# Patient Record
Sex: MALE | Race: WHITE | Employment: OTHER | URBAN - METROPOLITAN AREA
[De-identification: names, ages, dates, MRNs, and addresses within clinical notes are randomized per-mention and may not be internally consistent; named-entity substitution may affect disease eponyms.]

---

## 2017-01-12 RX ORDER — ATORVASTATIN CALCIUM 80 MG/1
TABLET, FILM COATED ORAL
Qty: 90 TABLET | Refills: 1 | Status: SHIPPED | OUTPATIENT
Start: 2017-01-12 | End: 2017-01-13 | Stop reason: SDUPTHER

## 2017-01-13 ENCOUNTER — TELEPHONE (OUTPATIENT)
Dept: CARDIOLOGY CLINIC | Age: 72
End: 2017-01-13

## 2017-01-13 RX ORDER — ATORVASTATIN CALCIUM 80 MG/1
TABLET, FILM COATED ORAL
Qty: 90 TABLET | Refills: 1 | Status: SHIPPED | OUTPATIENT
Start: 2017-01-13 | End: 2017-09-22 | Stop reason: SDUPTHER

## 2017-02-27 RX ORDER — RAMIPRIL 5 MG/1
CAPSULE ORAL
Qty: 90 CAPSULE | Refills: 3 | Status: SHIPPED | OUTPATIENT
Start: 2017-02-27 | End: 2017-12-07 | Stop reason: ALTCHOICE

## 2017-04-25 ENCOUNTER — OFFICE VISIT (OUTPATIENT)
Dept: INTERNAL MEDICINE CLINIC | Age: 72
End: 2017-04-25

## 2017-04-25 VITALS
HEART RATE: 72 BPM | SYSTOLIC BLOOD PRESSURE: 132 MMHG | BODY MASS INDEX: 37.1 KG/M2 | WEIGHT: 244 LBS | DIASTOLIC BLOOD PRESSURE: 60 MMHG

## 2017-04-25 DIAGNOSIS — M54.50 ACUTE RIGHT-SIDED LOW BACK PAIN WITHOUT SCIATICA: ICD-10-CM

## 2017-04-25 DIAGNOSIS — R25.1 TREMOR: Primary | ICD-10-CM

## 2017-04-25 PROCEDURE — 1123F ACP DISCUSS/DSCN MKR DOCD: CPT | Performed by: INTERNAL MEDICINE

## 2017-04-25 PROCEDURE — 1036F TOBACCO NON-USER: CPT | Performed by: INTERNAL MEDICINE

## 2017-04-25 PROCEDURE — 3017F COLORECTAL CA SCREEN DOC REV: CPT | Performed by: INTERNAL MEDICINE

## 2017-04-25 PROCEDURE — G8417 CALC BMI ABV UP PARAM F/U: HCPCS | Performed by: INTERNAL MEDICINE

## 2017-04-25 PROCEDURE — 4040F PNEUMOC VAC/ADMIN/RCVD: CPT | Performed by: INTERNAL MEDICINE

## 2017-04-25 PROCEDURE — 99214 OFFICE O/P EST MOD 30 MIN: CPT | Performed by: INTERNAL MEDICINE

## 2017-04-25 PROCEDURE — G8428 CUR MEDS NOT DOCUMENT: HCPCS | Performed by: INTERNAL MEDICINE

## 2017-04-25 PROCEDURE — G8599 NO ASA/ANTIPLAT THER USE RNG: HCPCS | Performed by: INTERNAL MEDICINE

## 2017-04-25 RX ORDER — TIZANIDINE 4 MG/1
4 TABLET ORAL EVERY 6 HOURS PRN
Qty: 40 TABLET | Refills: 0 | Status: SHIPPED | OUTPATIENT
Start: 2017-04-25 | End: 2019-06-11

## 2017-05-04 ENCOUNTER — TELEPHONE (OUTPATIENT)
Dept: INTERNAL MEDICINE CLINIC | Age: 72
End: 2017-05-04

## 2017-05-04 RX ORDER — PROPRANOLOL HYDROCHLORIDE 80 MG/1
80 TABLET ORAL 2 TIMES DAILY
Qty: 90 TABLET | Refills: 3 | Status: SHIPPED | OUTPATIENT
Start: 2017-05-04 | End: 2017-11-07 | Stop reason: SDUPTHER

## 2017-05-04 RX ORDER — PROPRANOLOL HYDROCHLORIDE 80 MG/1
80 TABLET ORAL 2 TIMES DAILY
COMMUNITY
End: 2017-05-04 | Stop reason: SDUPTHER

## 2017-09-25 RX ORDER — ATORVASTATIN CALCIUM 80 MG/1
TABLET, FILM COATED ORAL
Qty: 30 TABLET | Refills: 2 | Status: SHIPPED | OUTPATIENT
Start: 2017-09-25 | End: 2017-11-27 | Stop reason: SDUPTHER

## 2017-10-04 DIAGNOSIS — Z23 NEED FOR INFLUENZA VACCINATION: Primary | ICD-10-CM

## 2017-10-04 PROCEDURE — G0008 ADMIN INFLUENZA VIRUS VAC: HCPCS | Performed by: INTERNAL MEDICINE

## 2017-10-04 PROCEDURE — 90662 IIV NO PRSV INCREASED AG IM: CPT | Performed by: INTERNAL MEDICINE

## 2017-11-07 RX ORDER — PROPRANOLOL HYDROCHLORIDE 80 MG/1
TABLET ORAL
Qty: 180 TABLET | Refills: 3 | Status: SHIPPED | OUTPATIENT
Start: 2017-11-07 | End: 2017-12-07 | Stop reason: ALTCHOICE

## 2017-11-28 RX ORDER — ATORVASTATIN CALCIUM 80 MG/1
TABLET, FILM COATED ORAL
Qty: 90 TABLET | Refills: 0 | Status: SHIPPED | OUTPATIENT
Start: 2017-11-28 | End: 2018-06-18 | Stop reason: SDUPTHER

## 2017-12-01 ENCOUNTER — PATIENT MESSAGE (OUTPATIENT)
Dept: CARDIOLOGY CLINIC | Age: 72
End: 2017-12-01

## 2017-12-04 NOTE — TELEPHONE ENCOUNTER
From: Alejandro Tejeda  To: Chevy Cardenas MD  Sent: 12/1/2017 3:25 PM EST  Subject: Non-Urgent Medical Question    The online My chart tells me there is an order for a lipid panel ordered by Dr. Belinda Carpenter, that expires 12/28/17. I want to have it drawn at [facility] in the next few days, so results are back by my appointment on Dec 7. Do I need a printed order or does the lab have the order in the system?

## 2017-12-07 ENCOUNTER — OFFICE VISIT (OUTPATIENT)
Dept: CARDIOLOGY CLINIC | Age: 72
End: 2017-12-07

## 2017-12-07 ENCOUNTER — HOSPITAL ENCOUNTER (OUTPATIENT)
Dept: OTHER | Age: 72
Discharge: HOME | End: 2017-12-07
Attending: INTERNAL MEDICINE | Admitting: INTERNAL MEDICINE

## 2017-12-07 VITALS
BODY MASS INDEX: 35.16 KG/M2 | SYSTOLIC BLOOD PRESSURE: 112 MMHG | WEIGHT: 232 LBS | DIASTOLIC BLOOD PRESSURE: 58 MMHG | HEIGHT: 68 IN | HEART RATE: 58 BPM

## 2017-12-07 DIAGNOSIS — E78.5 HYPERLIPIDEMIA, UNSPECIFIED HYPERLIPIDEMIA TYPE: ICD-10-CM

## 2017-12-07 DIAGNOSIS — I10 ESSENTIAL HYPERTENSION: ICD-10-CM

## 2017-12-07 DIAGNOSIS — G20 PARKINSON DISEASE (HCC): ICD-10-CM

## 2017-12-07 DIAGNOSIS — I25.10 ATHEROSCLEROSIS OF NATIVE CORONARY ARTERY OF NATIVE HEART WITHOUT ANGINA PECTORIS: Primary | ICD-10-CM

## 2017-12-07 DIAGNOSIS — R07.9 CHEST PAIN, UNSPECIFIED TYPE: ICD-10-CM

## 2017-12-07 LAB
ALBUMIN SERPL-MCNC: 4.2 G/DL (ref 3.4–5)
ALP BLD-CCNC: 84 U/L (ref 40–129)
ALT SERPL-CCNC: 33 U/L (ref 10–40)
ANION GAP SERPL CALCULATED.3IONS-SCNC: 15 MMOL/L (ref 3–16)
AST SERPL-CCNC: 23 U/L (ref 15–37)
BILIRUB SERPL-MCNC: 0.5 MG/DL (ref 0–1)
BILIRUBIN DIRECT: <0.2 MG/DL (ref 0–0.3)
BILIRUBIN, INDIRECT: NORMAL MG/DL (ref 0–1)
BUN BLDV-MCNC: 15 MG/DL (ref 7–20)
CALCIUM SERPL-MCNC: 9.5 MG/DL (ref 8.3–10.6)
CHLORIDE BLD-SCNC: 103 MMOL/L (ref 99–110)
CHOLESTEROL, TOTAL: 151 MG/DL (ref 0–199)
CO2: 25 MMOL/L (ref 21–32)
CREAT SERPL-MCNC: 0.8 MG/DL (ref 0.8–1.3)
GFR AFRICAN AMERICAN: >60
GFR NON-AFRICAN AMERICAN: >60
GLUCOSE BLD-MCNC: 104 MG/DL (ref 70–99)
HDLC SERPL-MCNC: 41 MG/DL (ref 40–60)
LDL CHOLESTEROL CALCULATED: 88 MG/DL
POTASSIUM SERPL-SCNC: 4.3 MMOL/L (ref 3.5–5.1)
SODIUM BLD-SCNC: 143 MMOL/L (ref 136–145)
TOTAL PROTEIN: 6.8 G/DL (ref 6.4–8.2)
TRIGL SERPL-MCNC: 109 MG/DL (ref 0–150)
VLDLC SERPL CALC-MCNC: 22 MG/DL

## 2017-12-07 PROCEDURE — 3017F COLORECTAL CA SCREEN DOC REV: CPT | Performed by: INTERNAL MEDICINE

## 2017-12-07 PROCEDURE — G8417 CALC BMI ABV UP PARAM F/U: HCPCS | Performed by: INTERNAL MEDICINE

## 2017-12-07 PROCEDURE — G8599 NO ASA/ANTIPLAT THER USE RNG: HCPCS | Performed by: INTERNAL MEDICINE

## 2017-12-07 PROCEDURE — 1036F TOBACCO NON-USER: CPT | Performed by: INTERNAL MEDICINE

## 2017-12-07 PROCEDURE — 4040F PNEUMOC VAC/ADMIN/RCVD: CPT | Performed by: INTERNAL MEDICINE

## 2017-12-07 PROCEDURE — G8484 FLU IMMUNIZE NO ADMIN: HCPCS | Performed by: INTERNAL MEDICINE

## 2017-12-07 PROCEDURE — 1123F ACP DISCUSS/DSCN MKR DOCD: CPT | Performed by: INTERNAL MEDICINE

## 2017-12-07 PROCEDURE — 99214 OFFICE O/P EST MOD 30 MIN: CPT | Performed by: INTERNAL MEDICINE

## 2017-12-07 PROCEDURE — G8427 DOCREV CUR MEDS BY ELIG CLIN: HCPCS | Performed by: INTERNAL MEDICINE

## 2017-12-07 RX ORDER — PROPRANOLOL HYDROCHLORIDE 40 MG/1
40 TABLET ORAL DAILY
COMMUNITY
End: 2018-06-18

## 2017-12-07 RX ORDER — NITROGLYCERIN 0.4 MG/1
0.4 TABLET SUBLINGUAL EVERY 5 MIN PRN
Qty: 25 TABLET | Refills: 3 | Status: SHIPPED | OUTPATIENT
Start: 2017-12-07 | End: 2018-08-21 | Stop reason: SDUPTHER

## 2017-12-07 RX ORDER — PRIMIDONE 50 MG/1
50 TABLET ORAL DAILY
COMMUNITY

## 2017-12-07 NOTE — PATIENT INSTRUCTIONS
Mr. Marlen Brown has been stable cardiac wise. Doing well and no cardiac testing at this time but if he have any changes or if chest tightness persists. 1. No med changes. Contiue inderal 40 mg anfd stop ramipril. 2. Aoid cold weather exertion. 3. Will continue with risk factor modifications. 4. Return for regular follow up in 6 months. 5. Slip given for lipid panel.

## 2017-12-07 NOTE — PROGRESS NOTES
TAKE 1 CAPSULE EVERY DAY 90 capsule 3    omeprazole (PRILOSEC) 20 MG delayed release capsule Take 20 mg by mouth daily      aspirin 81 MG tablet Take 1 tablet by mouth daily 30 tablet 0    nitroGLYCERIN (NITROSTAT) 0.4 MG SL tablet Place 1 tablet under the tongue every 5 minutes as needed 25 tablet 3    Psyllium 500 MG CAPS Take by mouth 2 times daily      fish oil-omega-3 fatty acids 1000 MG capsule Take 2 g by mouth daily.  multivitamin (THERAGRAN) per tablet Take 1 tablet by mouth daily.  propranolol (INDERAL) 80 MG tablet TAKE 1 TABLET TWICE DAILY (SUBSTITUTED FOR INDERAL) 180 tablet 3    tiZANidine (ZANAFLEX) 4 MG tablet Take 1 tablet by mouth every 6 hours as needed (pain) 40 tablet 0     No current facility-administered medications for this visit. Allergies:  Amoxicillin-pot clavulanate     Review of Systems:   · Constitutional: there has been no unanticipated weight loss. There's been no change in energy level, sleep pattern, or activity level. · Eyes: No visual changes or diplopia. No scleral icterus. · ENT: No Headaches, hearing loss or vertigo. No mouth sores or sore throat. · Cardiovascular: Reviewed in HPI  · Respiratory: No cough or wheezing, no sputum production. No hematemesis. · Gastrointestinal: No abdominal pain, appetite loss, blood in stools. No change in bowel or bladder habits. · Genitourinary: No dysuria, trouble voiding, or hematuria. · Musculoskeletal:  No gait disturbance, weakness or joint complaints. · Integumentary: No rash or pruritis. · Neurological: No headache, diplopia, change in muscle strength, numbness or tingling. No change in gait, balance, coordination, mood, affect, memory, mentation, behavior. · Psychiatric: No anxiety, no depression. · Endocrine: No malaise, fatigue or temperature intolerance. No excessive thirst, fluid intake, or urination. No tremor.   · Hematologic/Lymphatic: No abnormal bruising or bleeding, blood clots or swollen lymph nodes. · Allergic/Immunologic: No nasal congestion or hives. Physical Examination:    Vitals:    12/07/17 1255   BP: (!) 112/58   Pulse: 58        Constitutional and General Appearance: in NAD  Skin:good turgor,intact without lesions  HEENT: EOMI ,normal  Neck:no JVD  Respiratory:  · Normal excursion and expansion without use of accessory muscles  · Resp Auscultation: Normal breath sounds without dullness  Cardiovascular:  · The apical impulses not displaced  · Heart tones are crisp and normal  · Cervical veins are not engorged  · The carotid upstroke is normal in amplitude and contour without delay. No bruit heard on physical.   · Peripheral pulses are symmetrical and full  · There is no clubbing, cyanosis of the extremities. · No edema  · Femoral Arteries: 2+ and equal  · Pedal Pulses: 2+ and equal   Abdomen:  · No masses or tenderness  · Liver/Spleen: No Abnormalities Noted  Neurological/Psychiatric:  · Alert and oriented in all spheres  · Moves all extremities well  · Exhibits normal gait balance and coordination  · No abnormalities of mood, affect, memory, mentation, or behavior are noted    Assessment:    1. Coronary atherosclerosis of native coronary artery: Stable. 2005 Cath-- stent in LAD  2006 Cath--Patent stent in lad -nonobstructive circumflex disease. 1/10 GXT Myoview Normal perfusion. An angiogram was normal.   Cath 4/2015--Normal LV function with EF 60, widely patent LAD stent, 30% distal CFx. 2. Hypertension: Stable. Blood pressure 122/56, pulse 60 sitting BP drops about 10 points with standing most likely due to the parkinson's. 3. Hyperlipemia: Currently on Lipitor 80 mg and fish oil. 4.  Carotid disease: Dopplers 2012-- 41-67% MINA, 4-63% LICA. 5.  Tremors, chronic: Essential familial. Taking Inderal and primidone. 6.   Chest pain, chronic: Intermittent. Normal angiogram 2015. 7. Parkinson disease--newly diagnosed and on carbidopa.      Plan:  Mr. Iris Domínguez has been stable cardiac wise. Doing well and no cardiac testing at this time but if he have any changes or if chest tightness persists. 1. No med changes. Contiue inderal 40 mg anfd stop ramipril. 2. Aoid cold weather exertion. 3. Will continue with risk factor modifications. 4. Return for regular follow up in 6 months. 5. Slip given for lipid panel. I appreciate the opportunity of cooperating in the care of this individual.    Jing Cheng.  Millie Cage M.D., [facility] [place]

## 2018-06-18 ENCOUNTER — OFFICE VISIT (OUTPATIENT)
Dept: INTERNAL MEDICINE CLINIC | Age: 73
End: 2018-06-18

## 2018-06-18 ENCOUNTER — OFFICE VISIT (OUTPATIENT)
Dept: CARDIOLOGY CLINIC | Age: 73
End: 2018-06-18

## 2018-06-18 VITALS
WEIGHT: 237 LBS | SYSTOLIC BLOOD PRESSURE: 114 MMHG | HEART RATE: 93 BPM | BODY MASS INDEX: 35.92 KG/M2 | OXYGEN SATURATION: 95 % | DIASTOLIC BLOOD PRESSURE: 78 MMHG | HEIGHT: 68 IN

## 2018-06-18 VITALS
WEIGHT: 236 LBS | HEIGHT: 68 IN | BODY MASS INDEX: 35.77 KG/M2 | SYSTOLIC BLOOD PRESSURE: 100 MMHG | HEART RATE: 72 BPM | DIASTOLIC BLOOD PRESSURE: 58 MMHG

## 2018-06-18 DIAGNOSIS — I10 ESSENTIAL HYPERTENSION: Primary | ICD-10-CM

## 2018-06-18 DIAGNOSIS — R06.02 SHORTNESS OF BREATH: Chronic | ICD-10-CM

## 2018-06-18 DIAGNOSIS — E78.49 OTHER HYPERLIPIDEMIA: ICD-10-CM

## 2018-06-18 DIAGNOSIS — E78.49 OTHER HYPERLIPIDEMIA: Chronic | ICD-10-CM

## 2018-06-18 DIAGNOSIS — I25.10 ATHEROSCLEROSIS OF NATIVE CORONARY ARTERY OF NATIVE HEART WITHOUT ANGINA PECTORIS: Primary | Chronic | ICD-10-CM

## 2018-06-18 DIAGNOSIS — I10 ESSENTIAL HYPERTENSION: ICD-10-CM

## 2018-06-18 DIAGNOSIS — G20 PARKINSON'S DISEASE (HCC): ICD-10-CM

## 2018-06-18 DIAGNOSIS — R42 DIZZINESS: ICD-10-CM

## 2018-06-18 DIAGNOSIS — R07.9 CHEST PAIN, UNSPECIFIED TYPE: Chronic | ICD-10-CM

## 2018-06-18 DIAGNOSIS — R25.1 TREMOR: ICD-10-CM

## 2018-06-18 PROCEDURE — 99214 OFFICE O/P EST MOD 30 MIN: CPT | Performed by: INTERNAL MEDICINE

## 2018-06-18 PROCEDURE — G8417 CALC BMI ABV UP PARAM F/U: HCPCS | Performed by: INTERNAL MEDICINE

## 2018-06-18 PROCEDURE — 1036F TOBACCO NON-USER: CPT | Performed by: INTERNAL MEDICINE

## 2018-06-18 PROCEDURE — 1123F ACP DISCUSS/DSCN MKR DOCD: CPT | Performed by: INTERNAL MEDICINE

## 2018-06-18 PROCEDURE — 3017F COLORECTAL CA SCREEN DOC REV: CPT | Performed by: INTERNAL MEDICINE

## 2018-06-18 PROCEDURE — 4040F PNEUMOC VAC/ADMIN/RCVD: CPT | Performed by: INTERNAL MEDICINE

## 2018-06-18 PROCEDURE — G8427 DOCREV CUR MEDS BY ELIG CLIN: HCPCS | Performed by: INTERNAL MEDICINE

## 2018-06-18 PROCEDURE — G8599 NO ASA/ANTIPLAT THER USE RNG: HCPCS | Performed by: INTERNAL MEDICINE

## 2018-06-18 RX ORDER — ATORVASTATIN CALCIUM 80 MG/1
80 TABLET, FILM COATED ORAL DAILY
Qty: 90 TABLET | Refills: 3 | Status: CANCELLED | OUTPATIENT
Start: 2018-06-18

## 2018-06-18 RX ORDER — ATORVASTATIN CALCIUM 80 MG/1
80 TABLET, FILM COATED ORAL DAILY
Qty: 90 TABLET | Refills: 3 | Status: SHIPPED | OUTPATIENT
Start: 2018-06-18 | End: 2019-01-09 | Stop reason: SDUPTHER

## 2018-06-18 RX ORDER — METOPROLOL SUCCINATE 50 MG/1
50 TABLET, EXTENDED RELEASE ORAL DAILY
Qty: 90 TABLET | Refills: 3 | Status: SHIPPED | OUTPATIENT
Start: 2018-06-18 | End: 2019-01-09 | Stop reason: SDUPTHER

## 2018-06-18 ASSESSMENT — PATIENT HEALTH QUESTIONNAIRE - PHQ9
2. FEELING DOWN, DEPRESSED OR HOPELESS: 0
1. LITTLE INTEREST OR PLEASURE IN DOING THINGS: 1
SUM OF ALL RESPONSES TO PHQ QUESTIONS 1-9: 1
SUM OF ALL RESPONSES TO PHQ9 QUESTIONS 1 & 2: 1

## 2018-06-27 ENCOUNTER — HOSPITAL ENCOUNTER (OUTPATIENT)
Dept: OTHER | Age: 73
Discharge: HOME | End: 2018-06-27
Attending: INTERNAL MEDICINE | Admitting: INTERNAL MEDICINE

## 2018-06-27 DIAGNOSIS — G20 PARKINSON DISEASE (HCC): ICD-10-CM

## 2018-06-27 DIAGNOSIS — I10 ESSENTIAL HYPERTENSION: ICD-10-CM

## 2018-06-27 DIAGNOSIS — I25.10 ATHEROSCLEROSIS OF NATIVE CORONARY ARTERY OF NATIVE HEART WITHOUT ANGINA PECTORIS: ICD-10-CM

## 2018-06-27 DIAGNOSIS — E78.5 HYPERLIPIDEMIA, UNSPECIFIED HYPERLIPIDEMIA TYPE: ICD-10-CM

## 2018-06-27 DIAGNOSIS — R07.9 CHEST PAIN, UNSPECIFIED TYPE: ICD-10-CM

## 2018-06-27 LAB
A/G RATIO: 1.5 (ref 1.1–2.2)
ALBUMIN SERPL-MCNC: 4 G/DL (ref 3.4–5)
ALP BLD-CCNC: 89 U/L (ref 40–129)
ALT SERPL-CCNC: 18 U/L (ref 10–40)
ANION GAP SERPL CALCULATED.3IONS-SCNC: 15 MMOL/L (ref 3–16)
AST SERPL-CCNC: 34 U/L (ref 15–37)
BILIRUB SERPL-MCNC: 0.4 MG/DL (ref 0–1)
BILIRUBIN DIRECT: <0.2 MG/DL (ref 0–0.3)
BILIRUBIN, INDIRECT: NORMAL MG/DL (ref 0–1)
BUN BLDV-MCNC: 15 MG/DL (ref 7–20)
CALCIUM SERPL-MCNC: 9.2 MG/DL (ref 8.3–10.6)
CHLORIDE BLD-SCNC: 105 MMOL/L (ref 99–110)
CO2: 25 MMOL/L (ref 21–32)
CREAT SERPL-MCNC: 0.8 MG/DL (ref 0.8–1.3)
GFR AFRICAN AMERICAN: >60
GFR NON-AFRICAN AMERICAN: >60
GLOBULIN: 2.6 G/DL
GLUCOSE BLD-MCNC: 98 MG/DL (ref 70–99)
POTASSIUM SERPL-SCNC: 4.4 MMOL/L (ref 3.5–5.1)
SODIUM BLD-SCNC: 145 MMOL/L (ref 136–145)
TOTAL PROTEIN: 6.6 G/DL (ref 6.4–8.2)
TSH REFLEX: 2.36 UIU/ML (ref 0.27–4.2)

## 2018-08-21 ENCOUNTER — TELEPHONE (OUTPATIENT)
Dept: CARDIOLOGY CLINIC | Age: 73
End: 2018-08-21

## 2018-08-21 DIAGNOSIS — I25.10 ATHEROSCLEROSIS OF NATIVE CORONARY ARTERY OF NATIVE HEART WITHOUT ANGINA PECTORIS: ICD-10-CM

## 2018-08-21 DIAGNOSIS — E78.5 HYPERLIPIDEMIA, UNSPECIFIED HYPERLIPIDEMIA TYPE: ICD-10-CM

## 2018-08-21 DIAGNOSIS — R07.9 CHEST PAIN, UNSPECIFIED TYPE: ICD-10-CM

## 2018-08-21 DIAGNOSIS — G20 PARKINSON DISEASE (HCC): ICD-10-CM

## 2018-08-21 DIAGNOSIS — I10 ESSENTIAL HYPERTENSION: ICD-10-CM

## 2018-08-21 RX ORDER — NITROGLYCERIN 0.4 MG/1
0.4 TABLET SUBLINGUAL EVERY 5 MIN PRN
Qty: 25 TABLET | Refills: 3 | Status: SHIPPED | OUTPATIENT
Start: 2018-08-21 | End: 2020-02-12 | Stop reason: SDUPTHER

## 2018-10-03 DIAGNOSIS — Z23 NEED FOR INFLUENZA VACCINATION: Primary | ICD-10-CM

## 2018-10-03 PROCEDURE — 90662 IIV NO PRSV INCREASED AG IM: CPT | Performed by: INTERNAL MEDICINE

## 2018-10-03 PROCEDURE — G0008 ADMIN INFLUENZA VIRUS VAC: HCPCS | Performed by: INTERNAL MEDICINE

## 2018-12-18 ENCOUNTER — OFFICE VISIT (OUTPATIENT)
Dept: INTERNAL MEDICINE CLINIC | Age: 73
End: 2018-12-18
Payer: MEDICARE

## 2018-12-18 VITALS
BODY MASS INDEX: 36.19 KG/M2 | DIASTOLIC BLOOD PRESSURE: 60 MMHG | HEART RATE: 68 BPM | WEIGHT: 238 LBS | SYSTOLIC BLOOD PRESSURE: 122 MMHG

## 2018-12-18 DIAGNOSIS — R25.1 TREMOR: ICD-10-CM

## 2018-12-18 DIAGNOSIS — E78.49 OTHER HYPERLIPIDEMIA: Primary | ICD-10-CM

## 2018-12-18 DIAGNOSIS — G20 PARKINSON'S DISEASE (HCC): ICD-10-CM

## 2018-12-18 PROCEDURE — 1101F PT FALLS ASSESS-DOCD LE1/YR: CPT | Performed by: INTERNAL MEDICINE

## 2018-12-18 PROCEDURE — 1036F TOBACCO NON-USER: CPT | Performed by: INTERNAL MEDICINE

## 2018-12-18 PROCEDURE — 4040F PNEUMOC VAC/ADMIN/RCVD: CPT | Performed by: INTERNAL MEDICINE

## 2018-12-18 PROCEDURE — 3017F COLORECTAL CA SCREEN DOC REV: CPT | Performed by: INTERNAL MEDICINE

## 2018-12-18 PROCEDURE — 1123F ACP DISCUSS/DSCN MKR DOCD: CPT | Performed by: INTERNAL MEDICINE

## 2018-12-18 PROCEDURE — 99214 OFFICE O/P EST MOD 30 MIN: CPT | Performed by: INTERNAL MEDICINE

## 2018-12-18 PROCEDURE — G8482 FLU IMMUNIZE ORDER/ADMIN: HCPCS | Performed by: INTERNAL MEDICINE

## 2018-12-18 PROCEDURE — G8599 NO ASA/ANTIPLAT THER USE RNG: HCPCS | Performed by: INTERNAL MEDICINE

## 2018-12-18 PROCEDURE — G8428 CUR MEDS NOT DOCUMENT: HCPCS | Performed by: INTERNAL MEDICINE

## 2018-12-18 PROCEDURE — G8417 CALC BMI ABV UP PARAM F/U: HCPCS | Performed by: INTERNAL MEDICINE

## 2018-12-18 NOTE — PROGRESS NOTES
Chief Complaint   Patient presents with    Coronary Artery Disease    Tremors       Richie Gregory 72 y.o. male is here for follow-up of hypertension Hyperlipidemia, tremor, Parkinson's disease,  Is having some problems with orthostatic dizziness. These are worse in the morning when first arising from bed. They're not associated with symptoms of true vertigo. He has some orthostatic symptoms related to his Parkinson's disease and his ongoing treatment. Current Outpatient Prescriptions on File Prior to Visit   Medication Sig Dispense Refill    metoprolol succinate (TOPROL XL) 50 MG extended release tablet Take 1 tablet by mouth daily 90 tablet 3    atorvastatin (LIPITOR) 80 MG tablet Take 1 tablet by mouth daily 90 tablet 3    primidone (MYSOLINE) 50 MG tablet Take 50 mg by mouth daily      carbidopa-levodopa (SINEMET)  MG per tablet Take 2 tablets by mouth 3 times daily       nitroGLYCERIN (NITROSTAT) 0.4 MG SL tablet Place 1 tablet under the tongue every 5 minutes as needed (prn) 25 tablet 3    tiZANidine (ZANAFLEX) 4 MG tablet Take 1 tablet by mouth every 6 hours as needed (pain) 40 tablet 0    omeprazole (PRILOSEC) 20 MG delayed release capsule Take 20 mg by mouth daily      aspirin 81 MG tablet Take 1 tablet by mouth daily 30 tablet 0    Psyllium 500 MG CAPS Take by mouth 2 times daily      fish oil-omega-3 fatty acids 1000 MG capsule Take 2 g by mouth daily.  multivitamin (THERAGRAN) per tablet Take 1 tablet by mouth daily. No current facility-administered medications on file prior to visit.         Past Medical History:   Diagnosis Date    Acute bronchitis     history of    CAD (coronary artery disease)     Hyperlipidemia            BP (!) 100/58   Pulse 72   Ht 5' 8\" (1.727 m)   Wt 236 lb (107 kg)   BMI 35.88 kg/m²     General Appearance:  Alert, cooperative, no distress, appears stated age   Head:  Normocephalic, without obvious abnormality, atraumatic   Neck: Supple, symmetrical, trachea midline, no adenopathy, thyroid: not enlarged, symmetric, no tenderness/mass/nodules,    Lungs:   Clear to auscultation bilaterally, respirations unlabored   Chest Wall:  No tenderness or deformity   Heart:  Regular rate and rhythm, S1, S2 normal, no murmur, rub or gallop   Abdomen:   Soft, non-tender, bowel sounds active all four quadrants,  no masses, no organomegaly   Skin: Skin color, texture, turgor normal, no rashes or lesions   Lymph nodes: Cervical, supraclavicular  Adenopathy is absent   Neurologic:  's tremor continues to have the characteristics of Parkinson's and essential tremor although I think quite clearly I appreciate cogwheel rigidity on today's examination, looks like he is developing masked facies       No components found for: CHLPL  Lab Results   Component Value Date    TRIG 109 12/07/2017    TRIG 145 12/15/2015    TRIG 138 11/25/2014     Lab Results   Component Value Date    HDL 41 12/07/2017    HDL 39 (L) 12/15/2015    HDL 38 (L) 11/25/2014     Lab Results   Component Value Date    LDLCALC 88 12/07/2017    LDLCALC 71 12/15/2015    LDLCALC 86 11/25/2014     Lab Results   Component Value Date    LABVLDL 22 12/07/2017    LABVLDL 29 12/15/2015    LABVLDL 28 11/25/2014     Lab Results   Component Value Date    CREATININE 0.8 12/07/2017         ASSESSMENT/PLAN[de-identified]     Diagnosis Orders   1. Other hyperlipidemia  Comprehensive Metabolic Panel    Lipid Panel    TSH WITH REFLEX TO FT4   2. Parkinson's disease ([address]     3. Tremor         We'll obtain fasting laboratory Make sure there is not a metabolic reason contributing to his dizziness but I believe it is from a combination of orthostatic hypotension from Parkinson's disease  Sinemet and the beta blocker    He had been started on the beta blocker initially for essential tremor, also has known coronary artery disease.     I discussed with the patient and his wife was in attendance during the visit the possibility of cutting back on the    They are concerned that he has multiple physicians changing his medications and I elected to defer discontinuation of the beta blocker until after he sees the cardiologist.  Discontinuation of the beta blocker may at least in part help with the orthostasis although he still has underlying Parkinson's disease and autonomic dysfunction may be playing a significant.     Ordered fasting laboratory    He is on high-dose treatment for hyperlipidemia

## 2018-12-19 ENCOUNTER — HOSPITAL ENCOUNTER (OUTPATIENT)
Age: 73
Discharge: HOME | End: 2018-12-19
Payer: MEDICARE

## 2018-12-19 DIAGNOSIS — E78.49 OTHER HYPERLIPIDEMIA: ICD-10-CM

## 2018-12-19 LAB
A/G RATIO: 1.5 (ref 1.1–2.2)
ALBUMIN SERPL-MCNC: 4.1 G/DL (ref 3.4–5)
ALP BLD-CCNC: 90 U/L (ref 40–129)
ALT SERPL-CCNC: 36 U/L (ref 10–40)
ANION GAP SERPL CALCULATED.3IONS-SCNC: 11 MMOL/L (ref 3–16)
AST SERPL-CCNC: 26 U/L (ref 15–37)
BILIRUB SERPL-MCNC: 0.3 MG/DL (ref 0–1)
BUN BLDV-MCNC: 17 MG/DL (ref 7–20)
CALCIUM SERPL-MCNC: 9.4 MG/DL (ref 8.3–10.6)
CHLORIDE BLD-SCNC: 105 MMOL/L (ref 99–110)
CHOLESTEROL, TOTAL: 144 MG/DL (ref 0–199)
CO2: 28 MMOL/L (ref 21–32)
CREAT SERPL-MCNC: 0.8 MG/DL (ref 0.8–1.3)
GFR AFRICAN AMERICAN: >60
GFR NON-AFRICAN AMERICAN: >60
GLOBULIN: 2.7 G/DL
GLUCOSE BLD-MCNC: 97 MG/DL (ref 70–99)
HDLC SERPL-MCNC: 42 MG/DL (ref 40–60)
LDL CHOLESTEROL CALCULATED: 84 MG/DL
POTASSIUM SERPL-SCNC: 5.1 MMOL/L (ref 3.5–5.1)
SODIUM BLD-SCNC: 144 MMOL/L (ref 136–145)
TOTAL PROTEIN: 6.8 G/DL (ref 6.4–8.2)
TRIGL SERPL-MCNC: 90 MG/DL (ref 0–150)
TSH REFLEX FT4: 2.27 UIU/ML (ref 0.27–4.2)
VLDLC SERPL CALC-MCNC: 18 MG/DL

## 2018-12-19 PROCEDURE — 84443 ASSAY THYROID STIM HORMONE: CPT

## 2018-12-19 PROCEDURE — 80061 LIPID PANEL: CPT

## 2018-12-19 PROCEDURE — 36415 COLL VENOUS BLD VENIPUNCTURE: CPT

## 2018-12-19 PROCEDURE — 80053 COMPREHEN METABOLIC PANEL: CPT

## 2019-01-09 ENCOUNTER — OFFICE VISIT (OUTPATIENT)
Dept: CARDIOLOGY CLINIC | Age: 74
End: 2019-01-09
Payer: MEDICARE

## 2019-01-09 VITALS
SYSTOLIC BLOOD PRESSURE: 122 MMHG | HEIGHT: 68 IN | DIASTOLIC BLOOD PRESSURE: 66 MMHG | WEIGHT: 240.8 LBS | HEART RATE: 68 BPM | BODY MASS INDEX: 36.49 KG/M2

## 2019-01-09 DIAGNOSIS — I10 ESSENTIAL HYPERTENSION: ICD-10-CM

## 2019-01-09 DIAGNOSIS — I25.10 ATHEROSCLEROSIS OF NATIVE CORONARY ARTERY OF NATIVE HEART WITHOUT ANGINA PECTORIS: Primary | Chronic | ICD-10-CM

## 2019-01-09 DIAGNOSIS — E78.49 OTHER HYPERLIPIDEMIA: Chronic | ICD-10-CM

## 2019-01-09 PROCEDURE — 1123F ACP DISCUSS/DSCN MKR DOCD: CPT | Performed by: INTERNAL MEDICINE

## 2019-01-09 PROCEDURE — 99214 OFFICE O/P EST MOD 30 MIN: CPT | Performed by: INTERNAL MEDICINE

## 2019-01-09 PROCEDURE — G8417 CALC BMI ABV UP PARAM F/U: HCPCS | Performed by: INTERNAL MEDICINE

## 2019-01-09 PROCEDURE — G8482 FLU IMMUNIZE ORDER/ADMIN: HCPCS | Performed by: INTERNAL MEDICINE

## 2019-01-09 PROCEDURE — G8427 DOCREV CUR MEDS BY ELIG CLIN: HCPCS | Performed by: INTERNAL MEDICINE

## 2019-01-09 PROCEDURE — 4040F PNEUMOC VAC/ADMIN/RCVD: CPT | Performed by: INTERNAL MEDICINE

## 2019-01-09 PROCEDURE — G8599 NO ASA/ANTIPLAT THER USE RNG: HCPCS | Performed by: INTERNAL MEDICINE

## 2019-01-09 PROCEDURE — 1101F PT FALLS ASSESS-DOCD LE1/YR: CPT | Performed by: INTERNAL MEDICINE

## 2019-01-09 PROCEDURE — 1036F TOBACCO NON-USER: CPT | Performed by: INTERNAL MEDICINE

## 2019-01-09 PROCEDURE — 3017F COLORECTAL CA SCREEN DOC REV: CPT | Performed by: INTERNAL MEDICINE

## 2019-01-09 RX ORDER — METOPROLOL SUCCINATE 25 MG/1
25 TABLET, EXTENDED RELEASE ORAL DAILY
Qty: 90 TABLET | Refills: 3 | Status: SHIPPED | OUTPATIENT
Start: 2019-01-09 | End: 2019-07-10 | Stop reason: SDUPTHER

## 2019-01-09 RX ORDER — ATORVASTATIN CALCIUM 80 MG/1
80 TABLET, FILM COATED ORAL DAILY
Qty: 90 TABLET | Refills: 3 | Status: SHIPPED | OUTPATIENT
Start: 2019-01-09 | End: 2019-11-29 | Stop reason: SDUPTHER

## 2019-06-11 ENCOUNTER — OFFICE VISIT (OUTPATIENT)
Dept: INTERNAL MEDICINE CLINIC | Age: 74
End: 2019-06-11
Payer: MEDICARE

## 2019-06-11 VITALS
HEART RATE: 80 BPM | WEIGHT: 235 LBS | DIASTOLIC BLOOD PRESSURE: 70 MMHG | BODY MASS INDEX: 35.73 KG/M2 | SYSTOLIC BLOOD PRESSURE: 120 MMHG

## 2019-06-11 DIAGNOSIS — Z23 NEED FOR VACCINATION WITH 13-POLYVALENT PNEUMOCOCCAL CONJUGATE VACCINE: ICD-10-CM

## 2019-06-11 DIAGNOSIS — E78.49 OTHER HYPERLIPIDEMIA: ICD-10-CM

## 2019-06-11 DIAGNOSIS — R53.83 FATIGUE, UNSPECIFIED TYPE: ICD-10-CM

## 2019-06-11 DIAGNOSIS — I10 ESSENTIAL HYPERTENSION: Primary | ICD-10-CM

## 2019-06-11 PROCEDURE — G8599 NO ASA/ANTIPLAT THER USE RNG: HCPCS | Performed by: INTERNAL MEDICINE

## 2019-06-11 PROCEDURE — G8417 CALC BMI ABV UP PARAM F/U: HCPCS | Performed by: INTERNAL MEDICINE

## 2019-06-11 PROCEDURE — 1036F TOBACCO NON-USER: CPT | Performed by: INTERNAL MEDICINE

## 2019-06-11 PROCEDURE — 3017F COLORECTAL CA SCREEN DOC REV: CPT | Performed by: INTERNAL MEDICINE

## 2019-06-11 PROCEDURE — 1123F ACP DISCUSS/DSCN MKR DOCD: CPT | Performed by: INTERNAL MEDICINE

## 2019-06-11 PROCEDURE — 99214 OFFICE O/P EST MOD 30 MIN: CPT | Performed by: INTERNAL MEDICINE

## 2019-06-11 PROCEDURE — 4040F PNEUMOC VAC/ADMIN/RCVD: CPT | Performed by: INTERNAL MEDICINE

## 2019-06-11 PROCEDURE — G8427 DOCREV CUR MEDS BY ELIG CLIN: HCPCS | Performed by: INTERNAL MEDICINE

## 2019-06-11 ASSESSMENT — PATIENT HEALTH QUESTIONNAIRE - PHQ9
1. LITTLE INTEREST OR PLEASURE IN DOING THINGS: 0
SUM OF ALL RESPONSES TO PHQ QUESTIONS 1-9: 0
SUM OF ALL RESPONSES TO PHQ QUESTIONS 1-9: 0
2. FEELING DOWN, DEPRESSED OR HOPELESS: 0
SUM OF ALL RESPONSES TO PHQ9 QUESTIONS 1 & 2: 0

## 2019-06-11 NOTE — PROGRESS NOTES
Chief Complaint   Patient presents with    Hypertension    Tremors       Ricardo Pereira 72 y.o. male is here for follow-up of hypertension Hyperlipidemia, tremor, Parkinson's disease,  He is accompanied by his wife. The patient complains of episodes of a lack of energy. His wife states that they seem to be throughout the day however the patient states that more precisely the seem to occur in the early afternoon. There is no clear cut relationship to dosing of his carboxy dopa levodopa. He takes both long-acting and short acting carboxy dopa levodopa throughout the day. He continues under active ongoing treatment for hyperlipidemia    He complains of some problems with erectile dysfunction. He is on nitroglycerin. According to his wife when he does activity such as cutting the lawn he does use nitro      He has some orthostatic symptoms related to his Parkinson's disease and his ongoing treatment. Current Outpatient Prescriptions on File Prior to Visit   Medication Sig Dispense Refill    metoprolol succinate (TOPROL XL) 50 MG extended release tablet Take 1 tablet by mouth daily 90 tablet 3    atorvastatin (LIPITOR) 80 MG tablet Take 1 tablet by mouth daily 90 tablet 3    primidone (MYSOLINE) 50 MG tablet Take 50 mg by mouth daily      carbidopa-levodopa (SINEMET)  MG per tablet Take 2 tablets by mouth 3 times daily       nitroGLYCERIN (NITROSTAT) 0.4 MG SL tablet Place 1 tablet under the tongue every 5 minutes as needed (prn) 25 tablet 3    tiZANidine (ZANAFLEX) 4 MG tablet Take 1 tablet by mouth every 6 hours as needed (pain) 40 tablet 0    omeprazole (PRILOSEC) 20 MG delayed release capsule Take 20 mg by mouth daily      aspirin 81 MG tablet Take 1 tablet by mouth daily 30 tablet 0    Psyllium 500 MG CAPS Take by mouth 2 times daily      fish oil-omega-3 fatty acids 1000 MG capsule Take 2 g by mouth daily.         multivitamin (THERAGRAN) per tablet Take 1 tablet by mouth daily. No current facility-administered medications on file prior to visit. Past Medical History:   Diagnosis Date    Acute bronchitis     history of    CAD (coronary artery disease)     Hyperlipidemia            BP (!) 100/58   Pulse 72   Ht 5' 8\" (1.727 m)   Wt 236 lb (107 kg)   BMI 35.88 kg/m²     General Appearance:  Alert, cooperative, no distress, appears stated age   Head:  Normocephalic, without obvious abnormality, atraumatic   Neck: Supple, symmetrical, trachea midline, no adenopathy, thyroid: not enlarged, symmetric, no tenderness/mass/nodules,    Lungs:   Clear to auscultation bilaterally, respirations unlabored   Chest Wall:  No tenderness or deformity   Heart:  Regular rate and rhythm, S1, S2 normal, no murmur, rub or gallop   Abdomen:   Soft, non-tender, bowel sounds active all four quadrants,  no masses, no organomegaly   Skin: Skin color, texture, turgor normal, no rashes or lesions   Lymph nodes: Cervical, supraclavicular  Adenopathy is absent   Neurologic:  Cogwheel rigidity is present right greater than left but is present bilaterally masked facies broad-based gait       No components found for: CHLPL  Lab Results   Component Value Date    TRIG 109 12/07/2017    TRIG 145 12/15/2015    TRIG 138 11/25/2014     Lab Results   Component Value Date    HDL 41 12/07/2017    HDL 39 (L) 12/15/2015    HDL 38 (L) 11/25/2014     Lab Results   Component Value Date    LDLCALC 88 12/07/2017    LDLCALC 71 12/15/2015    LDLCALC 86 11/25/2014     Lab Results   Component Value Date    LABVLDL 22 12/07/2017    LABVLDL 29 12/15/2015    LABVLDL 28 11/25/2014     Lab Results   Component Value Date    CREATININE 0.8 12/07/2017         ASSESSMENT/PLAN[de-identified]     Diagnosis Orders   1. Essential hypertension     2. Need for vaccination with 13-polyvalent pneumococcal conjugate vaccine  pneumococcal 13-valent conjugate (PREVNAR) injection 0.5 mL   3.  Other hyperlipidemia         The patient has Parkinson's disease. I believe this is the most debilitating illness that he has    We discussed erectile dysfunction. Given his comorbid conditions and use of nitroglycerin I advised against Viagra or Viagra-like medications    Recommended that he might consider using a vacuum pump or alternatively we could refer him to a urologist to consider placement of a prosthesis. The fatigue is likely related to his underlying medical illnesses and the treatment thereof.     He is on high-dose treatment for hyperlipidemia

## 2019-07-10 ENCOUNTER — OFFICE VISIT (OUTPATIENT)
Dept: CARDIOLOGY CLINIC | Age: 74
End: 2019-07-10
Payer: MEDICARE

## 2019-07-10 VITALS
SYSTOLIC BLOOD PRESSURE: 122 MMHG | HEART RATE: 68 BPM | DIASTOLIC BLOOD PRESSURE: 76 MMHG | HEIGHT: 68 IN | WEIGHT: 235.2 LBS | BODY MASS INDEX: 35.64 KG/M2

## 2019-07-10 DIAGNOSIS — E78.49 OTHER HYPERLIPIDEMIA: Chronic | ICD-10-CM

## 2019-07-10 DIAGNOSIS — G20 PARKINSON'S DISEASE (HCC): ICD-10-CM

## 2019-07-10 DIAGNOSIS — I77.9 BILATERAL CAROTID ARTERY DISEASE, UNSPECIFIED TYPE (HCC): ICD-10-CM

## 2019-07-10 DIAGNOSIS — I65.23 BILATERAL CAROTID ARTERY STENOSIS: Chronic | ICD-10-CM

## 2019-07-10 DIAGNOSIS — I25.10 ATHEROSCLEROSIS OF NATIVE CORONARY ARTERY OF NATIVE HEART WITHOUT ANGINA PECTORIS: Primary | Chronic | ICD-10-CM

## 2019-07-10 DIAGNOSIS — I10 ESSENTIAL HYPERTENSION: ICD-10-CM

## 2019-07-10 DIAGNOSIS — G20 PARKINSON DISEASE (HCC): Chronic | ICD-10-CM

## 2019-07-10 PROCEDURE — 4040F PNEUMOC VAC/ADMIN/RCVD: CPT | Performed by: INTERNAL MEDICINE

## 2019-07-10 PROCEDURE — G8599 NO ASA/ANTIPLAT THER USE RNG: HCPCS | Performed by: INTERNAL MEDICINE

## 2019-07-10 PROCEDURE — 99214 OFFICE O/P EST MOD 30 MIN: CPT | Performed by: INTERNAL MEDICINE

## 2019-07-10 PROCEDURE — G8427 DOCREV CUR MEDS BY ELIG CLIN: HCPCS | Performed by: INTERNAL MEDICINE

## 2019-07-10 PROCEDURE — G8417 CALC BMI ABV UP PARAM F/U: HCPCS | Performed by: INTERNAL MEDICINE

## 2019-07-10 PROCEDURE — 1123F ACP DISCUSS/DSCN MKR DOCD: CPT | Performed by: INTERNAL MEDICINE

## 2019-07-10 PROCEDURE — 1036F TOBACCO NON-USER: CPT | Performed by: INTERNAL MEDICINE

## 2019-07-10 PROCEDURE — 3017F COLORECTAL CA SCREEN DOC REV: CPT | Performed by: INTERNAL MEDICINE

## 2019-07-10 RX ORDER — METOPROLOL SUCCINATE 25 MG/1
25 TABLET, EXTENDED RELEASE ORAL DAILY
Qty: 90 TABLET | Refills: 3 | Status: SHIPPED | OUTPATIENT
Start: 2019-07-10 | End: 2020-02-26 | Stop reason: SDUPTHER

## 2019-07-10 NOTE — PROGRESS NOTES
[address]   Cardiac Follow up    Referring Provider:  Joel Farah MD     Chief Complaint   Patient presents with    Coronary Artery Disease    Chest Pain     on monday       History of Present Illness:  Mr. Lidia Cagle is a 69 y.o. gentleman. His history includes CAD with PCI in 2005, hypertension, and hyperlipidemia. He had a cardiac angiogram in 2015 that showed normal LV function and EF 60%. Coronary stent widely patent He [facility], considering brain stimulator but it takes many steps to reach that point according to his wife. Today, he reports he has occasional chest pains. Feels he goes through \"spurts of pain\". These are short in duration, sometimes occur with activity. Though on Monday, he was not active. At times he takes Nitro, states this is about 75% of the times he gets pain. Nitro relives the pain. He denies exertional chest pain, GONZALEZ/PND, palpitations, edema. He has ongoing strong right arm/hand tremors; he tires easily. He is somewhat active with some limitations. He does not smoke. His wife is with him for the visit. Past Medical History:   has a past medical history of Acute bronchitis, CAD (coronary artery disease), and Hyperlipidemia. Also parkinson disease    Surgical History:   has a past surgical history that includes Diagnostic Cardiac Cath Lab Procedure (2005) and Coronary angioplasty with stent (2005). Social History:   reports that he quit smoking about 39 years ago. He has a 2.50 pack-year smoking history. He has never used smokeless tobacco. He reports that he does not drink alcohol or use drugs. Family History:  family history includes Cancer in his mother and sister; Diabetes in his sister and sister; Other in his brother, brother, and father. He was adopted.      Home Medications:  Prior to Admission medications      Current Outpatient Medications 235 lb 3.2 oz (106.7 kg). 3. Hyperlipemia: Currently on Lipitor 80mg and fish oil. 12/19/18> , TG 90, HDL 42, LDL 84.   4.  Carotid disease: Dopplers 2011> 40-84% MINA, 4-74% LICA. 5.  Tremors, chronic: Essential familial. Taking  Primidone & carbidopa. High dose inderal did not help. 6.   Chest pain, chronic: Intermittent, unchanged. Relieved with nitro. Normal angiogram 2015. Reviewed cath films from 2015 today in office. 7.   Parkinson disease: Remains on carbidopa--does not think medications work like he would want. He feels as if he is in \"another body,\" sometimes dizzy, lightheaded. He is treated by Dr. Amanuel Rhodes at [facility] and had a second opinion at [facility]. May consider brain stimulator at some point. Plan: Cardiac cath films from 4/30/15 reviewed personally by me in office. Mr. Venkat Sparrow has a stable cardiac status. He continues to struggle with his Parkinson's symptoms. 1. Reviewed recent labs. 2. No medication changes   3. Nuclear stress test- to evaluate CP   4. Will continue with risk factor modifications. 5. Return for regular follow up in 6 months or sooner based on testing. I appreciate the opportunity of cooperating in the care of this individual.    Lenore Armstrong. Ajit Hua M.D., [facility] [place]    Patient's problem list, medications, allergies, past medical, surgical, social and family histories were reviewed and updated as appropriate. Scribe's attestation: This note was scribed in the presence of Dr. Ajit Hua MD, by Johan Barkley RN. The scribe's documentation has been prepared under my direction and personally reviewed by me in its entirety. I confirm that the note above accurately reflects all work, treatment, procedures, and medical decision making performed by me.

## 2019-07-13 PROBLEM — I65.23 BILATERAL CAROTID ARTERY STENOSIS: Chronic | Status: ACTIVE | Noted: 2019-07-13

## 2019-07-13 PROBLEM — G20.A1 PARKINSON DISEASE: Chronic | Status: ACTIVE | Noted: 2019-07-13

## 2019-07-13 PROBLEM — G20 PARKINSON DISEASE (HCC): Chronic | Status: ACTIVE | Noted: 2019-07-13

## 2019-07-17 ENCOUNTER — TELEPHONE (OUTPATIENT)
Dept: CARDIOLOGY CLINIC | Age: 74
End: 2019-07-17

## 2019-07-17 ENCOUNTER — HOSPITAL ENCOUNTER (OUTPATIENT)
Dept: NON INVASIVE DIAGNOSTICS | Age: 74
Discharge: HOME | End: 2019-07-17
Payer: MEDICARE

## 2019-07-17 DIAGNOSIS — I25.10 ATHEROSCLEROSIS OF NATIVE CORONARY ARTERY OF NATIVE HEART WITHOUT ANGINA PECTORIS: Chronic | ICD-10-CM

## 2019-07-17 LAB
LV EF: 62 %
LVEF MODALITY: NORMAL

## 2019-07-17 PROCEDURE — 6360000002 HC RX W HCPCS: Performed by: INTERNAL MEDICINE

## 2019-07-17 PROCEDURE — A9502 TC99M TETROFOSMIN: HCPCS | Performed by: INTERNAL MEDICINE

## 2019-07-17 PROCEDURE — 78452 HT MUSCLE IMAGE SPECT MULT: CPT | Performed by: INTERNAL MEDICINE

## 2019-07-17 PROCEDURE — 93017 CV STRESS TEST TRACING ONLY: CPT | Performed by: INTERNAL MEDICINE

## 2019-07-17 PROCEDURE — 3430000000 HC RX DIAGNOSTIC RADIOPHARMACEUTICAL: Performed by: INTERNAL MEDICINE

## 2019-07-17 RX ORDER — AMINOPHYLLINE DIHYDRATE 25 MG/ML
100 INJECTION, SOLUTION INTRAVENOUS ONCE
Status: COMPLETED | OUTPATIENT
Start: 2019-07-17 | End: 2019-07-17

## 2019-07-17 RX ADMIN — TETROFOSMIN 30 MILLICURIE: 1.38 INJECTION, POWDER, LYOPHILIZED, FOR SOLUTION INTRAVENOUS at 09:48

## 2019-07-17 RX ADMIN — REGADENOSON 0.4 MG: 0.08 INJECTION, SOLUTION INTRAVENOUS at 09:45

## 2019-07-17 RX ADMIN — AMINOPHYLLINE 100 MG: 25 INJECTION, SOLUTION INTRAVENOUS at 09:47

## 2019-07-17 RX ADMIN — TETROFOSMIN 10 MILLICURIE: 1.38 INJECTION, POWDER, LYOPHILIZED, FOR SOLUTION INTRAVENOUS at 08:21

## 2019-07-17 NOTE — TELEPHONE ENCOUNTER
Mr. Alfonzo Lott here today for a nuclear stress test. His wife asked for a letter stating he is stable from cardiology standpoint and can have therapy/rehab for his Parkinson's. This letter was apparently requested by his physical therapist Anyi Humphrey (ph [phone]); I LMOM for Amanda to call me with her fax number. FYI: Patient f/w Dr. Taylor Draper at [address] [place] [address] [phone] [facility] who Mrs. Alfonzo Lott states is the one who wants him to have this therapy (ph: [phone], fax: [phone]).

## 2019-07-24 ENCOUNTER — TELEPHONE (OUTPATIENT)
Dept: CARDIOLOGY CLINIC | Age: 74
End: 2019-07-24

## 2019-07-24 NOTE — TELEPHONE ENCOUNTER
Copies of nuclear report and first page of treadmill portion mailed to patient's home at wife's request.

## 2019-08-19 ENCOUNTER — OFFICE VISIT (OUTPATIENT)
Dept: INTERNAL MEDICINE CLINIC | Age: 74
End: 2019-08-19
Payer: MEDICARE

## 2019-08-19 VITALS
DIASTOLIC BLOOD PRESSURE: 60 MMHG | TEMPERATURE: 98.2 F | BODY MASS INDEX: 35.58 KG/M2 | SYSTOLIC BLOOD PRESSURE: 130 MMHG | WEIGHT: 234 LBS | HEART RATE: 80 BPM | OXYGEN SATURATION: 94 %

## 2019-08-19 DIAGNOSIS — J22 LOWER RESPIRATORY INFECTION: Primary | ICD-10-CM

## 2019-08-19 PROCEDURE — 3017F COLORECTAL CA SCREEN DOC REV: CPT | Performed by: NURSE PRACTITIONER

## 2019-08-19 PROCEDURE — G8599 NO ASA/ANTIPLAT THER USE RNG: HCPCS | Performed by: NURSE PRACTITIONER

## 2019-08-19 PROCEDURE — 1036F TOBACCO NON-USER: CPT | Performed by: NURSE PRACTITIONER

## 2019-08-19 PROCEDURE — G8427 DOCREV CUR MEDS BY ELIG CLIN: HCPCS | Performed by: NURSE PRACTITIONER

## 2019-08-19 PROCEDURE — G8417 CALC BMI ABV UP PARAM F/U: HCPCS | Performed by: NURSE PRACTITIONER

## 2019-08-19 PROCEDURE — 1123F ACP DISCUSS/DSCN MKR DOCD: CPT | Performed by: NURSE PRACTITIONER

## 2019-08-19 PROCEDURE — 4040F PNEUMOC VAC/ADMIN/RCVD: CPT | Performed by: NURSE PRACTITIONER

## 2019-08-19 PROCEDURE — 99213 OFFICE O/P EST LOW 20 MIN: CPT | Performed by: NURSE PRACTITIONER

## 2019-08-19 RX ORDER — PREDNISONE 20 MG/1
20 TABLET ORAL 2 TIMES DAILY
Qty: 10 TABLET | Refills: 0 | Status: SHIPPED | OUTPATIENT
Start: 2019-08-19 | End: 2019-08-24

## 2019-08-19 RX ORDER — BENZONATATE 100 MG/1
100 CAPSULE ORAL 3 TIMES DAILY PRN
Qty: 30 CAPSULE | Refills: 0 | Status: SHIPPED | OUTPATIENT
Start: 2019-08-19 | End: 2019-11-20 | Stop reason: SDUPTHER

## 2019-08-19 RX ORDER — AZITHROMYCIN 250 MG/1
250 TABLET, FILM COATED ORAL SEE ADMIN INSTRUCTIONS
Qty: 6 TABLET | Refills: 0 | Status: SHIPPED | OUTPATIENT
Start: 2019-08-19 | End: 2019-08-24

## 2019-08-19 ASSESSMENT — ENCOUNTER SYMPTOMS
SORE THROAT: 0
COUGH: 1
WHEEZING: 1
HEMOPTYSIS: 0
HEARTBURN: 0
SHORTNESS OF BREATH: 1
RHINORRHEA: 0

## 2019-08-19 NOTE — PROGRESS NOTES
HPI:  8/19/2019    This is a 69 y.o.male   Chief Complaint   Patient presents with    Cough     cough - sometimes productive - wheezing   x 10 days   on clariten and muchinex    . Cough   This is a new problem. Episode onset: 10+ days  The problem has been gradually worsening. The problem occurs constantly. The cough is productive of sputum. Associated symptoms include shortness of breath (at times) and wheezing. Pertinent negatives include no chills, ear congestion, ear pain, fever, headaches, heartburn, hemoptysis, myalgias, nasal congestion, postnasal drip, rhinorrhea, sore throat, sweats or weight loss. Treatments tried: claritin and mucinex. The treatment provided mild relief. There is no history of asthma, bronchiectasis, COPD, environmental allergies or pneumonia.      /60   Pulse 80   Temp 98.2 °F (36.8 °C)   Wt 234 lb (106.1 kg)   SpO2 94%   BMI 35.58 kg/m²     Allergies   Allergen Reactions    Amoxicillin-Pot Clavulanate      Current Outpatient Medications   Medication Sig Dispense Refill    azithromycin (ZITHROMAX) 250 MG tablet Take 1 tablet by mouth See Admin Instructions for 5 days 500mg on day 1 followed by 250mg on days 2 - 5 6 tablet 0    predniSONE (DELTASONE) 20 MG tablet Take 1 tablet by mouth 2 times daily for 5 days 10 tablet 0    benzonatate (TESSALON) 100 MG capsule Take 1 capsule by mouth 3 times daily as needed for Cough 30 capsule 0    metoprolol succinate (TOPROL XL) 25 MG extended release tablet Take 1 tablet by mouth daily 90 tablet 3    Carbidopa-Levodopa ER (RYTARY) 36. MG CPCR Take 1 tablet by mouth 3 times daily       atorvastatin (LIPITOR) 80 MG tablet Take 1 tablet by mouth daily 90 tablet 3    nitroGLYCERIN (NITROSTAT) 0.4 MG SL tablet Place 1 tablet under the tongue every 5 minutes as needed (prn) 25 tablet 3    primidone (MYSOLINE) 50 MG tablet Take 50 mg by mouth daily      carbidopa-levodopa (SINEMET)  MG per tablet Take 1 tablet by mouth

## 2019-10-10 ENCOUNTER — IMMUNIZATION (OUTPATIENT)
Dept: INTERNAL MEDICINE CLINIC | Age: 74
End: 2019-10-10
Payer: MEDICARE

## 2019-10-10 DIAGNOSIS — Z23 NEED FOR INFLUENZA VACCINATION: Primary | ICD-10-CM

## 2019-10-10 PROCEDURE — G0008 ADMIN INFLUENZA VIRUS VAC: HCPCS | Performed by: INTERNAL MEDICINE

## 2019-10-10 PROCEDURE — 90653 IIV ADJUVANT VACCINE IM: CPT | Performed by: INTERNAL MEDICINE

## 2019-11-14 ENCOUNTER — OFFICE VISIT (OUTPATIENT)
Dept: INTERNAL MEDICINE CLINIC | Age: 74
End: 2019-11-14
Payer: MEDICARE

## 2019-11-14 VITALS
SYSTOLIC BLOOD PRESSURE: 108 MMHG | HEART RATE: 68 BPM | DIASTOLIC BLOOD PRESSURE: 68 MMHG | HEIGHT: 68 IN | BODY MASS INDEX: 35.77 KG/M2 | WEIGHT: 236 LBS

## 2019-11-14 DIAGNOSIS — Z00.00 ROUTINE GENERAL MEDICAL EXAMINATION AT A HEALTH CARE FACILITY: ICD-10-CM

## 2019-11-14 DIAGNOSIS — Z12.5 PROSTATE CANCER SCREENING: ICD-10-CM

## 2019-11-14 DIAGNOSIS — I10 ESSENTIAL HYPERTENSION: Primary | ICD-10-CM

## 2019-11-14 DIAGNOSIS — E78.49 OTHER HYPERLIPIDEMIA: ICD-10-CM

## 2019-11-14 PROCEDURE — G8599 NO ASA/ANTIPLAT THER USE RNG: HCPCS | Performed by: INTERNAL MEDICINE

## 2019-11-14 PROCEDURE — G8482 FLU IMMUNIZE ORDER/ADMIN: HCPCS | Performed by: INTERNAL MEDICINE

## 2019-11-14 PROCEDURE — 4040F PNEUMOC VAC/ADMIN/RCVD: CPT | Performed by: INTERNAL MEDICINE

## 2019-11-14 PROCEDURE — G0438 PPPS, INITIAL VISIT: HCPCS | Performed by: INTERNAL MEDICINE

## 2019-11-14 PROCEDURE — 3017F COLORECTAL CA SCREEN DOC REV: CPT | Performed by: INTERNAL MEDICINE

## 2019-11-14 PROCEDURE — 1123F ACP DISCUSS/DSCN MKR DOCD: CPT | Performed by: INTERNAL MEDICINE

## 2019-11-14 ASSESSMENT — PATIENT HEALTH QUESTIONNAIRE - PHQ9
SUM OF ALL RESPONSES TO PHQ QUESTIONS 1-9: 0
SUM OF ALL RESPONSES TO PHQ QUESTIONS 1-9: 0

## 2019-11-14 ASSESSMENT — LIFESTYLE VARIABLES
HOW OFTEN DURING THE LAST YEAR HAVE YOU FOUND THAT YOU WERE NOT ABLE TO STOP DRINKING ONCE YOU HAD STARTED: 0
HOW OFTEN DO YOU HAVE SIX OR MORE DRINKS ON ONE OCCASION: 1
HAS A RELATIVE, FRIEND, DOCTOR, OR ANOTHER HEALTH PROFESSIONAL EXPRESSED CONCERN ABOUT YOUR DRINKING OR SUGGESTED YOU CUT DOWN: 0
HOW OFTEN DURING THE LAST YEAR HAVE YOU FAILED TO DO WHAT WAS NORMALLY EXPECTED FROM YOU BECAUSE OF DRINKING: 0
AUDIT-C TOTAL SCORE: 4
HOW OFTEN DO YOU HAVE A DRINK CONTAINING ALCOHOL: 3
AUDIT TOTAL SCORE: 4
HOW OFTEN DURING THE LAST YEAR HAVE YOU HAD A FEELING OF GUILT OR REMORSE AFTER DRINKING: 0
HOW OFTEN DURING THE LAST YEAR HAVE YOU NEEDED AN ALCOHOLIC DRINK FIRST THING IN THE MORNING TO GET YOURSELF GOING AFTER A NIGHT OF HEAVY DRINKING: 0
HOW OFTEN DURING THE LAST YEAR HAVE YOU BEEN UNABLE TO REMEMBER WHAT HAPPENED THE NIGHT BEFORE BECAUSE YOU HAD BEEN DRINKING: 0
HOW MANY STANDARD DRINKS CONTAINING ALCOHOL DO YOU HAVE ON A TYPICAL DAY: 0
HAVE YOU OR SOMEONE ELSE BEEN INJURED AS A RESULT OF YOUR DRINKING: 0

## 2019-11-20 ENCOUNTER — OFFICE VISIT (OUTPATIENT)
Dept: INTERNAL MEDICINE CLINIC | Age: 74
End: 2019-11-20
Payer: MEDICARE

## 2019-11-20 VITALS
WEIGHT: 234 LBS | HEART RATE: 69 BPM | BODY MASS INDEX: 35.58 KG/M2 | TEMPERATURE: 97.7 F | DIASTOLIC BLOOD PRESSURE: 60 MMHG | SYSTOLIC BLOOD PRESSURE: 110 MMHG | OXYGEN SATURATION: 97 %

## 2019-11-20 DIAGNOSIS — R05.9 COUGH: Primary | ICD-10-CM

## 2019-11-20 PROCEDURE — G8599 NO ASA/ANTIPLAT THER USE RNG: HCPCS | Performed by: NURSE PRACTITIONER

## 2019-11-20 PROCEDURE — G8482 FLU IMMUNIZE ORDER/ADMIN: HCPCS | Performed by: NURSE PRACTITIONER

## 2019-11-20 PROCEDURE — G8417 CALC BMI ABV UP PARAM F/U: HCPCS | Performed by: NURSE PRACTITIONER

## 2019-11-20 PROCEDURE — 4040F PNEUMOC VAC/ADMIN/RCVD: CPT | Performed by: NURSE PRACTITIONER

## 2019-11-20 PROCEDURE — G8427 DOCREV CUR MEDS BY ELIG CLIN: HCPCS | Performed by: NURSE PRACTITIONER

## 2019-11-20 PROCEDURE — 1123F ACP DISCUSS/DSCN MKR DOCD: CPT | Performed by: NURSE PRACTITIONER

## 2019-11-20 PROCEDURE — 99213 OFFICE O/P EST LOW 20 MIN: CPT | Performed by: NURSE PRACTITIONER

## 2019-11-20 PROCEDURE — 3017F COLORECTAL CA SCREEN DOC REV: CPT | Performed by: NURSE PRACTITIONER

## 2019-11-20 PROCEDURE — 1036F TOBACCO NON-USER: CPT | Performed by: NURSE PRACTITIONER

## 2019-11-20 RX ORDER — BENZONATATE 100 MG/1
100 CAPSULE ORAL 3 TIMES DAILY PRN
Qty: 30 CAPSULE | Refills: 0 | Status: SHIPPED | OUTPATIENT
Start: 2019-11-20 | End: 2019-11-27

## 2019-11-20 ASSESSMENT — ENCOUNTER SYMPTOMS
CONSTIPATION: 0
EYE PAIN: 0
RHINORRHEA: 1
EYE DISCHARGE: 0
SORE THROAT: 1
SHORTNESS OF BREATH: 0
EYE REDNESS: 0
SINUS PRESSURE: 0
ABDOMINAL PAIN: 0
SINUS PAIN: 0
EYE ITCHING: 0
COUGH: 1
WHEEZING: 0
NAUSEA: 0
VOMITING: 0
PHOTOPHOBIA: 0
DIARRHEA: 0
TROUBLE SWALLOWING: 0

## 2019-11-25 ENCOUNTER — TELEPHONE (OUTPATIENT)
Dept: INTERNAL MEDICINE CLINIC | Age: 74
End: 2019-11-25

## 2019-11-26 RX ORDER — AZITHROMYCIN 250 MG/1
250 TABLET, FILM COATED ORAL SEE ADMIN INSTRUCTIONS
Qty: 6 TABLET | Refills: 0 | Status: SHIPPED | OUTPATIENT
Start: 2019-11-26 | End: 2019-12-01

## 2019-12-03 RX ORDER — ATORVASTATIN CALCIUM 80 MG/1
80 TABLET, FILM COATED ORAL DAILY
Qty: 90 TABLET | Refills: 0 | Status: SHIPPED | OUTPATIENT
Start: 2019-12-03 | End: 2020-01-23 | Stop reason: SDUPTHER

## 2019-12-10 ENCOUNTER — HOSPITAL ENCOUNTER (OUTPATIENT)
Age: 74
Discharge: HOME | End: 2019-12-10
Payer: MEDICARE

## 2019-12-10 DIAGNOSIS — E78.49 OTHER HYPERLIPIDEMIA: ICD-10-CM

## 2019-12-10 DIAGNOSIS — I10 ESSENTIAL HYPERTENSION: ICD-10-CM

## 2019-12-10 LAB
A/G RATIO: 1.4 (ref 1.1–2.2)
ALBUMIN SERPL-MCNC: 4.1 G/DL (ref 3.4–5)
ALP BLD-CCNC: 83 U/L (ref 40–129)
ALT SERPL-CCNC: 10 U/L (ref 10–40)
ANION GAP SERPL CALCULATED.3IONS-SCNC: 15 MMOL/L (ref 3–16)
AST SERPL-CCNC: 26 U/L (ref 15–37)
BILIRUB SERPL-MCNC: 0.5 MG/DL (ref 0–1)
BUN BLDV-MCNC: 16 MG/DL (ref 7–20)
CALCIUM SERPL-MCNC: 9.6 MG/DL (ref 8.3–10.6)
CHLORIDE BLD-SCNC: 105 MMOL/L (ref 99–110)
CHOLESTEROL, TOTAL: 131 MG/DL (ref 0–199)
CO2: 25 MMOL/L (ref 21–32)
CREAT SERPL-MCNC: 0.9 MG/DL (ref 0.8–1.3)
GFR AFRICAN AMERICAN: >60
GFR NON-AFRICAN AMERICAN: >60
GLOBULIN: 2.9 G/DL
GLUCOSE BLD-MCNC: 113 MG/DL (ref 70–99)
HDLC SERPL-MCNC: 39 MG/DL (ref 40–60)
LDL CHOLESTEROL CALCULATED: 75 MG/DL
POTASSIUM SERPL-SCNC: 5.2 MMOL/L (ref 3.5–5.1)
PROSTATE SPECIFIC ANTIGEN: 1.7 NG/ML (ref 0–4)
SODIUM BLD-SCNC: 145 MMOL/L (ref 136–145)
TOTAL PROTEIN: 7 G/DL (ref 6.4–8.2)
TRIGL SERPL-MCNC: 83 MG/DL (ref 0–150)
TSH REFLEX FT4: 1.74 UIU/ML (ref 0.27–4.2)
VLDLC SERPL CALC-MCNC: 17 MG/DL

## 2019-12-10 PROCEDURE — 84443 ASSAY THYROID STIM HORMONE: CPT

## 2019-12-10 PROCEDURE — 84153 ASSAY OF PSA TOTAL: CPT

## 2019-12-10 PROCEDURE — 80061 LIPID PANEL: CPT

## 2019-12-10 PROCEDURE — 80053 COMPREHEN METABOLIC PANEL: CPT

## 2019-12-10 PROCEDURE — 36415 COLL VENOUS BLD VENIPUNCTURE: CPT

## 2019-12-11 ENCOUNTER — OFFICE VISIT (OUTPATIENT)
Dept: INTERNAL MEDICINE CLINIC | Age: 74
End: 2019-12-11
Payer: MEDICARE

## 2019-12-11 VITALS
HEART RATE: 72 BPM | BODY MASS INDEX: 34.97 KG/M2 | DIASTOLIC BLOOD PRESSURE: 60 MMHG | WEIGHT: 230 LBS | SYSTOLIC BLOOD PRESSURE: 120 MMHG

## 2019-12-11 DIAGNOSIS — R05.9 COUGH: ICD-10-CM

## 2019-12-11 DIAGNOSIS — G20 PARKINSON DISEASE (HCC): Primary | ICD-10-CM

## 2019-12-11 DIAGNOSIS — I10 ESSENTIAL HYPERTENSION: ICD-10-CM

## 2019-12-11 PROCEDURE — 3017F COLORECTAL CA SCREEN DOC REV: CPT | Performed by: INTERNAL MEDICINE

## 2019-12-11 PROCEDURE — G8427 DOCREV CUR MEDS BY ELIG CLIN: HCPCS | Performed by: INTERNAL MEDICINE

## 2019-12-11 PROCEDURE — G8417 CALC BMI ABV UP PARAM F/U: HCPCS | Performed by: INTERNAL MEDICINE

## 2019-12-11 PROCEDURE — 99214 OFFICE O/P EST MOD 30 MIN: CPT | Performed by: INTERNAL MEDICINE

## 2019-12-11 PROCEDURE — 1036F TOBACCO NON-USER: CPT | Performed by: INTERNAL MEDICINE

## 2019-12-11 PROCEDURE — G8599 NO ASA/ANTIPLAT THER USE RNG: HCPCS | Performed by: INTERNAL MEDICINE

## 2019-12-11 PROCEDURE — G8482 FLU IMMUNIZE ORDER/ADMIN: HCPCS | Performed by: INTERNAL MEDICINE

## 2019-12-11 PROCEDURE — 1123F ACP DISCUSS/DSCN MKR DOCD: CPT | Performed by: INTERNAL MEDICINE

## 2019-12-11 PROCEDURE — 4040F PNEUMOC VAC/ADMIN/RCVD: CPT | Performed by: INTERNAL MEDICINE

## 2019-12-11 RX ORDER — DOXYCYCLINE HYCLATE 100 MG/1
100 CAPSULE ORAL 2 TIMES DAILY
Qty: 20 CAPSULE | Refills: 0 | Status: SHIPPED | OUTPATIENT
Start: 2019-12-11 | End: 2022-04-15 | Stop reason: SDUPTHER

## 2020-01-23 RX ORDER — ATORVASTATIN CALCIUM 80 MG/1
80 TABLET, FILM COATED ORAL DAILY
Qty: 90 TABLET | Refills: 1 | Status: SHIPPED | OUTPATIENT
Start: 2020-01-23 | End: 2020-11-05 | Stop reason: SDUPTHER

## 2020-01-27 NOTE — PROGRESS NOTES
metoprolol succinate (TOPROL XL) 25 MG extended release tablet Take 1 tablet by mouth daily 90 tablet 3    Carbidopa-Levodopa ER (RYTARY) 36. MG CPCR Take 1 tablet by mouth 3 times daily       nitroGLYCERIN (NITROSTAT) 0.4 MG SL tablet Place 1 tablet under the tongue every 5 minutes as needed (prn) 25 tablet 3    primidone (MYSOLINE) 50 MG tablet Take 50 mg by mouth daily      carbidopa-levodopa (SINEMET)  MG per tablet Take 1 tablet by mouth every 2 hours From 9 am to 7 pm      omeprazole (PRILOSEC) 20 MG delayed release capsule Take 20 mg by mouth daily      aspirin 81 MG tablet Take 1 tablet by mouth daily 30 tablet 0    Psyllium 500 MG CAPS Take 6 capsules by mouth daily       fish oil-omega-3 fatty acids 1000 MG capsule Take 2 g by mouth daily.  multivitamin (THERAGRAN) per tablet Take 1 tablet by mouth daily. No current facility-administered medications for this visit. Allergies:  Amoxicillin-pot clavulanate     Review of Systems:   · Constitutional: there has been no unanticipated weight loss. There's been no change in energy level, sleep pattern, or activity level. · Eyes: No visual changes or diplopia. No scleral icterus. · ENT: No Headaches, hearing loss or vertigo. No mouth sores or sore throat. · Cardiovascular: Reviewed in HPI  · Respiratory: No cough or wheezing, no sputum production. No hematemesis. · Gastrointestinal: No abdominal pain, appetite loss, blood in stools. No change in bowel or bladder habits. · Genitourinary: No dysuria, trouble voiding, or hematuria. · Musculoskeletal:  No gait disturbance, weakness or joint complaints. · Integumentary: No rash or pruritis. · Neurological: No headache, diplopia, change in muscle strength, numbness or tingling. No change in gait, balance, coordination, mood, affect, memory, mentation, behavior. · Psychiatric: No anxiety, no depression. · Endocrine: No malaise, fatigue or temperature intolerance.  No on Lipitor 80mg and fish oil. 12/10/19> , TG 83, HDL 39, LDL 75.   4.  Carotid disease: Dopplers 2011> 30-81% MINA, 6-81% LICA. 5.  Tremors, chronic: Essential familial.   6.   Parkinson disease:   He is treated by Dr. Harvinder Plasencia at [facility] and had a second opinion at [facility]. Plan:   Mr. Deepa Wells has a stable cardiac status. He continues to struggle with his Parkinson's symptoms. Ashutosh Figueroa has a stable cardiac status. Cardiac test and lab results personally reviewed by me during this office visit and discussed. No med changes. Continue risk factor modifications. Call for any change in symptoms. Return for regular follow up in 6 months. I appreciate the opportunity of cooperating in the care of this individual.    Christy Azevedo. Patrick Franklin M.D., [facility] - [place]      Patient's problem list, medications, allergies, past medical, surgical, social and family histories were reviewed and updated as appropriate. Scribe's attestation: This note was scribed in the presence of Dr. Patrick Franklin MD, by Rebeca Penaloza RN. The scribe's documentation has been prepared under my direction and personally reviewed by me in its entirety. I confirm that the note above accurately reflects all work, treatment, procedures, and medical decision making performed by me.

## 2020-02-12 ENCOUNTER — OFFICE VISIT (OUTPATIENT)
Dept: CARDIOLOGY CLINIC | Age: 75
End: 2020-02-12
Payer: MEDICARE

## 2020-02-12 VITALS
WEIGHT: 234 LBS | BODY MASS INDEX: 35.46 KG/M2 | DIASTOLIC BLOOD PRESSURE: 72 MMHG | HEART RATE: 70 BPM | HEIGHT: 68 IN | SYSTOLIC BLOOD PRESSURE: 122 MMHG

## 2020-02-12 PROCEDURE — G8417 CALC BMI ABV UP PARAM F/U: HCPCS | Performed by: INTERNAL MEDICINE

## 2020-02-12 PROCEDURE — 99214 OFFICE O/P EST MOD 30 MIN: CPT | Performed by: INTERNAL MEDICINE

## 2020-02-12 PROCEDURE — 1036F TOBACCO NON-USER: CPT | Performed by: INTERNAL MEDICINE

## 2020-02-12 PROCEDURE — G8427 DOCREV CUR MEDS BY ELIG CLIN: HCPCS | Performed by: INTERNAL MEDICINE

## 2020-02-12 PROCEDURE — 3017F COLORECTAL CA SCREEN DOC REV: CPT | Performed by: INTERNAL MEDICINE

## 2020-02-12 PROCEDURE — G8482 FLU IMMUNIZE ORDER/ADMIN: HCPCS | Performed by: INTERNAL MEDICINE

## 2020-02-12 PROCEDURE — 1123F ACP DISCUSS/DSCN MKR DOCD: CPT | Performed by: INTERNAL MEDICINE

## 2020-02-12 PROCEDURE — 4040F PNEUMOC VAC/ADMIN/RCVD: CPT | Performed by: INTERNAL MEDICINE

## 2020-02-12 RX ORDER — NITROGLYCERIN 0.4 MG/1
0.4 TABLET SUBLINGUAL EVERY 5 MIN PRN
Qty: 25 TABLET | Refills: 3 | Status: SHIPPED | OUTPATIENT
Start: 2020-02-12 | End: 2020-08-24 | Stop reason: SDUPTHER

## 2020-02-26 RX ORDER — METOPROLOL SUCCINATE 25 MG/1
25 TABLET, EXTENDED RELEASE ORAL DAILY
Qty: 90 TABLET | Refills: 3 | Status: SHIPPED | OUTPATIENT
Start: 2020-02-26 | End: 2020-11-03 | Stop reason: SDUPTHER

## 2020-06-10 ENCOUNTER — VIRTUAL VISIT (OUTPATIENT)
Dept: INTERNAL MEDICINE CLINIC | Age: 75
End: 2020-06-10
Payer: MEDICARE

## 2020-06-10 PROCEDURE — 99213 OFFICE O/P EST LOW 20 MIN: CPT | Performed by: INTERNAL MEDICINE

## 2020-06-10 PROCEDURE — 1123F ACP DISCUSS/DSCN MKR DOCD: CPT | Performed by: INTERNAL MEDICINE

## 2020-06-10 PROCEDURE — 4040F PNEUMOC VAC/ADMIN/RCVD: CPT | Performed by: INTERNAL MEDICINE

## 2020-06-10 PROCEDURE — G8427 DOCREV CUR MEDS BY ELIG CLIN: HCPCS | Performed by: INTERNAL MEDICINE

## 2020-06-10 PROCEDURE — 3017F COLORECTAL CA SCREEN DOC REV: CPT | Performed by: INTERNAL MEDICINE

## 2020-06-10 RX ORDER — MULTIVIT-MIN/IRON/FOLIC ACID/K 18-600-40
CAPSULE ORAL
COMMUNITY

## 2020-06-10 RX ORDER — QUETIAPINE FUMARATE 25 MG/1
25 TABLET, FILM COATED ORAL
COMMUNITY
Start: 2020-06-01 | End: 2020-12-08 | Stop reason: ALTCHOICE

## 2020-06-10 ASSESSMENT — PATIENT HEALTH QUESTIONNAIRE - PHQ9
SUM OF ALL RESPONSES TO PHQ9 QUESTIONS 1 & 2: 1
SUM OF ALL RESPONSES TO PHQ QUESTIONS 1-9: 1
2. FEELING DOWN, DEPRESSED OR HOPELESS: 0
SUM OF ALL RESPONSES TO PHQ QUESTIONS 1-9: 1
1. LITTLE INTEREST OR PLEASURE IN DOING THINGS: 1

## 2020-06-12 NOTE — PROGRESS NOTES
CPCR Take 1 tablet by mouth 3 times daily   Historical Provider, MD   primidone (MYSOLINE) 50 MG tablet Take 50 mg by mouth daily  Historical Provider, MD   carbidopa-levodopa (SINEMET)  MG per tablet Take 1 tablet by mouth every 2 hours From 9 am to 7 pm  Historical Provider, MD   omeprazole (PRILOSEC) 20 MG delayed release capsule Take 20 mg by mouth daily  Historical Provider, MD   aspirin 81 MG tablet Take 1 tablet by mouth daily  Enrike Lin MD   Psyllium 500 MG CAPS Take 6 capsules by mouth daily   Historical Provider, MD   fish oil-omega-3 fatty acids 1000 MG capsule Take 2 g by mouth daily. Historical Provider, MD   multivitamin [facility]) per tablet Take 1 tablet by mouth daily. Historical Provider, MD       Social History     Tobacco Use    Smoking status: Former Smoker     Packs/day: 0.25     Years: 10.00     Pack years: 2.50     Last attempt to quit: 1980     Years since quittin.4    Smokeless tobacco: Never Used    Tobacco comment: states he smokes a cigar once a month or once every six weeks   Substance Use Topics    Alcohol use: No     Comment: occas.  Drug use: No            PHYSICAL EXAMINATION:  [ INSTRUCTIONS:  \"[x]\" Indicates a positive item  \"[]\" Indicates a negative item  -- DELETE ALL ITEMS NOT EXAMINED]  Vital Signs: (As obtained by patient/caregiver or practitioner observation)    Blood pressure-  Heart rate-    Respiratory rate-    Temperature-  Pulse oximetry-     Constitutional: [x] Appears well-developed and well-nourished [] No apparent distress      [] Abnormal-   Mental status  [x] Alert and awake  [] Oriented to person/place/time []Able to follow commands      Eyes:  EOM    [x]  Normal  [] Abnormal-  Sclera  []  Normal  [] Abnormal -         Discharge []  None visible  [] Abnormal -    HENT:   [x] Normocephalic, atraumatic.   [] Abnormal   [] Mouth/Throat: Mucous membranes are moist.     External Ears [] Normal  [] Abnormal-     Neck: [x] No visualized mass Pulmonary/Chest: [x] Respiratory effort normal.  [] No visualized signs of difficulty breathing or respiratory distress        [] Abnormal-      Musculoskeletal:   [] Normal gait with no signs of ataxia         [] Normal range of motion of neck        [x] Abnormal-has had a broad-based gait consistent with Parkinson's in the past he was not ambulated during this visit      Neurological:        [] No Facial Asymmetry (Cranial nerve 7 motor function) (limited exam to video visit)          [] No gaze palsy        [x] Abnormal-masked facies, tremor observed      Skin:        [x] No significant exanthematous lesions or discoloration noted on facial skin         [] Abnormal-            Psychiatric:       [x] Normal Affect [] No Hallucinations        [] Abnormal-     Other pertinent observable physical exam findings-     ASSESSMENT/PLAN:  1. Hyperglycemia  Obtain hemoglobin A1c    - Hemoglobin A1C; Future    2. Essential hypertension  Continue current treatment    3. Parkinson disease ([facility] [address]  Titrate up Seroquel due to hallucinations      No follow-ups on file. Danae Draper is a 70 y.o. male being evaluated by a Virtual Visit (video visit) encounter to address concerns as mentioned above. A caregiver was present when appropriate. Due to this being a TeleHealth encounter (During [facility] public health emergency), evaluation of the following organ systems was limited: Vitals/Constitutional/EENT/Resp/CV/GI//MS/Neuro/Skin/Heme-Lymph-Imm. Pursuant to the emergency declaration under the [address] [place] [address], [address] [place] [address] and the Campus [employer]orship and [employer] Act, this Virtual Visit was conducted with patient's (and/or legal guardian's) consent, to reduce the patient's risk of exposure to COVID-19 and provide necessary medical care.   The patient (and/or legal guardian) has also been advised to contact this office for worsening conditions or

## 2020-06-18 ENCOUNTER — NURSE TRIAGE (OUTPATIENT)
Dept: OTHER | Facility: CLINIC | Age: 75
End: 2020-06-18

## 2020-06-18 ENCOUNTER — TELEPHONE (OUTPATIENT)
Dept: INTERNAL MEDICINE CLINIC | Age: 75
End: 2020-06-18

## 2020-06-19 ENCOUNTER — OFFICE VISIT (OUTPATIENT)
Dept: INTERNAL MEDICINE CLINIC | Age: 75
End: 2020-06-19
Payer: MEDICARE

## 2020-06-19 VITALS
BODY MASS INDEX: 34.52 KG/M2 | HEART RATE: 64 BPM | TEMPERATURE: 97.2 F | WEIGHT: 227 LBS | SYSTOLIC BLOOD PRESSURE: 120 MMHG | OXYGEN SATURATION: 98 % | DIASTOLIC BLOOD PRESSURE: 62 MMHG

## 2020-06-19 DIAGNOSIS — R73.9 HYPERGLYCEMIA: ICD-10-CM

## 2020-06-19 PROCEDURE — 99213 OFFICE O/P EST LOW 20 MIN: CPT | Performed by: INTERNAL MEDICINE

## 2020-06-19 PROCEDURE — 1123F ACP DISCUSS/DSCN MKR DOCD: CPT | Performed by: INTERNAL MEDICINE

## 2020-06-19 PROCEDURE — 4040F PNEUMOC VAC/ADMIN/RCVD: CPT | Performed by: INTERNAL MEDICINE

## 2020-06-19 PROCEDURE — G8417 CALC BMI ABV UP PARAM F/U: HCPCS | Performed by: INTERNAL MEDICINE

## 2020-06-19 PROCEDURE — 3017F COLORECTAL CA SCREEN DOC REV: CPT | Performed by: INTERNAL MEDICINE

## 2020-06-19 PROCEDURE — 1036F TOBACCO NON-USER: CPT | Performed by: INTERNAL MEDICINE

## 2020-06-19 PROCEDURE — G8427 DOCREV CUR MEDS BY ELIG CLIN: HCPCS | Performed by: INTERNAL MEDICINE

## 2020-06-19 RX ORDER — CLINDAMYCIN HYDROCHLORIDE 150 MG/1
150 CAPSULE ORAL 4 TIMES DAILY
Qty: 28 CAPSULE | Refills: 0 | Status: SHIPPED | OUTPATIENT
Start: 2020-06-19 | End: 2020-06-26

## 2020-06-19 NOTE — PROGRESS NOTES
Chief complaint lesion in mouth    Present illness    The patient gives a 4-day history of seen a lesion on the maxillary cheek at about the level of the second molar. He does not complain of pain with chewing his food, he does complain of some swelling on the right side of his face.     There is been no nasal discharge    Physical examination  There is a slightly pointing area with a whitish surface in the location as described above palpation of the lesion intra-and extraoral he does not show evidence of a mass lesion there is some induration around the lesion    Impression  Cellulitis, I do not think he has a dental abscess that has spread    Plan  Patient is allergic to penicillin antibiotics placed on clindamycin for coverage

## 2020-06-20 LAB
ESTIMATED AVERAGE GLUCOSE: 131.2 MG/DL
HBA1C MFR BLD: 6.2 %

## 2020-07-12 ENCOUNTER — APPOINTMENT (OUTPATIENT)
Dept: CT IMAGING | Age: 75
End: 2020-07-12
Payer: MEDICARE

## 2020-07-12 ENCOUNTER — HOSPITAL ENCOUNTER (EMERGENCY)
Age: 75
Discharge: HOME | End: 2020-07-12
Attending: EMERGENCY MEDICINE
Payer: MEDICARE

## 2020-07-12 ENCOUNTER — APPOINTMENT (OUTPATIENT)
Dept: GENERAL RADIOLOGY | Age: 75
End: 2020-07-12
Payer: MEDICARE

## 2020-07-12 VITALS
DIASTOLIC BLOOD PRESSURE: 59 MMHG | OXYGEN SATURATION: 97 % | RESPIRATION RATE: 25 BRPM | SYSTOLIC BLOOD PRESSURE: 126 MMHG | HEART RATE: 71 BPM | TEMPERATURE: 98.5 F

## 2020-07-12 LAB
A/G RATIO: 1.1 (ref 1.1–2.2)
ALBUMIN SERPL-MCNC: 3.2 G/DL (ref 3.4–5)
ALP BLD-CCNC: 80 U/L (ref 40–129)
ALT SERPL-CCNC: <5 U/L (ref 10–40)
ANION GAP SERPL CALCULATED.3IONS-SCNC: 14 MMOL/L (ref 3–16)
AST SERPL-CCNC: 19 U/L (ref 15–37)
BASOPHILS ABSOLUTE: 0 K/UL (ref 0–0.2)
BASOPHILS RELATIVE PERCENT: 0.3 %
BILIRUB SERPL-MCNC: 0.9 MG/DL (ref 0–1)
BILIRUBIN URINE: NEGATIVE
BLOOD, URINE: NEGATIVE
BUN BLDV-MCNC: 14 MG/DL (ref 7–20)
C DIFF TOXIN/ANTIGEN: ABNORMAL
CALCIUM SERPL-MCNC: 8.6 MG/DL (ref 8.3–10.6)
CHLORIDE BLD-SCNC: 96 MMOL/L (ref 99–110)
CLARITY: CLEAR
CO2: 22 MMOL/L (ref 21–32)
COLOR: YELLOW
CREAT SERPL-MCNC: 1 MG/DL (ref 0.8–1.3)
EOSINOPHILS ABSOLUTE: 0.2 K/UL (ref 0–0.6)
EOSINOPHILS RELATIVE PERCENT: 2.1 %
GFR AFRICAN AMERICAN: >60
GFR NON-AFRICAN AMERICAN: >60
GLOBULIN: 2.9 G/DL
GLUCOSE BLD-MCNC: 111 MG/DL (ref 70–99)
GLUCOSE URINE: NEGATIVE MG/DL
HCT VFR BLD CALC: 43.9 % (ref 40.5–52.5)
HEMOGLOBIN: 14.4 G/DL (ref 13.5–17.5)
KETONES, URINE: 40 MG/DL
LACTIC ACID, SEPSIS: 0.9 MMOL/L (ref 0.4–1.9)
LEUKOCYTE ESTERASE, URINE: NEGATIVE
LYMPHOCYTES ABSOLUTE: 1.1 K/UL (ref 1–5.1)
LYMPHOCYTES RELATIVE PERCENT: 14.9 %
MCH RBC QN AUTO: 30.7 PG (ref 26–34)
MCHC RBC AUTO-ENTMCNC: 32.7 G/DL (ref 31–36)
MCV RBC AUTO: 93.8 FL (ref 80–100)
MICROSCOPIC EXAMINATION: ABNORMAL
MONOCYTES ABSOLUTE: 1.3 K/UL (ref 0–1.3)
MONOCYTES RELATIVE PERCENT: 17.6 %
NEUTROPHILS ABSOLUTE: 4.8 K/UL (ref 1.7–7.7)
NEUTROPHILS RELATIVE PERCENT: 65.1 %
NITRITE, URINE: NEGATIVE
PDW BLD-RTO: 13.6 % (ref 12.4–15.4)
PH UA: 5 (ref 5–8)
PLATELET # BLD: 218 K/UL (ref 135–450)
PMV BLD AUTO: 7.5 FL (ref 5–10.5)
POTASSIUM SERPL-SCNC: 3.9 MMOL/L (ref 3.5–5.1)
PRO-BNP: 413 PG/ML (ref 0–124)
PROTEIN UA: NEGATIVE MG/DL
RBC # BLD: 4.68 M/UL (ref 4.2–5.9)
SODIUM BLD-SCNC: 132 MMOL/L (ref 136–145)
SPECIFIC GRAVITY UA: >1.03 (ref 1–1.03)
TOTAL PROTEIN: 6.1 G/DL (ref 6.4–8.2)
TROPONIN: <0.01 NG/ML
URINE REFLEX TO CULTURE: ABNORMAL
URINE TYPE: ABNORMAL
UROBILINOGEN, URINE: 0.2 E.U./DL
WBC # BLD: 7.3 K/UL (ref 4–11)

## 2020-07-12 PROCEDURE — 99284 EMERGENCY DEPT VISIT MOD MDM: CPT

## 2020-07-12 PROCEDURE — 74177 CT ABD & PELVIS W/CONTRAST: CPT

## 2020-07-12 PROCEDURE — 87449 NOS EACH ORGANISM AG IA: CPT

## 2020-07-12 PROCEDURE — 2580000003 HC RX 258: Performed by: EMERGENCY MEDICINE

## 2020-07-12 PROCEDURE — 93005 ELECTROCARDIOGRAM TRACING: CPT | Performed by: EMERGENCY MEDICINE

## 2020-07-12 PROCEDURE — 80053 COMPREHEN METABOLIC PANEL: CPT

## 2020-07-12 PROCEDURE — U0003 INFECTIOUS AGENT DETECTION BY NUCLEIC ACID (DNA OR RNA); SEVERE ACUTE RESPIRATORY SYNDROME CORONAVIRUS 2 (SARS-COV-2) (CORONAVIRUS DISEASE [COVID-19]), AMPLIFIED PROBE TECHNIQUE, MAKING USE OF HIGH THROUGHPUT TECHNOLOGIES AS DESCRIBED BY CMS-2020-01-R: HCPCS

## 2020-07-12 PROCEDURE — 81003 URINALYSIS AUTO W/O SCOPE: CPT

## 2020-07-12 PROCEDURE — 70450 CT HEAD/BRAIN W/O DYE: CPT

## 2020-07-12 PROCEDURE — 6370000000 HC RX 637 (ALT 250 FOR IP): Performed by: EMERGENCY MEDICINE

## 2020-07-12 PROCEDURE — 83605 ASSAY OF LACTIC ACID: CPT

## 2020-07-12 PROCEDURE — 6360000004 HC RX CONTRAST MEDICATION: Performed by: EMERGENCY MEDICINE

## 2020-07-12 PROCEDURE — 71045 X-RAY EXAM CHEST 1 VIEW: CPT

## 2020-07-12 PROCEDURE — 83880 ASSAY OF NATRIURETIC PEPTIDE: CPT

## 2020-07-12 PROCEDURE — 84484 ASSAY OF TROPONIN QUANT: CPT

## 2020-07-12 PROCEDURE — 85025 COMPLETE CBC W/AUTO DIFF WBC: CPT

## 2020-07-12 PROCEDURE — 87324 CLOSTRIDIUM AG IA: CPT

## 2020-07-12 PROCEDURE — 87040 BLOOD CULTURE FOR BACTERIA: CPT

## 2020-07-12 RX ORDER — SODIUM CHLORIDE, SODIUM LACTATE, POTASSIUM CHLORIDE, CALCIUM CHLORIDE 600; 310; 30; 20 MG/100ML; MG/100ML; MG/100ML; MG/100ML
INJECTION, SOLUTION INTRAVENOUS ONCE
Status: COMPLETED | OUTPATIENT
Start: 2020-07-12 | End: 2020-07-12

## 2020-07-12 RX ORDER — VANCOMYCIN HYDROCHLORIDE 125 MG/1
125 CAPSULE ORAL 4 TIMES DAILY
Qty: 56 CAPSULE | Refills: 0 | Status: SHIPPED | OUTPATIENT
Start: 2020-07-12 | End: 2020-07-26

## 2020-07-12 RX ADMIN — IOPAMIDOL 75 ML: 755 INJECTION, SOLUTION INTRAVENOUS at 19:44

## 2020-07-12 RX ADMIN — VANCOMYCIN HYDROCHLORIDE 125 MG: KIT at 19:54

## 2020-07-12 RX ADMIN — SODIUM CHLORIDE, POTASSIUM CHLORIDE, SODIUM LACTATE AND CALCIUM CHLORIDE: 600; 310; 30; 20 INJECTION, SOLUTION INTRAVENOUS at 19:33

## 2020-07-12 ASSESSMENT — PAIN SCALES - GENERAL: PAINLEVEL_OUTOF10: 3

## 2020-07-12 NOTE — ED PROVIDER NOTES
[facility] Emergency Department      Pt Name: Sarah Aburto  MRN: [id number]  [place] 1945  Date of evaluation: 7/12/2020  Provider: Basim Goodrich MD  I independently performed a history and physical on Sarah Aburto. All diagnostic, treatment, and disposition decisions were made by myself in conjunction with the advanced practice provider. HPI: Sarah Aburto presented with   Chief Complaint   Patient presents with    Diarrhea     Per patients wife patient has been having diarrhea since tuesday, AMS, and a temp of 101.5. Tylenol given      Sarah Aburto has a past medical history of Acute bronchitis, CAD (coronary artery disease), and Hyperlipidemia. He has a past surgical history that includes Diagnostic Cardiac Cath Lab Procedure (2005) and Coronary angioplasty with stent (2005). No current facility-administered medications on file prior to encounter.       Current Outpatient Medications on File Prior to Encounter   Medication Sig Dispense Refill    QUEtiapine (SEROQUEL) 25 MG tablet 25 mg 1/2 am  1 pm      Vitamin D, Cholecalciferol, 50 MCG (2000 UT) CAPS Take by mouth      metoprolol succinate (TOPROL XL) 25 MG extended release tablet Take 1 tablet by mouth daily 90 tablet 3    nitroGLYCERIN (NITROSTAT) 0.4 MG SL tablet Place 1 tablet under the tongue every 5 minutes as needed (prn) 25 tablet 3    atorvastatin (LIPITOR) 80 MG tablet Take 1 tablet by mouth daily 90 tablet 1    Coenzyme Q10 (COQ10 PO) Take 1 capsule by mouth daily      Carbidopa-Levodopa ER (RYTARY) 36. MG CPCR Take 1 tablet by mouth 3 times daily       primidone (MYSOLINE) 50 MG tablet Take 50 mg by mouth daily      carbidopa-levodopa (SINEMET)  MG per tablet Take 1 tablet by mouth every 2 hours From 9 am to 7 pm      omeprazole (PRILOSEC) 20 MG delayed release capsule Take 20 mg by mouth daily      aspirin 81 MG tablet Take 1 tablet by mouth daily 30 tablet 0    Psyllium 500 MG CAPS Take 6 capsules by mouth daily       fish oil-omega-3 fatty acids 1000 MG capsule Take 2 g by mouth daily.  multivitamin (THERAGRAN) per tablet Take 1 tablet by mouth daily. PHYSICAL EXAM  Vitals: BP (!) 117/57   Pulse 69   Temp 98.5 °F (36.9 °C) (Oral)   Resp 26   SpO2 96%   Constitutional:  70 y.o. male alert  HENT:  Atraumatic, oral mucosa moist  Neck:  No visible JVD, supple  Chest/Lungs:  Respiratory effort normal, clear, regular  Abdomen:  Slightly distended, obese, BS present, diffuse tenderness, no r/g  Back:  No gross deformity  Extremities:  Normal tone and perfusion  Neurologic:  Alert, speech normal, mentation slow, pupils equal, fair coordination of extremities, no facial asymmetry, slow and steady gait with     Medical Decision Making and Plan: Briefly, this is an 70 y.o.male who presented with concern for diarrhea, confusion. The wife reports that he took a course of clindamycin about 2 weeks ago for an infected salivary gland. He ultimately passed a stone out of the gland and some purulent fluid. The diarrhea today was frequent and in large amounts. He was confused at home and he did not know how to help her get him off the toilet. He had incontinence. He has baseline disability with Parkinson's disease and worsening memory problems this past year. He was febrile at home. CT scan confirms pancolitis. Pancolitis includes the appendix but radiologist did not see any overt findings of appendicitis and it makes sense that the appendix would have inflammatory changes since the C difficile infection has caused diffuse radiographic changes. I consulted with Dr Yareli Yeung and he agrees that based on clinical presentation a trial of home abx is reasonable, especially since the inpatient treatment would involve oral medication. Pt confusion was vastly improved per his wife.   They both felt comfortable with outpatient treatment, especially since he has been experiencing hallucinations at home for some time and he gets frightened if she is not with him. She cannot stay with him since we are ruling him out for a COVID infection and it is against the current hospital policy. I also touched base with Dr Jorge Painting because I had considered an admission based upon the part of his clinical presentation involving his confusion. Wife is an excellent caregiver and is a retired nurse. Vikki Bryant was given appropriate discharge instructions. Referral to follow up provider. For further details of [facility] encounter, please see documentation by advanced practice provider Hans Davidson CNP.     Labs Reviewed   C DIFF TOXIN/ANTIGEN - Abnormal; Notable for the following components:       Result Value    C.diff Toxin/Antigen   (*)     Value: POSITIVE for Clostridium difficile antigen and toxin  CONTACT PRECAUTIONS INDICATED  Normal Range: Negative      All other components within normal limits    Narrative:     ORDER#: [id number]                          ORDERED BY: Zamzam Yañez  SOURCE: Stool Loose                        COLLECTED:  07/12/20 16:56  ANTIBIOTICS AT [facility]OH[facility].:                      RECEIVED :  07/12/20 17:51  Collect White vial (sterile container)  Performed at:  [facility] [address], [place], [facility] [employer]   Phone [phone]   COMPREHENSIVE METABOLIC PANEL - Abnormal; Notable for the following components:    Sodium 132 (*)     Chloride 96 (*)     Glucose 111 (*)     Total Protein 6.1 (*)     Alb 3.2 (*)     ALT <5 (*)     All other components within normal limits    Narrative:     Performed at:  [facility]  [address] [facility], [facility] [employer]   Phone [phone]   URINE RT REFLEX TO CULTURE - Abnormal; Notable for the following components:    Ketones, Urine 40 (*)     All other components within normal limits    Narrative:     Performed at:  [facility]  [address] [facility],  [place], [address]   Phone [phone]   BRAIN NATRIURETIC PEPTIDE - Abnormal; Notable for the following components:    Pro- (*)     All other components within normal limits    Narrative:     Performed at:  [facility]  [address] [facility],  [place], [address]   Phone [phone]   CULTURE, BLOOD 1   CULTURE, BLOOD 2   CBC WITH AUTO DIFFERENTIAL    Narrative:     Performed at:  [facility]  [address] [facility],  [place], [address]   Phone [phone]   LACTATE, SEPSIS    Narrative:     Performed at:  [facility]  [address] [facility],  [place], [address]   Phone [phone]   TROPONIN    Narrative:     Performed at:  [facility]  [address] [facility],  [place], [address]   Phone [phone]     RADIOLOGY:     Plain x-rays were viewed by me:   CT ABDOMEN PELVIS W IV CONTRAST Additional Contrast? None   Final Result   1. Moderate pancolitis. This could represent C. difficile colitis or   possible inflammatory colitis. 2. No obstruction, perforation, or abscess. 3. Mild secondary appendiceal inflammation. There is no evidence of acute   surgical appendicitis. 4. Prostatomegaly. Evidence of bladder outlet obstruction with a   subcentimeter left lateral bladder diverticulum. XR CHEST PORTABLE   Final Result   No acute process. CT Head WO Contrast   Preliminary Result   No acute intracranial abnormality.            EKG:  Read by me in the absence of a cardiologist shows: Sinus rhythm, rate 73, intervals normal, axis normal, no acute injury pattern, minimal change from comparison study from November 2011    Medications administered:  Medications   lactated ringers infusion ( Intravenous Stopped 7/12/20 2230)   vancomycin ELEAZAR FIGUEROA CTR) 50 MG/ML oral solution 125 mg (125 mg Oral Given 7/12/20 1954)   iopamidol (ISOVUE-370) 76 % injection 75 mL (75 mLs Intravenous Given 7/12/20 1944)     Vitals:    07/12/20 1930 07/12/20 2000 07/12/20 2100 07/12/20 2130   BP: (!) 120/59 130/65 126/60 (!) 126/59   Pulse: 69 76 69 71   Resp: 19 18 24 25   Temp:       TempSrc:       SpO2: 95%  96% 97%     Discharge Medication List as of 7/12/2020 10:15 PM      START taking these medications    Details   vancomycin (VANCOCIN) 125 MG capsule Take 1 capsule by mouth 4 times daily for 14 days, Disp-56 capsule,R-0Print           FOLLOW UP:    Gianni Campoverde MD  [address] [place] [address]          Kendra Gibson MD  [facility] [address] [facility] [address] [facility]  [phone]      GI specialist    Vanna Peralta [address]  [phone]    Schedule an appointment as soon as possible for a visit   Urology specialist for enlarged prostate    [facility]  [address] [place] [address]  [phone]  Go to   If symptoms worsen    FINAL IMPRESSION:    1. C. difficile colitis    2. Confusion, resolved    3.  Prostate enlargement       DISPOSITION Decision To Discharge 07/12/2020 10:10:58 PM         Chucky Coe MD  07/13/20 5512

## 2020-07-13 ENCOUNTER — CARE COORDINATION (OUTPATIENT)
Dept: CARE COORDINATION | Age: 75
End: 2020-07-13

## 2020-07-13 LAB — SARS-COV-2, PCR: NOT DETECTED

## 2020-07-13 ASSESSMENT — ENCOUNTER SYMPTOMS
ABDOMINAL PAIN: 1
DIARRHEA: 1
CHEST TIGHTNESS: 0
NAUSEA: 0
SHORTNESS OF BREATH: 0
VOMITING: 0

## 2020-07-13 NOTE — ED PROVIDER NOTES
[address] [facility]        Pt Name: Scarlett Longoria  MRN: [id number]  [place] 1945  Date of evaluation: 7/12/2020  Provider: KEYSHA Poon - LARON  PCP: Cailin Calero MD     I have seen and evaluated this patient with my supervising physician No att. providers found. CHIEF COMPLAINT       Chief Complaint   Patient presents with    Diarrhea     Per patients wife patient has been having diarrhea since tuesday, AMS, and a temp of 101.5. Tylenol given        HISTORY OF PRESENT ILLNESS   (Location, Timing/Onset, Context/Setting, Quality, Duration, Modifying Factors, Severity, Associated Signs and Symptoms)  Note limiting factors. Scarlett Longoria is a 70 y.o. male presents to the emergency department today with complaint of abdominal discomfort with diarrhea since Tuesday. Patient's wife does give history today, states that she has been feeding him brat diet and giving him Imodium. She reports that they were in [place] [address], secluded from others and did return on Thursday. Symptoms have persisted. She reports that he had an episode of chest pain yesterday and has been febrile today with some mild confusion. He does have history of Parkinson's disease. Tylenol was given prior to arrival, he had an oral temperature of 101.5. Denies any headache, lightheadedness, dizziness, visual disturbances. No neck or back pain. No shortness of breath, cough, or congestion. No vomiting, constipation, or dysuria. No rash. Nursing Notes were all reviewed and agreed with or any disagreements were addressed in the HPI. REVIEW OF SYSTEMS    (2-9 systems for level 4, 10 or more for level 5)     Review of Systems   Constitutional: Positive for fatigue and fever. Negative for activity change and chills. Respiratory: Negative for chest tightness and shortness of breath. Cardiovascular: Positive for chest pain. Gastrointestinal: Positive for abdominal pain and diarrhea. Negative for nausea and vomiting. Genitourinary: Negative for dysuria. Psychiatric/Behavioral: Positive for confusion. All other systems reviewed and are negative. Positives and Pertinent negatives as per HPI. Except as noted above in the ROS, all other systems were reviewed and negative.        PAST MEDICAL HISTORY     Past Medical History:   Diagnosis Date    Acute bronchitis     history of    CAD (coronary artery disease)     Hyperlipidemia          SURGICAL HISTORY     Past Surgical History:   Procedure Laterality Date    CORONARY ANGIOPLASTY WITH STENT PLACEMENT  2005    Germania Ennis    DIAGNOSTIC CARDIAC CATH LAB PROCEDURE  2005    Myra Hooks       Discharge Medication List as of 7/12/2020 10:15 PM      CONTINUE these medications which have NOT CHANGED    Details   QUEtiapine (SEROQUEL) 25 MG tablet 25 mg 1/2 am  1 pmHistorical Med      Vitamin D, Cholecalciferol, 50 MCG (2000 UT) CAPS Take by mouthHistorical Med      metoprolol succinate (TOPROL XL) 25 MG extended release tablet Take 1 tablet by mouth daily, Disp-90 tablet, R-3Normal      nitroGLYCERIN (NITROSTAT) 0.4 MG SL tablet Place 1 tablet under the tongue every 5 minutes as needed (prn), Disp-25 tablet, R-3Normal      atorvastatin (LIPITOR) 80 MG tablet Take 1 tablet by mouth daily, Disp-90 tablet, R-1Refill [id number]      Coenzyme Q10 (COQ10 PO) Take 1 capsule by mouth dailyHistorical Med      Carbidopa-Levodopa ER (RYTARY) 36. MG CPCR Take 1 tablet by mouth 3 times daily Historical Med      primidone (MYSOLINE) 50 MG tablet Take 50 mg by mouth dailyHistorical Med      carbidopa-levodopa (SINEMET)  MG per tablet Take 1 tablet by mouth every 2 hours From 9 am to 7 pmHistorical Med      omeprazole (PRILOSEC) 20 MG delayed release capsule Take 20 mg by mouth daily      aspirin 81 MG tablet Take 1 tablet by mouth daily, Disp-30 tablet, R-0 Psyllium 500 MG CAPS Take 6 capsules by mouth daily Historical Med      fish oil-omega-3 fatty acids 1000 MG capsule Take 2 g by mouth daily. multivitamin (THERAGRAN) per tablet Take 1 tablet by mouth daily. ALLERGIES     Amoxicillin-pot clavulanate    FAMILYHISTORY       Family History   Adopted: Yes   Problem Relation Age of Onset   [facility] Cancer Mother     Other Father         Dalton Pemberton Other Brother         emphysema    Cancer Sister     Diabetes Sister     Other Brother         joint problems    Diabetes Sister     Heart Disease Neg Hx     High Blood Pressure Neg Hx     High Cholesterol Neg Hx           SOCIAL HISTORY       Social History     Tobacco Use    Smoking status: Former Smoker     Packs/day: 0.25     Years: 10.00     Pack years: 2.50     Last attempt to quit: 1980     Years since quittin.5    Smokeless tobacco: Never Used    Tobacco comment: states he smokes a cigar once a month or once every six weeks   Substance Use Topics    Alcohol use: No     Comment: occas.  Drug use: No       SCREENINGS             PHYSICAL EXAM    (up to 7 for level 4, 8 or more for level 5)     ED Triage Vitals [20 1540]   BP Temp Temp Source Pulse Resp SpO2 Height Weight   (!) 96/58 98.5 °F (36.9 °C) Oral 75 18 99 % -- --       Physical Exam  Vitals signs and nursing note reviewed. Constitutional:       Appearance: He is well-developed. He is not diaphoretic. HENT:      Head: Normocephalic and atraumatic. Right Ear: External ear normal.      Left Ear: External ear normal.   Eyes:      General:         Right eye: No discharge. Left eye: No discharge. Neck:      Musculoskeletal: Normal range of motion and neck supple. Vascular: No JVD. Cardiovascular:      Rate and Rhythm: Normal rate and regular rhythm. Pulses: Normal pulses. Heart sounds: Normal heart sounds. Pulmonary:      Effort: Pulmonary effort is normal. No respiratory distress. Breath sounds: Normal breath sounds. Abdominal:      Palpations: Abdomen is soft. Tenderness: There is abdominal tenderness. Musculoskeletal: Normal range of motion. Skin:     General: Skin is warm and dry. Coloration: Skin is not pale. Neurological:      Mental Status: He is alert and oriented to person, place, and time. Cranial Nerves: Cranial nerves are intact. Sensory: Sensation is intact. Motor: Motor function is intact. Coordination: Coordination is intact.    Psychiatric:         Behavior: Behavior normal.         DIAGNOSTIC RESULTS   LABS:    Labs Reviewed   C DIFF TOXIN/ANTIGEN - Abnormal; Notable for the following components:       Result Value    C.diff Toxin/Antigen   (*)     Value: POSITIVE for Clostridium difficile antigen and toxin  CONTACT PRECAUTIONS INDICATED  Normal Range: Negative      All other components within normal limits    Narrative:     ORDER#: [id number]                          ORDERED BY: Eva Barrett  SOURCE: Stool Loose                        COLLECTED:  07/12/20 16:56  ANTIBIOTICS AT SOHAN.:                      RECEIVED :  07/12/20 17:51  Collect White vial (sterile container)  Performed at:  [facility] [address], [place], [facility] [employer]   Phone [phone]   COMPREHENSIVE METABOLIC PANEL - Abnormal; Notable for the following components:    Sodium 132 (*)     Chloride 96 (*)     Glucose 111 (*)     Total Protein 6.1 (*)     Alb 3.2 (*)     ALT <5 (*)     All other components within normal limits    Narrative:     Performed at:  [facility] [address] [employer]   Phone [phone]   URINE RT REFLEX TO CULTURE - Abnormal; Notable for the following components:    Ketones, Urine 40 (*)     All other components within normal limits    Narrative:     Performed at:  [facility] [address], [place], [facility] [employer]   Phone [phone]  - Abnormal; Notable for the following components:    Pro- (*)     All other components within normal limits    Narrative:     Performed at:  [facility]  [address], [address] [employer]   Phone [phone]   CULTURE, BLOOD 1   CULTURE, BLOOD 2   CBC WITH AUTO DIFFERENTIAL    Narrative:     Performed at:  [facility] [address], [address] [employer]   Phone [phone]   LACTATE, SEPSIS    Narrative:     Performed at:  [facility]  [address],  [place], [address] [employer] [employer]   Phone [phone]   TROPONIN    Narrative:     Performed at:  [facility] [address], [place]   Phone [phone]       All other labs were within normal range or not returned as of this dictation. EKG: All EKG's are interpreted by the Emergency Department Physician in the absence of a cardiologist.  Please see their note for interpretation of EKG. RADIOLOGY:   Non-plain film images such as CT, Ultrasound and MRI are read by the radiologist. Plain radiographic images are visualized and preliminarily interpreted by the ED Provider with the below findings:        Interpretation per the Radiologist below, if available at the time of this note:    CT ABDOMEN PELVIS W IV CONTRAST Additional Contrast? None   Final Result   1. Moderate pancolitis. This could represent C. difficile colitis or   possible inflammatory colitis. 2. No obstruction, perforation, or abscess. 3. Mild secondary appendiceal inflammation. There is no evidence of acute   surgical appendicitis. 4. Prostatomegaly. Evidence of bladder outlet obstruction with a   subcentimeter left lateral bladder diverticulum. XR CHEST PORTABLE   Final Result   No acute process.          CT Head WO Contrast   Preliminary Result   No acute intracranial abnormality. Ct Head Wo Contrast    Result Date: 7/12/2020  EXAMINATION: CT OF THE HEAD WITHOUT CONTRAST  7/12/2020 5:23 pm TECHNIQUE: CT of the head was performed without the administration of intravenous contrast. Dose modulation, iterative reconstruction, and/or weight based adjustment of the mA/kV was utilized to reduce the radiation dose to as low as reasonably achievable. COMPARISON: MRI 09/01/2016 HISTORY: ORDERING SYSTEM PROVIDED HISTORY: confusion TECHNOLOGIST PROVIDED HISTORY: If patient is on cardiac monitor and/or pulse ox, they may be taken off cardiac monitor and pulse ox, left on O2 if currently on. All monitors reattached when patient returns to room. Has a \"code stroke\" or \"stroke alert\" been called? ->No Reason for exam:->confusion Reason for Exam: Diarrhea (Per patients wife patient has been having diarrhea since tuesday, AMS, and a temp of 101.5. Tylenol given ) FINDINGS: BRAIN/VENTRICLES: There is no acute intracranial hemorrhage, mass effect or midline shift. No abnormal extra-axial fluid collection. The gray-white differentiation is maintained without evidence of an acute infarct. There is no evidence of hydrocephalus. Mild patchy areas of low attenuation in the periventricular and subcortical white matter likely related to chronic small vessel ischemic changes. Stable cerebral parenchymal atrophy. ORBITS: The visualized portion of the orbits demonstrate no acute abnormality. SINUSES: Partially visualized is a mucoid retention cyst versus polyp in the left maxillary sinus. The remaining paranasal sinuses and mastoid air cells otherwise clear. SOFT TISSUES/SKULL:  No acute abnormality of the visualized skull or soft tissues. No acute intracranial abnormality.      Ct Abdomen Pelvis W Iv Contrast Additional Contrast? None    Result Date: 7/12/2020  EXAMINATION: CT OF THE ABDOMEN AND PELVIS WITH CONTRAST 7/12/2020 7:44 pm TECHNIQUE: CT of the abdomen and pelvis was performed with the administration of intravenous contrast. Multiplanar reformatted images are provided for review. Dose modulation, iterative reconstruction, and/or weight based adjustment of the mA/kV was utilized to reduce the radiation dose to as low as reasonably achievable. COMPARISON: None. HISTORY: ORDERING SYSTEM PROVIDED HISTORY: abd pain, c diff infection TECHNOLOGIST PROVIDED HISTORY: Additional Contrast?->None Reason for exam:->abd pain, c diff infection Reason for Exam: Diarrhea (Per patients wife patient has been having diarrhea since tuesday, AMS, and a temp of 101.5. Tylenol given ) Acuity: Acute Type of Exam: Initial FINDINGS: Thorax base:  Borderline enlarged heart size with coronary artery calcifications. No significant pericardial effusion. The lung bases demonstrate mild dependent atelectasis. No effusion. Abdomen:  Abdominal aortic atherosclerosis with no aneurysm formation. Diffuse hepatic steatosis. There are few intrahepatic subcentimeter hypodense foci consistent with cysts, no follow-up imaging is recommended. No suspicious intrahepatic mass. The portal vasculature is patent. The gallbladder, common bile duct, pancreas, spleen, and adrenals are normal. The kidneys are symmetric with multiple renal cysts. There are numerous bilateral parapelvic cysts. No hydronephrosis. The bilateral ureters are normal. The stomach and small bowel are normal.  The appendix demonstrates mild fluid opacification without distention. There is mild adjacent fat induration. The colon demonstrates diffuse wall thickening and pericolonic fat induration which is more prominent in the left hemicolon. Left hemicolonic diverticulosis present. Trace ascites. No free intraperitoneal air. No peritoneal/retroperitoneal abscess. Pelvis:  Prostatomegaly. Diffuse rectal wall thickening. The bladder demonstrates mild distention. Left lateral bladder wall subcentimeter diverticulum present.  Musculoskeletal structures: No diffuse body wall edema. No significant enlarged inguinal lymph nodes. The lumbar spine and pelvis demonstrate normal alignment with degenerative changes. No acute abnormality. 1. Moderate pancolitis. This could represent C. difficile colitis or possible inflammatory colitis. 2. No obstruction, perforation, or abscess. 3. Mild secondary appendiceal inflammation. There is no evidence of acute surgical appendicitis. 4. Prostatomegaly. Evidence of bladder outlet obstruction with a subcentimeter left lateral bladder diverticulum. Xr Chest Portable    Result Date: 7/12/2020  EXAMINATION: ONE XRAY VIEW OF THE CHEST 7/12/2020 6:11 pm COMPARISON: 11/11/2011 HISTORY: ORDERING SYSTEM PROVIDED HISTORY: SOB TECHNOLOGIST PROVIDED HISTORY: Reason for exam:->SOB Reason for Exam: Diarrhea (Per patients wife patient has been having diarrhea since tuesday, AMS, and a temp of 101.5. Tylenol given ) Acuity: Acute Type of Exam: Initial FINDINGS: The lungs are without acute focal process. There is no effusion or pneumothorax. The cardiomediastinal silhouette is without acute process. The osseous structures are without acute process. No acute process. PROCEDURES   Unless otherwise noted below, none     Procedures    CRITICAL CARE TIME   The total critical care time spent while evaluating and treating this patient was at least 32 minutes. This excludes time spent doing separately billable procedures. This includes time at the bedside, data interpretation, medication management, obtaining critical history from collateral sources if the patient is unable to provide it directly, and physician consultation. Specifics of interventions taken and potentially life-threatening diagnostic considerations are listed above in the medical decision making.       CONSULTS:  IP CONSULT TO HOSPITALIST  IP CONSULT TO GI      EMERGENCY DEPARTMENT COURSE and DIFFERENTIAL DIAGNOSIS/MDM:   Vitals:    Vitals:    07/12/20 1930 07/12/20 2000 07/12/20 2100 07/12/20 2130   BP: (!) 120/59 130/65 126/60 (!) 126/59   Pulse: 69 76 69 71   Resp: 19 18 24 25   Temp:       TempSrc:       SpO2: 95%  96% 97%       Patient was given the following medications:  Medications   lactated ringers infusion ( Intravenous Stopped 7/12/20 2230)   vancomycin [facility]) 50 MG/ML oral solution 125 mg (125 mg Oral Given 7/12/20 1954)   iopamidol (ISOVUE-370) 76 % injection 75 mL (75 mLs Intravenous Given 7/12/20 1944)           Briefly, this is a 70year old male that  presents to the emergency department today with complaint of abdominal discomfort with diarrhea since Tuesday. Patient's wife does give history today, states that she has been feeding him brat diet and giving him Imodium. She reports that they were in [place] at their [address], secluded from others and did return on Thursday. Symptoms have persisted. She reports that he had an episode of chest pain yesterday and has been febrile today with some mild confusion. He does have history of Parkinson's disease. Tylenol was given prior to arrival, he had an oral temperature of 101.5.    UA shows ketonuria. CBC is unremarkable. CMP unremarkable. Lactate normal.  C. difficile is positive. Troponin negative. . CT ABDOMEN PELVIS W IV CONTRAST Additional Contrast? None (Final result)   Result time 07/12/20 20:23:33   Final result by Mick Salas MD (07/12/20 20:23:33)                 Impression:     1. Moderate pancolitis.  This could represent C. difficile colitis or   possible inflammatory colitis. 2. No obstruction, perforation, or abscess. 3. Mild secondary appendiceal inflammation. Dorothye Hero is no evidence of acute   surgical appendicitis. 4. Prostatomegaly.  Evidence of bladder outlet obstruction with a   subcentimeter left lateral bladder diverticulum. Patient is given oral vancomycin here and LR. Discharged home with oral vancomycin.   Instructed to follow-up edit the dictations but occasionally words are mis-transcribed.)    KEYSHA Crabtree - CNP (electronically signed)            KEYSHA Crabtree CNP  07/13/20 176 KEYSHA Snow CNP  07/13/20 176 KEYSHA Snow - CNP  07/13/20 1046

## 2020-07-13 NOTE — ED NOTES
Pt comes in today from home due to c/o diarrhea x 6 days. Pts wife states he has been altered that started this morning. Denies any emesis, abd pain, no diaphoresis. Pt alert and oriented. Pt hooked up to monitor and NSR noted on monitor. VSS. IV started and labs obtained per order. Pt tolerated well. Updated pt on POC; verbalized understanding. Siderails up x 2, call light within reach. Pt denies any further needs at this time. Will continue to monitor.         Morgan Cruz RN  07/12/20 2009

## 2020-07-13 NOTE — ED NOTES
Nursing Discharge Notes:  -Patient discharged at this time in no acute distress after verbalizing understanding of discharge instructions.  -A copy of the AVS was reviewed with pt and family.  -Pt received applicable scripts which were reviewed with pt and family by this RN. -Pt was given the opportunity to ask questions before signing for discharge. -IV removed and dressing applied.    -Pt left ambulatory to lobby / discharge area. Patient Education:  Learner - Patient and family. Motivation and Readiness To Learn - Medium to High  Barriers To Learning - None  Learning Preference / Provided Instructions - Both written and verbal discharge instructions.        Eri Burciaga RN  07/12/20 6530

## 2020-07-13 NOTE — CARE COORDINATION
COVID-19 ED/Flu Clinic Follow-up Note    Patient contacted regarding COVID-19 risk and screening. Unable to reach patient, left HIPAA complaint VM and requested return call.    family, patient's wife Annika Rodriguez returned care coordination outreach to perform post discharge call. Verified name and  with family as identifiers. Provided introduction to self, and reason for call due to viral symptoms of infection and/or exposure to COVID-19. Patient presented to emergency department/flu clinic with complaints of viral symptoms/exposure to COVID. Patient reports symptoms are the same. Due to no new or worsening symptoms the RN CTN/VIVIENNE was not notified for escalation. Discussed exposure protocols and quarantine with CDC Guidelines What To Do If You Are Sick    Family was given an opportunity for questions and concerns. Stay home except to get medical care    Separate yourself from other people and animals in your home    Call ahead before visiting your doctor    Wear a facemask    Cover your coughs and sneezes    Clean your hands often    Avoid sharing personal household items    Clean all high-touch surfaces everyday    Monitor your symptoms  Seek prompt medical attention if your illness is worsening (e.g., difficulty breathing). Before seeking care, call your healthcare provider and tell them that you have, or are being evaluated for, COVID-19. Put on a facemask before you enter the facility. These steps will help the healthcare provider's office to keep other people in the office or waiting room from getting infected or exposed. Ask your healthcare provider to call the local or state health department. Persons who are placed under If you have a medical emergency and need to call 911, notify the dispatch personnel that you have, or are being evaluated for COVID-19. If possible, put on a facemask before emergency medical services arrive.     The family agrees to contact the [facility] exposure line [phone],

## 2020-07-14 LAB
EKG ATRIAL RATE: 73 BPM
EKG DIAGNOSIS: NORMAL
EKG P AXIS: 32 DEGREES
EKG P-R INTERVAL: 160 MS
EKG Q-T INTERVAL: 420 MS
EKG QRS DURATION: 84 MS
EKG QTC CALCULATION (BAZETT): 462 MS
EKG R AXIS: 21 DEGREES
EKG T AXIS: 48 DEGREES
EKG VENTRICULAR RATE: 73 BPM

## 2020-07-14 PROCEDURE — 93010 ELECTROCARDIOGRAM REPORT: CPT | Performed by: INTERNAL MEDICINE

## 2020-07-16 LAB
BLOOD CULTURE, ROUTINE: NORMAL
CULTURE, BLOOD 2: NORMAL

## 2020-07-23 ENCOUNTER — OFFICE VISIT (OUTPATIENT)
Dept: INTERNAL MEDICINE CLINIC | Age: 75
End: 2020-07-23
Payer: MEDICARE

## 2020-07-23 VITALS
WEIGHT: 225.4 LBS | TEMPERATURE: 97.2 F | BODY MASS INDEX: 34.16 KG/M2 | DIASTOLIC BLOOD PRESSURE: 66 MMHG | SYSTOLIC BLOOD PRESSURE: 122 MMHG | HEIGHT: 68 IN | HEART RATE: 66 BPM

## 2020-07-23 PROCEDURE — 1123F ACP DISCUSS/DSCN MKR DOCD: CPT | Performed by: INTERNAL MEDICINE

## 2020-07-23 PROCEDURE — G8417 CALC BMI ABV UP PARAM F/U: HCPCS | Performed by: INTERNAL MEDICINE

## 2020-07-23 PROCEDURE — 4040F PNEUMOC VAC/ADMIN/RCVD: CPT | Performed by: INTERNAL MEDICINE

## 2020-07-23 PROCEDURE — G8427 DOCREV CUR MEDS BY ELIG CLIN: HCPCS | Performed by: INTERNAL MEDICINE

## 2020-07-23 PROCEDURE — 99213 OFFICE O/P EST LOW 20 MIN: CPT | Performed by: INTERNAL MEDICINE

## 2020-07-23 PROCEDURE — 1036F TOBACCO NON-USER: CPT | Performed by: INTERNAL MEDICINE

## 2020-07-23 PROCEDURE — 3017F COLORECTAL CA SCREEN DOC REV: CPT | Performed by: INTERNAL MEDICINE

## 2020-07-23 NOTE — PROGRESS NOTES
bronchitis     history of    CAD (coronary artery disease)     Hyperlipidemia            BP (!) 100/58   Pulse 72   Ht 5' 8\" (1.727 m)   Wt 236 lb (107 kg)   BMI 35.88 kg/m²     General Appearance:  Alert, cooperative, no distress, appears stated age   Head:  Normocephalic, without obvious abnormality, atraumatic   Neck: Supple,   Lungs:   Clear to auscultation bilaterally, respirations unlabored   Chest Wall:  No tenderness or deformity   Heart:  Regular rate and rhythm, S1, S2 normal, no murmur, rub or gallop   Abdomen:   Soft, non-tender, bowel sounds active all four quadrants,  no masses, no organomegaly   Skin:  Tinea pedis   Lymph nodes: Cervical, supraclavicular  Adenopathy is absent   Neurologic:  Cogwheel rigidity is present right greater than left but is present bilaterally masked facies broad-based gait       No components found for: CHLPL  Lab Results   Component Value Date    TRIG 109 12/07/2017    TRIG 145 12/15/2015    TRIG 138 11/25/2014     Lab Results   Component Value Date    HDL 41 12/07/2017    HDL 39 (L) 12/15/2015    HDL 38 (L) 11/25/2014     Lab Results   Component Value Date    LDLCALC 88 12/07/2017    LDLCALC 71 12/15/2015    LDLCALC 86 11/25/2014     Lab Results   Component Value Date    LABVLDL 22 12/07/2017    LABVLDL 29 12/15/2015    LABVLDL 28 11/25/2014     Lab Results   Component Value Date    CREATININE 0.8 12/07/2017         ASSESSMENT/PLAN[de-identified]     Diagnosis Orders   1. C. difficile colitis     2.  Calculus of parotid gland       The patient had C. difficile enterocolitis related to his recent clindamycin therapy    He has passed a parotid stone    He is evidence of tenia pedis recommended topical Lotrimin

## 2020-08-19 NOTE — PROGRESS NOTES
Pimavanserin Tartrate (NUPLAZID) 10 MG TABS Take by mouth      QUEtiapine (SEROQUEL) 25 MG tablet 25 mg 1/2 am  1 pm      Vitamin D, Cholecalciferol, 50 MCG (2000 UT) CAPS Take by mouth      metoprolol succinate (TOPROL XL) 25 MG extended release tablet Take 1 tablet by mouth daily 90 tablet 3    nitroGLYCERIN (NITROSTAT) 0.4 MG SL tablet Place 1 tablet under the tongue every 5 minutes as needed (prn) 25 tablet 3    atorvastatin (LIPITOR) 80 MG tablet Take 1 tablet by mouth daily 90 tablet 1    Coenzyme Q10 (COQ10 PO) Take 1 capsule by mouth daily      Carbidopa-Levodopa ER (RYTARY) 36. MG CPCR Take 1 tablet by mouth 3 times daily       primidone (MYSOLINE) 50 MG tablet Take 50 mg by mouth daily      carbidopa-levodopa (SINEMET)  MG per tablet Take 1 tablet by mouth every 2 hours From 9 am to 7 pm      omeprazole (PRILOSEC) 20 MG delayed release capsule Take 20 mg by mouth daily      aspirin 81 MG tablet Take 1 tablet by mouth daily 30 tablet 0    Psyllium 500 MG CAPS Take 6 capsules by mouth daily       fish oil-omega-3 fatty acids 1000 MG capsule Take 2 g by mouth daily.  multivitamin (THERAGRAN) per tablet Take 1 tablet by mouth daily. No current facility-administered medications for this visit. Allergies:  Amoxicillin-pot clavulanate     Review of Systems:   · Constitutional: there has been no unanticipated weight loss. There's been no change in energy level, sleep pattern, or activity level. · Eyes: No visual changes or diplopia. No scleral icterus. · ENT: No Headaches, hearing loss or vertigo. No mouth sores or sore throat. · Cardiovascular: Reviewed in HPI  · Respiratory: No cough or wheezing, no sputum production. No hematemesis. · Gastrointestinal: No abdominal pain, appetite loss, blood in stools. No change in bowel or bladder habits. · Genitourinary: No dysuria, trouble voiding, or hematuria.   · Musculoskeletal:  No gait disturbance, weakness or joint complaints. · Integumentary: No rash or pruritis. · Neurological: No headache, diplopia, change in muscle strength, numbness or tingling. No change in gait, balance, coordination, mood, affect, memory, mentation, behavior. · Psychiatric: No anxiety, no depression. · Endocrine: No malaise, fatigue or temperature intolerance. No excessive thirst, fluid intake, or urination. No tremor. · Hematologic/Lymphatic: No abnormal bruising or bleeding, blood clots or swollen lymph nodes. · Allergic/Immunologic: No nasal congestion or hives. Physical Examination:    Blood pressure (!) 100/52, pulse 69, height 5' 8\" (1.727 m), weight 225 lb (102.1 kg), SpO2 95 %. Constitutional and General Appearance: in NAD  Skin:good turgor,intact without lesions  HEENT: EOMI ,normal  Neck:no JVD  Respiratory:  · Normal excursion and expansion without use of accessory muscles  · Resp Auscultation: Normal breath sounds without dullness  Cardiovascular:  · The apical impulses not displaced  · Heart tones are crisp and normal  · Cervical veins are not engorged  · The carotid upstroke is normal in amplitude and contour without delay. No bruit heard on physical.   · Peripheral pulses are symmetrical and full  · There is no clubbing, cyanosis of the extremities. · No edema  · Femoral Arteries: 2+ and equal  · Pedal Pulses: 2+ and equal   Abdomen:  · No masses or tenderness  · Liver/Spleen: No Abnormalities Noted  Neurological/Psychiatric:  · Alert and oriented in all spheres  · Moves all extremities well  · Exhibits normal gait balance and coordination  · No abnormalities of mood, affect, memory, mentation, or behavior are noted  · Positive for tremors    Assessment:    1. Coronary atherosclerosis of native coronary artery: Stable. No anginal symptoms. 2005 Cath> stent in LAD  2006 Cath> Patent stent in lad -nonobstructive circumflex disease. 1/10 GXT Myoview> Normal perfusion.  An angiogram was normal.   Cath 4/2015> Normal LV function with EF 60, widely patent LAD stent, 30% distal CFx. -  nuclear stress test to evaluate CP   7/17/19> Normal LV size and systolic function. No evidence of ischemia. 2. Hypertension: Stable. Some positional dizziness, brief. Blood pressure (!) 100/52, pulse 69, height 5' 8\" (1.727 m), weight 225 lb (102.1 kg), SpO2 95 %. 3. Hyperlipemia: Currently on Lipitor 80mg and fish oil. 12/10/19> , TG 83, HDL 39, LDL 75.   4.  Carotid disease: Dopplers 2011> 45-41% MINA, 6-00% LICA. 5.  Tremors, chronic: Essential familial.   6.   Parkinson disease:   He is treated by Dr. Maurisio Golden at [facility] and had a second opinion at [facility]. Plan:   Mr. Jonathon Hurst has a stable cardiac status. He continues to struggle with his Parkinson's symptoms. Jacinda Baird has a stable cardiac status. Cardiac test and lab results personally reviewed by me during this office visit and discussed. No med changes. Continue risk factor modifications. Call for any change in symptoms. Return for regular follow up in 6 months. I appreciate the opportunity of cooperating in the care of this individual.    Dennie Rolling. Augusto Ellis M.D., [facility] [place]    Patient's problem list, medications, allergies, past medical, surgical, social and family histories were reviewed and updated as appropriate. Scribe's attestation: This note was scribed in the presence of Dr. Augusto Ellis MD, by Ramon Strauss RN. The scribe's documentation has been prepared under my direction and personally reviewed by me in its entirety. I confirm that the note above accurately reflects all work, treatment, procedures, and medical decision making performed by me.

## 2020-08-24 ENCOUNTER — OFFICE VISIT (OUTPATIENT)
Dept: CARDIOLOGY CLINIC | Age: 75
End: 2020-08-24
Payer: MEDICARE

## 2020-08-24 VITALS
BODY MASS INDEX: 34.1 KG/M2 | HEIGHT: 68 IN | WEIGHT: 225 LBS | OXYGEN SATURATION: 95 % | DIASTOLIC BLOOD PRESSURE: 52 MMHG | HEART RATE: 69 BPM | SYSTOLIC BLOOD PRESSURE: 100 MMHG

## 2020-08-24 PROCEDURE — G8427 DOCREV CUR MEDS BY ELIG CLIN: HCPCS | Performed by: INTERNAL MEDICINE

## 2020-08-24 PROCEDURE — 1036F TOBACCO NON-USER: CPT | Performed by: INTERNAL MEDICINE

## 2020-08-24 PROCEDURE — 3017F COLORECTAL CA SCREEN DOC REV: CPT | Performed by: INTERNAL MEDICINE

## 2020-08-24 PROCEDURE — 1123F ACP DISCUSS/DSCN MKR DOCD: CPT | Performed by: INTERNAL MEDICINE

## 2020-08-24 PROCEDURE — G8417 CALC BMI ABV UP PARAM F/U: HCPCS | Performed by: INTERNAL MEDICINE

## 2020-08-24 PROCEDURE — 4040F PNEUMOC VAC/ADMIN/RCVD: CPT | Performed by: INTERNAL MEDICINE

## 2020-08-24 PROCEDURE — 99214 OFFICE O/P EST MOD 30 MIN: CPT | Performed by: INTERNAL MEDICINE

## 2020-08-24 RX ORDER — PIMAVANSERIN TARTRATE 10 MG/1
TABLET, COATED ORAL
COMMUNITY
End: 2020-12-08

## 2020-08-24 RX ORDER — NITROGLYCERIN 0.4 MG/1
0.4 TABLET SUBLINGUAL EVERY 5 MIN PRN
Qty: 25 TABLET | Refills: 3 | Status: SHIPPED | OUTPATIENT
Start: 2020-08-24

## 2020-10-09 ENCOUNTER — NURSE ONLY (OUTPATIENT)
Dept: INTERNAL MEDICINE CLINIC | Age: 75
End: 2020-10-09
Payer: MEDICARE

## 2020-10-09 PROCEDURE — G0008 ADMIN INFLUENZA VIRUS VAC: HCPCS | Performed by: INTERNAL MEDICINE

## 2020-10-09 PROCEDURE — 90694 VACC AIIV4 NO PRSRV 0.5ML IM: CPT | Performed by: INTERNAL MEDICINE

## 2020-11-03 RX ORDER — METOPROLOL SUCCINATE 25 MG/1
25 TABLET, EXTENDED RELEASE ORAL DAILY
Qty: 90 TABLET | Refills: 3 | Status: SHIPPED | OUTPATIENT
Start: 2020-11-03 | End: 2021-04-16

## 2020-11-03 NOTE — TELEPHONE ENCOUNTER
RX APPROVAL:      Refill:   Requested Prescriptions      No prescriptions requested or ordered in this encounter      Last OV: 8/24/2020   Last EKG:   Last Labs:   Lab Results   Component Value Date    GLUCOSE 111 07/12/2020    BUN 14 07/12/2020    CREATININE 1.0 07/12/2020    LABGLOM >60 07/12/2020     07/12/2020    K 3.9 07/12/2020    CL 96 07/12/2020    CO2 22 07/12/2020    CALCIUM 8.6 07/12/2020     Lab Results   Component Value Date     07/12/2020    CL 96 07/12/2020    CO2 22 07/12/2020    ANIONGAP 14 07/12/2020    GLUCOSE 111 07/12/2020    BUN 14 07/12/2020    CREATININE 1.0 07/12/2020    LABGLOM >60 07/12/2020    GFRAA >60 07/12/2020    GFRAA >60 11/15/2012    CALCIUM 8.6 07/12/2020    PROT 6.1 07/12/2020    LABALBU 3.2 07/12/2020    BILITOT 0.9 07/12/2020    ALKPHOS 80 07/12/2020    AST 19 07/12/2020    ALT <5 07/12/2020    GLOB 2.9 07/12/2020     Lab Results   Component Value Date    ALT <5 07/12/2020    AST 19 07/12/2020     Lab Results   Component Value Date    K 3.9 07/12/2020       Plan and labs reviewed

## 2020-11-05 RX ORDER — ATORVASTATIN CALCIUM 80 MG/1
80 TABLET, FILM COATED ORAL DAILY
Qty: 90 TABLET | Refills: 3 | Status: SHIPPED | OUTPATIENT
Start: 2020-11-05 | End: 2020-12-04

## 2020-12-04 RX ORDER — ATORVASTATIN CALCIUM 80 MG/1
80 TABLET, FILM COATED ORAL DAILY
Qty: 90 TABLET | Refills: 0 | Status: SHIPPED | OUTPATIENT
Start: 2020-12-04 | End: 2021-05-06 | Stop reason: SDUPTHER

## 2020-12-04 NOTE — TELEPHONE ENCOUNTER
RX APPROVAL:      Refill:   Requested Prescriptions     Pending Prescriptions Disp Refills    atorvastatin (LIPITOR) 80 MG tablet [Pharmacy Med Name: ATORVASTATIN 80 MG TABLET] 90 tablet 0     Sig: Take 1 tablet by mouth daily      Last OV: 8/24/2020   Last EKG:    Last Labs:  Lab Results   Component Value Date    CHOL 131 12/10/2019    TRIG 83 12/10/2019    HDL 39 12/10/2019    HDL 39 05/08/2012    LDLCALC 75 12/10/2019    LABVLDL 17 12/10/2019     Lab Results   Component Value Date    ALT <5 07/12/2020    AST 19 07/12/2020       Plan and labs reviewed

## 2020-12-08 ENCOUNTER — OFFICE VISIT (OUTPATIENT)
Dept: INTERNAL MEDICINE CLINIC | Age: 75
End: 2020-12-08
Payer: MEDICARE

## 2020-12-08 VITALS
OXYGEN SATURATION: 95 % | BODY MASS INDEX: 34.97 KG/M2 | HEART RATE: 84 BPM | WEIGHT: 230 LBS | SYSTOLIC BLOOD PRESSURE: 120 MMHG | TEMPERATURE: 96.4 F | DIASTOLIC BLOOD PRESSURE: 58 MMHG

## 2020-12-08 PROCEDURE — 3017F COLORECTAL CA SCREEN DOC REV: CPT | Performed by: INTERNAL MEDICINE

## 2020-12-08 PROCEDURE — 1036F TOBACCO NON-USER: CPT | Performed by: INTERNAL MEDICINE

## 2020-12-08 PROCEDURE — 99214 OFFICE O/P EST MOD 30 MIN: CPT | Performed by: INTERNAL MEDICINE

## 2020-12-08 PROCEDURE — G8427 DOCREV CUR MEDS BY ELIG CLIN: HCPCS | Performed by: INTERNAL MEDICINE

## 2020-12-08 PROCEDURE — 1123F ACP DISCUSS/DSCN MKR DOCD: CPT | Performed by: INTERNAL MEDICINE

## 2020-12-08 PROCEDURE — 4040F PNEUMOC VAC/ADMIN/RCVD: CPT | Performed by: INTERNAL MEDICINE

## 2020-12-08 PROCEDURE — G8484 FLU IMMUNIZE NO ADMIN: HCPCS | Performed by: INTERNAL MEDICINE

## 2020-12-08 PROCEDURE — G8417 CALC BMI ABV UP PARAM F/U: HCPCS | Performed by: INTERNAL MEDICINE

## 2020-12-08 RX ORDER — CLOZAPINE 25 MG/1
25 TABLET ORAL 2 TIMES DAILY
COMMUNITY
Start: 2020-12-07

## 2020-12-11 NOTE — PROGRESS NOTES
symmetrical, trachea midline, no adenopathy, thyroid: not enlarged, symmetric, no tenderness/mass/nodules,    Lungs:   Clear to auscultation bilaterally, respirations unlabored   Chest Wall:  No tenderness or deformity   Heart:  Regular rate and rhythm, S1, S2 normal, no murmur, rub or gallop   Abdomen:   Soft, non-tender, bowel sounds active all four quadrants,  no masses, no organomegaly   Skin: Skin color, texture, turgor normal, no rashes or lesions   Lymph nodes: Cervical, supraclavicular  Adenopathy is absent   Neurologic:  Cogwheel rigidity is present right greater than left but is present bilaterally masked facies broad-based gait       No components found for: CHLPL  Lab Results   Component Value Date    TRIG 109 12/07/2017    TRIG 145 12/15/2015    TRIG 138 11/25/2014     Lab Results   Component Value Date    HDL 41 12/07/2017    HDL 39 (L) 12/15/2015    HDL 38 (L) 11/25/2014     Lab Results   Component Value Date    LDLCALC 88 12/07/2017    LDLCALC 71 12/15/2015    LDLCALC 86 11/25/2014     Lab Results   Component Value Date    LABVLDL 22 12/07/2017    LABVLDL 29 12/15/2015    LABVLDL 28 11/25/2014     Lab Results   Component Value Date    CREATININE 0.8 12/07/2017         ASSESSMENT/PLAN[de-identified]     Diagnosis Orders   1. Essential hypertension  Comprehensive Metabolic Panel    Lipid Panel    TSH WITH REFLEX TO FT4   2. Parkinson disease ([facility] [address]  Comprehensive Metabolic Panel    Lipid Panel    TSH WITH REFLEX TO FT4       Discussed risk and benefits of holding beta-blocker therapy.   Given his high risk for falls with holding beta-blockers on days of significant hypotension is reasonable    He is on high-dose treatment for hyperlipidemia

## 2021-01-21 ENCOUNTER — IMMUNIZATION (OUTPATIENT)
Dept: PRIMARY CARE CLINIC | Age: 76
End: 2021-01-21
Payer: MEDICARE

## 2021-01-21 PROCEDURE — 0001A COVID-19, PFIZER VACCINE 30MCG/0.3ML DOSE: CPT | Performed by: FAMILY MEDICINE

## 2021-01-21 PROCEDURE — 91300 COVID-19, PFIZER VACCINE 30MCG/0.3ML DOSE: CPT | Performed by: FAMILY MEDICINE

## 2021-01-28 ENCOUNTER — OFFICE VISIT (OUTPATIENT)
Dept: INTERNAL MEDICINE CLINIC | Age: 76
End: 2021-01-28
Payer: MEDICARE

## 2021-01-28 VITALS
BODY MASS INDEX: 35.01 KG/M2 | HEIGHT: 68 IN | WEIGHT: 231 LBS | SYSTOLIC BLOOD PRESSURE: 120 MMHG | DIASTOLIC BLOOD PRESSURE: 60 MMHG | OXYGEN SATURATION: 97 % | TEMPERATURE: 97 F | HEART RATE: 72 BPM

## 2021-01-28 DIAGNOSIS — H25.9 AGE-RELATED CATARACT OF BOTH EYES, UNSPECIFIED AGE-RELATED CATARACT TYPE: ICD-10-CM

## 2021-01-28 DIAGNOSIS — Z01.818 PREOP EXAMINATION: Primary | ICD-10-CM

## 2021-01-28 PROCEDURE — 1036F TOBACCO NON-USER: CPT | Performed by: NURSE PRACTITIONER

## 2021-01-28 PROCEDURE — 4040F PNEUMOC VAC/ADMIN/RCVD: CPT | Performed by: NURSE PRACTITIONER

## 2021-01-28 PROCEDURE — 1123F ACP DISCUSS/DSCN MKR DOCD: CPT | Performed by: NURSE PRACTITIONER

## 2021-01-28 PROCEDURE — G8484 FLU IMMUNIZE NO ADMIN: HCPCS | Performed by: NURSE PRACTITIONER

## 2021-01-28 PROCEDURE — 99214 OFFICE O/P EST MOD 30 MIN: CPT | Performed by: NURSE PRACTITIONER

## 2021-01-28 PROCEDURE — G8427 DOCREV CUR MEDS BY ELIG CLIN: HCPCS | Performed by: NURSE PRACTITIONER

## 2021-01-28 PROCEDURE — 3017F COLORECTAL CA SCREEN DOC REV: CPT | Performed by: NURSE PRACTITIONER

## 2021-01-28 PROCEDURE — G8417 CALC BMI ABV UP PARAM F/U: HCPCS | Performed by: NURSE PRACTITIONER

## 2021-01-28 ASSESSMENT — PATIENT HEALTH QUESTIONNAIRE - PHQ9
SUM OF ALL RESPONSES TO PHQ QUESTIONS 1-9: 0
SUM OF ALL RESPONSES TO PHQ QUESTIONS 1-9: 0

## 2021-01-28 NOTE — PROGRESS NOTES
 fish oil-omega-3 fatty acids 1000 MG capsule Take 2 g by mouth daily.  multivitamin (THERAGRAN) per tablet Take 1 tablet by mouth daily. No current facility-administered medications for this visit. Patient Active Problem List   Diagnosis    Coronary atherosclerosis of native coronary artery    Shortness of breath    Hyperlipemia    Chest pain    Dizziness    PVC's (premature ventricular contractions)    Hypertension    Bilateral carotid artery stenosis    Parkinson disease ([address]       Past Medical History:   Diagnosis Date    Acute bronchitis     history of    CAD (coronary artery disease)     Clostridioides difficile infection 07/12/2020    Hyperlipidemia        Past Surgical History:   Procedure Laterality Date    CORONARY ANGIOPLASTY WITH STENT PLACEMENT  2005    Germania Ennis    DIAGNOSTIC CARDIAC CATH LAB PROCEDURE  2005    Linda Mohamud       Family History   Adopted: Yes   Problem Relation Age of Onset    Cancer Mother     Other Father         Jacklyn Brothers    Other Brother         emphysema    Cancer Sister     Diabetes Sister     Other Brother         joint problems    Diabetes Sister     Heart Disease Neg Hx     High Blood Pressure Neg Hx     High Cholesterol Neg Hx          Review of Systems  A comprehensive review of systems was negative except for what was noted in the HPI. Physical Exam   Vitals:    01/28/21 0829   BP: 120/60   Pulse: 72   Temp: 97 °F (36.1 °C)   SpO2: 97%   Weight: 231 lb (104.8 kg)   Height: 5' 8\" (1.727 m)        Constitutional: He is oriented to person, place, and time. He appears well-developed and well-nourished. No distress. HENT:   Head: Normocephalic and atraumatic. Mouth/Throat: Uvula is midline, oropharynx is clear and moist and mucous membranes are normal.   Eyes: Conjunctivae and EOM are normal.   Neck: Trachea normal and normal range of motion. Neck supple. Cardiovascular: Normal rate, regular rhythm, normal heart sounds and intact distal pulses. Pulmonary/Chest: Effort normal and breath sounds normal. No respiratory distress. He has no wheezes. He has no rales. Abdominal: Soft, non-tender. Bowel sounds are normal.   Musculoskeletal: He exhibits no edema and no tenderness. Neurological: He is alert and oriented to person, place, and time. Skin: Skin is warm and dry. No rash noted. No erythema. Psychiatric: He has a normal mood and affect. His behavior is normal.       EKG Interpretation: n/a  Lab Review:  Lab Results   Component Value Date     07/12/2020    K 3.9 07/12/2020    CL 96 07/12/2020    CO2 22 07/12/2020    BUN 14 07/12/2020    CREATININE 1.0 07/12/2020    GLUCOSE 111 07/12/2020    CALCIUM 8.6 07/12/2020     Lab Results   Component Value Date    WBC 7.3 07/12/2020    HGB 14.4 07/12/2020    HCT 43.9 07/12/2020    MCV 93.8 07/12/2020     07/12/2020         Assessment:     78 y.o. patient with planned surgery as above. Known risk factors for perioperative complications: Coronary artery disease, Hypertension     Plan:     1. Preoperative workup as follows: none  2. Change in medication regimen before surgery: None  3. Prophylaxis for cardiac events with perioperative beta-blockers: Currently taking  metoprolol  4. Deep vein thrombosis prophylaxis: regimen to be chosen by surgical team  5. No contraindications to planned surgery.       Marilou Tapia, [address] [place] [address],[address] Internal Medicine and Rheumatology  [phone]

## 2021-02-11 ENCOUNTER — IMMUNIZATION (OUTPATIENT)
Dept: PRIMARY CARE CLINIC | Age: 76
End: 2021-02-11
Payer: MEDICARE

## 2021-02-11 PROCEDURE — 0002A COVID-19, PFIZER VACCINE 30MCG/0.3ML DOSE: CPT | Performed by: FAMILY MEDICINE

## 2021-02-11 PROCEDURE — 91300 COVID-19, PFIZER VACCINE 30MCG/0.3ML DOSE: CPT | Performed by: FAMILY MEDICINE

## 2021-04-09 NOTE — PROGRESS NOTES
[facility]   Cardiac Follow up    Referring Provider:  No primary care provider on file. Chief Complaint   Patient presents with    6 Month Follow-Up    Coronary Artery Disease    Hyperlipidemia    Hypertension      History of Present Illness:  Mr. Jah Morrissey is a 78 y.o. gentleman. His history includes CAD with PCI in 2005, hypertension, and hyperlipidemia. He had a cardiac angiogram in 2015 that showed normal LV function and EF 60%. Coronary stent widely patent He has Parkinsons, treated by Dr. Garzon [facility]); he has been evaluated by the [facility]. His wife is with him for the visit. He has ongoing strong right arm/hand tremors; he tires easily. He is somewhat active with some limitations. He continues to have dizziness at times, he reports that he had some chest pain this morning after getting dressed, did not require nitro. He has brought along BP log for review, well controlled. Has been off Metoprolol for around a year. No palpitations. He has been playing golf this spring. Past Medical History:   has a past medical history of Acute bronchitis, CAD (coronary artery disease), Clostridioides difficile infection, and Hyperlipidemia. Also parkinson disease    Surgical History:   has a past surgical history that includes Diagnostic Cardiac Cath Lab Procedure (2005) and Coronary angioplasty with stent (2005). Social History:   reports that he quit smoking about 41 years ago. He has a 2.50 pack-year smoking history. He has never used smokeless tobacco. He reports that he does not drink alcohol or use drugs. Family History:  family history includes Cancer in his mother and sister; Diabetes in his sister and sister; Other in his brother, brother, and father. He was adopted.      Home Medications:  Prior to Admission medications      Current Outpatient Medications   Medication Sig Dispense Refill    Menatetrenone (VITAMIN K2) 100 MCG TABS Take by mouth daily      cloZAPine (CLOZARIL) 25 MG tablet 12.5 mg am and 25 mg pm      atorvastatin (LIPITOR) 80 MG tablet Take 1 tablet by mouth daily 90 tablet 0    nitroGLYCERIN (NITROSTAT) 0.4 MG SL tablet Place 1 tablet under the tongue every 5 minutes as needed (prn) 25 tablet 3    Vitamin D, Cholecalciferol, 50 MCG (2000 UT) CAPS Take by mouth      Coenzyme Q10 (COQ10 PO) Take 1 capsule by mouth daily      Carbidopa-Levodopa ER (RYTARY) 36. MG CPCR Take 1 tablet by mouth 3 times daily       primidone (MYSOLINE) 50 MG tablet Take 50 mg by mouth daily      carbidopa-levodopa (SINEMET)  MG per tablet Take 1 tablet by mouth every 2 hours From 9 am to 7 pm      omeprazole (PRILOSEC) 20 MG delayed release capsule Take 20 mg by mouth daily      aspirin 81 MG tablet Take 1 tablet by mouth daily 30 tablet 0    Psyllium 500 MG CAPS Take 6 capsules by mouth daily       fish oil-omega-3 fatty acids 1000 MG capsule Take 2 g by mouth 2 times daily       multivitamin (THERAGRAN) per tablet Take 1 tablet by mouth daily. No current facility-administered medications for this visit. Allergies:  Amoxicillin-pot clavulanate     Review of Systems:   · Constitutional: there has been no unanticipated weight loss. There's been no change in energy level, sleep pattern, or activity level. · Eyes: No visual changes or diplopia. No scleral icterus. · ENT: No Headaches, hearing loss or vertigo. No mouth sores or sore throat. · Cardiovascular: Reviewed in HPI  · Respiratory: No cough or wheezing, no sputum production. No hematemesis. · Gastrointestinal: No abdominal pain, appetite loss, blood in stools. No change in bowel or bladder habits. · Genitourinary: No dysuria, trouble voiding, or hematuria. · Musculoskeletal:  No gait disturbance, weakness or joint complaints. · Integumentary: No rash or pruritis. · Neurological: No headache, diplopia, change in muscle strength, numbness or tingling.  No change in gait, balance, coordination, mood, affect, memory, mentation, behavior. · Psychiatric: No anxiety, no depression. · Endocrine: No malaise, fatigue or temperature intolerance. No excessive thirst, fluid intake, or urination. No tremor. · Hematologic/Lymphatic: No abnormal bruising or bleeding, blood clots or swollen lymph nodes. · Allergic/Immunologic: No nasal congestion or hives. Physical Examination:    Blood pressure 112/74, pulse 77, height 5' 8.5\" (1.74 m), weight 234 lb (106.1 kg), SpO2 95 %. Constitutional and General Appearance: in NAD  Skin:good turgor,intact without lesions  HEENT: EOMI ,normal  Neck:no JVD  Respiratory:  · Normal excursion and expansion without use of accessory muscles  · Resp Auscultation: Normal breath sounds without dullness  Cardiovascular:  · The apical impulses not displaced  · Heart tones are crisp and normal  · Cervical veins are not engorged  · The carotid upstroke is normal in amplitude and contour without delay. No bruit heard on physical.   · Peripheral pulses are symmetrical and full  · There is no clubbing, cyanosis of the extremities. · No edema  · Femoral Arteries: 2+ and equal  · Pedal Pulses: 2+ and equal   Abdomen:  · No masses or tenderness  · Liver/Spleen: No Abnormalities Noted  Neurological/Psychiatric:  · Alert and oriented in all spheres  · Moves all extremities well  · Exhibits normal gait balance and coordination  · No abnormalities of mood, affect, memory, mentation, or behavior are noted  · Positive for tremors    Assessment:    1. Coronary atherosclerosis of native coronary artery: Stable. 2005 Cath> stent in LAD  2006 Cath> Patent stent in lad -nonobstructive circumflex disease. 1/10 GXT Myoview> Normal perfusion. An angiogram was normal.   Cath 4/2015> Normal LV function with EF 60, widely patent LAD stent, 30% distal CFx. -  nuclear stress test to evaluate CP   7/17/19> Normal LV size and systolic function. No evidence of ischemia.     2.

## 2021-04-16 ENCOUNTER — OFFICE VISIT (OUTPATIENT)
Dept: CARDIOLOGY CLINIC | Age: 76
End: 2021-04-16
Payer: MEDICARE

## 2021-04-16 VITALS
HEART RATE: 77 BPM | OXYGEN SATURATION: 95 % | DIASTOLIC BLOOD PRESSURE: 74 MMHG | WEIGHT: 234 LBS | SYSTOLIC BLOOD PRESSURE: 112 MMHG | BODY MASS INDEX: 34.66 KG/M2 | HEIGHT: 69 IN

## 2021-04-16 DIAGNOSIS — I65.23 BILATERAL CAROTID ARTERY STENOSIS: ICD-10-CM

## 2021-04-16 DIAGNOSIS — I25.10 ATHEROSCLEROSIS OF NATIVE CORONARY ARTERY OF NATIVE HEART WITHOUT ANGINA PECTORIS: Primary | ICD-10-CM

## 2021-04-16 DIAGNOSIS — E78.49 OTHER HYPERLIPIDEMIA: ICD-10-CM

## 2021-04-16 DIAGNOSIS — I10 ESSENTIAL HYPERTENSION: ICD-10-CM

## 2021-04-16 DIAGNOSIS — G20 PARKINSON DISEASE (HCC): ICD-10-CM

## 2021-04-16 PROCEDURE — 3017F COLORECTAL CA SCREEN DOC REV: CPT | Performed by: INTERNAL MEDICINE

## 2021-04-16 PROCEDURE — G8427 DOCREV CUR MEDS BY ELIG CLIN: HCPCS | Performed by: INTERNAL MEDICINE

## 2021-04-16 PROCEDURE — G8417 CALC BMI ABV UP PARAM F/U: HCPCS | Performed by: INTERNAL MEDICINE

## 2021-04-16 PROCEDURE — 99214 OFFICE O/P EST MOD 30 MIN: CPT | Performed by: INTERNAL MEDICINE

## 2021-04-16 PROCEDURE — 4040F PNEUMOC VAC/ADMIN/RCVD: CPT | Performed by: INTERNAL MEDICINE

## 2021-04-16 PROCEDURE — 1036F TOBACCO NON-USER: CPT | Performed by: INTERNAL MEDICINE

## 2021-04-16 PROCEDURE — 1123F ACP DISCUSS/DSCN MKR DOCD: CPT | Performed by: INTERNAL MEDICINE

## 2021-04-16 RX ORDER — METHYLDOPA/HYDROCHLOROTHIAZIDE 250MG-15MG
TABLET ORAL DAILY
COMMUNITY

## 2021-05-05 NOTE — TELEPHONE ENCOUNTER
Medication Refill    Medication needing refilled:  atorvastatin (LIPITOR) 80 MG- Send to new mail service [facility] listed below      Dosage of the medication:    How are you taking this medication (QD, BID, TID, QID, PRN): once daily    30 or 90 day supply called in: 90 day     When will you run out of your medication:    Which Pharmacy are we sending the medication to?: [address], [address] [place] [address] [phone] - f [phone]   [place] [address]   Phone:  [phone]  Fax:  [phone]      Medication Question/Concern    What is the name of the medication you need to speak with someone about? Dosage of the medication:    How are you taking this medication:    What issues/concerns are you having with this medication:      Medication Samples    Medication:    Dosage of the medication:    How are you taking this medication (QD, BID, TID, QID, PRN):     in the office or Mail to your home?

## 2021-05-06 RX ORDER — ATORVASTATIN CALCIUM 80 MG/1
80 TABLET, FILM COATED ORAL DAILY
Qty: 90 TABLET | Refills: 0 | Status: SHIPPED | OUTPATIENT
Start: 2021-05-06 | End: 2021-07-23

## 2021-06-15 ENCOUNTER — OFFICE VISIT (OUTPATIENT)
Dept: INTERNAL MEDICINE CLINIC | Age: 76
End: 2021-06-15
Payer: MEDICARE

## 2021-06-15 VITALS
BODY MASS INDEX: 33.92 KG/M2 | DIASTOLIC BLOOD PRESSURE: 60 MMHG | HEART RATE: 76 BPM | HEIGHT: 69 IN | WEIGHT: 229 LBS | SYSTOLIC BLOOD PRESSURE: 138 MMHG | OXYGEN SATURATION: 97 %

## 2021-06-15 DIAGNOSIS — I10 ESSENTIAL HYPERTENSION: ICD-10-CM

## 2021-06-15 DIAGNOSIS — I77.9 BILATERAL CAROTID ARTERY DISEASE, UNSPECIFIED TYPE (HCC): ICD-10-CM

## 2021-06-15 DIAGNOSIS — G20 PARKINSON DISEASE (HCC): ICD-10-CM

## 2021-06-15 DIAGNOSIS — Z00.00 ROUTINE GENERAL MEDICAL EXAMINATION AT A HEALTH CARE FACILITY: Primary | ICD-10-CM

## 2021-06-15 DIAGNOSIS — I20.9 ANGINA PECTORIS, UNSPECIFIED (HCC): ICD-10-CM

## 2021-06-15 PROCEDURE — 3017F COLORECTAL CA SCREEN DOC REV: CPT | Performed by: NURSE PRACTITIONER

## 2021-06-15 PROCEDURE — 1123F ACP DISCUSS/DSCN MKR DOCD: CPT | Performed by: NURSE PRACTITIONER

## 2021-06-15 PROCEDURE — 4040F PNEUMOC VAC/ADMIN/RCVD: CPT | Performed by: NURSE PRACTITIONER

## 2021-06-15 PROCEDURE — G0439 PPPS, SUBSEQ VISIT: HCPCS | Performed by: NURSE PRACTITIONER

## 2021-06-15 ASSESSMENT — LIFESTYLE VARIABLES
HAS A RELATIVE, FRIEND, DOCTOR, OR ANOTHER HEALTH PROFESSIONAL EXPRESSED CONCERN ABOUT YOUR DRINKING OR SUGGESTED YOU CUT DOWN: 0
HOW OFTEN DO YOU HAVE A DRINK CONTAINING ALCOHOL: 2
HOW MANY STANDARD DRINKS CONTAINING ALCOHOL DO YOU HAVE ON A TYPICAL DAY: 0
HOW OFTEN DO YOU HAVE SIX OR MORE DRINKS ON ONE OCCASION: 0
HOW OFTEN DURING THE LAST YEAR HAVE YOU FAILED TO DO WHAT WAS NORMALLY EXPECTED FROM YOU BECAUSE OF DRINKING: 0
HOW OFTEN DURING THE LAST YEAR HAVE YOU BEEN UNABLE TO REMEMBER WHAT HAPPENED THE NIGHT BEFORE BECAUSE YOU HAD BEEN DRINKING: 0
HOW OFTEN DURING THE LAST YEAR HAVE YOU NEEDED AN ALCOHOLIC DRINK FIRST THING IN THE MORNING TO GET YOURSELF GOING AFTER A NIGHT OF HEAVY DRINKING: 0
HAVE YOU OR SOMEONE ELSE BEEN INJURED AS A RESULT OF YOUR DRINKING: 0
HOW OFTEN DURING THE LAST YEAR HAVE YOU FOUND THAT YOU WERE NOT ABLE TO STOP DRINKING ONCE YOU HAD STARTED: 0
AUDIT TOTAL SCORE: 2
HOW OFTEN DURING THE LAST YEAR HAVE YOU HAD A FEELING OF GUILT OR REMORSE AFTER DRINKING: 0
AUDIT-C TOTAL SCORE: 2

## 2021-06-15 ASSESSMENT — PATIENT HEALTH QUESTIONNAIRE - PHQ9
2. FEELING DOWN, DEPRESSED OR HOPELESS: 0
SUM OF ALL RESPONSES TO PHQ QUESTIONS 1-9: 0
SUM OF ALL RESPONSES TO PHQ9 QUESTIONS 1 & 2: 0
SUM OF ALL RESPONSES TO PHQ QUESTIONS 1-9: 0
1. LITTLE INTEREST OR PLEASURE IN DOING THINGS: 0
SUM OF ALL RESPONSES TO PHQ QUESTIONS 1-9: 0

## 2021-06-15 NOTE — PROGRESS NOTES
Medicare Annual Wellness Visit  Name: Abby Marie Date: 6/15/2021   MRN: [id number] Sex: Male   Age: 78 y.o. Ethnicity: Non-/Non    : 1945 Race: Reba Gray is here for Medicare AWV    Screenings for behavioral, psychosocial and functional/safety risks, and cognitive dysfunction are all negative except as indicated below. These results, as well as other patient data from the [address] form, are documented in Flowsheets linked to this Encounter. Allergies   Allergen Reactions    Amoxicillin-Pot Clavulanate        Prior to Visit Medications    Medication Sig Taking? Authorizing Provider   atorvastatin (LIPITOR) 80 MG tablet Take 1 tablet by mouth daily Yes Bel Deutsch MD   Menatetrenone (VITAMIN K2) 100 MCG TABS Take by mouth daily Yes Historical Provider, MD   cloZAPine (CLOZARIL) 25 MG tablet 25 mg 2 times daily 12.5 mg am and 25 mg pm Yes Historical Provider, MD   nitroGLYCERIN (NITROSTAT) 0.4 MG SL tablet Place 1 tablet under the tongue every 5 minutes as needed (prn) Yes Bel Deutsch MD   Vitamin D, Cholecalciferol, 50 MCG (2000) CAPS Take by mouth Yes Historical Provider, MD   Coenzyme Q10 (COQ10 PO) Take 1 capsule by mouth daily Yes Historical Provider, MD   Carbidopa-Levodopa ER (RYTARY) 36. MG CPCR Take 1 tablet by mouth 3 times daily  Yes Historical Provider, MD   primidone (MYSOLINE) 50 MG tablet Take 50 mg by mouth daily Yes Historical Provider, MD   omeprazole (PRILOSEC) 20 MG delayed release capsule Take 20 mg by mouth daily Yes Historical Provider, MD   aspirin 81 MG tablet Take 1 tablet by mouth daily Yes Bel Deutsch MD   Psyllium 500 MG CAPS Take 6 capsules by mouth daily  Yes Historical Provider, MD   fish oil-omega-3 fatty acids 1000 MG capsule Take 2 g by mouth 2 times daily  Yes Historical Provider, MD   multivitamin [facility]) per tablet Take 1 tablet by mouth daily.    Yes Historical Provider, MD       Past Medical History:   Diagnosis Date    Acute bronchitis     history of    CAD (coronary artery disease)     Clostridioides difficile infection 07/12/2020    Hyperlipidemia        Past Surgical History:   Procedure Laterality Date    CORONARY ANGIOPLASTY WITH STENT PLACEMENT  2005    Germania Ennis    DIAGNOSTIC CARDIAC CATH LAB PROCEDURE  2005           Family History   Adopted: Yes   Problem Relation Age of Onset    Cancer Mother     Other Father         Mckayla Soni Other Brother         emphysema    Cancer Sister     Diabetes Sister     Other Brother         joint problems    Diabetes Sister     Heart Disease Neg Hx     High Blood Pressure Neg Hx     High Cholesterol Neg Hx        CareTeam (Including outside providers/suppliers regularly involved in providing care):   Patient Care Team:  KEYSHA Miller - CNP as PCP - [facility]  Beth Ferrer MD as Consulting Physician (Cardiology)  Atif Esteban MD as Consulting Physician (Electrophysiology)  Paula Asencio MD as Consulting Physician (Internal Medicine)    Wt Readings from Last 3 Encounters:   06/15/21 229 lb (103.9 kg)   04/16/21 234 lb (106.1 kg)   01/28/21 231 lb (104.8 kg)     Vitals:    06/15/21 1139   BP: 138/60   Pulse: 76   SpO2: 97%   Weight: 229 lb (103.9 kg)   Height: 5' 8.5\" (1.74 m)     Body mass index is 34.31 kg/m². Based upon direct observation of the patient, evaluation of cognition reveals recent and remote memory intact. Patient's complete Health Risk Assessment and screening values have been reviewed and are found in Flowsheets. The following problems were reviewed today and where indicated follow up appointments were made and/or referrals ordered. Positive Risk Factor Screenings with Interventions:      Cognitive:   Words recalled: 0 Words Recalled  Clock Drawing Test (CDT) Score: (!) Abnormal  Total Score Interpretation: Positive Mini-Cog  Cognitive Impairment Interventions:  · Monitor Health Habits/Nutrition:  Health Habits/Nutrition  Do you exercise for at least 20 minutes 2-3 times per week?: (!) No  Have you lost any weight without trying in the past 3 months?: No  Do you eat only one meal per day?: No  Have you seen the dentist within the past year?: (!) No  Body mass index: (!) 34.31  Health Habits/Nutrition Interventions:  · Inadequate physical activity:  patient is not ready to increase his/her physical activity level at this time  · Dental exam overdue:  patient encouraged to make appointment with his/her dentist      ADL:  ADLs  In the past 7 days, did you need help from others to perform any of the following everyday activities? Eating, dressing, grooming, bathing, toileting, or walking/balance?: (!) Grooming  In the past 7 days, did you need help from others to take care of any of the following? Laundry, housekeeping, banking/finances, shopping, telephone use, food preparation, transportation, or taking medications? :  (wife does this for him)  ADL Interventions:  · Patient declines any further evaluation/treatment for this issue    Personalized Preventive Plan   Current Health Maintenance Status  Immunization History   Administered Date(s) Administered    COVID-19, Potter Peter, PF, 30mcg/0.3mL 01/21/2021, 02/11/2021    Influenza Vaccine, unspecified formulation 10/15/2015    Influenza, High Dose (Fluzone 65 yrs and older) 10/15/2015, 10/04/2017, 10/03/2018    Influenza, Quadv, adjuvanted, 65 yrs +, IM, PF (Fluad) 10/09/2020    Influenza, Triv, inactivated, subunit, adjuvanted, IM (Fluad 65 yrs and older) 10/10/2019    Pneumococcal Conjugate 13-valent ([id number]) 06/11/2019    Pneumococcal Polysaccharide ([id number]) 07/23/2013        Health Maintenance   Topic Date Due    Hepatitis C screen  Never done    DTaP/Tdap/Td vaccine (1 - Tdap) Never done    Shingles Vaccine (1 of 2) Never done   [employer] Visit (AWV)  Never done    Lipid screen  12/10/2020    A1C test (Diabetic or Prediabetic)  06/19/2021    Colon cancer screen colonoscopy  02/04/2025    Flu vaccine  Completed    Pneumococcal 65+ years Vaccine  Completed    COVID-19 Vaccine  Completed    AAA screen  Completed    Hepatitis A vaccine  Aged Out    Hepatitis B vaccine  Aged Out    Hib vaccine  Aged Out    Meningococcal (ACWY) vaccine  Aged Out     Recommendations for Tappr Due: see orders and patient instructions/AVS.  . Recommended screening schedule for the next 5-10 years is provided to the patient in written form: see Patient Instructions/AVS.      Nayeli Norman was seen today for medicare aw. Diagnoses and all orders for this visit:    Routine general medical examination at a health care facility  -     COMPREHENSIVE METABOLIC PANEL; Future  -     LIPID PANEL; Future    Essential hypertension  -     COMPREHENSIVE METABOLIC PANEL; Future  -     LIPID PANEL; Future    Parkinson disease ([address]  -     COMPREHENSIVE METABOLIC PANEL; Future  -     LIPID PANEL;  Future    Bilateral carotid artery disease, unspecified type ([address]  - Monitor    Angina pectoris, unspecified ([address]  - No CP  - Continue current regimen         KEYSHA Clemente - CNP

## 2021-06-15 NOTE — PATIENT INSTRUCTIONS
Personalized Preventive Plan for Mihai Monge - 6/15/2021  Medicare offers a range of preventive health benefits. Some of the tests and screenings are paid in full while other may be subject to a deductible, co-insurance, and/or copay. Some of these benefits include a comprehensive review of your medical history including lifestyle, illnesses that may run in your family, and various assessments and screenings as appropriate. After reviewing your medical record and screening and assessments performed today your provider may have ordered immunizations, labs, imaging, and/or referrals for you. A list of these orders (if applicable) as well as your Preventive Care list are included within your After Visit Summary for your review. Other Preventive Recommendations:    · A preventive eye exam performed by an eye specialist is recommended every 1-2 years to screen for glaucoma; cataracts, macular degeneration, and other eye disorders. · A preventive dental visit is recommended every 6 months. · Try to get at least 150 minutes of exercise per week or 10,000 steps per day on a pedometer . · Order or download the FREE \"Exercise & Physical Activity: Your Everyday Guide\" from [employer]. Call 4-381.589.3957 or search [employer] online. · You need 5710-2362 mg of calcium and 7789-7226 IU of vitamin D per day. It is possible to meet your calcium requirement with diet alone, but a vitamin D supplement is usually necessary to meet this goal.  · When exposed to the sun, use a sunscreen that protects against both UVA and UVB radiation with an SPF of 30 or greater. Reapply every 2 to 3 hours or after sweating, drying off with a towel, or swimming. · Always wear a seat belt when traveling in a car. Always wear a helmet when riding a bicycle or motorcycle.

## 2021-06-28 LAB
ALBUMIN SERPL-MCNC: 4 G/DL
ALP BLD-CCNC: 96 U/L
ALT SERPL-CCNC: 21 U/L
ANION GAP SERPL CALCULATED.3IONS-SCNC: 1.9 MMOL/L
AST SERPL-CCNC: 27 U/L
BILIRUB SERPL-MCNC: 0.4 MG/DL (ref 0.1–1.4)
BUN BLDV-MCNC: 19 MG/DL
CALCIUM SERPL-MCNC: 9.1 MG/DL
CHLORIDE BLD-SCNC: 107 MMOL/L
CHOLESTEROL, TOTAL: 129 MG/DL
CHOLESTEROL/HDL RATIO: NORMAL
CO2: 24 MMOL/L
CREAT SERPL-MCNC: 0.9 MG/DL
GFR CALCULATED: 84
GLUCOSE BLD-MCNC: 107 MG/DL
HDLC SERPL-MCNC: 40 MG/DL (ref 35–70)
LDL CHOLESTEROL CALCULATED: 71 MG/DL (ref 0–160)
NONHDLC SERPL-MCNC: NORMAL MG/DL
POTASSIUM SERPL-SCNC: 4.4 MMOL/L
SODIUM BLD-SCNC: 143 MMOL/L
TOTAL PROTEIN: 6.1
TRIGL SERPL-MCNC: 93 MG/DL
VLDLC SERPL CALC-MCNC: 18 MG/DL

## 2021-07-23 RX ORDER — ATORVASTATIN CALCIUM 80 MG/1
TABLET, FILM COATED ORAL
Qty: 90 TABLET | Refills: 3 | Status: SHIPPED | OUTPATIENT
Start: 2021-07-23 | End: 2021-12-29 | Stop reason: SDUPTHER

## 2021-07-23 NOTE — TELEPHONE ENCOUNTER
Received refill request for Lipitor from [address].      Last OV: 04/16/2021 w/ LES     Last Labs: 12/10/2019 Lipid Panel     Last Refill: 05/06/2021 #90 w/ 0 refills     Next Appointment: 10/06/2021 w/ LES

## 2021-10-05 NOTE — PROGRESS NOTES
MG per tablet Take 1 tablet by mouth 6 times daily Patient wife stated Every 2 HR  9am-7pm      donepezil (ARICEPT) 5 MG tablet Take 5 mg by mouth nightly Patient takes medication at night      atorvastatin (LIPITOR) 80 MG tablet TAKE 1 TABLET DAILY 90 tablet 3    Menatetrenone (VITAMIN K2) 100 MCG TABS Take by mouth daily      cloZAPine (CLOZARIL) 25 MG tablet 25 mg 2 times daily       nitroGLYCERIN (NITROSTAT) 0.4 MG SL tablet Place 1 tablet under the tongue every 5 minutes as needed (prn) 25 tablet 3    Vitamin D, Cholecalciferol, 50 MCG (2000 UT) CAPS Take by mouth      Coenzyme Q10 (COQ10 PO) Take 1 capsule by mouth daily      Carbidopa-Levodopa ER (RYTARY) 36. MG CPCR Take 1 tablet by mouth 4 times daily       primidone (MYSOLINE) 50 MG tablet Take 50 mg by mouth daily      omeprazole (PRILOSEC) 20 MG delayed release capsule Take 20 mg by mouth daily      aspirin 81 MG tablet Take 1 tablet by mouth daily 30 tablet 0    Psyllium 500 MG CAPS Take 6 capsules by mouth daily       fish oil-omega-3 fatty acids 1000 MG capsule Take 2 g by mouth 2 times daily       multivitamin (THERAGRAN) per tablet Take 1 tablet by mouth daily. No current facility-administered medications for this visit. Allergies:  Amoxicillin-pot clavulanate     Review of Systems:   · Constitutional: there has been no unanticipated weight loss. There's been no change in energy level, sleep pattern, or activity level. · Eyes: No visual changes or diplopia. No scleral icterus. · ENT: No Headaches, hearing loss or vertigo. No mouth sores or sore throat. · Cardiovascular: Reviewed in HPI  · Respiratory: No cough or wheezing, no sputum production. No hematemesis. · Gastrointestinal: No abdominal pain, appetite loss, blood in stools. No change in bowel or bladder habits. · Genitourinary: No dysuria, trouble voiding, or hematuria.   · Musculoskeletal:  No gait disturbance, weakness or joint complaints. · Integumentary: No rash or pruritis. · Neurological: No headache, diplopia, change in muscle strength, numbness or tingling. No change in gait, balance, coordination, mood, affect, memory, mentation, behavior. · Psychiatric: No anxiety, no depression. · Endocrine: No malaise, fatigue or temperature intolerance. No excessive thirst, fluid intake, or urination. No tremor. · Hematologic/Lymphatic: No abnormal bruising or bleeding, blood clots or swollen lymph nodes. · Allergic/Immunologic: No nasal congestion or hives. Physical Examination:    Blood pressure 126/70, pulse 70, height 5' 8.5\" (1.74 m), weight 225 lb 12.8 oz (102.4 kg), SpO2 98 %. Constitutional and General Appearance: in NAD  Skin:good turgor,intact without lesions  HEENT: EOMI ,normal  Neck:no JVD  Respiratory:  · Normal excursion and expansion without use of accessory muscles  · Resp Auscultation: Normal breath sounds without dullness  Cardiovascular:  · The apical impulses not displaced  · Heart tones are crisp and normal  · Cervical veins are not engorged  · The carotid upstroke is normal in amplitude and contour without delay. No bruit heard on physical.   · Peripheral pulses are symmetrical and full  · There is no clubbing, cyanosis of the extremities. · No edema  · Femoral Arteries: 2+ and equal  · Pedal Pulses: 2+ and equal   Abdomen:  · No masses or tenderness  · Liver/Spleen: No Abnormalities Noted  Neurological/Psychiatric:  · Alert and oriented in all spheres  · Moves all extremities well  · Exhibits normal gait balance and coordination  · No abnormalities of mood, affect, memory, mentation, or behavior are noted  · Positive for tremors    Assessment:    1. Coronary atherosclerosis of native coronary artery:   Occasional chest pain. One episode of sharp chest pain radiating to right arm, relieve dby Nitro today. Reports episodes can be weeks apart, or even months. Stable.     2005 Cath> stent in LAD  2006 Cath> Patent stent in lad -nonobstructive circumflex disease. 1/10 GXT Myoview> Normal perfusion. An angiogram was normal.   Cath 4/2015> Normal LV function with EF 60, widely patent LAD stent, 30% distal CFx. -  nuclear stress test to evaluate CP   7/17/19> Normal LV size and systolic function. No evidence of ischemia. ECHO 1/15/10: EF 55%, mild mitral and aortic insufficiency     2. Hypertension:   Controlled today. Some positional dizziness, brief. Blood pressure 126/70, pulse 70, height 5' 8.5\" (1.74 m), weight 225 lb 12.8 oz (102.4 kg), SpO2 98 %. /70 on my check after standing. /70 after sitting back down. 3. Hyperlipemia:   Currently on Lipitor 80mg and fish oil. 6/2021>  TG 93 HDL 40 LDL 71     4. Carotid disease:   Dopplers 2011> 30-19% MINA, 8-50% LICA. 5.  Tremors, chronic:   Essential familial.     6.   Parkinson disease:   He is treated by Dr. Mehrdad Mortensen at [facility] and had a second opinion at [facility]. Plan:   Mr. Guillermo Hood has a stable cardiac status. He continues to struggle with his Parkinson's symptoms. Cardiac test and lab results personally reviewed by me during this office visit and discussed. No med changes. Continue risk factor modifications. Call for any change in symptoms. Return for regular follow up in 6 months. Patient's problem list, medications, allergies, past medical, surgical, social and family histories were reviewed and updated as appropriate. I appreciate the opportunity of cooperating in the care of this individual.    Juliane Higgins. Eboni Mckeon M.D., [address] [place] [address]: This note was scribed in the presence of Dr. Eboni Mckeon MD by Heri Shaffer RN. The scribe's documentation has been prepared under my direction and personally reviewed by me in its entirety. I confirm that the note above accurately reflects all work, treatment, procedures, and medical decision making performed by me.

## 2021-10-06 ENCOUNTER — OFFICE VISIT (OUTPATIENT)
Dept: CARDIOLOGY CLINIC | Age: 76
End: 2021-10-06
Payer: MEDICARE

## 2021-10-06 VITALS
DIASTOLIC BLOOD PRESSURE: 70 MMHG | HEART RATE: 70 BPM | WEIGHT: 225.8 LBS | HEIGHT: 69 IN | BODY MASS INDEX: 33.44 KG/M2 | OXYGEN SATURATION: 98 % | SYSTOLIC BLOOD PRESSURE: 126 MMHG

## 2021-10-06 DIAGNOSIS — I10 PRIMARY HYPERTENSION: ICD-10-CM

## 2021-10-06 DIAGNOSIS — I25.10 ATHEROSCLEROSIS OF NATIVE CORONARY ARTERY OF NATIVE HEART WITHOUT ANGINA PECTORIS: Primary | Chronic | ICD-10-CM

## 2021-10-06 DIAGNOSIS — I77.9 BILATERAL CAROTID ARTERY DISEASE, UNSPECIFIED TYPE (HCC): ICD-10-CM

## 2021-10-06 PROCEDURE — 1036F TOBACCO NON-USER: CPT | Performed by: INTERNAL MEDICINE

## 2021-10-06 PROCEDURE — G8417 CALC BMI ABV UP PARAM F/U: HCPCS | Performed by: INTERNAL MEDICINE

## 2021-10-06 PROCEDURE — G8484 FLU IMMUNIZE NO ADMIN: HCPCS | Performed by: INTERNAL MEDICINE

## 2021-10-06 PROCEDURE — 3017F COLORECTAL CA SCREEN DOC REV: CPT | Performed by: INTERNAL MEDICINE

## 2021-10-06 PROCEDURE — 1123F ACP DISCUSS/DSCN MKR DOCD: CPT | Performed by: INTERNAL MEDICINE

## 2021-10-06 PROCEDURE — G8427 DOCREV CUR MEDS BY ELIG CLIN: HCPCS | Performed by: INTERNAL MEDICINE

## 2021-10-06 PROCEDURE — 4040F PNEUMOC VAC/ADMIN/RCVD: CPT | Performed by: INTERNAL MEDICINE

## 2021-10-06 PROCEDURE — 99214 OFFICE O/P EST MOD 30 MIN: CPT | Performed by: INTERNAL MEDICINE

## 2021-10-06 RX ORDER — DONEPEZIL HYDROCHLORIDE 5 MG/1
10 TABLET, FILM COATED ORAL NIGHTLY
COMMUNITY
Start: 2021-09-14 | End: 2022-04-01 | Stop reason: DRUGHIGH

## 2021-10-06 NOTE — PATIENT INSTRUCTIONS
No changes today  Call if BPs start to drop lower than they are  Stay hydrated  Follow up in 6 months

## 2021-11-17 ENCOUNTER — NURSE ONLY (OUTPATIENT)
Dept: INTERNAL MEDICINE CLINIC | Age: 76
End: 2021-11-17
Payer: MEDICARE

## 2021-11-17 DIAGNOSIS — Z23 NEED FOR INFLUENZA VACCINATION: Primary | ICD-10-CM

## 2021-11-17 PROCEDURE — 90694 VACC AIIV4 NO PRSRV 0.5ML IM: CPT | Performed by: NURSE PRACTITIONER

## 2021-11-17 PROCEDURE — G0008 ADMIN INFLUENZA VIRUS VAC: HCPCS | Performed by: NURSE PRACTITIONER

## 2021-12-16 ENCOUNTER — OFFICE VISIT (OUTPATIENT)
Dept: INTERNAL MEDICINE CLINIC | Age: 76
End: 2021-12-16
Payer: MEDICARE

## 2021-12-16 VITALS
OXYGEN SATURATION: 97 % | SYSTOLIC BLOOD PRESSURE: 120 MMHG | DIASTOLIC BLOOD PRESSURE: 58 MMHG | TEMPERATURE: 97.1 F | HEART RATE: 80 BPM | BODY MASS INDEX: 33.56 KG/M2 | WEIGHT: 224 LBS

## 2021-12-16 DIAGNOSIS — I49.9 IRREGULAR HEART RHYTHM: ICD-10-CM

## 2021-12-16 DIAGNOSIS — R73.01 IMPAIRED FASTING GLUCOSE: ICD-10-CM

## 2021-12-16 DIAGNOSIS — R21 RASH: ICD-10-CM

## 2021-12-16 DIAGNOSIS — I10 ESSENTIAL HYPERTENSION: ICD-10-CM

## 2021-12-16 DIAGNOSIS — G20 PARKINSON DISEASE (HCC): Primary | ICD-10-CM

## 2021-12-16 PROCEDURE — 3017F COLORECTAL CA SCREEN DOC REV: CPT | Performed by: NURSE PRACTITIONER

## 2021-12-16 PROCEDURE — G8484 FLU IMMUNIZE NO ADMIN: HCPCS | Performed by: NURSE PRACTITIONER

## 2021-12-16 PROCEDURE — G8427 DOCREV CUR MEDS BY ELIG CLIN: HCPCS | Performed by: NURSE PRACTITIONER

## 2021-12-16 PROCEDURE — 4040F PNEUMOC VAC/ADMIN/RCVD: CPT | Performed by: NURSE PRACTITIONER

## 2021-12-16 PROCEDURE — 99214 OFFICE O/P EST MOD 30 MIN: CPT | Performed by: NURSE PRACTITIONER

## 2021-12-16 PROCEDURE — 1123F ACP DISCUSS/DSCN MKR DOCD: CPT | Performed by: NURSE PRACTITIONER

## 2021-12-16 PROCEDURE — G8417 CALC BMI ABV UP PARAM F/U: HCPCS | Performed by: NURSE PRACTITIONER

## 2021-12-16 PROCEDURE — 1036F TOBACCO NON-USER: CPT | Performed by: NURSE PRACTITIONER

## 2021-12-16 ASSESSMENT — ENCOUNTER SYMPTOMS
RESPIRATORY NEGATIVE: 1
GASTROINTESTINAL NEGATIVE: 1

## 2021-12-16 NOTE — PROGRESS NOTES
SUBJECTIVE:    Patient ID: Chava Bustillos is a 78 y.o. male. CC: Fungal infections, sinus drainage, coronary artery disease, Parkinson's    HPI: Patient presents to the office today for routine follow-up of chronic medical conditions as well as ongoing medical concerns. Wife reports he has been dealing with fungal infections. He has had rash to the feet and groin. He has seen dermatology and they have him on an antifungal.  She does not feel this is helped significantly. We have discussed the importance of dryness. He has a lot of sweating at night which they attribute to his Parkinson's. He has sinus drainage. We discussed use of an antihistamine. Monitor for worsening drowsiness in the context of his Parkinson's medicine. He has a history of follow-up disease and hypertension. He denies any chest pain or shortness of breath. He is compliant with his medication regimen. History of Parkinson's. He is on multiple treatments for this. He is a patient of [facility] neurology.       Past Medical History:   Diagnosis Date    Acute bronchitis     history of    CAD (coronary artery disease)     Clostridioides difficile infection 07/12/2020    Hyperlipidemia         Current Outpatient Medications   Medication Sig Dispense Refill    donepezil (ARICEPT) 5 MG tablet Take 10 mg by mouth nightly Patient takes medication at night      carbidopa-levodopa (SINEMET)  MG per tablet Take 1 tablet by mouth 6 times daily Patient wife stated Every 2 HR  9am-7pm      atorvastatin (LIPITOR) 80 MG tablet TAKE 1 TABLET DAILY 90 tablet 3    Menatetrenone (VITAMIN K2) 100 MCG TABS Take by mouth daily      cloZAPine (CLOZARIL) 25 MG tablet 25 mg 2 times daily       nitroGLYCERIN (NITROSTAT) 0.4 MG SL tablet Place 1 tablet under the tongue every 5 minutes as needed (prn) 25 tablet 3    Vitamin D, Cholecalciferol, 50 MCG (2000 UT) CAPS Take by mouth      Coenzyme Q10 (COQ10 PO) Take 1 capsule by mouth daily      Carbidopa-Levodopa ER (RYTARY) 36. MG CPCR Take 1 tablet by mouth 4 times daily       primidone (MYSOLINE) 50 MG tablet Take 50 mg by mouth daily      omeprazole (PRILOSEC) 20 MG delayed release capsule Take 20 mg by mouth daily      aspirin 81 MG tablet Take 1 tablet by mouth daily 30 tablet 0    Psyllium 500 MG CAPS Take 6 capsules by mouth daily       fish oil-omega-3 fatty acids 1000 MG capsule Take 2 g by mouth 2 times daily       multivitamin (THERAGRAN) per tablet Take 1 tablet by mouth daily. No current facility-administered medications for this visit. Review of Systems   Constitutional: Negative. Respiratory: Negative. Cardiovascular: Negative. Gastrointestinal: Negative. Genitourinary: Negative. Musculoskeletal: Positive for arthralgias. Skin: Positive for rash. Neurological: Negative. Psychiatric/Behavioral: Negative. All other systems reviewed and are negative. OBJECTIVE:  Physical Exam  Vitals reviewed. Constitutional:       General: He is not in acute distress. Appearance: He is well-developed. He is not diaphoretic. HENT:      Head: Normocephalic and atraumatic. Eyes:      General: No scleral icterus. Conjunctiva/sclera: Conjunctivae normal.   Neck:      Vascular: No JVD. Cardiovascular:      Rate and Rhythm: Normal rate. Rhythm irregular. Pulmonary:      Effort: Pulmonary effort is normal. No respiratory distress. Breath sounds: Normal breath sounds. No wheezing or rales. Abdominal:      General: There is no distension. Palpations: Abdomen is soft. Tenderness: There is no abdominal tenderness. There is no guarding or rebound. Musculoskeletal:         General: Normal range of motion. Cervical back: Neck supple. Skin:     General: Skin is warm and dry. Neurological:      Mental Status: He is alert and oriented to person, place, and time.    Psychiatric:         Behavior: Behavior normal.         Thought Content: Thought content normal.        BP (!) 120/58   Pulse 80   Temp 97.1 °F (36.2 °C)   Wt 224 lb (101.6 kg)   SpO2 97%   BMI 33.56 kg/m²      PHQ Scores 6/15/2021 1/28/2021 6/10/2020 11/14/2019 6/11/2019 6/18/2018   PHQ2 Score 0 0 1 0 0 1   PHQ9 Score 0 0 1 0 0 1     Interpretation of Total Score Depression Severity: 1-4 = Minimal depression, 5-9 = Mild depression, 10-14 = Moderate depression, 15-19 = Moderately severe depression, 20-27 =Severe depression        ASSESSMENT/PLAN:  Maria Elena Sanchez was seen today for coronary artery disease, tremors and cough. Diagnoses and all orders for this visit:    Parkinson disease ([facility] [address]  -Chronic, intermittently problematic  -Continue treatment per [facility]    Essential hypertension  - Normotensive  - he has met JNC standards.  - Continue current regimen. Rash  -He has been having groin and feet rash felt to be fungal.  -Seeing dermatology  -Has been placed on antifungal treatments    Impaired fasting glucose  -     Hemoglobin A1C    Irregular heart rhythm  - Irregular on exam today. Will check an EKG. Consider Holter study if needed. Patient denies any recent symptomology although the wife reports 3 episodes of lightheadedness and dizziness in the past.  -     EKG 12 lead;  Future        KEYSHA Ordonez - CNP

## 2021-12-17 ENCOUNTER — HOSPITAL ENCOUNTER (OUTPATIENT)
Age: 76
Discharge: HOME | End: 2021-12-17
Payer: MEDICARE

## 2021-12-17 DIAGNOSIS — I49.9 IRREGULAR HEART RHYTHM: ICD-10-CM

## 2021-12-17 PROCEDURE — 93005 ELECTROCARDIOGRAM TRACING: CPT | Performed by: INTERNAL MEDICINE

## 2021-12-29 RX ORDER — ATORVASTATIN CALCIUM 80 MG/1
TABLET, FILM COATED ORAL
Qty: 90 TABLET | Refills: 3 | Status: SHIPPED | OUTPATIENT
Start: 2021-12-29 | End: 2022-09-29

## 2021-12-29 NOTE — TELEPHONE ENCOUNTER
Last OV: 10/6/21  Last labs: 6/28/21  Appt scheduled : 1/17/21    Faxed request for refill , different pharmacy    [facility] per spouse    Sent LES OOT

## 2021-12-30 NOTE — PROGRESS NOTES
[address]   Cardiac Follow up    Referring Provider:  Rollin Apgar, APRN - CNP     Chief Complaint   Patient presents with    Hypertension    Hyperlipidemia      History of Present Illness:  Mr. Kathryn Gillette is a 78 y.o. gentleman. His history includes CAD with PCI in , hypertension, and hyperlipidemia. He had a cardiac angiogram in  that showed normal LV function and EF 60%. Coronary stent widely patent. He has Parkinsons, treated by Dr. Rachel [facility]); [facility] Today, he is here for follow up to EKG changes. Overall, he feels OK. He denies exertional chest pain, GONZALEZ/PND, palpitations, light-headedness, edema. His wife is with him for the visit. His wife describes episodes of sudden diaphoresis with activities that are unpredictable but becoming more frequent. Past Medical History:   has a past medical history of Acute bronchitis, CAD (coronary artery disease), Clostridioides difficile infection, and Hyperlipidemia. Also parkinson disease    Surgical History:   has a past surgical history that includes Diagnostic Cardiac Cath Lab Procedure () and Coronary angioplasty with stent (). Social History:   reports that he quit smoking about 42 years ago. He has a 2.50 pack-year smoking history. He has never used smokeless tobacco. He reports that he does not drink alcohol and does not use drugs. Family History:  family history includes Cancer in his mother and sister; Diabetes in his sister and sister; Other in his brother, brother, and father. He was adopted.      Home Medications:  Prior to Admission medications      Current Outpatient Medications   Medication Sig Dispense Refill    fexofenadine (ALLEGRA ALLERGY) 180 MG tablet Take 180 mg by mouth daily      atorvastatin (LIPITOR) 80 MG tablet TAKE 1 TABLET DAILY 90 tablet 3    carbidopa-levodopa (SINEMET)  MG per tablet Take 1 tablet by mouth 6 times daily Patient wife stated Every 2 HR  9am-7pm      donepezil (ARICEPT) 5 MG tablet Take 10 mg by mouth nightly Patient takes medication at night      Menatetrenone (VITAMIN K2) 100 MCG TABS Take by mouth daily      cloZAPine (CLOZARIL) 25 MG tablet 25 mg 2 times daily       nitroGLYCERIN (NITROSTAT) 0.4 MG SL tablet Place 1 tablet under the tongue every 5 minutes as needed (prn) 25 tablet 3    Vitamin D, Cholecalciferol, 50 MCG (2000 UT) CAPS Take by mouth      Coenzyme Q10 (COQ10 PO) Take 1 capsule by mouth daily      Carbidopa-Levodopa ER (RYTARY) 36. MG CPCR Take 1 tablet by mouth 4 times daily       primidone (MYSOLINE) 50 MG tablet Take 50 mg by mouth daily      omeprazole (PRILOSEC) 20 MG delayed release capsule Take 20 mg by mouth daily      aspirin 81 MG tablet Take 1 tablet by mouth daily 30 tablet 0    Psyllium 500 MG CAPS Take 6 capsules by mouth daily       fish oil-omega-3 fatty acids 1000 MG capsule Take 2 g by mouth 2 times daily       multivitamin (THERAGRAN) per tablet Take 1 tablet by mouth daily. No current facility-administered medications for this visit. Allergies:  Amoxicillin, Amoxicillin-pot clavulanate, Sulfasuccinamide, and Tamsulosin     Review of Systems:   · Constitutional: there has been no unanticipated weight loss. There's been no change in energy level, sleep pattern, or activity level. · Eyes: No visual changes or diplopia. No scleral icterus. · ENT: No Headaches, hearing loss or vertigo. No mouth sores or sore throat. · Cardiovascular: Reviewed in HPI  · Respiratory: No cough or wheezing, no sputum production. No hematemesis. · Gastrointestinal: No abdominal pain, appetite loss, blood in stools. No change in bowel or bladder habits. · Genitourinary: No dysuria, trouble voiding, or hematuria. · Musculoskeletal:  No gait disturbance, weakness or joint complaints. · Integumentary: No rash or pruritis.   · Neurological: No headache, diplopia, change in muscle strength, numbness or tingling. No change in gait, balance, coordination, mood, affect, memory, mentation, behavior. · Psychiatric: No anxiety, no depression. · Endocrine: No malaise, fatigue or temperature intolerance. No excessive thirst, fluid intake, or urination. No tremor. · Hematologic/Lymphatic: No abnormal bruising or bleeding, blood clots or swollen lymph nodes. · Allergic/Immunologic: No nasal congestion or hives. Physical Examination:    Blood pressure 118/78, pulse 80, height 5' 8.5\" (1.74 m), weight 223 lb 12.8 oz (101.5 kg), SpO2 98 %. Constitutional and General Appearance: in NAD  Skin:good turgor,intact without lesions  HEENT: EOMI ,normal  Neck:no JVD  Respiratory:  · Normal excursion and expansion without use of accessory muscles  · Resp Auscultation: Normal breath sounds without dullness  Cardiovascular:  · The apical impulses not displaced  · Heart tones are crisp and normal  · Cervical veins are not engorged  · The carotid upstroke is normal in amplitude and contour without delay. No bruit heard on physical.   · Peripheral pulses are symmetrical and full  · There is no clubbing, cyanosis of the extremities. · No edema  · Femoral Arteries: 2+ and equal  · Pedal Pulses: 2+ and equal   Abdomen:  · No masses or tenderness  · Liver/Spleen: No Abnormalities Noted  Neurological/Psychiatric:  · Alert and oriented in all spheres  · Moves all extremities well  · Exhibits normal gait balance and coordination  · No abnormalities of mood, affect, memory, mentation, or behavior are noted  · Positive for tremors    Assessment:    1. Coronary atherosclerosis of native coronary artery: History of occasional chest pain. One episode of sharp chest pain radiating to right arm, relieve dby Nitro today. Reports episodes can be weeks apart, or even months.   -EKG today 1/17/22 (read & interpreted by me)> NSR with lateral ischemia   -Cath 2005> stent in LAD  -Cath 2006> Patent stent in lad -nonobstructive circumflex disease. -GXT Myoview 2010> Normal perfusion. An angiogram was normal.   -Cath 4/2015> Normal LV function with EF 60, widely patent LAD stent, 30% distal CFx. -  nuclear stress test to evaluate CP   -Lexiscan karina 7/17/19> Normal LV size and systolic function. No evidence of ischemia. -ECHO 1/15/10: EF 55%, mild mitral and aortic insufficiency     2. Hypertension: Controlled today. Some positional dizziness, brief. Blood pressure 118/78, pulse 80, height 5' 8.5\" (1.74 m), weight 223 lb 12.8 oz (101.5 kg), SpO2 98 %. 3. Hyperlipemia: Currently on Lipitor 80mg and fish oil. 6/2021>  TG 93 HDL 40 LDL 71     4. Carotid disease: Stable  Dopplers 2011> 46-16% MINA, 8-27% LICA. 5. Parkinson disease: He is treated by Dr. Liz Lebron at [facility] and had a second opinion at [facility]. 6.  Tremors, chronic: Essential, familial.        Plan:   His ECG from December was located. Current ECG with new T wave inversion in lateral leads. Concerning for unstable angina as cause for symptoms  Cardiac cath to be scheduled. Enmanuel Samson M.D., [facility]    Patient's problem list, medications, allergies, past medical, surgical, social and family histories were reviewed and updated as appropriate. Scribe's attestation: This note was scribed in the presence of Dr. Luis Samson MD, by Eliazar Smith RN. The scribe's documentation has been prepared under my direction and personally reviewed by me in its entirety. I confirm that the note above accurately reflects all work, treatment, procedures, and medical decision making performed by me.

## 2022-01-10 LAB
ESTIMATED AVERAGE GLUCOSE: NORMAL
HBA1C MFR BLD: 6.6 %

## 2022-01-17 ENCOUNTER — OFFICE VISIT (OUTPATIENT)
Dept: CARDIOLOGY CLINIC | Age: 77
End: 2022-01-17
Payer: MEDICARE

## 2022-01-17 VITALS
BODY MASS INDEX: 33.15 KG/M2 | SYSTOLIC BLOOD PRESSURE: 118 MMHG | HEART RATE: 80 BPM | DIASTOLIC BLOOD PRESSURE: 78 MMHG | WEIGHT: 223.8 LBS | OXYGEN SATURATION: 98 % | HEIGHT: 69 IN

## 2022-01-17 DIAGNOSIS — I25.10 ATHEROSCLEROSIS OF NATIVE CORONARY ARTERY OF NATIVE HEART WITHOUT ANGINA PECTORIS: ICD-10-CM

## 2022-01-17 DIAGNOSIS — R61 DIAPHORESIS: ICD-10-CM

## 2022-01-17 DIAGNOSIS — I10 PRIMARY HYPERTENSION: ICD-10-CM

## 2022-01-17 DIAGNOSIS — I10 ESSENTIAL HYPERTENSION: ICD-10-CM

## 2022-01-17 DIAGNOSIS — R94.31 ACUTE ELECTROCARDIOGRAM CHANGES: Primary | ICD-10-CM

## 2022-01-17 DIAGNOSIS — E78.49 OTHER HYPERLIPIDEMIA: ICD-10-CM

## 2022-01-17 PROCEDURE — 1036F TOBACCO NON-USER: CPT | Performed by: INTERNAL MEDICINE

## 2022-01-17 PROCEDURE — 3017F COLORECTAL CA SCREEN DOC REV: CPT | Performed by: INTERNAL MEDICINE

## 2022-01-17 PROCEDURE — 99214 OFFICE O/P EST MOD 30 MIN: CPT | Performed by: INTERNAL MEDICINE

## 2022-01-17 PROCEDURE — G8484 FLU IMMUNIZE NO ADMIN: HCPCS | Performed by: INTERNAL MEDICINE

## 2022-01-17 PROCEDURE — 1123F ACP DISCUSS/DSCN MKR DOCD: CPT | Performed by: INTERNAL MEDICINE

## 2022-01-17 PROCEDURE — 4040F PNEUMOC VAC/ADMIN/RCVD: CPT | Performed by: INTERNAL MEDICINE

## 2022-01-17 PROCEDURE — 93000 ELECTROCARDIOGRAM COMPLETE: CPT | Performed by: INTERNAL MEDICINE

## 2022-01-17 PROCEDURE — G8417 CALC BMI ABV UP PARAM F/U: HCPCS | Performed by: INTERNAL MEDICINE

## 2022-01-17 PROCEDURE — G8427 DOCREV CUR MEDS BY ELIG CLIN: HCPCS | Performed by: INTERNAL MEDICINE

## 2022-01-17 RX ORDER — FEXOFENADINE HCL 180 MG/1
180 TABLET ORAL DAILY
COMMUNITY

## 2022-01-17 NOTE — PATIENT INSTRUCTIONS
PRE-PROCEDURE INSTRUCTIONS (left heart catheterization):  1. Nothing to eat or drink 8 hours before procedure. 2. OK to take all morning medications with a small sip of water  3. You cannot drive for 24 hours after procedure. 4. You can usually go home afterwards but sometimes a one night stay is warranted.

## 2022-01-18 ENCOUNTER — TELEPHONE (OUTPATIENT)
Dept: CARDIOLOGY CLINIC | Age: 77
End: 2022-01-18

## 2022-01-18 LAB
EKG ATRIAL RATE: 66 BPM
EKG DIAGNOSIS: NORMAL
EKG P AXIS: 62 DEGREES
EKG P-R INTERVAL: 170 MS
EKG Q-T INTERVAL: 416 MS
EKG QRS DURATION: 78 MS
EKG QTC CALCULATION (BAZETT): 436 MS
EKG R AXIS: 19 DEGREES
EKG T AXIS: 49 DEGREES
EKG VENTRICULAR RATE: 66 BPM

## 2022-01-18 PROCEDURE — 93010 ELECTROCARDIOGRAM REPORT: CPT | Performed by: INTERNAL MEDICINE

## 2022-01-18 NOTE — TELEPHONE ENCOUNTER
LVM to call to schedule [facility] w/poss PCI on 2-1-22 with LES. Please transfer call to [phone] [facility].  mg

## 2022-01-18 NOTE — TELEPHONE ENCOUNTER
Ny Kemp wife called to schedule the [facility] with LES on 2-1-22 / 11-12:30 pm. She wanted to speak to LES or RN regarding Pacheco tremors. He takes 1 or 2 pills every 2 hrs for them throughout the day and is concerned that he will need to take them during the procedure. Please call to advise. Thank you.

## 2022-02-01 ENCOUNTER — HOSPITAL ENCOUNTER (OUTPATIENT)
Dept: CARDIAC CATH/INVASIVE PROCEDURES | Age: 77
Discharge: HOME | End: 2022-02-01
Attending: INTERNAL MEDICINE | Admitting: INTERNAL MEDICINE
Payer: MEDICARE

## 2022-02-01 VITALS
OXYGEN SATURATION: 98 % | DIASTOLIC BLOOD PRESSURE: 64 MMHG | BODY MASS INDEX: 33.8 KG/M2 | TEMPERATURE: 97.8 F | HEIGHT: 68 IN | WEIGHT: 223 LBS | RESPIRATION RATE: 18 BRPM | HEART RATE: 83 BPM | SYSTOLIC BLOOD PRESSURE: 132 MMHG

## 2022-02-01 LAB
ANION GAP SERPL CALCULATED.3IONS-SCNC: 13 MMOL/L (ref 3–16)
BUN BLDV-MCNC: 19 MG/DL (ref 7–20)
CALCIUM SERPL-MCNC: 9.1 MG/DL (ref 8.3–10.6)
CHLORIDE BLD-SCNC: 107 MMOL/L (ref 99–110)
CO2: 22 MMOL/L (ref 21–32)
CREAT SERPL-MCNC: 0.7 MG/DL (ref 0.8–1.3)
EKG ATRIAL RATE: 83 BPM
EKG DIAGNOSIS: NORMAL
EKG P AXIS: 60 DEGREES
EKG P-R INTERVAL: 152 MS
EKG Q-T INTERVAL: 404 MS
EKG QRS DURATION: 86 MS
EKG QTC CALCULATION (BAZETT): 474 MS
EKG R AXIS: 34 DEGREES
EKG T AXIS: 53 DEGREES
EKG VENTRICULAR RATE: 83 BPM
GFR AFRICAN AMERICAN: >60
GFR NON-AFRICAN AMERICAN: >60
GLUCOSE BLD-MCNC: 124 MG/DL (ref 70–99)
HCT VFR BLD CALC: 42.8 % (ref 40.5–52.5)
HEMOGLOBIN: 14.1 G/DL (ref 13.5–17.5)
LEFT VENTRICULAR EJECTION FRACTION HIGH VALUE: 60 %
MCH RBC QN AUTO: 30 PG (ref 26–34)
MCHC RBC AUTO-ENTMCNC: 32.9 G/DL (ref 31–36)
MCV RBC AUTO: 91.2 FL (ref 80–100)
PDW BLD-RTO: 13.9 % (ref 12.4–15.4)
PLATELET # BLD: 217 K/UL (ref 135–450)
PMV BLD AUTO: 7.8 FL (ref 5–10.5)
POTASSIUM SERPL-SCNC: 4.1 MMOL/L (ref 3.5–5.1)
RBC # BLD: 4.69 M/UL (ref 4.2–5.9)
SODIUM BLD-SCNC: 142 MMOL/L (ref 136–145)
WBC # BLD: 6.6 K/UL (ref 4–11)

## 2022-02-01 PROCEDURE — 85027 COMPLETE CBC AUTOMATED: CPT

## 2022-02-01 PROCEDURE — 99153 MOD SED SAME PHYS/QHP EA: CPT

## 2022-02-01 PROCEDURE — 93458 L HRT ARTERY/VENTRICLE ANGIO: CPT

## 2022-02-01 PROCEDURE — 36415 COLL VENOUS BLD VENIPUNCTURE: CPT

## 2022-02-01 PROCEDURE — 2500000003 HC RX 250 WO HCPCS

## 2022-02-01 PROCEDURE — C1894 INTRO/SHEATH, NON-LASER: HCPCS

## 2022-02-01 PROCEDURE — 80048 BASIC METABOLIC PNL TOTAL CA: CPT

## 2022-02-01 PROCEDURE — 6360000004 HC RX CONTRAST MEDICATION: Performed by: INTERNAL MEDICINE

## 2022-02-01 PROCEDURE — C1887 CATHETER, GUIDING: HCPCS

## 2022-02-01 PROCEDURE — 99152 MOD SED SAME PHYS/QHP 5/>YRS: CPT | Performed by: INTERNAL MEDICINE

## 2022-02-01 PROCEDURE — 93010 ELECTROCARDIOGRAM REPORT: CPT | Performed by: INTERNAL MEDICINE

## 2022-02-01 PROCEDURE — C1769 GUIDE WIRE: HCPCS

## 2022-02-01 PROCEDURE — 93005 ELECTROCARDIOGRAM TRACING: CPT | Performed by: INTERNAL MEDICINE

## 2022-02-01 PROCEDURE — 6360000002 HC RX W HCPCS

## 2022-02-01 PROCEDURE — 93458 L HRT ARTERY/VENTRICLE ANGIO: CPT | Performed by: INTERNAL MEDICINE

## 2022-02-01 PROCEDURE — 99152 MOD SED SAME PHYS/QHP 5/>YRS: CPT

## 2022-02-01 RX ORDER — SODIUM CHLORIDE 0.9 % (FLUSH) 0.9 %
5-40 SYRINGE (ML) INJECTION PRN
Status: DISCONTINUED | OUTPATIENT
Start: 2022-02-01 | End: 2022-02-01 | Stop reason: HOSPADM

## 2022-02-01 RX ADMIN — IOPAMIDOL 53 ML: 755 INJECTION, SOLUTION INTRAVENOUS at 11:03

## 2022-02-01 NOTE — PRE SEDATION
Brief Pre-Op Note/Sedation Assessment      Cal Guevara  1945  [id number]  10:19 AM    Planned Procedure: Cardiac Catheterization Procedure  Post Procedure Plan: Return to same level of care  Consent: I have discussed with the patient and/or the patient representative the indication, alternatives, and the possible risks and/or complications of the planned procedure and the anesthesia methods. The patient and/or patient representative appear to understand and agree to proceed. Chief Complaint:   Chest Pain/Pressure      Indications for Cath Procedure:  1. Presentation:  Worsening Angina  2. Anginal Classification within 2 weeks:  CCS III - Symptoms with everyday living activities, i.e., moderate limitation  3. Angina Symptoms Assessment:  Atypical Chest Pain  4. Heart Failure Class within last 2 weeks:  No symptoms  5. Cardiovascular Instability:  No    Prior Ischemic Workup/Eval:  1. Pre-Procedural Medications: Yes: Aspirin and STATIN  2. Stress Test Completed? No    Does Patient need surgery? Cath Valve Surgery:  No    Pre-Procedure Medical History:  Vital Signs:  /64   Pulse 83   Temp 97.8 °F (36.6 °C) (Temporal)   Resp 18   Ht 5' 8\" (1.727 m)   Wt 223 lb (101.2 kg)   SpO2 98%   BMI 33.91 kg/m²     Allergies:     Allergies   Allergen Reactions    Amoxicillin Hives    Amoxicillin-Pot Clavulanate     Sulfasuccinamide      Other reaction(s): opthalmic - conjunctivitis    Tamsulosin Angioedema and Diarrhea     Medications:    Current Facility-Administered Medications   Medication Dose Route Frequency Provider Last Rate Last Admin    sodium chloride flush 0.9 % injection 5-40 mL  5-40 mL IntraVENous PRN Alix Galvez MD           Past Medical History:    Past Medical History:   Diagnosis Date    Acute bronchitis     history of    CAD (coronary artery disease)     Clostridioides difficile infection 07/12/2020    Hyperlipidemia        Surgical History:    Past Surgical History:   Procedure Laterality Date    CORONARY ANGIOPLASTY WITH STENT PLACEMENT  2005    [facility]    DIAGNOSTIC CARDIAC CATH LAB PROCEDURE  2005                 Pre-Sedation:  Pre-Sedation Documentation and Exam:  I have personally completed a history, physical exam & review of systems for this patient (see notes). Prior History of Anesthesia Complications:   none    Modified Mallampati:  I (soft palate, uvula, fauces, tonsillar pillars visible)    ASA Classification:  Class 2 - A normal healthy patient with mild systemic disease    Roslyn Scale: Activity:  2 - Able to move 4 extremities voluntarily on command  Respiration:  2 - Able to breathe deeply and cough freely  Circulation:  2 - BP+/- 20mmHg of normal  Consciousness:  2 - Fully awake  Oxygen Saturation (color):  2 - Able to maintain oxygen saturation >92% on room air    Sedation/Anesthesia Plan:  Guard the patient's safety and welfare. Minimize physical discomfort and pain. Minimize negative psychological responses to treatment by providing sedation and analgesia and maximize the potential amnesia. Patient to meet pre-procedure discharge plan.     Medication Planned:  midazolam intravenously    Patient is an appropriate candidate for plan of sedation:   yes      Electronically signed by Odilon Lyons MD on 2/1/2022 at 10:19 AM

## 2022-02-01 NOTE — H&P
[facility]   Cardiac H and P    Referring Provider:  KEYSHA Blair CNP     Cc- sudden diaphoresis   History of Present Illness:  Mr. Kevin Adams is a 78 y.o. gentleman. His history includes CAD with PCI in 2005, hypertension, and hyperlipidemia. He had a cardiac angiogram in 2015 that showed normal LV function and EF 60%. Coronary stent widely patent. He has Parkinsons, treated by Dr. [facility]); he has been evaluated by the [facility]. Today, he is here for follow up to EKG changes. Overall, he feels OK. He denies exertional chest pain, GONZALEZ/PND, palpitations, light-headedness, edema. His wife is with him for the visit. His wife describes episodes of sudden diaphoresis with activities that are unpredictable but becoming more frequent. Past Medical History:   has a past medical history of Acute bronchitis, CAD (coronary artery disease), Clostridioides difficile infection, and Hyperlipidemia. Also parkinson disease    Surgical History:   has a past surgical history that includes Diagnostic Cardiac Cath Lab Procedure (2005) and Coronary angioplasty with stent (2005). Social History:   reports that he quit smoking about 42 years ago. He has a 2.50 pack-year smoking history. He has never used smokeless tobacco. He reports that he does not drink alcohol and does not use drugs. Family History:  family history includes Cancer in his mother and sister; Diabetes in his sister and sister; Other in his brother, brother, and father. He was adopted.      Home Medications:  Prior to Admission medications      Current Facility-Administered Medications   Medication Dose Route Frequency Provider Last Rate Last Admin    sodium chloride flush 0.9 % injection 5-40 mL  5-40 mL IntraVENous PRN Giselle Campos MD         Allergies:  Amoxicillin, Amoxicillin-pot clavulanate, Sulfasuccinamide, and Tamsulosin     Review of Systems:   · Constitutional: there has been no unanticipated weight loss. There's been no change in energy level, sleep pattern, or activity level. · Eyes: No visual changes or diplopia. No scleral icterus. · ENT: No Headaches, hearing loss or vertigo. No mouth sores or sore throat. · Cardiovascular: Reviewed in HPI  · Respiratory: No cough or wheezing, no sputum production. No hematemesis. · Gastrointestinal: No abdominal pain, appetite loss, blood in stools. No change in bowel or bladder habits. · Genitourinary: No dysuria, trouble voiding, or hematuria. · Musculoskeletal:  No gait disturbance, weakness or joint complaints. · Integumentary: No rash or pruritis. · Neurological: No headache, diplopia, change in muscle strength, numbness or tingling. No change in gait, balance, coordination, mood, affect, memory, mentation, behavior. · Psychiatric: No anxiety, no depression. · Endocrine: No malaise, fatigue or temperature intolerance. No excessive thirst, fluid intake, or urination. No tremor. · Hematologic/Lymphatic: No abnormal bruising or bleeding, blood clots or swollen lymph nodes. · Allergic/Immunologic: No nasal congestion or hives. Physical Examination:    Blood pressure 132/64, pulse 83, temperature 97.8 °F (36.6 °C), temperature source Temporal, resp. rate 18, height 5' 8\" (1.727 m), weight 223 lb (101.2 kg), SpO2 98 %. Constitutional and General Appearance: in NAD  Skin:good turgor,intact without lesions  HEENT: EOMI ,normal  Neck:no JVD  Respiratory:  · Normal excursion and expansion without use of accessory muscles  · Resp Auscultation: Normal breath sounds without dullness  Cardiovascular:  · The apical impulses not displaced  · Heart tones are crisp and normal  · Cervical veins are not engorged  · The carotid upstroke is normal in amplitude and contour without delay. No bruit heard on physical.   · Peripheral pulses are symmetrical and full  · There is no clubbing, cyanosis of the extremities.   · No edema  · Femoral Arteries: 2+ and equal  · Pedal Pulses: 2+ and equal   Abdomen:  · No masses or tenderness  · Liver/Spleen: No Abnormalities Noted  Neurological/Psychiatric:  · Alert and oriented in all spheres  · Moves all extremities well  · Exhibits normal gait balance and coordination  · No abnormalities of mood, affect, memory, mentation, or behavior are noted  · Positive for tremors    Assessment:    1. Coronary atherosclerosis of native coronary artery: History of occasional chest pain. One episode of sharp chest pain radiating to right arm, relieve dby Nitro today. Reports episodes can be weeks apart, or even months. -EKG today 1/17/22 (read & interpreted by me)> NSR with lateral ischemia   -Cath 2005> stent in LAD  -Cath 2006> Patent stent in lad -nonobstructive circumflex disease. -GXT Myoview 2010> Normal perfusion. An angiogram was normal.   -Cath 4/2015> Normal LV function with EF 60, widely patent LAD stent, 30% distal CFx. -  nuclear stress test to evaluate CP   -Lexiscan karina 7/17/19> Normal LV size and systolic function. No evidence of ischemia. -ECHO 1/15/10: EF 55%, mild mitral and aortic insufficiency     2. Hypertension: Controlled today. Some positional dizziness, brief. Blood pressure 132/64, pulse 83, temperature 97.8 °F (36.6 °C), temperature source Temporal, resp. rate 18, height 5' 8\" (1.727 m), weight 223 lb (101.2 kg), SpO2 98 %. 3. Hyperlipemia: Currently on Lipitor 80mg and fish oil. 6/2021>  TG 93 HDL 40 LDL 71     4. Carotid disease: Stable  Dopplers 2011> 18-04% MINA, 1-19% LICA. 5. Parkinson disease: He is treated by Dr. Rin Love at [facility] and had a second opinion at [facility]. 6.  Tremors, chronic: Essential, familial.        Plan:   His ECG from December was located. Current ECG with new T wave inversion in lateral leads. Concerning for unstable angina as cause for symptoms  Cardiac cath today.  Patient agrees to proceed with understanding of risks and options    Faisal Dunn George Garcia M.D., [facility] [place]    Patient's problem list, medications, allergies, past medical, surgical, social and family histories were reviewed and updated as appropriate.

## 2022-03-08 ENCOUNTER — OFFICE VISIT (OUTPATIENT)
Dept: CARDIOLOGY CLINIC | Age: 77
End: 2022-03-08
Payer: MEDICARE

## 2022-03-08 VITALS
WEIGHT: 219.8 LBS | SYSTOLIC BLOOD PRESSURE: 92 MMHG | BODY MASS INDEX: 33.31 KG/M2 | HEART RATE: 74 BPM | OXYGEN SATURATION: 98 % | DIASTOLIC BLOOD PRESSURE: 58 MMHG | HEIGHT: 68 IN

## 2022-03-08 DIAGNOSIS — I65.23 BILATERAL CAROTID ARTERY STENOSIS: ICD-10-CM

## 2022-03-08 DIAGNOSIS — E78.2 MIXED HYPERLIPIDEMIA: ICD-10-CM

## 2022-03-08 DIAGNOSIS — I25.10 CORONARY ARTERY DISEASE INVOLVING NATIVE CORONARY ARTERY OF NATIVE HEART WITHOUT ANGINA PECTORIS: Primary | ICD-10-CM

## 2022-03-08 DIAGNOSIS — I95.89 OTHER SPECIFIED HYPOTENSION: ICD-10-CM

## 2022-03-08 PROBLEM — I95.9 HYPOTENSION: Status: ACTIVE | Noted: 2022-03-08

## 2022-03-08 PROCEDURE — G8417 CALC BMI ABV UP PARAM F/U: HCPCS | Performed by: NURSE PRACTITIONER

## 2022-03-08 PROCEDURE — G8484 FLU IMMUNIZE NO ADMIN: HCPCS | Performed by: NURSE PRACTITIONER

## 2022-03-08 PROCEDURE — 3017F COLORECTAL CA SCREEN DOC REV: CPT | Performed by: NURSE PRACTITIONER

## 2022-03-08 PROCEDURE — G8427 DOCREV CUR MEDS BY ELIG CLIN: HCPCS | Performed by: NURSE PRACTITIONER

## 2022-03-08 PROCEDURE — 1123F ACP DISCUSS/DSCN MKR DOCD: CPT | Performed by: NURSE PRACTITIONER

## 2022-03-08 PROCEDURE — 4040F PNEUMOC VAC/ADMIN/RCVD: CPT | Performed by: NURSE PRACTITIONER

## 2022-03-08 PROCEDURE — 1036F TOBACCO NON-USER: CPT | Performed by: NURSE PRACTITIONER

## 2022-03-08 PROCEDURE — 99214 OFFICE O/P EST MOD 30 MIN: CPT | Performed by: NURSE PRACTITIONER

## 2022-03-08 NOTE — PROGRESS NOTES
tablet Take 1 tablet by mouth 6 times daily Patient wife stated Every 2 HR  9am-7pm      donepezil (ARICEPT) 5 MG tablet Take 10 mg by mouth nightly Patient takes medication at night      Menatetrenone (VITAMIN K2) 100 MCG TABS Take by mouth daily      cloZAPine (CLOZARIL) 25 MG tablet 25 mg 2 times daily       nitroGLYCERIN (NITROSTAT) 0.4 MG SL tablet Place 1 tablet under the tongue every 5 minutes as needed (prn) 25 tablet 3    Vitamin D, Cholecalciferol, 50 MCG (2000 UT) CAPS Take by mouth      Coenzyme Q10 (COQ10 PO) Take 1 capsule by mouth daily      Carbidopa-Levodopa ER (RYTARY) 36. MG CPCR Take 1 tablet by mouth 4 times daily       primidone (MYSOLINE) 50 MG tablet Take 50 mg by mouth daily      omeprazole (PRILOSEC) 20 MG delayed release capsule Take 20 mg by mouth daily      aspirin 81 MG tablet Take 1 tablet by mouth daily 30 tablet 0    Psyllium 500 MG CAPS Take 6 capsules by mouth daily       fish oil-omega-3 fatty acids 1000 MG capsule Take 2 g by mouth 2 times daily       multivitamin (THERAGRAN) per tablet Take 1 tablet by mouth daily. No current facility-administered medications for this visit. REVIEW OF SYSTEMS:   CONSTITUTIONAL: No major weight gain or loss, fatigue, weakness, night sweats or fever. There's been no change in energy level, sleep pattern, or activity level. HEENT: No new vision difficulties or ringing in the ears. RESPIRATORY: No new SOB, PND, orthopnea or cough. CARDIOVASCULAR: See HPI  GI: No nausea, vomiting, diarrhea, constipation, abdominal pain or changes in bowel habits. : No urinary frequency, urgency, incontinence hematuria or dysuria. SKIN: No cyanosis or skin lesions. MUSCULOSKELETAL: No new muscle or joint pain. NEUROLOGICAL: No syncope or TIA-like symptoms.   PSYCHIATRIC: No anxiety, pain, insomnia or depression    Objective:   PHYSICAL EXAM:        VITALS:  /64   Pulse 74   Ht 5' 8\" (1.727 m)   Wt 219 lb 12.8 oz (99.7 kg)   SpO2 98%   BMI 33.42 kg/m²     CONSTITUTIONAL: Cooperative, no apparent distress, and appears well nourished / developed  NEUROLOGIC:  Awake and orientated to person, place and time. PSYCH: Calm affect. SKIN: Warm and dry. HEENT: Sclera non-icteric, normocephalic, neck supple, no elevation of JVP, normal carotid pulses with no bruits and thyroid normal size. LUNGS:  No increased work of breathing and clear to auscultation, no crackles or wheezing. CARDIOVASCULAR:  Regular rate and rhythm with no murmurs, gallops, rubs, or abnormal heart sounds, normal PMI. The apical impulses not displaced. Heart tones are crisp and normal                                                                        The carotid upstroke is normal in amplitude and contour without delay or bruit    ABDOMEN:  Normal bowel sounds, non-distended and non-tender to palpation   EXT: No edema, no calf tenderness. Pulses are present bilaterally.     DATA:    Lab Results   Component Value Date    ALT 21 06/28/2021    AST 27 06/28/2021    ALKPHOS 96 06/28/2021    BILITOT 0.4 06/28/2021     Lab Results   Component Value Date    CREATININE 0.7 (L) 02/01/2022    BUN 19 02/01/2022     02/01/2022    K 4.1 02/01/2022     02/01/2022    CO2 22 02/01/2022     No results found for: TSH, W7UBXAF, E5UQURW, THYROIDAB  Lab Results   Component Value Date    WBC 6.6 02/01/2022    HGB 14.1 02/01/2022    HCT 42.8 02/01/2022    MCV 91.2 02/01/2022     02/01/2022     No components found for: CHLPL  Lab Results   Component Value Date    TRIG 93 06/28/2021    TRIG 83 12/10/2019    TRIG 90 12/19/2018     Lab Results   Component Value Date    HDL 40 06/28/2021    HDL 39 (L) 12/10/2019    HDL 42 12/19/2018     Lab Results   Component Value Date    LDLCALC 71 06/28/2021    LDLCALC 75 12/10/2019    LDLCALC 84 12/19/2018     Lab Results   Component Value Date    LABVLDL 17 12/10/2019    LABVLDL 18 12/19/2018 LABVLDL 2017     Radiology Review:  Pertinent images / reports were reviewed as a part of this visit and reveals the following:    Last Stress Test: 2019  Summary       No EKG evidence for ischemia with lexiscan       Normal LV size and systolic function.       There is normal isotope uptake at stress and rest. There is no evidence of    myocardial ischemia or scar.         Last Angiogram: 22  LAD stent widely patent  Otherwise only 30 %LAD stenosis     Suspect autonomic insufficiency as cause of symptoms    Last EC22  Sinus rhythm with occasional Premature ventricular complexes  Otherwise normal ECG    Assessment:      Diagnosis Orders   1. Coronary artery disease involving native coronary artery of native heart without angina pectoris   ~ stable. + exertional SOB. Denies angina   ~ [facility] 22 LAD stent widely patent, otherwise only 30% LAD stenosis  ~ GXT 2019 normal uptake. No evidence of ischemia or scar  ~ [facility] 2015 widely patent stent, EF 60%  ~ GXT  normal perfusion  ~ [facility]  patent stent in LAD, non obstructive Cx disease  ~ s/p PCI to LAD   ~ asa / statin    2. Other specified hypotension   ~ 92/58 on exam today  ~ orthostatics negative    3. Bilateral carotid artery stenosis   ~ carotid doppler  MINA 63-62%, 0-22% LICA VL DUP CAROTID BILATERAL   4. Mixed hyperlipidemia   ~ atorvastatin 80   ~ LDL 71 21    5. Parkinson's disease            ~ treated by Dr Tj Gunn at [facility]            ~ had second opinion at [facility]   6. Dizziness           ~ BP low, orthostatics negative in office today           ~ per patient's wife, symptoms thought to be neurogenic in nature per neurologist?     Patient  is stable since hospital discharge. Plan:   1. Repeat carotid duplex   2. Appointment scheduled with Dr Alin Gordon 22    I have addressed the patient's cardiac risk factors and adjusted pharmacologic treatment as needed.  In addition, I have reinforced the need for patient directed risk factor modification. Further evaluation will be based upon the patient's clinical course and testing results. All questions and concerns were addressed to the patient/family (wife, Kusum He)    The patient currently is not smoking. The risks related to smoking were reviewed with the patient. Recommend maintaining a smoke-free lifestyle. Dual Antiplatelet therapy / anti-coagulation has not been recommended / prescribed for this patient. Pt is not on a BB; no MI  Pt is not on an ace-i/ARB; no sHF  Pt is on a statin      Saturated fat diet discussed  Exercise program discussed    Thank you for allowing to us to participate in the care of [employer].       [facility]  Documentation of today's visit sent to PCP HTN (hypertension) Type 2 diabetes mellitus

## 2022-03-24 ENCOUNTER — HOSPITAL ENCOUNTER (OUTPATIENT)
Dept: VASCULAR LAB | Age: 77
Discharge: HOME | End: 2022-03-24
Payer: MEDICARE

## 2022-03-24 DIAGNOSIS — I65.23 BILATERAL CAROTID ARTERY STENOSIS: ICD-10-CM

## 2022-03-24 PROCEDURE — 93880 EXTRACRANIAL BILAT STUDY: CPT

## 2022-03-25 ENCOUNTER — TELEPHONE (OUTPATIENT)
Dept: CARDIOLOGY CLINIC | Age: 77
End: 2022-03-25

## 2022-04-01 ENCOUNTER — OFFICE VISIT (OUTPATIENT)
Dept: INTERNAL MEDICINE CLINIC | Age: 77
End: 2022-04-01
Payer: MEDICARE

## 2022-04-01 ENCOUNTER — HOSPITAL ENCOUNTER (OUTPATIENT)
Dept: GENERAL RADIOLOGY | Age: 77
Discharge: HOME | End: 2022-04-01
Payer: MEDICARE

## 2022-04-01 ENCOUNTER — HOSPITAL ENCOUNTER (OUTPATIENT)
Age: 77
Discharge: HOME | End: 2022-04-01
Payer: MEDICARE

## 2022-04-01 VITALS
TEMPERATURE: 97.2 F | DIASTOLIC BLOOD PRESSURE: 60 MMHG | BODY MASS INDEX: 33.45 KG/M2 | HEART RATE: 84 BPM | OXYGEN SATURATION: 95 % | SYSTOLIC BLOOD PRESSURE: 136 MMHG | WEIGHT: 220 LBS

## 2022-04-01 DIAGNOSIS — R05.9 COUGH: ICD-10-CM

## 2022-04-01 DIAGNOSIS — G20 PARKINSON DISEASE (HCC): ICD-10-CM

## 2022-04-01 DIAGNOSIS — R06.2 WHEEZING: ICD-10-CM

## 2022-04-01 DIAGNOSIS — R09.82 POST-NASAL DRAINAGE: Primary | ICD-10-CM

## 2022-04-01 DIAGNOSIS — E11.9 TYPE 2 DIABETES MELLITUS WITHOUT COMPLICATION, WITHOUT LONG-TERM CURRENT USE OF INSULIN (HCC): ICD-10-CM

## 2022-04-01 PROCEDURE — 71046 X-RAY EXAM CHEST 2 VIEWS: CPT

## 2022-04-01 PROCEDURE — 1123F ACP DISCUSS/DSCN MKR DOCD: CPT | Performed by: NURSE PRACTITIONER

## 2022-04-01 PROCEDURE — G8417 CALC BMI ABV UP PARAM F/U: HCPCS | Performed by: NURSE PRACTITIONER

## 2022-04-01 PROCEDURE — 4040F PNEUMOC VAC/ADMIN/RCVD: CPT | Performed by: NURSE PRACTITIONER

## 2022-04-01 PROCEDURE — 3017F COLORECTAL CA SCREEN DOC REV: CPT | Performed by: NURSE PRACTITIONER

## 2022-04-01 PROCEDURE — 3044F HG A1C LEVEL LT 7.0%: CPT | Performed by: NURSE PRACTITIONER

## 2022-04-01 PROCEDURE — 2022F DILAT RTA XM EVC RTNOPTHY: CPT | Performed by: NURSE PRACTITIONER

## 2022-04-01 PROCEDURE — 1036F TOBACCO NON-USER: CPT | Performed by: NURSE PRACTITIONER

## 2022-04-01 PROCEDURE — G8427 DOCREV CUR MEDS BY ELIG CLIN: HCPCS | Performed by: NURSE PRACTITIONER

## 2022-04-01 PROCEDURE — 99214 OFFICE O/P EST MOD 30 MIN: CPT | Performed by: NURSE PRACTITIONER

## 2022-04-01 RX ORDER — BENZONATATE 100 MG/1
100 CAPSULE ORAL EVERY 6 HOURS PRN
Qty: 30 CAPSULE | Refills: 0 | Status: SHIPPED | OUTPATIENT
Start: 2022-04-01 | End: 2022-04-11

## 2022-04-01 RX ORDER — FLUTICASONE PROPIONATE 50 MCG
2 SPRAY, SUSPENSION (ML) NASAL DAILY
Qty: 16 G | Refills: 5 | Status: SHIPPED | OUTPATIENT
Start: 2022-04-01

## 2022-04-01 RX ORDER — DONEPEZIL HYDROCHLORIDE 10 MG/1
TABLET, FILM COATED ORAL
COMMUNITY
Start: 2022-02-10

## 2022-04-01 ASSESSMENT — ENCOUNTER SYMPTOMS
CHEST TIGHTNESS: 0
SHORTNESS OF BREATH: 0
WHEEZING: 1
COUGH: 1

## 2022-04-01 NOTE — PROGRESS NOTES
SUBJECTIVE:    Patient ID: Luis Jurado is a 78 y.o. male. CC: Sinus drainage, cough    HPI: The patient presents to the office for an acute visit. At his appointment in December, the patient's wife reported sinus drainage. There was no concern for infection at that time. This was felt to be related to seasonal or environmental cause. He was started on Allegra daily which he has been taking as directed. Wife feels the Allegra helped. More recently, sinus drainage has worsened with a lot of nighttime postnasal drip. This further worsened over the past week with coughing. Cough is occasionally productive. She noted some wheezing this morning. There has been no fever. Patient reports he does not feel ill. He has a history of Parkinson's. I asked about swallowing difficulty and she admits he has had some episodes of choking of late.       Current Outpatient Medications   Medication Sig Dispense Refill    donepezil (ARICEPT) 10 MG tablet       fexofenadine (ALLEGRA ALLERGY) 180 MG tablet Take 180 mg by mouth daily      atorvastatin (LIPITOR) 80 MG tablet TAKE 1 TABLET DAILY 90 tablet 3    carbidopa-levodopa (SINEMET)  MG per tablet Take 1 tablet by mouth 6 times daily Patient wife stated Every 2 HR  9am-7pm      Menatetrenone (VITAMIN K2) 100 MCG TABS Take by mouth daily      cloZAPine (CLOZARIL) 25 MG tablet 25 mg 2 times daily       nitroGLYCERIN (NITROSTAT) 0.4 MG SL tablet Place 1 tablet under the tongue every 5 minutes as needed (prn) 25 tablet 3    Vitamin D, Cholecalciferol, 50 MCG (2000 UT) CAPS Take by mouth      Coenzyme Q10 (COQ10 PO) Take 1 capsule by mouth daily      Carbidopa-Levodopa ER (RYTARY) 36. MG CPCR Take 1 tablet by mouth 4 times daily       primidone (MYSOLINE) 50 MG tablet Take 50 mg by mouth daily      omeprazole (PRILOSEC) 20 MG delayed release capsule Take 20 mg by mouth daily      aspirin 81 MG tablet Take 1 tablet by mouth daily 30 tablet 0  Psyllium 500 MG CAPS Take 6 capsules by mouth daily       fish oil-omega-3 fatty acids 1000 MG capsule Take 2 g by mouth 2 times daily       multivitamin (THERAGRAN) per tablet Take 1 tablet by mouth daily. No current facility-administered medications for this visit. Review of Systems   Constitutional: Negative for chills and fever. HENT: Positive for postnasal drip. Respiratory: Positive for cough and wheezing. Negative for chest tightness and shortness of breath. OBJECTIVE:  Physical Exam  Constitutional:       General: He is not in acute distress. Appearance: He is not ill-appearing or toxic-appearing. HENT:      Head: Normocephalic and atraumatic. Pulmonary:      Breath sounds: Wheezing and rhonchi present. Comments: Patient does not appear to be in respiratory distress. He had a occasional cough which seemed mostly dry. On auscultation, he had some loose rhonchi and wheezing of the bilateral lower lobes which improved with coughing and deep breathing exercises. Skin:     General: Skin is warm and dry. Neurological:      Mental Status: He is alert. /60   Pulse 84   Temp 97.2 °F (36.2 °C)   Wt 220 lb (99.8 kg)   SpO2 95%   BMI 33.45 kg/m²      PHQ Scores 6/15/2021 1/28/2021 6/10/2020 11/14/2019 6/11/2019 6/18/2018   PHQ2 Score 0 0 1 0 0 1   PHQ9 Score 0 0 1 0 0 1     Interpretation of Total Score Depression Severity: 1-4 = Minimal depression, 5-9 = Mild depression, 10-14 = Moderate depression, 15-19 = Moderately severe depression, 20-27 =Severe depression        ASSESSMENT/PLAN:  Fátima Farah was seen today for cough. Diagnoses and all orders for this visit:    Post-nasal drainage  - Sinus drainage for over 6 months. This was initially improved on Allegra but seems to be worsening of late  - Add Flonase nasal spray  -     fluticasone (FLONASE) 50 MCG/ACT nasal spray; 2 sprays by Nasal route daily    Cough  - Cough for the past week.   This is intermittently productive. There is no associated fever. He does not feel ill.  - History of Parkinson's and wife admits to some recent choking.  - Will check chest x-ray due to symptoms and history of choking to rule out aspiration, pneumonia. -     XR CHEST (2 VW)  -     benzonatate (TESSALON PERLES) 100 MG capsule; Take 1 capsule by mouth every 6 hours as needed for Cough    Wheezing  - Some expiratory wheezing which improved with coughing and deep breathing  -     XR CHEST (2 VW)    Parkinson disease ([address]  - As above  -     XR CHEST (2 VW)    Type 2 diabetes mellitus without complication, without long-term current use of insulin ([facility])  -At the end of the acute visit, wife brought up the A1c which is drawn at an outside lab a few months ago. -A1c 6.6  -We discussed this is mild diabetes. Options were discussed of healthy diet and regular exercise versus medication treatment. Also offered formal diabetes education.  -Patient does not wish to start medication at this time. He declines diabetic education but will discuss this with his spouse.       KEYSHA Rausch - CNP

## 2022-04-15 ENCOUNTER — OFFICE VISIT (OUTPATIENT)
Dept: INTERNAL MEDICINE CLINIC | Age: 77
End: 2022-04-15
Payer: MEDICARE

## 2022-04-15 VITALS — SYSTOLIC BLOOD PRESSURE: 133 MMHG | HEART RATE: 79 BPM | OXYGEN SATURATION: 89 % | DIASTOLIC BLOOD PRESSURE: 59 MMHG

## 2022-04-15 DIAGNOSIS — R09.82 POST-NASAL DRAINAGE: ICD-10-CM

## 2022-04-15 DIAGNOSIS — R05.9 COUGH: Primary | ICD-10-CM

## 2022-04-15 PROCEDURE — 1036F TOBACCO NON-USER: CPT | Performed by: NURSE PRACTITIONER

## 2022-04-15 PROCEDURE — 4040F PNEUMOC VAC/ADMIN/RCVD: CPT | Performed by: NURSE PRACTITIONER

## 2022-04-15 PROCEDURE — 1123F ACP DISCUSS/DSCN MKR DOCD: CPT | Performed by: NURSE PRACTITIONER

## 2022-04-15 PROCEDURE — 3017F COLORECTAL CA SCREEN DOC REV: CPT | Performed by: NURSE PRACTITIONER

## 2022-04-15 PROCEDURE — G8427 DOCREV CUR MEDS BY ELIG CLIN: HCPCS | Performed by: NURSE PRACTITIONER

## 2022-04-15 PROCEDURE — 99213 OFFICE O/P EST LOW 20 MIN: CPT | Performed by: NURSE PRACTITIONER

## 2022-04-15 PROCEDURE — G8417 CALC BMI ABV UP PARAM F/U: HCPCS | Performed by: NURSE PRACTITIONER

## 2022-04-15 RX ORDER — DOXYCYCLINE HYCLATE 100 MG/1
100 CAPSULE ORAL 2 TIMES DAILY
Qty: 10 CAPSULE | Refills: 0 | Status: SHIPPED | OUTPATIENT
Start: 2022-04-15 | End: 2022-04-20

## 2022-04-15 SDOH — ECONOMIC STABILITY: FOOD INSECURITY: WITHIN THE PAST 12 MONTHS, YOU WORRIED THAT YOUR FOOD WOULD RUN OUT BEFORE YOU GOT MONEY TO BUY MORE.: NEVER TRUE

## 2022-04-15 SDOH — ECONOMIC STABILITY: FOOD INSECURITY: WITHIN THE PAST 12 MONTHS, THE FOOD YOU BOUGHT JUST DIDN'T LAST AND YOU DIDN'T HAVE MONEY TO GET MORE.: NEVER TRUE

## 2022-04-15 ASSESSMENT — ENCOUNTER SYMPTOMS
TROUBLE SWALLOWING: 0
SORE THROAT: 0
SHORTNESS OF BREATH: 0
DIARRHEA: 0
CONSTIPATION: 0
RHINORRHEA: 1
VOMITING: 0
SINUS PRESSURE: 1
EYE PAIN: 0
ABDOMINAL PAIN: 0
COUGH: 1
WHEEZING: 0
NAUSEA: 0

## 2022-04-15 ASSESSMENT — SOCIAL DETERMINANTS OF HEALTH (SDOH): HOW HARD IS IT FOR YOU TO PAY FOR THE VERY BASICS LIKE FOOD, HOUSING, MEDICAL CARE, AND HEATING?: NOT HARD AT ALL

## 2022-04-15 NOTE — PATIENT INSTRUCTIONS
Patient Education        doxycycline (oral/injection)  Pronunciation: DOX jb tyson  Brand:  Acticlate, Adoxa, Alodox, Avidoxy, Doryx, Mondoxyne NL, Monodox, Morgidox,Okebo, Oracea, Oraxyl, Targadox, Vibramycin  What is the most important information I should know about doxycycline? You should not take this medicine if you are allergic to any tetracyclineantibiotic. Children younger than 6years old should use doxycycline only in cases of severe or life-threatening conditions. This medicine can cause permanentyellowing or graying of the teeth in children  Using doxycycline during pregnancy could harm the unborn baby or cause permanent tooth discoloration later in the baby's life. What is doxycycline? Doxycycline is a tetracycline antibiotic that  Doxycycline is used to treat many different bacterial infections, such as acne, urinary tract infections, intestinal infections, eye infections, gonorrhea,chlamydia, periodontitis (gum disease), and others. Doxycycline is also used to treat blemishes, bumps, and acne-like lesionscaused by rosacea. Doxycycline will not treat facial redness caused by rosacea. Some forms of doxycycline are used to prevent malaria, to treat anthrax, or totreat infections caused by mites, ticks, or lice. Doxycycline may also be used for purposes not listed in this medication guide. What should I discuss with my healthcare provider before taking doxycycline? You should not take this medicine if you are allergic to doxycycline or other tetracycline antibiotics such as demeclocycline, minocycline, tetracycline, ortigecycline. Tell your doctor if you have ever had:   liver disease;   kidney disease;   asthma or sulfite allergy;   increased pressure inside your skull; or   if you also take isotretinoin, seizure medicine, or a blood thinner such as warfarin (Coumadin).   If you are using doxycycline to treat gonorrhea, your doctor may test you to make sure you do not also have syphilis, anothersexually transmitted disease. Taking this medicine during pregnancy may affect tooth and bone development in the unborn baby. Taking doxycycline during the last half of pregnancy can cause permanent tooth discoloration later in the baby's life. Tell your doctor if you are pregnant orif you become pregnant. Doxycycline can make birth control pills less effective. Ask your doctor about using a non-hormonal birth control (condom, diaphragm with spermicide) toprevent pregnancy. Doxycycline can pass into breast milk and may affect bone and tooth development in a nursing infant. Do not breastfeed while you are taking doxycycline. Doxycycline can cause permanent yellowing or graying of the teeth in children younger than 6years old. Children should use doxycycline only in cases of severe or life-threatening conditions such as anthrax or [facility] spotted fever. The benefit of treating a serious condition may outweigh any risks tothe child's tooth development. How should I take doxycycline? Follow all directions on your prescription label and read all medication guidesor instruction sheets. Use the medicine exactly as directed. Take doxycycline with a full glass of water. Drink plenty of liquids while youare taking doxycycline. Read and carefully follow any Instructions for Use provided with your medicine. Ask your doctor or pharmacist if you do not understand these instructions. Most brands of doxycyline may be taken with food or milk if the medicine upsetsyour stomach. Different brands of doxycycline may have different instructions about taking them with or without food. Take Oracea on an empty stomach, at least 1 hour before or 2 hours after a meal.  You may need to split a doxycycline tablet to get the correct dose. Follow yourdoctor's instructions. Swallow a delayed-release capsule or tablet whole. Do not crush, chew, break, or open it.   Measure liquid medicine  with the dosing syringe provided, or with a special dose-measuring spoon or medicine cup. If you do not have a dose-measuring device, ask your pharmacistfor one. If you take doxycycline to prevent malaria: Start taking the medicine 1 or 2 days before entering an area where malaria is common. Continue taking the medicine every day during your stay and for atleast 4 weeks after you leave the area. Doxycycline is usually given by injection only if you are unable to take the medicine by mouth. A healthcare providerwill give you this injection as an infusion into a vein. Use this medicine for the full prescribed length of time, even if your symptoms quickly improve. Skipping doses can increase your risk of infection that is resistant to medication. Doxycycline will not treat a viral infection such asthe flu or a common cold. Store at room temperature away from moisture, heat, and light. Throw away any unused medicine after the expiration date on the label has passed. Using  doxycycline can cause damage to your kidneys. What happens if I miss a dose? Take the medicine as soon as you can, but skip the missed dose if it is almost time for your next dose. Do not take two doses at one time. What happens if I overdose? Seek emergency medical attention or call the Poison Help line at 1-635.443.1391. What should I avoid while taking doxycycline? Do not take iron supplements, multivitamins, calcium supplements, antacids, orlaxatives within 2 hours before or after taking doxycycline. Avoid taking any other antibiotics with doxycycline unless your doctor has told you to. Doxycycline could make you sunburn more easily. Avoid sunlight or tanning beds. Wear protective clothing and use sunscreen (SPF 30 or higher) when you areoutdoors. Antibiotic medicines can cause diarrhea, which may be a sign of a new infection. If you have diarrhea that is watery or bloody, call your doctor.  Do not use anti-diarrhea medicine unless your doctor tells you to. What are the possible side effects of doxycycline? Get emergency medical help if you have signs of an allergic reaction (hives, difficult breathing, swelling in your face or throat) or a severe skin reaction (fever, sore throat, burning in your eyes, skin pain, red or purple skin rashthat spreads and causes blistering and peeling). Seek medical treatment if you have a serious drug reaction that can affect many parts of your body. Symptoms may include: skin rash, fever, swollen glands, flu-like symptoms, muscle aches, severe weakness, unusual bruising, or yellowing of your skin oreyes. This reaction may occur several weeks after you began using doxycycline. Call your doctor at once if you have:   severe stomach pain, diarrhea that is watery or bloody;   throat irritation, trouble swallowing;   chest pain, irregular heart rhythm, feeling short of breath;   little or no urination;   low white blood cell counts --fever, chills, swollen glands, body aches, weakness, pale skin, easy bruising or bleeding;   increased pressure inside the skull --severe headaches, ringing in your ears, dizziness, nausea, vision problems, pain behind your eyes; or   signs of liver or pancreas problems --loss of appetite, upper stomach pain (that may spread to your back), tiredness, nausea or vomiting, fast heart rate, dark urine, jaundice (yellowing of the skin or eyes). Common side effects may include:   nausea, vomiting, upset stomach, loss of appetite;   mild diarrhea;   skin rash or itching;   darkened skin color; or   vaginal itching or discharge. This is not a complete list of side effects and others may occur. Call your doctor for medical advice about side effects. You may report side effects toFDA at 2-503-OCD-7751. What other drugs will affect doxycycline? Sometimes it is not safe to use certain medications at the same time.  Some drugs can affect your blood levels of other drugs you take, which mayincrease side effects or make the medications less effective. Other drugs may affect doxycycline, including prescription and over-the-counter medicines, vitamins, and herbal products. Tell your doctor about all yourcurrent medicines and any medicine you start or stop using. Where can I get more information? Your pharmacist can provide more information about doxycycline. Remember, keep this and all other medicines out of the reach of children, never share your medicines with others, and use this medication only for the indication prescribed. Every effort has been made to ensure that the information provided by [address] is accurate, up-to-date, and complete, but no guarantee is made to that effect. Drug information contained herein may be time sensitive. [facility] information has been compiled for use by healthcare practitioners and consumers in the [place] and therefore [facility] does not warrant that uses outside of the [place] are appropriate, unless specifically indicated otherwise. [facility]'s drug information does not endorse drugs, diagnose patients or recommend therapy. [facility] [employer] drug information is an informational resource designed to assist licensed healthcare practitioners in caring for their patients and/or to serve consumers viewing this service as a supplement to, and not a substitute for, the expertise, skill, knowledge and judgment of healthcare practitioners. The absence of a warning for a given drug or drug combination in no way should be construed to indicate that the drug or drug combination is safe, effective or appropriate for any given patient. [facility] does not assume any responsibility for any aspect of healthcare administered with the aid of information [facility] provides. The information contained herein is not intended to cover all possible uses, directions, precautions, warnings, drug interactions, allergic reactions, or adverse effects.  If you have questions about the drugs you are taking, check with yourdoctor, nurse or pharmacist.  Copyright 3654-7126 167  Seth: 21.04. Revision date:11/4/2020. Care instructions adapted under license by [facility] ([facility]). If you have questions about a medical condition or this instruction, always ask your healthcare professional. [address] any warranty or liability for your use of this information.

## 2022-04-15 NOTE — PROGRESS NOTES
TELEHEALTH EVALUATION -- Audio/Visual (During PXVHY-33 public health emergency)  Acute Office Visit  4/15/2022    SUBJECTIVE:    Patient ID: Lori Cowan is a 78 y.o. male. Chief Complaint   Patient presents with    Cough     x 17 days, productive yellow brown, no fever,      HPI: The patient presents for an acute visit. Pt reports a cough for weeks. Symptoms worsened. Cough is now productive. Was having a fever 100.8 about two weeks ago-Fever has since resolved. Nasal congestion present as well- sinus pressure. Denies sore throat. Denies fevers or chills. Denies N/V, diarrhea or abdominal pain. Denies CP, SOB or wheezing. Treatment tried and response - benzonatate   Vaccinated for COVID-19. Reports he completed a home COVID-19 test, which was negative. Recent ill contacts? No  Tobacco use or second hand smoke exposure - No    Pt's wife present and helping to provide history.     Allergies   Allergen Reactions    Amoxicillin Hives    Amoxicillin-Pot Clavulanate     Sulfasuccinamide      Other reaction(s): opthalmic - conjunctivitis    Tamsulosin Angioedema and Diarrhea     Current Outpatient Medications   Medication Sig Dispense Refill    doxycycline hyclate (VIBRAMYCIN) 100 MG capsule Take 1 capsule by mouth 2 times daily for 5 days 10 capsule 0    donepezil (ARICEPT) 10 MG tablet       fluticasone (FLONASE) 50 MCG/ACT nasal spray 2 sprays by Nasal route daily 16 g 5    fexofenadine (ALLEGRA ALLERGY) 180 MG tablet Take 180 mg by mouth daily      atorvastatin (LIPITOR) 80 MG tablet TAKE 1 TABLET DAILY 90 tablet 3    carbidopa-levodopa (SINEMET)  MG per tablet Take 1 tablet by mouth 6 times daily Patient wife stated Every 2 HR  9am-7pm      Menatetrenone (VITAMIN K2) 100 MCG TABS Take by mouth daily      cloZAPine (CLOZARIL) 25 MG tablet 25 mg 2 times daily       nitroGLYCERIN (NITROSTAT) 0.4 MG SL tablet Place 1 tablet under the tongue every 5 minutes as needed (prn) 25 tablet 3    Vitamin D, Cholecalciferol, 50 MCG (2000 UT) CAPS Take by mouth      Coenzyme Q10 (COQ10 PO) Take 1 capsule by mouth daily      Carbidopa-Levodopa ER (RYTARY) 36. MG CPCR Take 1 tablet by mouth 4 times daily       primidone (MYSOLINE) 50 MG tablet Take 50 mg by mouth daily      omeprazole (PRILOSEC) 20 MG delayed release capsule Take 20 mg by mouth daily      aspirin 81 MG tablet Take 1 tablet by mouth daily 30 tablet 0    Psyllium 500 MG CAPS Take 6 capsules by mouth daily       fish oil-omega-3 fatty acids 1000 MG capsule Take 2 g by mouth 2 times daily       multivitamin (THERAGRAN) per tablet Take 1 tablet by mouth daily. No current facility-administered medications for this visit. Review of Systems   Constitutional: Negative for appetite change, chills, diaphoresis, fatigue and fever. HENT: Positive for congestion, rhinorrhea and sinus pressure. Negative for drooling, ear discharge, ear pain, hearing loss, sneezing, sore throat and trouble swallowing. Eyes: Negative for pain and visual disturbance. Respiratory: Positive for cough. Negative for shortness of breath and wheezing. Cardiovascular: Negative for chest pain and palpitations. Gastrointestinal: Negative for abdominal pain, constipation, diarrhea, nausea and vomiting. Skin: Negative for pallor. Neurological: Negative for dizziness, light-headedness and headaches. OBJECTIVE:  Video Virtual Visit  Patient-Reported Vitals 4/15/2022   Patient-Reported Weight 210   Patient-Reported Height 5'8'   Patient-Reported Systolic 652   Patient-Reported Diastolic 59   Patient-Reported Pulse 79   Patient-Reported Temperature 98.0   Patient-Reported SpO2 90    ^no access to other VS d/t VV per pt. Physical Exam  Constitutional:       General: He is not in acute distress. Appearance: Normal appearance. He is not ill-appearing. Pulmonary:      Effort: Pulmonary effort is normal. No respiratory distress. Skin:     Coloration: Skin is not jaundiced or pale. Neurological:      Mental Status: He is alert. Comments: No facial asymmetry noted   Psychiatric:         Mood and Affect: Mood normal.     Due to this being a TeleHealth encounter, evaluation of the following organ systems is limited: Vitals/Constitutional/EENT/Resp/CV/GI//MS/Neuro/Skin/Heme-Lymph-Imm. ASSESSMENT/PLAN:  Tyra Landin was seen today for cough. Diagnoses and all orders for this visit:    Cough/Post-nasal drainage  -     Last PCP note reviewed. Appears patient started with postnasal drip symptoms in December and they were thought to be related to allergies. He was started on Allegra and his symptoms improved. However, 2 weeks ago he was reevaluated for cough for 1 week. A chest x-ray was completed and showed no acute concerns. Since, his symptoms have worsened. - Sinus symptoms present as well as cough. - Reviewed viral vs bacterial with pt and wife. Symptoms for 17 days and stable, not improving despite symptomatic management.  - Recommend ATB. Risks vs benefits reviewed. Side effects reviewed. Pt/family agreeable. - History of parkinsons- He is at risk of aspiration. Allergy to PCN. Has been treated with Nelma Ko in the past without relief. Will treat with doxycyline. Pt/wife agreeable. - Symptomatic management reviewed. - doxycycline hyclate (VIBRAMYCIN) 100 MG capsule; Take 1 capsule by mouth 2 times daily for 5 days - patient education handout provided and reviewed with the pt. - Pt will call if symptoms worsen or fail to improve  - Red flag warning signs reviewed with the pt and he will go to the ER if these occur. Return for as previously scheduled or sooner if needed. Cony Chawla, was evaluated through a synchronous (real-time) audio-video encounter. The patient (or guardian if applicable) is aware that this is a billable service, which includes applicable co-pays.  This Virtual Visit was conducted with patient's (and/or legal guardian's) consent. The visit was conducted pursuant to the emergency declaration under the [address] [place] [address] authority and the "[employer] and [employer] General Act. Patient identification was verified, and a caregiver was present when appropriate. The patient was located in a state where the provider was licensed to provide care. Consent:  He and/or health care decision maker is aware that that he may receive a bill for this virtual service, depending on his insurance coverage, and has provided verbal consent to proceed: Yes    Patient identification was verified at the start of the visit: Yes    Total time spent on this encounter: Not billed by time    Services were provided through a video synchronous discussion virtually to substitute for in-person clinic visit. Patient and provider were located at their individual homes. All questions answered. Pt states no further questions or concerns at this time.    Electronically signed by: KEYSHA Dean CNP 04/15/22

## 2022-04-29 NOTE — PROGRESS NOTES
[address]   Cardiac Follow up    Referring Provider:  KEYSHA Rocha CNP     Chief Complaint   Patient presents with    Coronary Artery Disease    Hyperlipidemia    Hypertension      History of Present Illness:  Mr. Tonja Keith is a 78 y.o. gentleman. His history includes CAD with PCI in 2005, hypertension, and hyperlipidemia. He had a cardiac angiogram in 2015 that showed normal LV function and EF 60%. Coronary stent widely patent. He has Parkinsons, treated by Dr. Waleska Moura [facility]); he has been evaluated by the [facility]. Today, he is here for 3 mos follow up s/p cath 2/1/22. He states he has not been out to golf yet, due to weather. He is with his wife. A couple evenings ago he had CP that was relieved by nitro within 5 minutes, only 1 other episode of CP since last being seen by me. Chest pain could be possible coronary spasm? Pt reports slightly dizzy when he first stands up but resolves quickly. Past Medical History:   has a past medical history of Acute bronchitis, CAD (coronary artery disease), Clostridioides difficile infection, and Hyperlipidemia. Also parkinson disease    Surgical History:   has a past surgical history that includes Diagnostic Cardiac Cath Lab Procedure (2005) and Coronary angioplasty with stent (2005). Social History:   reports that he quit smoking about 42 years ago. He has a 2.50 pack-year smoking history. He has never used smokeless tobacco. He reports that he does not drink alcohol and does not use drugs. Family History:  family history includes Cancer in his mother and sister; Diabetes in his sister and sister; Other in his brother, brother, and father. He was adopted.      Home Medications:  Prior to Admission medications      Current Outpatient Medications   Medication Sig Dispense Refill    donepezil (ARICEPT) 10 MG tablet       fluticasone (FLONASE) 50 MCG/ACT nasal spray 2 sprays by Nasal route daily 16 g 5    fexofenadine (ALLEGRA ALLERGY) 180 MG tablet Take 180 mg by mouth daily      atorvastatin (LIPITOR) 80 MG tablet TAKE 1 TABLET DAILY 90 tablet 3    carbidopa-levodopa (SINEMET)  MG per tablet Take 1 tablet by mouth 6 times daily Patient wife stated Every 2 HR  9am-7pm      Menatetrenone (VITAMIN K2) 100 MCG TABS Take by mouth daily      cloZAPine (CLOZARIL) 25 MG tablet 25 mg 2 times daily       nitroGLYCERIN (NITROSTAT) 0.4 MG SL tablet Place 1 tablet under the tongue every 5 minutes as needed (prn) 25 tablet 3    Vitamin D, Cholecalciferol, 50 MCG (2000 UT) CAPS Take by mouth      Coenzyme Q10 (COQ10 PO) Take 1 capsule by mouth daily      Carbidopa-Levodopa ER (RYTARY) 36. MG CPCR Take 1 tablet by mouth 4 times daily       primidone (MYSOLINE) 50 MG tablet Take 50 mg by mouth daily      omeprazole (PRILOSEC) 20 MG delayed release capsule Take 20 mg by mouth daily      aspirin 81 MG tablet Take 1 tablet by mouth daily 30 tablet 0    Psyllium 500 MG CAPS Take 6 capsules by mouth daily       fish oil-omega-3 fatty acids 1000 MG capsule Take 2 g by mouth 2 times daily       multivitamin (THERAGRAN) per tablet Take 1 tablet by mouth daily. No current facility-administered medications for this visit. Allergies:  Amoxicillin, Amoxicillin-pot clavulanate, Sulfasuccinamide, and Tamsulosin     Review of Systems:   · Constitutional: there has been no unanticipated weight loss. There's been no change in energy level, sleep pattern, or activity level. · Eyes: No visual changes or diplopia. No scleral icterus. · ENT: No Headaches, hearing loss or vertigo. No mouth sores or sore throat. · Cardiovascular: Reviewed in HPI  · Respiratory: No cough or wheezing, no sputum production. No hematemesis. · Gastrointestinal: No abdominal pain, appetite loss, blood in stools. No change in bowel or bladder habits. · Genitourinary: No dysuria, trouble voiding, or hematuria.   · Musculoskeletal: No gait disturbance, weakness or joint complaints. · Integumentary: No rash or pruritis. · Neurological: No headache, diplopia, change in muscle strength, numbness or tingling. No change in gait, balance, coordination, mood, affect, memory, mentation, behavior. · Psychiatric: No anxiety, no depression. · Endocrine: No malaise, fatigue or temperature intolerance. No excessive thirst, fluid intake, or urination. No tremor. · Hematologic/Lymphatic: No abnormal bruising or bleeding, blood clots or swollen lymph nodes. · Allergic/Immunologic: No nasal congestion or hives. Physical Examination:    Blood pressure 96/60, pulse 69, height 5' 8.5\" (1.74 m), weight 214 lb 4.8 oz (97.2 kg), SpO2 96 %. Constitutional and General Appearance: in NAD  Skin:good turgor,intact without lesions  HEENT: EOMI ,normal  Neck:no JVD  Respiratory:  · Normal excursion and expansion without use of accessory muscles  · Resp Auscultation: Normal breath sounds without dullness  Cardiovascular:  · The apical impulses not displaced  · Heart tones are crisp and normal  · Cervical veins are not engorged  · The carotid upstroke is normal in amplitude and contour without delay. No bruit heard on physical.   · Peripheral pulses are symmetrical and full  · There is no clubbing, cyanosis of the extremities. · No edema  · Femoral Arteries: 2+ and equal  · Pedal Pulses: 2+ and equal   Abdomen:  · No masses or tenderness  · Liver/Spleen: No Abnormalities Noted  Neurological/Psychiatric:  · Alert and oriented in all spheres  · Moves all extremities well  · Exhibits normal gait balance and coordination  · No abnormalities of mood, affect, memory, mentation, or behavior are noted  · Positive for tremors    [facility] 2/1/22  Normal EF  LAD stent widely patent  Otherwise only 30 %LAD stenosis    Assessment:    1. Coronary atherosclerosis of native coronary artery: History of occasional chest pain.  One episode of sharp chest pain radiating to right arm, relieve dby Nitro today. Reports episodes can be weeks apart, or even months. -EKG 1/17/22 (read & interpreted by me)> NSR with lateral ischemia   -Cath 2005> stent in LAD  -Cath 2006> Patent stent in lad -nonobstructive circumflex disease. -GXT Myoview 2010> Normal perfusion. An angiogram was normal.   -Cath 4/2015> Normal LV function with EF 60, widely patent LAD stent, 30% distal CFx. -  nuclear stress test to evaluate CP   -Lexiscan karina 7/17/19> Normal LV size and systolic function. No evidence of ischemia. -ECHO 1/15/10: EF 55%, mild mitral and aortic insufficiency     2. Hypertension: Controlled today. Some positional dizziness, brief. Blood pressure 96/60, pulse 69, height 5' 8.5\" (1.74 m), weight 214 lb 4.8 oz (97.2 kg), SpO2 96 %. sometimes BP drops per wife  Recheck BP by me standing 96/60 , then sitting sbp 110     3. Hyperlipemia: Currently on Lipitor 80mg and fish oil. 6/2021>  TG 93 HDL 40 LDL 71     4. Carotid disease: Stable  Dopplers 2011> 95-80% MINA, 5-58% LICA. Dopplers 3/2022> There is <50% stenosis of the right internal carotid arteries. No hemodynamically significant carotid stenosis noted on left side. Vertebral flow are antegrade bilaterally. 5. Parkinson disease: He is treated by Dr. Hermelinda Vaz at [facility] and had a second opinion at [facility]. 6.  Tremors, chronic: Essential, familial.        Plan:   Cardiac test and lab results personally reviewed by me during this office visit and discussed. Could consider compression stockings if he prefers rather than drinking more  It is ok to eat salt and stay well hydrated  Follow up in 6 mos  Call if any new or worsening symptoms present      Queta Ham M.D., [facility] [place]    Patient's problem list, medications, allergies, past medical, surgical, social and family histories were reviewed and updated as appropriate. Scribe's attestation:  This note was scribed in the presence of Dr Paz Ham by Frances Byers, RN.      The scribe's documentation has been prepared under my direction and personally reviewed by me in its entirety. I confirm that the note above accurately reflects all work, treatment, procedures, and medical decision making performed by me.

## 2022-05-04 ENCOUNTER — OFFICE VISIT (OUTPATIENT)
Dept: CARDIOLOGY CLINIC | Age: 77
End: 2022-05-04
Payer: MEDICARE

## 2022-05-04 VITALS
WEIGHT: 214.3 LBS | BODY MASS INDEX: 31.74 KG/M2 | DIASTOLIC BLOOD PRESSURE: 60 MMHG | OXYGEN SATURATION: 96 % | SYSTOLIC BLOOD PRESSURE: 96 MMHG | HEART RATE: 69 BPM | HEIGHT: 69 IN

## 2022-05-04 DIAGNOSIS — I10 PRIMARY HYPERTENSION: ICD-10-CM

## 2022-05-04 DIAGNOSIS — I65.23 BILATERAL CAROTID ARTERY STENOSIS: Chronic | ICD-10-CM

## 2022-05-04 DIAGNOSIS — I25.10 CORONARY ARTERY DISEASE INVOLVING NATIVE CORONARY ARTERY OF NATIVE HEART WITHOUT ANGINA PECTORIS: Primary | ICD-10-CM

## 2022-05-04 DIAGNOSIS — G20 PARKINSON DISEASE (HCC): Chronic | ICD-10-CM

## 2022-05-04 DIAGNOSIS — E78.2 MIXED HYPERLIPIDEMIA: ICD-10-CM

## 2022-05-04 PROCEDURE — 1036F TOBACCO NON-USER: CPT | Performed by: INTERNAL MEDICINE

## 2022-05-04 PROCEDURE — 4040F PNEUMOC VAC/ADMIN/RCVD: CPT | Performed by: INTERNAL MEDICINE

## 2022-05-04 PROCEDURE — 99214 OFFICE O/P EST MOD 30 MIN: CPT | Performed by: INTERNAL MEDICINE

## 2022-05-04 PROCEDURE — G8417 CALC BMI ABV UP PARAM F/U: HCPCS | Performed by: INTERNAL MEDICINE

## 2022-05-04 PROCEDURE — 1123F ACP DISCUSS/DSCN MKR DOCD: CPT | Performed by: INTERNAL MEDICINE

## 2022-05-04 PROCEDURE — G8427 DOCREV CUR MEDS BY ELIG CLIN: HCPCS | Performed by: INTERNAL MEDICINE

## 2022-05-04 PROCEDURE — 3017F COLORECTAL CA SCREEN DOC REV: CPT | Performed by: INTERNAL MEDICINE

## 2022-05-04 NOTE — PATIENT INSTRUCTIONS
Follow up in 6 mos  Call if any new or worsening symptoms present  It is ok to eat salt and stay well hydrated  Could consider compression stockings, if he prefers rather than drinking more

## 2022-07-05 ENCOUNTER — TELEPHONE (OUTPATIENT)
Dept: INTERNAL MEDICINE CLINIC | Age: 77
End: 2022-07-05

## 2022-07-05 NOTE — TELEPHONE ENCOUNTER
Treat with preferred cold and cough formulas such as Tylenol cold and cough. If symptoms severe, recommend ER visit. If not severe, ok for virtual visit later today or tomorrow when able.

## 2022-07-05 NOTE — TELEPHONE ENCOUNTER
Pt is scheduled for VV. Advised pts wife to go to the ED is symptoms get severe, watch O2, he was at 88%. Pt has nasal drip and coughing up yellowish green phlegm. She is not going to use tylenol could and cough for now.

## 2022-07-05 NOTE — TELEPHONE ENCOUNTER
Pt's wife calling, states pt tested positive for covid yesterday 7/4 and he is about 3 days into symptoms. Symptoms started with a \"drippy nose\", confusion, dizziness and a fever of 101.5. HR was at 140 and /54. Wife states she has been giving pt tylenol or ibuprofen every 4 hours and that has broke his fever. Pt did start to have chills with fever. Pt's wife also concerned he may have a UTI. [facility] pt had urinary incontinence multiple times throughout the night and that is not normal for him. Offered VV but pt is currently in [place] and will be back this afternoon around 3, \[facility]\"" service is spotty in some locations. Please call.

## 2022-07-06 ENCOUNTER — TELEMEDICINE (OUTPATIENT)
Dept: INTERNAL MEDICINE CLINIC | Age: 77
End: 2022-07-06
Payer: MEDICARE

## 2022-07-06 DIAGNOSIS — J98.8 RESPIRATORY INFECTION: ICD-10-CM

## 2022-07-06 DIAGNOSIS — U07.1 COVID-19: Primary | ICD-10-CM

## 2022-07-06 DIAGNOSIS — N30.00 ACUTE CYSTITIS WITHOUT HEMATURIA: ICD-10-CM

## 2022-07-06 PROCEDURE — 1036F TOBACCO NON-USER: CPT | Performed by: NURSE PRACTITIONER

## 2022-07-06 PROCEDURE — G8427 DOCREV CUR MEDS BY ELIG CLIN: HCPCS | Performed by: NURSE PRACTITIONER

## 2022-07-06 PROCEDURE — 3017F COLORECTAL CA SCREEN DOC REV: CPT | Performed by: NURSE PRACTITIONER

## 2022-07-06 PROCEDURE — 1123F ACP DISCUSS/DSCN MKR DOCD: CPT | Performed by: NURSE PRACTITIONER

## 2022-07-06 PROCEDURE — G8417 CALC BMI ABV UP PARAM F/U: HCPCS | Performed by: NURSE PRACTITIONER

## 2022-07-06 PROCEDURE — 99214 OFFICE O/P EST MOD 30 MIN: CPT | Performed by: NURSE PRACTITIONER

## 2022-07-06 RX ORDER — BEBTELOVIMAB 87.5 MG/ML
175 INJECTION, SOLUTION INTRAVENOUS ONCE
Qty: 2 ML | Refills: 0
Start: 2022-07-06 | End: 2022-07-06

## 2022-07-06 RX ORDER — ONDANSETRON 2 MG/ML
8 INJECTION INTRAMUSCULAR; INTRAVENOUS
OUTPATIENT
Start: 2022-07-06

## 2022-07-06 RX ORDER — LEVOFLOXACIN 500 MG/1
500 TABLET, FILM COATED ORAL DAILY
Qty: 7 TABLET | Refills: 0 | Status: SHIPPED | OUTPATIENT
Start: 2022-07-06 | End: 2022-07-13

## 2022-07-06 RX ORDER — EPINEPHRINE 1 MG/ML
0.3 INJECTION, SOLUTION, CONCENTRATE INTRAVENOUS PRN
OUTPATIENT
Start: 2022-07-06

## 2022-07-06 RX ORDER — SODIUM CHLORIDE 9 MG/ML
INJECTION, SOLUTION INTRAVENOUS CONTINUOUS
OUTPATIENT
Start: 2022-07-06

## 2022-07-06 RX ORDER — ALBUTEROL SULFATE 90 UG/1
4 AEROSOL, METERED RESPIRATORY (INHALATION) PRN
OUTPATIENT
Start: 2022-07-06

## 2022-07-06 RX ORDER — DIPHENHYDRAMINE HYDROCHLORIDE 50 MG/ML
50 INJECTION INTRAMUSCULAR; INTRAVENOUS
OUTPATIENT
Start: 2022-07-06

## 2022-07-06 RX ORDER — ACETAMINOPHEN 325 MG/1
650 TABLET ORAL
OUTPATIENT
Start: 2022-07-06

## 2022-07-06 NOTE — PROGRESS NOTES
2022    TELEHEALTH EVALUATION -- Audio/Visual (During ZMRTE-58 public health emergency)    HPI:    Amada Odom (:  1945) has requested an audio/video evaluation for the following concern(s):    Started with dripping nose on . On Monday, wife noticed some mild confusion and he started getting chills. Wife checked temp 101.5. Took home COVID test which was positive. Overnight episodes of urinary incontinence and developed productive cough of yellow/green/gray phlegm. Seven episodes of incontinence which is unusual.    He has rigors and wife giving him Tylenol. Pulse ox 88%  Pulse: 96  R: 28    Patient reports feeling tired,   No rash   No hematuria, dysuria. Review of Systems   Constitutional: Positive for chills, fatigue and fever. Respiratory: Positive for cough. Cardiovascular: Negative. Gastrointestinal: Negative. Genitourinary:        Incont   Musculoskeletal: Negative. Neurological: Negative. Psychiatric/Behavioral: Negative. Prior to Visit Medications    Medication Sig Taking?  Authorizing Provider   donepezil (ARICEPT) 10 MG tablet  Yes Historical Provider, MD   fluticasone (FLONASE) 50 MCG/ACT nasal spray 2 sprays by Nasal route daily Yes KEYSHA Goldstein - CNP   fexofenadine (ALLEGRA ALLERGY) 180 MG tablet Take 180 mg by mouth daily Yes Historical Provider, MD   atorvastatin (LIPITOR) 80 MG tablet TAKE 1 TABLET DAILY Yes Shalom Whitley MD   carbidopa-levodopa (SINEMET)  MG per tablet Take 1 tablet by mouth 6 times daily Patient wife stated Every 2 HR  9am-7pm Yes Historical Provider, MD   Menatetrenone (VITAMIN K2) 100 MCG TABS Take by mouth daily Yes Historical Provider, MD   cloZAPine (CLOZARIL) 25 MG tablet 25 mg 2 times daily  Yes Historical Provider, MD   nitroGLYCERIN (NITROSTAT) 0.4 MG SL tablet Place 1 tablet under the tongue every 5 minutes as needed (prn) Yes Shalom Whitley MD   Vitamin D, Cholecalciferol, 50 MCG ( UT) CAPS Take by mouth Yes Historical Provider, MD   Coenzyme Q10 (COQ10 PO) Take 1 capsule by mouth daily Yes Historical Provider, MD   Carbidopa-Levodopa ER (RYTARY) 36. MG CPCR Take 1 tablet by mouth 4 times daily  Yes Historical Provider, MD   primidone (MYSOLINE) 50 MG tablet Take 50 mg by mouth daily Yes Historical Provider, MD   omeprazole (PRILOSEC) 20 MG delayed release capsule Take 20 mg by mouth daily Yes Historical Provider, MD   aspirin 81 MG tablet Take 1 tablet by mouth daily Yes Laurel Steve MD   Psyllium 500 MG CAPS Take 6 capsules by mouth daily  Yes Historical Provider, MD   fish oil-omega-3 fatty acids 1000 MG capsule Take 2 g by mouth 2 times daily  Yes Historical Provider, MD   multivitamin [facility]) per tablet Take 1 tablet by mouth daily. Yes Historical Provider, MD       Social History     Tobacco Use    Smoking status: Former Smoker     Packs/day: 0.25     Years: 10.00     Pack years: 2.50     Quit date:      Years since quittin.5    Smokeless tobacco: Never Used    Tobacco comment: states he smokes a cigar once a month or once every six weeks   Vaping Use    Vaping Use: Never used   Substance Use Topics    Alcohol use: No     Comment: occas.  Drug use: No            PHYSICAL EXAMINATION:  Vital Signs: (As obtained by patient/caregiver or practitioner observation)    Blood pressure-  Heart rate-    Respiratory rate-    Temperature-  Pulse oximetry-     Constitutional: [x] Appears well-developed and well-nourished [] No apparent distress      [x] Abnormal- rigors  Mental status  [x] Alert and awake  [] Oriented to person/place/time [x]Able to follow commands      Eyes:  EOM    [x]  Normal  [] Abnormal-  Sclera  [x]  Normal  [] Abnormal -         Discharge [x]  None visible  [] Abnormal -    HENT:   [x] Normocephalic, atraumatic.   [] Abnormal   [x] Mouth/Throat: Mucous membranes are moist.     External Ears [x] Normal  [] Abnormal-     Neck: [x] No visualized mass Pulmonary/Chest: [x] Respiratory effort normal.  [x] No visualized signs of difficulty breathing or respiratory distress        [] Abnormal-      Musculoskeletal:   [x] Normal gait with no signs of ataxia         [x] Normal range of motion of neck        [] Abnormal-       Neurological:        [x] No Facial Asymmetry (Cranial nerve 7 motor function) (limited exam to video visit)          [x] No gaze palsy        [] Abnormal-         Skin:        [x] No significant exanthematous lesions or discoloration noted on facial skin         [] Abnormal-            Psychiatric:       [x] Normal Affect [x] No Hallucinations        [] Abnormal-     Other pertinent observable physical exam findings-         ASSESSMENT/PLAN:  1. COVID-19  - COVID-19  - bebtelovimab 175 MG/2ML SOLN; Infuse 2 mLs intravenously once for 1 dose  Dispense: 2 mL; Refill: 0    2. Acute cystitis without hematuria  - levoFLOXacin (LEVAQUIN) 500 MG tablet; Take 1 tablet by mouth daily for 7 days  Dispense: 7 tablet; Refill: 0    3. Respiratory infection  - levoFLOXacin (LEVAQUIN) 500 MG tablet; Take 1 tablet by mouth daily for 7 days  Dispense: 7 tablet; Refill: 0      - 4 day history of illness, worsening  - Fever & rigors. Tylenol helping  - Home COVID test +  - Spouse also concerned about possible UTI as 7 episodes of incontinence is unusual  - He has sat high 80's-90's  - Coughing up discolored phlegm. pneumonia would be in the differential    - Discussed limitations of video visits without physical exam.  Spouse does not feel he needs to be hospitalized. Patient looks ill but non-toxic. Advised if he is worsening will need ER evaluation.  - He is at risk for worsening with COVID. Symptoms less than 5 days which makes outpatient treatment for COVID appropriate.   -Use of Paxlovid is problematic due to his Parkinson's treatments. There is interaction with atorvastatin, primidone, but most problematic would be clozapine.   Reviewing medication literature recommends alternative treatment for patients on clozapine.    -Spoke to [facility] infusion center. They do not have medication in stock.  -Spoke to [facility]. They state closest medication would be at [facility] to [facility] infusion center (x2). They have bebtelovimab in stock and are amenable to treating the patient but protocol dictates the patient have a positive PCR test  -Spoke to [facility] internal medicine and they can accommodate patient for a PCR test today    -Plan is to obtain a PCR test today and hopefully infusion with bebtelovimab later today or tomorrow. I am going to place the patient on antibiotic for possible concomitant UTI or respiratory infection/pneumonia. He has amoxicillin allergy and interaction between his existing medication and Cipro so I have chosen Levaquin. Spouse has been advised to take him to the hospital if his symptoms are worsening. No follow-ups on file. Carmen Gamboa, was evaluated through a synchronous (real-time) audio-video encounter. The patient (or guardian if applicable) is aware that this is a billable service, which includes applicable co-pays. This Virtual Visit was conducted with patient's (and/or legal guardian's) consent. The visit was conducted pursuant to the emergency declaration under the [facility], [address] [place] [address] authority and the [employer] and [employer] General Act. Patient identification was verified, and a caregiver was present when appropriate. The patient was located at Home: [address] [place], [place] [id number]. Provider was located at Home ([place] [address]): [place]. Total time spent on this encounter: 70m     More than half the visit time was spent counseling the patient or family and in care coordination.   Total face-to-face time was: 20 minutes, care coordination was: 50 minutes      --Selam Tierney Nell Rader CNP on 7/6/2022 at 8:19 AM    An electronic signature was used to authenticate this note.

## 2022-07-07 ENCOUNTER — HOSPITAL ENCOUNTER (OUTPATIENT)
Dept: INFUSION THERAPY | Age: 77
Setting detail: INFUSION SERIES
Discharge: HOME | End: 2022-07-07
Payer: MEDICARE

## 2022-07-07 VITALS
SYSTOLIC BLOOD PRESSURE: 107 MMHG | DIASTOLIC BLOOD PRESSURE: 66 MMHG | TEMPERATURE: 97.3 F | RESPIRATION RATE: 16 BRPM | OXYGEN SATURATION: 95 % | HEART RATE: 59 BPM

## 2022-07-07 DIAGNOSIS — U07.1 COVID-19: Primary | ICD-10-CM

## 2022-07-07 LAB — SARS-COV-2: DETECTED

## 2022-07-07 PROCEDURE — 6360000002 HC RX W HCPCS: Performed by: NURSE PRACTITIONER

## 2022-07-07 PROCEDURE — 2580000003 HC RX 258: Performed by: NURSE PRACTITIONER

## 2022-07-07 PROCEDURE — M0222 HC BEBTELOVIMAB INJECTION: HCPCS

## 2022-07-07 RX ORDER — SODIUM CHLORIDE 0.9 % (FLUSH) 0.9 %
5-40 SYRINGE (ML) INJECTION PRN
Status: DISCONTINUED | OUTPATIENT
Start: 2022-07-07 | End: 2022-07-08 | Stop reason: HOSPADM

## 2022-07-07 RX ORDER — ACETAMINOPHEN 325 MG/1
650 TABLET ORAL
OUTPATIENT
Start: 2022-07-07

## 2022-07-07 RX ORDER — BEBTELOVIMAB 87.5 MG/ML
175 INJECTION, SOLUTION INTRAVENOUS ONCE
Status: COMPLETED | OUTPATIENT
Start: 2022-07-07 | End: 2022-07-07

## 2022-07-07 RX ORDER — ONDANSETRON 2 MG/ML
8 INJECTION INTRAMUSCULAR; INTRAVENOUS
OUTPATIENT
Start: 2022-07-07

## 2022-07-07 RX ORDER — DIPHENHYDRAMINE HYDROCHLORIDE 50 MG/ML
50 INJECTION INTRAMUSCULAR; INTRAVENOUS
OUTPATIENT
Start: 2022-07-07

## 2022-07-07 RX ORDER — ZINC GLUCONATE 50 MG
50 TABLET ORAL DAILY
COMMUNITY

## 2022-07-07 RX ORDER — SODIUM CHLORIDE 0.9 % (FLUSH) 0.9 %
5-40 SYRINGE (ML) INJECTION PRN
Status: CANCELLED | OUTPATIENT
Start: 2022-07-07

## 2022-07-07 RX ORDER — VIT C/B6/B5/MAGNESIUM/HERB 173 50-5-6-5MG
CAPSULE ORAL DAILY
COMMUNITY

## 2022-07-07 RX ORDER — ALBUTEROL SULFATE 90 UG/1
4 AEROSOL, METERED RESPIRATORY (INHALATION) PRN
OUTPATIENT
Start: 2022-07-07

## 2022-07-07 RX ORDER — GUAIFENESIN 400 MG/1
400 TABLET ORAL 4 TIMES DAILY PRN
COMMUNITY

## 2022-07-07 RX ORDER — SODIUM CHLORIDE 9 MG/ML
INJECTION, SOLUTION INTRAVENOUS CONTINUOUS
OUTPATIENT
Start: 2022-07-07

## 2022-07-07 RX ORDER — BEBTELOVIMAB 87.5 MG/ML
175 INJECTION, SOLUTION INTRAVENOUS ONCE
Status: CANCELLED | OUTPATIENT
Start: 2022-07-07 | End: 2022-07-07

## 2022-07-07 RX ORDER — FAMOTIDINE 20 MG/1
20 TABLET, FILM COATED ORAL 2 TIMES DAILY
COMMUNITY

## 2022-07-07 RX ORDER — ASCORBIC ACID 500 MG
500 TABLET ORAL DAILY
COMMUNITY

## 2022-07-07 RX ORDER — EPINEPHRINE 1 MG/ML
0.3 INJECTION, SOLUTION, CONCENTRATE INTRAVENOUS PRN
OUTPATIENT
Start: 2022-07-07

## 2022-07-07 RX ADMIN — Medication 10 ML: at 14:15

## 2022-07-07 RX ADMIN — Medication 10 ML: at 15:28

## 2022-07-07 RX ADMIN — BEBTELOVIMAB 175 MG: 87.5 INJECTION, SOLUTION INTRAVENOUS at 14:29

## 2022-07-07 ASSESSMENT — ENCOUNTER SYMPTOMS
COUGH: 1
GASTROINTESTINAL NEGATIVE: 1

## 2022-07-07 NOTE — PROGRESS NOTES
[id number] [facility]     Monoclonal Antibodies for Covid-19 Visit    NAME:  Tung Thompson OF BIRTH:  1945  MEDICAL RECORD NUMBER:  [id number]  Episode Date:  7/7/2022    Patient arrived to [facility]   [] per wheelchair   [x] ambulatory     Indication for use:      [x] Treatment of Covid 19    [] Post Exposure Prophylaxis            - patient at high risk for progression to severe COVID-19             - patient with immunocompromising condition or is taking immunosuppressive medications            - high risk of exposure due to occurrence of COVID in an institutional setting such as Nursing home, Group home or [facility]. Date of COVID test:PCR done 7/6/22    Have you received COVID vaccine: yes    When did symptoms first appear: monday    What symptoms are you having:     [x] Fever     [x] Cough     [] SOB     [x] Headache     [x] Tiredness     [x] Muscle or body aches     [x] Loss of taste or smell     [x] Sore throat     [x] Congestion or runny nose     [] Nausea or vomiting     [x] Diarrhea     []      /66   Pulse 59   Temp 97.3 °F (36.3 °C) (Infrared)   Resp 16   SpO2 95%     Breath Sounds: No increased work of breathing, Breath sounds clear to auscultation bilaterally and Good air exchange    Pulse Oximetry: 96 %    Reviewed information on Monoclonal antibody infusion Bebtelovimab such as Emergency Use Authorizationrovimab) status, hypersensitivity drug and potential side effects such as fever, chills, headache, nausea, SOB, high or low BP, rapid or slow heart rate, chest discomfort or pain, weakness, confusion, wheezing, swelling of face, lips or throat, rash, hives, itching, feeling faint, dizziness, sweating. Patient given Fact sheet. yes    Administered via: [x] Peripheral access    [] PICC access    [] Port access    Patient received Monoclonal antibody infusion Bebtelovimab 175 mg in 2 ml over 30 seconds.     Patient monitored for an hour after infusion. Upon discharge, VSS and patient without signs/symptoms of reaction. yes    Vital signs done per protocol and are documented on flowsheet: yes    Patient has been given a copy of Monoclonal antibody Fact Sheet: Yes      Response to treatment:  Well tolerated by patient.       Electronically signed by Amanda Chan RN on 7/7/2022 at 3:40 PM                    Electronically signed by Amanda Chan RN on 7/7/2022 at 3:40 PM

## 2022-07-22 ENCOUNTER — OFFICE VISIT (OUTPATIENT)
Dept: INTERNAL MEDICINE CLINIC | Age: 77
End: 2022-07-22
Payer: MEDICARE

## 2022-07-22 VITALS
BODY MASS INDEX: 30.21 KG/M2 | HEART RATE: 80 BPM | DIASTOLIC BLOOD PRESSURE: 60 MMHG | OXYGEN SATURATION: 98 % | WEIGHT: 204 LBS | SYSTOLIC BLOOD PRESSURE: 100 MMHG | HEIGHT: 69 IN

## 2022-07-22 DIAGNOSIS — E78.2 MIXED HYPERLIPIDEMIA: ICD-10-CM

## 2022-07-22 DIAGNOSIS — I77.9 BILATERAL CAROTID ARTERY DISEASE, UNSPECIFIED TYPE (HCC): ICD-10-CM

## 2022-07-22 DIAGNOSIS — G20 PARKINSON DISEASE (HCC): Chronic | ICD-10-CM

## 2022-07-22 DIAGNOSIS — E11.9 TYPE 2 DIABETES MELLITUS WITHOUT COMPLICATION, WITHOUT LONG-TERM CURRENT USE OF INSULIN (HCC): ICD-10-CM

## 2022-07-22 DIAGNOSIS — U07.1 COVID-19: Primary | ICD-10-CM

## 2022-07-22 DIAGNOSIS — I25.10 CORONARY ARTERY DISEASE INVOLVING NATIVE CORONARY ARTERY OF NATIVE HEART WITHOUT ANGINA PECTORIS: ICD-10-CM

## 2022-07-22 DIAGNOSIS — I20.9 ANGINA PECTORIS, UNSPECIFIED (HCC): ICD-10-CM

## 2022-07-22 DIAGNOSIS — I10 PRIMARY HYPERTENSION: ICD-10-CM

## 2022-07-22 PROCEDURE — G8417 CALC BMI ABV UP PARAM F/U: HCPCS | Performed by: NURSE PRACTITIONER

## 2022-07-22 PROCEDURE — 3017F COLORECTAL CA SCREEN DOC REV: CPT | Performed by: NURSE PRACTITIONER

## 2022-07-22 PROCEDURE — 99214 OFFICE O/P EST MOD 30 MIN: CPT | Performed by: NURSE PRACTITIONER

## 2022-07-22 PROCEDURE — G8427 DOCREV CUR MEDS BY ELIG CLIN: HCPCS | Performed by: NURSE PRACTITIONER

## 2022-07-22 PROCEDURE — 1036F TOBACCO NON-USER: CPT | Performed by: NURSE PRACTITIONER

## 2022-07-22 PROCEDURE — 1123F ACP DISCUSS/DSCN MKR DOCD: CPT | Performed by: NURSE PRACTITIONER

## 2022-07-22 PROCEDURE — 3044F HG A1C LEVEL LT 7.0%: CPT | Performed by: NURSE PRACTITIONER

## 2022-07-22 PROCEDURE — 2022F DILAT RTA XM EVC RTNOPTHY: CPT | Performed by: NURSE PRACTITIONER

## 2022-08-04 ASSESSMENT — ENCOUNTER SYMPTOMS
COUGH: 1
GASTROINTESTINAL NEGATIVE: 1

## 2022-08-04 NOTE — PROGRESS NOTES
SUBJECTIVE:    Patient ID: Erica Jesus is a 78 y.o. male. CC: COVID, diabetes, pretension, hyperlipidemia, coronary artery disease    HPI: Patient presents to the office today for follow-up of recent and chronic medical conditions. Recently diagnosed with COVID. Patient had confusion, chills, fever. He took an at home COVID test which was positive. There was also concern about concomitant bacterial infection either UTI or respiratory in nature. Patient was given a 7-day course of Levaquin. Patient was unable to take Paxlovid due to Parkinson's treatments which were not able to be held. Because of this, treatment was arranged through the infusion center and the patient received Bebtelovimab (monoclonal antibody) treatment. He continues to have cough but is improving overall. No further fevers. Still quite fatigued and sleeping a lot. Diabetes: Previous A1c mildly elevated at 6.6. We discussed that this is consistent with a diagnosis of mild diabetes. We discussed that the patient's recent illness may have played a role in higher blood sugars in the recent past.    History of hypertension and coronary artery disease. He denies any chest pain or shortness of breath. He is compliant with his medication regimen.       Past Medical History:   Diagnosis Date    Acute bronchitis     history of    CAD (coronary artery disease)     Clostridioides difficile infection 07/12/2020    Hyperlipidemia         Current Outpatient Medications   Medication Sig Dispense Refill    famotidine (PEPCID) 20 MG tablet Take 20 mg by mouth 2 times daily      zinc gluconate 50 MG tablet Take 50 mg by mouth daily      vitamin C (ASCORBIC ACID) 500 MG tablet Take 500 mg by mouth daily      turmeric 500 MG CAPS Take by mouth daily      guaiFENesin 400 MG tablet Take 400 mg by mouth 4 times daily as needed for Cough      donepezil (ARICEPT) 10 MG tablet       fluticasone (FLONASE) 50 MCG/ACT nasal spray 2 sprays by Nasal route daily 16 g 5    fexofenadine (ALLEGRA) 180 MG tablet Take 180 mg by mouth daily      atorvastatin (LIPITOR) 80 MG tablet TAKE 1 TABLET DAILY 90 tablet 3    carbidopa-levodopa (SINEMET)  MG per tablet Take 1 tablet by mouth 6 times daily Patient wife stated Every 2 HR  9am-7pm      Menatetrenone (VITAMIN K2) 100 MCG TABS Take by mouth daily      cloZAPine (CLOZARIL) 25 MG tablet 25 mg 2 times daily       nitroGLYCERIN (NITROSTAT) 0.4 MG SL tablet Place 1 tablet under the tongue every 5 minutes as needed (prn) 25 tablet 3    Vitamin D, Cholecalciferol, 50 MCG (2000 UT) CAPS Take by mouth      Coenzyme Q10 (COQ10 PO) Take 1 capsule by mouth daily      Carbidopa-Levodopa ER 36. MG CPCR Take 1 tablet by mouth 4 times daily       primidone (MYSOLINE) 50 MG tablet Take 50 mg by mouth daily      omeprazole (PRILOSEC) 20 MG delayed release capsule Take 20 mg by mouth daily      aspirin 81 MG tablet Take 1 tablet by mouth daily 30 tablet 0    Psyllium 500 MG CAPS Take 6 capsules by mouth daily       fish oil-omega-3 fatty acids 1000 MG capsule Take 2 g by mouth 2 times daily       multivitamin (THERAGRAN) per tablet Take 1 tablet by mouth daily. No current facility-administered medications for this visit. Review of Systems   Constitutional:  Positive for fatigue. Negative for fever. Respiratory:  Positive for cough. Cardiovascular: Negative. Negative for chest pain and leg swelling. Gastrointestinal: Negative. Genitourinary: Negative. Musculoskeletal: Negative. Neurological: Negative. Psychiatric/Behavioral: Negative. OBJECTIVE:  Physical Exam  Vitals reviewed. Constitutional:       General: He is not in acute distress. Appearance: Normal appearance. He is well-developed. He is not diaphoretic. HENT:      Head: Normocephalic and atraumatic. Eyes:      General: No scleral icterus.      Conjunctiva/sclera: Conjunctivae normal.   Neck:      Vascular: No regimen    Type 2 diabetes mellitus without complication, without long-term current use of insulin (HCC)  -A1c consistent with mild diabetes  -Glucose may have been affected negatively by recent illnesses. As his A1c is quite mild, we will focus on healthy diet.   Patient and family prefer to avoid medication for now which is reasonable    Parkinson disease ([facility]  - Chronic, stable  - Continue current regimen    Bilateral carotid artery disease, unspecified type ([facility]  - Chronic, stable  - Continue current regimen    Angina pectoris, unspecified ([facility]  - No CP  - Monitor      KEYSHA Santana - CNP

## 2022-09-29 RX ORDER — ATORVASTATIN CALCIUM 80 MG/1
TABLET, FILM COATED ORAL
Qty: 90 TABLET | Refills: 3 | Status: SHIPPED | OUTPATIENT
Start: 2022-09-29

## 2022-10-18 NOTE — PROGRESS NOTES
[address]   Cardiac Follow up    Referring Provider:  KEYSHA Araujo CNP     Chief Complaint   Patient presents with    Coronary Artery Disease     No complaints     Hypertension    6 Month Follow-Up        History of Present Illness:  Mr. Corinne Gomez is a 78 y.o. gentleman. His history includes CAD with PCI in 2005, hypertension, and hyperlipidemia. He had a cardiac angiogram in 2015 that showed normal LV function and EF 60%. Coronary stent widely patent. He has Parkinsons, treated by Dr. Ger Gardner [facility]); he has been evaluated by the [facility]. He took an at home COVID test which was positive in July 2022. Today, he is here for 6 mos follow up. He is with his wife. He recently traveled, including [address], and tolerated well, it made him tired but no other symptoms. When they travel, his wife does the driving. Pt denies exertional chest pain, GONZALEZ/PND, palpitations, light-headedness, edema. Past Medical History:   has a past medical history of Acute bronchitis, CAD (coronary artery disease), Clostridioides difficile infection, and Hyperlipidemia. Also parkinson disease    Surgical History:   has a past surgical history that includes Diagnostic Cardiac Cath Lab Procedure (2005) and Coronary angioplasty with stent (2005). Social History:   reports that he quit smoking about 42 years ago. He has a 2.50 pack-year smoking history. He has never used smokeless tobacco. He reports that he does not drink alcohol and does not use drugs. Family History:  family history includes Cancer in his mother and sister; Diabetes in his sister and sister; Other in his brother, brother, and father. He was adopted.      Home Medications:  Prior to Admission medications      Current Outpatient Medications   Medication Sig Dispense Refill    atorvastatin (LIPITOR) 80 MG tablet TAKE 1 TABLET EVERY DAY 90 tablet 3    famotidine (PEPCID) 20 MG tablet Take 20 mg by mouth 2 times daily zinc gluconate 50 MG tablet Take 50 mg by mouth daily      vitamin C (ASCORBIC ACID) 500 MG tablet Take 500 mg by mouth daily      turmeric 500 MG CAPS Take by mouth daily      guaiFENesin 400 MG tablet Take 400 mg by mouth 4 times daily as needed for Cough      donepezil (ARICEPT) 10 MG tablet       fluticasone (FLONASE) 50 MCG/ACT nasal spray 2 sprays by Nasal route daily 16 g 5    fexofenadine (ALLEGRA) 180 MG tablet Take 180 mg by mouth daily      carbidopa-levodopa (SINEMET)  MG per tablet Take 1 tablet by mouth 6 times daily Patient wife stated Every 2 HR  9am-7pm      Menatetrenone (VITAMIN K2) 100 MCG TABS Take by mouth daily      cloZAPine (CLOZARIL) 25 MG tablet 25 mg 2 times daily       nitroGLYCERIN (NITROSTAT) 0.4 MG SL tablet Place 1 tablet under the tongue every 5 minutes as needed (prn) 25 tablet 3    Vitamin D, Cholecalciferol, 50 MCG (2000 UT) CAPS Take by mouth      Coenzyme Q10 (COQ10 PO) Take 1 capsule by mouth daily      Carbidopa-Levodopa ER 36. MG CPCR Take 1 tablet by mouth 4 times daily       primidone (MYSOLINE) 50 MG tablet Take 50 mg by mouth daily      omeprazole (PRILOSEC) 20 MG delayed release capsule Take 20 mg by mouth daily      aspirin 81 MG tablet Take 1 tablet by mouth daily 30 tablet 0    Psyllium 500 MG CAPS Take 6 capsules by mouth daily       fish oil-omega-3 fatty acids 1000 MG capsule Take 2 g by mouth 2 times daily       multivitamin (THERAGRAN) per tablet Take 1 tablet by mouth daily. No current facility-administered medications for this visit. Allergies:  Amoxicillin, Amoxicillin-pot clavulanate, Sulfasuccinamide, and Tamsulosin     Review of Systems:   Constitutional: there has been no unanticipated weight loss. There's been no change in energy level, sleep pattern, or activity level. Eyes: No visual changes or diplopia. No scleral icterus. ENT: No Headaches, hearing loss or vertigo. No mouth sores or sore throat.   Cardiovascular: Reviewed in HPI  Respiratory: No cough or wheezing, no sputum production. No hematemesis. Gastrointestinal: No abdominal pain, appetite loss, blood in stools. No change in bowel or bladder habits. Genitourinary: No dysuria, trouble voiding, or hematuria. Musculoskeletal:  No gait disturbance, weakness or joint complaints. Integumentary: No rash or pruritis. Neurological: No headache, diplopia, change in muscle strength, numbness or tingling. No change in gait, balance, coordination, mood, affect, memory, mentation, behavior. Psychiatric: No anxiety, no depression. Endocrine: No malaise, fatigue or temperature intolerance. No excessive thirst, fluid intake, or urination. No tremor. Hematologic/Lymphatic: No abnormal bruising or bleeding, blood clots or swollen lymph nodes. Allergic/Immunologic: No nasal congestion or hives. Physical Examination:    Blood pressure 112/60, pulse 76, height 5' 7.5\" (1.715 m), weight 203 lb 11.2 oz (92.4 kg), SpO2 98 %. Constitutional and General Appearance: in NAD  Skin:good turgor,intact without lesions  HEENT: EOMI ,normal  Neck:no JVD  Respiratory:  Normal excursion and expansion without use of accessory muscles  Resp Auscultation: Normal breath sounds without dullness  Cardiovascular: The apical impulses not displaced  Heart tones are crisp and normal  Cervical veins are not engorged  The carotid upstroke is normal in amplitude and contour without delay. No bruit heard on physical.   Peripheral pulses are symmetrical and full  There is no clubbing, cyanosis of the extremities.   No edema  Femoral Arteries: 2+ and equal  Pedal Pulses: 2+ and equal   Abdomen:  No masses or tenderness  Liver/Spleen: No Abnormalities Noted  Neurological/Psychiatric:  Alert and oriented in all spheres  Moves all extremities well  Exhibits normal gait balance and coordination  No abnormalities of mood, affect, memory, mentation, or behavior are noted  Positive for tremors        Assessment: 1. Coronary atherosclerosis of native coronary artery:   denies anginal symptoms   History of occasional chest pain. Reports episodes can be weeks apart, or even months. -EKG 1/17/22 (read & interpreted by me)> NSR with lateral ischemia   -Cath 2005> stent in LAD  -Cath 2006> Patent stent in lad -nonobstructive circumflex disease. -GXT Myoview 2010> Normal perfusion. An angiogram was normal.   -Cath 4/2015> Normal LV function with EF 60, widely patent LAD stent, 30% distal CFx. -  nuclear stress test to evaluate CP   -Lexiscan karina 7/17/19> Normal LV size and systolic function. No evidence of ischemia. -ECHO 1/15/10: EF 55%, mild mitral and aortic insufficiency  -LHC 2/1/22 Normal EF. LAD stent widely patent. Otherwise only 30 %LAD stenosis     2. Hypertension:   Stable. No c/o dizziness   Blood pressure 112/60, pulse 76, height 5' 7.5\" (1.715 m), weight 203 lb 11.2 oz (92.4 kg), SpO2 98 %. Continue current medication regimen     3. Hyperlipemia:   continue on Lipitor 80mg and fish oil. 6/2021>  TG 93 HDL 40 LDL 71     4. Carotid disease:   stable  Dopplers 2011> 09-42% MINA, 8-27% LICA. Dopplers 3/2022> There is <50% stenosis of the right internal carotid arteries. No hemodynamically significant carotid stenosis noted on left side. Vertebral flow are antegrade bilaterally. Continue statin     5. Parkinson disease:   He is treated by Dr. Becky Ann at [facility] and had a second opinion at [facility]. 6.  Tremors, chronic: Essential, familial.        Plan:   Cardiac test and lab results personally reviewed by me during this office visit and discussed. No medication changes  Lipids CMP  Continue risk factor modifications. Call for any change in symptoms, call to report any changes in shortness of breath or development of chest pain with activity. Follow up in 6 mos    Torin Sanchez M.D., [facility]    Patient's problem list, medications, allergies, past medical, surgical, social

## 2022-11-02 ENCOUNTER — OFFICE VISIT (OUTPATIENT)
Dept: CARDIOLOGY CLINIC | Age: 77
End: 2022-11-02
Payer: MEDICARE

## 2022-11-02 VITALS
WEIGHT: 203.7 LBS | HEART RATE: 76 BPM | HEIGHT: 68 IN | OXYGEN SATURATION: 98 % | BODY MASS INDEX: 30.87 KG/M2 | DIASTOLIC BLOOD PRESSURE: 60 MMHG | SYSTOLIC BLOOD PRESSURE: 112 MMHG

## 2022-11-02 DIAGNOSIS — E78.2 MIXED HYPERLIPIDEMIA: ICD-10-CM

## 2022-11-02 DIAGNOSIS — G20 PARKINSON DISEASE (HCC): Chronic | ICD-10-CM

## 2022-11-02 DIAGNOSIS — I10 PRIMARY HYPERTENSION: ICD-10-CM

## 2022-11-02 DIAGNOSIS — I77.9 BILATERAL CAROTID ARTERY DISEASE, UNSPECIFIED TYPE (HCC): ICD-10-CM

## 2022-11-02 DIAGNOSIS — I25.10 CORONARY ARTERY DISEASE INVOLVING NATIVE CORONARY ARTERY OF NATIVE HEART WITHOUT ANGINA PECTORIS: Primary | ICD-10-CM

## 2022-11-02 PROCEDURE — 3078F DIAST BP <80 MM HG: CPT | Performed by: INTERNAL MEDICINE

## 2022-11-02 PROCEDURE — G8427 DOCREV CUR MEDS BY ELIG CLIN: HCPCS | Performed by: INTERNAL MEDICINE

## 2022-11-02 PROCEDURE — G8417 CALC BMI ABV UP PARAM F/U: HCPCS | Performed by: INTERNAL MEDICINE

## 2022-11-02 PROCEDURE — G8484 FLU IMMUNIZE NO ADMIN: HCPCS | Performed by: INTERNAL MEDICINE

## 2022-11-02 PROCEDURE — 99214 OFFICE O/P EST MOD 30 MIN: CPT | Performed by: INTERNAL MEDICINE

## 2022-11-02 PROCEDURE — 3074F SYST BP LT 130 MM HG: CPT | Performed by: INTERNAL MEDICINE

## 2022-11-02 PROCEDURE — 1036F TOBACCO NON-USER: CPT | Performed by: INTERNAL MEDICINE

## 2022-11-02 PROCEDURE — 3017F COLORECTAL CA SCREEN DOC REV: CPT | Performed by: INTERNAL MEDICINE

## 2022-11-02 PROCEDURE — 1123F ACP DISCUSS/DSCN MKR DOCD: CPT | Performed by: INTERNAL MEDICINE

## 2022-11-02 NOTE — PATIENT INSTRUCTIONS
No medication changes  Lipid panel and CMP (labs)  Continue risk factor modifications. Call for any change in symptoms, call to report any changes in shortness of breath or development of chest pain with activity.     Follow up in 6 mos

## 2022-11-16 DIAGNOSIS — I25.10 CORONARY ARTERY DISEASE INVOLVING NATIVE CORONARY ARTERY OF NATIVE HEART WITHOUT ANGINA PECTORIS: ICD-10-CM

## 2022-11-16 DIAGNOSIS — E78.2 MIXED HYPERLIPIDEMIA: ICD-10-CM

## 2022-11-16 DIAGNOSIS — I10 PRIMARY HYPERTENSION: ICD-10-CM

## 2022-11-16 LAB
A/G RATIO: 2 (ref 1.1–2.2)
ALBUMIN SERPL-MCNC: 4.2 G/DL (ref 3.4–5)
ALP BLD-CCNC: 102 U/L (ref 40–129)
ALT SERPL-CCNC: 19 U/L (ref 10–40)
ANION GAP SERPL CALCULATED.3IONS-SCNC: 13 MMOL/L (ref 3–16)
AST SERPL-CCNC: 23 U/L (ref 15–37)
BILIRUB SERPL-MCNC: 0.4 MG/DL (ref 0–1)
BUN BLDV-MCNC: 13 MG/DL (ref 7–20)
CALCIUM SERPL-MCNC: 9.5 MG/DL (ref 8.3–10.6)
CHLORIDE BLD-SCNC: 105 MMOL/L (ref 99–110)
CHOLESTEROL, FASTING: 122 MG/DL (ref 0–199)
CO2: 26 MMOL/L (ref 21–32)
CREAT SERPL-MCNC: 0.7 MG/DL (ref 0.8–1.3)
GFR SERPL CREATININE-BSD FRML MDRD: >60 ML/MIN/{1.73_M2}
GLUCOSE BLD-MCNC: 112 MG/DL (ref 70–99)
HDLC SERPL-MCNC: 41 MG/DL (ref 40–60)
LDL CHOLESTEROL CALCULATED: 65 MG/DL
POTASSIUM SERPL-SCNC: 4.2 MMOL/L (ref 3.5–5.1)
SODIUM BLD-SCNC: 144 MMOL/L (ref 136–145)
TOTAL PROTEIN: 6.3 G/DL (ref 6.4–8.2)
TRIGLYCERIDE, FASTING: 79 MG/DL (ref 0–150)
VLDLC SERPL CALC-MCNC: 16 MG/DL

## 2022-11-17 ENCOUNTER — TELEPHONE (OUTPATIENT)
Dept: CARDIOLOGY CLINIC | Age: 77
End: 2022-11-17

## 2022-11-22 ENCOUNTER — OFFICE VISIT (OUTPATIENT)
Dept: INTERNAL MEDICINE CLINIC | Age: 77
End: 2022-11-22
Payer: MEDICARE

## 2022-11-22 VITALS
HEART RATE: 90 BPM | WEIGHT: 203 LBS | TEMPERATURE: 97.1 F | SYSTOLIC BLOOD PRESSURE: 118 MMHG | DIASTOLIC BLOOD PRESSURE: 64 MMHG | HEIGHT: 68 IN | OXYGEN SATURATION: 95 % | BODY MASS INDEX: 30.77 KG/M2

## 2022-11-22 DIAGNOSIS — I10 PRIMARY HYPERTENSION: ICD-10-CM

## 2022-11-22 DIAGNOSIS — E78.2 MIXED HYPERLIPIDEMIA: ICD-10-CM

## 2022-11-22 DIAGNOSIS — Z00.00 MEDICARE ANNUAL WELLNESS VISIT, SUBSEQUENT: Primary | ICD-10-CM

## 2022-11-22 DIAGNOSIS — E11.9 TYPE 2 DIABETES MELLITUS WITHOUT COMPLICATION, WITHOUT LONG-TERM CURRENT USE OF INSULIN (HCC): ICD-10-CM

## 2022-11-22 DIAGNOSIS — Z23 NEED FOR INFLUENZA VACCINATION: ICD-10-CM

## 2022-11-22 DIAGNOSIS — I25.10 CORONARY ARTERY DISEASE INVOLVING NATIVE CORONARY ARTERY OF NATIVE HEART WITHOUT ANGINA PECTORIS: ICD-10-CM

## 2022-11-22 DIAGNOSIS — G20 PARKINSON DISEASE (HCC): ICD-10-CM

## 2022-11-22 PROCEDURE — 1123F ACP DISCUSS/DSCN MKR DOCD: CPT | Performed by: NURSE PRACTITIONER

## 2022-11-22 PROCEDURE — 3074F SYST BP LT 130 MM HG: CPT | Performed by: NURSE PRACTITIONER

## 2022-11-22 PROCEDURE — G8484 FLU IMMUNIZE NO ADMIN: HCPCS | Performed by: NURSE PRACTITIONER

## 2022-11-22 PROCEDURE — 2022F DILAT RTA XM EVC RTNOPTHY: CPT | Performed by: NURSE PRACTITIONER

## 2022-11-22 PROCEDURE — 90694 VACC AIIV4 NO PRSRV 0.5ML IM: CPT | Performed by: NURSE PRACTITIONER

## 2022-11-22 PROCEDURE — G8417 CALC BMI ABV UP PARAM F/U: HCPCS | Performed by: NURSE PRACTITIONER

## 2022-11-22 PROCEDURE — G0439 PPPS, SUBSEQ VISIT: HCPCS | Performed by: NURSE PRACTITIONER

## 2022-11-22 PROCEDURE — 1036F TOBACCO NON-USER: CPT | Performed by: NURSE PRACTITIONER

## 2022-11-22 PROCEDURE — 3017F COLORECTAL CA SCREEN DOC REV: CPT | Performed by: NURSE PRACTITIONER

## 2022-11-22 PROCEDURE — G8427 DOCREV CUR MEDS BY ELIG CLIN: HCPCS | Performed by: NURSE PRACTITIONER

## 2022-11-22 PROCEDURE — G0008 ADMIN INFLUENZA VIRUS VAC: HCPCS | Performed by: NURSE PRACTITIONER

## 2022-11-22 PROCEDURE — 3044F HG A1C LEVEL LT 7.0%: CPT | Performed by: NURSE PRACTITIONER

## 2022-11-22 PROCEDURE — 99213 OFFICE O/P EST LOW 20 MIN: CPT | Performed by: NURSE PRACTITIONER

## 2022-11-22 PROCEDURE — 3078F DIAST BP <80 MM HG: CPT | Performed by: NURSE PRACTITIONER

## 2022-11-22 ASSESSMENT — PATIENT HEALTH QUESTIONNAIRE - PHQ9
1. LITTLE INTEREST OR PLEASURE IN DOING THINGS: 0
SUM OF ALL RESPONSES TO PHQ9 QUESTIONS 1 & 2: 2
SUM OF ALL RESPONSES TO PHQ QUESTIONS 1-9: 2
2. FEELING DOWN, DEPRESSED OR HOPELESS: 2
SUM OF ALL RESPONSES TO PHQ QUESTIONS 1-9: 2

## 2022-11-22 ASSESSMENT — ENCOUNTER SYMPTOMS
GASTROINTESTINAL NEGATIVE: 1
RESPIRATORY NEGATIVE: 1

## 2022-11-22 ASSESSMENT — LIFESTYLE VARIABLES
HOW MANY STANDARD DRINKS CONTAINING ALCOHOL DO YOU HAVE ON A TYPICAL DAY: 1 OR 2
HOW OFTEN DO YOU HAVE A DRINK CONTAINING ALCOHOL: 2-4 TIMES A MONTH

## 2022-11-22 NOTE — PATIENT INSTRUCTIONS
Personalized Preventive Plan for Milly Weinberg - 11/22/2022  Medicare offers a range of preventive health benefits. Some of the tests and screenings are paid in full while other may be subject to a deductible, co-insurance, and/or copay. Some of these benefits include a comprehensive review of your medical history including lifestyle, illnesses that may run in your family, and various assessments and screenings as appropriate. After reviewing your medical record and screening and assessments performed today your provider may have ordered immunizations, labs, imaging, and/or referrals for you. A list of these orders (if applicable) as well as your Preventive Care list are included within your After Visit Summary for your review. Other Preventive Recommendations:    A preventive eye exam performed by an eye specialist is recommended every 1-2 years to screen for glaucoma; cataracts, macular degeneration, and other eye disorders. A preventive dental visit is recommended every 6 months. Try to get at least 150 minutes of exercise per week or 10,000 steps per day on a pedometer . Order or download the FREE \"Exercise & Physical Activity: Your Everyday Guide\" from [employer]. Call 6-669.637.2255 or search [employer] online. You need 6478-0498 mg of calcium and 5436-0361 IU of vitamin D per day. It is possible to meet your calcium requirement with diet alone, but a vitamin D supplement is usually necessary to meet this goal.  When exposed to the sun, use a sunscreen that protects against both UVA and UVB radiation with an SPF of 30 or greater. Reapply every 2 to 3 hours or after sweating, drying off with a towel, or swimming. Always wear a seat belt when traveling in a car. Always wear a helmet when riding a bicycle or motorcycle.

## 2022-11-22 NOTE — PROGRESS NOTES
Medicare Annual Wellness Visit    Mariano Rodriguez is here for Medicare AWV and Follow-up    Assessment & Plan     Medicare annual wellness visit, subsequent    Parkinson disease ([address]  - Chronic, remains problematic    - He is under the care of neurology and has been evaluated at tertiary center    Primary hypertension  - Normotensive  - he has met JNC standards.  - Continue current regimen. Coronary artery disease involving native coronary artery of native heart without angina pectoris  - Chronic, stable  - Continue current regimen    Mixed hyperlipidemia  - Chronic, stable  - Continue current regimen  ]  Type 2 diabetes mellitus without complication, without long-term current use of insulin (HCC)  - Chronic, stable  - Last A1c 6.6  - Last glucose 112  - Chronic, stable  - Continue current regimen    Need for influenza vaccination  -     Influenza, FLUAD, (age 72 y+), IM, Preservative Free, 0.5 mL        Recommendations for Preventive Services Due: see orders and patient instructions/AVS.  Recommended screening schedule for the next 5-10 years is provided to the patient in written form: see Patient Instructions/AVS.     Return for Medicare Annual Wellness Visit in 1 year. Subjective     Review of Systems   Constitutional: Negative. Respiratory: Negative. Cardiovascular: Negative. Gastrointestinal: Negative. Genitourinary:         Incont   Musculoskeletal: Negative. Neurological:  Positive for tremors. Psychiatric/Behavioral: Negative. Patient's complete Health Risk Assessment and screening values have been reviewed and are found in Flowsheets. The following problems were reviewed today and where indicated follow up appointments were made and/or referrals ordered.     Positive Risk Factor Screenings with Interventions:             General Health and ACP:  General  In general, how would you say your health is?: Fair  In the past 7 days, have you experienced any of the following: New or Increased Pain, New or Increased Fatigue, Loneliness, Social Isolation, Stress or Anger?: (!) Yes  Select all that apply: (!) Stress, Anger  Do you get the social and emotional support that you need?: Yes  Do you have a Living Will?: Yes    Advance Directives       Power of  Living Will ACP-Advance Directive ACP-Power of     Not on File Filed on 04/30/15 Filed Not on File          General Health Risk Interventions:  Stress: patient advised to follow-up in this office for further evaluation and treatment within 6 month(s)    Health Habits/Nutrition:  Physical Activity: Insufficiently Active    Days of Exercise per Week: 1 day    Minutes of Exercise per Session: 10 min     Have you lost any weight without trying in the past 3 months?: No  Body mass index: (!) 31.32  Have you seen the dentist within the past year?: (!) No  Health Habits/Nutrition Interventions:  Dental exam overdue:  patient encouraged to make appointment with his/her dentist    Hearing/Vision:  Do you or your family notice any trouble with your hearing that hasn't been managed with hearing aids?: No  Do you have difficulty driving, watching TV, or doing any of your daily activities because of your eyesight?: (!) Yes  Have you had an eye exam within the past year?: Yes  No results found.   Hearing/Vision Interventions:  Vision concerns:  patient encouraged to make appointment with his/her eye specialist     ADLs:  In the past 7 days, did you need help from others to perform any of the following everyday activities: Eating, dressing, grooming, bathing, toileting, or walking/balance?: (!) Yes  Select all that apply: (!) Eating, Dressing, Grooming, Bathing  In the past 7 days, did you need help from others to take care of any of the following: Laundry, housekeeping, banking/finances, shopping, telephone use, food preparation, transportation, or taking medications?: (!) Yes  Select all that apply: Lawrence Pete, Banking/Finances, Shopping, Telephone Use, Food Preparation, Transportation, Taking Medications  ADL Interventions:  Assistance rendered from spouse          Objective   Vitals:    11/22/22 0942   BP: 118/64   Pulse: 90   Temp: 97.1 °F (36.2 °C)   SpO2: 95%   Weight: 203 lb (92.1 kg)   Height: 5' 7.5\" (1.715 m)      Body mass index is 31.33 kg/m². Gen: NAD  Eyes: no icterus, no conjunctival erythema  CV: RRR, no mrgs  Resp: CTAB  Abd: soft, NTTP  Neuro: alert, oriented, answers questions appropriately. + tremor  MSK: no peripheral edema  Skin: warm, dry  Psych: Normal mood, affect          Allergies   Allergen Reactions    Amoxicillin Hives    Amoxicillin-Pot Clavulanate     Sulfasuccinamide      Other reaction(s): opthalmic - conjunctivitis    Tamsulosin Angioedema and Diarrhea     Prior to Visit Medications    Medication Sig Taking?  Authorizing Provider   atorvastatin (LIPITOR) 80 MG tablet TAKE 1 TABLET EVERY DAY Yes Ronald Denton MD   famotidine (PEPCID) 20 MG tablet Take 20 mg by mouth 2 times daily Yes Historical Provider, MD   zinc gluconate 50 MG tablet Take 50 mg by mouth daily Yes Historical Provider, MD   vitamin C (ASCORBIC ACID) 500 MG tablet Take 500 mg by mouth daily Yes Historical Provider, MD   turmeric 500 MG CAPS Take by mouth daily Yes Historical Provider, MD   guaiFENesin 400 MG tablet Take 400 mg by mouth 4 times daily as needed for Cough Yes Historical Provider, MD   donepezil (ARICEPT) 10 MG tablet  Yes Historical Provider, MD   fluticasone (FLONASE) 50 MCG/ACT nasal spray 2 sprays by Nasal route daily Yes Jai Palacio APRN - CNP   fexofenadine (ALLEGRA) 180 MG tablet Take 180 mg by mouth daily Yes Historical Provider, MD   carbidopa-levodopa (SINEMET)  MG per tablet Take 1 tablet by mouth 6 times daily Patient wife stated Every 2 HR  9am-7pm Yes Historical Provider, MD   Menatetrenone (VITAMIN K2) 100 MCG TABS Take by mouth daily Yes Historical Provider, MD   cloZAPine (CLOZARIL) 25 MG tablet 25 mg 2 times daily  Yes Historical Provider, MD   nitroGLYCERIN (NITROSTAT) 0.4 MG SL tablet Place 1 tablet under the tongue every 5 minutes as needed (prn) Yes Debbie Dumont MD   Vitamin D, Cholecalciferol, 50 MCG (2000 UT) CAPS Take by mouth Yes Historical Provider, MD   Coenzyme Q10 (COQ10 PO) Take 1 capsule by mouth daily Yes Historical Provider, MD   Carbidopa-Levodopa ER 36. MG CPCR Take 1 tablet by mouth 4 times daily  Yes Historical Provider, MD   primidone (MYSOLINE) 50 MG tablet Take 50 mg by mouth daily Yes Historical Provider, MD   omeprazole (PRILOSEC) 20 MG delayed release capsule Take 20 mg by mouth daily Yes Historical Provider, MD   aspirin 81 MG tablet Take 1 tablet by mouth daily Yes Debbie Dumont MD   Psyllium 500 MG CAPS Take 6 capsules by mouth daily  Yes Historical Provider, MD   fish oil-omega-3 fatty acids 1000 MG capsule Take 2 g by mouth 2 times daily  Yes Historical Provider, MD   multivitamin [facility]) per tablet Take 1 tablet by mouth daily.    Yes Historical Provider, MD Verma (Including outside providers/suppliers regularly involved in providing care):   Patient Care Team:  KEYSHA Moralez CNP as PCP - General (Family Nurse Practitioner)  KEYSHA Moralez CNP as PCP - [facility]  Debbie Dumont MD as Consulting Physician (Cardiology)  Arnol Shi MD as Consulting Physician (Electrophysiology)  Selina Phipps MD as Consulting Physician (Internal Medicine)     Reviewed and updated this visit:  Allergies  Meds

## 2022-12-15 ENCOUNTER — OFFICE VISIT (OUTPATIENT)
Dept: INTERNAL MEDICINE CLINIC | Age: 77
End: 2022-12-15
Payer: MEDICARE

## 2022-12-15 ENCOUNTER — TELEPHONE (OUTPATIENT)
Dept: INTERNAL MEDICINE CLINIC | Age: 77
End: 2022-12-15

## 2022-12-15 VITALS
WEIGHT: 200 LBS | HEART RATE: 78 BPM | HEIGHT: 69 IN | BODY MASS INDEX: 29.62 KG/M2 | DIASTOLIC BLOOD PRESSURE: 70 MMHG | SYSTOLIC BLOOD PRESSURE: 120 MMHG | OXYGEN SATURATION: 96 % | TEMPERATURE: 97.3 F

## 2022-12-15 DIAGNOSIS — R69 ILLNESS: Primary | ICD-10-CM

## 2022-12-15 DIAGNOSIS — R41.0 CONFUSION: ICD-10-CM

## 2022-12-15 DIAGNOSIS — G20 PARKINSON DISEASE (HCC): ICD-10-CM

## 2022-12-15 DIAGNOSIS — J98.8 RESPIRATORY INFECTION: ICD-10-CM

## 2022-12-15 DIAGNOSIS — R53.1 GENERAL WEAKNESS: ICD-10-CM

## 2022-12-15 DIAGNOSIS — N30.00 ACUTE CYSTITIS WITHOUT HEMATURIA: ICD-10-CM

## 2022-12-15 LAB
BACTERIA: ABNORMAL /HPF
BILIRUBIN URINE: ABNORMAL
BLOOD, URINE: NEGATIVE
CLARITY: ABNORMAL
COLOR: ABNORMAL
COMMENT UA: ABNORMAL
CRYSTALS, UA: ABNORMAL /HPF
EPITHELIAL CELLS, UA: 2 /HPF (ref 0–5)
FINE CASTS, UA: ABNORMAL /LPF (ref 0–2)
GLUCOSE URINE: 100 MG/DL
HYALINE CASTS: ABNORMAL /LPF (ref 0–2)
INFLUENZA A ANTIBODY: NEGATIVE
INFLUENZA B ANTIBODY: NEGATIVE
KETONES, URINE: 15 MG/DL
LEUKOCYTE ESTERASE, URINE: ABNORMAL
MICROSCOPIC EXAMINATION: YES
MUCUS: ABNORMAL /LPF
NITRITE, URINE: NEGATIVE
PH UA: 5 (ref 5–8)
PROTEIN UA: 100 MG/DL
RBC UA: 11 /HPF (ref 0–4)
SPECIFIC GRAVITY UA: 1.04 (ref 1–1.03)
URINE REFLEX TO CULTURE: ABNORMAL
URINE TYPE: ABNORMAL
UROBILINOGEN, URINE: 1 E.U./DL
WBC UA: 2 /HPF (ref 0–5)

## 2022-12-15 PROCEDURE — 3074F SYST BP LT 130 MM HG: CPT | Performed by: NURSE PRACTITIONER

## 2022-12-15 PROCEDURE — G8484 FLU IMMUNIZE NO ADMIN: HCPCS | Performed by: NURSE PRACTITIONER

## 2022-12-15 PROCEDURE — 99213 OFFICE O/P EST LOW 20 MIN: CPT | Performed by: NURSE PRACTITIONER

## 2022-12-15 PROCEDURE — 1123F ACP DISCUSS/DSCN MKR DOCD: CPT | Performed by: NURSE PRACTITIONER

## 2022-12-15 PROCEDURE — G8427 DOCREV CUR MEDS BY ELIG CLIN: HCPCS | Performed by: NURSE PRACTITIONER

## 2022-12-15 PROCEDURE — 1036F TOBACCO NON-USER: CPT | Performed by: NURSE PRACTITIONER

## 2022-12-15 PROCEDURE — 87804 INFLUENZA ASSAY W/OPTIC: CPT | Performed by: NURSE PRACTITIONER

## 2022-12-15 PROCEDURE — 3017F COLORECTAL CA SCREEN DOC REV: CPT | Performed by: NURSE PRACTITIONER

## 2022-12-15 PROCEDURE — 3078F DIAST BP <80 MM HG: CPT | Performed by: NURSE PRACTITIONER

## 2022-12-15 PROCEDURE — G8417 CALC BMI ABV UP PARAM F/U: HCPCS | Performed by: NURSE PRACTITIONER

## 2022-12-15 RX ORDER — LEVOFLOXACIN 500 MG/1
500 TABLET, FILM COATED ORAL DAILY
Qty: 10 TABLET | Refills: 0 | Status: SHIPPED | OUTPATIENT
Start: 2022-12-15 | End: 2022-12-25

## 2022-12-15 NOTE — TELEPHONE ENCOUNTER
Patient's wife states patient has not been feeling well the last few days. Has fever today. Started with runny nose, mucus, and cough. Wife states lung sounds were kind of diminished in the bases and she heard some crackles, then it cleared. Covid test today is negative. No availability today across the practice. Wife has been advised to take patient to urgent care or ER if patient cannot be seen today. Please advise.

## 2022-12-15 NOTE — PROGRESS NOTES
SUBJECTIVE:    Patient ID: Miryam Paz is a 78 y.o. male. CC: illness    HPI: Presents for an acute illnes. Cough, fever. Thick green/yellow mucus production. Symptoms for one week. 101.2 this AM.  Tylenol helpful. Dizziness. Confused today  No changes with urination.    COVID test at home negative      Past Medical History:   Diagnosis Date    Acute bronchitis     history of    CAD (coronary artery disease)     Clostridioides difficile infection 07/12/2020    Hyperlipidemia         Current Outpatient Medications   Medication Sig Dispense Refill    atorvastatin (LIPITOR) 80 MG tablet TAKE 1 TABLET EVERY DAY 90 tablet 3    famotidine (PEPCID) 20 MG tablet Take 20 mg by mouth 2 times daily      zinc gluconate 50 MG tablet Take 50 mg by mouth daily      vitamin C (ASCORBIC ACID) 500 MG tablet Take 500 mg by mouth daily      turmeric 500 MG CAPS Take by mouth daily      guaiFENesin 400 MG tablet Take 400 mg by mouth 4 times daily as needed for Cough      donepezil (ARICEPT) 10 MG tablet       fluticasone (FLONASE) 50 MCG/ACT nasal spray 2 sprays by Nasal route daily 16 g 5    fexofenadine (ALLEGRA) 180 MG tablet Take 180 mg by mouth daily      carbidopa-levodopa (SINEMET)  MG per tablet Take 1 tablet by mouth 6 times daily Patient wife stated Every 2 HR  9am-7pm      Menatetrenone (VITAMIN K2) 100 MCG TABS Take by mouth daily      cloZAPine (CLOZARIL) 25 MG tablet 25 mg 2 times daily       nitroGLYCERIN (NITROSTAT) 0.4 MG SL tablet Place 1 tablet under the tongue every 5 minutes as needed (prn) 25 tablet 3    Vitamin D, Cholecalciferol, 50 MCG (2000 UT) CAPS Take by mouth      Coenzyme Q10 (COQ10 PO) Take 1 capsule by mouth daily      Carbidopa-Levodopa ER 36. MG CPCR Take 1 tablet by mouth 4 times daily       primidone (MYSOLINE) 50 MG tablet Take 50 mg by mouth daily      omeprazole (PRILOSEC) 20 MG delayed release capsule Take 20 mg by mouth daily      aspirin 81 MG tablet Take 1 tablet MG tablet;  Take 1 tablet by mouth daily for 10 days    Confusion  -     Urinalysis with Reflex to Culture    General weakness  -     [facility] - [facility]    Parkinson disease ([facility] [address]  -     [facility]    Other orders  -     Microscopic Urinalysis    - Febrile illness for 1 week  - COVID neg at home  - Worrisome for respiratory vs urinary cause  - Will treat with broad spectrum antibiotic after discussing options with patient/spouse.  - Check urine culture  - Refer to PT for weakness, parkinson disease      KEYSHA Hernandez - CNP

## 2022-12-29 ASSESSMENT — ENCOUNTER SYMPTOMS: COUGH: 1

## 2023-01-10 ENCOUNTER — HOSPITAL ENCOUNTER (OUTPATIENT)
Dept: PHYSICAL THERAPY | Age: 78
Setting detail: THERAPIES SERIES
Discharge: HOME | End: 2023-01-10
Payer: MEDICARE

## 2023-01-10 PROCEDURE — 97162 PT EVAL MOD COMPLEX 30 MIN: CPT

## 2023-01-10 PROCEDURE — 97110 THERAPEUTIC EXERCISES: CPT

## 2023-01-10 PROCEDURE — 97530 THERAPEUTIC ACTIVITIES: CPT

## 2023-01-10 NOTE — FLOWSHEET NOTE
[address] [facility] Physical [facility]  Phone: [phone]   Fax: [phone]    Physical Therapy Daily Treatment Note    Date:  01/10/2023     Patient Name:  Virgilio Harris    :  1945  MRN: [id number]  Medical Diagnosis:  Parkinson disease ([address] [G20]  General weakness [R53.1]  Treatment Diagnosis: imbalance , Abnormal gait   Insurance/Certification information:  PT Insurance Information: Medicare  Physician Information:  JERAMIE Jacobs    Plan of care signed (Y/N): []  Yes [x]  No     Date of Patient follow up with Physician:      Progress Report: []  Yes  [x]  No     Date Range for reporting period:  Beginnin/10/2023  Ending:     Progress report due (10 Rx/or 30 days whichever is less): visit #10 or  810    Recertification due (POC duration/ or 90 days whichever is less): visit # or  3/    Visit # Insurance Allowable Auth required?  Date Range    Mn []  Yes  [x]  No Mn           Latex Allergy:  [x]NO      []YES  Preferred Language for Healthcare:   [x]English       []other:    Functional Scale:       Date assessed:  TUG 11.5 1/10/2023    Pain level:  0/10     SUBJECTIVE:  See eval    OBJECTIVE: See eval      RESTRICTIONS/PRECAUTIONS:     Exercises/Interventions:     Therapeutic Exercises (78511) Resistance / level Sets/sec Reps Notes   Nustep  warmup                                                               Therapeutic Activities (72999)       Bed mobility training                             Gait (75436)                                   Neuromuscular Re-ed (88948)       Balance training:  LVST screening       Tandem  NBOS  Airex                                    Manual Intervention (34717)                                                     Modalities:     Pt. Education:  01/10/2023  -patient educated on diagnosis, prognosis and expectations for rehab  -all patient questions were answered    Home Exercise Program:  NPV      Therapeutic Exercise and NMR EXR  [] (24202) Provided verbal/tactile cueing for activities related to strengthening, flexibility, endurance, ROM for improvements in  [] LE / Lumbar: LE, proximal hip, and core control with self care, mobility, lifting, ambulation. [] UE / Cervical: cervical, postural, scapular, scapulothoracic and UE control with self care, reaching, carrying, lifting, house/yardwork, driving, computer work.  [] (44997) Provided verbal/tactile cueing for activities related to improving balance, coordination, kinesthetic sense, posture, motor skill, proprioception to assist with   [] LE / lumbar: LE, proximal hip, and core control in self care, mobility, lifting, ambulation and eccentric single leg control. [] UE / cervical: cervical, scapular, scapulothoracic and UE control with self care, reaching, carrying, lifting, house/yardwork, driving, computer work.   [] (39789) Therapist is in constant attendance of 2 or more patients providing skilled therapy interventions, but not providing any significant amount of measurable one-on-one time to either patient, for improvements in  [] LE / lumbar: LE, proximal hip, and core control in self care, mobility, lifting, ambulation and eccentric single leg control. [] UE / cervical: cervical, scapular, scapulothoracic and UE control with self care, reaching, carrying, lifting, house/yardwork, driving, computer work.      NMR and Therapeutic Activities:    [] (22147 or 42522) Provided verbal/tactile cueing for activities related to improving balance, coordination, kinesthetic sense, posture, motor skill, proprioception and motor activation to allow for proper function of   [] LE: / Lumbar core, proximal hip and LE with self care and ADLs  [] UE / Cervical: cervical, postural, scapular, scapulothoracic and UE control with self care, carrying, lifting, driving, computer work.   [] (17200) Gait Re-education- Provided training and instruction to the patient for proper LE, core and proximal hip recruitment and positioning and eccentric body weight control with ambulation re-education including up and down stairs     Home Management Training / Self Care:  [] (06439) Provided self-care/home management training related to activities of daily living and compensatory training, and/or use of adaptive equipment for improvement with: ADLs and compensatory training, meal preparation, safety procedures and instruction in use of adaptive equipment, including bathing, grooming, dressing, personal hygiene, basic household cleaning and chores. Home Exercise Program:    [x] (85163) Reviewed/Progressed HEP activities related to strengthening, flexibility, endurance, ROM of   [] LE / Lumbar: core, proximal hip and LE for functional self-care, mobility, lifting and ambulation/stair navigation   [] UE / Cervical: cervical, postural, scapular, scapulothoracic and UE control with self care, reaching, carrying, lifting, house/yardwork, driving, computer work  [] (29112)Reviewed/Progressed HEP activities related to improving balance, coordination, kinesthetic sense, posture, motor skill, proprioception of   [] LE: core, proximal hip and LE for self care, mobility, lifting, and ambulation/stair navigation    [] UE / Cervical: cervical, postural,  scapular, scapulothoracic and UE control with self care, reaching, carrying, lifting, house/yardwork, driving, computer work    Manual Treatments:  PROM / STM / Oscillations-Mobs:  G-I, II, III, IV (PA's, Inf., Post.)  [] (18275) Provided manual therapy to mobilize LE, proximal hip and/or LS spine soft tissue/joints for the purpose of modulating pain, promoting relaxation,  increasing ROM, reducing/eliminating soft tissue swelling/inflammation/restriction, improving soft tissue extensibility and allowing for proper ROM for normal function with   [] LE / lumbar: self care, mobility, lifting and ambulation.     [] UE / Cervical: self care, reaching, carrying, lifting, house/yardwork, driving, computer work. Modalities:  [] (76924) Vasopneumatic compression: Utilized vasopneumatic compression to decrease edema / swelling for the purpose of improving mobility and quad tone / recruitment which will allow for increased overall function including but not limited to self-care, transfers, ambulation, and ascending / descending stairs. Charges:  Timed Code Treatment Minutes: 25   Total Treatment Minutes: 46     [] EVAL - LOW (54119)   [x] EVAL - MOD (81366)  [] EVAL - HIGH (37831)  [] RE-EVAL (94592)  [x] MV(15861) x 1      [] Ionto  [] NMR (87701) x       [] Vaso  [] Manual (91791) x       [] Ultrasound  [x] TA x  1      [] Mech Traction (52762)  [] Aquatic Therapy x     [] ES (un) (89670):   [] Home Management Training x  [] ES(attended) (53117)   [] Dry Needling 1-2 muscles (31159):  [] Dry Needling 3+ muscles (557213)  [] Group:      [] Other:     GOALS:    Patient stated goal: to improve mobility   [] Progressing: [] Met: [] Not Met: [] Adjusted     Therapist goals for Patient:   Short Term Goals: To be achieved in: 2 weeks  1. Independent in HEP and progression per patient tolerance, in order to prevent re-injury. [] Progressing: [] Met: [] Not Met: [] Adjusted  2. Patient will have a decrease in pain to facilitate improvement in movement, function, and ADLs as indicated by Functional Deficits. [] Progressing: [] Met: [] Not Met: [] Adjusted     Long Term Goals: To be achieved in: 6 weeks / Dc   1. Patient to demonstrate TUG/ score to 9  to demonstrate improved fall risk to assist with reaching prior level of function. [] Progressing: [] Met: [] Not Met: [] Adjusted  2. Patient to demonstrate consistently getting out of bed mod I .  [] Progressing: [] Met: [] Not Met: [] Adjusted  3.  Patient will demonstrate an increase in strength to good UE & Proximal hip complex, and core activation to allow for proper functional mobility as indicated by patients Functional Deficits. [] Progressing: [] Met: [] Not Met: [] Adjusted  4. Patient will return to functional activities including sit to stand mod I consistently  without increased restriction. [] Progressing: [] Met: [] Not Met: [] Adjusted  5. Patient to tolerate to demonstrate floor transfer SBA to mod I for safety management. [] Progressing: [] Met: [] Not Met: [] Adjusted         Overall Progression Towards Functional goals/ Treatment Progress Update:  [] Patient is progressing as expected towards functional goals listed. [] Progression is slowed due to complexities/Impairments listed. [] Progression has been slowed due to co-morbidities. [x] Plan just implemented, too soon to assess goals progression <30days   [] Goals require adjustment due to lack of progress  [] Patient is not progressing as expected and requires additional follow up with physician  [] Other    Persisting Functional Limitations/Impairments:  []Sleeping []Sitting               []Standing [x]Transfers        [x]Walking [x]Kneeling               [x]Stairs []Squatting / bending   [x]ADLs []Reaching  [x]Lifting  []Housework  []Driving []Job related tasks  []Sports/Recreation [x]Other: bed mobility         ASSESSMENT:  See eval    Treatment/Activity Tolerance:  [] Patient able to complete tx [] Patient limited by fatigue  [] Patient limited by pain  [] Patient limited by other medical complications  [] Other:     Prognosis: [] Good [] Fair  [] Poor    Patient Requires Follow-up: [x] Yes  [] No    Plan for next treatment session:    PLAN: See eval. PT 2x / week for 6 weeks. [] Continue per plan of care [] Alter current plan (see comments)  [x] Plan of care initiated [] Hold pending MD visit [] Discharge    Electronically signed by: Karlee Powers, PT PT, DPT    Note: If patient does not return for scheduled/ recommended follow up visits, this note will serve as a discharge from care along with most recent update on progress.

## 2023-01-10 NOTE — PLAN OF CARE
[facility], [facility], [address]  Phone: [phone]   Fax: [phone]     Physical Therapy Certification    Dear KEYSHA Donnelly*  ,    We had the pleasure of evaluating the following patient for physical therapy services at [address] [place] [address]. A summary of our findings can be found in the initial assessment below. This includes our plan of care. If you have any questions or concerns regarding these findings, please do not hesitate to contact me at the office phone number checked above. Thank you for the referral.       Physician Signature:_______________________________Date:__________________  By signing above (or electronic signature), therapists plan is approved by physician      Patient: Ferdinand Ormond   : 1945   MRN: [id number]  Referring Physician: KEYSHA Donnelly*        Evaluation Date: 2023      Medical Diagnosis Information:  Parkinson disease ([facility] [address] [G20]  General weakness [R53.1]   PT diagnosis:  imbalance , Abnormal gait                                       Insurance information: PT Insurance Information: Medicare    Plan of care sent to provider:      []Faxed   []Co-signature    (attempts: 1[x] 1/10/23 2[] 3[])          Precautions/ Contra-indications:   Adhesive allergy:  [x]NO      []YES   Latex Allergy:   [x]NO      []YES     Preferred Language for Healthcare:   [x]English       []other:    C-SSRS Triggered by Intake questionnaire (Past 2 wk assessment ):   [x] No, Questionnaire did not trigger screening.   [] Yes, Patient intake triggered C-SSRS Screening     [] Completed, no further action required.    [] Completed, PCP notified via Epic      Functional Scale:       Date assessed:  1/10/2023  MAYO   FGA  TUG  11.5  Mini besTest   Tinetti     Relevant Medical History:  Dizziness, CAD, low back pain, Left shoulder injection    SUBJECTIVE:     History of current complaint:  Per Spouse , the patient has progressed with weakness of the last 4 months . He was dx with Parkinson's over the last 4 weeks. Patient had a recent fall about 2 weeks while walking outside in sleepers on wet concrete. During incident he had a difficulty time getting up. Spouse sometimes has to assist with getting out of bed and with dressing. Spouse has also noticed decrease stamina in the last few months. Patient is currently taking sentimet for Parkinon's . He naps about 3-4 hours a date late mornings. His goal is improve his mobility strength and stamina.      Pain Scale: 2/10  Easing factors:   Provocative factors:    Type: []Constant   []Intermittent  []Radiating []Localized []other:  Numbness/Tingling:   Pt. reports bowel and bladder changes:   []yes   []no     Baseline function/PLOF:  History of falls      [x]yes   []no   []unknown  4 weeks while ambulating in sleepers outside   Pt able to drive      []yes   [x]no   []unknown  Pt independent with ADLs   []yes   []no   []unknown  Pt required assistance from family for:      [x]bathing    [x]cooking    [x]cleaning    [x]dressing    [x]laundry    []unknown    Transfers:  CGA-Mod I depending the surfaces  Ambulation: Mod I     Occupation/School/Recreational activities: retired       Social support/Living environment:      Family/caregiver support:   [x] yes:   []no  Equipment owned:  []SPC    []standard walker (SW)   []rolling walker (RW)  []rollator   []hemiwalker   []crutches   []loftstrands    []large based quad cane (LBQC)  []small based quad cane (SBQC)  []manual wc    [] electric wc    []lift chair     []hospital bed     []home o2    L      []reacher     []life alert     []none    []unknown  []other:   Home environment:   []apartment    []1 story      [x]2 story     [] in LTC facility   [] split level home    [] mobile home    []senior apartment     [] unknown     Steps to enter first floor:    [] N/A    []no steps    []     steps to enter   []no handrail    [x]single HR     []bilateral HR   []ramp    [] unknown     Steps to second floor:  [] N/A    []     steps    [x]full flight 12-13   []no handrail     [] single HR     [] bilateral HR   [] unknown     Bathroom: []tub shower    [x] walk-in shower   [x]grab bars    []shower chair   [x]tub shower bench   []raised toilet seat w/arms  []raised toilet seat w/o arms   []BSC (3 in 1)   []unknown           OBJECTIVE:     Cognitive Status:    A&O to  [x]x4    []person    []place    []time    []situation    []orientation not directly assessed    []not oriented   Able to follow:   []1 step commands    []1 step command inconsistently     []2 step commands       []multi-step commands     Vision:    []WNL    []wears glasses for reading    [x]wears glasses for sight    []Homonymous hemianopsia  []diplopia    []other:   Hearing:   [x]WNL    []wears hearing aides    []impaired     []tinnitus      Communication      [x]WNL    []slurred speech    []expressive aphasia    []receptive aphasia     []impaired:  Comments:  Wife speaks/communicates for him most of the time      Functional Mobility    Bed Mobility:     Transfers:  Rolling to right side: Min A     Sit to stand:  CGA-Mod I  Rolling to left side: Mod I     Stand to sit:  CGA-Mod I  Scooting in bed: Min A     Stand pivot:  CGA-Mod I  Supine to sit:   [facility]               Bed to chair:  Min A to mod I  Sit to supine:  [facility]       Gait Testing:     Gait   Level of Assistance needed:   CGA-Mod I    Assistive Device Used:    Gait Deviations (firm surface/linoleum):  wide base gait      Stairs  Level of Assistance needed:   Mod I     Pt negotiated up/down 4 stairs:   []WNL with 0-1 HR, reciprocal pattern, no falter     []reciprocal    []step to pattern:       []no HR    []1 HR    [x] B HR   Assistive Device Used:  None     Deficits noted:          Balance:   Static Sitting:  [x]normal  []good  []good-  []fair+  []fair   []fair -   []poor  Dynamic Sitting: [x]normal []good  []good-  []fair+  []fair   []fair -   []poor  Static Standing:  []normal  []good  [x]good-  []fair+  []fair   []fair -   []poor  Dynamic Standing: []normal  []good  []good-  []fair+  []fair   []fair -   []poor    Balance/Special Tests/Outcome Measures: Result N/A Comments   SLS      Feet Together      Romberg         Tandem Stance      QOLIBRI      TUG Score 11.5     10-meter Walk      30 sec Sit to Stand 11     Mini-BESTest      Dynamic Gait Index      Functional Gait Assessment      Tinetti Assessment       MAYO Balance Assessment           Coordination Testing:       Finger to Nose:        []WNL  [x]Impaired   Alternating pronation/supination:   [x]WNL  []Impaired   Finger/Thumb opposition:  []WNL  [x]Impaired   Heel to shin (sitting or supine):      []WNL  [x]Impaired   Alternating Toe Tapping:       [x]WNL  []Impaired     Flexibility     Hip flexion/Natalio test:    []decreased R  []decreased L  Hamstrings:    []decreased R  []decreased L  Gastrocs:     []decreased R  []decreased L  Obers/TFL/ITB:    []decreased R  []decreased L  Piriformis:    []decreased R  []decreased L  Other:      Tone   RUE:  [x]WNL []Hypertonia []Hypotonia []Tremoring []Spastic []Clonus []Flaccid  LUE:  [x]WNL  []Hypertonia []Hypotonia []Tremoring []Spastic []Clonus []Flaccid  RLE:  [x]WNL  []Hypertonia []Hypotonia []Tremoring []Spastic []Clonus []Flaccid  LLE:   [x]WNL  []Hypertonia []Hypotonia []Tremoring []Spastic []Clonus []Flaccid      Sensation      Light touch:    []WNL    [x]Diminished    []Impaired   []Absent Location:   [] RUE    []LUE         []RLE     []LLE     Comment:    Proprioception:    []WNL    []Diminished    []Impaired   []Absent Location:   [] RUE    []LUE         []RLE     []LLE     Comment:      Reflexes NT   Reflex Left Right   Biceps (C5,6)     Brachioradialis (C6)     Triceps (C7)     Quadriceps (L3,4)     Achilles (S1,2)     Ankle clonus     Elliot's reflex      Babinski's reflex            MMT LEFT RIGHT Comments   Neck flexion (C1-C2)      Neck sidebending (C3)      Shoulder flexion/elevation (C4) 4 4    Shoulder abduction (C5) 4 4    Shoulder ER 4 4    Shoulder IR 4 4    Elbow flexion/wrist extension (C6) 4 4    Elbow extension/wrist flexion (C7) 4 4    Thumb abduction (C8)      Finger abduction (T1)            Hip flexion (L1-L2) 3- 3-    Hip abduction 3- 3-    Hip extension 3+ 3+    Knee extension (L2-L4) 5 5    Knee flexion 5 5    Ankle Dorsiflexion (L4-L5) 4 4    Ankle Plantar flexion (S1-S2) 4 4    Ankle Eversion (S1-S2) 4 4    Great Toe Ext (L5)            Core Strength             [x] Patient history, allergies, meds reviewed. Medical chart reviewed. See intake form. Review Of Systems (ROS):  [x]Performed Review of systems (Integumentary, CardioPulmonary, Neurological) by intake and observation. Intake form has been scanned into medical record. Patient has been instructed to contact their primary care physician regarding ROS issues if not already being addressed at this time.       Co-morbidities/Complexities (which will affect course of rehabilitation):   []None        []Hx of COVID   Arthritic conditions   []Rheumatoid arthritis (M05.9)  []Osteoarthritis (M19.91)  []Gout   Cardiovascular conditions   []Hypertension (I10)  [x]Hyperlipidemia (E78.5)  []Angina pectoris (I20)  []Atherosclerosis (I70)  []Pacemaker  []Hx of CABG/stent/  cardiac surgeries   Musculoskeletal conditions   []Disc pathology   []Congenital spine pathologies   []Osteoporosis (M81.8)  []Osteopenia (M85.8)  []Scoliosis       Endocrine conditions   []Hypothyroid (E03.9)  []Hyperthyroid Gastrointestinal conditions   []Constipation (H85.02)   Metabolic conditions   []Morbid obesity (E66.01)  []Diabetes type 1(E10.65) or 2 (E11.65)   []Neuropathy (G60.9)     Cardio/Pulmonary conditions   []Asthma (J45)  []Coughing   []COPD (J44.9)  []CHF  []A-fib   Psychological Disorders  []Anxiety (F41.9)  []Depression (F32.9)   []Other: Developmental Disorders  []Autism (F84.0)  []CP (G80)  []Down Syndrome (Q90.9)  []Developmental delay     Neurological conditions  []Prior Stroke (I69.30)  []Parkinson's (G20)  []Encephalopathy (G93.40)  []MS (G35)  []Post-polio (G14)  []SCI  []TBI  []ALS Other conditions  []Fibromyalgia (M79.7)  []Vertigo  []Syncope  []Kidney Failure  []Cancer      []currently undergoing                treatment  []Pregnancy  []Incontinence   Prior surgeries  []involved limb  []previous spinal surgery  [] section birth  []hysterectomy  []bowel / bladder surgery  []other relevant surgeries   [x]Other:   CAD, Parkinon's ,              Barriers to/and or personal factors that will affect rehab potential:              []Age  []Sex    []Smoker              []Motivation/Lack of Motivation                        [x]Co-Morbidities              []Cognitive Function, education/learning barriers              []Environmental, home barriers              []profession/work barriers  []past PT/medical experience  []other:    Falls Risk Assessment (30 days):   [] Falls Risk assessed and no intervention required. [x] Falls Risk assessed and Patient requires intervention due to being higher risk   []Tinetti Balance:    Total Score:        Balance:            Gait:         Disability Index:       Risk of Falls:   []Low (> 24)   []Mod (19-23)   []High (< 18)    []Dynamic Gait Index       Risk of Falls: <19 = increased fall risk    []Functional Gait Assessment           Indications: Non-specific older adults: <22/30 = risk of falls; Parkinson's patients <15-18/30 = risk of falls    []De Jesus Balance Scale          []41-56 = independent     []21-40 = walking with assistance     []0-20 = wc bound    [x]Timed Up and Go (TUG)     seconds     [x]<12 = Independent    []<20 = Fall risk    []20-29  = High fall risk    []>30 = Severe fall risk  [] Falls education provided, including:       ASSESSMENT: The patient is a 69 yo male who presents with decrease mobility, abnormal gait, decrease stamina, decrease balance, and B proximal hip muscle weakness. He will benefit from skilled PT and participating in the LSVT program designed address impairments with functional movement patterns. Functional Impairments:     []Noted scapular/hip hypomobility   []Noted scapular/hip hypermobility   [x]Assistance required with functional mobility/gait for safety   []Decreased core/proximal hip strength and neuromuscular control    [x]Decreased UE/LE functional strength    []Abnormal reflexes/sensation/myotomal/dermatomal deficits  []Hypertonic UE/LE   []Hypotonic UE/LE  []Dislocated//Hypermobile Glenohumeral joint  [x]Impaired balance/coordination/proprioceptive control    []other:      Functional Activity Limitations (from functional questionnaire and intake)   []Reduced ability to tolerate prolonged functional positions   [x]Reduced ability or difficulty with changes of positions or transfers between positions   []Reduced ability to maintain good posture and demonstrate good body mechanics with sitting, bending, and lifting   []Reduced ability to sleep   [] Reduced ability or tolerance with driving and/or computer work   []Reduced ability to perform lifting, reaching, carrying tasks   [x]Reduced ability to squat   []Reduced ability to forward bend   []Reduced ability to ambulate prolonged functional periods/distances/surfaces   []Reduced ability to ascend/descend stairs   []other:       Participation Restrictions   [x]Reduced participation in self care activities   [x]Reduced participation in home management activities   []Reduced participation in work activities   [x]Reduced participation in social activities. []Reduced participation in sport/recreational activities.     Classification:   []Signs/symptoms consistent with Primary prevention/risk reduction for loss of balance and falls    []Signs/symptoms consistent with Impaired neuromotor development   []Signs/symptoms consistent with Impaired motor function and sensory integrity associated w/non-progressive disorders of CNS - Congenital/Aquired in Infancy/Childhood or Acquired in Adolescence/Adulthood   []Signs/symptoms consistent with Impaired motor function and sensory integrity associate w/progressive disorders of the CNS     []Signs/symptoms consistent with Impaired motor function and sensory integrity associated w/acute or chronic polyneuropathies   []Signs/symptoms consistent with Impaired motor function, peripheral nerve integrity, and sendory integrity associated w/non-progressive disorders of spinal cord   []other:      Prognosis/Rehab Potential:   Tolerance of evaluation/treatment:    [x]Excellent     [x]Excellent   []Good     []Good   []Fair      []Fair   []Poor      []Poor      Physical Therapy Evaluation Complexity Justification  [x] A history of present problem with:  [] no personal factors and/or comorbidities that impact the plan of care;  [x]1-2 personal factors and/or comorbidities that impact the plan of care  []3 personal factors and/or comorbidities that impact the plan of care  [x] An examination of body systems using standardized tests and measures addressing any of the following: body structures and functions (impairments), activity limitations, and/or participation restrictions;:  [x] a total of 1-2 or more elements   [] a total of 3 or more elements   [] a total of 4 or more elements   [x] A clinical presentation with:  [x] stable and/or uncomplicated characteristics   [] evolving clinical presentation with changing characteristics  [] unstable and unpredictable characteristics;   [x] Clinical decision making of [] low, [x] moderate, [] high complexity using standardized patient assessment instrument and/or measurable assessment of functional outcome.     [] EVAL (LOW) 32262 (typically 15 minutes face-to-face)  [x] EVAL (MOD) 20510 (typically 30 minutes face-to-face)  [] EVAL (HIGH) 78638 (typically 45 minutes face-to-face)  [] RE-EVAL     PLAN: PT to begin focusing on: Activation of scapular and hip musculature, Transfer & Gait safety, Evenly distributed weight-bearing through extremities, Pain Management    Frequency/Duration:  2 days per week for 6 Weeks:  Interventions:  [x]  Therapeutic exercise including: strength training, ROM, for LE, Glutes and core   [x]  NMR activation and proprioception for shoulder complex, glutes, UE/LE, and Core   [x]  Manual therapy as indicated for Shoulder & Hip complex, LE and core activation to include: STM, manual cues to facilitate/inhibit muscle groups. [x]  Modalities as needed that may include: thermal agents, E-stim, Biofeedback, US as indicated  [x]  Patient education on joint protection, postural re-education, home/activity modification, progression of HEP. HEP instruction: Written HEP instructions provided and reviewed. GOALS:  Patient stated goal: to improve mobility   [] Progressing: [] Met: [] Not Met: [] Adjusted    Therapist goals for Patient:   Short Term Goals: To be achieved in: 2 weeks  1. Independent in HEP and progression per patient tolerance, in order to prevent re-injury. [] Progressing: [] Met: [] Not Met: [] Adjusted  2. Patient will have a decrease in pain to facilitate improvement in movement, function, and ADLs as indicated by Functional Deficits. [] Progressing: [] Met: [] Not Met: [] Adjusted    Long Term Goals: To be achieved in: 6 weeks / Dc   1. Patient to demonstrate TUG/ score to 9  to demonstrate improved fall risk to assist with reaching prior level of function. [] Progressing: [] Met: [] Not Met: [] Adjusted  2. Patient to demonstrate consistently getting out of bed mod I .  [] Progressing: [] Met: [] Not Met: [] Adjusted  3.  Patient will demonstrate an increase in strength to good UE & Proximal hip complex, and core activation to allow for proper functional mobility as indicated by patients Functional Deficits. [] Progressing: [] Met: [] Not Met: [] Adjusted  4. Patient will return to functional activities including sit to stand mod I consistently  without increased restriction. [] Progressing: [] Met: [] Not Met: [] Adjusted  5. Patient to tolerate to demonstrate floor transfer SBA to mod I for safety management.    [] Progressing: [] Met: [] Not Met: [] Adjusted     Electronically signed by:  Brock Noland PT

## 2023-01-12 ENCOUNTER — APPOINTMENT (OUTPATIENT)
Dept: GENERAL RADIOLOGY | Age: 78
End: 2023-01-12
Payer: MEDICARE

## 2023-01-12 ENCOUNTER — HOSPITAL ENCOUNTER (EMERGENCY)
Age: 78
Discharge: HOME | End: 2023-01-13
Payer: MEDICARE

## 2023-01-12 DIAGNOSIS — R42 LIGHTHEADEDNESS: Primary | ICD-10-CM

## 2023-01-12 LAB
A/G RATIO: 1.3 (ref 1.1–2.2)
ALBUMIN SERPL-MCNC: 3.5 G/DL (ref 3.4–5)
ALP BLD-CCNC: 91 U/L (ref 40–129)
ALT SERPL-CCNC: 8 U/L (ref 10–40)
ANION GAP SERPL CALCULATED.3IONS-SCNC: 9 MMOL/L (ref 3–16)
AST SERPL-CCNC: 24 U/L (ref 15–37)
BASOPHILS ABSOLUTE: 0.1 K/UL (ref 0–0.2)
BASOPHILS RELATIVE PERCENT: 1.4 %
BILIRUB SERPL-MCNC: 0.3 MG/DL (ref 0–1)
BUN BLDV-MCNC: 20 MG/DL (ref 7–20)
CALCIUM SERPL-MCNC: 8.7 MG/DL (ref 8.3–10.6)
CHLORIDE BLD-SCNC: 108 MMOL/L (ref 99–110)
CO2: 24 MMOL/L (ref 21–32)
CREAT SERPL-MCNC: 0.8 MG/DL (ref 0.8–1.3)
EOSINOPHILS ABSOLUTE: 0.4 K/UL (ref 0–0.6)
EOSINOPHILS RELATIVE PERCENT: 4.7 %
GFR SERPL CREATININE-BSD FRML MDRD: >60 ML/MIN/{1.73_M2}
GLUCOSE BLD-MCNC: 113 MG/DL (ref 70–99)
HCT VFR BLD CALC: 39.2 % (ref 40.5–52.5)
HEMOGLOBIN: 12.9 G/DL (ref 13.5–17.5)
LYMPHOCYTES ABSOLUTE: 1.8 K/UL (ref 1–5.1)
LYMPHOCYTES RELATIVE PERCENT: 24 %
MCH RBC QN AUTO: 30.6 PG (ref 26–34)
MCHC RBC AUTO-ENTMCNC: 33 G/DL (ref 31–36)
MCV RBC AUTO: 92.7 FL (ref 80–100)
MONOCYTES ABSOLUTE: 0.7 K/UL (ref 0–1.3)
MONOCYTES RELATIVE PERCENT: 9.1 %
NEUTROPHILS ABSOLUTE: 4.7 K/UL (ref 1.7–7.7)
NEUTROPHILS RELATIVE PERCENT: 60.8 %
PDW BLD-RTO: 13.4 % (ref 12.4–15.4)
PLATELET # BLD: 222 K/UL (ref 135–450)
PMV BLD AUTO: 8.5 FL (ref 5–10.5)
POTASSIUM SERPL-SCNC: 4.8 MMOL/L (ref 3.5–5.1)
PRO-BNP: 194 PG/ML (ref 0–449)
RBC # BLD: 4.23 M/UL (ref 4.2–5.9)
SODIUM BLD-SCNC: 141 MMOL/L (ref 136–145)
TOTAL PROTEIN: 6.1 G/DL (ref 6.4–8.2)
TROPONIN: <0.01 NG/ML
WBC # BLD: 7.7 K/UL (ref 4–11)

## 2023-01-12 PROCEDURE — 99285 EMERGENCY DEPT VISIT HI MDM: CPT

## 2023-01-12 PROCEDURE — 80053 COMPREHEN METABOLIC PANEL: CPT

## 2023-01-12 PROCEDURE — 85025 COMPLETE CBC W/AUTO DIFF WBC: CPT

## 2023-01-12 PROCEDURE — 83880 ASSAY OF NATRIURETIC PEPTIDE: CPT

## 2023-01-12 PROCEDURE — 36415 COLL VENOUS BLD VENIPUNCTURE: CPT

## 2023-01-12 PROCEDURE — 93005 ELECTROCARDIOGRAM TRACING: CPT | Performed by: EMERGENCY MEDICINE

## 2023-01-12 PROCEDURE — 81003 URINALYSIS AUTO W/O SCOPE: CPT

## 2023-01-12 PROCEDURE — 84484 ASSAY OF TROPONIN QUANT: CPT

## 2023-01-12 PROCEDURE — 71045 X-RAY EXAM CHEST 1 VIEW: CPT

## 2023-01-12 ASSESSMENT — ENCOUNTER SYMPTOMS
NAUSEA: 0
SHORTNESS OF BREATH: 0
DIARRHEA: 0
RHINORRHEA: 0
VOMITING: 0
COUGH: 0
ABDOMINAL PAIN: 0

## 2023-01-13 ENCOUNTER — TELEPHONE (OUTPATIENT)
Dept: CARDIOLOGY CLINIC | Age: 78
End: 2023-01-13

## 2023-01-13 ENCOUNTER — HOSPITAL ENCOUNTER (OUTPATIENT)
Dept: PHYSICAL THERAPY | Age: 78
Setting detail: THERAPIES SERIES
Discharge: HOME | End: 2023-01-13
Payer: MEDICARE

## 2023-01-13 VITALS
RESPIRATION RATE: 16 BRPM | SYSTOLIC BLOOD PRESSURE: 130 MMHG | TEMPERATURE: 98.4 F | HEIGHT: 69 IN | OXYGEN SATURATION: 97 % | DIASTOLIC BLOOD PRESSURE: 61 MMHG | WEIGHT: 191 LBS | HEART RATE: 57 BPM | BODY MASS INDEX: 28.29 KG/M2

## 2023-01-13 LAB
BILIRUBIN URINE: NEGATIVE
BLOOD, URINE: NEGATIVE
CLARITY: CLEAR
COLOR: YELLOW
EKG ATRIAL RATE: 67 BPM
EKG DIAGNOSIS: NORMAL
EKG P AXIS: 67 DEGREES
EKG P-R INTERVAL: 156 MS
EKG Q-T INTERVAL: 430 MS
EKG QRS DURATION: 88 MS
EKG QTC CALCULATION (BAZETT): 454 MS
EKG R AXIS: 63 DEGREES
EKG T AXIS: 64 DEGREES
EKG VENTRICULAR RATE: 67 BPM
GLUCOSE URINE: NEGATIVE MG/DL
KETONES, URINE: ABNORMAL MG/DL
LEUKOCYTE ESTERASE, URINE: NEGATIVE
MICROSCOPIC EXAMINATION: ABNORMAL
NITRITE, URINE: NEGATIVE
PH UA: 5.5 (ref 5–8)
PROTEIN UA: NEGATIVE MG/DL
SPECIFIC GRAVITY UA: 1.02 (ref 1–1.03)
URINE REFLEX TO CULTURE: ABNORMAL
URINE TYPE: ABNORMAL
UROBILINOGEN, URINE: 0.2 E.U./DL

## 2023-01-13 PROCEDURE — 97530 THERAPEUTIC ACTIVITIES: CPT

## 2023-01-13 PROCEDURE — 93010 ELECTROCARDIOGRAM REPORT: CPT | Performed by: INTERNAL MEDICINE

## 2023-01-13 PROCEDURE — 97110 THERAPEUTIC EXERCISES: CPT

## 2023-01-13 NOTE — FLOWSHEET NOTE
[facility]  Phone: [phone]   Fax: [phone]    Physical Therapy Daily Treatment Note    Date:  2023     Patient Name:  Maris Olszewski    :  1945  MRN: [id number]  Medical Diagnosis:  Parkinson disease ([facility] [address] [G20]  General weakness [R53.1]  Treatment Diagnosis: imbalance , Abnormal gait   Insurance/Certification information:  PT Insurance Information: Medicare  Physician Information:  Jerryl Bumpers, APRN*    Plan of care signed (Y/N): [x]  Yes []  No     Date of Patient follow up with Physician:      Progress Report: []  Yes  [x]  No     Date Range for reporting period:  Beginnin/10/2023  Ending:     Progress report due (10 Rx/or 30 days whichever is less): visit #10 or  2794    Recertification due (POC duration/ or 90 days whichever is less): visit # or  23    Visit # Insurance Allowable Auth required? Date Range    Mn []  Yes  [x]  No Mn           Latex Allergy:  [x]NO      []YES  Preferred Language for Healthcare:   [x]English       []other:    Functional Scale:       Date assessed:  TUG 11.5      1/10/2023    Pain level:  0/10     SUBJECTIVE:    Patient and wife report dizziness and low BP resulting in ER visit last night. Found to be dehydrated and wife reports abnormal heart readings that they plan to discuss with cardiologist at end of the month. Pt still getting dizzy at times but has been improving. Wasn't able to perform balance ex's yesterday.         OBJECTIVE: See eval      RESTRICTIONS/PRECAUTIONS:     Exercises/Interventions:   Therapeutic Exercises (03833) Resistance / level Sets/sec Reps Notes   Nustep  warmup  Bike   1.0   5 mins                                                             Therapeutic Activities (23918)       Bed mobility training   -Sit to supine  -Rolling R & L w/Big technique  -Supine to sit    X2    X2      X10 ea    Sit to stand w/Big technique 20\"  10                  Gait (61196)       LSVT Big gait   160' -cues for improved B arm swing and posture                        Neuromuscular Re-ed (07596)       Balance training:  LVST screening       Tandem  NBOS  Airex                                    Manual Intervention (43051)                                                     Modalities:     Pt. Education:  01/10/2023  -patient educated on diagnosis, prognosis and expectations for rehab  -all patient questions were answered    Home Exercise Program:  Access Code: [email or web address]  URL: [email or web address]  Date: 01/13/2023  Prepared by: Katherine Grijalva    Exercises  Sit to Stand Without Arm Support - 2 x daily - 7 x weekly - 1 sets - 10 reps  Seated Upright Posture Correction - 4 x daily - 7 x weekly - 1 sets - 5 reps  Added side to side rocking in supine         Therapeutic Exercise and NMR EXR  [] (39113) Provided verbal/tactile cueing for activities related to strengthening, flexibility, endurance, ROM for improvements in  [] LE / Lumbar: LE, proximal hip, and core control with self care, mobility, lifting, ambulation. [] UE / Cervical: cervical, postural, scapular, scapulothoracic and UE control with self care, reaching, carrying, lifting, house/yardwork, driving, computer work.  [] (25614) Provided verbal/tactile cueing for activities related to improving balance, coordination, kinesthetic sense, posture, motor skill, proprioception to assist with   [] LE / lumbar: LE, proximal hip, and core control in self care, mobility, lifting, ambulation and eccentric single leg control.    [] UE / cervical: cervical, scapular, scapulothoracic and UE control with self care, reaching, carrying, lifting, house/yardwork, driving, computer work.   [] (42936) Therapist is in constant attendance of 2 or more patients providing skilled therapy interventions, but not providing any significant amount of measurable one-on-one time to either patient, for improvements in  [] LE / lumbar: LE, proximal hip, and core control in self care, mobility, lifting, ambulation and eccentric single leg control. [] UE / cervical: cervical, scapular, scapulothoracic and UE control with self care, reaching, carrying, lifting, house/yardwork, driving, computer work. NMR and Therapeutic Activities:    [x] (05702 or 45023) Provided verbal/tactile cueing for activities related to improving balance, coordination, kinesthetic sense, posture, motor skill, proprioception and motor activation to allow for proper function of   [x] LE: / Lumbar core, proximal hip and LE with self care and ADLs  [] UE / Cervical: cervical, postural, scapular, scapulothoracic and UE control with self care, carrying, lifting, driving, computer work.   [] (93916) Gait Re-education- Provided training and instruction to the patient for proper LE, core and proximal hip recruitment and positioning and eccentric body weight control with ambulation re-education including up and down stairs     Home Management Training / Self Care:  [] (29327) Provided self-care/home management training related to activities of daily living and compensatory training, and/or use of adaptive equipment for improvement with: ADLs and compensatory training, meal preparation, safety procedures and instruction in use of adaptive equipment, including bathing, grooming, dressing, personal hygiene, basic household cleaning and chores.      Home Exercise Program:    [x] (53692) Reviewed/Progressed HEP activities related to strengthening, flexibility, endurance, ROM of   [] LE / Lumbar: core, proximal hip and LE for functional self-care, mobility, lifting and ambulation/stair navigation   [] UE / Cervical: cervical, postural, scapular, scapulothoracic and UE control with self care, reaching, carrying, lifting, house/yardwork, driving, computer work  [] (57969)Reviewed/Progressed HEP activities related to improving balance, coordination, kinesthetic sense, posture, motor skill, proprioception of   [] LE: core, proximal hip and LE for self care, mobility, lifting, and ambulation/stair navigation    [] UE / Cervical: cervical, postural,  scapular, scapulothoracic and UE control with self care, reaching, carrying, lifting, house/yardwork, driving, computer work    Manual Treatments:  PROM / STM / Oscillations-Mobs:  G-I, II, III, IV (PA's, Inf., Post.)  [] (46771) Provided manual therapy to mobilize LE, proximal hip and/or LS spine soft tissue/joints for the purpose of modulating pain, promoting relaxation,  increasing ROM, reducing/eliminating soft tissue swelling/inflammation/restriction, improving soft tissue extensibility and allowing for proper ROM for normal function with   [] LE / lumbar: self care, mobility, lifting and ambulation. [] UE / Cervical: self care, reaching, carrying, lifting, house/yardwork, driving, computer work. Modalities:  [] (13007) Vasopneumatic compression: Utilized vasopneumatic compression to decrease edema / swelling for the purpose of improving mobility and quad tone / recruitment which will allow for increased overall function including but not limited to self-care, transfers, ambulation, and ascending / descending stairs. Charges:  Timed Code Treatment Minutes: 42   Total Treatment Minutes: 42     [] EVAL - LOW (90329)   [] EVAL - MOD (21059)  [] EVAL - HIGH (42796)  [] RE-EVAL (27214)  [x] IY(61096) x 1      [] Ionto  [] NMR (54363) x       [] Vaso  [] Manual (54382) x       [] Ultrasound  [x] TA x  2      [] Mech Traction (73187)  [] Aquatic Therapy x     [] ES (un) (89366):   [] Home Management Training x  [] ES(attended) (48520)   [] Dry Needling 1-2 muscles (78460):  [] Dry Needling 3+ muscles (113100)  [] Group:      [] Other:     GOALS:    Patient stated goal: to improve mobility   [] Progressing: [] Met: [] Not Met: [] Adjusted     Therapist goals for Patient:   Short Term Goals: To be achieved in: 2 weeks  1.  Independent in HEP and progression per patient tolerance, in order to prevent re-injury. [] Progressing: [] Met: [] Not Met: [] Adjusted  2. Patient will have a decrease in pain to facilitate improvement in movement, function, and ADLs as indicated by Functional Deficits. [] Progressing: [] Met: [] Not Met: [] Adjusted     Long Term Goals: To be achieved in: 6 weeks / Dc   1. Patient to demonstrate TUG/ score to 9  to demonstrate improved fall risk to assist with reaching prior level of function. [] Progressing: [] Met: [] Not Met: [] Adjusted  2. Patient to demonstrate consistently getting out of bed mod I .  [] Progressing: [] Met: [] Not Met: [] Adjusted  3. Patient will demonstrate an increase in strength to good UE & Proximal hip complex, and core activation to allow for proper functional mobility as indicated by patients Functional Deficits. [] Progressing: [] Met: [] Not Met: [] Adjusted  4. Patient will return to functional activities including sit to stand mod I consistently  without increased restriction. [] Progressing: [] Met: [] Not Met: [] Adjusted  5. Patient to tolerate to demonstrate floor transfer SBA to mod I for safety management. [] Progressing: [] Met: [] Not Met: [] Adjusted         Overall Progression Towards Functional goals/ Treatment Progress Update:  [] Patient is progressing as expected towards functional goals listed. [] Progression is slowed due to complexities/Impairments listed. [] Progression has been slowed due to co-morbidities.   [x] Plan just implemented, too soon to assess goals progression <30days   [] Goals require adjustment due to lack of progress  [] Patient is not progressing as expected and requires additional follow up with physician  [] Other    Persisting Functional Limitations/Impairments:  []Sleeping []Sitting               []Standing [x]Transfers        [x]Walking [x]Kneeling               [x]Stairs []Squatting / bending   [x]ADLs []Reaching  [x]Lifting []Housework  []Driving []Job related tasks  []Sports/Recreation [x]Other: bed mobility         ASSESSMENT:  Deferred LSVT Big assessment due to recent ER visit for dizziness but performed activities with Big principles in preparation of expected LSVT Big program.  Pt tolerated well, even improved ability to roll in bed, get in & out of bed, and perform transfers. Patient did become dizzy several times during treatment and wife reports pt is trying to drink more water and eat more salt. Plan to perform LSVT Big assessment next session if pt is able to increase fluid intake to stabilize blood pressure. Treatment/Activity Tolerance:  [x] Patient able to complete tx [] Patient limited by fatigue  [] Patient limited by pain  [] Patient limited by other medical complications  [] Other:     Prognosis: [] Good [x] Fair  [] Poor    Patient Requires Follow-up: [x] Yes  [] No    Plan for next treatment session:    PLAN: See eval. PT 2x / week for 6 weeks. [x] Continue per plan of care [] Alter current plan (see comments)  [] Plan of care initiated [] Hold pending MD visit [] Discharge    Electronically signed by: Denton Sheets PT, DPT    Note: If patient does not return for scheduled/ recommended follow up visits, this note will serve as a discharge from care along with most recent update on progress.

## 2023-01-13 NOTE — TELEPHONE ENCOUNTER
Pt spouse states pt was in ER [facility] last night. He was having dizziness and BP running in the 90's all day. Pt did have Chest Pain when walking. Asking to see LES for ER FU. Please advise.

## 2023-01-13 NOTE — ED PROVIDER NOTES
EKG Interpretation    Interpreted by emergency department physician    Rhythm: normal sinus   Rate: normal  Axis: normal  Ectopy: premature ventricular contractions (unifocal)  Conduction: normal  ST Segments: no acute change  T Waves: no acute change  Q Waves: none    Clinical Impression: Normal sinus rhythm    MD Ada Franklin MD  01/13/23 5610

## 2023-01-13 NOTE — ED PROVIDER NOTES
[facility]        Pt Name: Sonido Espana  MRN: [id number]  [place] 1945  Date of evaluation: 1/12/2023  Provider: Chapo Petty PA-C  PCP: KEYSHA Blue CNP  Note Started: 11:16 PM EST 1/12/23      IVIS. I have evaluated this patient. My supervising physician was available for consultation. CHIEF COMPLAINT       Chief Complaint   Patient presents with    Dizziness     Pt arrives via EMS from home per EMS pt was c/o dizziness and wife states pt was hypotensive, wife got manual BP with systolic of 93, EMS states BP en route was over 383 systolic, BP in triage 517/35. EMS states pt did have some chest tightness earlier in the day that has now resolved. Per EMS EKG showed elevation in V6 and AVL not reciprocal in another lead. Pt has hx of cardiac stent placement. Pt denies any chest tightness or dizziness at time of triage       HISTORY OF PRESENT ILLNESS: 1 or more Elements     History From: Patient, Wife  Limitations to history : None    Sonido Espana is a 70 y.o. male who presents to the emergency department today for evaluation for lightheadedness. The patient is accompanied by his wife, who does help with the history. The patient has a past medical history of Parkinson's disease, history of coronary artery disease, hyperlipidemia. The patient states that he did go out to a meeting today, and he states that he did have several glasses of Coke, and normally does not drink this much caffeine. The patient states that upon returning home, he states that he felt very lightheaded and felt that he was going to pass out. The wife is at bedside and reports that the patient does have frequent episodes of feeling dizzy, and lightheaded. The wife states that she checked the patient's blood pressure, and she was noticed that it was in the 79Y, systolic. She states that his heart rate was in the 40s and 50s.   The patient was continuing reporting lightheadedness, and EMS was called. When the patient arrived to the ED, his vital signs are stable, the patient is actually asymptomatic. Patient denies feeling dizzy or lightheaded at this time. Patient again reports that he has had multiple episodes of this lightheadedness in the past, denies feeling dizzy that the room is spinning. The patient states that his episode today is similar to the other episodes that he has had in the past.  Patient states that he never had chest pain or shortness of breath. Patient denies any abdominal pain. He has no recent illnesses including fever, cough, vomiting or diarrhea. No urinary symptoms, no other complaints    Nursing Notes were all reviewed and agreed with or any disagreements were addressed in the HPI. REVIEW OF SYSTEMS :      Review of Systems   Constitutional:  Negative for activity change, appetite change, chills and fever. HENT:  Negative for congestion and rhinorrhea. Respiratory:  Negative for cough and shortness of breath. Cardiovascular:  Negative for chest pain. Gastrointestinal:  Negative for abdominal pain, diarrhea, nausea and vomiting. Genitourinary:  Negative for difficulty urinating, dysuria and hematuria. Neurological:  Positive for light-headedness. Positives and Pertinent negatives as per HPI.      SURGICAL HISTORY     Past Surgical History:   Procedure Laterality Date    CORONARY ANGIOPLASTY WITH STENT PLACEMENT  2005    [facility]    DIAGNOSTIC CARDIAC CATH LAB PROCEDURE  2005    [facility]       Discharge Medication List as of 1/13/2023 12:40 AM        CONTINUE these medications which have NOT CHANGED    Details   atorvastatin (LIPITOR) 80 MG tablet TAKE 1 TABLET EVERY DAY, Disp-90 tablet, R-3Normal      famotidine (PEPCID) 20 MG tablet Take 20 mg by mouth 2 times dailyHistorical Med      zinc gluconate 50 MG tablet Take 50 mg by mouth dailyHistorical Med      vitamin C (ASCORBIC ACID) 500 MG tablet Take 500 mg by mouth dailyHistorical Med      turmeric 500 MG CAPS Take by mouth dailyHistorical Med      guaiFENesin 400 MG tablet Take 400 mg by mouth 4 times daily as needed for CoughHistorical Med      donepezil (ARICEPT) 10 MG tablet Historical Med      fluticasone (FLONASE) 50 MCG/ACT nasal spray 2 sprays by Nasal route daily, Disp-16 g, R-5Normal      fexofenadine (ALLEGRA) 180 MG tablet Take 180 mg by mouth dailyHistorical Med      carbidopa-levodopa (SINEMET)  MG per tablet Take 1 tablet by mouth 6 times daily Patient wife stated Every 2 HR  9am-7pmHistorical Med      Menatetrenone (VITAMIN K2) 100 MCG TABS Take by mouth dailyHistorical Med      cloZAPine (CLOZARIL) 25 MG tablet 25 mg 2 times daily Historical Med      nitroGLYCERIN (NITROSTAT) 0.4 MG SL tablet Place 1 tablet under the tongue every 5 minutes as needed (prn), Disp-25 tablet,R-3Normal      Vitamin D, Cholecalciferol, 50 MCG (2000 UT) CAPS Take by mouthHistorical Med      Coenzyme Q10 (COQ10 PO) Take 1 capsule by mouth dailyHistorical Med      Carbidopa-Levodopa ER 36. MG CPCR Take 1 tablet by mouth 4 times daily Historical Med      primidone (MYSOLINE) 50 MG tablet Take 50 mg by mouth dailyHistorical Med      omeprazole (PRILOSEC) 20 MG delayed release capsule Take 20 mg by mouth dailyHistorical Med      aspirin 81 MG tablet Take 1 tablet by mouth daily, Disp-30 tablet, R-0      Psyllium 500 MG CAPS Take 6 capsules by mouth daily Historical Med      fish oil-omega-3 fatty acids 1000 MG capsule Take 2 g by mouth 2 times daily Historical Med      multivitamin (THERAGRAN) per tablet Take 1 tablet by mouth daily.                ALLERGIES     Amoxicillin, Amoxicillin-pot clavulanate, Sulfasuccinamide, and Tamsulosin    FAMILYHISTORY       Family History   Adopted: Yes   Problem Relation Age of Onset    Cancer Mother     Other Father         Rebeca Brinks    Other Brother         emphysema    Cancer Sister Diabetes Sister     Other Brother         joint problems    Diabetes Sister     Heart Disease Neg Hx     High Blood Pressure Neg Hx     High Cholesterol Neg Hx         SOCIAL HISTORY       Social History     Tobacco Use    Smoking status: Former     Packs/day: 0.25     Years: 10.00     Pack years: 2.50     Types: Cigarettes     Quit date:      Years since quittin.0    Smokeless tobacco: Never    Tobacco comments:     states he smokes a cigar once a month or once every six weeks   Vaping Use    Vaping Use: Never used   Substance Use Topics    Alcohol use: No     Comment: occas. Drug use: No       SCREENINGS        [place] Coma Scale  Eye Opening: Spontaneous  Best Verbal Response: Oriented  Best Motor Response: Obeys commands  Simon Coma Scale Score: 15                CIWA Assessment  BP: 130/61  Heart Rate: 57           PHYSICAL EXAM  1 or more Elements     ED Triage Vitals [23 2143]   BP Temp Temp Source Heart Rate Resp SpO2 Height Weight   113/84 98.4 °F (36.9 °C) Oral 67 16 98 % 5' 8.5\" (1.74 m) 191 lb (86.6 kg)       Physical Exam  Vitals and nursing note reviewed. Constitutional:       Appearance: He is well-developed. He is not diaphoretic. HENT:      Head: Normocephalic and atraumatic. Right Ear: External ear normal.      Left Ear: External ear normal.      Nose: Nose normal.      Mouth/Throat:      Mouth: Mucous membranes are moist.      Pharynx: Oropharynx is clear. No posterior oropharyngeal erythema. Eyes:      General:         Right eye: No discharge. Left eye: No discharge. Extraocular Movements: Extraocular movements intact. Conjunctiva/sclera: Conjunctivae normal.      Pupils: Pupils are equal, round, and reactive to light. Neck:      Trachea: No tracheal deviation. Cardiovascular:      Rate and Rhythm: Regular rhythm. Tachycardia present. Pulmonary:      Effort: Pulmonary effort is normal. No respiratory distress.       Breath sounds: Normal breath sounds. No wheezing or rales. Abdominal:      General: Bowel sounds are normal. There is no distension. Palpations: Abdomen is soft. Tenderness: There is no abdominal tenderness. There is no guarding. Musculoskeletal:         General: Normal range of motion. Cervical back: Normal range of motion and neck supple. Skin:     General: Skin is warm and dry. Neurological:      General: No focal deficit present. Mental Status: He is alert and oriented to person, place, and time. GCS: GCS eye subscore is 4. GCS verbal subscore is 5. GCS motor subscore is 6. Cranial Nerves: Cranial nerves 2-12 are intact. Sensory: Sensation is intact. Motor: Motor function is intact. Coordination: Coordination is intact. Gait: Gait is intact. Psychiatric:         Behavior: Behavior normal.           DIAGNOSTIC RESULTS   LABS:    Labs Reviewed   CBC WITH AUTO DIFFERENTIAL - Abnormal; Notable for the following components:       Result Value    Hemoglobin 12.9 (*)     Hematocrit 39.2 (*)     All other components within normal limits   COMPREHENSIVE METABOLIC PANEL - Abnormal; Notable for the following components:    Glucose 113 (*)     Total Protein 6.1 (*)     ALT 8 (*)     All other components within normal limits   URINALYSIS WITH REFLEX TO CULTURE - Abnormal; Notable for the following components:    Ketones, Urine TRACE (*)     All other components within normal limits   TROPONIN   BRAIN NATRIURETIC PEPTIDE       When ordered only abnormal lab results are displayed. All other labs were within normal range or not returned as of this dictation. EKG: When ordered, EKG's are interpreted by the Emergency Department Physician in the absence of a cardiologist.  Please see their note for interpretation of EKG.     RADIOLOGY:   Non-plain film images such as CT, Ultrasound and MRI are read by the radiologist. Plain radiographic images are visualized and preliminarily interpreted by the ED Provider with the below findings:        Interpretation per the Radiologist below, if available at the time of this note:    XR CHEST PORTABLE   Final Result   Unchanged appearance of the chest without acute airspace disease identified. XR CHEST PORTABLE    Result Date: 1/12/2023  EXAMINATION: ONE XRAY VIEW OF THE CHEST 1/12/2023 10:15 pm COMPARISON: 04/01/2022 HISTORY: ORDERING SYSTEM PROVIDED HISTORY: erum TECHNOLOGIST PROVIDED HISTORY: Reason for exam:->lightheadednes Reason for Exam: Dizziness FINDINGS: Kyphotic positioning. The cardiomediastinal silhouette is unchanged in appearance. There is no consolidation, pneumothorax, or evidence of edema. No effusion is appreciated. The osseous structures are unchanged in appearance. Unchanged appearance of the chest without acute airspace disease identified. No results found. PROCEDURES   Unless otherwise noted below, none     Procedures    CRITICAL CARE TIME (.cctime)       PAST MEDICAL HISTORY      has a past medical history of Acute bronchitis, CAD (coronary artery disease), Clostridioides difficile infection (07/12/2020), and Hyperlipidemia. EMERGENCY DEPARTMENT COURSE and DIFFERENTIAL DIAGNOSIS/MDM:   Vitals:    Vitals:    01/12/23 2200 01/12/23 2345 01/13/23 0000 01/13/23 0015   BP: 133/67 (!) 137/54 127/60 130/61   Pulse: 67 61 55 57   Resp:       Temp:       TempSrc:       SpO2: 97%      Weight:       Height:           Patient was given the following medications:  Medications - No data to display          Is this patient to be included in the SEP-1 Core Measure due to severe sepsis or septic shock? No   Exclusion criteria - the patient is NOT to be included for SEP-1 Core Measure due to: Infection is not suspected    Chronic Conditions affecting care:  has a past medical history of Acute bronchitis, CAD (coronary artery disease), Clostridioides difficile infection (07/12/2020), and Hyperlipidemia.     CONSULTS: (Who and What was discussed)  None      Social Determinants : None    Records Reviewed (Source): Previous emergency room visits    CC/HPI Summary, DDx, ED Course, and Reassessment: Briefly, this is a 79-year-old male who presents to the emergency department today for evaluation for lightheadedness. The patient states he does have a history of dizziness, and lightheadedness and states that he does have these episodes pretty frequently. The patient states that he did go to a meeting today, he states that he did have several glasses of Coca-Cola and he otherwise does not normally drink that much pop. Patient states that upon returning home, he states that he was reporting that he felt very lightheaded. He states that his blood pressure was checked it was noted to be in the 94Q systolic. EMS was called. Patient reports that he never had chest pain, shortness of breath. Patient denies been dizzy that the room was spinning. No fall or true syncope. The patient arrives to the ED, his vital signs are stable, he states that he is actually asymptomatic. His physical exam is unremarkable, there are no neurological deficits. Disposition Considerations (tests considered but not done, Admit vs D/C, Shared Decision Making, Pt Expectation of Test or Tx.):    EKG is documented by my attending, please see his note for further details. CBC shows no evidence of leukocytosis, mild stable anemia. CMP is unremarkable. Troponin negative, BNP is normal.  X-ray is negative. Urine shows no evidence of infection or blood. Does show trace ketones. Patient has been able to tolerate p.o. intake in the ED without any difficulty, has been in the emergency room for over 3 hours, continues to remain asymptomatic. I did have a long discussion with the patient, as well as family members at bedside. Shared medical decision making in regards to admission, versus continued observation, versus discharge home.   The patient tells me that he has had these episodes in the past, these are not new symptoms for him, as he has been asymptomatic here with stable vital signs and a negative work-up, shared medical decision making with the family, they prefer to go home, I do feel that this is reasonable. They will call her cardiologist in the morning. Strict return precautions given. Patient and family voiced understanding and are agreeable with plan. Stable for discharge.    Results for orders placed or performed during the hospital encounter of 01/12/23   CBC with Auto Differential   Result Value Ref Range    WBC 7.7 4.0 - 11.0 K/uL    RBC 4.23 4.20 - 5.90 M/uL    Hemoglobin 12.9 (L) 13.5 - 17.5 g/dL    Hematocrit 39.2 (L) 40.5 - 52.5 %    MCV 92.7 80.0 - 100.0 fL    MCH 30.6 26.0 - 34.0 pg    MCHC 33.0 31.0 - 36.0 g/dL    RDW 13.4 12.4 - 15.4 %    Platelets 616 723 - 080 K/uL    MPV 8.5 5.0 - 10.5 fL    Neutrophils % 60.8 %    Lymphocytes % 24.0 %    Monocytes % 9.1 %    Eosinophils % 4.7 %    Basophils % 1.4 %    Neutrophils Absolute 4.7 1.7 - 7.7 K/uL    Lymphocytes Absolute 1.8 1.0 - 5.1 K/uL    Monocytes Absolute 0.7 0.0 - 1.3 K/uL    Eosinophils Absolute 0.4 0.0 - 0.6 K/uL    Basophils Absolute 0.1 0.0 - 0.2 K/uL   Comprehensive Metabolic Panel   Result Value Ref Range    Sodium 141 136 - 145 mmol/L    Potassium 4.8 3.5 - 5.1 mmol/L    Chloride 108 99 - 110 mmol/L    CO2 24 21 - 32 mmol/L    Anion Gap 9 3 - 16    Glucose 113 (H) 70 - 99 mg/dL    BUN 20 7 - 20 mg/dL    Creatinine 0.8 0.8 - 1.3 mg/dL    Est, Glom Filt Rate >60 >60    Calcium 8.7 8.3 - 10.6 mg/dL    Total Protein 6.1 (L) 6.4 - 8.2 g/dL    Albumin 3.5 3.4 - 5.0 g/dL    Albumin/Globulin Ratio 1.3 1.1 - 2.2    Total Bilirubin 0.3 0.0 - 1.0 mg/dL    Alkaline Phosphatase 91 40 - 129 U/L    ALT 8 (L) 10 - 40 U/L    AST 24 15 - 37 U/L   Troponin   Result Value Ref Range    Troponin <0.01 <0.01 ng/mL   Brain Natriuretic Peptide   Result Value Ref Range    Pro- 0 - 449 pg/mL Urinalysis with Reflex to Culture    Specimen: Urine   Result Value Ref Range    Color, UA Yellow Straw/Yellow    Clarity, UA Clear Clear    Glucose, Ur Negative Negative mg/dL    Bilirubin Urine Negative Negative    Ketones, Urine TRACE (A) Negative mg/dL    Specific Gravity, UA 1.020 1.005 - 1.030    Blood, Urine Negative Negative    pH, UA 5.5 5.0 - 8.0    Protein, UA Negative Negative mg/dL    Urobilinogen, Urine 0.2 <2.0 E.U./dL    Nitrite, Urine Negative Negative    Leukocyte Esterase, Urine Negative Negative    Microscopic Examination Not Indicated     Urine Type NotGiven     Urine Reflex to Culture Not Indicated    EKG 12 Lead   Result Value Ref Range    Ventricular Rate 67 BPM    Atrial Rate 67 BPM    P-R Interval 156 ms    QRS Duration 88 ms    Q-T Interval 440 ms    QTc Calculation (Bazett) 464 ms    P Axis 67 degrees    R Axis 63 degrees    T Axis 64 degrees    Diagnosis       Sinus rhythm with frequent Premature ventricular complexesOtherwise normal ECG       I estimate there is LOW risk for PE, ACS, pneumonia, pleural effusion, pneumothorax, myocarditis, pericarditis, cardiac tamponade, life-threatening arrhythmia, respiratory distress, acute dissectionthus I consider the discharge disposition reasonable. Oneil Jenkins and I have discussed the diagnosis and risks, and we agree with discharging home to follow-up with their primary doctor. We also discussed returning to the Emergency Department immediately if new or worsening symptoms occur. We have discussed the symptoms which are most concerning (e.g., bloody sputum, fever, worsening pain or shortness of breath, vomiting) that necessitate immediate return. I am the Primary Clinician of Record. FINAL IMPRESSION      1. Lightheadedness          DISPOSITION/PLAN     DISPOSITION Decision To Discharge 01/13/2023 01:11:20 AM      PATIENT REFERRED TO:  Denise Umana MD  [address] [facility].  [address]    Call in 1 day      [facility] Emergency Department  [address] [facility]  [phone]    As needed, If symptoms worsen    DISCHARGE MEDICATIONS:  Discharge Medication List as of 1/13/2023 12:40 AM          DISCONTINUED MEDICATIONS:  Discharge Medication List as of 1/13/2023 12:40 AM                 (Please note that portions of this note were completed with a voice recognition program.  Efforts were made to edit the dictations but occasionally words are mis-transcribed.)    Mark Esteban PA-C (electronically signed)        Mark Esteban PA-C  01/13/23 0250

## 2023-01-18 ENCOUNTER — HOSPITAL ENCOUNTER (OUTPATIENT)
Dept: PHYSICAL THERAPY | Age: 78
Setting detail: THERAPIES SERIES
Discharge: HOME | End: 2023-01-18
Payer: MEDICARE

## 2023-01-18 PROCEDURE — 97112 NEUROMUSCULAR REEDUCATION: CPT

## 2023-01-18 PROCEDURE — 97530 THERAPEUTIC ACTIVITIES: CPT

## 2023-01-18 PROCEDURE — 97110 THERAPEUTIC EXERCISES: CPT

## 2023-01-18 NOTE — FLOWSHEET NOTE
[address] [facility]  Phone: [phone]   Fax: [phone]    Physical Therapy Daily Treatment Note    Date:  2023     Patient Name:  Rebecca Grimes    :  1945  MRN: [id number]  Medical Diagnosis:  Parkinson disease ([facility] [address] [G20]  General weakness [R53.1]  Treatment Diagnosis: imbalance , Abnormal gait   Insurance/Certification information:  PT Insurance Information: Medicare  Physician Information:  JERAMIE Santamaria    Plan of care signed (Y/N): [x]  Yes []  No     Date of Patient follow up with Physician:      Progress Report: []  Yes  [x]  No     Date Range for reporting period:  Beginnin/10/2023  Ending:     Progress report due (10 Rx/or 30 days whichever is less): visit #10 or      Recertification due (POC duration/ or 90 days whichever is less): visit # or  23    Visit # Insurance Allowable Auth required? Date Range   3/12 Mn []  Yes  [x]  No Mn           Latex Allergy:  [x]NO      []YES  Preferred Language for Healthcare:   [x]English       []other:    Functional Scale:       Date assessed:  TUG 11.5      1/10/2023    Pain level:  0/10     SUBJECTIVE:     Dizziness continues, is constant but typically occurs daily. Systolic BP has been over 100, drinking water is improving but still a struggle. Has been performing HEP regularly.         OBJECTIVE: See eval      RESTRICTIONS/PRECAUTIONS:     Exercises/Interventions:   Therapeutic Exercises (53735) Resistance / level Sets/sec Reps Notes   Nustep  warmup  Bike   1.0   5 mins     IB / HR  2x30\"/2x10                                                      Therapeutic Activities (80024)       Bed mobility training   -Sit to supine  -Rolling R & L w/Big technique  -Supine to sit    -prone position      4 mins               -standing to S/L, rolled L to prone   Sit to stand w/Big technique  -AirEx 20\"    10   -no UE assist   Fwd step ups  Lat step ups 6\"  6\" 1  1 10 B  10 B -no UE assist  -no UE assist                               LSVT functional tasks:  -Getting in and out of car  -Putting on long sleeved shirt  -Buttoning shirt   4\" box  npv    x8           Gait (29780)       LSVT Big gait   -cues for improved B arm swing and posture                        Neuromuscular Re-ed (96951)       Balance training:  LVST screening      Deferred due to persistent dizziness   Tandem  NBOS  Airex                                    Manual Intervention (99426)       Seated hamstring stretch  3x30\" B     Supine piriformis stretch                                       Modalities:     Pt. Education:  01/10/2023  -patient educated on diagnosis, prognosis and expectations for rehab  -all patient questions were answered    Home Exercise Program:  Access Code: [email or web address]  URL: [email or web address]  Date: 01/13/2023  Prepared by: Francisco Torres    Exercises  Sit to Stand Without Arm Support - 2 x daily - 7 x weekly - 1 sets - 10 reps  Seated Upright Posture Correction - 4 x daily - 7 x weekly - 1 sets - 5 reps  Added side to side rocking in supine         Therapeutic Exercise and NMR EXR  [] (83133) Provided verbal/tactile cueing for activities related to strengthening, flexibility, endurance, ROM for improvements in  [] LE / Lumbar: LE, proximal hip, and core control with self care, mobility, lifting, ambulation. [] UE / Cervical: cervical, postural, scapular, scapulothoracic and UE control with self care, reaching, carrying, lifting, house/yardwork, driving, computer work.  [] (96562) Provided verbal/tactile cueing for activities related to improving balance, coordination, kinesthetic sense, posture, motor skill, proprioception to assist with   [] LE / lumbar: LE, proximal hip, and core control in self care, mobility, lifting, ambulation and eccentric single leg control.    [] UE / cervical: cervical, scapular, scapulothoracic and UE control with self care, reaching, carrying, lifting, house/yardwork, driving, computer work.   [] (68951) Therapist is in constant attendance of 2 or more patients providing skilled therapy interventions, but not providing any significant amount of measurable one-on-one time to either patient, for improvements in  [] LE / lumbar: LE, proximal hip, and core control in self care, mobility, lifting, ambulation and eccentric single leg control. [] UE / cervical: cervical, scapular, scapulothoracic and UE control with self care, reaching, carrying, lifting, house/yardwork, driving, computer work. NMR and Therapeutic Activities:    [x] (13622 or 98058) Provided verbal/tactile cueing for activities related to improving balance, coordination, kinesthetic sense, posture, motor skill, proprioception and motor activation to allow for proper function of   [x] LE: / Lumbar core, proximal hip and LE with self care and ADLs  [] UE / Cervical: cervical, postural, scapular, scapulothoracic and UE control with self care, carrying, lifting, driving, computer work.   [] (60383) Gait Re-education- Provided training and instruction to the patient for proper LE, core and proximal hip recruitment and positioning and eccentric body weight control with ambulation re-education including up and down stairs     Home Management Training / Self Care:  [] (28630) Provided self-care/home management training related to activities of daily living and compensatory training, and/or use of adaptive equipment for improvement with: ADLs and compensatory training, meal preparation, safety procedures and instruction in use of adaptive equipment, including bathing, grooming, dressing, personal hygiene, basic household cleaning and chores.      Home Exercise Program:    [x] (67639) Reviewed/Progressed HEP activities related to strengthening, flexibility, endurance, ROM of   [] LE / Lumbar: core, proximal hip and LE for functional self-care, mobility, lifting and ambulation/stair navigation   [] UE / Cervical: cervical, postural, scapular, scapulothoracic and UE control with self care, reaching, carrying, lifting, house/yardwork, driving, computer work  [] (78076)Reviewed/Progressed HEP activities related to improving balance, coordination, kinesthetic sense, posture, motor skill, proprioception of   [] LE: core, proximal hip and LE for self care, mobility, lifting, and ambulation/stair navigation    [] UE / Cervical: cervical, postural,  scapular, scapulothoracic and UE control with self care, reaching, carrying, lifting, house/yardwork, driving, computer work    Manual Treatments:  PROM / STM / Oscillations-Mobs:  G-I, II, III, IV (PA's, Inf., Post.)  [] (21175) Provided manual therapy to mobilize LE, proximal hip and/or LS spine soft tissue/joints for the purpose of modulating pain, promoting relaxation,  increasing ROM, reducing/eliminating soft tissue swelling/inflammation/restriction, improving soft tissue extensibility and allowing for proper ROM for normal function with   [] LE / lumbar: self care, mobility, lifting and ambulation. [] UE / Cervical: self care, reaching, carrying, lifting, house/yardwork, driving, computer work. Modalities:  [] (95187) Vasopneumatic compression: Utilized vasopneumatic compression to decrease edema / swelling for the purpose of improving mobility and quad tone / recruitment which will allow for increased overall function including but not limited to self-care, transfers, ambulation, and ascending / descending stairs.        Charges:  Timed Code Treatment Minutes: 58   Total Treatment Minutes: 58     [] EVAL - LOW (53176)   [] EVAL - MOD (77062)  [] EVAL - HIGH (87070)  [] RE-EVAL (96280)  [x] FS(77095) x 1      [] Ionto  [x] NMR (41971) x  1     [] Vaso  [] Manual (77542) x       [] Ultrasound  [x] TA x  2      [] Mech Traction (49942)  [] Aquatic Therapy x     [] ES (un) (03731):   [] Home Management Training x  [] ES(attended) (06952)   [] Dry Needling 1-2 muscles (24997):  [] Dry Needling 3+ muscles (749380)  [] Group:      [] Other:     GOALS:    Patient stated goal: to improve mobility   [] Progressing: [] Met: [] Not Met: [] Adjusted     Therapist goals for Patient:   Short Term Goals: To be achieved in: 2 weeks  1. Independent in HEP and progression per patient tolerance, in order to prevent re-injury. [] Progressing: [] Met: [] Not Met: [] Adjusted  2. Patient will have a decrease in pain to facilitate improvement in movement, function, and ADLs as indicated by Functional Deficits. [] Progressing: [] Met: [] Not Met: [] Adjusted     Long Term Goals: To be achieved in: 6 weeks / Dc   1. Patient to demonstrate TUG/ score to 9  to demonstrate improved fall risk to assist with reaching prior level of function. [] Progressing: [] Met: [] Not Met: [] Adjusted  2. Patient to demonstrate consistently getting out of bed mod I .  [] Progressing: [] Met: [] Not Met: [] Adjusted  3. Patient will demonstrate an increase in strength to good UE & Proximal hip complex, and core activation to allow for proper functional mobility as indicated by patients Functional Deficits. [] Progressing: [] Met: [] Not Met: [] Adjusted  4. Patient will return to functional activities including sit to stand mod I consistently  without increased restriction. [] Progressing: [] Met: [] Not Met: [] Adjusted  5. Patient to tolerate to demonstrate floor transfer SBA to mod I for safety management. [] Progressing: [] Met: [] Not Met: [] Adjusted         Overall Progression Towards Functional goals/ Treatment Progress Update:  [] Patient is progressing as expected towards functional goals listed. [] Progression is slowed due to complexities/Impairments listed. [] Progression has been slowed due to co-morbidities.   [x] Plan just implemented, too soon to assess goals progression <30days   [] Goals require adjustment due to lack of progress  [] Patient is not progressing as expected and requires additional follow up with physician  [] Other    Persisting Functional Limitations/Impairments:  []Sleeping []Sitting               []Standing [x]Transfers        [x]Walking [x]Kneeling               [x]Stairs []Squatting / bending   [x]ADLs []Reaching  [x]Lifting  []Housework  []Driving []Job related tasks  []Sports/Recreation [x]Other: bed mobility         ASSESSMENT:  Deferred LSVT Big assessment as pt continues to have dizzy spells, discussed w/pt and wife how LSVT Big program is challenging on body and would most likely only exacerbate symptoms. Patient performed step ups well, had some difficulty placing entire foot on step to leave space for contralateral LE, improved with cues, unable to maintain however. With cues, pt able to perform getting in and out of car LSVT functional task well, assess response to treatment and if able to carry over into car when leaving. Continue and alter complaint surface with sit to stand transfers. Treatment/Activity Tolerance:  [x] Patient able to complete tx [] Patient limited by fatigue  [] Patient limited by pain  [] Patient limited by other medical complications  [] Other:     Prognosis: [] Good [x] Fair  [] Poor    Patient Requires Follow-up: [x] Yes  [] No    Plan for next treatment session:    PLAN: See eval. PT 2x / week for 6 weeks. [x] Continue per plan of care [] Alter current plan (see comments)  [] Plan of care initiated [] Hold pending MD visit [] Discharge    Electronically signed by: Dangelo Terry, PT, DPT    Note: If patient does not return for scheduled/ recommended follow up visits, this note will serve as a discharge from care along with most recent update on progress.

## 2023-01-20 ENCOUNTER — HOSPITAL ENCOUNTER (OUTPATIENT)
Dept: PHYSICAL THERAPY | Age: 78
Setting detail: THERAPIES SERIES
Discharge: HOME | End: 2023-01-20
Payer: MEDICARE

## 2023-01-20 PROCEDURE — 97112 NEUROMUSCULAR REEDUCATION: CPT

## 2023-01-20 PROCEDURE — 97110 THERAPEUTIC EXERCISES: CPT

## 2023-01-20 PROCEDURE — 97530 THERAPEUTIC ACTIVITIES: CPT

## 2023-01-20 NOTE — FLOWSHEET NOTE
[address] [facility]  Phone: [phone]   Fax: [phone]    Physical Therapy Daily Treatment Note    Date:  2023     Patient Name:  Layne Corral    :  1945  MRN: [id number]  Medical Diagnosis:  Parkinson disease ([facility] [address] [G20]  General weakness [R53.1]  Treatment Diagnosis: imbalance , Abnormal gait   Insurance/Certification information:  PT Insurance Information: Medicare  Physician Information:  JERAMIE Martin    Plan of care signed (Y/N): [x]  Yes []  No     Date of Patient follow up with Physician:      Progress Report: []  Yes  [x]  No     Date Range for reporting period:  Beginnin/10/2023  Ending:     Progress report due (10 Rx/or 30 days whichever is less): visit #10 or      Recertification due (POC duration/ or 90 days whichever is less): visit # or  23    Visit # Insurance Allowable Auth required? Date Range    Mn []  Yes  [x]  No Mn           Latex Allergy:  [x]NO      []YES  Preferred Language for Healthcare:   [x]English       []other:    Functional Scale:       Date assessed:  TUG 11.5      1/10/2023    Pain level:  0/10     SUBJECTIVE:     Doing okay today, denies pain, was pretty tired after last session though. Dizziness is less yesterday and today, still seems to occur at anytime, can't pinpoint a certain activity or time that triggers his dizziness.         OBJECTIVE: See eval      RESTRICTIONS/PRECAUTIONS:     Exercises/Interventions:   Therapeutic Exercises (77053) Resistance / level Sets/sec Reps Notes   Nustep  warmup  Bike   1.0   5 mins     IB / HR  2x30\"/2x10                                                      Therapeutic Activities (15489)       Bed mobility training   -Sit to supine  -Rolling R & L w/Big technique  -Supine to sit    -prone position                    -standing to S/L, rolled L to prone   Sit to stand w/Big technique  -AirEx  -blue wedge 20\"    Ascending  Descending 10  10   -no UE assist   Fwd step ups  Lat step ups 6\"  6\" 1  1 10 B  10 B -no UE assist  -no UE assist                               LSVT functional tasks:  -Getting in and out of car  -Putting on long sleeved shirt  -Buttoning shirt   4\" box      x6     -frequent cues required          Gait (37753)       LSVT Big gait   300' -cues for improved B hands and B arm swing                        Neuromuscular Re-ed (97810)       Balance training:  LVST screening      Deferred due to persistent dizziness   Tandem  NBOS  Airex                                    Manual Intervention (19829)       Seated hamstring stretch      Supine piriformis stretch                                       Modalities:     Pt. Education:  01/10/2023  -patient educated on diagnosis, prognosis and expectations for rehab  -all patient questions were answered    Home Exercise Program:  Access Code: [email or web address]  URL: [email or web address]  Date: 01/13/2023  Prepared by: Miller Mc    Exercises  Sit to Stand Without Arm Support - 2 x daily - 7 x weekly - 1 sets - 10 reps  Seated Upright Posture Correction - 4 x daily - 7 x weekly - 1 sets - 5 reps  Added side to side rocking in supine         Therapeutic Exercise and NMR EXR  [x] (20996) Provided verbal/tactile cueing for activities related to strengthening, flexibility, endurance, ROM for improvements in  [x] LE / Lumbar: LE, proximal hip, and core control with self care, mobility, lifting, ambulation. [] UE / Cervical: cervical, postural, scapular, scapulothoracic and UE control with self care, reaching, carrying, lifting, house/yardwork, driving, computer work.  [] (05133) Provided verbal/tactile cueing for activities related to improving balance, coordination, kinesthetic sense, posture, motor skill, proprioception to assist with   [] LE / lumbar: LE, proximal hip, and core control in self care, mobility, lifting, ambulation and eccentric single leg control.    [] UE / cervical: cervical, scapular, scapulothoracic and UE control with self care, reaching, carrying, lifting, house/yardwork, driving, computer work.   [] (17172) Therapist is in constant attendance of 2 or more patients providing skilled therapy interventions, but not providing any significant amount of measurable one-on-one time to either patient, for improvements in  [] LE / lumbar: LE, proximal hip, and core control in self care, mobility, lifting, ambulation and eccentric single leg control. [] UE / cervical: cervical, scapular, scapulothoracic and UE control with self care, reaching, carrying, lifting, house/yardwork, driving, computer work. NMR and Therapeutic Activities:    [x] (23563 or 23060) Provided verbal/tactile cueing for activities related to improving balance, coordination, kinesthetic sense, posture, motor skill, proprioception and motor activation to allow for proper function of   [x] LE: / Lumbar core, proximal hip and LE with self care and ADLs  [] UE / Cervical: cervical, postural, scapular, scapulothoracic and UE control with self care, carrying, lifting, driving, computer work.   [] (20206) Gait Re-education- Provided training and instruction to the patient for proper LE, core and proximal hip recruitment and positioning and eccentric body weight control with ambulation re-education including up and down stairs     Home Management Training / Self Care:  [] (69477) Provided self-care/home management training related to activities of daily living and compensatory training, and/or use of adaptive equipment for improvement with: ADLs and compensatory training, meal preparation, safety procedures and instruction in use of adaptive equipment, including bathing, grooming, dressing, personal hygiene, basic household cleaning and chores.      Home Exercise Program:    [x] (80983) Reviewed/Progressed HEP activities related to strengthening, flexibility, endurance, ROM of   [] LE / Lumbar: core, proximal hip and LE for functional self-care, mobility, lifting and ambulation/stair navigation   [] UE / Cervical: cervical, postural, scapular, scapulothoracic and UE control with self care, reaching, carrying, lifting, house/yardwork, driving, computer work  [] (39332)Reviewed/Progressed HEP activities related to improving balance, coordination, kinesthetic sense, posture, motor skill, proprioception of   [] LE: core, proximal hip and LE for self care, mobility, lifting, and ambulation/stair navigation    [] UE / Cervical: cervical, postural,  scapular, scapulothoracic and UE control with self care, reaching, carrying, lifting, house/yardwork, driving, computer work    Manual Treatments:  PROM / STM / Oscillations-Mobs:  G-I, II, III, IV (PA's, Inf., Post.)  [] (51382) Provided manual therapy to mobilize LE, proximal hip and/or LS spine soft tissue/joints for the purpose of modulating pain, promoting relaxation,  increasing ROM, reducing/eliminating soft tissue swelling/inflammation/restriction, improving soft tissue extensibility and allowing for proper ROM for normal function with   [] LE / lumbar: self care, mobility, lifting and ambulation. [] UE / Cervical: self care, reaching, carrying, lifting, house/yardwork, driving, computer work. Modalities:  [] (13747) Vasopneumatic compression: Utilized vasopneumatic compression to decrease edema / swelling for the purpose of improving mobility and quad tone / recruitment which will allow for increased overall function including but not limited to self-care, transfers, ambulation, and ascending / descending stairs.        Charges:  Timed Code Treatment Minutes: 50   Total Treatment Minutes: 50     [] EVAL - LOW (64874)   [] EVAL - MOD (27665)  [] EVAL - HIGH (18803)  [] RE-EVAL (07211)  [x] EU(07998) x 1      [] Ionto  [x] NMR (12322) x  1     [] Vaso  [] Manual (55796) x       [] Ultrasound  [x] TA x  1      [] Mech Traction (30307)  [] Aquatic Therapy x     [] ES (un) (95973):   [] Home Management Training x  [] ES(attended) (91520)   [] Dry Needling 1-2 muscles (04488):  [] Dry Needling 3+ muscles (021552)  [] Group:      [] Other:     GOALS:    Patient stated goal: to improve mobility   [] Progressing: [] Met: [] Not Met: [] Adjusted     Therapist goals for Patient:   Short Term Goals: To be achieved in: 2 weeks  1. Independent in HEP and progression per patient tolerance, in order to prevent re-injury. [] Progressing: [] Met: [] Not Met: [] Adjusted  2. Patient will have a decrease in pain to facilitate improvement in movement, function, and ADLs as indicated by Functional Deficits. [] Progressing: [] Met: [] Not Met: [] Adjusted     Long Term Goals: To be achieved in: 6 weeks / Dc   1. Patient to demonstrate TUG/ score to 9  to demonstrate improved fall risk to assist with reaching prior level of function. [] Progressing: [] Met: [] Not Met: [] Adjusted  2. Patient to demonstrate consistently getting out of bed mod I .  [] Progressing: [] Met: [] Not Met: [] Adjusted  3. Patient will demonstrate an increase in strength to good UE & Proximal hip complex, and core activation to allow for proper functional mobility as indicated by patients Functional Deficits. [] Progressing: [] Met: [] Not Met: [] Adjusted  4. Patient will return to functional activities including sit to stand mod I consistently  without increased restriction. [] Progressing: [] Met: [] Not Met: [] Adjusted  5. Patient to tolerate to demonstrate floor transfer SBA to mod I for safety management. [] Progressing: [] Met: [] Not Met: [] Adjusted         Overall Progression Towards Functional goals/ Treatment Progress Update:  [] Patient is progressing as expected towards functional goals listed. [] Progression is slowed due to complexities/Impairments listed. [] Progression has been slowed due to co-morbidities.   [x] Plan just implemented, too soon to assess goals progression <30days   [] Goals require adjustment due to lack of progress  [] Patient is not progressing as expected and requires additional follow up with physician  [] Other    Persisting Functional Limitations/Impairments:  []Sleeping []Sitting               []Standing [x]Transfers        [x]Walking [x]Kneeling               [x]Stairs []Squatting / bending   [x]ADLs []Reaching  [x]Lifting  []Housework  []Driving []Job related tasks  []Sports/Recreation [x]Other: bed mobility         ASSESSMENT:  Deferred LSVT Big assessment as pt continues to have dizzy spells, although are beginning to reduce, consider LSVT assessment next session if dizzy spells further reduce. Pt demo'd significant difficulty donning jacket, but did improve with reps. Fwd & lat step ups improved with direction to step up and down Big, however pt continues to have difficulty with Big hands and increased amplitude of arm swing, increased stride length was noted. Continue strengthening of B hips and LSVT Big functional tasks. Treatment/Activity Tolerance:  [x] Patient able to complete tx [] Patient limited by fatigue  [] Patient limited by pain  [] Patient limited by other medical complications  [] Other:     Prognosis: [] Good [x] Fair  [] Poor    Patient Requires Follow-up: [x] Yes  [] No    Plan for next treatment session:    PLAN: See eval. PT 2x / week for 6 weeks. [x] Continue per plan of care [] Alter current plan (see comments)  [] Plan of care initiated [] Hold pending MD visit [] Discharge    Electronically signed by: Janet Beard, PT, DPT    Note: If patient does not return for scheduled/ recommended follow up visits, this note will serve as a discharge from care along with most recent update on progress.

## 2023-01-23 ENCOUNTER — HOSPITAL ENCOUNTER (OUTPATIENT)
Dept: PHYSICAL THERAPY | Age: 78
Setting detail: THERAPIES SERIES
End: 2023-01-23
Payer: MEDICARE

## 2023-01-23 NOTE — FLOWSHEET NOTE
[address] [facility]     Physical Therapy  Cancellation/No-show Note  Patient Name:  Marina Bashir  :  1945   Date:  2023  Cancelled visits to date: 0  No-shows to date: 0    Patient status for today's appointment patient:  []  Cancelled  [x]  Rescheduled appointment  to   [x]  No-show      Reason given by patient:  []  Patient ill  []  Conflicting appointment  []  No transportation    []  Conflict with work  []  No reason given  [x]  Other:  had wrong day written down   Comments:      Phone call information:   [x]  Phone call made today to patient at 2:45 time at number provided:      [x]  Patient answered, conversation as follows: notified of missed visit, pt's wife reported having wrong day written down, able to reschedule this missed visit to . []  Patient did not answer, message left as follows:  []  Phone call not made today  []  Phone call not needed - pt contacted us to cancel and provided reason for cancellation.      Electronically signed by:  yWatt Salas PT

## 2023-01-23 NOTE — PROGRESS NOTES
[address]   Cardiac Follow up    Referring Provider:  KEYSHA Mallory CNP     Chief Complaint   Patient presents with    Follow-Up from Hospital    Chest Pain     sometimes    Shortness of Breath     sometimes    Dizziness          History of Present Illness:  Mr. Abdoulaye Wyman is a 70 y.o. gentleman. His history includes CAD with PCI in 2005, hypertension, and hyperlipidemia. He had a cardiac angiogram in 2015 that showed normal LV function and EF 60%. Coronary stent widely patent. He has Parkinsons, [facility] he has been evaluated by the [facility] [place]. He took an at home COVID test which was positive in July 2022. He was seen in ER 1/12/23 with c/o dizziness and hypotension (SBP 93), EMS in route states SBP was over 100. Pt also had chest tightness earlier that day that spontaneously resolved. The patient said that he has had these episodes in the past, these are not new symptoms for him, as he has been asymptomatic in ER with stable vital signs and a negative work-up, shared medical decision making with the family, they prefer to go home. Pt d/c'd with follow up on outpatient basis with cardiology. Today, he is here for hospital follow up. He is with his wife. He feels dizzy at times but not currently. Pt denies exertional chest pain, GONZALEZ/PND, palpitations, edema. They plan to go out of town Feb 19 for a couple of weeks. Past Medical History:   has a past medical history of Acute bronchitis, CAD (coronary artery disease), Clostridioides difficile infection, and Hyperlipidemia. Also parkinson disease    Surgical History:   has a past surgical history that includes Diagnostic Cardiac Cath Lab Procedure (01/01/2005); Coronary angioplasty with stent (01/01/2005); and Cataract extraction. Social History:   reports that he quit smoking about 43 years ago. His smoking use included cigarettes. He has a 2.50 pack-year smoking history.  He has quit using smokeless tobacco. He reports current alcohol use. He reports that he does not use drugs. Family History:  family history includes Cancer in his mother and sister; Diabetes in his sister and sister; Other in his brother, brother, and father. He was adopted. Home Medications:  Prior to Admission medications      Current Outpatient Medications   Medication Sig Dispense Refill    atorvastatin (LIPITOR) 80 MG tablet TAKE 1 TABLET EVERY DAY 90 tablet 3    guaiFENesin 400 MG tablet Take 400 mg by mouth 4 times daily as needed for Cough      donepezil (ARICEPT) 10 MG tablet nightly      fluticasone (FLONASE) 50 MCG/ACT nasal spray 2 sprays by Nasal route daily 16 g 5    fexofenadine (ALLEGRA) 180 MG tablet Take 180 mg by mouth daily      carbidopa-levodopa (SINEMET)  MG per tablet Take 1 tablet by mouth 6 times daily Patient wife stated Every 2 HR  9am-7pm      Menatetrenone (VITAMIN K2) 100 MCG TABS Take by mouth daily      cloZAPine (CLOZARIL) 25 MG tablet 25 mg 2 times daily       nitroGLYCERIN (NITROSTAT) 0.4 MG SL tablet Place 1 tablet under the tongue every 5 minutes as needed (prn) 25 tablet 3    Vitamin D, Cholecalciferol, 50 MCG (2000 UT) CAPS Take by mouth      Coenzyme Q10 (COQ10 PO) Take 1 capsule by mouth daily      Carbidopa-Levodopa ER 36. MG CPCR Take 1 tablet by mouth 4 times daily       primidone (MYSOLINE) 50 MG tablet Take 50 mg by mouth daily      omeprazole (PRILOSEC) 20 MG delayed release capsule Take 20 mg by mouth daily      aspirin 81 MG tablet Take 1 tablet by mouth daily 30 tablet 0    Psyllium 500 MG CAPS Take 6 capsules by mouth daily       fish oil-omega-3 fatty acids 1000 MG capsule Take 2 g by mouth 2 times daily       multivitamin (THERAGRAN) per tablet Take 1 tablet by mouth daily. No current facility-administered medications for this visit.      Allergies:  Amoxicillin, Amoxicillin-pot clavulanate, Sulfasuccinamide, and Tamsulosin     Review of Systems: Constitutional: there has been no unanticipated weight loss. There's been no change in energy level, sleep pattern, or activity level. Eyes: No visual changes or diplopia. No scleral icterus. ENT: No Headaches, hearing loss or vertigo. No mouth sores or sore throat. Cardiovascular: Reviewed in HPI  Respiratory: No cough or wheezing, no sputum production. No hematemesis. Gastrointestinal: No abdominal pain, appetite loss, blood in stools. No change in bowel or bladder habits. Genitourinary: No dysuria, trouble voiding, or hematuria. Musculoskeletal:  No gait disturbance, weakness or joint complaints. Integumentary: No rash or pruritis. Neurological: No headache, diplopia, change in muscle strength, numbness or tingling. No change in gait, balance, coordination, mood, affect, memory, mentation, behavior. Psychiatric: No anxiety, no depression. Endocrine: No malaise, fatigue or temperature intolerance. No excessive thirst, fluid intake, or urination. No tremor. Hematologic/Lymphatic: No abnormal bruising or bleeding, blood clots or swollen lymph nodes. Allergic/Immunologic: No nasal congestion or hives. Physical Examination:    Pulse 70, height 5' 8\" (1.727 m), weight 201 lb 6.4 oz (91.4 kg), SpO2 97 %. Vitals:    01/30/23 1150   Pulse: 70   SpO2: 97%            Constitutional and General Appearance: in NAD  Skin:good turgor,intact without lesions  HEENT: EOMI ,normal  Neck:no JVD  Respiratory:  Normal excursion and expansion without use of accessory muscles  Resp Auscultation: Normal breath sounds without dullness  Cardiovascular: The apical impulses not displaced  Heart tones are crisp and normal  Cervical veins are not engorged  The carotid upstroke is normal in amplitude and contour without delay. No bruit heard on physical.   Peripheral pulses are symmetrical and full  There is no clubbing, cyanosis of the extremities.   No edema  Femoral Arteries: 2+ and equal  Pedal Pulses: 2+ and equal Abdomen:  No masses or tenderness  Liver/Spleen: No Abnormalities Noted  Neurological/Psychiatric:  Alert and oriented in all spheres  Moves all extremities well  Exhibits normal gait balance and coordination  No abnormalities of mood, affect, memory, mentation, or behavior are noted  Positive for tremors        Assessment:    1. Coronary atherosclerosis of native coronary artery:   denies anginal symptoms   History of occasional chest pain. Reports episodes can be weeks apart, or even months. -EKG 1/17/22 (read & interpreted by me)> NSR with lateral ischemia   -Cath 2005> stent in LAD  -Cath 2006> Patent stent in lad -nonobstructive circumflex disease. -GXT Myoview 2010> Normal perfusion. An angiogram was normal.   -Cath 4/2015> Normal LV function with EF 60, widely patent LAD stent, 30% distal CFx. -  nuclear stress test to evaluate CP   -Lexiscan karina 7/17/19> Normal LV size and systolic function. No evidence of ischemia. -ECHO 1/15/10: EF 55%, mild mitral and aortic insufficiency  -LHC 2/1/22 Normal EF. LAD stent widely patent. Otherwise only 30 %LAD stenosis    Continue ASA/statin     2. Hypertension:   Stable. C/o intermittent dizziness    Pulse 70, height 5' 8\" (1.727 m), weight 201 lb 6.4 oz (91.4 kg), SpO2 97 %. Continue current medication regimen    Bp rechecked by me, sitting 90/50 and drops 6-8 points upon standing     3. Hyperlipemia:   remain on Lipitor 80mg and fish oil. 6/2021>  TG 93 HDL 40 LDL 71  11/16/22 >  TG 79 HDL 41 LDL 65     4. Carotid disease:   stable  Dopplers 2011> 46-19% MINA, 2-41% LICA. Dopplers 3/2022> There is <50% stenosis of the right internal carotid arteries. No hemodynamically significant carotid stenosis noted on left side. Vertebral flow are antegrade bilaterally. Continue statin/ASA     5. Parkinson disease:   He is treated by Dr. Summer Rasmussen at [facility] and had a second opinion at [facility].       6.  Tremors, chronic: Essential, familial.        Plan:   Cardiac test and lab results personally reviewed by me during this office visit and discussed. Start Midodrine 2.5 mg three times per day with meals  Check BP in morning and evening, call Dr Joaquina Zuñiga if Sbp is greater than 140  Continue risk factor modifications. Call for any change in symptoms, call to report any changes in shortness of breath or development of chest pain with activity. Follow up end of March    Wilver Zuñiga M.D., [facility] [place]    Patient's problem list, medications, allergies, past medical, surgical, social and family histories were reviewed and updated as appropriate. Scribe's attestation: This note was scribed in the presence of Dr Joaquina Zuñiga by Luisana Cardona RN. The scribe's documentation has been prepared under my direction and personally reviewed by me in its entirety. I confirm that the note above accurately reflects all work, treatment, procedures, and medical decision making performed by me.

## 2023-01-25 ENCOUNTER — HOSPITAL ENCOUNTER (OUTPATIENT)
Dept: PHYSICAL THERAPY | Age: 78
Setting detail: THERAPIES SERIES
Discharge: HOME | End: 2023-01-25
Payer: MEDICARE

## 2023-01-25 PROCEDURE — 97112 NEUROMUSCULAR REEDUCATION: CPT

## 2023-01-25 PROCEDURE — 97110 THERAPEUTIC EXERCISES: CPT

## 2023-01-25 NOTE — FLOWSHEET NOTE
[address] [facility]  Phone: [phone]   Fax: [phone]    Physical Therapy Daily Treatment Note    Date:  2023     Patient Name:  Erica Jesus    :  1945  MRN: [id number]  Medical Diagnosis:  Parkinson disease ([facility] [address] [G20]  General weakness [R53.1]  Treatment Diagnosis: imbalance , Abnormal gait   Insurance/Certification information:  PT Insurance Information: Medicare  Physician Information:  KEYSHA Ashley*    Plan of care signed (Y/N): [x]  Yes []  No     Date of Patient follow up with Physician:      Progress Report: []  Yes  [x]  No     Date Range for reporting period:  Beginnin/10/2023  Ending:     Progress report due (10 Rx/or 30 days whichever is less): visit #10 or  1601    Recertification due (POC duration/ or 90 days whichever is less): visit # or  23    Visit # Insurance Allowable Auth required? Date Range    Mn []  Yes  [x]  No Mn           Latex Allergy:  [x]NO      []YES  Preferred Language for Healthcare:   [x]English       []other:    Functional Scale:       Date assessed:  TUG 11.5      1/10/2023    Pain level:  0/10     SUBJECTIVE:     Doing okay today, no new complaints. Dizzy spells continue and remain unpredictable. Wife reports pt demo'd good improvements over the weekend while with family noticing pt moving better.       OBJECTIVE:        RESTRICTIONS/PRECAUTIONS:     Exercises/Interventions:   Therapeutic Exercises (18389) Resistance / level Sets/sec Reps Notes   Nustep  warmup  Bike  TM -retro   1.0  0.6/0.5 mph   2' / 3'     IB / HR      Total gym  -squats   CGA on/off   2   10                                                Therapeutic Activities (36773)       Bed mobility training   -Sit to supine  -Rolling R & L w/Big technique  -Supine to sit    -prone position                    -standing to S/L, rolled L to prone   Sit to stand w/Big technique  -AirEx  -blue wedge 20\"    Ascending  Descending      -no UE assist   Fwd step ups  Lat step ups 6\"  6\" -no UE assist  -no UE assist                               LSVT functional tasks:  -Getting in and out of car  -Putting on long sleeved jacket  -Buttoning shirt   4\" box      x5     -improved technique          Gait (91114)       LSVT Big gait   300' -cues for improved B hands and B arm swing                        Neuromuscular Re-ed (49482)       Balance training:  LVST screening      Deferred due to persistent dizziness   Tandem  NBOS  Airex   10\"x4 B  -frequent stepping to regain balance   Shuttle balance:  -improved posture  -cervical rotation  -B arm swing       npv   15\"x3     10 B   -1 UE assist  -1 UE assist                        Manual Intervention (93572)       Seated hamstring stretch      Supine piriformis stretch                                       Modalities:     Pt. Education:     1/25: discussed reducing use of suspenders as they could be applying tactile feedback to patient to facilitate poor posture. Pt and wife plan to try belts again, but pt recently lost a lot of weight and are having difficulty keeping pants up.      01/10/2023  -patient educated on diagnosis, prognosis and expectations for rehab  -all patient questions were answered    Home Exercise Program:  Access Code: [email or web address]  URL: [email or web address]  Date: 01/13/2023  Prepared by: Kofi Peck    Exercises  Sit to Stand Without Arm Support - 2 x daily - 7 x weekly - 1 sets - 10 reps  Seated Upright Posture Correction - 4 x daily - 7 x weekly - 1 sets - 5 reps  Added side to side rocking in supine         Therapeutic Exercise and NMR EXR  [x] (44917) Provided verbal/tactile cueing for activities related to strengthening, flexibility, endurance, ROM for improvements in  [x] LE / Lumbar: LE, proximal hip, and core control with self care, mobility, lifting, ambulation.   [] UE / Cervical: cervical, postural, scapular, scapulothoracic and UE control with self care, reaching, carrying, lifting, house/yardwork, driving, computer work.  [] (41628) Provided verbal/tactile cueing for activities related to improving balance, coordination, kinesthetic sense, posture, motor skill, proprioception to assist with   [] LE / lumbar: LE, proximal hip, and core control in self care, mobility, lifting, ambulation and eccentric single leg control. [] UE / cervical: cervical, scapular, scapulothoracic and UE control with self care, reaching, carrying, lifting, house/yardwork, driving, computer work.   [] (87928) Therapist is in constant attendance of 2 or more patients providing skilled therapy interventions, but not providing any significant amount of measurable one-on-one time to either patient, for improvements in  [] LE / lumbar: LE, proximal hip, and core control in self care, mobility, lifting, ambulation and eccentric single leg control. [] UE / cervical: cervical, scapular, scapulothoracic and UE control with self care, reaching, carrying, lifting, house/yardwork, driving, computer work.      NMR and Therapeutic Activities:    [x] (67667 or 50866) Provided verbal/tactile cueing for activities related to improving balance, coordination, kinesthetic sense, posture, motor skill, proprioception and motor activation to allow for proper function of   [x] LE: / Lumbar core, proximal hip and LE with self care and ADLs  [] UE / Cervical: cervical, postural, scapular, scapulothoracic and UE control with self care, carrying, lifting, driving, computer work.   [] (32469) Gait Re-education- Provided training and instruction to the patient for proper LE, core and proximal hip recruitment and positioning and eccentric body weight control with ambulation re-education including up and down stairs     Home Management Training / Self Care:  [] (26838) Provided self-care/home management training related to activities of daily living and compensatory training, and/or use of adaptive equipment for improvement with: ADLs and compensatory training, meal preparation, safety procedures and instruction in use of adaptive equipment, including bathing, grooming, dressing, personal hygiene, basic household cleaning and chores. Home Exercise Program:    [x] (58471) Reviewed/Progressed HEP activities related to strengthening, flexibility, endurance, ROM of   [] LE / Lumbar: core, proximal hip and LE for functional self-care, mobility, lifting and ambulation/stair navigation   [] UE / Cervical: cervical, postural, scapular, scapulothoracic and UE control with self care, reaching, carrying, lifting, house/yardwork, driving, computer work  [] (05766)Reviewed/Progressed HEP activities related to improving balance, coordination, kinesthetic sense, posture, motor skill, proprioception of   [] LE: core, proximal hip and LE for self care, mobility, lifting, and ambulation/stair navigation    [] UE / Cervical: cervical, postural,  scapular, scapulothoracic and UE control with self care, reaching, carrying, lifting, house/yardwork, driving, computer work    Manual Treatments:  PROM / STM / Oscillations-Mobs:  G-I, II, III, IV (PA's, Inf., Post.)  [] (08512) Provided manual therapy to mobilize LE, proximal hip and/or LS spine soft tissue/joints for the purpose of modulating pain, promoting relaxation,  increasing ROM, reducing/eliminating soft tissue swelling/inflammation/restriction, improving soft tissue extensibility and allowing for proper ROM for normal function with   [] LE / lumbar: self care, mobility, lifting and ambulation. [] UE / Cervical: self care, reaching, carrying, lifting, house/yardwork, driving, computer work.      Modalities:  [] (05763) Vasopneumatic compression: Utilized vasopneumatic compression to decrease edema / swelling for the purpose of improving mobility and quad tone / recruitment which will allow for increased overall function including but not limited to self-care, transfers, ambulation, and ascending / descending stairs. Charges:  Timed Code Treatment Minutes: 45   Total Treatment Minutes: 45     [] EVAL - LOW (47294)   [] EVAL - MOD (73073)  [] EVAL - HIGH (14271)  [] RE-EVAL (82424)  [x] SY(99825) x 1      [] Ionto  [x] NMR (51653) x  2     [] Vaso  [] Manual (10074) x       [] Ultrasound  [] TA x        [] Mech Traction (22908)  [] Aquatic Therapy x     [] ES (un) (82663):   [] Home Management Training x  [] ES(attended) (78989)   [] Dry Needling 1-2 muscles (83886):  [] Dry Needling 3+ muscles (763877)  [] Group:      [] Other:     GOALS:    Patient stated goal: to improve mobility   [] Progressing: [] Met: [] Not Met: [] Adjusted     Therapist goals for Patient:   Short Term Goals: To be achieved in: 2 weeks  1. Independent in HEP and progression per patient tolerance, in order to prevent re-injury. [] Progressing: [] Met: [] Not Met: [] Adjusted  2. Patient will have a decrease in pain to facilitate improvement in movement, function, and ADLs as indicated by Functional Deficits. [] Progressing: [] Met: [] Not Met: [] Adjusted     Long Term Goals: To be achieved in: 6 weeks / Dc   1. Patient to demonstrate TUG/ score to 9  to demonstrate improved fall risk to assist with reaching prior level of function. [] Progressing: [] Met: [] Not Met: [] Adjusted  2. Patient to demonstrate consistently getting out of bed mod I .  [] Progressing: [] Met: [] Not Met: [] Adjusted  3. Patient will demonstrate an increase in strength to good UE & Proximal hip complex, and core activation to allow for proper functional mobility as indicated by patients Functional Deficits. [] Progressing: [] Met: [] Not Met: [] Adjusted  4. Patient will return to functional activities including sit to stand mod I consistently  without increased restriction. [] Progressing: [] Met: [] Not Met: [] Adjusted  5.   Patient to tolerate to demonstrate floor transfer SBA to mod I for safety management. [] Progressing: [] Met: [] Not Met: [] Adjusted         Overall Progression Towards Functional goals/ Treatment Progress Update:  [] Patient is progressing as expected towards functional goals listed. [] Progression is slowed due to complexities/Impairments listed. [] Progression has been slowed due to co-morbidities. [x] Plan just implemented, too soon to assess goals progression <30days   [] Goals require adjustment due to lack of progress  [] Patient is not progressing as expected and requires additional follow up with physician  [] Other    Persisting Functional Limitations/Impairments:  []Sleeping []Sitting               []Standing [x]Transfers        [x]Walking [x]Kneeling               [x]Stairs []Squatting / bending   [x]ADLs []Reaching  [x]Lifting  []Housework  []Driving []Job related tasks  []Sports/Recreation [x]Other: bed mobility         ASSESSMENT:  Deferred LSVT Big assessment as pt continues to have dizzy spells, although are beginning to reduce, consider LSVT assessment end of next week following cardiologist apt. Patient able to demo improvements in donning jacket with fewer cues required. Walking bwds was challenging with pt unable to increase step length. B arm swing remains limited. Continue to progress posture and gait, incorporate LSVT functional tasks as able. Treatment/Activity Tolerance:  [x] Patient able to complete tx [] Patient limited by fatigue  [] Patient limited by pain  [] Patient limited by other medical complications  [] Other:     Prognosis: [] Good [x] Fair  [] Poor    Patient Requires Follow-up: [x] Yes  [] No    Plan for next treatment session:    PLAN: See eval. PT 2x / week for 6 weeks.    [x] Continue per plan of care [] Alter current plan (see comments)  [] Plan of care initiated [] Hold pending MD visit [] Discharge    Electronically signed by: Davis Chris, PT, DPT    Note: If patient does not return for scheduled/ recommended follow up visits, this note will serve as a discharge from care along with most recent update on progress.

## 2023-01-27 ENCOUNTER — HOSPITAL ENCOUNTER (OUTPATIENT)
Dept: PHYSICAL THERAPY | Age: 78
Setting detail: THERAPIES SERIES
Discharge: HOME | End: 2023-01-27
Payer: MEDICARE

## 2023-01-27 PROCEDURE — 97112 NEUROMUSCULAR REEDUCATION: CPT

## 2023-01-27 PROCEDURE — 97110 THERAPEUTIC EXERCISES: CPT

## 2023-01-27 PROCEDURE — 97530 THERAPEUTIC ACTIVITIES: CPT

## 2023-01-27 NOTE — FLOWSHEET NOTE
[address] [facility] Physical Therapy  Phone: [phone]   Fax: [phone]    Physical Therapy Daily Treatment Note    Date:  2023     Patient Name:  Yung Chan    :  1945  MRN: [id number]  Medical Diagnosis:  Parkinson disease ([address] [G20]  General weakness [R53.1]  Treatment Diagnosis: imbalance , Abnormal gait   Insurance/Certification information:  PT Insurance Information: Medicare  Physician Information:  Particia Duty, APRN*    Plan of care signed (Y/N): [x]  Yes []  No     Date of Patient follow up with Physician:      Progress Report: []  Yes  [x]  No     Date Range for reporting period:  Beginnin/10/2023  Ending:     Progress report due (10 Rx/or 30 days whichever is less): visit #10 or  5240    Recertification due (POC duration/ or 90 days whichever is less): visit # or  23    Visit # Insurance Allowable Auth required? Date Range    Mn []  Yes  [x]  No Mn           Latex Allergy:  [x]NO      []YES  Preferred Language for Healthcare:   [x]English       []other:    Functional Scale:       Date assessed:  TUG 11.5      1/10/2023    Pain level:  0/10     SUBJECTIVE:     Had a hard time sleeping last night, and was more dizzy this am.  Dizziness is still present but comes and goes. Next apt follows cardiologist visit.        OBJECTIVE:        RESTRICTIONS/PRECAUTIONS:     Exercises/Interventions:   Therapeutic Exercises (41686) Resistance / level Sets/sec Reps Notes   Nustep  warmup  Bike  TM -retro   1.0  0.6 mph   5'     IB / HR      Total gym  -squats   CGA on/off                                                Therapeutic Activities (79038)       Bed mobility training   -Sit to supine  -Rolling R & L w/Big technique  -Supine to sit    -prone position                    -standing to S/L, rolled L to prone   Sit to stand w/Big technique  -AirEx  -blue wedge 20\"    Ascending  Descending      -no UE assist   Fwd step ups  Lat step ups 6\"  6\" -no UE assist  -no UE assist                               LSVT functional tasks:  -Getting in and out of car  -Putting on long sleeved jacket  -Buttoning shirt  -Sit to stand, 3 steps fwd, 4 steps bwd, stand to sit  -Top shelf reach w/roll of TB   4\" box      x1    x7    10 B     -improved technique        -focus on ankle PF          Gait (97396)       LSVT Big gait   300' -cues for improved Big hands and B arm swing                        Neuromuscular Re-ed (00011)       Balance training:  LVST screening      Deferred due to persistent dizziness   Tandem  NBOS  Airex    -frequent stepping to regain balance   Shuttle balance:  -improved posture  -cervical rotation  -B arm swing  -Trunk rotation  -hip abd                10 B  10 B   -1 UE assist  -1 UE assist   Staggered stance  -B arm swing    1 min BLE      -cues for increased swing behind                 Manual Intervention (85931)       Seated hamstring stretch      Supine piriformis stretch                                       Modalities:     Pt. Education:     1/25: discussed reducing use of suspenders as they could be applying tactile feedback to patient to facilitate poor posture. Pt and wife plan to try belts again, but pt recently lost a lot of weight and are having difficulty keeping pants up.      01/10/2023  -patient educated on diagnosis, prognosis and expectations for rehab  -all patient questions were answered    Home Exercise Program:  Access Code: [email or web address]  URL: [email or web address]  Date: 01/13/2023  Prepared by: Daphnie Andino    Exercises  Sit to Stand Without Arm Support - 2 x daily - 7 x weekly - 1 sets - 10 reps  Seated Upright Posture Correction - 4 x daily - 7 x weekly - 1 sets - 5 reps  Added side to side rocking in supine         Therapeutic Exercise and NMR EXR  [x] (33126) Provided verbal/tactile cueing for activities related to strengthening, flexibility, endurance, ROM for improvements in  [x] LE / Lumbar: LE, proximal hip, and core control with self care, mobility, lifting, ambulation. [] UE / Cervical: cervical, postural, scapular, scapulothoracic and UE control with self care, reaching, carrying, lifting, house/yardwork, driving, computer work.  [] (96653) Provided verbal/tactile cueing for activities related to improving balance, coordination, kinesthetic sense, posture, motor skill, proprioception to assist with   [] LE / lumbar: LE, proximal hip, and core control in self care, mobility, lifting, ambulation and eccentric single leg control. [] UE / cervical: cervical, scapular, scapulothoracic and UE control with self care, reaching, carrying, lifting, house/yardwork, driving, computer work.   [] (51988) Therapist is in constant attendance of 2 or more patients providing skilled therapy interventions, but not providing any significant amount of measurable one-on-one time to either patient, for improvements in  [] LE / lumbar: LE, proximal hip, and core control in self care, mobility, lifting, ambulation and eccentric single leg control. [] UE / cervical: cervical, scapular, scapulothoracic and UE control with self care, reaching, carrying, lifting, house/yardwork, driving, computer work.      NMR and Therapeutic Activities:    [x] (89589 or 33156) Provided verbal/tactile cueing for activities related to improving balance, coordination, kinesthetic sense, posture, motor skill, proprioception and motor activation to allow for proper function of   [x] LE: / Lumbar core, proximal hip and LE with self care and ADLs  [] UE / Cervical: cervical, postural, scapular, scapulothoracic and UE control with self care, carrying, lifting, driving, computer work.   [] (17488) Gait Re-education- Provided training and instruction to the patient for proper LE, core and proximal hip recruitment and positioning and eccentric body weight control with ambulation re-education including up and down stairs     Home Management Training / Self Care:  [] (91173) Provided self-care/home management training related to activities of daily living and compensatory training, and/or use of adaptive equipment for improvement with: ADLs and compensatory training, meal preparation, safety procedures and instruction in use of adaptive equipment, including bathing, grooming, dressing, personal hygiene, basic household cleaning and chores. Home Exercise Program:    [x] (71638) Reviewed/Progressed HEP activities related to strengthening, flexibility, endurance, ROM of   [] LE / Lumbar: core, proximal hip and LE for functional self-care, mobility, lifting and ambulation/stair navigation   [] UE / Cervical: cervical, postural, scapular, scapulothoracic and UE control with self care, reaching, carrying, lifting, house/yardwork, driving, computer work  [] (33017)Reviewed/Progressed HEP activities related to improving balance, coordination, kinesthetic sense, posture, motor skill, proprioception of   [] LE: core, proximal hip and LE for self care, mobility, lifting, and ambulation/stair navigation    [] UE / Cervical: cervical, postural,  scapular, scapulothoracic and UE control with self care, reaching, carrying, lifting, house/yardwork, driving, computer work    Manual Treatments:  PROM / STM / Oscillations-Mobs:  G-I, II, III, IV (PA's, Inf., Post.)  [] (02709) Provided manual therapy to mobilize LE, proximal hip and/or LS spine soft tissue/joints for the purpose of modulating pain, promoting relaxation,  increasing ROM, reducing/eliminating soft tissue swelling/inflammation/restriction, improving soft tissue extensibility and allowing for proper ROM for normal function with   [] LE / lumbar: self care, mobility, lifting and ambulation. [] UE / Cervical: self care, reaching, carrying, lifting, house/yardwork, driving, computer work.      Modalities:  [] (22751) Vasopneumatic compression: Utilized vasopneumatic compression to decrease edema / swelling for the purpose of improving mobility and quad tone / recruitment which will allow for increased overall function including but not limited to self-care, transfers, ambulation, and ascending / descending stairs. Charges:  Timed Code Treatment Minutes: 42   Total Treatment Minutes: 42     [] EVAL - LOW (50203)   [] EVAL - MOD (04461)  [] EVAL - HIGH (34098)  [] RE-EVAL (42247)  [x] VN(76764) x 1      [] Ionto  [x] NMR (40458) x  1    [] Vaso  [] Manual (78268) x       [] Ultrasound  [x] TA x  1      [] Mech Traction (40318)  [] Aquatic Therapy x     [] ES (un) (53981):   [] Home Management Training x  [] ES(attended) (75734)   [] Dry Needling 1-2 muscles (33841):  [] Dry Needling 3+ muscles (146521)  [] Group:      [] Other:     GOALS:    Patient stated goal: to improve mobility   [] Progressing: [] Met: [] Not Met: [] Adjusted     Therapist goals for Patient:   Short Term Goals: To be achieved in: 2 weeks  1. Independent in HEP and progression per patient tolerance, in order to prevent re-injury. [] Progressing: [] Met: [] Not Met: [] Adjusted  2. Patient will have a decrease in pain to facilitate improvement in movement, function, and ADLs as indicated by Functional Deficits. [] Progressing: [] Met: [] Not Met: [] Adjusted     Long Term Goals: To be achieved in: 6 weeks / Dc   1. Patient to demonstrate TUG/ score to 9  to demonstrate improved fall risk to assist with reaching prior level of function. [] Progressing: [] Met: [] Not Met: [] Adjusted  2. Patient to demonstrate consistently getting out of bed mod I .  [] Progressing: [] Met: [] Not Met: [] Adjusted  3. Patient will demonstrate an increase in strength to good UE & Proximal hip complex, and core activation to allow for proper functional mobility as indicated by patients Functional Deficits. [] Progressing: [] Met: [] Not Met: [] Adjusted  4.  Patient will return to functional activities including sit to stand mod I consistently  without increased restriction. [] Progressing: [] Met: [] Not Met: [] Adjusted  5. Patient to tolerate to demonstrate floor transfer SBA to mod I for safety management. [] Progressing: [] Met: [] Not Met: [] Adjusted         Overall Progression Towards Functional goals/ Treatment Progress Update:  [] Patient is progressing as expected towards functional goals listed. [] Progression is slowed due to complexities/Impairments listed. [] Progression has been slowed due to co-morbidities. [x] Plan just implemented, too soon to assess goals progression <30days   [] Goals require adjustment due to lack of progress  [] Patient is not progressing as expected and requires additional follow up with physician  [] Other    Persisting Functional Limitations/Impairments:  []Sleeping []Sitting               []Standing [x]Transfers        [x]Walking [x]Kneeling               [x]Stairs []Squatting / bending   [x]ADLs []Reaching  [x]Lifting  []Housework  []Driving []Job related tasks  []Sports/Recreation [x]Other: bed mobility         ASSESSMENT:  Deferred LSVT Big assessment as pt continues to have dizzy spells, although are beginning to reduce, consider LSVT assessment end of next week following cardiologist apt. Patient consistently demonstrating increased B step length, however B arm swing remains limited, constant cues and encouragement minimally improved. Practice steps at 2 at a time next session, continue to challenge balance and facilitate B arm swing. Treatment/Activity Tolerance:  [x] Patient able to complete tx [] Patient limited by fatigue  [] Patient limited by pain  [] Patient limited by other medical complications  [] Other:     Prognosis: [] Good [x] Fair  [] Poor    Patient Requires Follow-up: [x] Yes  [] No    Plan for next treatment session:    PLAN: See eval. PT 2x / week for 6 weeks.    [x] Continue per plan of care [] Alter current plan (see comments)  [] Plan of care initiated [] Hold pending MD visit [] Discharge    Electronically signed by: Alexia Diggs, PT, DPT    Note: If patient does not return for scheduled/ recommended follow up visits, this note will serve as a discharge from care along with most recent update on progress.

## 2023-01-30 ENCOUNTER — OFFICE VISIT (OUTPATIENT)
Dept: CARDIOLOGY CLINIC | Age: 78
End: 2023-01-30

## 2023-01-30 ENCOUNTER — HOSPITAL ENCOUNTER (OUTPATIENT)
Dept: PHYSICAL THERAPY | Age: 78
Setting detail: THERAPIES SERIES
Discharge: HOME | End: 2023-01-30
Payer: MEDICARE

## 2023-01-30 VITALS — BODY MASS INDEX: 30.52 KG/M2 | WEIGHT: 201.4 LBS | OXYGEN SATURATION: 97 % | HEIGHT: 68 IN | HEART RATE: 70 BPM

## 2023-01-30 DIAGNOSIS — R07.9 CHEST PAIN, UNSPECIFIED TYPE: ICD-10-CM

## 2023-01-30 DIAGNOSIS — I25.10 CORONARY ARTERY DISEASE INVOLVING NATIVE CORONARY ARTERY OF NATIVE HEART WITHOUT ANGINA PECTORIS: Primary | ICD-10-CM

## 2023-01-30 DIAGNOSIS — I77.9 BILATERAL CAROTID ARTERY DISEASE, UNSPECIFIED TYPE (HCC): ICD-10-CM

## 2023-01-30 DIAGNOSIS — I10 PRIMARY HYPERTENSION: ICD-10-CM

## 2023-01-30 DIAGNOSIS — E78.5 HYPERLIPIDEMIA, UNSPECIFIED HYPERLIPIDEMIA TYPE: ICD-10-CM

## 2023-01-30 DIAGNOSIS — G20 PARKINSON DISEASE (HCC): ICD-10-CM

## 2023-01-30 DIAGNOSIS — E78.2 MIXED HYPERLIPIDEMIA: ICD-10-CM

## 2023-01-30 DIAGNOSIS — I10 ESSENTIAL HYPERTENSION: ICD-10-CM

## 2023-01-30 DIAGNOSIS — I25.10 ATHEROSCLEROSIS OF NATIVE CORONARY ARTERY OF NATIVE HEART WITHOUT ANGINA PECTORIS: Chronic | ICD-10-CM

## 2023-01-30 PROCEDURE — 97112 NEUROMUSCULAR REEDUCATION: CPT

## 2023-01-30 PROCEDURE — 97110 THERAPEUTIC EXERCISES: CPT

## 2023-01-30 PROCEDURE — 97530 THERAPEUTIC ACTIVITIES: CPT

## 2023-01-30 RX ORDER — NITROGLYCERIN 0.4 MG/1
0.4 TABLET SUBLINGUAL EVERY 5 MIN PRN
Qty: 25 TABLET | Refills: 3 | Status: SHIPPED | OUTPATIENT
Start: 2023-01-30

## 2023-01-30 RX ORDER — MIDODRINE HYDROCHLORIDE 2.5 MG/1
2.5 TABLET ORAL 3 TIMES DAILY
Qty: 270 TABLET | Refills: 3 | Status: SHIPPED | OUTPATIENT
Start: 2023-01-30

## 2023-01-30 RX ORDER — MIDODRINE HYDROCHLORIDE 2.5 MG/1
2.5 TABLET ORAL 3 TIMES DAILY
Qty: 180 TABLET | Refills: 3 | Status: SHIPPED | OUTPATIENT
Start: 2023-01-30 | End: 2023-01-30

## 2023-01-30 NOTE — FLOWSHEET NOTE
[address] [facility] Physical Therapy  Phone: [phone]   Fax: [phone]    Physical Therapy Daily Treatment Note    Date:  2023     Patient Name:  Jimmie Monterroso    :  1945  MRN: [id number]  Medical Diagnosis:  Parkinson disease ([facility] [address] [G20]  General weakness [R53.1]  Treatment Diagnosis: imbalance , Abnormal gait   Insurance/Certification information:  PT Insurance Information: Medicare  Physician Information:  JERAMIE Faith    Plan of care signed (Y/N): [x]  Yes []  No     Date of Patient follow up with Physician:      Progress Report: []  Yes  [x]  No     Date Range for reporting period:  Beginnin/10/2023  Ending:     Progress report due (10 Rx/or 30 days whichever is less): visit #10 or  7715    Recertification due (POC duration/ or 90 days whichever is less): visit # or  23    Visit # Insurance Allowable Auth required? Date Range    Mn []  Yes  [x]  No Mn           Latex Allergy:  [x]NO      []YES  Preferred Language for Healthcare:   [x]English       []other:    Functional Scale:       Date assessed:  TUG 11.5      1/10/2023    Pain level:  0/10     SUBJECTIVE:     Doing okay today, had apt w/cardiologist just before PT, feels his irregular heart beat is due to low blood pressure so prescribed a medication to raise his BP, has to have it ordered so will begin as soon as possible.         OBJECTIVE:        RESTRICTIONS/PRECAUTIONS:     Exercises/Interventions:   Therapeutic Exercises (64630) Resistance / level Sets/sec Reps Notes   Nustep  warmup  Bike  TM -retro   1.0  0.6 mph   5'     IB / HR      Total gym  -squats   CGA on/off   2   12   -incr time required to get off machine                                             Therapeutic Activities (05285)       Bed mobility training   -Sit to supine  -Rolling R & L w/Big technique  -Supine to sit    -prone position                    -standing to S/L, rolled L to prone   Sit to stand w/Big technique  -AirEx  -blue wedge 20\"    Ascending  Descending      -no UE assist   Fwd step ups  Lat step ups 6\"  6\" -no UE assist  -no UE assist                               LSVT functional tasks:  -Getting in and out of car  -Putting on long sleeved jacket  -Buttoning shirt  -Sit to stand, 3 steps fwd, 4 steps bwd, stand to sit  -Top shelf reach w/roll of TB   4\" box      x1    x10    10 B         -across room    -focus on ankle PF          Gait (68524)       LSVT Big gait   280' -cues for improved Big hands and B arm swing                        Neuromuscular Re-ed (97350)       Balance training:  LVST screening      Deferred due to persistent dizziness   Tandem  NBOS  Airex    -frequent stepping to regain balance   Shuttle balance:  -improved posture  -cervical rotation  -B arm swing  -Trunk rotation  -hip abd  -head nods                10 B  10   -1 UE assist  -1 UE assist   Staggered stance  -B arm swing         -cues for increased swing behind                 Manual Intervention (43756)       Seated hamstring stretch      Supine piriformis stretch                                       Modalities:     Pt. Education:     1/25: discussed reducing use of suspenders as they could be applying tactile feedback to patient to facilitate poor posture.  Pt and wife plan to try belts again, but pt recently lost a lot of weight and are having difficulty keeping pants up.      01/10/2023  -patient educated on diagnosis, prognosis and expectations for rehab  -all patient questions were answered    Home Exercise Program:  Access Code: [email or web address]  URL: [email or web address]  Date: 01/13/2023  Prepared by: Jorge Luis Auguste    Exercises  Sit to Stand Without Arm Support - 2 x daily - 7 x weekly - 1 sets - 10 reps  Seated Upright Posture Correction - 4 x daily - 7 x weekly - 1 sets - 5 reps  Added side to side rocking in supine         Therapeutic Exercise and NMR EXR  [x] (52939) Provided verbal/tactile cueing  for activities related to strengthening, flexibility, endurance, ROM for improvements in  [x] LE / Lumbar: LE, proximal hip, and core control with self care, mobility, lifting, ambulation. [] UE / Cervical: cervical, postural, scapular, scapulothoracic and UE control with self care, reaching, carrying, lifting, house/yardwork, driving, computer work.  [] (16707) Provided verbal/tactile cueing for activities related to improving balance, coordination, kinesthetic sense, posture, motor skill, proprioception to assist with   [] LE / lumbar: LE, proximal hip, and core control in self care, mobility, lifting, ambulation and eccentric single leg control. [] UE / cervical: cervical, scapular, scapulothoracic and UE control with self care, reaching, carrying, lifting, house/yardwork, driving, computer work.   [] (04734) Therapist is in constant attendance of 2 or more patients providing skilled therapy interventions, but not providing any significant amount of measurable one-on-one time to either patient, for improvements in  [] LE / lumbar: LE, proximal hip, and core control in self care, mobility, lifting, ambulation and eccentric single leg control. [] UE / cervical: cervical, scapular, scapulothoracic and UE control with self care, reaching, carrying, lifting, house/yardwork, driving, computer work.      NMR and Therapeutic Activities:    [x] (01848 or 87902) Provided verbal/tactile cueing for activities related to improving balance, coordination, kinesthetic sense, posture, motor skill, proprioception and motor activation to allow for proper function of   [x] LE: / Lumbar core, proximal hip and LE with self care and ADLs  [] UE / Cervical: cervical, postural, scapular, scapulothoracic and UE control with self care, carrying, lifting, driving, computer work.   [] (86265) Gait Re-education- Provided training and instruction to the patient for proper LE, core and proximal hip recruitment and positioning and eccentric body weight control with ambulation re-education including up and down stairs     Home Management Training / Self Care:  [] (98321) Provided self-care/home management training related to activities of daily living and compensatory training, and/or use of adaptive equipment for improvement with: ADLs and compensatory training, meal preparation, safety procedures and instruction in use of adaptive equipment, including bathing, grooming, dressing, personal hygiene, basic household cleaning and chores. Home Exercise Program:    [x] (72800) Reviewed/Progressed HEP activities related to strengthening, flexibility, endurance, ROM of   [] LE / Lumbar: core, proximal hip and LE for functional self-care, mobility, lifting and ambulation/stair navigation   [] UE / Cervical: cervical, postural, scapular, scapulothoracic and UE control with self care, reaching, carrying, lifting, house/yardwork, driving, computer work  [] (91420)Reviewed/Progressed HEP activities related to improving balance, coordination, kinesthetic sense, posture, motor skill, proprioception of   [] LE: core, proximal hip and LE for self care, mobility, lifting, and ambulation/stair navigation    [] UE / Cervical: cervical, postural,  scapular, scapulothoracic and UE control with self care, reaching, carrying, lifting, house/yardwork, driving, computer work    Manual Treatments:  PROM / STM / Oscillations-Mobs:  G-I, II, III, IV (PA's, Inf., Post.)  [] (33152) Provided manual therapy to mobilize LE, proximal hip and/or LS spine soft tissue/joints for the purpose of modulating pain, promoting relaxation,  increasing ROM, reducing/eliminating soft tissue swelling/inflammation/restriction, improving soft tissue extensibility and allowing for proper ROM for normal function with   [] LE / lumbar: self care, mobility, lifting and ambulation. [] UE / Cervical: self care, reaching, carrying, lifting, house/yardwork, driving, computer work.      Modalities:  [] (27751) Vasopneumatic compression: Utilized vasopneumatic compression to decrease edema / swelling for the purpose of improving mobility and quad tone / recruitment which will allow for increased overall function including but not limited to self-care, transfers, ambulation, and ascending / descending stairs. Charges:  Timed Code Treatment Minutes: 43   Total Treatment Minutes: 43     [] EVAL - LOW (44076)   [] EVAL - MOD (61062)  [] EVAL - HIGH (04221)  [] RE-EVAL (58957)  [x] YA(05221) x 1      [] Ionto  [x] NMR (36866) x  1    [] Vaso  [] Manual (42818) x       [] Ultrasound  [x] TA x  1      [] Mech Traction (72614)  [] Aquatic Therapy x     [] ES (un) (49345):   [] Home Management Training x  [] ES(attended) (82879)   [] Dry Needling 1-2 muscles (58649):  [] Dry Needling 3+ muscles (176421)  [] Group:      [] Other:     GOALS:    Patient stated goal: to improve mobility   [] Progressing: [] Met: [] Not Met: [] Adjusted     Therapist goals for Patient:   Short Term Goals: To be achieved in: 2 weeks  1. Independent in HEP and progression per patient tolerance, in order to prevent re-injury. [] Progressing: [] Met: [] Not Met: [] Adjusted  2. Patient will have a decrease in pain to facilitate improvement in movement, function, and ADLs as indicated by Functional Deficits. [] Progressing: [] Met: [] Not Met: [] Adjusted     Long Term Goals: To be achieved in: 6 weeks / Dc   1. Patient to demonstrate TUG/ score to 9  to demonstrate improved fall risk to assist with reaching prior level of function. [] Progressing: [] Met: [] Not Met: [] Adjusted  2. Patient to demonstrate consistently getting out of bed mod I .  [] Progressing: [] Met: [] Not Met: [] Adjusted  3. Patient will demonstrate an increase in strength to good UE & Proximal hip complex, and core activation to allow for proper functional mobility as indicated by patients Functional Deficits. [] Progressing: [] Met: [] Not Met: [] Adjusted  4. Patient will return to functional activities including sit to stand mod I consistently  without increased restriction. [] Progressing: [] Met: [] Not Met: [] Adjusted  5. Patient to tolerate to demonstrate floor transfer SBA to mod I for safety management. [] Progressing: [] Met: [] Not Met: [] Adjusted         Overall Progression Towards Functional goals/ Treatment Progress Update:  [] Patient is progressing as expected towards functional goals listed. [] Progression is slowed due to complexities/Impairments listed. [] Progression has been slowed due to co-morbidities. [x] Plan just implemented, too soon to assess goals progression <30days   [] Goals require adjustment due to lack of progress  [] Patient is not progressing as expected and requires additional follow up with physician  [] Other    Persisting Functional Limitations/Impairments:  []Sleeping []Sitting               []Standing [x]Transfers        [x]Walking [x]Kneeling               [x]Stairs []Squatting / bending   [x]ADLs []Reaching  [x]Lifting  []Housework  []Driving []Job related tasks  []Sports/Recreation [x]Other: bed mobility         ASSESSMENT:  Deferred LSVT Big assessment as pt continues to have dizzy spells, prescribed a new medication to assist with raising BP, is hopeful to begin this week. Plan to perform LSVT Big assessment next visit. Pt presented with increased fatigue throughout session, but able to perform each task with good form throughout session with appropriate rest breaks. Pt able to demo consistent B arm swing as well, however posterior movement continues to lack. Treatment/Activity Tolerance:  [x] Patient able to complete tx [] Patient limited by fatigue  [] Patient limited by pain  [] Patient limited by other medical complications  [] Other:     Prognosis: [] Good [x] Fair  [] Poor    Patient Requires Follow-up: [x] Yes  [] No    Plan for next treatment session:    PLAN: See perri. PT 2x / week for 6 weeks. [x] Continue per plan of care [] Alter current plan (see comments)  [] Plan of care initiated [] Hold pending MD visit [] Discharge    Electronically signed by: Sofi Perez, PT, DPT    Note: If patient does not return for scheduled/ recommended follow up visits, this note will serve as a discharge from care along with most recent update on progress.

## 2023-01-30 NOTE — PATIENT INSTRUCTIONS
Start Midodrine 2.5 mg three times per day with meals  Check BP in morning and evening, call Dr Solo if Sbp is greater than 140  Continue risk factor modifications.   Call for any change in symptoms, call to report any changes in shortness of breath or development of chest pain with activity.    Follow up end of March

## 2023-02-01 ENCOUNTER — TELEPHONE (OUTPATIENT)
Dept: INTERNAL MEDICINE CLINIC | Age: 78
End: 2023-02-01

## 2023-02-01 ENCOUNTER — HOSPITAL ENCOUNTER (OUTPATIENT)
Dept: PHYSICAL THERAPY | Age: 78
Setting detail: THERAPIES SERIES
Discharge: HOME | End: 2023-02-01
Payer: MEDICARE

## 2023-02-01 PROCEDURE — 97750 PHYSICAL PERFORMANCE TEST: CPT

## 2023-02-01 PROCEDURE — 97530 THERAPEUTIC ACTIVITIES: CPT

## 2023-02-01 NOTE — PLAN OF CARE
[facility]  Phone: [phone]   Fax: [phone]    Physical Therapy Re-Certification Plan of Care    Dear KEYSHA Mayers*  ,    We had the pleasure of treating the following patient for physical therapy services at [facility]. A summary of our findings can be found in the updated assessment below. This includes our plan of care. If you have any questions or concerns regarding these findings, please do not hesitate to contact me at the office phone number checked above.   Thank you for the referral.     Physician Signature:________________________________Date:__________________  By signing above (or electronic signature), therapist's plan is approved by physician     Alize  02/01/2023 Discharge    30 sec STS   ([address] improve by 3 reps)  9    10 meter walk  ([address] improve by 0.18 m/s comfortable speed)  Trial 1: 8\" = 0.75 m/s   Trial 2: 8.5\"= 0.71 m/s    FGA (high balance)  less than 18 indicates high risk of falls in people with PD ([address] improve by 4 points) 15/30    Activities Specific Balance Confidence Scale    69% or less= recurrent falls in people with PD (BARBARA 11.12 to 13% improvement) 72.81%    MoCA score   less than 26 indicated mild cog impairment  Deferred to OT    Donning/[place] shirt   Buttoning   Donning socks and shoes  Deferred to OT    Functional Task Recording Form See media See media            Overall Response to Treatment:   []Patient is responding well to treatment and improvement is noted with regards  to goals   []Patient should continue to improve in reasonable time if they continue HEP   []Patient has plateaued and is no longer responding to skilled PT intervention    []Patient is getting worse and would benefit from return to referring MD   []Patient unable to adhere to initial POC   [x]Other: Patient has participated in and tolerated traditional PT sessions well despite inconsistencies with blood pressure and dizziness. Patient recently prescribed new medication to assist in raising BP that should further improve tolerance to activity. LSVT Big techniques have been incorporated into treatment sessions allowing pt to improve donning & doffing of jacket and ability to get in and out of car. Pt would further benefit from full LSVT Big program to address deficits caused by Parkinson's Disease. Due to pt listing functional tasks primarily related to OT based treatment session, feel that an order for OT would be beneficial.  Patient is traveling out of town for a couple weeks, plan to initiate [address] program upon his return. Date range of Visits: 1/10 to 23  Total Visits:     Recommendation:    [x]Request additional PT visits at 4x / wk for 4 weeks +1 additional visit for reassessment/discharge, following completion of remaining 4 visits. []Hold PT, pending MD visit        Physical Therapy Daily Treatment Note    Date:  2023     Patient Name:  Sowmya Clark    :  1945  MRN: [id number]  Medical Diagnosis:  Parkinson disease ([facility] [address] [G20]  General weakness [R53.1]  Treatment Diagnosis: imbalance , Abnormal gait   Insurance/Certification information:  PT Insurance Information: Medicare  Physician Information:  KEYSHA Cantor*    Plan of care signed (Y/N): [x]  Yes []  No     Date of Patient follow up with Physician:      Progress Report: [x]  Yes  []  No     Date Range for reporting period:  Beginnin/10/2023  POC: 23  Ending:     Progress report due (10 Rx/or 30 days whichever is less): visit #18 or  1877    Recertification due (POC duration/ or 90 days whichever is less): visit # or  23    Visit # Insurance Allowable Auth required?  Date Range    Mn []  Yes  [x]  No Mn           Latex Allergy:  [x]NO      []YES  Preferred Language for Healthcare:   [x]English       []other:    Functional Scale:       Date assessed:  TUG 11.5      1/10/2023    Pain level:  0/10     SUBJECTIVE:     Dizziness continues, has not yet received new medications to assist in raising BP. Is ready for testing for LSVT Big program, is going out of town for 2 weeks end of February, so will schedule program to begin once he returns.         OBJECTIVE:    LSVT Program testing-   Eval  02/01/2023 Discharge    30 sec STS   ([employer] improve by 3 reps)  9    10 meter walk  ([facility] improve by 0.18 m/s comfortable speed)  Trial 1: 8\" = 0.75 m/s   Trial 2: 8.5\"= 0.71 m/s    FGA (high balance)  less than 18 indicates high risk of falls in people with PD ([address] improve by 4 points) 15/30    Activities Specific Balance Confidence Scale    69% or less= recurrent falls in people with PD (BARBARA 11.12 to 13% improvement) 72.81%    MoCA score   less than 26 indicated mild cog impairment  Deferred to OT    Donning/[place] shirt   Buttoning   Donning socks and shoes  Deferred to OT    Functional Task Recording Form See media See media          RESTRICTIONS/PRECAUTIONS:     Exercises/Interventions:   Therapeutic Exercises (60384) Resistance / level Sets/sec Reps Notes   Nustep  warmup  Bike  TM -retro   2.0  0.6 mph   5 mins      IB / HR      Total gym  -squats   CGA on/off   -incr time required to get off machine                                             Therapeutic Activities (04756)       Bed mobility training   -Sit to supine  -Rolling R & L w/Big technique  -Supine to sit    -prone position                    -standing to S/L, rolled L to prone   Sit to stand w/Big technique  -AirEx  -blue wedge 20\"    Ascending  Descending      -no UE assist   Fwd step ups  Lat step ups 6\"  6\" -no UE assist  -no UE assist                               LSVT functional tasks:  -Getting in and out of car  -Putting on long sleeved jacket  -Buttoning shirt  -Sit to stand, 3 steps fwd, 4 steps bwd, stand to sit  -Top shelf reach w/roll of TB   4\" box            -across room    -focus on ankle PF         Gait (64117)      LSVT Big gait   -cues for improved Big hands and B arm swing                        Neuromuscular Re-ed (21802)       Balance training:  LVST screening      Deferred due to persistent dizziness   Tandem  NBOS  Airex    -frequent stepping to regain balance   Shuttle balance:  -improved posture  -cervical rotation  -B arm swing  -Trunk rotation  -hip abd  -head nods                10   -1 UE assist  -1 UE assist   Staggered stance  -B arm swing         -cues for increased swing behind                 Manual Intervention (38788)       Seated hamstring stretch      Supine piriformis stretch                                       Modalities:     Pt. Education:     1/25: discussed reducing use of suspenders as they could be applying tactile feedback to patient to facilitate poor posture. Pt and wife plan to try belts again, but pt recently lost a lot of weight and are having difficulty keeping pants up.      01/10/2023  -patient educated on diagnosis, prognosis and expectations for rehab  -all patient questions were answered    Home Exercise Program:  Access Code: [email or web address]  URL: [email or web address]  Date: 01/13/2023  Prepared by: Maria A Douglas    Exercises  Sit to Stand Without Arm Support - 2 x daily - 7 x weekly - 1 sets - 10 reps  Seated Upright Posture Correction - 4 x daily - 7 x weekly - 1 sets - 5 reps  Added side to side rocking in supine         Therapeutic Exercise and NMR EXR  [x] (00555) Provided verbal/tactile cueing for activities related to strengthening, flexibility, endurance, ROM for improvements in  [x] LE / Lumbar: LE, proximal hip, and core control with self care, mobility, lifting, ambulation.   [] UE / Cervical: cervical, postural, scapular, scapulothoracic and UE control with self care, reaching, carrying, lifting, house/yardwork, driving, computer work.  [] (75679) Provided verbal/tactile cueing for activities related to improving balance, coordination, kinesthetic sense, posture, motor skill, proprioception to assist with   [] LE / lumbar: LE, proximal hip, and core control in self care, mobility, lifting, ambulation and eccentric single leg control. [] UE / cervical: cervical, scapular, scapulothoracic and UE control with self care, reaching, carrying, lifting, house/yardwork, driving, computer work.   [] (58040) Therapist is in constant attendance of 2 or more patients providing skilled therapy interventions, but not providing any significant amount of measurable one-on-one time to either patient, for improvements in  [] LE / lumbar: LE, proximal hip, and core control in self care, mobility, lifting, ambulation and eccentric single leg control. [] UE / cervical: cervical, scapular, scapulothoracic and UE control with self care, reaching, carrying, lifting, house/yardwork, driving, computer work.      NMR and Therapeutic Activities:    [x] (24597 or 19493) Provided verbal/tactile cueing for activities related to improving balance, coordination, kinesthetic sense, posture, motor skill, proprioception and motor activation to allow for proper function of   [x] LE: / Lumbar core, proximal hip and LE with self care and ADLs  [] UE / Cervical: cervical, postural, scapular, scapulothoracic and UE control with self care, carrying, lifting, driving, computer work.   [] (56604) Gait Re-education- Provided training and instruction to the patient for proper LE, core and proximal hip recruitment and positioning and eccentric body weight control with ambulation re-education including up and down stairs     Home Management Training / Self Care:  [] (22769) Provided self-care/home management training related to activities of daily living and compensatory training, and/or use of adaptive equipment for improvement with: ADLs and compensatory training, meal preparation, safety procedures and instruction in use of adaptive equipment, including bathing, grooming, dressing, personal hygiene, basic household cleaning and chores. Home Exercise Program:    [x] (94399) Reviewed/Progressed HEP activities related to strengthening, flexibility, endurance, ROM of   [] LE / Lumbar: core, proximal hip and LE for functional self-care, mobility, lifting and ambulation/stair navigation   [] UE / Cervical: cervical, postural, scapular, scapulothoracic and UE control with self care, reaching, carrying, lifting, house/yardwork, driving, computer work  [] (17595)Reviewed/Progressed HEP activities related to improving balance, coordination, kinesthetic sense, posture, motor skill, proprioception of   [] LE: core, proximal hip and LE for self care, mobility, lifting, and ambulation/stair navigation    [] UE / Cervical: cervical, postural,  scapular, scapulothoracic and UE control with self care, reaching, carrying, lifting, house/yardwork, driving, computer work    Manual Treatments:  PROM / STM / Oscillations-Mobs:  G-I, II, III, IV (PA's, Inf., Post.)  [] (04268) Provided manual therapy to mobilize LE, proximal hip and/or LS spine soft tissue/joints for the purpose of modulating pain, promoting relaxation,  increasing ROM, reducing/eliminating soft tissue swelling/inflammation/restriction, improving soft tissue extensibility and allowing for proper ROM for normal function with   [] LE / lumbar: self care, mobility, lifting and ambulation. [] UE / Cervical: self care, reaching, carrying, lifting, house/yardwork, driving, computer work. Modalities:  [] (89470) Vasopneumatic compression: Utilized vasopneumatic compression to decrease edema / swelling for the purpose of improving mobility and quad tone / recruitment which will allow for increased overall function including but not limited to self-care, transfers, ambulation, and ascending / descending stairs.        Charges:  Timed Code Treatment Minutes: 45   Total Treatment Minutes: 45     [] EVAL - LOW (15707)   [] EVAL - MOD (20268)  [] EVAL - HIGH (13941)  [] RE-EVAL (96144)  [] BZ(14036) x       [] Ionto  [] NMR (35708) x      [] Vaso  [] Manual (71002) x       [] Ultrasound  [x] TA x  2      [] Mech Traction (12088)  [] Aquatic Therapy x     [] ES (un) (17363):   [] Home Management Training x  [] ES(attended) (05349)   [] Dry Needling 1-2 muscles (22630):  [] Dry Needling 3+ muscles (738049)  [] Group:      [x] Performance Testing: 02164 x1    GOALS:    Patient stated goal: to improve mobility   [x] Progressing: [] Met: [] Not Met: [] Adjusted     Therapist goals for Patient:   Short Term Goals: To be achieved in: 2 weeks  1. Independent in HEP and progression per patient tolerance, in order to prevent re-injury. [] Progressing: [x] Met: [] Not Met: [] Adjusted  2. Patient will have a decrease in pain to facilitate improvement in movement, function, and ADLs as indicated by Functional Deficits. [] Progressing: [x] Met: [] Not Met: [] Adjusted     Long Term Goals: To be achieved in: 6 weeks / Dc   1. Patient to demonstrate TUG/ score to 9  to demonstrate improved fall risk to assist with reaching prior level of function. [x] Progressing: [] Met: [] Not Met: [] Adjusted  2. Patient to demonstrate consistently getting out of bed mod I .  [x] Progressing: [] Met: [] Not Met: [] Adjusted  3. Patient will demonstrate an increase in strength to good UE & Proximal hip complex, and core activation to allow for proper functional mobility as indicated by patients Functional Deficits. [x] Progressing: [] Met: [] Not Met: [] Adjusted  4. Patient will return to functional activities including sit to stand mod I consistently  without increased restriction. [] Progressing: [x] Met: [] Not Met: [] Adjusted  5. Patient to tolerate to demonstrate floor transfer SBA to mod I for safety management. [x] Not assessed: [] Met: [] Not Met: [] Adjusted       6.  Patient to improve 30\" STS to 13 in order to demo improvements in functional strength. [] Not assessed: [] Met: [] Not Met: [x] New goal 2/1/23  7. Patient to improve FGA to 22/30 in order to reduce fall risk and improve community access. [] Not assessed: [] Met: [] Not Met: [x] New goal 2/1/23  8. Patient to improve ABC Scale to 80% to improve confidence with functional tasks. [] Not assessed: [] Met: [] Not Met: [x] New goal 2/1/23    Overall Progression Towards Functional goals/ Treatment Progress Update:  [] Patient is progressing as expected towards functional goals listed. [] Progression is slowed due to complexities/Impairments listed. [] Progression has been slowed due to co-morbidities. [x] Plan just implemented, too soon to assess goals progression <30days   [] Goals require adjustment due to lack of progress  [] Patient is not progressing as expected and requires additional follow up with physician  [] Other    Persisting Functional Limitations/Impairments:  []Sleeping []Sitting               []Standing [x]Transfers        [x]Walking [x]Kneeling               [x]Stairs []Squatting / bending   [x]ADLs []Reaching  [x]Lifting  []Housework  []Driving []Job related tasks  []Sports/Recreation [x]Other: bed mobility         ASSESSMENT:     see above    Treatment/Activity Tolerance:  [x] Patient able to complete tx [] Patient limited by fatigue  [] Patient limited by pain  [] Patient limited by other medical complications  [] Other:     Prognosis: [] Good [x] Fair  [] Poor    Patient Requires Follow-up: [x] Yes  [] No    Plan for next treatment session:    PLAN: See [facility] PT 2x / week for 6 weeks. [x] Continue per plan of care [] Alter current plan (see comments)  [] Plan of care initiated [] Hold pending MD visit [] Discharge    Electronically signed by: Arabella Lemus PT, DPT    Note: If patient does not return for scheduled/ recommended follow up visits, this note will serve as a discharge from care along with most recent update on progress.

## 2023-02-01 NOTE — TELEPHONE ENCOUNTER
Seth Rudd from Physical Therapy at [facility] called in regards to needing an order for OT placed for Pt. Seth Rudd states an Order for OT with the diagnosis for Parkinson Disease will be good enough. Seth Rudd thinks Pt will participle well with LSVT porgram. OT Oriented. If any questions or concerns please call Seth Rudd back.

## 2023-02-02 DIAGNOSIS — G20 PARKINSON DISEASE (HCC): Primary | ICD-10-CM

## 2023-02-06 ENCOUNTER — HOSPITAL ENCOUNTER (OUTPATIENT)
Dept: PHYSICAL THERAPY | Age: 78
Setting detail: THERAPIES SERIES
Discharge: HOME | End: 2023-02-06
Payer: MEDICARE

## 2023-02-06 PROCEDURE — 97530 THERAPEUTIC ACTIVITIES: CPT

## 2023-02-06 PROCEDURE — 97112 NEUROMUSCULAR REEDUCATION: CPT

## 2023-02-06 NOTE — FLOWSHEET NOTE
[address] [facility]  Phone: [phone]   Fax: [phone]    Physical Therapy Daily Treatment Note    Date:  2023     Patient Name:  Claudia Ramos    :  1945  MRN: [id number]  Medical Diagnosis:  Parkinson disease ([address] [G20]  General weakness [R53.1]  Treatment Diagnosis: imbalance , Abnormal gait   Insurance/Certification information:  PT Insurance Information: Medicare  Physician Information:  JERAMIE Acevedo    Plan of care signed (Y/N): [x]  Yes []  No     Date of Patient follow up with Physician:      Progress Report: []  Yes  [x]  No     Date Range for reporting period:  Beginnin/10/2023  POC: 23  Ending:     Progress report due (10 Rx/or 30 days whichever is less): visit #18 or  8970    Recertification due (POC duration/ or 90 days whichever is less): visit # or  23    Visit # Insurance Allowable Auth required? Date Range    Mn []  Yes  [x]  No Mn           Latex Allergy:  [x]NO      []YES  Preferred Language for Healthcare:   [x]English       []other:    Functional Scale:       Date assessed:  TUG 11.5      1/10/2023    Pain level:  0/10     SUBJECTIVE:     Dizziness continues, has not yet received new medications to assist in raising BP. Is ready for testing for LSVT Big program, is going out of town for 2 weeks end of February, so will schedule program to begin once he returns. Would like to practice getting up from floor. Towards end of session, wife reported pt slidding off edge of bed  morning while being assist to edge of bed. Took a long time to get up from the floor, eventually crawled over to the stairs and backed his way down until he was able to stand up.        OBJECTIVE:    LSVT Program testing-   Deepaal  2023 Discharge    30 sec STS   (MDC improve by 3 reps)  9    10 meter walk  (MDC improve by 0.18 m/s comfortable speed)  Trial 1: 8\" = 0.75 m/s   Trial 2: 8.5\"= 0.71 m/s    FGA (high balance)  less than 18 indicates high risk of falls in people with PD ([address] improve by 4 points) 15/30    Activities Specific Balance Confidence Scale    69% or less= recurrent falls in people with PD (BARBARA 11.12 to 13% improvement) 72.81%    MoCA score   less than 26 indicated mild cog impairment  Deferred to OT    Donning/[place] shirt   Buttoning   Donning socks and shoes  Deferred to OT    Functional Task Recording Form See media See media          RESTRICTIONS/PRECAUTIONS:     Exercises/Interventions:   Therapeutic Exercises (79111) Resistance / level Sets/sec Reps Notes   Nustep  warmup  Bike  TM -retro  -side stepping   2.0  0.8 mph  0.5 mph   5'  2'15\" B     IB / HR      Total gym  -squats   CGA on/off   -incr time required to get off machine                                             Therapeutic Activities (63400)       Bed mobility training   -Sit to supine  -Rolling R & L w/Big technique  -Supine to sit    -prone position                    -standing to S/L, rolled L to prone   Sit to stand w/Big technique  -AirEx  -blue wedge 20\"    Ascending  Descending      -no UE assist   Fwd step ups  Lat step ups 6\"  6\" -no UE assist  -no UE assist   Practice getting up from floor on large mat table, large gray bolster utilized as low sofa:  From supine to R S/L to prone to quadruped to tall kneeling, to 1/2 kneel to transfer onto bolster  10 mins  Pt able to complete bed mobility and transitions with min verbal cues but required increased amount of time.                         LSVT functional tasks:  -Getting in and out of car  -Putting on long sleeved jacket  -Buttoning shirt  -Sit to stand, 3 steps fwd, 4 steps bwd, stand to sit  -Top shelf reach w/roll of TB   4\" box            -across room    -focus on ankle PF         Gait (40237)      LSVT Big gait   -cues for improved Big hands and B arm swing                        Neuromuscular Re-ed (80570)       Balance training:  LVST screening      Deferred due to persistent dizziness   Tandem  NBOS  Airex    -frequent stepping to regain balance   Shuttle balance:  -improved posture  -cervical rotation  -B arm swing  -Trunk rotation  -hip abd  -head nods                10   -1 UE assist  -1 UE assist   Staggered stance  -B arm swing         -cues for increased swing behind   High knee w/pause for balance  Walking partial lunge 20'  20'  3  1           Manual Intervention (88504)       Seated hamstring stretch      Supine piriformis stretch                                       Modalities:     Pt. Education:     1/25: discussed reducing use of suspenders as they could be applying tactile feedback to patient to facilitate poor posture. Pt and wife plan to try belts again, but pt recently lost a lot of weight and are having difficulty keeping pants up.      01/10/2023  -patient educated on diagnosis, prognosis and expectations for rehab  -all patient questions were answered    Home Exercise Program:  Access Code: [email or web address]  URL: [email or web address]  Date: 01/13/2023  Prepared by: Verónica Rivers    Exercises  Sit to Stand Without Arm Support - 2 x daily - 7 x weekly - 1 sets - 10 reps  Seated Upright Posture Correction - 4 x daily - 7 x weekly - 1 sets - 5 reps  Added side to side rocking in supine         Therapeutic Exercise and NMR EXR  [x] (91799) Provided verbal/tactile cueing for activities related to strengthening, flexibility, endurance, ROM for improvements in  [x] LE / Lumbar: LE, proximal hip, and core control with self care, mobility, lifting, ambulation.   [] UE / Cervical: cervical, postural, scapular, scapulothoracic and UE control with self care, reaching, carrying, lifting, house/yardwork, driving, computer work.  [] (96888) Provided verbal/tactile cueing for activities related to improving balance, coordination, kinesthetic sense, posture, motor skill, proprioception to assist with   [] LE / lumbar: LE, proximal hip, and core control in self care, mobility, lifting, ambulation and eccentric single leg control. [] UE / cervical: cervical, scapular, scapulothoracic and UE control with self care, reaching, carrying, lifting, house/yardwork, driving, computer work.   [] (34957) Therapist is in constant attendance of 2 or more patients providing skilled therapy interventions, but not providing any significant amount of measurable one-on-one time to either patient, for improvements in  [] LE / lumbar: LE, proximal hip, and core control in self care, mobility, lifting, ambulation and eccentric single leg control. [] UE / cervical: cervical, scapular, scapulothoracic and UE control with self care, reaching, carrying, lifting, house/yardwork, driving, computer work. NMR and Therapeutic Activities:    [x] (08583 or 79268) Provided verbal/tactile cueing for activities related to improving balance, coordination, kinesthetic sense, posture, motor skill, proprioception and motor activation to allow for proper function of   [x] LE: / Lumbar core, proximal hip and LE with self care and ADLs  [] UE / Cervical: cervical, postural, scapular, scapulothoracic and UE control with self care, carrying, lifting, driving, computer work.   [] (89953) Gait Re-education- Provided training and instruction to the patient for proper LE, core and proximal hip recruitment and positioning and eccentric body weight control with ambulation re-education including up and down stairs     Home Management Training / Self Care:  [] (21835) Provided self-care/home management training related to activities of daily living and compensatory training, and/or use of adaptive equipment for improvement with: ADLs and compensatory training, meal preparation, safety procedures and instruction in use of adaptive equipment, including bathing, grooming, dressing, personal hygiene, basic household cleaning and chores.      Home Exercise Program:    [x] (09308) Reviewed/Progressed HEP activities related to strengthening, flexibility, endurance, ROM of   [] LE / Lumbar: core, proximal hip and LE for functional self-care, mobility, lifting and ambulation/stair navigation   [] UE / Cervical: cervical, postural, scapular, scapulothoracic and UE control with self care, reaching, carrying, lifting, house/yardwork, driving, computer work  [] (36386)Reviewed/Progressed HEP activities related to improving balance, coordination, kinesthetic sense, posture, motor skill, proprioception of   [] LE: core, proximal hip and LE for self care, mobility, lifting, and ambulation/stair navigation    [] UE / Cervical: cervical, postural,  scapular, scapulothoracic and UE control with self care, reaching, carrying, lifting, house/yardwork, driving, computer work    Manual Treatments:  PROM / STM / Oscillations-Mobs:  G-I, II, III, IV (PA's, Inf., Post.)  [] (40171) Provided manual therapy to mobilize LE, proximal hip and/or LS spine soft tissue/joints for the purpose of modulating pain, promoting relaxation,  increasing ROM, reducing/eliminating soft tissue swelling/inflammation/restriction, improving soft tissue extensibility and allowing for proper ROM for normal function with   [] LE / lumbar: self care, mobility, lifting and ambulation. [] UE / Cervical: self care, reaching, carrying, lifting, house/yardwork, driving, computer work. Modalities:  [] (96108) Vasopneumatic compression: Utilized vasopneumatic compression to decrease edema / swelling for the purpose of improving mobility and quad tone / recruitment which will allow for increased overall function including but not limited to self-care, transfers, ambulation, and ascending / descending stairs.        Charges:  Timed Code Treatment Minutes: 45   Total Treatment Minutes: 45     [] EVAL - LOW (07757)   [] EVAL - MOD (65424)  [] EVAL - HIGH (54633)  [] RE-EVAL (74854)  [] IS(19690) x       [] Ionto  [x] NMR (76519) x 2     [] Vaso  [] Manual (08642) x       [] Ultrasound  [x] TA x  1      [] [facility] Traction (87227)  [] Aquatic Therapy x     [] ES (un) (16721):   [] Home Management Training x  [] ES(attended) (01619)   [] Dry Needling 1-2 muscles (27852):  [] Dry Needling 3+ muscles (507811)  [] Group:      [] Performance Testing: 87650 x1    GOALS:    Patient stated goal: to improve mobility   [x] Progressing: [] Met: [] Not Met: [] Adjusted     Therapist goals for Patient:   Short Term Goals: To be achieved in: 2 weeks  1. Independent in HEP and progression per patient tolerance, in order to prevent re-injury. [] Progressing: [x] Met: [] Not Met: [] Adjusted  2. Patient will have a decrease in pain to facilitate improvement in movement, function, and ADLs as indicated by Functional Deficits. [] Progressing: [x] Met: [] Not Met: [] Adjusted     Long Term Goals: To be achieved in: 6 weeks / Dc   1. Patient to demonstrate TUG/ score to 9  to demonstrate improved fall risk to assist with reaching prior level of function. [x] Progressing: [] Met: [] Not Met: [] Adjusted  2. Patient to demonstrate consistently getting out of bed mod I .  [x] Progressing: [] Met: [] Not Met: [] Adjusted  3. Patient will demonstrate an increase in strength to good UE & Proximal hip complex, and core activation to allow for proper functional mobility as indicated by patients Functional Deficits. [x] Progressing: [] Met: [] Not Met: [] Adjusted  4. Patient will return to functional activities including sit to stand mod I consistently  without increased restriction. [] Progressing: [x] Met: [] Not Met: [] Adjusted  5. Patient to tolerate to demonstrate floor transfer SBA to mod I for safety management. [x] Not assessed: [] Met: [] Not Met: [] Adjusted       6. Patient to improve 30\" STS to 13 in order to demo improvements in functional strength. [] Not assessed: [] Met: [] Not Met: [x] New goal 2/1/23  7.  Patient to improve FGA to 22/30 in order to reduce fall risk and improve community access. [] Not assessed: [] Met: [] Not Met: [x] New goal 2/1/23  8. Patient to improve ABC Scale to 80% to improve confidence with functional tasks. [] Not assessed: [] Met: [] Not Met: [x] New goal 2/1/23    Overall Progression Towards Functional goals/ Treatment Progress Update:  [] Patient is progressing as expected towards functional goals listed. [] Progression is slowed due to complexities/Impairments listed. [] Progression has been slowed due to co-morbidities. [x] Plan just implemented, too soon to assess goals progression <30days   [] Goals require adjustment due to lack of progress  [] Patient is not progressing as expected and requires additional follow up with physician  [] Other    Persisting Functional Limitations/Impairments:  []Sleeping []Sitting               []Standing [x]Transfers        [x]Walking [x]Kneeling               [x]Stairs []Squatting / bending   [x]ADLs []Reaching  [x]Lifting  []Housework  []Driving []Job related tasks  []Sports/Recreation [x]Other: bed mobility         ASSESSMENT:     Patient had difficulty distinguishing high knee w/pause and walking with high knees, able to perform x~5 feet. Attempt to challenge SLS w/stepping to 2nd step or up to platform. Schedule sessions for LSVT treatment next visit. Continue functional tasks to further improve pt's ADL performance. Treatment/Activity Tolerance:  [x] Patient able to complete tx [] Patient limited by fatigue  [] Patient limited by pain  [] Patient limited by other medical complications  [] Other:     Prognosis: [] Good [x] Fair  [] Poor    Patient Requires Follow-up: [x] Yes  [] No    Plan for next treatment session:    PLAN: See eval. PT 2x / week for 6 weeks.    [x] Continue per plan of care [] Alter current plan (see comments)  [] Plan of care initiated [] Hold pending MD visit [] Discharge    Electronically signed by: Bree Maloney PT, DPT    Note: If patient does not return for scheduled/ recommended follow up visits, this note will serve as a discharge from care along with most recent update on progress.

## 2023-02-08 ENCOUNTER — HOSPITAL ENCOUNTER (OUTPATIENT)
Dept: PHYSICAL THERAPY | Age: 78
Setting detail: THERAPIES SERIES
Discharge: HOME | End: 2023-02-08
Payer: MEDICARE

## 2023-02-08 PROCEDURE — 97112 NEUROMUSCULAR REEDUCATION: CPT

## 2023-02-08 PROCEDURE — 97530 THERAPEUTIC ACTIVITIES: CPT

## 2023-02-08 NOTE — FLOWSHEET NOTE
[address] [facility]  Phone: [phone]   Fax: [phone]    Physical Therapy Daily Treatment Note    Date:  2023     Patient Name:  Prasad Perdomo    :  1945  MRN: [id number]  Medical Diagnosis:  Parkinson disease ([address] [G20]  General weakness [R53.1]  Treatment Diagnosis: imbalance , Abnormal gait   Insurance/Certification information:  PT Insurance Information: Medicare  Physician Information:  JERAMIE Whitfield    Plan of care signed (Y/N): [x]  Yes []  No     Date of Patient follow up with Physician:      Progress Report: []  Yes  [x]  No     Date Range for reporting period:  Beginnin/10/2023  POC: 23  Ending:     Progress report due (10 Rx/or 30 days whichever is less): visit #18 or  8346    Recertification due (POC duration/ or 90 days whichever is less): visit # or  23    Visit # Insurance Allowable Auth required? Date Range   10/12 +  Mn []  Yes  [x]  No Mn           Latex Allergy:  [x]NO      []YES  Preferred Language for Healthcare:   [x]English       []other:    Functional Scale:       Date assessed:  TUG 11.5      1/10/2023    Pain level:  4/10  -lower back    SUBJECTIVE:     Doing well today, brought in golf club to practice swinging in preparation of golfing on vacation. Doesn't feel like theres anything particular he needs to practice today, maybe his balance a little more.         OBJECTIVE:    LSVT Program testing-   Eval  2023 Discharge    30 sec STS   ([facility] improve by 3 reps)  9    10 meter walk  ([facility] improve by 0.18 m/s comfortable speed)  Trial 1: 8\" = 0.75 m/s   Trial 2: 8.5\"= 0.71 m/s    FGA (high balance)  less than 18 indicates high risk of falls in people with PD ([facility] improve by 4 points) 1530    Activities Specific Balance Confidence Scale    69% or less= recurrent falls in people with PD (BARBARA 11.12 to 13% improvement) 72.81%    MoCA score   less than 26 indicated mild cog impairment Deferred to OT    Donning/Brilliant shirt   Buttoning   Donning socks and shoes  Deferred to OT    Functional Task Recording Form See media See media          RESTRICTIONS/PRECAUTIONS:     Exercises/Interventions:   Therapeutic Exercises (95354) Resistance / level Sets/sec Reps Notes   Nustep  warmup  Bike  TM -retro  -side stepping   2.0  0.8 mph  0.5 mph   2.5' B     IB / HR      Total gym  -squats   CGA on/off   -incr time required to get off machine                                             Therapeutic Activities (88837)       Bed mobility training   -Sit to supine  -Rolling R & L w/Big technique  -Supine to sit    -prone position                    -standing to S/L, rolled L to prone   Sit to stand w/Big technique  -AirEx  -blue wedge 20\"    Ascending  Descending      -no UE assist   Fwd step ups from 2nd step w/contral LE to platform  Lat step ups 6\"    6\" 1  10 B  -no UE assist  -no UE assist   Practice getting up from floor on large mat table, large gray bolster utilized as low sofa:  From supine to R S/L to prone to quadruped to tall kneeling, to 1/2 kneel to transfer onto bolster   Pt able to complete bed mobility and transitions with min verbal cues but required increased amount of time.    Golf club swings  -flat surface  -on AirEx    3 mins  3 mins                   LSVT functional tasks:  -Getting in and out of car  -Putting on long sleeved jacket  -Buttoning shirt  -Sit to stand, 3 steps fwd, 4 steps bwd, stand to sit  -Top shelf reach w/roll of TB   4\" box            -across room    -focus on ankle PF         Gait (58759)      LSVT Big gait   280' -cues for improved Big hands and B arm swing                        Neuromuscular Re-ed (48829)       Balance training:  LVST screening      Deferred due to persistent dizziness   Tandem  NBOS  Airex    -frequent stepping to regain balance   Shuttle balance:  -improved posture  -cervical rotation  -B arm swing in staggered stance  -Trunk rotation  -hip abd  -head nods            2 mins B         -1 UE assist  -1 UE assist   Staggered stance  -B arm swing         -cues for increased swing behind   High knee w/pause for balance  Walking partial lunge           Manual Intervention (08834)       Seated hamstring stretch      Supine piriformis stretch                                       Modalities:     Pt. Education:     1/25: discussed reducing use of suspenders as they could be applying tactile feedback to patient to facilitate poor posture. Pt and wife plan to try belts again, but pt recently lost a lot of weight and are having difficulty keeping pants up.      01/10/2023  -patient educated on diagnosis, prognosis and expectations for rehab  -all patient questions were answered    Home Exercise Program:  Access Code: [email or web address]  URL: [email or web address]  Date: 01/13/2023  Prepared by: Lemmie Lombard    Exercises  Sit to Stand Without Arm Support - 2 x daily - 7 x weekly - 1 sets - 10 reps  Seated Upright Posture Correction - 4 x daily - 7 x weekly - 1 sets - 5 reps  Added side to side rocking in supine         Therapeutic Exercise and NMR EXR  [x] (80692) Provided verbal/tactile cueing for activities related to strengthening, flexibility, endurance, ROM for improvements in  [x] LE / Lumbar: LE, proximal hip, and core control with self care, mobility, lifting, ambulation. [] UE / Cervical: cervical, postural, scapular, scapulothoracic and UE control with self care, reaching, carrying, lifting, house/yardwork, driving, computer work.  [] (30190) Provided verbal/tactile cueing for activities related to improving balance, coordination, kinesthetic sense, posture, motor skill, proprioception to assist with   [] LE / lumbar: LE, proximal hip, and core control in self care, mobility, lifting, ambulation and eccentric single leg control.    [] UE / cervical: cervical, scapular, scapulothoracic and UE control with self care, reaching, carrying, lifting, house/yardwork, driving, computer work.   [] (34432) Therapist is in constant attendance of 2 or more patients providing skilled therapy interventions, but not providing any significant amount of measurable one-on-one time to either patient, for improvements in  [] LE / lumbar: LE, proximal hip, and core control in self care, mobility, lifting, ambulation and eccentric single leg control. [] UE / cervical: cervical, scapular, scapulothoracic and UE control with self care, reaching, carrying, lifting, house/yardwork, driving, computer work. NMR and Therapeutic Activities:    [x] (49057 or 31350) Provided verbal/tactile cueing for activities related to improving balance, coordination, kinesthetic sense, posture, motor skill, proprioception and motor activation to allow for proper function of   [x] LE: / Lumbar core, proximal hip and LE with self care and ADLs  [] UE / Cervical: cervical, postural, scapular, scapulothoracic and UE control with self care, carrying, lifting, driving, computer work.   [] (14589) Gait Re-education- Provided training and instruction to the patient for proper LE, core and proximal hip recruitment and positioning and eccentric body weight control with ambulation re-education including up and down stairs     Home Management Training / Self Care:  [] (29936) Provided self-care/home management training related to activities of daily living and compensatory training, and/or use of adaptive equipment for improvement with: ADLs and compensatory training, meal preparation, safety procedures and instruction in use of adaptive equipment, including bathing, grooming, dressing, personal hygiene, basic household cleaning and chores.      Home Exercise Program:    [x] (18808) Reviewed/Progressed HEP activities related to strengthening, flexibility, endurance, ROM of   [] LE / Lumbar: core, proximal hip and LE for functional self-care, mobility, lifting and ambulation/stair navigation   [] UE / Cervical: cervical, postural, scapular, scapulothoracic and UE control with self care, reaching, carrying, lifting, house/yardwork, driving, computer work  [] (44231)Reviewed/Progressed HEP activities related to improving balance, coordination, kinesthetic sense, posture, motor skill, proprioception of   [] LE: core, proximal hip and LE for self care, mobility, lifting, and ambulation/stair navigation    [] UE / Cervical: cervical, postural,  scapular, scapulothoracic and UE control with self care, reaching, carrying, lifting, house/yardwork, driving, computer work    Manual Treatments:  PROM / STM / Oscillations-Mobs:  G-I, II, III, IV (PA's, Inf., Post.)  [] (27040) Provided manual therapy to mobilize LE, proximal hip and/or LS spine soft tissue/joints for the purpose of modulating pain, promoting relaxation,  increasing ROM, reducing/eliminating soft tissue swelling/inflammation/restriction, improving soft tissue extensibility and allowing for proper ROM for normal function with   [] LE / lumbar: self care, mobility, lifting and ambulation. [] UE / Cervical: self care, reaching, carrying, lifting, house/yardwork, driving, computer work. Modalities:  [] (66674) Vasopneumatic compression: Utilized vasopneumatic compression to decrease edema / swelling for the purpose of improving mobility and quad tone / recruitment which will allow for increased overall function including but not limited to self-care, transfers, ambulation, and ascending / descending stairs.        Charges:  Timed Code Treatment Minutes: 43   Total Treatment Minutes: 43     [] EVAL - LOW (26503)   [] EVAL - MOD (43773)  [] EVAL - HIGH (83666)  [] RE-EVAL (97959)  [] HN(02400) x       [] Ionto  [x] NMR (17363) x 2     [] Vaso  [] Manual (86576) x       [] Ultrasound  [x] TA x  1      [] Mech Traction (94240)  [] Aquatic Therapy x     [] ES (un) (75445):   [] Home Management Training x  [] ES(attended) (27054)   [] Dry Needling 1-2 muscles (19185):  [] Dry Needling 3+ muscles (200365)  [] Group:      [] Performance Testing: 93317 x1    GOALS:    Patient stated goal: to improve mobility   [x] Progressing: [] Met: [] Not Met: [] Adjusted     Therapist goals for Patient:   Short Term Goals: To be achieved in: 2 weeks  1. Independent in HEP and progression per patient tolerance, in order to prevent re-injury. [] Progressing: [x] Met: [] Not Met: [] Adjusted  2. Patient will have a decrease in pain to facilitate improvement in movement, function, and ADLs as indicated by Functional Deficits. [] Progressing: [x] Met: [] Not Met: [] Adjusted     Long Term Goals: To be achieved in: 6 weeks / Dc   1. Patient to demonstrate TUG/ score to 9  to demonstrate improved fall risk to assist with reaching prior level of function. [x] Progressing: [] Met: [] Not Met: [] Adjusted  2. Patient to demonstrate consistently getting out of bed mod I .  [x] Progressing: [] Met: [] Not Met: [] Adjusted  3. Patient will demonstrate an increase in strength to good UE & Proximal hip complex, and core activation to allow for proper functional mobility as indicated by patients Functional Deficits. [x] Progressing: [] Met: [] Not Met: [] Adjusted  4. Patient will return to functional activities including sit to stand mod I consistently  without increased restriction. [] Progressing: [x] Met: [] Not Met: [] Adjusted  5. Patient to tolerate to demonstrate floor transfer SBA to mod I for safety management. [x] Not assessed: [] Met: [] Not Met: [] Adjusted       6. Patient to improve 30\" STS to 13 in order to demo improvements in functional strength. [] Not assessed: [] Met: [] Not Met: [x] New goal 2/1/23  7. Patient to improve FGA to 22/30 in order to reduce fall risk and improve community access. [] Not assessed: [] Met: [] Not Met: [x] New goal 2/1/23  8. Patient to improve ABC Scale to 80% to improve confidence with functional tasks.   [] Not assessed: [] Met: [] Not Met: [x] New goal 2/1/23    Overall Progression Towards Functional goals/ Treatment Progress Update:  [] Patient is progressing as expected towards functional goals listed. [] Progression is slowed due to complexities/Impairments listed. [] Progression has been slowed due to co-morbidities. [x] Plan just implemented, too soon to assess goals progression <30days   [] Goals require adjustment due to lack of progress  [] Patient is not progressing as expected and requires additional follow up with physician  [] Other    Persisting Functional Limitations/Impairments:  []Sleeping []Sitting               []Standing [x]Transfers        [x]Walking [x]Kneeling               [x]Stairs []Squatting / bending   [x]ADLs []Reaching  [x]Lifting  []Housework  []Driving []Job related tasks  []Sports/Recreation [x]Other: bed mobility         ASSESSMENT:     Patient demonstrating improved trunk ext and B step length consistently, however pt continues to have difficulty increasing B arm swing despite frequent instruction and demonstration. Pt has OT eval scheduled for next week to participate in LSVT Big program with conjunction w/PT. Continue to progress large amplitude patterns in preparation of initiation of LSVT Big program next month. Treatment/Activity Tolerance:  [x] Patient able to complete tx [] Patient limited by fatigue  [] Patient limited by pain  [] Patient limited by other medical complications  [] Other:     Prognosis: [x] Good [] Fair  [] Poor    Patient Requires Follow-up: [x] Yes  [] No    Plan for next treatment session:    PLAN: See eval. PT 2x / week for 6 weeks.    [x] Continue per plan of care [] Alter current plan (see comments)  [] Plan of care initiated [] Hold pending MD visit [] Discharge    Electronically signed by: Leonard Duron, PT, DPT    Note: If patient does not return for scheduled/ recommended follow up visits, this note will serve as a discharge from care along with most recent update on progress.

## 2023-02-15 ENCOUNTER — HOSPITAL ENCOUNTER (OUTPATIENT)
Dept: PHYSICAL THERAPY | Age: 78
Setting detail: THERAPIES SERIES
Discharge: HOME | End: 2023-02-15
Payer: MEDICARE

## 2023-02-15 PROCEDURE — 97112 NEUROMUSCULAR REEDUCATION: CPT

## 2023-02-15 PROCEDURE — 97530 THERAPEUTIC ACTIVITIES: CPT

## 2023-02-15 NOTE — FLOWSHEET NOTE
[address] [facility] Physical Therapy  Phone: [phone]   Fax: [phone]    Physical Therapy Daily Treatment Note    Date:  02/15/2023     Patient Name:  Anthony Negrete    :  1945  MRN: [id number]  Medical Diagnosis:  Parkinson disease ([address] [G20]  General weakness [R53.1]  Treatment Diagnosis: imbalance , Abnormal gait   Insurance/Certification information:  PT Insurance Information: Medicare  Physician Information:  KEYSHA Caba*    Plan of care signed (Y/N): [x]  Yes []  No     Date of Patient follow up with Physician:      Progress Report: []  Yes  [x]  No     Date Range for reporting period:  Beginnin/10/2023  POC: 23  Ending:     Progress report due (10 Rx/or 30 days whichever is less): visit #18 or  5434    Recertification due (POC duration/ or 90 days whichever is less): visit # or  23    Visit # Insurance Allowable Auth required? Date Range    + 017 Mn []  Yes  [x]  No Mn           Latex Allergy:  [x]NO      []YES  Preferred Language for Healthcare:   [x]English       []other:    Functional Scale:       Date assessed:  TUG 11.5      1/10/2023    Pain level:  0/10  -lower back    SUBJECTIVE:     Doing okay today, no new complaints or difficulties since last session.           OBJECTIVE:    LSVT Program testing-   Eval  2023 Discharge    30 sec STS   ([address] improve by 3 reps)  9    10 meter walk  (MDC improve by 0.18 m/s comfortable speed)  Trial 1: 8\" = 0.75 m/s   Trial 2: 8.5\"= 0.71 m/s    FGA (high balance)  less than 18 indicates high risk of falls in people with PD ([address] improve by 4 points) 15/30    Activities Specific Balance Confidence Scale    69% or less= recurrent falls in people with PD (BARBARA 11.12 to 13% improvement) 72.81%    MoCA score   less than 26 indicated mild cog impairment  Deferred to OT    Donning/Marlette shirt   Buttoning   Donning socks and shoes  Deferred to OT    Functional Task Recording Form See media See media          RESTRICTIONS/PRECAUTIONS:     Exercises/Interventions:   Therapeutic Exercises (95046) Resistance / level Sets/sec Reps Notes   Nustep  warmup  Bike  TM -retro  -side stepping   2.0  0.6 mph  0.5 mph   5'      IB / HR      Total gym  -squats   CGA on/off   -incr time required to get off machine                                             Therapeutic Activities (88852)       Bed mobility training   -Sit to supine  -Rolling R & L w/Big technique  -Supine to sit    -prone position                    -standing to S/L, rolled L to prone   Sit to stand w/Big technique  -AirEx  -blue wedge    -large tire 20\"    Ascending  Descending    10   -no UE assist   Fwd step ups from 2nd step w/contral LE to platform  Lat step ups 6\"    6\" -no UE assist  -no UE assist   Practice getting up from floor on large mat table, large gray bolster utilized as low sofa:  From supine to R S/L to prone to quadruped to tall kneeling, to 1/2 kneel to transfer onto bolster   Pt able to complete bed mobility and transitions with min verbal cues but required increased amount of time.    Golf club swings  -flat surface  -on AirEx  -The Field        -Normal stance        -Feet inclined        -Feet declined        -foot on 2\" surface            4'  2'  1'  2' B                   LSVT functional tasks:  -Getting in and out of car  -Putting on long sleeved jacket  -Buttoning shirt  -Sit to stand, 3 steps fwd, 4 steps bwd, stand to sit  -Top shelf reach w/roll of TB  -reaching to top bar at cross fit   4\" box    x10         -across room    -focus on ankle PF  -focus on ankle PF         Gait (18993)      LSVT Big gait   350'x2 -cues for improved Big hands and B arm swing                        Neuromuscular Re-ed (26194)       Balance training:  LVST screening       Tandem  NBOS  Airex    -frequent stepping to regain balance   Shuttle balance:  -improved posture  -cervical rotation  -B arm swing in staggered stance        -in front        -behind  -Trunk rotation  -hip abd  -head nods                   10  10    -1 UE assist  -1 UE assist   Staggered stance  -B arm swing         -cues for increased swing behind   High knee w/pause for balance  Walking partial lunge           Manual Intervention (30125)       Seated hamstring stretch      Supine piriformis stretch                                       Modalities:     Pt. Education:     1/25: discussed reducing use of suspenders as they could be applying tactile feedback to patient to facilitate poor posture. Pt and wife plan to try belts again, but pt recently lost a lot of weight and are having difficulty keeping pants up.      01/10/2023  -patient educated on diagnosis, prognosis and expectations for rehab  -all patient questions were answered    Home Exercise Program:  Access Code: [email or web address]  URL: [email or web address]  Date: 01/13/2023  Prepared by: Jazmín Sampson    Exercises  Sit to Stand Without Arm Support - 2 x daily - 7 x weekly - 1 sets - 10 reps  Seated Upright Posture Correction - 4 x daily - 7 x weekly - 1 sets - 5 reps  Added side to side rocking in supine         Therapeutic Exercise and NMR EXR  [x] (52702) Provided verbal/tactile cueing for activities related to strengthening, flexibility, endurance, ROM for improvements in  [x] LE / Lumbar: LE, proximal hip, and core control with self care, mobility, lifting, ambulation. [] UE / Cervical: cervical, postural, scapular, scapulothoracic and UE control with self care, reaching, carrying, lifting, house/yardwork, driving, computer work.  [] (64119) Provided verbal/tactile cueing for activities related to improving balance, coordination, kinesthetic sense, posture, motor skill, proprioception to assist with   [] LE / lumbar: LE, proximal hip, and core control in self care, mobility, lifting, ambulation and eccentric single leg control.    [] UE / cervical: cervical, scapular, scapulothoracic and UE control with self care, reaching, carrying, lifting, house/yardwork, driving, computer work.   [] (61241) Therapist is in constant attendance of 2 or more patients providing skilled therapy interventions, but not providing any significant amount of measurable one-on-one time to either patient, for improvements in  [] LE / lumbar: LE, proximal hip, and core control in self care, mobility, lifting, ambulation and eccentric single leg control. [] UE / cervical: cervical, scapular, scapulothoracic and UE control with self care, reaching, carrying, lifting, house/yardwork, driving, computer work. NMR and Therapeutic Activities:    [x] (48834 or 83161) Provided verbal/tactile cueing for activities related to improving balance, coordination, kinesthetic sense, posture, motor skill, proprioception and motor activation to allow for proper function of   [x] LE: / Lumbar core, proximal hip and LE with self care and ADLs  [] UE / Cervical: cervical, postural, scapular, scapulothoracic and UE control with self care, carrying, lifting, driving, computer work.   [] (23531) Gait Re-education- Provided training and instruction to the patient for proper LE, core and proximal hip recruitment and positioning and eccentric body weight control with ambulation re-education including up and down stairs     Home Management Training / Self Care:  [] (85330) Provided self-care/home management training related to activities of daily living and compensatory training, and/or use of adaptive equipment for improvement with: ADLs and compensatory training, meal preparation, safety procedures and instruction in use of adaptive equipment, including bathing, grooming, dressing, personal hygiene, basic household cleaning and chores.      Home Exercise Program:    [x] (87499) Reviewed/Progressed HEP activities related to strengthening, flexibility, endurance, ROM of   [] LE / Lumbar: core, proximal hip and LE for functional self-care, mobility, lifting and ambulation/stair navigation   [] UE / Cervical: cervical, postural, scapular, scapulothoracic and UE control with self care, reaching, carrying, lifting, house/yardwork, driving, computer work  [] (87519)Reviewed/Progressed HEP activities related to improving balance, coordination, kinesthetic sense, posture, motor skill, proprioception of   [] LE: core, proximal hip and LE for self care, mobility, lifting, and ambulation/stair navigation    [] UE / Cervical: cervical, postural,  scapular, scapulothoracic and UE control with self care, reaching, carrying, lifting, house/yardwork, driving, computer work    Manual Treatments:  PROM / STM / Oscillations-Mobs:  G-I, II, III, IV (PA's, Inf., Post.)  [] (52680) Provided manual therapy to mobilize LE, proximal hip and/or LS spine soft tissue/joints for the purpose of modulating pain, promoting relaxation,  increasing ROM, reducing/eliminating soft tissue swelling/inflammation/restriction, improving soft tissue extensibility and allowing for proper ROM for normal function with   [] LE / lumbar: self care, mobility, lifting and ambulation. [] UE / Cervical: self care, reaching, carrying, lifting, house/yardwork, driving, computer work. Modalities:  [] (46810) Vasopneumatic compression: Utilized vasopneumatic compression to decrease edema / swelling for the purpose of improving mobility and quad tone / recruitment which will allow for increased overall function including but not limited to self-care, transfers, ambulation, and ascending / descending stairs.        Charges:  Timed Code Treatment Minutes: 48   Total Treatment Minutes: 48     [] EVAL - LOW (47444)   [] EVAL - MOD (11752)  [] EVAL - HIGH (94442)  [] RE-EVAL (42537)  [] OH(91907) x       [] Ionto  [x] NMR (61755) x 2     [] Vaso  [] Manual (64023) x       [] Ultrasound  [x] TA x  1      [] Mech Traction (88494)  [] Aquatic Therapy x     [] ES (un) (92479):   [] Home Management Training x  [] ES(attended) (06484)   [] Dry Needling 1-2 muscles (71970):  [] Dry Needling 3+ muscles (505590)  [] Group:      [] Performance Testing: 02225 x1    GOALS:    Patient stated goal: to improve mobility   [x] Progressing: [] Met: [] Not Met: [] Adjusted     Therapist goals for Patient:   Short Term Goals: To be achieved in: 2 weeks  1. Independent in HEP and progression per patient tolerance, in order to prevent re-injury. [] Progressing: [x] Met: [] Not Met: [] Adjusted  2. Patient will have a decrease in pain to facilitate improvement in movement, function, and ADLs as indicated by Functional Deficits. [] Progressing: [x] Met: [] Not Met: [] Adjusted     Long Term Goals: To be achieved in: 6 weeks / Dc   1. Patient to demonstrate TUG/ score to 9  to demonstrate improved fall risk to assist with reaching prior level of function. [x] Progressing: [] Met: [] Not Met: [] Adjusted  2. Patient to demonstrate consistently getting out of bed mod I .  [x] Progressing: [] Met: [] Not Met: [] Adjusted  3. Patient will demonstrate an increase in strength to good UE & Proximal hip complex, and core activation to allow for proper functional mobility as indicated by patients Functional Deficits. [x] Progressing: [] Met: [] Not Met: [] Adjusted  4. Patient will return to functional activities including sit to stand mod I consistently  without increased restriction. [] Progressing: [x] Met: [] Not Met: [] Adjusted  5. Patient to tolerate to demonstrate floor transfer SBA to mod I for safety management. [x] Not assessed: [] Met: [] Not Met: [] Adjusted       6. Patient to improve 30\" STS to 13 in order to demo improvements in functional strength. [] Not assessed: [] Met: [] Not Met: [x] New goal 2/1/23  7. Patient to improve FGA to 22/30 in order to reduce fall risk and improve community access. [] Not assessed: [] Met: [] Not Met: [x] New goal 2/1/23  8.  Patient to improve ABC Scale to 80% to improve confidence with functional tasks.  [] Not assessed: [] Met: [] Not Met: [x] New goal 2/1/23    Overall Progression Towards Functional goals/ Treatment Progress Update:  [] Patient is progressing as expected towards functional goals listed. [] Progression is slowed due to complexities/Impairments listed. [] Progression has been slowed due to co-morbidities. [x] Plan just implemented, too soon to assess goals progression <30days   [] Goals require adjustment due to lack of progress  [] Patient is not progressing as expected and requires additional follow up with physician  [] Other    Persisting Functional Limitations/Impairments:  []Sleeping []Sitting               []Standing [x]Transfers        [x]Walking [x]Kneeling               [x]Stairs []Squatting / bending   [x]ADLs []Reaching  [x]Lifting  []Housework  []Driving []Job related tasks  []Sports/Recreation [x]Other: bed mobility         ASSESSMENT:     Pt had 1 episode of increased dizziness following sit to stands from tire, required seated rest to recover. Difficulty with B arm swing continues. Pt able to maintain balance while swinging club on the field, and with feet in different position. Advised pt to not attempt to hit ball with ball is located on uneven surface, below feet. Address goals next visit in preparation of beginning LSVT Big treatment 30 days after. Treatment/Activity Tolerance:  [x] Patient able to complete tx [] Patient limited by fatigue  [] Patient limited by pain  [] Patient limited by other medical complications  [] Other:     Prognosis: [x] Good [] Fair  [] Poor    Patient Requires Follow-up: [x] Yes  [] No    Plan for next treatment session:    PLAN: See eval. PT 2x / week for 6 weeks.    [x] Continue per plan of care [] Alter current plan (see comments)  [] Plan of care initiated [] Hold pending MD visit [] Discharge    Electronically signed by: Bereket High, PT, DPT    Note: If patient does not return for scheduled/ recommended follow up visits, this note will serve as a discharge from care along with most recent update on progress.

## 2023-02-16 ENCOUNTER — HOSPITAL ENCOUNTER (OUTPATIENT)
Dept: OCCUPATIONAL THERAPY | Age: 78
Setting detail: THERAPIES SERIES
Discharge: HOME | End: 2023-02-16
Payer: MEDICARE

## 2023-02-16 PROCEDURE — 97129 THER IVNTJ 1ST 15 MIN: CPT

## 2023-02-16 PROCEDURE — 97112 NEUROMUSCULAR REEDUCATION: CPT

## 2023-02-16 PROCEDURE — 97166 OT EVAL MOD COMPLEX 45 MIN: CPT

## 2023-02-16 PROCEDURE — 97150 GROUP THERAPEUTIC PROCEDURES: CPT

## 2023-02-17 ENCOUNTER — HOSPITAL ENCOUNTER (OUTPATIENT)
Dept: PHYSICAL THERAPY | Age: 78
Setting detail: THERAPIES SERIES
Discharge: HOME | End: 2023-02-17
Payer: MEDICARE

## 2023-02-17 PROCEDURE — 97140 MANUAL THERAPY 1/> REGIONS: CPT

## 2023-02-17 PROCEDURE — 97110 THERAPEUTIC EXERCISES: CPT

## 2023-02-17 NOTE — FLOWSHEET NOTE
[address] [facility]  Phone: [phone]   Fax: [phone]    Physical Therapy Daily Treatment Note    Date:  2023     Patient Name:  Delvin Monet    :  1945  MRN: [id number]  Medical Diagnosis:  Parkinson disease ([address] [G20]  General weakness [R53.1]  Treatment Diagnosis: imbalance , Abnormal gait   Insurance/Certification information:  PT Insurance Information: Medicare  Physician Information:  JERAMIE Ordoñez    Plan of care signed (Y/N): [x]  Yes []  No     Date of Patient follow up with Physician:      Progress Report: []  Yes  [x]  No     Date Range for reporting period:  Beginnin/10/2023  POC: 23  Ending:     Progress report due (10 Rx/or 30 days whichever is less): visit #18 or  2587    Recertification due (POC duration/ or 90 days whichever is less): visit # or  23    Visit # Insurance Allowable Auth required? Date Range    +  Mn []  Yes  [x]  No Mn           Latex Allergy:  [x]NO      []YES  Preferred Language for Healthcare:   [x]English       []other:    Functional Scale:       Date assessed:  TUG 11.5      1/10/2023    Pain level:  0/10  -lower back; 4/10 R hip    SUBJECTIVE:    Pt and wife reporting hip has been really hurting for the last day and a half. Injured it when his wife was helping him out of bed.          OBJECTIVE:    LSVT Program testing-   Eval  2023 Discharge    30 sec STS   ([employer] [address] improve by 3 reps)  9    10 meter walk  (MDC improve by 0.18 m/s comfortable speed)  Trial 1: 8\" = 0.75 m/s   Trial 2: 8.5\"= 0.71 m/s    FGA (high balance)  less than 18 indicates high risk of falls in people with PD ([employer] [address] improve by 4 points) 15/30    Activities Specific Balance Confidence Scale    69% or less= recurrent falls in people with PD (BARBARA 11.12 to 13% improvement) 72.81%    MoCA score   less than 26 indicated mild cog impairment  Deferred to OT    Donning/[place] shirt   Buttoning   Donning socks and shoes  Deferred to OT    Functional Task Recording Form See media See media          RESTRICTIONS/PRECAUTIONS:     Exercises/Interventions:   Therapeutic Exercises (27945) Resistance / level Sets/sec Reps Notes   Nustep  warmup  Bike  TM -retro  -side stepping     IB / HR      Total gym  -squats   CGA on/off   -incr time required to get off machine   Bridges  AROM hip IR and ER in hookyling   X 10     Reveiwed HEP as seen below  X 5 min   Advised heat and pillow between knees - wife to only help in and out of bed if pt unable to do independently                                Therapeutic Activities (42198)       Bed mobility training   -Sit to supine  -Rolling R & L w/Big technique  -Supine to sit    -prone position                    -standing to S/L, rolled L to prone   Sit to stand w/Big technique  -AirEx  -blue wedge    -large tire   10   -no UE assist   Fwd step ups from 2nd step w/contral LE to platform  Lat step ups -no UE assist  -no UE assist   Practice getting up from floor on large mat table, large gray bolster utilized as low sofa:  From supine to R S/L to prone to quadruped to tall kneeling, to 1/2 kneel to transfer onto bolster  Pt able to complete bed mobility and transitions with min verbal cues but required increased amount of time.    Golf club swings  -flat surface  -on AirEx  -The Field        -Normal stance        -Feet inclined        -Feet declined        -foot on 2\" surface                   LSVT functional tasks:  -Getting in and out of car  -Putting on long sleeved jacket  -Buttoning shirt  -Sit to stand, 3 steps fwd, 4 steps bwd, stand to sit  -Top shelf reach w/roll of TB  -reaching to top bar at cross fit   4\" box            -across room    -focus on ankle PF  -focus on ankle PF         Gait (40972)      LSVT Big gait   -cues for improved Big hands and B arm swing                        Neuromuscular Re-ed (42617)       Balance training:  LVST screening       Tandem  NBOS  Airex -frequent stepping to regain balance   Shuttle balance:  -improved posture  -cervical rotation  -B arm swing in staggered stance        -in front        -behind  -Trunk rotation  -hip abd  -head nods                   -1 UE assist  -1 UE assist   Staggered stance  -B arm swing         -cues for increased swing behind   High knee w/pause for balance  Walking partial lunge           Manual Intervention (68972)       Seated hamstring stretch      Supine piriformis stretch       Assessment of hip and surrounding mm, DTM to R TFL and ITB, long leg distraction at L hip, PROM into ER and IR, negative figure 4 (limited), lateral joint mob grade 3   X 26 min                              Modalities:   2/17: MHP to R hip in sidelying x 10 min     Pt. Education:     1/25: discussed reducing use of suspenders as they could be applying tactile feedback to patient to facilitate poor posture. Pt and wife plan to try belts again, but pt recently lost a lot of weight and are having difficulty keeping pants up.      01/10/2023  -patient educated on diagnosis, prognosis and expectations for rehab  -all patient questions were answered    Home Exercise Program:      Access Code: [place]  URL: [email or web address]  Date: 02/17/2023  Prepared by: Jasmin Lares    Exercises  Supine Bridge - 1 x daily - 7 x weekly - 3 sets - 10 reps  Supine Hip Abduction - 1 x daily - 7 x weekly - 3 sets - 10 reps  Seated Hip Internal Rotation AROM - 1 x daily - 7 x weekly - 3 sets - 10 reps  Seated Hip External Rotation AROM - 1 x daily - 7 x weekly - 3 sets - 10 reps  Supine Heel Slide - 1 x daily - 7 x weekly - 3 sets - 10 reps      Access Code: [email or web address]  URL: [email or web address]/  Date: 01/13/2023  Prepared by: Thomas Vasquez    Exercises  Sit to Stand Without Arm Support - 2 x daily - 7 x weekly - 1 sets - 10 reps  Seated Upright Posture Correction - 4 x daily - 7 x weekly - 1 sets - 5 reps  Added side to side rocking in supine         Therapeutic Exercise and NMR EXR  [x] (64555) Provided verbal/tactile cueing for activities related to strengthening, flexibility, endurance, ROM for improvements in  [x] LE / Lumbar: LE, proximal hip, and core control with self care, mobility, lifting, ambulation. [] UE / Cervical: cervical, postural, scapular, scapulothoracic and UE control with self care, reaching, carrying, lifting, house/yardwork, driving, computer work.  [] (90427) Provided verbal/tactile cueing for activities related to improving balance, coordination, kinesthetic sense, posture, motor skill, proprioception to assist with   [] LE / lumbar: LE, proximal hip, and core control in self care, mobility, lifting, ambulation and eccentric single leg control. [] UE / cervical: cervical, scapular, scapulothoracic and UE control with self care, reaching, carrying, lifting, house/yardwork, driving, computer work.   [] (89280) Therapist is in constant attendance of 2 or more patients providing skilled therapy interventions, but not providing any significant amount of measurable one-on-one time to either patient, for improvements in  [] LE / lumbar: LE, proximal hip, and core control in self care, mobility, lifting, ambulation and eccentric single leg control. [] UE / cervical: cervical, scapular, scapulothoracic and UE control with self care, reaching, carrying, lifting, house/yardwork, driving, computer work.      NMR and Therapeutic Activities:    [x] (32563 or 91621) Provided verbal/tactile cueing for activities related to improving balance, coordination, kinesthetic sense, posture, motor skill, proprioception and motor activation to allow for proper function of   [x] LE: / Lumbar core, proximal hip and LE with self care and ADLs  [] UE / Cervical: cervical, postural, scapular, scapulothoracic and UE control with self care, carrying, lifting, driving, computer work.   [] (87201) Gait Re-education- Provided training and instruction to the patient for proper LE, core and proximal hip recruitment and positioning and eccentric body weight control with ambulation re-education including up and down stairs     Home Management Training / Self Care:  [] (39918) Provided self-care/home management training related to activities of daily living and compensatory training, and/or use of adaptive equipment for improvement with: ADLs and compensatory training, meal preparation, safety procedures and instruction in use of adaptive equipment, including bathing, grooming, dressing, personal hygiene, basic household cleaning and chores. Home Exercise Program:    [] (76684) Reviewed/Progressed HEP activities related to strengthening, flexibility, endurance, ROM of   [] LE / Lumbar: core, proximal hip and LE for functional self-care, mobility, lifting and ambulation/stair navigation   [] UE / Cervical: cervical, postural, scapular, scapulothoracic and UE control with self care, reaching, carrying, lifting, house/yardwork, driving, computer work  [] (32218)Reviewed/Progressed HEP activities related to improving balance, coordination, kinesthetic sense, posture, motor skill, proprioception of   [] LE: core, proximal hip and LE for self care, mobility, lifting, and ambulation/stair navigation    [] UE / Cervical: cervical, postural,  scapular, scapulothoracic and UE control with self care, reaching, carrying, lifting, house/yardwork, driving, computer work    Manual Treatments:  PROM / STM / Oscillations-Mobs:  G-I, II, III, IV (PA's, Inf., Post.)  [] (20394) Provided manual therapy to mobilize LE, proximal hip and/or LS spine soft tissue/joints for the purpose of modulating pain, promoting relaxation,  increasing ROM, reducing/eliminating soft tissue swelling/inflammation/restriction, improving soft tissue extensibility and allowing for proper ROM for normal function with   [] LE / lumbar: self care, mobility, lifting and ambulation.     [] UE / Cervical: self care, reaching, carrying, lifting, house/yardwork, driving, computer work. Modalities:  [] (44666) Vasopneumatic compression: Utilized vasopneumatic compression to decrease edema / swelling for the purpose of improving mobility and quad tone / recruitment which will allow for increased overall function including but not limited to self-care, transfers, ambulation, and ascending / descending stairs. Charges:  Timed Code Treatment Minutes: 38   Total Treatment Minutes: 48     [] EVAL - LOW (16217)   [] EVAL - MOD (40294)  [] EVAL - HIGH (60778)  [] RE-EVAL (95349)  [x] EK(67358) x   1    [] Ionto  [] NMR (13440) x     [] Vaso  [x] Manual (91184) x 2       [] Ultrasound  [] TA x        [] Mech Traction (06430)  [] Aquatic Therapy x     [] ES (un) (22324):   [] Home Management Training x  [] ES(attended) (84554)   [] Dry Needling 1-2 muscles (65657):  [] Dry Needling 3+ muscles (701861)  [] Group:      [] Performance Testing: 65366 x1    GOALS:    Patient stated goal: to improve mobility   [x] Progressing: [] Met: [] Not Met: [] Adjusted     Therapist goals for Patient:   Short Term Goals: To be achieved in: 2 weeks  1. Independent in HEP and progression per patient tolerance, in order to prevent re-injury. [] Progressing: [x] Met: [] Not Met: [] Adjusted  2. Patient will have a decrease in pain to facilitate improvement in movement, function, and ADLs as indicated by Functional Deficits. [] Progressing: [x] Met: [] Not Met: [] Adjusted     Long Term Goals: To be achieved in: 6 weeks / Dc   1. Patient to demonstrate TUG/ score to 9  to demonstrate improved fall risk to assist with reaching prior level of function. [x] Progressing: [] Met: [] Not Met: [] Adjusted  2. Patient to demonstrate consistently getting out of bed mod I .  [x] Progressing: [] Met: [] Not Met: [] Adjusted  3.  Patient will demonstrate an increase in strength to good UE & Proximal hip complex, and core activation to allow for proper functional mobility as indicated by patients Functional Deficits. [x] Progressing: [] Met: [] Not Met: [] Adjusted  4. Patient will return to functional activities including sit to stand mod I consistently  without increased restriction. [] Progressing: [x] Met: [] Not Met: [] Adjusted  5. Patient to tolerate to demonstrate floor transfer SBA to mod I for safety management. [x] Not assessed: [] Met: [] Not Met: [] Adjusted       6. Patient to improve 30\" STS to 13 in order to demo improvements in functional strength. [] Not assessed: [] Met: [] Not Met: [x] New goal 2/1/23  7. Patient to improve FGA to 22/30 in order to reduce fall risk and improve community access. [] Not assessed: [] Met: [] Not Met: [x] New goal 2/1/23  8. Patient to improve ABC Scale to 80% to improve confidence with functional tasks. [] Not assessed: [] Met: [] Not Met: [x] New goal 2/1/23    Overall Progression Towards Functional goals/ Treatment Progress Update:  [] Patient is progressing as expected towards functional goals listed. [] Progression is slowed due to complexities/Impairments listed. [] Progression has been slowed due to co-morbidities. [x] Plan just implemented, too soon to assess goals progression <30days   [] Goals require adjustment due to lack of progress  [] Patient is not progressing as expected and requires additional follow up with physician  [] Other    Persisting Functional Limitations/Impairments:  []Sleeping []Sitting               []Standing [x]Transfers        [x]Walking [x]Kneeling               [x]Stairs []Squatting / bending   [x]ADLs []Reaching  [x]Lifting  []Housework  []Driving []Job related tasks  []Sports/Recreation [x]Other: bed mobility         ASSESSMENT:  Assessed R hip pain - appears to originate from TFL and hip joint. Possibly pulled muscle when getting up from bed. Issued HEP of gentle movement and heat. Advised to call if new issues arise while on trip. Leaving on Sunday.     Treatment/Activity Tolerance:  [x] Patient able to complete tx [] Patient limited by fatigue  [] Patient limited by pain  [] Patient limited by other medical complications  [] Other:     Prognosis: [x] Good [] Fair  [] Poor    Patient Requires Follow-up: [x] Yes  [] No    Plan for next treatment session:    PLAN: See eval. PT 2x / week for 6 weeks.   [x] Continue per plan of care [] Alter current plan (see comments)  [] Plan of care initiated [] Hold pending MD visit [] Discharge    Electronically signed by: Nena Merritt, PT, DPT    Note: If patient does not return for scheduled/ recommended follow up visits, this note will serve as a discharge from care along with most recent update on progress.

## 2023-03-07 ENCOUNTER — HOSPITAL ENCOUNTER (OUTPATIENT)
Dept: PHYSICAL THERAPY | Age: 78
Setting detail: THERAPIES SERIES
Discharge: HOME | End: 2023-03-07
Payer: MEDICARE

## 2023-03-07 PROCEDURE — 97112 NEUROMUSCULAR REEDUCATION: CPT

## 2023-03-07 PROCEDURE — 97530 THERAPEUTIC ACTIVITIES: CPT

## 2023-03-07 PROCEDURE — 97116 GAIT TRAINING THERAPY: CPT

## 2023-03-07 NOTE — PROGRESS NOTES
[address] [facility] Physical Therapy  Phone: [phone]   Fax: [phone]    Physical Therapy Daily Treatment Note/Progress Note    Date:  2023     Patient Name:  Delvin Monet    :  1945  MRN: [id number]  Medical Diagnosis:  Parkinson disease ([address] [G20]  General weakness [R53.1]  Treatment Diagnosis: imbalance , Abnormal gait   Insurance/Certification information:  PT Insurance Information: Medicare  Physician Information:  KEYSHA Ordoñez*    Plan of care signed (Y/N): [x]  Yes []  No     Date of Patient follow up with Physician:      Progress Report: [x]  Yes  []  No     Date Range for reporting period:  Beginnin/10/2023  POC: 23  PN: 3/7  Ending:     Progress report due (10 Rx/or 30 days whichever is less): visit #18 or  3/0/5489    Recertification due (POC duration/ or 90 days whichever is less): visit # or  23    Visit # Insurance Allowable Auth required? Date Range    +  Mn []  Yes  [x]  No Mn           Latex Allergy:  [x]NO      []YES  Preferred Language for Healthcare:   [x]English       []other:    Functional Scale:       Date assessed:  TUG 11.5                                                              1/10/2023  TUG 9.75 sec                   3/7/2023    Pain level:  0/10  -lower back; 0/10 R hip    SUBJECTIVE:    Pt and wife just got back from [facility]. Golfed about 5 times. No more hip pain.          OBJECTIVE:    LSVT Program testing-   Eval  2023 Discharge    30 sec STS   ([address] improve by 3 reps)  9    10 meter walk  (MDC improve by 0.18 m/s comfortable speed)  Trial 1: 8\" = 0.75 m/s   Trial 2: 8.5\"= 0.71 m/s    FGA (high balance)  less than 18 indicates high risk of falls in people with PD ([address] improve by 4 points) 15/30    Activities Specific Balance Confidence Scale    69% or less= recurrent falls in people with PD (BARBARA 11.12 to 13% improvement) 72.81%    MoCA score   less than 26 indicated mild cog impairment Deferred to OT    Donning/Stafford shirt   Buttoning   Donning socks and shoes  Deferred to OT    Functional Task Recording Form See media See media          Day of treatment (out of 16):  1    Maximum Daily Exercises-    Indicate if UE support   Floor to Ceiling (10 s hold) x 8    Side to side (10 s hold) x 8    Forward BIG step x 8 B - pt int moving arms, in front and not to the sides - complete seated at next visit     Sideways BIG step x 8 B - pt able to get arms on cue on L foot - complete seated at next visit     Backwards BIG step x 8 B - difficulty with moving arms, no toe raise, very slight bow despite VCs - complete seated at next visit     [address] and Reach x 10 B - seated, difficulty with lifting toes and heels in the correct pattern, able to move arms but not arm and feet at the same time     [employer] and Reach x 10 B - seated, difficulty holding up arms and minimal turn B       Functional Component Tasks (5 total) -    Sit to stand x 5 - VCs for BIG power with arms     Rolling over in bed    Crossing leg x 10 B - unable to get either heel on kneel     Putting on coat    Button on shirt      Hierarchy Tasks (progressively more difficult multi level tasks)-            BIG walking-  50 feet x 2 - VCs for arm swings       Homework for the day-  MDEs, BIG walking, and FCTs x 2 on days patient is not in the clinic - 30 min total  MDEs, BIG walking, and FCTs x 1 on days patient is in clinic - 15 min total      RESTRICTIONS/PRECAUTIONS:     Exercises/Interventions:   Therapeutic Exercises (75899) Resistance / level Sets/sec Reps Notes   Nustep  warmup  Bike  TM -retro  -side stepping    IB / HR    Total gym  -squats   -incr time required to get off machine   Bridges  AROM hip IR and ER in hookyling     Reveiwed HEP as seen below Advised heat and pillow between knees - wife to only help in and out of bed if pt unable to do independently                    Therapeutic Activities (23025)    Bed mobility training   -Sit to supine  -Rolling R & L w/Big technique  -Supine to sit    -prone position           -standing to S/L, rolled L to prone   Sit to stand w/Big technique  -AirEx  -blue wedge    -large tire   -no UE assist   Fwd step ups from 2nd step w/contral LE to platform  Lat step ups -no UE assist  -no UE assist   Practice getting up from floor on large mat table, large gray bolster utilized as low sofa:  From supine to R S/L to prone to quadruped to tall kneeling, to 1/2 kneel to transfer onto bolster Pt able to complete bed mobility and transitions with min verbal cues but required increased amount of time.    Golf club swings  -flat surface  -on AirEx  -The Field        -Normal stance        -Feet inclined        -Feet declined        -foot on 2\" surface            LSVT functional tasks:  -Getting in and out of car  -Putting on long sleeved jacket  -Buttoning shirt  -Sit to stand, 3 steps fwd, 4 steps bwd, stand to sit  -Top shelf reach w/roll of TB  -reaching to top bar at cross fit         -across room    -focus on ankle PF  -focus on ankle PF       Gait (88029)    LSVT Big gait -cues for improved Big hands and B arm swing               Neuromuscular Re-ed (43085)    Balance training:  LVST screening    Tandem  NBOS  Airex  -frequent stepping to regain balance   Shuttle balance:  -improved posture  -cervical rotation  -B arm swing in staggered stance        -in front        -behind  -Trunk rotation  -hip abd  -head nods   -1 UE assist  -1 UE assist   Staggered stance  -B arm swing   -cues for increased swing behind   High knee w/pause for balance  Walking partial lunge        Manual Intervention (68066)    Seated hamstring stretch    Supine piriformis stretch    Assessment of hip and surrounding mm, DTM to R TFL and ITB, long leg distraction at L hip, PROM into ER and IR, negative figure 4 (limited), lateral joint mob grade 3                             Modalities:   2/17: MHP to R hip in sidelying x 10 min Pt. Education:     3/7: issued HO of seated MDEs - advised 1x on therapy days and 2x on nontherapy days    1/25: discussed reducing use of suspenders as they could be applying tactile feedback to patient to facilitate poor posture. Pt and wife plan to try belts again, but pt recently lost a lot of weight and are having difficulty keeping pants up.      01/10/2023  -patient educated on diagnosis, prognosis and expectations for rehab  -all patient questions were answered    Home Exercise Program:  Access Code: Evelyn Paez  URL: [email or web address]  Date: 02/17/2023  Prepared by: Haydee Tolliver    Exercises  Supine Bridge - 1 x daily - 7 x weekly - 3 sets - 10 reps  Supine Hip Abduction - 1 x daily - 7 x weekly - 3 sets - 10 reps  Seated Hip Internal Rotation AROM - 1 x daily - 7 x weekly - 3 sets - 10 reps  Seated Hip External Rotation AROM - 1 x daily - 7 x weekly - 3 sets - 10 reps  Supine Heel Slide - 1 x daily - 7 x weekly - 3 sets - 10 reps      Access Code: [email or web address]  URL: [email or web address]  Date: 01/13/2023  Prepared by: Renell Alpers    Exercises  Sit to Stand Without Arm Support - 2 x daily - 7 x weekly - 1 sets - 10 reps  Seated Upright Posture Correction - 4 x daily - 7 x weekly - 1 sets - 5 reps  Added side to side rocking in supine         Therapeutic Exercise and NMR EXR  [] (50748) Provided verbal/tactile cueing for activities related to strengthening, flexibility, endurance, ROM for improvements in  [] LE / Lumbar: LE, proximal hip, and core control with self care, mobility, lifting, ambulation. [] UE / Cervical: cervical, postural, scapular, scapulothoracic and UE control with self care, reaching, carrying, lifting, house/yardwork, driving, computer work.   [x] (20949) Provided verbal/tactile cueing for activities related to improving balance, coordination, kinesthetic sense, posture, motor skill, proprioception to assist with   [x] LE / lumbar: LE, proximal hip, and core control in self care, mobility, lifting, ambulation and eccentric single leg control. [x] UE / cervical: cervical, scapular, scapulothoracic and UE control with self care, reaching, carrying, lifting, house/yardwork, driving, computer work.   [] (54500) Therapist is in constant attendance of 2 or more patients providing skilled therapy interventions, but not providing any significant amount of measurable one-on-one time to either patient, for improvements in  [] LE / lumbar: LE, proximal hip, and core control in self care, mobility, lifting, ambulation and eccentric single leg control. [] UE / cervical: cervical, scapular, scapulothoracic and UE control with self care, reaching, carrying, lifting, house/yardwork, driving, computer work. NMR and Therapeutic Activities:    [x] (64862 or 01394) Provided verbal/tactile cueing for activities related to improving balance, coordination, kinesthetic sense, posture, motor skill, proprioception and motor activation to allow for proper function of   [x] LE: / Lumbar core, proximal hip and LE with self care and ADLs  [x] UE / Cervical: cervical, postural, scapular, scapulothoracic and UE control with self care, carrying, lifting, driving, computer work. [x] (75208) Gait Re-education- Provided training and instruction to the patient for proper LE, core and proximal hip recruitment and positioning and eccentric body weight control with ambulation re-education including up and down stairs     Home Management Training / Self Care:  [] (18047) Provided self-care/home management training related to activities of daily living and compensatory training, and/or use of adaptive equipment for improvement with: ADLs and compensatory training, meal preparation, safety procedures and instruction in use of adaptive equipment, including bathing, grooming, dressing, personal hygiene, basic household cleaning and chores.      Home Exercise Program:    [] (58690) Reviewed/Progressed HEP activities related to strengthening, flexibility, endurance, ROM of   [] LE / Lumbar: core, proximal hip and LE for functional self-care, mobility, lifting and ambulation/stair navigation   [] UE / Cervical: cervical, postural, scapular, scapulothoracic and UE control with self care, reaching, carrying, lifting, house/yardwork, driving, computer work  [] (54725)Reviewed/Progressed HEP activities related to improving balance, coordination, kinesthetic sense, posture, motor skill, proprioception of   [] LE: core, proximal hip and LE for self care, mobility, lifting, and ambulation/stair navigation    [] UE / Cervical: cervical, postural,  scapular, scapulothoracic and UE control with self care, reaching, carrying, lifting, house/yardwork, driving, computer work    Manual Treatments:  PROM / STM / Oscillations-Mobs:  G-I, II, III, IV (PA's, Inf., Post.)  [] (93798) Provided manual therapy to mobilize LE, proximal hip and/or LS spine soft tissue/joints for the purpose of modulating pain, promoting relaxation,  increasing ROM, reducing/eliminating soft tissue swelling/inflammation/restriction, improving soft tissue extensibility and allowing for proper ROM for normal function with   [] LE / lumbar: self care, mobility, lifting and ambulation.    [] UE / Cervical: self care, reaching, carrying, lifting, house/yardwork, driving, computer work.     Modalities:  [] (87772) Vasopneumatic compression: Utilized vasopneumatic compression to decrease edema / swelling for the purpose of improving mobility and quad tone / recruitment which will allow for increased overall function including but not limited to self-care, transfers, ambulation, and ascending / descending stairs.       Charges:  Timed Code Treatment Minutes: 64   Total Treatment Minutes: 64     [] EVAL - LOW (79339)   [] EVAL - MOD (20610)  [] EVAL - HIGH (83874)  [] RE-EVAL (60079)  [] TE(82638) x       [] Ionto  [x] NMR (17066) x 2    [] Vaso  [] Manual (71402) x     [] Ultrasound  [x] TA x   1     [facility] (7[facility]  [] Aquatic Therapy x     [] ES (un) (24036):   [] Home Management Training x  [] ES(attended) (34743)   [] Dry Needling 1-2 muscles (25407):  [] Dry Needling 3+ muscles (684544)  [x] Gait x 1     [] Performance Testing: 82022 x1    GOALS:    Patient stated goal: to improve mobility   [x] Progressing: [] Met: [] Not Met: [] Adjusted     Therapist goals for Patient:   Short Term Goals: To be achieved in: 2 weeks  1. Independent in HEP and progression per patient tolerance, in order to prevent re-injury. [] Progressing: [x] Met: [] Not Met: [] Adjusted  2. Patient will have a decrease in pain to facilitate improvement in movement, function, and ADLs as indicated by Functional Deficits. [] Progressing: [x] Met: [] Not Met: [] Adjusted     Long Term Goals: To be achieved in: 6 weeks / Dc   1. Patient to demonstrate TUG/ score to 9  to demonstrate improved fall risk to assist with reaching prior level of function. - 9.75 sec on 3/7/23  [x] Progressing: [] Met: [] Not Met: [] Adjusted  2. Patient to demonstrate consistently getting out of bed mod I .  [x] Progressing: [] Met: [] Not Met: [] Adjusted  3. Patient will demonstrate an increase in strength to good UE & Proximal hip complex, and core activation to allow for proper functional mobility as indicated by patients Functional Deficits. [x] Progressing: [] Met: [] Not Met: [] Adjusted  4. Patient will return to functional activities including sit to stand mod I consistently  without increased restriction. [] Progressing: [x] Met: [] Not Met: [] Adjusted  5. Patient to tolerate to demonstrate floor transfer SBA to mod I for safety management. [x] Not assessed: [] Met: [] Not Met: [] Adjusted       6. Patient to improve 30\" STS to 13 in order to demo improvements in functional strength. [x] Not assessed: [] Met: [] Not Met: [] New goal 2/1/23  7.  Patient to improve FGA to 22/30 in order to reduce fall risk and improve community access. [x] Not assessed: [] Met: [] Not Met: [] New goal 2/1/23  8. Patient to improve ABC Scale to 80% to improve confidence with functional tasks. [x] Not assessed: [] Met: [] Not Met: [] New goal 2/1/23    Overall Progression Towards Functional goals/ Treatment Progress Update:  [] Patient is progressing as expected towards functional goals listed. [] Progression is slowed due to complexities/Impairments listed. [] Progression has been slowed due to co-morbidities. [x] Plan just implemented, too soon to assess goals progression <30days   [] Goals require adjustment due to lack of progress  [] Patient is not progressing as expected and requires additional follow up with physician  [] Other    Persisting Functional Limitations/Impairments:  []Sleeping []Sitting               []Standing [x]Transfers        [x]Walking [x]Kneeling               [x]Stairs []Squatting / bending   [x]ADLs []Reaching  [x]Lifting  []Housework  []Driving []Job related tasks  []Sports/Recreation [x]Other: bed mobility         ASSESSMENT:  Pt is a 71 y/o make who presented to therapy with complaints secondary to PD. Just returned from trip to [place]. First day of formal LSVT program completed today, so did not assess most of goals. Does demo decreased time for TUG. Pt does not show difficulty with balance, however has great difficulty with moving arms and legs simultaneously so switched MDEs to seated. Will complete remainder of LSVT program in next 3 weeks and retest all measures at the end. Treatment/Activity Tolerance:  [x] Patient able to complete tx [] Patient limited by fatigue  [] Patient limited by pain  [] Patient limited by other medical complications  [] Other:     Prognosis: [x] Good [] Fair  [] Poor    Patient Requires Follow-up: [x] Yes  [] No    Plan for next treatment session:    PLAN: See eval. PT 2x / week for 6 weeks.    [x] Continue per plan of care [] Alter current plan (see comments)  [] Plan of care initiated [] Hold pending MD visit [] Discharge    Electronically signed by: Shahrzad Pressley PT, DPT    Note: If patient does not return for scheduled/ recommended follow up visits, this note will serve as a discharge from care along with most recent update on progress.

## 2023-03-08 ENCOUNTER — HOSPITAL ENCOUNTER (OUTPATIENT)
Dept: OCCUPATIONAL THERAPY | Age: 78
Setting detail: THERAPIES SERIES
Discharge: HOME | End: 2023-03-08
Payer: MEDICARE

## 2023-03-08 PROCEDURE — 97112 NEUROMUSCULAR REEDUCATION: CPT

## 2023-03-08 PROCEDURE — 97530 THERAPEUTIC ACTIVITIES: CPT

## 2023-03-08 PROCEDURE — 97110 THERAPEUTIC EXERCISES: CPT

## 2023-03-08 PROCEDURE — 97535 SELF CARE MNGMENT TRAINING: CPT

## 2023-03-08 NOTE — FLOWSHEET NOTE
[address] [facility] Occupational Therapy  [address] [place] [address]  Phone: [phone]   Fax: [phone]      Occupational Therapy Daily Treatment Note  Date:  3/8/2023    Patient: Concetta Cruz   : 1945   MRN: [id number]  Referring Physician:   KEYSHA Payan-CNP          Medical Diagnosis Information:  G20 (ICD-10-CM) - Parkinson disease (initially diagnosed 2017; also has hx essential tremor)                            Insurance information: Medicare + [employer] - no [place], [place], no cop[place], 20% pt responsibility      Plan of care sent to provider:      []Faxed   [x]Co-signature    (attempts: 1[] 2[] 3[])       Progress Report: []  Yes  [x]  No     Date Range for reporting period:  Beginnin23  Ending: end of POC    Progress report due (10 Rx/or 30 days whichever is less): visit #10 or 24    Recertification due (POC duration/ or 90 days whichever is less): visit #16 or 23    Visit # Insurance Allowable Auth required? Date Range    [place] []  Yes  [x]  No N/a       Latex Allergy:  [x]No      []Yes  Pacemaker:  [x] No       [] Yes     Preferred Language for Healthcare:   [x]English       []other:    Pain level:  0/10    SUBJECTIVE:  Pt reports trip to [facility] went well and he went golfing. Previous R hip pain is resolved. Pt's wife reports pt completed 2 seated MDEs at home yesterday, and then stopped due to difficulty/frustration. Reports he has done better with some of the standing MDEs.      LSVT Program Testing-       Eval  2023 Discharge    30 sec STS   ([address] improve by 3 reps) Per PT eval 23: 9      10 meter walk  ([employer] improve by 0.18 m/s comfortable speed) Per PT eval 23: Trial 1: 8\" = 0.75 m/s   Trial 2: 8.5\"= 0.71 m/s     FGA (high balance)  less than 18 indicates high risk of falls in people with PD ([address] improve by 4 points) Per PT eval 23: 15/30     Activities Specific Balance Confidence Scale    69% or less= recurrent falls in people with PD (BARBARA 11.12 to 13% improvement) Per PT eval 2/1/23: 72.81%     MoCA score   less than 26 indicated mild cog impairment  12/30     [place] coat  Donning coat   Unbuttoning 5 buttons  Buttoning 5 buttons  Doff L shoe and sock   Don L sock and shoe 12 seconds  10 seconds  38 seconds  65 seconds  107 seconds  Dependent/assist from wife     Functional Task Recording Form See media See media       RESTRICTIONS/PRECAUTIONS: PD    Day of treatment (out of 16 in conjunction w/ PT): 2    Maximum Daily Exercises-    Responding best during session to simple cues (\"look at me,\" \"do what my arms are doing\") with intermittent tactile cues. Requiring initial cues for sequencing for all MDEs with intermittent redirection. Pt and family education on importance of attempting all tasks with as much independence as possible.      Floor to Ceiling (10 s hold) x 8 - decreased shoulder flexion during overhead reach; decreased shoulder ER and hand opening during reaching to sides    Side to side (10 s hold) x 8 - decreased amplitude during reach; decreased hip/knee extension improved with additional reps     Forward BIG step x 8 B - good step length; decreased UE amplitude (max A cues for UE involvement); cues for big finish     Sideways BIG step x 8 B - good step length; decreased UE amplitude with frequent cues for involvement     Backwards BIG step x 8 B - improved amplitude during forward arms with decreased amplitude during back swing; cues for weight shift into back foot assisted in dorsiflexion    Forward Rock and Reach x 10 B - unilateral UE support to decrease motor planning demand; frequent cues for maintaining rock/weight shift with performance improving during L step forward; therapist initial [address] cues for UE arm swing/amplitude     Sideways Rock and Reach x 5 B - decreased BIG turn; frequent cues for UE amplitude and big finish      Functional Component Tasks (5 total) -    Sit to stand x5 (completed throughout session) - good anterior weight shift and power up with cues; good eccentric control during lowering     Rolling over in bed x5 each side - initial cues for sequencing and min A for initial R roll > CGA for remaining R rolls; CGA for L rolls    Crossing leg x5 B - good L hip/knee ROM; decreased R hip/knee ROM requiring use of BUE to pull leg into figure 4    Putting on coat x4 - cues to pull first sleeve all the way up to shoulder to improve positioning of other sleeve behind pt    Buttoning 3 buttons on shirt x5 - min cues to redirect to task for additional reps       Hierarchy Tasks (progressively more difficult multi level tasks)-    Supine to sit x5 - min A for initial attempt with CGA and verbal cues for sequencing for remaining attempts     Doffed/donned hat, jacket, zip-up sweatshirt, and gloves with BIG movements     Opening doors with BIG movements x3      BIG walking-    ~120 ft from waiting room to clinic and clinic to waiting room (~240 ft total)  ~200 ft to/from bathroom with good step length, decreased UE arm swing   ~100 ft within clinic with improved UE involvement, good step length        Homework for the day-  MDEs, BIG walking, and FCTs x 2 on days patient is not in the clinic - 30 min total  MDEs, BIG walking, and FCTs x 1 on days patient is in clinic - 15 min total    Lying flat in bed for 5 minutes in the morning and 5 minutes at night to work on hip and knee extension  Increased independence and BIG movements during supine to sit       Patient education:  Eval, POC, HEP, LSVT BIG- pt verbalized understanding        Home Exercise Program:  LSVT BIG program    Therapeutic Exercise and NMR:  [x] (11894) Provided verbal/tactile cueing for activities related to strengthening, flexibility, endurance, ROM  for improvements in scapular, scapulothoracic and UE control with self care, reaching, carrying, lifting, house/yardwork, driving/computer work.    [] (84413) Provided verbal/tactile cueing for activities related to improving balance, coordination, kinesthetic sense, posture, motor skill, proprioception  to assist with  scapular, scapulothoracic and UE control with self care, reaching, carrying, lifting, house/yardwork, driving/computer work.   [] Comments:    Therapeutic Activities:    [x] (82173 or 85662) Provided verbal/tactile cueing for activities related to improving balance, coordination, kinesthetic sense, posture, motor skill, proprioception and motor activation to allow for proper function of scapular, scapulothoracic and UE control with self care, carrying, lifting, driving/computer work  [] Comments:    Home Exercise Program:    [x] (78609) Reviewed/Progressed HEP activities related to strengthening, flexibility, endurance, ROM of scapular, scapulothoracic and UE control with self care, reaching, carrying, lifting, house/yardwork, driving/computer work  [] (95893) Reviewed/Progressed HEP activities related to improving balance, coordination, kinesthetic sense, posture, motor skill, proprioception of scapular, scapulothoracic and UE control with self care, reaching, carrying, lifting, house/yardwork, driving/computer work    [] Comments:    Manual Treatments:  PROM / STM / Oscillations-Mobs:  G-I, II, III, IV (PA's, Inf., Post.)  [] (73217) Provided manual therapy to mobilize soft tissue/joints of cervical/CT, scapular GHJ and UE for the purpose of modulating pain, promoting relaxation,  increasing ROM, reducing/eliminating soft tissue swelling/inflammation/restriction, improving soft tissue extensibility and allowing for proper ROM for normal function with self care, reaching, carrying, lifting, house/yardwork, driving/computer work  [] Comments:    ADL Training:  [x] (87138) Provided self-care/home management training related to activities of daily living and compensatory training, and/or use of adaptive equipment   [] Comments:     Splinting:  [] Fabrication of:   [] (36708) Orthotic/Prosthetic Management, subsequent encounter  [] (36248) Orthotic management and training (fitting and assessment)  [] Comments:      Charges:  Timed Code Treatment Minutes: 85   Total Treatment Minutes: 85     [] EVAL (LOW) 10219   [] OT Re-eval (82113)  [] EVAL (MOD) 42881   [] EVAL (HIGH) 90166       [x] Vida (21872) x    2 [] CUVMY(32118)  [x] NMR (42801) x    2 [] Estim (attended) (96612)   [] Manual (78354 [facility]) x     [] US (21531)  [x] TA (70906) x    1 [] Paraffin (07199)  [x] ADL  (05026) x   1 [] Splint/L code:    [] Estim (unattended) ([id number])  [] Fluidotherapy (05772)  [] Other:   GOALS:   Patient stated goal: increase independence      Therapist goals for Patient:   Short Term Goals: To be achieved in: 30 days  1. Pt will report compliance in LSVT BIG home program to improve endurance, sequencing, and amplitude of movement. [] Progressing: [] Met: [] Not Met: [] Adjusted  2. Pt will decrease time to button 5 buttons to 45 seconds or less to improve ADL independence and efficiency. [] Progressing: [] Met: [] Not Met: [] Adjusted  3. Pt will increase fine motor control to complete 9-hole peg task in 35 seconds or less with each hand to improve coordination in ADLs. [] Progressing: [] Met: [] Not Met: [] Adjusted        Long Term Goals: To be achieved by discharge  1. Pt will improve ROM, strength, and sequencing to complete bed mobility mod I for increased independence and decreased caregiver burden. [] Progressing: [] Met: [] Not Met: [] Adjusted  2. Pt will improve ROM, strength, and sequencing to complete LB dressing SBA to improve independence in ADLs. [] Progressing: [] Met: [] Not Met: [] Adjusted  3. Pt will improve sequencing, problem solving, and attention to complete Trailmaking Part A assessment within 3 minutes with 1 error or less.   [] Progressing: [] Met: [] Not Met: [] Adjusted    Progression Towards Functional goals:  [] Patient is progressing as expected towards functional goals listed. [] Progression is slowed due to complexities listed. [] Progression has been slowed due to co-morbidities. [x] Plan just implemented, too soon to assess goals progression  [] All goals are met  [] Other:     ASSESSMENT:  Pt tolerated activities well on this date requiring frequent cues for sequencing throughout MDEs; increased time needed to process with pt responding best to simple cues. Good balance and strength noted. Decreased UE involvement during MDEs requiring frequent cues and demonstration for BIG arms. Mild decreased endurance throughout session requiring rest breaks. Treatment/Activity Tolerance:  [x] Patient tolerated treatment well [] Patient limited by fatigue  [] Patient limited by pain  [] Patient limited by other medical complications  [] Other:     Prognosis: [x] Good [] Fair  [] Poor    Patient Requires Follow-up: [x] Yes  [] No    PLAN: See eval  [x] Continue per plan of care [] Alter current plan (see comments)  [] Plan of care initiated [] Hold pending MD visit [] Discharge    Electronically signed by: RICH Solo/MAYO, C/NDT ([id number])      Note: If patient does not return for scheduled/ recommended follow up visits, this note will serve as a discharge from care along with most recent update on progress.

## 2023-03-09 ENCOUNTER — HOSPITAL ENCOUNTER (OUTPATIENT)
Dept: PHYSICAL THERAPY | Age: 78
Setting detail: THERAPIES SERIES
Discharge: HOME | End: 2023-03-09
Payer: MEDICARE

## 2023-03-09 PROCEDURE — 97112 NEUROMUSCULAR REEDUCATION: CPT

## 2023-03-09 PROCEDURE — 97530 THERAPEUTIC ACTIVITIES: CPT

## 2023-03-09 NOTE — FLOWSHEET NOTE
[address] [facility] Physical Therapy  Phone: [phone]   Fax: [phone]    Physical Therapy Daily Treatment Note/Progress Note    Date:  2023     Patient Name:  Delvin Monet    :  1945  MRN: [id number]  Medical Diagnosis:  Parkinson disease ([address] [G20]  General weakness [R53.1]  Treatment Diagnosis: imbalance , Abnormal gait   Insurance/Certification information:  PT Insurance Information: Medicare  Physician Information:  KEYSHA Ordoñez*    Plan of care signed (Y/N): [x]  Yes []  No     Date of Patient follow up with Physician:      Progress Report: [x]  Yes  []  No     Date Range for reporting period:  Beginnin/10/2023  POC: 23  PN: 3/7  Ending:     Progress report due (10 Rx/or 30 days whichever is less): visit #18 or  8855    Recertification due (POC duration/ or 90 days whichever is less): visit # or  23    Visit # Insurance Allowable Auth required? Date Range    +  Mn []  Yes  [x]  No Mn           Latex Allergy:  [x]NO      []YES  Preferred Language for Healthcare:   [x]English       []other:    Functional Scale:       Date assessed:  TUG 11.5                                                              1/10/2023  TUG 9.75 sec                   3/7/2023    Pain level:  0/10  -lower back; 0/10 R hip    SUBJECTIVE:     Doing okay today, continues to perform MDEs, did forget the backwards step last night but are going well. Played cards last night, had some trouble holding the cards due to the tremors, but didn't need to set them down.           OBJECTIVE:    LSVT Program testing-   Eval  2023 Discharge    30 sec STS   ([address] improve by 3 reps)  9    10 meter walk  (MDC improve by 0.18 m/s comfortable speed)  Trial 1: 8\" = 0.75 m/s   Trial 2: 8.5\"= 0.71 m/s    FGA (high balance)  less than 18 indicates high risk of falls in people with PD ([address] improve by 4 points) 15/30    Activities Specific Balance Confidence Scale 69% or less= recurrent falls in people with PD (BARBARA 11.12 to 13% improvement) 72.81%    MoCA score   less than 26 indicated mild cog impairment  Deferred to OT    Donning/[place] shirt   Buttoning   Donning socks and shoes  Deferred to OT    Functional Task Recording Form See media See media          Day of treatment (3out of 16):  -in conjunction w/OT    Maximum Daily Exercises-    Indicate if UE support   Floor to Ceiling (10 s hold) x 8    Side to side (10 s hold) x 8    Forward BIG step x 10 B     Sideways BIG step x 10 B      Backwards BIG step x 8 B     Forward Rock and Reach x 10 B       Sideways Rock and Reach x - try supine next visit to facilitate rotation      Functional Component Tasks (5 total) -    Sit to stand x 10 - VCs for BIG ROM with arms     Rolling over in bed    Crossing leg x  - unable to get either heel on knee     Putting on coat    Button on shirt      Hierarchy Tasks (progressively more difficult multi level tasks)-          BIG walking-  280' - VCs for arm swings       Homework for the day-  MDEs, BIG walking, and FCTs x 2 on days patient is not in the clinic - 30 min total  MDEs, BIG walking, and FCTs x 1 on days patient is in clinic - 15 min total      RESTRICTIONS/PRECAUTIONS:     Exercises/Interventions:   Therapeutic Exercises (34785) Resistance / level Sets/sec Reps Notes   Nustep  warmup  Bike  TM -retro  -side stepping    IB / HR    Total gym  -squats   -incr time required to get off machine   Bridges  AROM hip IR and ER in hookyling     Reveiwed HEP as seen below Advised heat and pillow between knees - wife to only help in and out of bed if pt unable to do independently                    Therapeutic Activities (42282)    Bed mobility training   -Sit to supine  -Rolling R & L w/Big technique  -Supine to sit    -prone position           -standing to S/L, rolled L to prone   Sit to stand w/Big technique  -AirEx  -blue wedge    -large tire   -no UE assist   Fwd step ups from 2nd step w/contral LE to platform  Lat step ups -no UE assist  -no UE assist   Practice getting up from floor on large mat table, large gray bolster utilized as low sofa:  From supine to R S/L to prone to quadruped to tall kneeling, to 1/2 kneel to transfer onto bolster Pt able to complete bed mobility and transitions with min verbal cues but required increased amount of time. Golf club swings  -flat surface  -on AirEx  -The Field        -Normal stance        -Feet inclined        -Feet declined        -foot on 2\" surface            LSVT functional tasks:  -Getting in and out of car  -Putting on long sleeved jacket  -Buttoning shirt  -Sit to stand, 3 steps fwd, 4 steps bwd, stand to sit  -Top shelf reach w/roll of TB  -reaching to top bar at cross fit         -across room    -focus on ankle PF  -focus on ankle PF       Gait (60490)    LSVT Big gait -cues for improved Big hands and B arm swing               Neuromuscular Re-ed (84409)    Balance training:  LVST screening    Tandem  NBOS  Airex  -frequent stepping to regain balance   Shuttle balance:  -improved posture  -cervical rotation  -B arm swing in staggered stance        -in front        -behind  -Trunk rotation  -hip abd  -head nods   -1 UE assist  -1 UE assist   Staggered stance  -B arm swing   -cues for increased swing behind   High knee w/pause for balance  Walking partial lunge        Manual Intervention (46806)    Seated hamstring stretch    Supine piriformis stretch    Assessment of hip and surrounding mm, DTM to R TFL and ITB, long leg distraction at L hip, PROM into ER and IR, negative figure 4 (limited), lateral joint mob grade 3                             Modalities:   2/17: MHP to R hip in sidelying x 10 min     Pt. Education:     3/7: issued HO of seated MDEs - advised 1x on therapy days and 2x on nontherapy days    1/25: discussed reducing use of suspenders as they could be applying tactile feedback to patient to facilitate poor posture.   Pt and wife plan to try belts again, but pt recently lost a lot of weight and are having difficulty keeping pants up.      01/10/2023  -patient educated on diagnosis, prognosis and expectations for rehab  -all patient questions were answered    Home Exercise Program:  Access Code: Ericeddie Moon  URL: [email or web address]/  Date: 02/17/2023  Prepared by: Mili Jj    Exercises  Supine Bridge - 1 x daily - 7 x weekly - 3 sets - 10 reps  Supine Hip Abduction - 1 x daily - 7 x weekly - 3 sets - 10 reps  Seated Hip Internal Rotation AROM - 1 x daily - 7 x weekly - 3 sets - 10 reps  Seated Hip External Rotation AROM - 1 x daily - 7 x weekly - 3 sets - 10 reps  Supine Heel Slide - 1 x daily - 7 x weekly - 3 sets - 10 reps      Access Code: [email or web address]  URL: [email or web address]  Date: 01/13/2023  Prepared by: Erika Anthony    Exercises  Sit to Stand Without Arm Support - 2 x daily - 7 x weekly - 1 sets - 10 reps  Seated Upright Posture Correction - 4 x daily - 7 x weekly - 1 sets - 5 reps  Added side to side rocking in supine         Therapeutic Exercise and NMR EXR  [] (13265) Provided verbal/tactile cueing for activities related to strengthening, flexibility, endurance, ROM for improvements in  [] LE / Lumbar: LE, proximal hip, and core control with self care, mobility, lifting, ambulation. [] UE / Cervical: cervical, postural, scapular, scapulothoracic and UE control with self care, reaching, carrying, lifting, house/yardwork, driving, computer work. [x] (85475) Provided verbal/tactile cueing for activities related to improving balance, coordination, kinesthetic sense, posture, motor skill, proprioception to assist with   [x] LE / lumbar: LE, proximal hip, and core control in self care, mobility, lifting, ambulation and eccentric single leg control. [x] UE / cervical: cervical, scapular, scapulothoracic and UE control with self care, reaching, carrying, lifting, house/yardwork, driving, computer work. [] (25771) Therapist is in constant attendance of 2 or more patients providing skilled therapy interventions, but not providing any significant amount of measurable one-on-one time to either patient, for improvements in  [] LE / lumbar: LE, proximal hip, and core control in self care, mobility, lifting, ambulation and eccentric single leg control. [] UE / cervical: cervical, scapular, scapulothoracic and UE control with self care, reaching, carrying, lifting, house/yardwork, driving, computer work. NMR and Therapeutic Activities:    [x] (24176 or 31837) Provided verbal/tactile cueing for activities related to improving balance, coordination, kinesthetic sense, posture, motor skill, proprioception and motor activation to allow for proper function of   [x] LE: / Lumbar core, proximal hip and LE with self care and ADLs  [x] UE / Cervical: cervical, postural, scapular, scapulothoracic and UE control with self care, carrying, lifting, driving, computer work. [x] (54700) Gait Re-education- Provided training and instruction to the patient for proper LE, core and proximal hip recruitment and positioning and eccentric body weight control with ambulation re-education including up and down stairs     Home Management Training / Self Care:  [] (02961) Provided self-care/home management training related to activities of daily living and compensatory training, and/or use of adaptive equipment for improvement with: ADLs and compensatory training, meal preparation, safety procedures and instruction in use of adaptive equipment, including bathing, grooming, dressing, personal hygiene, basic household cleaning and chores.      Home Exercise Program:    [] (13965) Reviewed/Progressed HEP activities related to strengthening, flexibility, endurance, ROM of   [] LE / Lumbar: core, proximal hip and LE for functional self-care, mobility, lifting and ambulation/stair navigation   [] UE / Cervical: cervical, postural, scapular, scapulothoracic and UE control with self care, reaching, carrying, lifting, house/yardwork, driving, computer work  [] (66351)Reviewed/Progressed HEP activities related to improving balance, coordination, kinesthetic sense, posture, motor skill, proprioception of   [] LE: core, proximal hip and LE for self care, mobility, lifting, and ambulation/stair navigation    [] UE / Cervical: cervical, postural,  scapular, scapulothoracic and UE control with self care, reaching, carrying, lifting, house/yardwork, driving, computer work    Manual Treatments:  PROM / STM / Oscillations-Mobs:  G-I, II, III, IV (PA's, Inf., Post.)  [] (66213) Provided manual therapy to mobilize LE, proximal hip and/or LS spine soft tissue/joints for the purpose of modulating pain, promoting relaxation,  increasing ROM, reducing/eliminating soft tissue swelling/inflammation/restriction, improving soft tissue extensibility and allowing for proper ROM for normal function with   [] LE / lumbar: self care, mobility, lifting and ambulation. [] UE / Cervical: self care, reaching, carrying, lifting, house/yardwork, driving, computer work. Modalities:  [] (85749) Vasopneumatic compression: Utilized vasopneumatic compression to decrease edema / swelling for the purpose of improving mobility and quad tone / recruitment which will allow for increased overall function including but not limited to self-care, transfers, ambulation, and ascending / descending stairs.        Charges:  Timed Code Treatment Minutes: 75   Total Treatment Minutes: 75     [] EVAL - LOW (86014)   [] EVAL - MOD (88921)  [] EVAL - HIGH (02972)  [] RE-EVAL (13446)  [] DD(38352) x       [] Ionto  [x] NMR (99372) x 2    [] Vaso  [] Manual (01095) x       [] Ultrasound  [x] TA x   3     [] Mech Traction (44517)  [] Aquatic Therapy x     [] ES (un) (58329):   [] Home Management Training x  [] ES(attended) (29157)   [] Dry Needling 1-2 muscles (41381):  [] Dry Needling 3+ muscles (930089)  [] Gait x      [] Performance Testing: 62100 x1    GOALS:    Patient stated goal: to improve mobility   [x] Progressing: [] Met: [] Not Met: [] Adjusted     Therapist goals for Patient:   Short Term Goals: To be achieved in: 2 weeks  1. Independent in HEP and progression per patient tolerance, in order to prevent re-injury. [] Progressing: [x] Met: [] Not Met: [] Adjusted  2. Patient will have a decrease in pain to facilitate improvement in movement, function, and ADLs as indicated by Functional Deficits. [] Progressing: [x] Met: [] Not Met: [] Adjusted     Long Term Goals: To be achieved in: 6 weeks / Dc   1. Patient to demonstrate TUG/ score to 9  to demonstrate improved fall risk to assist with reaching prior level of function. - 9.75 sec on 3/7/23  [x] Progressing: [] Met: [] Not Met: [] Adjusted  2. Patient to demonstrate consistently getting out of bed mod I .  [x] Progressing: [] Met: [] Not Met: [] Adjusted  3. Patient will demonstrate an increase in strength to good UE & Proximal hip complex, and core activation to allow for proper functional mobility as indicated by patients Functional Deficits. [x] Progressing: [] Met: [] Not Met: [] Adjusted  4. Patient will return to functional activities including sit to stand mod I consistently  without increased restriction. [] Progressing: [x] Met: [] Not Met: [] Adjusted  5. Patient to tolerate to demonstrate floor transfer SBA to mod I for safety management. [x] Not assessed: [] Met: [] Not Met: [] Adjusted       6. Patient to improve 30\" STS to 13 in order to demo improvements in functional strength. [x] Not assessed: [] Met: [] Not Met: [] New goal 2/1/23  7. Patient to improve FGA to 22/30 in order to reduce fall risk and improve community access. [x] Not assessed: [] Met: [] Not Met: [] New goal 2/1/23  8. Patient to improve ABC Scale to 80% to improve confidence with functional tasks.   [x] Not assessed: [] Met: [] Not Met: [] New goal 2/1/23    Overall Progression Towards Functional goals/ Treatment Progress Update:  [] Patient is progressing as expected towards functional goals listed. [x] Progression is slowed due to complexities/Impairments listed. [] Progression has been slowed due to co-morbidities. [] Plan just implemented, too soon to assess goals progression <30days   [] Goals require adjustment due to lack of progress  [] Patient is not progressing as expected and requires additional follow up with physician  [] Other    Persisting Functional Limitations/Impairments:  []Sleeping []Sitting               []Standing [x]Transfers        [x]Walking [x]Kneeling               [x]Stairs []Squatting / bending   [x]ADLs []Reaching  [x]Lifting  []Housework  []Driving []Job related tasks  []Sports/Recreation [x]Other: bed mobility         ASSESSMENT:   Patient required increased time to process and perform each task, despite reduced verbal cues and directions to \"Do what I do\" only. Possible that too many distractions present during session, plan to perform functional tasks prior to MDEs and maybe perform stepping or rocking MDEs while supine to better engage patient. Continue to progress LSVT Big treatment. Treatment/Activity Tolerance:  [x] Patient able to complete tx [] Patient limited by fatigue  [] Patient limited by pain  [] Patient limited by other medical complications  [] Other:     Prognosis: [x] Good [] Fair  [] Poor    Patient Requires Follow-up: [x] Yes  [] No    Plan for next treatment session:    PLAN: See perri. PT 2x / week for 6 weeks. [x] Continue per plan of care [] Alter current plan (see comments)  [] Plan of care initiated [] Hold pending MD visit [] Discharge    Electronically signed by: Renell Alpers, PT, DPT    Note: If patient does not return for scheduled/ recommended follow up visits, this note will serve as a discharge from care along with most recent update on progress.

## 2023-03-09 NOTE — PROGRESS NOTES
primidone (MYSOLINE) 50 MG tablet Take 50 mg by mouth daily      omeprazole (PRILOSEC) 20 MG delayed release capsule Take 20 mg by mouth daily      aspirin 81 MG tablet Take 1 tablet by mouth daily 30 tablet 0    Psyllium 500 MG CAPS Take 6 capsules by mouth daily       fish oil-omega-3 fatty acids 1000 MG capsule Take 2 g by mouth 2 times daily       multivitamin (THERAGRAN) per tablet Take 1 tablet by mouth daily. No current facility-administered medications for this visit. Allergies:  Amoxicillin, Amoxicillin-pot clavulanate, Sulfasuccinamide, and Tamsulosin     Review of Systems:   Constitutional: there has been no unanticipated weight loss. There's been no change in energy level, sleep pattern, or activity level. Eyes: No visual changes or diplopia. No scleral icterus. ENT: No Headaches, hearing loss or vertigo. No mouth sores or sore throat. Cardiovascular: Reviewed in HPI  Respiratory: No cough or wheezing, no sputum production. No hematemesis. Gastrointestinal: No abdominal pain, appetite loss, blood in stools. No change in bowel or bladder habits. Genitourinary: No dysuria, trouble voiding, or hematuria. Musculoskeletal:  No gait disturbance, weakness or joint complaints. Integumentary: No rash or pruritis. Neurological: No headache, diplopia, change in muscle strength, numbness or tingling. No change in gait, balance, coordination, mood, affect, memory, mentation, behavior. Psychiatric: No anxiety, no depression. Endocrine: No malaise, fatigue or temperature intolerance. No excessive thirst, fluid intake, or urination. No tremor. Hematologic/Lymphatic: No abnormal bruising or bleeding, blood clots or swollen lymph nodes. Allergic/Immunologic: No nasal congestion or hives. Physical Examination:    Blood pressure 116/64, pulse 67, height 5' 8\" (1.727 m), weight 193 lb 6.4 oz (87.7 kg), SpO2 97 %.   Vitals:    03/29/23 1309   BP: 116/64   Pulse: 67   SpO2: 97%

## 2023-03-10 ENCOUNTER — HOSPITAL ENCOUNTER (OUTPATIENT)
Dept: OCCUPATIONAL THERAPY | Age: 78
Setting detail: THERAPIES SERIES
Discharge: HOME | End: 2023-03-10
Payer: MEDICARE

## 2023-03-10 PROCEDURE — 97112 NEUROMUSCULAR REEDUCATION: CPT

## 2023-03-10 PROCEDURE — 97535 SELF CARE MNGMENT TRAINING: CPT

## 2023-03-10 PROCEDURE — 97110 THERAPEUTIC EXERCISES: CPT

## 2023-03-10 PROCEDURE — 97530 THERAPEUTIC ACTIVITIES: CPT

## 2023-03-10 NOTE — FLOWSHEET NOTE
[address] [facility] Occupational Therapy  [address] [place] [address] Donya Garcia, [address]  Phone: [phone]   Fax: [phone]      Occupational Therapy Daily Treatment Note  Date:  3/10/2023    Patient: Freeman Higgins   : 1945   MRN: [id number]  Referring Physician:   Alcides Vázquez, KEYSHA-CNP          Medical Diagnosis Information:  G20 (ICD-10-CM) - Parkinson disease (initially diagnosed 2017; also has hx essential tremor)                            Insurance information: Medicare + AARP - [place], [place], no copay, 20% pt responsibility      Plan of care sent to provider:      []Faxed   [x]Co-signature    (attempts: 1[] 2[] 3[])       Progress Report: []  Yes  [x]  No     Date Range for reporting period:  Beginnin23  Ending: end of POC    Progress report due (10 Rx/or 30 days whichever is less): visit #10 or 43    Recertification due (POC duration/ or 90 days whichever is less): visit #16 or 23    Visit # Insurance Allowable Auth required? Date Range   3/16 [place] []  Yes  [x]  No N/a       Latex Allergy:  [x]No      []Yes  Pacemaker:  [x] No       [] Yes     Preferred Language for Healthcare:   [x]English       []other:    Pain level:  0/10    SUBJECTIVE:  Pt's wife reports pt was motivated to complete homework last night, even though he did not get home from social function until 8:45. Pt's wife reports pt has had difficulty with aiming while urinating in stance.      LSVT Program Testing-       Eval  2023 Discharge    30 sec STS   ([employer] 112 improve by 3 reps) Per PT eval 23: 9      10 meter walk  ([employer] [address] improve by 0.18 m/s comfortable speed) Per PT eval 23: Trial 1: 8\" = 0.75 m/s   Trial 2: 8.5\"= 0.71 m/s     FGA (high balance)  less than 18 indicates high risk of falls in people with PD ([employer] 112 improve by 4 points) Per PT eval 23: 15/30     Activities Specific Balance Confidence Scale    69% or less= recurrent falls in people with PD (BARBARA 11.12 to 13% improvement) Per PT eval 2/1/23: 72.81%     MoCA score   less than 26 indicated mild cog impairment  12/30     [place] coat  Donning coat   Unbuttoning 5 buttons  Buttoning 5 buttons  Doff L shoe and sock   Don L sock and shoe 12 seconds  10 seconds  38 seconds  65 seconds  107 seconds  Dependent/assist from wife     Functional Task Recording Form See media See media       RESTRICTIONS/PRECAUTIONS: PD    Day of treatment (out of 16 in conjunction w/ PT): 4    Maximum Daily Exercises-    Responding best during session to simple cues (\"look at me,\" \"do what my arms are doing\") with intermittent tactile cues. Requiring initial cues for sequencing for all MDEs with intermittent redirection. Improved pt initiation on this date for functional tasks, such as removing coat, rather than waiting for wife to complete.      Floor to Ceiling (10 s hold) x 8 - decreased reach down; continued decreased hand opening during side reach despite cues    Side to side (10 s hold) x 8 - improved hip/knee extension; good BIG turn and reach forward with continued decreased amplitude     Forward BIG step x 8 B - good step length, improved UE involvement with reps and following visual cues but continued decreased amplitude    Sideways BIG step x 8 B - good step length; improved UE involvement requiring less cues but continued decreased amplitude; no emphasis on head turn this date due to increased focus on UE involvement and BIG amplitude    Backwards BIG step x 8 B - good step length; frequent cues for back swing and weight shift into back foot for front foot dorsiflexion     Forward Rock and Reach x 10 B - no UE support on this date; increased difficulty with simultaneous UE and LE movements; improved performance with reps and [place] cues from therapist    Sideways Rock and Reach x 5 B - improved BIG turn, intermittent cues for BIG finish, amplitude, and foot swivel      Functional Component Tasks (5 total) -    Sit to stand x5 (completed throughout session) - good anterior weight shift and power up; good eccentric control during lowering; improper foot placement prior to sitting with pt's feet pointing sideways during sit - education provided regarding feet pointing forward before sitting      Rolling over in bed x5 each side - initial cues for sequencing and min A for initial R roll > CGA for remaining R rolls; CGA for L rolls    Crossing leg x5 B - good L hip/knee ROM; decreased R hip/knee ROM requiring use of BUE to pull leg into figure 4    Putting on coat x5 - continued cues required to pull first sleeve all the way up to shoulder to improve positioning of other sleeve behind pt; emphasis on BIG push through sleeve     Buttoning 3 buttons on shirt x5 - min cues to redirect to task for additional reps       Hierarchy Tasks (progressively more difficult multi level tasks)-    Supine to sit x3 - CGA for initial attempt with SBA and verbal cues for sequencing for remaining attempts     BIG scoots back in arm chair x5    Opening doors with BIG movements x3    BIG pillow throw to wife on balance board x10 - pt seemed to enjoy task, playful with wife, increased amplitude of throw as task progressed      BIG walking-    ~120 ft from waiting room to clinic and clinic to waiting room (~240 ft total)  ~100 ft within clinic with improved UE involvement, good step length        Homework for the day-  MDEs, BIG walking, and FCTs x 2 on days patient is not in the clinic - 30 min total  MDEs, BIG walking, and FCTs x 1 on days patient is in clinic - 15 min total    Lying flat in bed for 5 minutes in the morning and 5 minutes at night to work on hip and knee extension  Increased independence and BIG movements during supine to sit       Patient education:  Eval, POC, HEP, LSVT BIG- pt verbalized understanding        Home Exercise Program:  LSVT BIG program    Therapeutic Exercise and NMR:  [x] (59076) Provided verbal/tactile cueing for activities related to strengthening, flexibility, endurance, ROM  for improvements in scapular, scapulothoracic and UE control with self care, reaching, carrying, lifting, house/yardwork, driving/computer work.    [] (12344) Provided verbal/tactile cueing for activities related to improving balance, coordination, kinesthetic sense, posture, motor skill, proprioception  to assist with  scapular, scapulothoracic and UE control with self care, reaching, carrying, lifting, house/yardwork, driving/computer work.   [] Comments:    Therapeutic Activities:    [x] (92576 or 45438) Provided verbal/tactile cueing for activities related to improving balance, coordination, kinesthetic sense, posture, motor skill, proprioception and motor activation to allow for proper function of scapular, scapulothoracic and UE control with self care, carrying, lifting, driving/computer work  [] Comments:    Home Exercise Program:    [x] (19041) Reviewed/Progressed HEP activities related to strengthening, flexibility, endurance, ROM of scapular, scapulothoracic and UE control with self care, reaching, carrying, lifting, house/yardwork, driving/computer work  [] (37346) Reviewed/Progressed HEP activities related to improving balance, coordination, kinesthetic sense, posture, motor skill, proprioception of scapular, scapulothoracic and UE control with self care, reaching, carrying, lifting, house/yardwork, driving/computer work    [] Comments:    Manual Treatments:  PROM / STM / Oscillations-Mobs:  G-I, II, III, IV (PA's, Inf., Post.)  [] (75919) Provided manual therapy to mobilize soft tissue/joints of cervical/CT, scapular GHJ and UE for the purpose of modulating pain, promoting relaxation,  increasing ROM, reducing/eliminating soft tissue swelling/inflammation/restriction, improving soft tissue extensibility and allowing for proper ROM for normal function with self care, reaching, carrying, lifting, house/yardwork, driving/computer work  [] Comments:    ADL Training:  [x] (58589) Provided self-care/home management training related to activities of daily living and compensatory training, and/or use of adaptive equipment   [] Comments:     Splinting:  [] Fabrication of:   [] (01908) Orthotic/Prosthetic Management, subsequent encounter  [] (42044) Orthotic management and training (fitting and assessment)  [] Comments:      Charges:  Timed Code Treatment Minutes: 88   Total Treatment Minutes: 88     [] EVAL (LOW) 02091   [] OT Re-eval (14272)  [] EVAL (MOD) 89426   [] EVAL (HIGH) 49199       [x] Vida (15259) x    2 [] IYQRV(11732)  [x] NMR (96836) x    2 [] Estim (attended) (21344)   [] Manual ([id number]) x     [] US (05037)  [x] TA (65070) x    1 [] Paraffin (81119)  [x] ADL  ([phone]) x   1 [] Splint/L code:    [] Estim (unattended) (22 700509)  [] Fluidotherapy (75661)  [] Other:   GOALS:   Patient stated goal: increase independence      Therapist goals for Patient:   Short Term Goals: To be achieved in: 30 days  1. Pt will report compliance in [facility] BIG home program to improve endurance, sequencing, and amplitude of movement. [] Progressing: [] Met: [] Not Met: [] Adjusted  2. Pt will decrease time to button 5 buttons to 45 seconds or less to improve ADL independence and efficiency. [] Progressing: [] Met: [] Not Met: [] Adjusted  3. Pt will increase fine motor control to complete 9-hole peg task in 35 seconds or less with each hand to improve coordination in ADLs. [] Progressing: [] Met: [] Not Met: [] Adjusted        Long Term Goals: To be achieved by discharge  1. Pt will improve ROM, strength, and sequencing to complete bed mobility mod I for increased independence and decreased caregiver burden. [] Progressing: [] Met: [] Not Met: [] Adjusted  2. Pt will improve ROM, strength, and sequencing to complete LB dressing SBA to improve independence in ADLs. [] Progressing: [] Met: [] Not Met: [] Adjusted  3.  Pt will improve sequencing, problem solving, and attention to complete Trailmaking Part A assessment within 3 minutes with 1 error or less. [] Progressing: [] Met: [] Not Met: [] Adjusted    Progression Towards Functional goals:  [x] Patient is progressing as expected towards functional goals listed. [] Progression is slowed due to complexities listed. [] Progression has been slowed due to co-morbidities. [] Plan just implemented, too soon to assess goals progression  [] All goals are met  [] Other:     ASSESSMENT:  Pt with improved efficiency during completion of MDEs and less rest breaks required. Continued need for increased time for processing and frequent cues for sequencing. Treatment/Activity Tolerance:  [x] Patient tolerated treatment well [] Patient limited by fatigue  [] Patient limited by pain  [] Patient limited by other medical complications  [] Other:     Prognosis: [x] Good [] Fair  [] Poor    Patient Requires Follow-up: [x] Yes  [] No    PLAN: See eval  [x] Continue per plan of care [] Alter current plan (see comments)  [] Plan of care initiated [] Hold pending MD visit [] Discharge    Electronically signed by: Criselda Bamberger, OTR/L, C/NDT ([id number])      Note: If patient does not return for scheduled/ recommended follow up visits, this note will serve as a discharge from care along with most recent update on progress.

## 2023-03-13 ENCOUNTER — HOSPITAL ENCOUNTER (OUTPATIENT)
Dept: PHYSICAL THERAPY | Age: 78
Setting detail: THERAPIES SERIES
Discharge: HOME | End: 2023-03-13
Payer: MEDICARE

## 2023-03-13 PROCEDURE — 97530 THERAPEUTIC ACTIVITIES: CPT

## 2023-03-13 PROCEDURE — 97112 NEUROMUSCULAR REEDUCATION: CPT

## 2023-03-13 NOTE — FLOWSHEET NOTE
[address] [facility] Occupational Therapy  [address] [place] [address]  Phone: [phone]   Fax: [phone]      Occupational Therapy Daily Treatment Note  Date:  3/13/2023    Patient: Nanci Reilly   : 1945   MRN: [id number]  Referring Physician:   Anila Chandler, KEYSHA-CNP          Medical Diagnosis Information:  G20 (ICD-10-CM) - Parkinson disease (initially diagnosed 2017; also has hx essential tremor)                            Insurance information: Medicare + AARP - no [place], [place], no copay, 20% pt responsibility      Plan of care sent to provider:      []Faxed   [x]Co-signature    (attempts: 1[] 2[] 3[])       Progress Report: []  Yes  [x]  No     Date Range for reporting period:  Beginnin23  Ending: end of POC    Progress report due (10 Rx/or 30 days whichever is less): visit #10 or     Recertification due (POC duration/ or 90 days whichever is less): visit #16 or 23    Visit # Insurance Allowable Auth required?  Date Range    [place] []  Yes  [x]  No N/a       Latex Allergy:  [x]No      []Yes  Pacemaker:  [x] No       [] Yes     Preferred Language for Healthcare:   [x]English       []other:    Pain level:  0/10    SUBJECTIVE:  Patient and wife report he kind of timed out during PT treatment yesterday    LSVT Program Testing-       Eval  2023 Discharge    30 sec STS   ([employer] 112 improve by 3 reps) Per PT eval 23: 9      10 meter walk  ([employer] improve by 0.18 m/s comfortable speed) Per PT eval 23: Trial 1: 8\" = 0.75 m/s   Trial 2: 8.5\"= 0.71 m/s     FGA (high balance)  less than 18 indicates high risk of falls in people with PD ([employer] 112 improve by 4 points) Per PT eval 23: 15/30     Activities Specific Balance Confidence Scale    69% or less= recurrent falls in people with PD (BARBARA 11.12 to 13% improvement) Per PT eval 23: 72.81%     MoCA score   less than 26 indicated mild cog impairment       [place] coat  Donning coat Unbuttoning 5 buttons  Buttoning 5 buttons  Doff L shoe and sock   Don L sock and shoe 12 seconds  10 seconds  38 seconds  65 seconds  107 seconds  Dependent/assist from wife     Functional Task Recording Form See media See media       RESTRICTIONS/PRECAUTIONS: PD    Day of treatment (out of 16 in conjunction w/ PT): 4    Maximum Daily Exercises-    Responding best during session to simple cues (\"look at me,\" \"do what my arms are doing\") with intermittent tactile cues. Requiring initial cues for sequencing for all MDEs with intermittent redirection. Improved pt initiation on this date for functional tasks, such as removing coat, rather than waiting for wife to complete. Wife positioned in front of patient as model while OT visual and verbal cues for amplitude and sequence.      Floor to Ceiling (10 s hold) x 8 - decreased reach down; continued decreased hand opening during side reach despite cues    Side to side (10 s hold) x 8 - improved hip/knee extension; good BIG turn and reach forward with continued decreased amplitude     Forward BIG step x 8 B - good step length, improved UE involvement with reps and following visual cues but continued decreased amplitude    Sideways BIG step x 8 B - good step length; improved UE involvement requiring less cues but continued decreased amplitude; no emphasis on head turn this date due to increased focus on UE involvement and BIG amplitude    Backwards BIG step x 8 B - good step length; frequent cues for back swing and weight shift into back foot for front foot dorsiflexion     Forward Rock and Reach x 10 B - no UE support on this date; increased difficulty with simultaneous UE and LE movements; improved performance with reps and [place] cues from therapist        Functional Component Tasks (5 total) -        Hierarchy Tasks (progressively more difficult multi level tasks)-    S    BIG walking-    ~120 ft from waiting room to clinic and clinic to waiting room (~240 ft total)  ~100 ft within clinic with improved UE involvement, good step length        Homework for the day-  Patient cued to work on [address] to improve bilateral coordination on this night. Requested patient complete three sets of 10 reps this evening. Patient and wife verbalized understanding. Patient education:  Eval, POC, HEP, LSVT BIG- pt verbalized understanding        Home Exercise Program:  LSVT BIG program    Therapeutic Exercise and NMR:  [x] (70123) Provided verbal/tactile cueing for activities related to strengthening, flexibility, endurance, ROM  for improvements in scapular, scapulothoracic and UE control with self care, reaching, carrying, lifting, house/yardwork, driving/computer work.    [] (48180) Provided verbal/tactile cueing for activities related to improving balance, coordination, kinesthetic sense, posture, motor skill, proprioception  to assist with  scapular, scapulothoracic and UE control with self care, reaching, carrying, lifting, house/yardwork, driving/computer work.   [] Comments:    Therapeutic Activities:    [x] (45919 or 86510) Provided verbal/tactile cueing for activities related to improving balance, coordination, kinesthetic sense, posture, motor skill, proprioception and motor activation to allow for proper function of scapular, scapulothoracic and UE control with self care, carrying, lifting, driving/computer work  [] Comments:    Home Exercise Program:    [x] (11190) Reviewed/Progressed HEP activities related to strengthening, flexibility, endurance, ROM of scapular, scapulothoracic and UE control with self care, reaching, carrying, lifting, house/yardwork, driving/computer work  [] (80093) Reviewed/Progressed HEP activities related to improving balance, coordination, kinesthetic sense, posture, motor skill, proprioception of scapular, scapulothoracic and UE control with self care, reaching, carrying, lifting, house/yardwork, driving/computer work    [] Comments:    Manual Treatments:  PROM / STM / Oscillations-Mobs:  G-I, II, III, IV (PA's, Inf., Post.)  [] (97699) Provided manual therapy to mobilize soft tissue/joints of cervical/CT, scapular GHJ and UE for the purpose of modulating pain, promoting relaxation,  increasing ROM, reducing/eliminating soft tissue swelling/inflammation/restriction, improving soft tissue extensibility and allowing for proper ROM for normal function with self care, reaching, carrying, lifting, house/yardwork, driving/computer work  [] Comments:    ADL Training:  [x] (78242) Provided self-care/home management training related to activities of daily living and compensatory training, and/or use of adaptive equipment   [] Comments:     Splinting:  [] Fabrication of:   [] (44531) Orthotic/Prosthetic Management, subsequent encounter  [] (43109) Orthotic management and training (fitting and assessment)  [] Comments:      Charges:  Timed Code Treatment Minutes: 90   Total Treatment Minutes: 90     [] EVAL (LOW) 51684   [] OT Re-eval (96249)  [] EVAL (MOD) 17252   [] EVAL (HIGH) 11916       [x] Vida (31989) x    2 [] QUYSU(09501)  [x] NMR (07207) x    2 [] Estim (attended) (68943)   [] Manual ([id number]) x     [] US (30632)  [x] TA (07783) x    1 [] Paraffin (15268)  [x] ADL  (65681) x   1 [] Splint/L code:    [] Estim (unattended) (22 [id number])  [] Fluidotherapy (06981)  [] Other:   GOALS:   Patient stated goal: increase independence      Therapist goals for Patient:   Short Term Goals: To be achieved in: 30 days  1. Pt will report compliance in [facility] BIG home program to improve endurance, sequencing, and amplitude of movement. [] Progressing: [] Met: [] Not Met: [] Adjusted  2. Pt will decrease time to button 5 buttons to 45 seconds or less to improve ADL independence and efficiency. [] Progressing: [] Met: [] Not Met: [] Adjusted  3. Pt will increase fine motor control to complete 9-hole peg task in 35 seconds or less with each hand to improve coordination in ADLs.   [] Progressing: [] Met: [] Not Met: [] Adjusted        Long Term Goals: To be achieved by discharge  1. Pt will improve ROM, strength, and sequencing to complete bed mobility mod I for increased independence and decreased caregiver burden. [] Progressing: [] Met: [] Not Met: [] Adjusted  2. Pt will improve ROM, strength, and sequencing to complete LB dressing SBA to improve independence in ADLs. [] Progressing: [] Met: [] Not Met: [] Adjusted  3. Pt will improve sequencing, problem solving, and attention to complete Trailmaking Part A assessment within 3 minutes with 1 error or less. [] Progressing: [] Met: [] Not Met: [] Adjusted    Progression Towards Functional goals:  [x] Patient is progressing as expected towards functional goals listed. [] Progression is slowed due to complexities listed. [] Progression has been slowed due to co-morbidities. [] Plan just implemented, too soon to assess goals progression  [] All goals are met  [] Other:     ASSESSMENT:  Pt with improved efficiency during completion of MDEs and less rest breaks required. Continued need for increased time for processing and frequent cues for sequencing. Treatment/Activity Tolerance:  [x] Patient tolerated treatment well [] Patient limited by fatigue  [] Patient limited by pain  [] Patient limited by other medical complications  [] Other:     Prognosis: [x] Good [] Fair  [] Poor    Patient Requires Follow-up: [x] Yes  [] No    PLAN: See eval  [x] Continue per plan of care [] Alter current plan (see comments)  [] Plan of care initiated [] Hold pending MD visit [] Discharge    Electronically signed by:            Note: If patient does not return for scheduled/ recommended follow up visits, this note will serve as a discharge from care along with most recent update on progress.

## 2023-03-13 NOTE — FLOWSHEET NOTE
[address] [facility]  Phone: [phone]   Fax: [phone]    Physical Therapy Daily Treatment Note/Progress Note    Date:  2023     Patient Name:  Oneil Jenkins    :  1945  MRN: [id number]  Medical Diagnosis:  Parkinson disease ([address] [G20]  General weakness [R53.1]  Treatment Diagnosis: imbalance , Abnormal gait   Insurance/Certification information:  PT Insurance Information: Medicare  Physician Information:  JERAMIE Giraldo    Plan of care signed (Y/N): [x]  Yes []  No     Date of Patient follow up with Physician:      Progress Report: []  Yes  [x]  No     Date Range for reporting period:  Beginnin/10/2023  POC: 23  PN: 3/7  Ending:     Progress report due (10 Rx/or 30 days whichever is less): visit #18 or  3/2/0108    Recertification due (POC duration/ or 90 days whichever is less): visit # or  23    Visit # Insurance Allowable Auth required? Date Range    + 3/17 Mn []  Yes  [x]  No Mn           Latex Allergy:  [x]NO      []YES  Preferred Language for Healthcare:   [x]English       []other:    Functional Scale:       Date assessed:  TUG 11.5                                                              1/10/2023  TUG 9.75 sec                   3/7/2023    Pain level:  0/10  -lower back; 0/10 R hip    SUBJECTIVE:   Having the most difficulty with retro steps, rock and reach, and sideway rock and reach. Does not roll in bed.          OBJECTIVE:    3/13: pt's wife felt like pt was \"out of it\" during MDEs - checked BP: 113/57, 99% SO2, 74 bpm; pt having a difficult time keeping up with exercises this date,       LSVT Program testing-   Eval  2023 Discharge    30 sec STS   ([address] improve by 3 reps)  9    10 meter walk  ([address] improve by 0.18 m/s comfortable speed)  Trial 1: 8\" = 0.75 m/s   Trial 2: 8.5\"= 0.71 m/s    FGA (high balance)  less than 18 indicates high risk of falls in people with PD ([address] improve by 4 points) 15/30    Activities Specific Balance Confidence Scale    69% or less= recurrent falls in people with PD (BARBARA 11.12 to 13% improvement) 72.81%    MoCA score   less than 26 indicated mild cog impairment  Deferred to OT    Donning/[place] shirt   Buttoning   Donning socks and shoes  Deferred to OT    Functional Task Recording Form See media See media          Day of treatment (5 out of 16):  -in conjunction w/OT    Maximum Daily Exercises-    Indicate if UE support   Floor to Ceiling (10 s hold) x 8    Side to side (10 s hold) x 8    Forward BIG step x 10 B - difficulty with moving arms and LEs simultaneously     Sideways BIG step x 10 B  - difficulty with moving arms and LEs simultaneously     Backwards BIG step x 8 B - blood pressure check due to feeling off     Forward Rock and Reach x 10 B - trialled in sitting, but achieved better result in standing     [employer] and Reach x 10 B - standing, limited rot B, no toe shift      Functional Component Tasks (5 total) -    Sit to stand x 5 - VCs for BIG ROM with arms     Rolling over in bed x 5 B     Crossing leg x 10 B - unable to get either heel on knee     Putting on coat x 2 - min A for L bracelet caught in sleeve     Button on shirt      Hierarchy Tasks (progressively more difficult multi level tasks)-          BIG walking-  100' - VCs for arm swings       Homework for the day-  MDEs, BIG walking, and FCTs x 2 on days patient is not in the clinic - 30 min total  MDEs, BIG walking, and FCTs x 1 on days patient is in clinic - 15 min total      RESTRICTIONS/PRECAUTIONS:     Exercises/Interventions:   Therapeutic Exercises (95518) Resistance / level Sets/sec Reps Notes   Nustep  warmup  Bike  TM -retro  -side stepping    IB / HR    Total gym  -squats   -incr time required to get off machine   Bridges  AROM hip IR and ER in hookyling     Reveiwed HEP as seen below Advised heat and pillow between knees - wife to only help in and out of bed if pt unable to do independently                    Therapeutic Activities (64496)    Bed mobility training   -Sit to supine  -Rolling R & L w/Big technique  -Supine to sit    -prone position           -standing to S/L, rolled L to prone   Sit to stand w/Big technique  -AirEx  -blue wedge    -large tire   -no UE assist   Fwd step ups from 2nd step w/contral LE to platform  Lat step ups -no UE assist  -no UE assist   Practice getting up from floor on large mat table, large gray bolster utilized as low sofa:  From supine to R S/L to prone to quadruped to tall kneeling, to 1/2 kneel to transfer onto bolster Pt able to complete bed mobility and transitions with min verbal cues but required increased amount of time.    Golf club swings  -flat surface  -on AirEx  -The Field        -Normal stance        -Feet inclined        -Feet declined        -foot on 2\" surface            LSVT functional tasks:  -Getting in and out of car  -Putting on long sleeved jacket  -Buttoning shirt  -Sit to stand, 3 steps fwd, 4 steps bwd, stand to sit  -Top shelf reach w/roll of TB  -reaching to top bar at cross fit         -across room    -focus on ankle PF  -focus on ankle PF       Gait (07132)    LSVT Big gait -cues for improved Big hands and B arm swing               Neuromuscular Re-ed (72753)    Balance training:  LVST screening    Tandem  NBOS  Airex  -frequent stepping to regain balance   Shuttle balance:  -improved posture  -cervical rotation  -B arm swing in staggered stance        -in front        -behind  -Trunk rotation  -hip abd  -head nods   -1 UE assist  -1 UE assist   Staggered stance  -B arm swing   -cues for increased swing behind   High knee w/pause for balance  Walking partial lunge        Manual Intervention (09327)    Seated hamstring stretch    Supine piriformis stretch    Assessment of hip and surrounding mm, DTM to R TFL and ITB, long leg distraction at L hip, PROM into ER and IR, negative figure 4 (limited), lateral joint mob grade 3                            Modalities:   2/17: MHP to R hip in sidelying x 10 min     Pt. Education:     3/7: issued HO of seated MDEs - advised 1x on therapy days and 2x on nontherapy days    1/25: discussed reducing use of suspenders as they could be applying tactile feedback to patient to facilitate poor posture.  Pt and wife plan to try belts again, but pt recently lost a lot of weight and are having difficulty keeping pants up.      01/10/2023  -patient educated on diagnosis, prognosis and expectations for rehab  -all patient questions were answered    Home Exercise Program:  Access Code: [email or web address]  URL: [email or web address]  Date: 02/17/2023  Prepared by: Nena Merritt    Exercises  Supine Bridge - 1 x daily - 7 x weekly - 3 sets - 10 reps  Supine Hip Abduction - 1 x daily - 7 x weekly - 3 sets - 10 reps  Seated Hip Internal Rotation AROM - 1 x daily - 7 x weekly - 3 sets - 10 reps  Seated Hip External Rotation AROM - 1 x daily - 7 x weekly - 3 sets - 10 reps  Supine Heel Slide - 1 x daily - 7 x weekly - 3 sets - 10 reps      Access Code: [email or web address]  URL: [email or web address]  Date: 01/13/2023  Prepared by: Jorge Luis Auguste    Exercises  Sit to Stand Without Arm Support - 2 x daily - 7 x weekly - 1 sets - 10 reps  Seated Upright Posture Correction - 4 x daily - 7 x weekly - 1 sets - 5 reps  Added side to side rocking in supine         Therapeutic Exercise and NMR EXR  [] (36432) Provided verbal/tactile cueing for activities related to strengthening, flexibility, endurance, ROM for improvements in  [] LE / Lumbar: LE, proximal hip, and core control with self care, mobility, lifting, ambulation.  [] UE / Cervical: cervical, postural, scapular, scapulothoracic and UE control with self care, reaching, carrying, lifting, house/yardwork, driving, computer work.  [x] (85760) Provided verbal/tactile cueing for activities related to improving balance, coordination, kinesthetic sense, posture, motor  skill, proprioception to assist with   [x] LE / lumbar: LE, proximal hip, and core control in self care, mobility, lifting, ambulation and eccentric single leg control. [x] UE / cervical: cervical, scapular, scapulothoracic and UE control with self care, reaching, carrying, lifting, house/yardwork, driving, computer work.   [] (70372) Therapist is in constant attendance of 2 or more patients providing skilled therapy interventions, but not providing any significant amount of measurable one-on-one time to either patient, for improvements in  [] LE / lumbar: LE, proximal hip, and core control in self care, mobility, lifting, ambulation and eccentric single leg control. [] UE / cervical: cervical, scapular, scapulothoracic and UE control with self care, reaching, carrying, lifting, house/yardwork, driving, computer work. NMR and Therapeutic Activities:    [x] (05030 or 52945) Provided verbal/tactile cueing for activities related to improving balance, coordination, kinesthetic sense, posture, motor skill, proprioception and motor activation to allow for proper function of   [x] LE: / Lumbar core, proximal hip and LE with self care and ADLs  [x] UE / Cervical: cervical, postural, scapular, scapulothoracic and UE control with self care, carrying, lifting, driving, computer work.    [x] (84514) Gait Re-education- Provided training and instruction to the patient for proper LE, core and proximal hip recruitment and positioning and eccentric body weight control with ambulation re-education including up and down stairs     Home Management Training / Self Care:  [] (06839) Provided self-care/home management training related to activities of daily living and compensatory training, and/or use of adaptive equipment for improvement with: ADLs and compensatory training, meal preparation, safety procedures and instruction in use of adaptive equipment, including bathing, grooming, dressing, personal hygiene, basic household cleaning and chores. Home Exercise Program:    [] (06255) Reviewed/Progressed HEP activities related to strengthening, flexibility, endurance, ROM of   [] LE / Lumbar: core, proximal hip and LE for functional self-care, mobility, lifting and ambulation/stair navigation   [] UE / Cervical: cervical, postural, scapular, scapulothoracic and UE control with self care, reaching, carrying, lifting, house/yardwork, driving, computer work  [] (50604)Reviewed/Progressed HEP activities related to improving balance, coordination, kinesthetic sense, posture, motor skill, proprioception of   [] LE: core, proximal hip and LE for self care, mobility, lifting, and ambulation/stair navigation    [] UE / Cervical: cervical, postural,  scapular, scapulothoracic and UE control with self care, reaching, carrying, lifting, house/yardwork, driving, computer work    Manual Treatments:  PROM / STM / Oscillations-Mobs:  G-I, II, III, IV (PA's, Inf., Post.)  [] (90357) Provided manual therapy to mobilize LE, proximal hip and/or LS spine soft tissue/joints for the purpose of modulating pain, promoting relaxation,  increasing ROM, reducing/eliminating soft tissue swelling/inflammation/restriction, improving soft tissue extensibility and allowing for proper ROM for normal function with   [] LE / lumbar: self care, mobility, lifting and ambulation. [] UE / Cervical: self care, reaching, carrying, lifting, house/yardwork, driving, computer work. Modalities:  [] (87543) Vasopneumatic compression: Utilized vasopneumatic compression to decrease edema / swelling for the purpose of improving mobility and quad tone / recruitment which will allow for increased overall function including but not limited to self-care, transfers, ambulation, and ascending / descending stairs.        Charges:  Timed Code Treatment Minutes: 70   Total Treatment Minutes: 70     [] EVAL - LOW (96365)   [] EVAL - MOD (98808)  [] EVAL - HIGH (94806)  [] RE-EVAL (12076)  [] IM(62656) x       [] Ionto  [x] NMR (13384) x 2    [] Vaso  [] Manual (38368) x       [] Ultrasound  [x] TA x   2     [] Mech Traction (97851)  [] Aquatic Therapy x     [] ES (un) (10619):   [] Home Management Training x  [] ES(attended) (54866)   [] Dry Needling 1-2 muscles (48926):  [] Dry Needling 3+ muscles (473826)  [] Gait x      [] Performance Testing: 89325 x1    GOALS:    Patient stated goal: to improve mobility   [x] Progressing: [] Met: [] Not Met: [] Adjusted     Therapist goals for Patient:   Short Term Goals: To be achieved in: 2 weeks  1. Independent in HEP and progression per patient tolerance, in order to prevent re-injury. [] Progressing: [x] Met: [] Not Met: [] Adjusted  2. Patient will have a decrease in pain to facilitate improvement in movement, function, and ADLs as indicated by Functional Deficits. [] Progressing: [x] Met: [] Not Met: [] Adjusted     Long Term Goals: To be achieved in: 6 weeks / Dc   1. Patient to demonstrate TUG/ score to 9  to demonstrate improved fall risk to assist with reaching prior level of function. - 9.75 sec on 3/7/23  [x] Progressing: [] Met: [] Not Met: [] Adjusted  2. Patient to demonstrate consistently getting out of bed mod I .  [x] Progressing: [] Met: [] Not Met: [] Adjusted  3. Patient will demonstrate an increase in strength to good UE & Proximal hip complex, and core activation to allow for proper functional mobility as indicated by patients Functional Deficits. [x] Progressing: [] Met: [] Not Met: [] Adjusted  4. Patient will return to functional activities including sit to stand mod I consistently  without increased restriction. [] Progressing: [x] Met: [] Not Met: [] Adjusted  5. Patient to tolerate to demonstrate floor transfer SBA to mod I for safety management. [x] Not assessed: [] Met: [] Not Met: [] Adjusted       6. Patient to improve 30\" STS to 13 in order to demo improvements in functional strength.   [x] Not assessed: [] Met: [] Not Met: [] New goal 2/1/23  7. Patient to improve FGA to 22/30 in order to reduce fall risk and improve community access. [x] Not assessed: [] Met: [] Not Met: [] New goal 2/1/23  8. Patient to improve ABC Scale to 80% to improve confidence with functional tasks. [x] Not assessed: [] Met: [] Not Met: [] New goal 2/1/23    Overall Progression Towards Functional goals/ Treatment Progress Update:  [] Patient is progressing as expected towards functional goals listed. [x] Progression is slowed due to complexities/Impairments listed. [] Progression has been slowed due to co-morbidities. [] Plan just implemented, too soon to assess goals progression <30days   [] Goals require adjustment due to lack of progress  [] Patient is not progressing as expected and requires additional follow up with physician  [] Other    Persisting Functional Limitations/Impairments:  []Sleeping []Sitting               []Standing [x]Transfers        [x]Walking [x]Kneeling               [x]Stairs []Squatting / bending   [x]ADLs []Reaching  [x]Lifting  []Housework  []Driving []Job related tasks  []Sports/Recreation [x]Other: bed mobility         ASSESSMENT:   Patient required increased time to process and perform each task, despite reduced verbal cues and directions to \"Do what I do\" only. Wife continues to try to model pt movements despite advice to reduce verbal cuing. Standing balance steady, however difficulty with moving UEs and LEs simultaneously. Wife reported new problem swallowing a few times this past week. May need to get SLP rx. Plan to continue to progress LSVT Big treatment.       Treatment/Activity Tolerance:  [x] Patient able to complete tx [] Patient limited by fatigue  [] Patient limited by pain  [] Patient limited by other medical complications  [] Other:     Prognosis: [x] Good [] Fair  [] Poor    Patient Requires Follow-up: [x] Yes  [] No    Plan for next treatment session:    PLAN: See perri. PT 2x / week for 6 weeks. [x] Continue per plan of care [] Alter current plan (see comments)  [] Plan of care initiated [] Hold pending MD visit [] Discharge    Electronically signed by: Mounika Rashid, PT, DPT    Note: If patient does not return for scheduled/ recommended follow up visits, this note will serve as a discharge from care along with most recent update on progress.

## 2023-03-14 ENCOUNTER — HOSPITAL ENCOUNTER (OUTPATIENT)
Dept: OCCUPATIONAL THERAPY | Age: 78
Setting detail: THERAPIES SERIES
Discharge: HOME | End: 2023-03-14
Payer: MEDICARE

## 2023-03-14 PROCEDURE — 97112 NEUROMUSCULAR REEDUCATION: CPT

## 2023-03-14 PROCEDURE — 97110 THERAPEUTIC EXERCISES: CPT

## 2023-03-14 PROCEDURE — 97530 THERAPEUTIC ACTIVITIES: CPT

## 2023-03-16 ENCOUNTER — HOSPITAL ENCOUNTER (OUTPATIENT)
Dept: PHYSICAL THERAPY | Age: 78
Setting detail: THERAPIES SERIES
Discharge: HOME | End: 2023-03-16
Payer: MEDICARE

## 2023-03-16 PROCEDURE — 97530 THERAPEUTIC ACTIVITIES: CPT

## 2023-03-16 PROCEDURE — 97112 NEUROMUSCULAR REEDUCATION: CPT

## 2023-03-16 NOTE — FLOWSHEET NOTE
[address] [facility] Physical Therapy  Phone: [phone]   Fax: [phone]    Physical Therapy Daily Treatment Note/Progress Note    Date:  2023     Patient Name:  Ferdinand Ormond    :  1945  MRN: [id number]  Medical Diagnosis:  Parkinson disease ([address] [G20]  General weakness [R53.1]  Treatment Diagnosis: imbalance , Abnormal gait   Insurance/Certification information:  PT Insurance Information: Medicare  Physician Information:  JERAMIE Donnelly    Plan of care signed (Y/N): [x]  Yes []  No     Date of Patient follow up with Physician:      Progress Report: []  Yes  [x]  No     Date Range for reporting period:  Beginnin/10/2023  POC: 23  PN: 3/7  Ending:     Progress report due (10 Rx/or 30 days whichever is less): visit #18 or  3/9/4061    Recertification due (POC duration/ or 90 days whichever is less): visit # or  23    Visit # Insurance Allowable Auth required? Date Range    +  Mn []  Yes  [x]  No Mn           Latex Allergy:  [x]NO      []YES  Preferred Language for Healthcare:   [x]English       []other:    Functional Scale:       Date assessed:  TUG 11.5                                                              1/10/2023  TUG 9.75 sec                   3/7/2023    Pain level:  10  -L knee    SUBJECTIVE:     Is doing okay, has some pain in L knee from the exercises. Pain didn't start until walking in but is expecting it to improve with exercises.           OBJECTIVE:    3/13: pt's wife felt like pt was \"out of it\" during MDEs - checked BP: 113/57, 99% SO2, 74 bpm; pt having a difficult time keeping up with exercises this date,       LSVT Program testing-   Eval  2023 Discharge    30 sec STS   ([address] improve by 3 reps)  9    10 meter walk  ([address] improve by 0.18 m/s comfortable speed)  Trial 1: 8\" = 0.75 m/s   Trial 2: 8.5\"= 0.71 m/s    FGA (high balance)  less than 18 indicates high risk of falls in people with PD ([facility] improve by 4 points) 15/30    Activities Specific Balance Confidence Scale    69% or less= recurrent falls in people with PD (BARBARA 11.12 to 13% improvement) 72.81%    MoCA score   less than 26 indicated mild cog impairment  Deferred to OT    Donning/[place] shirt   Buttoning   Donning socks and shoes  Deferred to OT    Functional Task Recording Form See media See media          Day of treatment (7 out of 16):  -in conjunction w/OT    Maximum Daily Exercises-    Indicate if UE support   Floor to Ceiling (10 s hold) x 8  w/finer flicks x2    Side to side (10 s hold) x 8 w/finger flicks    Forward BIG step x 10 B - difficulty with moving arms and LEs simultaneously     Sideways BIG step x 10 B  - difficulty with moving arms and LEs simultaneously     Backwards BIG step x 8 B - decreased trunk bend, step length and no ankle movement after stepping    Forward Rock and Reach x 10 B - cued for B arm flexion at same time, improved rocking     [employer] and Reach x 6 B - standing, limited rot B, no toe shift      Functional Component Tasks (5 total) -    Sit to stand x 5 - VCs for BIG ROM with arms     Rolling over in bed   Crossing leg x 8 B - unable to get either heel on knee, makes it to about mid shin at best    Putting on coat x 1 - min A for LUE into jacket    Button on shirt      Hierarchy Tasks (progressively more difficult multi level tasks)-          BIG walking-    Big walking to basketball court [facility], up/down ramp, up/down 3 steps, down/up 3 steps then return to Clinic ~300' total       Homework for the day-  MDEs, BIG walking, and FCTs x 2 on days patient is not in the clinic - 30 min total  MDEs, BIG walking, and FCTs x 1 on days patient is in clinic - 15 min total      RESTRICTIONS/PRECAUTIONS:     Exercises/Interventions:   Therapeutic Exercises (16682) Resistance / level Sets/sec Reps Notes   Nustep  warmup  Bike  TM -retro  -side stepping    IB / HR    Total gym  -squats   -incr time required to get off machine   Bridges  AROM hip IR and ER in hookyling     Reveiwed HEP as seen below Advised heat and pillow between knees - wife to only help in and out of bed if pt unable to do independently                    Therapeutic Activities (96571)    Bed mobility training   -Sit to supine  -Rolling R & L w/Big technique  -Supine to sit    -prone position           -standing to S/L, rolled L to prone   Sit to stand w/Big technique  -AirEx  -blue wedge    -large tire   -no UE assist   Fwd step ups from 2nd step w/contral LE to platform  Lat step ups -no UE assist  -no UE assist   Practice getting up from floor on large mat table, large gray bolster utilized as low sofa:  From supine to R S/L to prone to quadruped to tall kneeling, to 1/2 kneel to transfer onto bolster Pt able to complete bed mobility and transitions with min verbal cues but required increased amount of time.    [employer] swings  -flat surface  -on AirEx  -The Field        -Normal stance        -Feet inclined        -Feet declined        -foot on 2\" surface            LSVT functional tasks:  -Getting in and out of car  -Putting on long sleeved jacket  -Buttoning shirt  -Sit to stand, 3 steps fwd, 4 steps bwd, stand to sit  -Top shelf reach w/roll of TB  -reaching to top bar at cross fit         -across room    -focus on ankle PF  -focus on ankle PF       Gait (82806)    LSVT Big gait -cues for improved Big hands and B arm swing               Neuromuscular Re-ed (88260)    Balance training:  LVST screening    Tandem  NBOS  Airex  -frequent stepping to regain balance   Shuttle balance:  -improved posture  -cervical rotation  -B arm swing in staggered stance        -in front        -behind  -Trunk rotation  -hip abd  -head nods   -1 UE assist  -1 UE assist   Staggered stance  -B arm swing   -cues for increased swing behind   High knee w/pause for balance  Walking partial lunge        Manual Intervention (90673)    Seated hamstring stretch    Supine piriformis stretch    Assessment of hip and surrounding mm, DTM to R TFL and ITB, long leg distraction at L hip, PROM into ER and IR, negative figure 4 (limited), lateral joint mob grade 3                             Modalities:   2/17: MHP to R hip in sidelying x 10 min     Pt. Education:     3/7: issued HO of seated MDEs - advised 1x on therapy days and 2x on nontherapy days    1/25: discussed reducing use of suspenders as they could be applying tactile feedback to patient to facilitate poor posture. Pt and wife plan to try belts again, but pt recently lost a lot of weight and are having difficulty keeping pants up.      01/10/2023  -patient educated on diagnosis, prognosis and expectations for rehab  -all patient questions were answered    Home Exercise Program:  Access Code: Kem Ruiz  URL: [email or web address]  Date: 02/17/2023  Prepared by: Amy Hunger    Exercises  Supine Bridge - 1 x daily - 7 x weekly - 3 sets - 10 reps  Supine Hip Abduction - 1 x daily - 7 x weekly - 3 sets - 10 reps  Seated Hip Internal Rotation AROM - 1 x daily - 7 x weekly - 3 sets - 10 reps  Seated Hip External Rotation AROM - 1 x daily - 7 x weekly - 3 sets - 10 reps  Supine Heel Slide - 1 x daily - 7 x weekly - 3 sets - 10 reps      Access Code: [email or web address]  URL: [email or web address]  Date: 01/13/2023  Prepared by: Renaldo Masker    Exercises  Sit to Stand Without Arm Support - 2 x daily - 7 x weekly - 1 sets - 10 reps  Seated Upright Posture Correction - 4 x daily - 7 x weekly - 1 sets - 5 reps  Added side to side rocking in supine         Therapeutic Exercise and NMR EXR  [] (45444) Provided verbal/tactile cueing for activities related to strengthening, flexibility, endurance, ROM for improvements in  [] LE / Lumbar: LE, proximal hip, and core control with self care, mobility, lifting, ambulation.   [] UE / Cervical: cervical, postural, scapular, scapulothoracic and UE control with self care, reaching, carrying, lifting, house/yardwork, driving, computer work.  [x] (84076) Provided verbal/tactile cueing for activities related to improving balance, coordination, kinesthetic sense, posture, motor skill, proprioception to assist with   [x] LE / lumbar: LE, proximal hip, and core control in self care, mobility, lifting, ambulation and eccentric single leg control.   [x] UE / cervical: cervical, scapular, scapulothoracic and UE control with self care, reaching, carrying, lifting, house/yardwork, driving, computer work.   [] (34960) Therapist is in constant attendance of 2 or more patients providing skilled therapy interventions, but not providing any significant amount of measurable one-on-one time to either patient, for improvements in  [] LE / lumbar: LE, proximal hip, and core control in self care, mobility, lifting, ambulation and eccentric single leg control.   [] UE / cervical: cervical, scapular, scapulothoracic and UE control with self care, reaching, carrying, lifting, house/yardwork, driving, computer work.     NMR and Therapeutic Activities:    [x] (53365 or 73406) Provided verbal/tactile cueing for activities related to improving balance, coordination, kinesthetic sense, posture, motor skill, proprioception and motor activation to allow for proper function of   [x] LE: / Lumbar core, proximal hip and LE with self care and ADLs  [x] UE / Cervical: cervical, postural, scapular, scapulothoracic and UE control with self care, carrying, lifting, driving, computer work.   [x] (66430) Gait Re-education- Provided training and instruction to the patient for proper LE, core and proximal hip recruitment and positioning and eccentric body weight control with ambulation re-education including up and down stairs     Home Management Training / Self Care:  [] (73541) Provided self-care/home management training related to activities of daily living and compensatory training, and/or use of adaptive equipment for improvement with: ADLs  and compensatory training, meal preparation, safety procedures and instruction in use of adaptive equipment, including bathing, grooming, dressing, personal hygiene, basic household cleaning and chores. Home Exercise Program:    [] (68116) Reviewed/Progressed HEP activities related to strengthening, flexibility, endurance, ROM of   [] LE / Lumbar: core, proximal hip and LE for functional self-care, mobility, lifting and ambulation/stair navigation   [] UE / Cervical: cervical, postural, scapular, scapulothoracic and UE control with self care, reaching, carrying, lifting, house/yardwork, driving, computer work  [] (64755)Reviewed/Progressed HEP activities related to improving balance, coordination, kinesthetic sense, posture, motor skill, proprioception of   [] LE: core, proximal hip and LE for self care, mobility, lifting, and ambulation/stair navigation    [] UE / Cervical: cervical, postural,  scapular, scapulothoracic and UE control with self care, reaching, carrying, lifting, house/yardwork, driving, computer work    Manual Treatments:  PROM / STM / Oscillations-Mobs:  G-I, II, III, IV (PA's, Inf., Post.)  [] (17901) Provided manual therapy to mobilize LE, proximal hip and/or LS spine soft tissue/joints for the purpose of modulating pain, promoting relaxation,  increasing ROM, reducing/eliminating soft tissue swelling/inflammation/restriction, improving soft tissue extensibility and allowing for proper ROM for normal function with   [] LE / lumbar: self care, mobility, lifting and ambulation. [] UE / Cervical: self care, reaching, carrying, lifting, house/yardwork, driving, computer work.      Modalities:  [] (36925) Vasopneumatic compression: Utilized vasopneumatic compression to decrease edema / swelling for the purpose of improving mobility and quad tone / recruitment which will allow for increased overall function including but not limited to self-care, transfers, ambulation, and ascending / descending stairs. Charges:  Timed Code Treatment Minutes: 65   Total Treatment Minutes: 65     [] EVAL - LOW (74227)   [] EVAL - MOD (41073)  [] EVAL - HIGH (36113)  [] RE-EVAL (35279)  [] PF(38382) x       [] Ionto  [x] NMR (87457) x 2    [] Vaso  [] Manual (39656) x       [] Ultrasound  [x] TA x   2     [] Mech Traction (20925)  [] Aquatic Therapy x     [] ES (un) (79218):   [] Home Management Training x  [] ES(attended) (61935)   [] Dry Needling 1-2 muscles (26262):  [] Dry Needling 3+ muscles (673956)  [] Gait x      [] Performance Testing: 10995 x1    GOALS:    Patient stated goal: to improve mobility   [x] Progressing: [] Met: [] Not Met: [] Adjusted     Therapist goals for Patient:   Short Term Goals: To be achieved in: 2 weeks  1. Independent in HEP and progression per patient tolerance, in order to prevent re-injury. [] Progressing: [x] Met: [] Not Met: [] Adjusted  2. Patient will have a decrease in pain to facilitate improvement in movement, function, and ADLs as indicated by Functional Deficits. [] Progressing: [x] Met: [] Not Met: [] Adjusted     Long Term Goals: To be achieved in: 6 weeks / Dc   1. Patient to demonstrate TUG/ score to 9  to demonstrate improved fall risk to assist with reaching prior level of function. - 9.75 sec on 3/7/23  [x] Progressing: [] Met: [] Not Met: [] Adjusted  2. Patient to demonstrate consistently getting out of bed mod I .  [x] Progressing: [] Met: [] Not Met: [] Adjusted  3. Patient will demonstrate an increase in strength to good UE & Proximal hip complex, and core activation to allow for proper functional mobility as indicated by patients Functional Deficits. [x] Progressing: [] Met: [] Not Met: [] Adjusted  4. Patient will return to functional activities including sit to stand mod I consistently  without increased restriction. [] Progressing: [x] Met: [] Not Met: [] Adjusted  5. Patient to tolerate to demonstrate floor transfer SBA to mod I for safety management. [x] Not assessed: [] Met: [] Not Met: [] Adjusted       6. Patient to improve 30\" STS to 13 in order to demo improvements in functional strength. [x] Not assessed: [] Met: [] Not Met: [] New goal 2/1/23  7. Patient to improve FGA to 22/30 in order to reduce fall risk and improve community access. [x] Not assessed: [] Met: [] Not Met: [] New goal 2/1/23  8. Patient to improve ABC Scale to 80% to improve confidence with functional tasks. [x] Not assessed: [] Met: [] Not Met: [] New goal 2/1/23    Overall Progression Towards Functional goals/ Treatment Progress Update:  [] Patient is progressing as expected towards functional goals listed. [x] Progression is slowed due to complexities/Impairments listed. [] Progression has been slowed due to co-morbidities. [] Plan just implemented, too soon to assess goals progression <30days   [] Goals require adjustment due to lack of progress  [] Patient is not progressing as expected and requires additional follow up with physician  [] Other    Persisting Functional Limitations/Impairments:  []Sleeping []Sitting               []Standing [x]Transfers        [x]Walking [x]Kneeling               [x]Stairs []Squatting / bending   [x]ADLs []Reaching  [x]Lifting  []Housework  []Driving []Job related tasks  []Sports/Recreation [x]Other: bed mobility         ASSESSMENT:      Patient demo's increased difficulty when looking downward, performance and speed of MDEs greatly improves when maintaining good cervical posture but requires constant cueing to improve and maintain, that begins to interfere with LSVT program.  Patient able to demo normal reps of seated side to side to L, however unable to replicate on R side. Continue to progress LSVT program, including functional tasks and Big walking.        Treatment/Activity Tolerance:  [x] Patient able to complete tx [] Patient limited by fatigue  [] Patient limited by pain  [] Patient limited by other medical complications  [] Other:     Prognosis: [x] Good [] Fair  [] Poor    Patient Requires Follow-up: [x] Yes  [] No    Plan for next treatment session:    PLAN: See eval. PT 2x / week for 6 weeks.   [x] Continue per plan of care [] Alter current plan (see comments)  [] Plan of care initiated [] Hold pending MD visit [] Discharge    Electronically signed by: Jorge Luis Auguste, PT, DPT    Note: If patient does not return for scheduled/ recommended follow up visits, this note will serve as a discharge from care along with most recent update on progress.

## 2023-03-17 ENCOUNTER — HOSPITAL ENCOUNTER (OUTPATIENT)
Dept: OCCUPATIONAL THERAPY | Age: 78
Setting detail: THERAPIES SERIES
Discharge: HOME | End: 2023-03-17
Payer: MEDICARE

## 2023-03-17 PROCEDURE — 97535 SELF CARE MNGMENT TRAINING: CPT

## 2023-03-17 PROCEDURE — 97530 THERAPEUTIC ACTIVITIES: CPT

## 2023-03-17 PROCEDURE — 97112 NEUROMUSCULAR REEDUCATION: CPT

## 2023-03-17 PROCEDURE — 97110 THERAPEUTIC EXERCISES: CPT

## 2023-03-17 NOTE — FLOWSHEET NOTE
[facility] - Outpatient Occupational Therapy  [address] [place] [address]  Phone: [phone]   Fax: [phone]      Occupational Therapy Daily Treatment Note  Date:  3/17/2023    Patient: Pacheco Prince   : 1945   MRN: [id number]  Referring Physician:   Rohan Watson, APRN-CNP          Medical Diagnosis Information:  G20 (ICD-10-CM) - Parkinson disease (initially diagnosed 2017; also has hx essential tremor)                            Insurance information: Medicare + AARP - no [place], [place], no copay, 20% pt responsibility      Plan of care sent to provider:      []Faxed   [x]Co-signature    (attempts: 1[] 2[] 3[])       Progress Report: []  Yes  [x]  No     Date Range for reporting period:  Beginnin23  Ending: end of POC    Progress report due (10 Rx/or 30 days whichever is less): visit #10 or 3/16/23    Recertification due (POC duration/ or 90 days whichever is less): visit #16 or 23    Visit # Insurance Allowable Auth required? Date Range    [place] []  Yes  [x]  No N/a       Latex Allergy:  [x]No      []Yes  Pacemaker:  [x] No       [] Yes     Preferred Language for Healthcare:   [x]English       []other:    Pain level:  0/10    SUBJECTIVE:  Pt reports his knee is feeling better.     LSVT Program Testing-       Eval  2023 Discharge    30 sec STS   (MDC improve by 3 reps) Per PT eval 23: 9      10 meter walk  (MDC improve by 0.18 m/s comfortable speed) Per PT eval 23: Trial 1: 8\" = 0.75 m/s   Trial 2: 8.5\"= 0.71 m/s     FGA (high balance)  less than 18 indicates high risk of falls in people with PD (MDC improve by 4 points) Per PT eval 23: 15/30     Activities Specific Balance Confidence Scale    69% or less= recurrent falls in people with PD (BARBARA 11.12 to 13% improvement) Per PT eval 23: 72.81%     MoCA score   less than 26 indicated mild cog impairment       [place] coat  Donning coat   Unbuttoning 5 buttons  Buttoning  5 buttons  Doff L shoe and sock   Don L sock and shoe 12 seconds  10 seconds  38 seconds  65 seconds  107 seconds  Dependent/assist from wife     Functional Task Recording Form See media See media       RESTRICTIONS/PRECAUTIONS: PD    Day of treatment (out of 16 in conjunction w/ PT): 8    Maximum Daily Exercises-    Responding best during session to simple cues (\"look at me,\" \"do what my arms are doing\") with intermittent tactile cues. Requiring initial cues for sequencing for all MDEs with intermittent redirection.      Floor to Ceiling (10 s hold) x 8 - addition of finger flicks with intermittent success coordinating grasp/release    Side to side (10 s hold) x 8 - addition of finger flicks with decreased coordination of hand opening requiring max cues; max cues for hand involvement/opening; good BIG turn and improved leg/knee extension with cues and reps (R > L)    Forward BIG step x 8 B - no cues needed for BIG step length, UE opening forward instead of out to the side with mild improvement following tactile cue    Sideways BIG step x 8 B - no cues needed for BIG step length; decreased UE amplitude improved with cues; successful head turn following intermittent cues     Backwards BIG step x 8 B - improved sequencing on this date; continued cues for BIG UE back swing and front foot dorsiflexion     Forward Rock and Reach x 10 B - no UE support on this date; improved sequencing and simultaneous UE/LE movements on this date with L foot forward but decreased UE amplitude; difficulty with sequencing with R foot forward but improvements following tactile cues    Sideways Rock and Reach x 5 B - no UE support on this date; good BIG turn and improved UE involvement; frequent cues for foot pivot    Functional Component Tasks (5 total) -    Sit to stand x5 (completed throughout session) - good anterior weight shift and power up (lower mat this session); good eccentric control during lowering; improved foot placement prior to sitting following cues     Rolling over in bed x5 each side - initial cues for sequencing; SBA for L and R rolls     Crossing leg x5 B - slight decreased bilateral hip/knee ROM this date; use of BUE to pull each leg into figure 4 (L hip ROM > R hip)    Putting on coat x5 - continued emphasis on BIG push through sleeve     Buttoning 3 buttons on shirt x5 - min cues to redirect to task for additional reps; increased speed this date       Hierarchy Tasks (progressively more difficult multi level tasks)-    Supine to sit x3 - initial cue for sequencing; SBA for all attempts  Opening doors with BIG movements x3      Walk ~40ft with BIG step over cone, turn to Solomon cone with BIG steps x6 - initial freezing at cone with improvements after rep 2    Ascended/descended 3 steps without UE support      BIG walking-    ~300ft [facility] (~600 ft total) with good step length but decreased UE involvement   ~200ft within [facility] with good step length and improved UE involvement with cues      Homework for the day-  MDEs, BIG walking, and FCTs x 2 on days patient is not in the clinic - 30 min total  MDEs, BIG walking, and FCTs x 1 on days patient is in clinic - 15 min total    BIG LE lift when removing socks/shoes    Patient education:  Eval, POC, HEP, LSVT BIG- pt verbalized understanding        Home Exercise Program:  LSVT BIG program    Therapeutic Exercise and NMR:  [x] (36754) Provided verbal/tactile cueing for activities related to strengthening, flexibility, endurance, ROM  for improvements in scapular, scapulothoracic and UE control with self care, reaching, carrying, lifting, house/yardwork, driving/computer work.    [] (99186) Provided verbal/tactile cueing for activities related to improving balance, coordination, kinesthetic sense, posture, motor skill, proprioception  to assist with  scapular, scapulothoracic and UE control with self care, reaching, carrying, lifting, house/yardwork, driving/computer work.   [] Comments:    Therapeutic Activities:    [x] (39576 or 56185) Provided verbal/tactile cueing for activities related to improving balance, coordination, kinesthetic sense, posture, motor skill, proprioception and motor activation to allow for proper function of scapular, scapulothoracic and UE control with self care, carrying, lifting, driving/computer work  [] Comments:    Home Exercise Program:    [x] (92167) Reviewed/Progressed HEP activities related to strengthening, flexibility, endurance, ROM of scapular, scapulothoracic and UE control with self care, reaching, carrying, lifting, house/yardwork, driving/computer work  [] (52382) Reviewed/Progressed HEP activities related to improving balance, coordination, kinesthetic sense, posture, motor skill, proprioception of scapular, scapulothoracic and UE control with self care, reaching, carrying, lifting, house/yardwork, driving/computer work    [] Comments:    Manual Treatments:  PROM / STM / Oscillations-Mobs:  G-I, II, III, IV (PA's, Inf., Post.)  [] (94942) Provided manual therapy to mobilize soft tissue/joints of cervical/CT, scapular GHJ and UE for the purpose of modulating pain, promoting relaxation,  increasing ROM, reducing/eliminating soft tissue swelling/inflammation/restriction, improving soft tissue extensibility and allowing for proper ROM for normal function with self care, reaching, carrying, lifting, house/yardwork, driving/computer work  [] Comments:    ADL Training:  [x] (33600) Provided self-care/home management training related to activities of daily living and compensatory training, and/or use of adaptive equipment   [] Comments:     Splinting:  [] Fabrication of:   [] (13992) Orthotic/Prosthetic Management, subsequent encounter  [] (92144) Orthotic management and training (fitting and assessment)  [] Comments:      Charges:  Timed Code Treatment Minutes: 85   Total Treatment Minutes: 85     [] EVAL (LOW) 70549   [] OT Re-eval (41412)  [] EVAL (MOD) 68058   [] EVAL (HIGH) 40102       [x] Vida (62745) x    2 [] AHAPR(67813)  [x] NMR (97406) x    2 [] Estim (attended) (10058)   [] Manual ([id number]) x     [] US (34543)  [x] TA (58809) x    1 [] Paraffin (45580)  [x] ADL  (35063) x   1 [] Splint/L code:    [] Estim (unattended) ([id number])  [] Fluidotherapy (20421)  [] Other:   GOALS:   Patient stated goal: increase independence      Therapist goals for Patient:   Short Term Goals: To be achieved in: 30 days  1. Pt will report compliance in LSVT BIG home program to improve endurance, sequencing, and amplitude of movement. [x] Progressing: [] Met: [] Not Met: [] Adjusted  2. Pt will decrease time to button 5 buttons to 45 seconds or less to improve ADL independence and efficiency. [x] Progressing: [] Met: [] Not Met: [] Adjusted  3. Pt will increase fine motor control to complete 9-hole peg task in 35 seconds or less with each hand to improve coordination in ADLs. [x] Progressing: [] Met: [] Not Met: [] Adjusted     Long Term Goals: To be achieved by discharge  1. Pt will improve ROM, strength, and sequencing to complete bed mobility mod I for increased independence and decreased caregiver burden. [x] Progressing: [] Met: [] Not Met: [] Adjusted  2. Pt will improve ROM, strength, and sequencing to complete LB dressing SBA to improve independence in ADLs. [x] Progressing: [] Met: [] Not Met: [] Adjusted  3. Pt will improve sequencing, problem solving, and attention to complete Trailmaking Part A assessment within 3 minutes with 1 error or less. [x] Progressing: [] Met: [] Not Met: [] Adjusted    Progression Towards Functional goals:  [x] Patient is progressing as expected towards functional goals listed. [] Progression is slowed due to complexities listed. [] Progression has been slowed due to co-morbidities.   [] Plan just implemented, too soon to assess goals progression  [] All goals are met  [] Other: ASSESSMENT:  Continued good efficiency during completion of MDEs and improved endurance. Completed session in [facility] this date with pt demonstrating good divided attention during MDEs with distractions present. Increased difficulty with divided attention during buttoning task; completing buttoning while therapist asking questions about family and distractions present. Treatment/Activity Tolerance:  [x] Patient tolerated treatment well [] Patient limited by fatigue  [] Patient limited by pain  [] Patient limited by other medical complications  [] Other:     Prognosis: [x] Good [] Fair  [] Poor    Patient Requires Follow-up: [x] Yes  [] No    PLAN: See eval  [x] Continue per plan of care [] Alter current plan (see comments)  [] Plan of care initiated [] Hold pending MD visit [] Discharge    Electronically signed by:     RICH Dow/MAYO C/WAYNE ([id number])      Note: If patient does not return for scheduled/ recommended follow up visits, this note will serve as a discharge from care along with most recent update on progress.

## 2023-03-20 ENCOUNTER — HOSPITAL ENCOUNTER (OUTPATIENT)
Dept: PHYSICAL THERAPY | Age: 78
Setting detail: THERAPIES SERIES
Discharge: HOME | End: 2023-03-20
Payer: MEDICARE

## 2023-03-20 PROCEDURE — 97112 NEUROMUSCULAR REEDUCATION: CPT

## 2023-03-20 PROCEDURE — 97530 THERAPEUTIC ACTIVITIES: CPT

## 2023-03-20 NOTE — FLOWSHEET NOTE
compensatory training, and/or use of adaptive equipment for improvement with: ADLs and compensatory training, meal preparation, safety procedures and instruction in use of adaptive equipment, including bathing, grooming, dressing, personal hygiene, basic household cleaning and chores. Home Exercise Program:    [] (68495) Reviewed/Progressed HEP activities related to strengthening, flexibility, endurance, ROM of   [] LE / Lumbar: core, proximal hip and LE for functional self-care, mobility, lifting and ambulation/stair navigation   [] UE / Cervical: cervical, postural, scapular, scapulothoracic and UE control with self care, reaching, carrying, lifting, house/yardwork, driving, computer work  [] (71016)Reviewed/Progressed HEP activities related to improving balance, coordination, kinesthetic sense, posture, motor skill, proprioception of   [] LE: core, proximal hip and LE for self care, mobility, lifting, and ambulation/stair navigation    [] UE / Cervical: cervical, postural,  scapular, scapulothoracic and UE control with self care, reaching, carrying, lifting, house/yardwork, driving, computer work    Manual Treatments:  PROM / STM / Oscillations-Mobs:  G-I, II, III, IV (PA's, Inf., Post.)  [] (58557) Provided manual therapy to mobilize LE, proximal hip and/or LS spine soft tissue/joints for the purpose of modulating pain, promoting relaxation,  increasing ROM, reducing/eliminating soft tissue swelling/inflammation/restriction, improving soft tissue extensibility and allowing for proper ROM for normal function with   [] LE / lumbar: self care, mobility, lifting and ambulation. [] UE / Cervical: self care, reaching, carrying, lifting, house/yardwork, driving, computer work.      Modalities:  [] (89299) Vasopneumatic compression: Utilized vasopneumatic compression to decrease edema / swelling for the purpose of improving mobility and quad tone / recruitment which will allow for increased overall function

## 2023-03-21 ENCOUNTER — HOSPITAL ENCOUNTER (OUTPATIENT)
Dept: OCCUPATIONAL THERAPY | Age: 78
Setting detail: THERAPIES SERIES
Discharge: HOME | End: 2023-03-21
Payer: MEDICARE

## 2023-03-21 PROCEDURE — 97112 NEUROMUSCULAR REEDUCATION: CPT

## 2023-03-21 PROCEDURE — 97530 THERAPEUTIC ACTIVITIES: CPT

## 2023-03-21 PROCEDURE — 97535 SELF CARE MNGMENT TRAINING: CPT

## 2023-03-21 PROCEDURE — 97110 THERAPEUTIC EXERCISES: CPT

## 2023-03-21 NOTE — FLOWSHEET NOTE
improve by 0.18 m/s comfortable speed) Per PT eval 2/1/23: Trial 1: 8\" = 0.75 m/s   Trial 2: 8.5\"= 0.71 m/s     FGA (high balance)  less than 18 indicates high risk of falls in people with PD ([address] improve by 4 points) Per PT eval 2/1/23: 15/30     Activities Specific Balance Confidence Scale    69% or less= recurrent falls in people with PD (BARBARA 11.12 to 13% improvement) Per PT eval 2/1/23: 72.81%     MoCA score   less than 26 indicated mild cog impairment  12/30     [place] coat  Donning coat   Unbuttoning 5 buttons  Buttoning 5 buttons  Doff L shoe and sock   Don L sock and shoe 12 seconds    10 seconds     38 seconds  65 seconds  107 seconds  Dependent/assist from wife     Functional Task Recording Form See media See media       RESTRICTIONS/PRECAUTIONS: PD    Day of treatment (out of 16 in conjunction w/ PT): 8    Maximum Daily Exercises-    Responding best during session to simple cues (\"look at me,\" \"do what my arms are doing\") with intermittent tactile cues. Requiring initial cues for sequencing for all MDEs with intermittent redirection.  , Wife positioned in front of patient as mirror, cues for sequence     Floor to Ceiling (10 s hold) x 8 - addition of finger flicks with intermittent success coordinating grasp/release    Side to side (10 s hold) x 8 -  with decreased coordination of hand opening requiring max cues; max cues for hand involvement/opening; good BIG turn and improved leg/knee extension with cues and reps (R > L)    Forward BIG step x 8 B - cues for big step forward and big arms    Sideways BIG step x 8 B - no cues needed for BIG step length; decreased UE amplitude improved with cues; successful head turn following intermittent cues     Backwards BIG step x 8 B - improved sequencing on this date; continued cues for BIG UE back swing and front foot dorsiflexion     Forward Rock and Reach x 10 B -   UE support on this date; improved sequencing and simultaneous UE/LE movements on this date with L

## 2023-03-23 ENCOUNTER — HOSPITAL ENCOUNTER (OUTPATIENT)
Dept: PHYSICAL THERAPY | Age: 78
Setting detail: THERAPIES SERIES
Discharge: HOME | End: 2023-03-23
Payer: MEDICARE

## 2023-03-23 PROCEDURE — 97112 NEUROMUSCULAR REEDUCATION: CPT

## 2023-03-23 NOTE — FLOWSHEET NOTE
compensatory training, meal preparation, safety procedures and instruction in use of adaptive equipment, including bathing, grooming, dressing, personal hygiene, basic household cleaning and chores. Home Exercise Program:    [] (04949) Reviewed/Progressed HEP activities related to strengthening, flexibility, endurance, ROM of   [] LE / Lumbar: core, proximal hip and LE for functional self-care, mobility, lifting and ambulation/stair navigation   [] UE / Cervical: cervical, postural, scapular, scapulothoracic and UE control with self care, reaching, carrying, lifting, house/yardwork, driving, computer work  [] (65382)Reviewed/Progressed HEP activities related to improving balance, coordination, kinesthetic sense, posture, motor skill, proprioception of   [] LE: core, proximal hip and LE for self care, mobility, lifting, and ambulation/stair navigation    [] UE / Cervical: cervical, postural,  scapular, scapulothoracic and UE control with self care, reaching, carrying, lifting, house/yardwork, driving, computer work    Manual Treatments:  PROM / STM / Oscillations-Mobs:  G-I, II, III, IV (PA's, Inf., Post.)  [] (42720) Provided manual therapy to mobilize LE, proximal hip and/or LS spine soft tissue/joints for the purpose of modulating pain, promoting relaxation,  increasing ROM, reducing/eliminating soft tissue swelling/inflammation/restriction, improving soft tissue extensibility and allowing for proper ROM for normal function with   [] LE / lumbar: self care, mobility, lifting and ambulation. [] UE / Cervical: self care, reaching, carrying, lifting, house/yardwork, driving, computer work.      Modalities:  [] (63204) Vasopneumatic compression: Utilized vasopneumatic compression to decrease edema / swelling for the purpose of improving mobility and quad tone / recruitment which will allow for increased overall function including but not limited to self-care, transfers, ambulation, and ascending / descending

## 2023-03-24 ENCOUNTER — HOSPITAL ENCOUNTER (OUTPATIENT)
Dept: OCCUPATIONAL THERAPY | Age: 78
Setting detail: THERAPIES SERIES
Discharge: HOME | End: 2023-03-24
Payer: MEDICARE

## 2023-03-24 PROCEDURE — 97535 SELF CARE MNGMENT TRAINING: CPT

## 2023-03-24 PROCEDURE — 97530 THERAPEUTIC ACTIVITIES: CPT

## 2023-03-24 PROCEDURE — 97110 THERAPEUTIC EXERCISES: CPT

## 2023-03-24 PROCEDURE — 97112 NEUROMUSCULAR REEDUCATION: CPT

## 2023-03-24 NOTE — FLOWSHEET NOTE
Comments:      Charges:  Timed Code Treatment Minutes: 68   Total Treatment Minutes:  68     [] EVAL (LOW) 91434   [] OT Re-eval (01614)  [] EVAL (MOD) 29769   [] EVAL (HIGH) 56543       [x] Vida (59662) x    2 [] DHQDG(04387)  [x] NMR (66201) x    1 [] Estim (attended) (69221)   [] Manual ([id number]) x     [] US (31788)  [x] TA (84793) x    1 [] Paraffin (27924)  [x] ADL  (80029) x   1 [] Splint/L code:    [] Estim (unattended) (96529)  [] Fluidotherapy (27198)  [] Other:   GOALS:   Patient stated goal: increase independence      Therapist goals for Patient:   Short Term Goals: To be achieved in: 30 days  1. Pt will report compliance in LSVT BIG home program to improve endurance, sequencing, and amplitude of movement. [x] Progressing: [] Met: [] Not Met: [] Adjusted  2. Pt will decrease time to button 5 buttons to 45 seconds or less to improve ADL independence and efficiency. [x] Progressing: [] Met: [] Not Met: [] Adjusted  3. Pt will increase fine motor control to complete 9-hole peg task in 35 seconds or less with each hand to improve coordination in ADLs. [x] Progressing: [] Met: [] Not Met: [] Adjusted     Long Term Goals: To be achieved by discharge  1. Pt will improve ROM, strength, and sequencing to complete bed mobility mod I for increased independence and decreased caregiver burden. [x] Progressing: [] Met: [] Not Met: [] Adjusted  2. Pt will improve ROM, strength, and sequencing to complete LB dressing SBA to improve independence in ADLs. [x] Progressing: [] Met: [] Not Met: [] Adjusted  3. Pt will improve sequencing, problem solving, and attention to complete Trailmaking Part A assessment within 3 minutes with 1 error or less. [x] Progressing: [] Met: [] Not Met: [] Adjusted    Progression Towards Functional goals:  [] Patient is progressing as expected towards functional goals listed. [x] Progression is slowed due to complexities listed. patient having some problems with heart rythm  [] Progression

## 2023-03-27 ENCOUNTER — APPOINTMENT (OUTPATIENT)
Dept: PHYSICAL THERAPY | Age: 78
End: 2023-03-27
Payer: MEDICARE

## 2023-03-27 PROCEDURE — 97116 GAIT TRAINING THERAPY: CPT

## 2023-03-27 PROCEDURE — 97112 NEUROMUSCULAR REEDUCATION: CPT

## 2023-03-27 PROCEDURE — 97530 THERAPEUTIC ACTIVITIES: CPT

## 2023-03-27 NOTE — FLOWSHEET NOTE
[address] [facility] Physical Therapy  Phone: [phone]   Fax: [phone]    Physical Therapy Daily Treatment Note/Progress Note    Date:  2023     Patient Name:  Parmjit Conklin    :  1945  MRN: [id number]  Medical Diagnosis:  Parkinson disease ([address] [G20]  General weakness [R53.1]  Treatment Diagnosis: imbalance , Abnormal gait   Insurance/Certification information:  PT Insurance Information: Medicare  Physician Information:  KEYSHA Christianson*    Plan of care signed (Y/N): [x]  Yes []  No     Date of Patient follow up with Physician:      Progress Report: []  Yes  [x]  No     Date Range for reporting period:  Beginnin/10/2023  POC: 23  PN: 3/7  Ending:     Progress report due (10 Rx/or 30 days whichever is less): visit #18 or  3131    Recertification due (POC duration/ or 90 days whichever is less): visit # or  23    Visit # Insurance Allowable Auth required? Date Range    +  Mn []  Yes  [x]  No Mn           Latex Allergy:  [x]NO      []YES  Preferred Language for Healthcare:   [x]English       []other:    Functional Scale:       Date assessed:  TUG 11.5                                                              1/10/2023  TUG 9.75 sec                   3/7/2023    Pain level:  0/10       SUBJECTIVE:   Pt's wife reports his BP has been variable. Has been monitoring HR. Had an episode of  PVCs . Had house guests all day on Saturday. Denies chest pressure, BP, dizziness currently.        OBJECTIVE:      3/27: pt reporting mild dizziness through session - saved retro steps for last as this is usually what makes him most dizzy, rested for a few min at end of session due to dizziness     3/23: pt reporting dizziness in the middle of retro MDE, able to finish but dizzy again after, improved slightly with pushing UEs into mat with erect spinal posture; complaining of chest pressure, took nitro at 2:22 pm; took another BP at 2:24 pm

## 2023-03-28 ENCOUNTER — APPOINTMENT (OUTPATIENT)
Dept: OCCUPATIONAL THERAPY | Age: 78
End: 2023-03-28
Payer: MEDICARE

## 2023-03-28 PROCEDURE — 97112 NEUROMUSCULAR REEDUCATION: CPT

## 2023-03-28 PROCEDURE — 97535 SELF CARE MNGMENT TRAINING: CPT

## 2023-03-28 NOTE — FLOWSHEET NOTE
has been slowed due to co-morbidities. [] Plan just implemented, too soon to assess goals progression  [] All goals are met  [] Other:     ASSESSMENT:  Good efficiency this date during completion of MDEs requiring less rest breaks. Significant improvements with UE involvement during MDEs requiring less cues. Treatment/Activity Tolerance:  [x] Patient tolerated treatment well [] Patient limited by fatigue  [] Patient limited by pain  [] Patient limited by other medical complications  [] Other:     Prognosis: [x] Good [] Fair  [] Poor    Patient Requires Follow-up: [x] Yes  [] No    PLAN: See eval  [x] Continue per plan of care [] Alter current plan (see comments)  [] Plan of care initiated [] Hold pending MD visit [] Discharge    Electronically signed by:               Note: If patient does not return for scheduled/ recommended follow up visits, this note will serve as a discharge from care along with most recent update on progress.

## 2023-03-29 ENCOUNTER — OFFICE VISIT (OUTPATIENT)
Dept: CARDIOLOGY CLINIC | Age: 78
End: 2023-03-29
Payer: MEDICARE

## 2023-03-29 VITALS
DIASTOLIC BLOOD PRESSURE: 64 MMHG | WEIGHT: 193.4 LBS | BODY MASS INDEX: 29.31 KG/M2 | HEIGHT: 68 IN | HEART RATE: 67 BPM | OXYGEN SATURATION: 97 % | SYSTOLIC BLOOD PRESSURE: 116 MMHG

## 2023-03-29 DIAGNOSIS — I10 PRIMARY HYPERTENSION: ICD-10-CM

## 2023-03-29 DIAGNOSIS — G20 PARKINSON DISEASE (HCC): ICD-10-CM

## 2023-03-29 DIAGNOSIS — I77.9 BILATERAL CAROTID ARTERY DISEASE, UNSPECIFIED TYPE (HCC): ICD-10-CM

## 2023-03-29 DIAGNOSIS — I25.10 CORONARY ARTERY DISEASE INVOLVING NATIVE CORONARY ARTERY OF NATIVE HEART WITHOUT ANGINA PECTORIS: Primary | ICD-10-CM

## 2023-03-29 DIAGNOSIS — E78.2 MIXED HYPERLIPIDEMIA: ICD-10-CM

## 2023-03-29 PROCEDURE — 3074F SYST BP LT 130 MM HG: CPT | Performed by: INTERNAL MEDICINE

## 2023-03-29 PROCEDURE — 1036F TOBACCO NON-USER: CPT | Performed by: INTERNAL MEDICINE

## 2023-03-29 PROCEDURE — 1123F ACP DISCUSS/DSCN MKR DOCD: CPT | Performed by: INTERNAL MEDICINE

## 2023-03-29 PROCEDURE — G8427 DOCREV CUR MEDS BY ELIG CLIN: HCPCS | Performed by: INTERNAL MEDICINE

## 2023-03-29 PROCEDURE — G8417 CALC BMI ABV UP PARAM F/U: HCPCS | Performed by: INTERNAL MEDICINE

## 2023-03-29 PROCEDURE — 99214 OFFICE O/P EST MOD 30 MIN: CPT | Performed by: INTERNAL MEDICINE

## 2023-03-29 PROCEDURE — 3078F DIAST BP <80 MM HG: CPT | Performed by: INTERNAL MEDICINE

## 2023-03-29 PROCEDURE — G8484 FLU IMMUNIZE NO ADMIN: HCPCS | Performed by: INTERNAL MEDICINE

## 2023-03-29 RX ORDER — IPRATROPIUM BROMIDE 42 UG/1
SPRAY, METERED NASAL PRN
COMMUNITY
Start: 2023-03-06

## 2023-03-30 ENCOUNTER — APPOINTMENT (OUTPATIENT)
Dept: PHYSICAL THERAPY | Age: 78
End: 2023-03-30
Payer: MEDICARE

## 2023-03-30 PROCEDURE — 97112 NEUROMUSCULAR REEDUCATION: CPT

## 2023-03-30 PROCEDURE — 97530 THERAPEUTIC ACTIVITIES: CPT

## 2023-03-30 NOTE — FLOWSHEET NOTE
mobilize LE, proximal hip and/or LS spine soft tissue/joints for the purpose of modulating pain, promoting relaxation,  increasing ROM, reducing/eliminating soft tissue swelling/inflammation/restriction, improving soft tissue extensibility and allowing for proper ROM for normal function with   [] LE / lumbar: self care, mobility, lifting and ambulation. [] UE / Cervical: self care, reaching, carrying, lifting, house/yardwork, driving, computer work. Modalities:  [] (68951) Vasopneumatic compression: Utilized vasopneumatic compression to decrease edema / swelling for the purpose of improving mobility and quad tone / recruitment which will allow for increased overall function including but not limited to self-care, transfers, ambulation, and ascending / descending stairs. Charges:  Timed Code Treatment Minutes: 75   Total Treatment Minutes: 75     [] EVAL - LOW (64483)   [] EVAL - MOD (62876)  [] EVAL - HIGH (16009)  [] RE-EVAL (54591)  [] RE(63517) x       [] Ionto  [x] NMR (02547) x 2    [] Vaso  [] Manual (71307) x       [] Ultrasound  [x] TA x  3      [] Mech Traction (49566)  [] Aquatic Therapy x     [] ES (un) (94502):   [] Home Management Training x  [] ES(attended) (61069)   [] Dry Needling 1-2 muscles (31306):  [] Dry Needling 3+ muscles (564694)  [] Gait x      [] Performance Testing: 90720 x1    GOALS:    Patient stated goal: to improve mobility   [x] Progressing: [] Met: [] Not Met: [] Adjusted     Therapist goals for Patient:   Short Term Goals: To be achieved in: 2 weeks  1. Independent in HEP and progression per patient tolerance, in order to prevent re-injury. [] Progressing: [x] Met: [] Not Met: [] Adjusted  2. Patient will have a decrease in pain to facilitate improvement in movement, function, and ADLs as indicated by Functional Deficits. [] Progressing: [x] Met: [] Not Met: [] Adjusted     Long Term Goals: To be achieved in: 6 weeks / Dc   1.  Patient to demonstrate TUG/ score to 9

## 2023-03-31 ENCOUNTER — APPOINTMENT (OUTPATIENT)
Dept: OCCUPATIONAL THERAPY | Age: 78
End: 2023-03-31
Payer: MEDICARE

## 2023-03-31 PROCEDURE — 97129 THER IVNTJ 1ST 15 MIN: CPT

## 2023-03-31 PROCEDURE — 97535 SELF CARE MNGMENT TRAINING: CPT

## 2023-03-31 PROCEDURE — 97110 THERAPEUTIC EXERCISES: CPT

## 2023-03-31 PROCEDURE — 97112 NEUROMUSCULAR REEDUCATION: CPT

## 2023-03-31 PROCEDURE — 97530 THERAPEUTIC ACTIVITIES: CPT

## 2023-03-31 NOTE — PLAN OF CARE
decreased cues; head turn with intermittent cues    Backwards BIG step x 8 B - frequent cues for BIG UE back swing and front foot dorsiflexion with improved performance when slowing down the movement      Hiram Kersey and Reach x 10 B -   no UE support; improved simultaneous UE/LE movements; mild decreased UE amplitude but good involvement     Sideways Rock and Reach x 5 B - no UE support on this date; good BIG turn; max cues for UE amplitude to the side (pt repeatedly was trying to turn with arms out in front; improved with verbal and tactile cues)      Functional Component Tasks (5 total) -    Sit to stand x5 (completed throughout session) - good anterior weight shift and power up; good eccentric control and improved foot placement this session   Crossing leg x5 B - cues to stabilize trunk  Putting on coat x5 - emphasis on BIG pull of coat onto shoulder and BIG reach back to arm hole  Buttoning 3 buttons on shirt x5 - good attention to task this date with decreased cues required for additional reps     Hierarchy Tasks (progressively more difficult multi level tasks)-    Opening doors with BIG movements x3        BIG walking-    ~500 ft total in clinic, to/from restroom x5 - cues for UE involvement with appropriate step length    Homework for the day-  MDEs, BIG walking, and FCTs x 2 on days patient is not in the clinic - 30 min total  MDEs, BIG walking, and FCTs x 1 on days patient is in clinic - 15 min total        Patient education:  Eval, POC, HEP, LSVT BIG, discharge- pt verbalized understanding        Home Exercise Program:  LSVT BIG program    Therapeutic Exercise and NMR:  [x] (10657) Provided verbal/tactile cueing for activities related to strengthening, flexibility, endurance, ROM  for improvements in scapular, scapulothoracic and UE control with self care, reaching, carrying, lifting, house/yardwork, driving/computer work.    [] (85804) Provided verbal/tactile cueing for activities related to improving

## 2023-04-03 ENCOUNTER — HOSPITAL ENCOUNTER (OUTPATIENT)
Dept: PHYSICAL THERAPY | Age: 78
Setting detail: THERAPIES SERIES
Discharge: HOME | End: 2023-04-03
Payer: MEDICARE

## 2023-04-03 PROCEDURE — 97112 NEUROMUSCULAR REEDUCATION: CPT

## 2023-04-03 PROCEDURE — 97530 THERAPEUTIC ACTIVITIES: CPT

## 2023-04-03 NOTE — PLAN OF CARE
kinesthetic sense, posture, motor skill, proprioception of   [] LE: core, proximal hip and LE for self care, mobility, lifting, and ambulation/stair navigation    [] UE / Cervical: cervical, postural,  scapular, scapulothoracic and UE control with self care, reaching, carrying, lifting, house/yardwork, driving, computer work    Manual Treatments:  PROM / STM / Oscillations-Mobs:  G-I, II, III, IV (PA's, Inf., Post.)  [] (32789) Provided manual therapy to mobilize LE, proximal hip and/or LS spine soft tissue/joints for the purpose of modulating pain, promoting relaxation,  increasing ROM, reducing/eliminating soft tissue swelling/inflammation/restriction, improving soft tissue extensibility and allowing for proper ROM for normal function with   [] LE / lumbar: self care, mobility, lifting and ambulation. [] UE / Cervical: self care, reaching, carrying, lifting, house/yardwork, driving, computer work. Modalities:  [] (26263) Vasopneumatic compression: Utilized vasopneumatic compression to decrease edema / swelling for the purpose of improving mobility and quad tone / recruitment which will allow for increased overall function including but not limited to self-care, transfers, ambulation, and ascending / descending stairs.        Charges:  Timed Code Treatment Minutes: 50   Total Treatment Minutes: 50     [] EVAL - LOW (77198)   [] EVAL - MOD (01204)  [] EVAL - HIGH (68350)  [] RE-EVAL (68365)  [] AM(84058) x       [] Ionto  [x] NMR (64804) x 1    [] Vaso  [] Manual (44721) x       [] Ultrasound  [x] TA x  2      [] Mech Traction (34320)  [] Aquatic Therapy x     [] ES (un) (87430):   [] Home Management Training x  [] ES(attended) (51265)   [] Dry Needling 1-2 muscles (21965):  [] Dry Needling 3+ muscles (950890)  [] Gait x      [] Performance Testing: 04854 x1    GOALS:    Patient stated goal: to improve mobility   [] Progressing: [x] Met: [] Not Met: [] Adjusted     Therapist goals for Patient:   Short Term

## 2023-04-05 ENCOUNTER — APPOINTMENT (OUTPATIENT)
Dept: OCCUPATIONAL THERAPY | Age: 78
End: 2023-04-05
Payer: MEDICARE

## 2023-05-14 ENCOUNTER — APPOINTMENT (OUTPATIENT)
Dept: GENERAL RADIOLOGY | Age: 78
DRG: 871 | End: 2023-05-14
Payer: MEDICARE

## 2023-05-14 ENCOUNTER — APPOINTMENT (OUTPATIENT)
Dept: CT IMAGING | Age: 78
DRG: 871 | End: 2023-05-14
Payer: MEDICARE

## 2023-05-14 ENCOUNTER — HOSPITAL ENCOUNTER (INPATIENT)
Age: 78
LOS: 3 days | Discharge: NURSING, REHAB OR LONG-TERM CARE | DRG: 871 | End: 2023-05-17
Attending: EMERGENCY MEDICINE | Admitting: INTERNAL MEDICINE
Payer: MEDICARE

## 2023-05-14 DIAGNOSIS — R65.20 SEVERE SEPSIS (HCC): ICD-10-CM

## 2023-05-14 DIAGNOSIS — A41.9 SEVERE SEPSIS (HCC): ICD-10-CM

## 2023-05-14 DIAGNOSIS — J18.9 PNEUMONIA OF LEFT LOWER LOBE DUE TO INFECTIOUS ORGANISM: Primary | ICD-10-CM

## 2023-05-14 DIAGNOSIS — R41.0 CONFUSION: ICD-10-CM

## 2023-05-14 PROBLEM — J69.0 ACUTE ASPIRATION PNEUMONIA (HCC): Status: ACTIVE | Noted: 2023-05-14

## 2023-05-14 PROBLEM — G93.41 ACUTE METABOLIC ENCEPHALOPATHY: Status: ACTIVE | Noted: 2023-05-14

## 2023-05-14 PROBLEM — J69.0 ASPIRATION PNEUMONIA (HCC): Status: ACTIVE | Noted: 2023-05-14

## 2023-05-14 LAB
ALBUMIN SERPL-MCNC: 3.8 G/DL (ref 3.4–5)
ALBUMIN/GLOB SERPL: 1.7 {RATIO} (ref 1.1–2.2)
ALP SERPL-CCNC: 91 U/L (ref 40–129)
ALT SERPL-CCNC: <5 U/L (ref 10–40)
AMMONIA PLAS-SCNC: 38 UMOL/L (ref 16–60)
ANION GAP SERPL CALCULATED.3IONS-SCNC: 11 MMOL/L (ref 3–16)
ANTI-XA UNFRAC HEPARIN: 0.11 IU/ML (ref 0.3–0.7)
ANTI-XA UNFRAC HEPARIN: <0.1 IU/ML (ref 0.3–0.7)
APTT BLD: 29.5 SEC (ref 22.7–35.9)
AST SERPL-CCNC: 17 U/L (ref 15–37)
BACTERIA URNS QL MICRO: NORMAL /HPF
BASOPHILS # BLD: 0 K/UL (ref 0–0.2)
BASOPHILS NFR BLD: 0.2 %
BILIRUB SERPL-MCNC: 0.4 MG/DL (ref 0–1)
BILIRUB UR QL STRIP.AUTO: NEGATIVE
BUN SERPL-MCNC: 23 MG/DL (ref 7–20)
CALCIUM SERPL-MCNC: 9.2 MG/DL (ref 8.3–10.6)
CHLORIDE SERPL-SCNC: 103 MMOL/L (ref 99–110)
CLARITY UR: CLEAR
CO2 SERPL-SCNC: 24 MMOL/L (ref 21–32)
COLOR UR: YELLOW
CREAT SERPL-MCNC: 0.8 MG/DL (ref 0.8–1.3)
DEPRECATED RDW RBC AUTO: 13.3 % (ref 12.4–15.4)
DEPRECATED RDW RBC AUTO: 13.9 % (ref 12.4–15.4)
EOSINOPHIL # BLD: 0.1 K/UL (ref 0–0.6)
EOSINOPHIL NFR BLD: 0.6 %
EPI CELLS #/AREA URNS AUTO: 1 /HPF (ref 0–5)
GFR SERPLBLD CREATININE-BSD FMLA CKD-EPI: >60 ML/MIN/{1.73_M2}
GLUCOSE BLD-MCNC: 159 MG/DL (ref 70–99)
GLUCOSE BLD-MCNC: 93 MG/DL (ref 70–99)
GLUCOSE SERPL-MCNC: 162 MG/DL (ref 70–99)
GLUCOSE UR STRIP.AUTO-MCNC: NEGATIVE MG/DL
HCT VFR BLD AUTO: 37.4 % (ref 40.5–52.5)
HCT VFR BLD AUTO: 39.7 % (ref 40.5–52.5)
HGB BLD-MCNC: 12.3 G/DL (ref 13.5–17.5)
HGB BLD-MCNC: 12.8 G/DL (ref 13.5–17.5)
HGB UR QL STRIP.AUTO: NEGATIVE
HYALINE CASTS #/AREA URNS AUTO: 0 /LPF (ref 0–8)
INR PPP: 1.19 (ref 0.84–1.16)
KETONES UR STRIP.AUTO-MCNC: ABNORMAL MG/DL
LACTATE BLDV-SCNC: 1.8 MMOL/L (ref 0.4–1.9)
LACTATE BLDV-SCNC: 1.9 MMOL/L (ref 0.4–1.9)
LACTATE BLDV-SCNC: 2.1 MMOL/L (ref 0.4–1.9)
LACTATE BLDV-SCNC: 3.9 MMOL/L (ref 0.4–1.9)
LEUKOCYTE ESTERASE UR QL STRIP.AUTO: ABNORMAL
LYMPHOCYTES # BLD: 0.6 K/UL (ref 1–5.1)
LYMPHOCYTES NFR BLD: 5.3 %
MCH RBC QN AUTO: 29.8 PG (ref 26–34)
MCH RBC QN AUTO: 29.9 PG (ref 26–34)
MCHC RBC AUTO-ENTMCNC: 32.2 G/DL (ref 31–36)
MCHC RBC AUTO-ENTMCNC: 32.9 G/DL (ref 31–36)
MCV RBC AUTO: 90.8 FL (ref 80–100)
MCV RBC AUTO: 92.4 FL (ref 80–100)
MONOCYTES # BLD: 0.4 K/UL (ref 0–1.3)
MONOCYTES NFR BLD: 3.6 %
NEUTROPHILS # BLD: 9.9 K/UL (ref 1.7–7.7)
NEUTROPHILS NFR BLD: 90.3 %
NITRITE UR QL STRIP.AUTO: NEGATIVE
PERFORMED ON: ABNORMAL
PERFORMED ON: NORMAL
PH UR STRIP.AUTO: 5.5 [PH] (ref 5–8)
PLATELET # BLD AUTO: 173 K/UL (ref 135–450)
PLATELET # BLD AUTO: 195 K/UL (ref 135–450)
PMV BLD AUTO: 7.4 FL (ref 5–10.5)
PMV BLD AUTO: 7.7 FL (ref 5–10.5)
POTASSIUM SERPL-SCNC: 4.3 MMOL/L (ref 3.5–5.1)
PROCALCITONIN SERPL IA-MCNC: 0.12 NG/ML (ref 0–0.15)
PROT SERPL-MCNC: 6 G/DL (ref 6.4–8.2)
PROT UR STRIP.AUTO-MCNC: NEGATIVE MG/DL
PROTHROMBIN TIME: 15.1 SEC (ref 11.5–14.8)
RBC # BLD AUTO: 4.12 M/UL (ref 4.2–5.9)
RBC # BLD AUTO: 4.29 M/UL (ref 4.2–5.9)
RBC CLUMPS #/AREA URNS AUTO: 1 /HPF (ref 0–4)
SARS-COV-2 RDRP RESP QL NAA+PROBE: NOT DETECTED
SODIUM SERPL-SCNC: 138 MMOL/L (ref 136–145)
SP GR UR STRIP.AUTO: >=1.03 (ref 1–1.03)
UA COMPLETE W REFLEX CULTURE PNL UR: ABNORMAL
UA DIPSTICK W REFLEX MICRO PNL UR: YES
URN SPEC COLLECT METH UR: ABNORMAL
UROBILINOGEN UR STRIP-ACNC: 0.2 E.U./DL
WBC # BLD AUTO: 11 K/UL (ref 4–11)
WBC # BLD AUTO: 14.2 K/UL (ref 4–11)
WBC #/AREA URNS AUTO: 2 /HPF (ref 0–5)

## 2023-05-14 PROCEDURE — 2500000003 HC RX 250 WO HCPCS: Performed by: INTERNAL MEDICINE

## 2023-05-14 PROCEDURE — 2580000003 HC RX 258: Performed by: INTERNAL MEDICINE

## 2023-05-14 PROCEDURE — 74176 CT ABD & PELVIS W/O CONTRAST: CPT

## 2023-05-14 PROCEDURE — 96361 HYDRATE IV INFUSION ADD-ON: CPT

## 2023-05-14 PROCEDURE — 87635 SARS-COV-2 COVID-19 AMP PRB: CPT

## 2023-05-14 PROCEDURE — 96365 THER/PROPH/DIAG IV INF INIT: CPT

## 2023-05-14 PROCEDURE — 85610 PROTHROMBIN TIME: CPT

## 2023-05-14 PROCEDURE — 83605 ASSAY OF LACTIC ACID: CPT

## 2023-05-14 PROCEDURE — 6370000000 HC RX 637 (ALT 250 FOR IP): Performed by: INTERNAL MEDICINE

## 2023-05-14 PROCEDURE — 96367 TX/PROPH/DG ADDL SEQ IV INF: CPT

## 2023-05-14 PROCEDURE — 6360000002 HC RX W HCPCS: Performed by: EMERGENCY MEDICINE

## 2023-05-14 PROCEDURE — 2580000003 HC RX 258: Performed by: PHYSICIAN ASSISTANT

## 2023-05-14 PROCEDURE — 2000000000 HC ICU R&B

## 2023-05-14 PROCEDURE — 87040 BLOOD CULTURE FOR BACTERIA: CPT

## 2023-05-14 PROCEDURE — 83036 HEMOGLOBIN GLYCOSYLATED A1C: CPT

## 2023-05-14 PROCEDURE — 6360000002 HC RX W HCPCS: Performed by: INTERNAL MEDICINE

## 2023-05-14 PROCEDURE — 81001 URINALYSIS AUTO W/SCOPE: CPT

## 2023-05-14 PROCEDURE — 96366 THER/PROPH/DIAG IV INF ADDON: CPT

## 2023-05-14 PROCEDURE — 80053 COMPREHEN METABOLIC PANEL: CPT

## 2023-05-14 PROCEDURE — 6370000000 HC RX 637 (ALT 250 FOR IP): Performed by: PHYSICIAN ASSISTANT

## 2023-05-14 PROCEDURE — 85520 HEPARIN ASSAY: CPT

## 2023-05-14 PROCEDURE — 6360000004 HC RX CONTRAST MEDICATION: Performed by: PHYSICIAN ASSISTANT

## 2023-05-14 PROCEDURE — 85025 COMPLETE CBC W/AUTO DIFF WBC: CPT

## 2023-05-14 PROCEDURE — 70450 CT HEAD/BRAIN W/O DYE: CPT

## 2023-05-14 PROCEDURE — 99285 EMERGENCY DEPT VISIT HI MDM: CPT

## 2023-05-14 PROCEDURE — 36415 COLL VENOUS BLD VENIPUNCTURE: CPT

## 2023-05-14 PROCEDURE — 6360000002 HC RX W HCPCS: Performed by: PHYSICIAN ASSISTANT

## 2023-05-14 PROCEDURE — 82140 ASSAY OF AMMONIA: CPT

## 2023-05-14 PROCEDURE — 93005 ELECTROCARDIOGRAM TRACING: CPT | Performed by: PHYSICIAN ASSISTANT

## 2023-05-14 PROCEDURE — 84145 PROCALCITONIN (PCT): CPT

## 2023-05-14 PROCEDURE — 71045 X-RAY EXAM CHEST 1 VIEW: CPT

## 2023-05-14 PROCEDURE — 70498 CT ANGIOGRAPHY NECK: CPT

## 2023-05-14 PROCEDURE — 85730 THROMBOPLASTIN TIME PARTIAL: CPT

## 2023-05-14 PROCEDURE — 73030 X-RAY EXAM OF SHOULDER: CPT

## 2023-05-14 PROCEDURE — 85027 COMPLETE CBC AUTOMATED: CPT

## 2023-05-14 RX ORDER — MULTIVITAMIN WITH IRON
1 TABLET ORAL DAILY
Status: DISCONTINUED | OUTPATIENT
Start: 2023-05-15 | End: 2023-05-17 | Stop reason: HOSPADM

## 2023-05-14 RX ORDER — NITROGLYCERIN 0.4 MG/1
0.4 TABLET SUBLINGUAL EVERY 5 MIN PRN
Status: DISCONTINUED | OUTPATIENT
Start: 2023-05-14 | End: 2023-05-17 | Stop reason: HOSPADM

## 2023-05-14 RX ORDER — HEPARIN SODIUM 10000 [USP'U]/100ML
5-30 INJECTION, SOLUTION INTRAVENOUS CONTINUOUS
Status: DISCONTINUED | OUTPATIENT
Start: 2023-05-14 | End: 2023-05-14 | Stop reason: ALTCHOICE

## 2023-05-14 RX ORDER — MIDODRINE HYDROCHLORIDE 5 MG/1
2.5 TABLET ORAL 3 TIMES DAILY
Status: DISCONTINUED | OUTPATIENT
Start: 2023-05-14 | End: 2023-05-17 | Stop reason: HOSPADM

## 2023-05-14 RX ORDER — SODIUM CHLORIDE 0.9 % (FLUSH) 0.9 %
5-40 SYRINGE (ML) INJECTION EVERY 12 HOURS SCHEDULED
Status: DISCONTINUED | OUTPATIENT
Start: 2023-05-14 | End: 2023-05-17 | Stop reason: HOSPADM

## 2023-05-14 RX ORDER — ENOXAPARIN SODIUM 100 MG/ML
40 INJECTION SUBCUTANEOUS DAILY
Status: DISCONTINUED | OUTPATIENT
Start: 2023-05-14 | End: 2023-05-14

## 2023-05-14 RX ORDER — SODIUM CHLORIDE, SODIUM LACTATE, POTASSIUM CHLORIDE, AND CALCIUM CHLORIDE .6; .31; .03; .02 G/100ML; G/100ML; G/100ML; G/100ML
30 INJECTION, SOLUTION INTRAVENOUS ONCE
Status: COMPLETED | OUTPATIENT
Start: 2023-05-14 | End: 2023-05-14

## 2023-05-14 RX ORDER — ACETAMINOPHEN 650 MG/1
650 SUPPOSITORY RECTAL EVERY 6 HOURS PRN
Status: DISCONTINUED | OUTPATIENT
Start: 2023-05-14 | End: 2023-05-17 | Stop reason: HOSPADM

## 2023-05-14 RX ORDER — SODIUM CHLORIDE 0.9 % (FLUSH) 0.9 %
10 SYRINGE (ML) INJECTION ONCE
Status: CANCELLED | OUTPATIENT
Start: 2023-05-14 | End: 2023-05-14

## 2023-05-14 RX ORDER — CETIRIZINE HYDROCHLORIDE 10 MG/1
10 TABLET ORAL DAILY
Status: DISCONTINUED | OUTPATIENT
Start: 2023-05-15 | End: 2023-05-17 | Stop reason: HOSPADM

## 2023-05-14 RX ORDER — ONDANSETRON 4 MG/1
4 TABLET, ORALLY DISINTEGRATING ORAL EVERY 8 HOURS PRN
Status: DISCONTINUED | OUTPATIENT
Start: 2023-05-14 | End: 2023-05-14 | Stop reason: SDUPTHER

## 2023-05-14 RX ORDER — SODIUM CHLORIDE 9 MG/ML
INJECTION, SOLUTION INTRAVENOUS CONTINUOUS
Status: DISCONTINUED | OUTPATIENT
Start: 2023-05-14 | End: 2023-05-15

## 2023-05-14 RX ORDER — INSULIN LISPRO 100 [IU]/ML
0-4 INJECTION, SOLUTION INTRAVENOUS; SUBCUTANEOUS NIGHTLY
Status: DISCONTINUED | OUTPATIENT
Start: 2023-05-14 | End: 2023-05-17 | Stop reason: HOSPADM

## 2023-05-14 RX ORDER — ONDANSETRON 4 MG/1
4 TABLET, ORALLY DISINTEGRATING ORAL EVERY 8 HOURS PRN
Status: DISCONTINUED | OUTPATIENT
Start: 2023-05-14 | End: 2023-05-17 | Stop reason: HOSPADM

## 2023-05-14 RX ORDER — DEXTROSE MONOHYDRATE 100 MG/ML
INJECTION, SOLUTION INTRAVENOUS CONTINUOUS PRN
Status: DISCONTINUED | OUTPATIENT
Start: 2023-05-14 | End: 2023-05-17 | Stop reason: HOSPADM

## 2023-05-14 RX ORDER — PANTOPRAZOLE SODIUM 40 MG/1
40 TABLET, DELAYED RELEASE ORAL
Status: DISCONTINUED | OUTPATIENT
Start: 2023-05-15 | End: 2023-05-17 | Stop reason: HOSPADM

## 2023-05-14 RX ORDER — KETOROLAC TROMETHAMINE 15 MG/ML
15 INJECTION, SOLUTION INTRAMUSCULAR; INTRAVENOUS EVERY 6 HOURS
Status: DISCONTINUED | OUTPATIENT
Start: 2023-05-14 | End: 2023-05-14

## 2023-05-14 RX ORDER — INSULIN LISPRO 100 [IU]/ML
0-8 INJECTION, SOLUTION INTRAVENOUS; SUBCUTANEOUS
Status: DISCONTINUED | OUTPATIENT
Start: 2023-05-14 | End: 2023-05-17 | Stop reason: HOSPADM

## 2023-05-14 RX ORDER — METHYLDOPA/HYDROCHLOROTHIAZIDE 250MG-15MG
100 TABLET ORAL DAILY
Status: DISCONTINUED | OUTPATIENT
Start: 2023-05-15 | End: 2023-05-17 | Stop reason: HOSPADM

## 2023-05-14 RX ORDER — HEPARIN SODIUM 10000 [USP'U]/100ML
5-30 INJECTION, SOLUTION INTRAVENOUS CONTINUOUS
Status: DISCONTINUED | OUTPATIENT
Start: 2023-05-14 | End: 2023-05-15

## 2023-05-14 RX ORDER — SODIUM CHLORIDE 9 MG/ML
INJECTION, SOLUTION INTRAVENOUS PRN
Status: DISCONTINUED | OUTPATIENT
Start: 2023-05-14 | End: 2023-05-17 | Stop reason: HOSPADM

## 2023-05-14 RX ORDER — HEPARIN SODIUM 1000 [USP'U]/ML
2000 INJECTION, SOLUTION INTRAVENOUS; SUBCUTANEOUS PRN
Status: DISCONTINUED | OUTPATIENT
Start: 2023-05-14 | End: 2023-05-15

## 2023-05-14 RX ORDER — ACETAMINOPHEN 500 MG
1000 TABLET ORAL ONCE
Status: COMPLETED | OUTPATIENT
Start: 2023-05-14 | End: 2023-05-14

## 2023-05-14 RX ORDER — POLYETHYLENE GLYCOL 3350 17 G/17G
17 POWDER, FOR SOLUTION ORAL DAILY PRN
Status: DISCONTINUED | OUTPATIENT
Start: 2023-05-14 | End: 2023-05-17 | Stop reason: HOSPADM

## 2023-05-14 RX ORDER — SODIUM CHLORIDE 9 MG/ML
INJECTION, SOLUTION INTRAVENOUS PRN
Status: CANCELLED | OUTPATIENT
Start: 2023-05-14

## 2023-05-14 RX ORDER — DOCUSATE SODIUM 100 MG/1
100 CAPSULE, LIQUID FILLED ORAL PRN
Status: ON HOLD | COMMUNITY

## 2023-05-14 RX ORDER — METRONIDAZOLE 500 MG/100ML
500 INJECTION, SOLUTION INTRAVENOUS EVERY 8 HOURS
Status: DISCONTINUED | OUTPATIENT
Start: 2023-05-14 | End: 2023-05-15

## 2023-05-14 RX ORDER — IPRATROPIUM BROMIDE 42 UG/1
1 SPRAY, METERED NASAL 4 TIMES DAILY
Status: DISCONTINUED | OUTPATIENT
Start: 2023-05-14 | End: 2023-05-17 | Stop reason: HOSPADM

## 2023-05-14 RX ORDER — ONDANSETRON 2 MG/ML
4 INJECTION INTRAMUSCULAR; INTRAVENOUS EVERY 6 HOURS PRN
Status: DISCONTINUED | OUTPATIENT
Start: 2023-05-14 | End: 2023-05-14 | Stop reason: SDUPTHER

## 2023-05-14 RX ORDER — ATORVASTATIN CALCIUM 80 MG/1
80 TABLET, FILM COATED ORAL NIGHTLY
Status: DISCONTINUED | OUTPATIENT
Start: 2023-05-14 | End: 2023-05-17 | Stop reason: HOSPADM

## 2023-05-14 RX ORDER — DONEPEZIL HYDROCHLORIDE 5 MG/1
10 TABLET, FILM COATED ORAL NIGHTLY
Status: DISCONTINUED | OUTPATIENT
Start: 2023-05-14 | End: 2023-05-17 | Stop reason: HOSPADM

## 2023-05-14 RX ORDER — SODIUM CHLORIDE 0.9 % (FLUSH) 0.9 %
5-40 SYRINGE (ML) INJECTION PRN
Status: DISCONTINUED | OUTPATIENT
Start: 2023-05-14 | End: 2023-05-17 | Stop reason: HOSPADM

## 2023-05-14 RX ORDER — SODIUM CHLORIDE 0.9 % (FLUSH) 0.9 %
5-40 SYRINGE (ML) INJECTION PRN
Status: CANCELLED | OUTPATIENT
Start: 2023-05-14

## 2023-05-14 RX ORDER — HEPARIN SODIUM 1000 [USP'U]/ML
4000 INJECTION, SOLUTION INTRAVENOUS; SUBCUTANEOUS PRN
Status: DISCONTINUED | OUTPATIENT
Start: 2023-05-14 | End: 2023-05-15

## 2023-05-14 RX ORDER — DEXTROSE MONOHYDRATE 25 G/50ML
12.5 INJECTION, SOLUTION INTRAVENOUS
Status: CANCELLED | OUTPATIENT
Start: 2023-05-14 | End: 2023-05-15

## 2023-05-14 RX ORDER — PRIMIDONE 50 MG/1
50 TABLET ORAL NIGHTLY
Status: DISCONTINUED | OUTPATIENT
Start: 2023-05-14 | End: 2023-05-17 | Stop reason: HOSPADM

## 2023-05-14 RX ORDER — FLUTICASONE PROPIONATE 50 MCG
1 SPRAY, SUSPENSION (ML) NASAL NIGHTLY
Status: DISCONTINUED | OUTPATIENT
Start: 2023-05-14 | End: 2023-05-17 | Stop reason: HOSPADM

## 2023-05-14 RX ORDER — ACETAMINOPHEN 325 MG/1
650 TABLET ORAL EVERY 6 HOURS PRN
Status: DISCONTINUED | OUTPATIENT
Start: 2023-05-14 | End: 2023-05-17 | Stop reason: HOSPADM

## 2023-05-14 RX ORDER — ASPIRIN 81 MG/1
81 TABLET ORAL DAILY
Status: DISCONTINUED | OUTPATIENT
Start: 2023-05-15 | End: 2023-05-17 | Stop reason: HOSPADM

## 2023-05-14 RX ORDER — ONDANSETRON 2 MG/ML
4 INJECTION INTRAMUSCULAR; INTRAVENOUS EVERY 6 HOURS PRN
Status: DISCONTINUED | OUTPATIENT
Start: 2023-05-14 | End: 2023-05-17 | Stop reason: HOSPADM

## 2023-05-14 RX ORDER — CLOZAPINE 25 MG/1
25 TABLET ORAL 2 TIMES DAILY
Status: DISCONTINUED | OUTPATIENT
Start: 2023-05-14 | End: 2023-05-17 | Stop reason: HOSPADM

## 2023-05-14 RX ORDER — SODIUM CHLORIDE 0.9 % (FLUSH) 0.9 %
5-40 SYRINGE (ML) INJECTION EVERY 12 HOURS SCHEDULED
Status: CANCELLED | OUTPATIENT
Start: 2023-05-14

## 2023-05-14 RX ADMIN — Medication 10 ML: at 20:18

## 2023-05-14 RX ADMIN — CEFTRIAXONE SODIUM 1000 MG: 1 INJECTION, POWDER, FOR SOLUTION INTRAMUSCULAR; INTRAVENOUS at 20:00

## 2023-05-14 RX ADMIN — PRIMIDONE 50 MG: 50 TABLET ORAL at 20:14

## 2023-05-14 RX ADMIN — ATORVASTATIN CALCIUM 80 MG: 80 TABLET, FILM COATED ORAL at 20:14

## 2023-05-14 RX ADMIN — SODIUM CHLORIDE: 9 INJECTION, SOLUTION INTRAVENOUS at 19:53

## 2023-05-14 RX ADMIN — Medication 1 PACKET: at 20:18

## 2023-05-14 RX ADMIN — Medication 11 UNITS/KG/HR: at 15:48

## 2023-05-14 RX ADMIN — CLOZAPINE 25 MG: 25 TABLET ORAL at 22:17

## 2023-05-14 RX ADMIN — SODIUM CHLORIDE, POTASSIUM CHLORIDE, SODIUM LACTATE AND CALCIUM CHLORIDE 2018.4 ML: 600; 310; 30; 20 INJECTION, SOLUTION INTRAVENOUS at 14:29

## 2023-05-14 RX ADMIN — DONEPEZIL HYDROCHLORIDE 10 MG: 5 TABLET, FILM COATED ORAL at 20:14

## 2023-05-14 RX ADMIN — ACETAMINOPHEN 1000 MG: 500 TABLET ORAL at 14:40

## 2023-05-14 RX ADMIN — CEFEPIME 2000 MG: 2 INJECTION, POWDER, FOR SOLUTION INTRAVENOUS at 15:20

## 2023-05-14 RX ADMIN — FLUTICASONE PROPIONATE 1 SPRAY: 50 SPRAY, METERED NASAL at 20:15

## 2023-05-14 RX ADMIN — METRONIDAZOLE 500 MG: 500 INJECTION, SOLUTION INTRAVENOUS at 21:20

## 2023-05-14 RX ADMIN — ACETAMINOPHEN 650 MG: 325 TABLET ORAL at 20:14

## 2023-05-14 RX ADMIN — VANCOMYCIN HYDROCHLORIDE 2000 MG: 10 INJECTION, POWDER, LYOPHILIZED, FOR SOLUTION INTRAVENOUS at 15:52

## 2023-05-14 RX ADMIN — HEPARIN SODIUM 11 UNITS/KG/HR: 5000 INJECTION INTRAVENOUS; SUBCUTANEOUS at 20:13

## 2023-05-14 RX ADMIN — IOPAMIDOL 75 ML: 755 INJECTION, SOLUTION INTRAVENOUS at 13:36

## 2023-05-14 ASSESSMENT — PAIN - FUNCTIONAL ASSESSMENT: PAIN_FUNCTIONAL_ASSESSMENT: NONE - DENIES PAIN

## 2023-05-14 ASSESSMENT — ENCOUNTER SYMPTOMS
NAUSEA: 0
CONSTIPATION: 0
BACK PAIN: 1
DIARRHEA: 0
COUGH: 0
RHINORRHEA: 0
ABDOMINAL PAIN: 0
EYE PAIN: 0
SHORTNESS OF BREATH: 0
SORE THROAT: 0
VOMITING: 0

## 2023-05-14 ASSESSMENT — LIFESTYLE VARIABLES
HOW MANY STANDARD DRINKS CONTAINING ALCOHOL DO YOU HAVE ON A TYPICAL DAY: 1 OR 2
HOW OFTEN DO YOU HAVE A DRINK CONTAINING ALCOHOL: MONTHLY OR LESS

## 2023-05-14 ASSESSMENT — PAIN SCALES - GENERAL: PAINLEVEL_OUTOF10: 0

## 2023-05-15 ENCOUNTER — APPOINTMENT (OUTPATIENT)
Dept: GENERAL RADIOLOGY | Age: 78
DRG: 871 | End: 2023-05-15
Payer: MEDICARE

## 2023-05-15 ENCOUNTER — APPOINTMENT (OUTPATIENT)
Dept: MRI IMAGING | Age: 78
DRG: 871 | End: 2023-05-15
Payer: MEDICARE

## 2023-05-15 PROBLEM — J18.9 PNEUMONIA OF LEFT LOWER LOBE DUE TO INFECTIOUS ORGANISM: Status: ACTIVE | Noted: 2023-05-15

## 2023-05-15 LAB
ANION GAP SERPL CALCULATED.3IONS-SCNC: 10 MMOL/L (ref 3–16)
ANTI-XA UNFRAC HEPARIN: 0.24 IU/ML (ref 0.3–0.7)
APTT BLD: 57.9 SEC (ref 22.7–35.9)
APTT BLD: 69.8 SEC (ref 22.7–35.9)
APTT BLD: 95.7 SEC (ref 22.7–35.9)
BUN SERPL-MCNC: 15 MG/DL (ref 7–20)
CALCIUM SERPL-MCNC: 8.2 MG/DL (ref 8.3–10.6)
CHLORIDE SERPL-SCNC: 109 MMOL/L (ref 99–110)
CHOLEST SERPL-MCNC: 87 MG/DL (ref 0–199)
CO2 SERPL-SCNC: 18 MMOL/L (ref 21–32)
CREAT SERPL-MCNC: 0.6 MG/DL (ref 0.8–1.3)
DEPRECATED RDW RBC AUTO: 13.6 % (ref 12.4–15.4)
EKG ATRIAL RATE: 101 BPM
EKG DIAGNOSIS: NORMAL
EKG P AXIS: 73 DEGREES
EKG P-R INTERVAL: 152 MS
EKG Q-T INTERVAL: 344 MS
EKG QRS DURATION: 76 MS
EKG QTC CALCULATION (BAZETT): 446 MS
EKG R AXIS: 33 DEGREES
EKG T AXIS: 59 DEGREES
EKG VENTRICULAR RATE: 101 BPM
EST. AVERAGE GLUCOSE BLD GHB EST-MCNC: 128.4 MG/DL
EST. AVERAGE GLUCOSE BLD GHB EST-MCNC: 128.4 MG/DL
GFR SERPLBLD CREATININE-BSD FMLA CKD-EPI: >60 ML/MIN/{1.73_M2}
GLUCOSE BLD-MCNC: 113 MG/DL (ref 70–99)
GLUCOSE BLD-MCNC: 124 MG/DL (ref 70–99)
GLUCOSE BLD-MCNC: 98 MG/DL (ref 70–99)
GLUCOSE SERPL-MCNC: 99 MG/DL (ref 70–99)
HBA1C MFR BLD: 6.1 %
HBA1C MFR BLD: 6.1 %
HCT VFR BLD AUTO: 38.4 % (ref 40.5–52.5)
HDLC SERPL-MCNC: 43 MG/DL (ref 40–60)
HGB BLD-MCNC: 12.3 G/DL (ref 13.5–17.5)
INR PPP: 1.3 (ref 0.84–1.16)
LDLC SERPL CALC-MCNC: 35 MG/DL
MCH RBC QN AUTO: 29.6 PG (ref 26–34)
MCHC RBC AUTO-ENTMCNC: 32 G/DL (ref 31–36)
MCV RBC AUTO: 92.6 FL (ref 80–100)
PERFORMED ON: ABNORMAL
PERFORMED ON: ABNORMAL
PERFORMED ON: NORMAL
PLATELET # BLD AUTO: 167 K/UL (ref 135–450)
PMV BLD AUTO: 7.5 FL (ref 5–10.5)
POTASSIUM SERPL-SCNC: 3.9 MMOL/L (ref 3.5–5.1)
PROTHROMBIN TIME: 16.1 SEC (ref 11.5–14.8)
RBC # BLD AUTO: 4.15 M/UL (ref 4.2–5.9)
REASON FOR REJECTION: NORMAL
REJECTED TEST: NORMAL
SODIUM SERPL-SCNC: 137 MMOL/L (ref 136–145)
TRIGL SERPL-MCNC: 46 MG/DL (ref 0–150)
VLDLC SERPL CALC-MCNC: 9 MG/DL
WBC # BLD AUTO: 19.8 K/UL (ref 4–11)

## 2023-05-15 PROCEDURE — 92526 ORAL FUNCTION THERAPY: CPT

## 2023-05-15 PROCEDURE — 80048 BASIC METABOLIC PNL TOTAL CA: CPT

## 2023-05-15 PROCEDURE — 93880 EXTRACRANIAL BILAT STUDY: CPT

## 2023-05-15 PROCEDURE — 99223 1ST HOSP IP/OBS HIGH 75: CPT

## 2023-05-15 PROCEDURE — 99222 1ST HOSP IP/OBS MODERATE 55: CPT | Performed by: INTERNAL MEDICINE

## 2023-05-15 PROCEDURE — 36415 COLL VENOUS BLD VENIPUNCTURE: CPT

## 2023-05-15 PROCEDURE — 71045 X-RAY EXAM CHEST 1 VIEW: CPT

## 2023-05-15 PROCEDURE — 85027 COMPLETE CBC AUTOMATED: CPT

## 2023-05-15 PROCEDURE — 6360000002 HC RX W HCPCS: Performed by: INTERNAL MEDICINE

## 2023-05-15 PROCEDURE — 83036 HEMOGLOBIN GLYCOSYLATED A1C: CPT

## 2023-05-15 PROCEDURE — 80061 LIPID PANEL: CPT

## 2023-05-15 PROCEDURE — 2580000003 HC RX 258: Performed by: INTERNAL MEDICINE

## 2023-05-15 PROCEDURE — 6370000000 HC RX 637 (ALT 250 FOR IP): Performed by: INTERNAL MEDICINE

## 2023-05-15 PROCEDURE — 97166 OT EVAL MOD COMPLEX 45 MIN: CPT

## 2023-05-15 PROCEDURE — 97530 THERAPEUTIC ACTIVITIES: CPT

## 2023-05-15 PROCEDURE — 92610 EVALUATE SWALLOWING FUNCTION: CPT

## 2023-05-15 PROCEDURE — 85730 THROMBOPLASTIN TIME PARTIAL: CPT

## 2023-05-15 PROCEDURE — 6360000002 HC RX W HCPCS: Performed by: HOSPITALIST

## 2023-05-15 PROCEDURE — 92523 SPEECH SOUND LANG COMPREHEN: CPT

## 2023-05-15 PROCEDURE — 70551 MRI BRAIN STEM W/O DYE: CPT

## 2023-05-15 PROCEDURE — 93010 ELECTROCARDIOGRAM REPORT: CPT | Performed by: INTERNAL MEDICINE

## 2023-05-15 PROCEDURE — 97162 PT EVAL MOD COMPLEX 30 MIN: CPT

## 2023-05-15 PROCEDURE — 85520 HEPARIN ASSAY: CPT

## 2023-05-15 PROCEDURE — 2060000000 HC ICU INTERMEDIATE R&B

## 2023-05-15 PROCEDURE — 2500000003 HC RX 250 WO HCPCS: Performed by: INTERNAL MEDICINE

## 2023-05-15 RX ORDER — LEVOFLOXACIN 5 MG/ML
750 INJECTION, SOLUTION INTRAVENOUS EVERY 24 HOURS
Status: DISCONTINUED | OUTPATIENT
Start: 2023-05-15 | End: 2023-05-17 | Stop reason: HOSPADM

## 2023-05-15 RX ORDER — ENOXAPARIN SODIUM 100 MG/ML
40 INJECTION SUBCUTANEOUS DAILY
Status: DISCONTINUED | OUTPATIENT
Start: 2023-05-15 | End: 2023-05-17 | Stop reason: HOSPADM

## 2023-05-15 RX ADMIN — LEVOFLOXACIN 750 MG: 5 INJECTION, SOLUTION INTRAVENOUS at 15:29

## 2023-05-15 RX ADMIN — HEPARIN SODIUM 13 UNITS/KG/HR: 5000 INJECTION INTRAVENOUS; SUBCUTANEOUS at 00:45

## 2023-05-15 RX ADMIN — ACETAMINOPHEN 650 MG: 325 TABLET ORAL at 20:49

## 2023-05-15 RX ADMIN — MIDODRINE HYDROCHLORIDE 2.5 MG: 5 TABLET ORAL at 16:59

## 2023-05-15 RX ADMIN — ASPIRIN 81 MG: 81 TABLET, COATED ORAL at 12:48

## 2023-05-15 RX ADMIN — ATORVASTATIN CALCIUM 80 MG: 80 TABLET, FILM COATED ORAL at 20:44

## 2023-05-15 RX ADMIN — DONEPEZIL HYDROCHLORIDE 10 MG: 5 TABLET, FILM COATED ORAL at 20:44

## 2023-05-15 RX ADMIN — MIDODRINE HYDROCHLORIDE 2.5 MG: 5 TABLET ORAL at 20:44

## 2023-05-15 RX ADMIN — CLOZAPINE 25 MG: 25 TABLET ORAL at 20:45

## 2023-05-15 RX ADMIN — FLUTICASONE PROPIONATE 1 SPRAY: 50 SPRAY, METERED NASAL at 20:46

## 2023-05-15 RX ADMIN — CARBIDOPA AND LEVODOPA 1 TABLET: 25; 100 TABLET ORAL at 16:59

## 2023-05-15 RX ADMIN — CETIRIZINE HYDROCHLORIDE 10 MG: 10 TABLET, FILM COATED ORAL at 12:48

## 2023-05-15 RX ADMIN — CLOZAPINE 25 MG: 25 TABLET ORAL at 16:58

## 2023-05-15 RX ADMIN — ENOXAPARIN SODIUM 40 MG: 100 INJECTION SUBCUTANEOUS at 16:53

## 2023-05-15 RX ADMIN — Medication 1 PACKET: at 20:44

## 2023-05-15 RX ADMIN — SODIUM CHLORIDE: 9 INJECTION, SOLUTION INTRAVENOUS at 13:06

## 2023-05-15 RX ADMIN — METRONIDAZOLE 500 MG: 500 INJECTION, SOLUTION INTRAVENOUS at 13:02

## 2023-05-15 RX ADMIN — METRONIDAZOLE 500 MG: 500 INJECTION, SOLUTION INTRAVENOUS at 03:46

## 2023-05-15 RX ADMIN — CARBIDOPA AND LEVODOPA 1 TABLET: 25; 100 TABLET ORAL at 18:52

## 2023-05-15 RX ADMIN — HEPARIN SODIUM 2000 UNITS: 1000 INJECTION INTRAVENOUS; SUBCUTANEOUS at 00:41

## 2023-05-15 RX ADMIN — PRIMIDONE 50 MG: 50 TABLET ORAL at 20:44

## 2023-05-15 RX ADMIN — CARBIDOPA AND LEVODOPA 1 TABLET: 25; 100 TABLET ORAL at 12:49

## 2023-05-15 RX ADMIN — Medication 10 ML: at 20:53

## 2023-05-15 ASSESSMENT — PAIN SCALES - GENERAL
PAINLEVEL_OUTOF10: 3
PAINLEVEL_OUTOF10: 8
PAINLEVEL_OUTOF10: 0
PAINLEVEL_OUTOF10: 0

## 2023-05-15 ASSESSMENT — PAIN DESCRIPTION - ORIENTATION
ORIENTATION: RIGHT
ORIENTATION: RIGHT

## 2023-05-15 ASSESSMENT — PAIN DESCRIPTION - LOCATION
LOCATION: SHOULDER
LOCATION: SHOULDER

## 2023-05-15 NOTE — CONSULTS
In patient Neurology consult        [facility] Neurology      MD Omer Esposito Ace  1945    Date of Service: 5/15/2023    Referring Physician: Oren Sanchez MD      Reason for the consult and CC: Altered mental status    HPI:   The patient is a 70y.o.  years old male past medical history of Parkinson's disease, essential tremors, CAD, hypotension, hyperlipidemia, diabetes and other medical problems reported to the hospital with acute onset confusion which started on admission day. Patient is not able to provide history for this exam, history is obtained from spouse who is at bedside. Patient spouse states patient  went to [place] morning of admission date family gathering. After he got home he took a nap, when he woke up he started with his symptoms. Patient was noted to point at things and have nonsensical speech. Degree severe, duration persistent. No aggravating or relieving factors. Wife reports, he had trouble ambulating after he got up from his nap. Per wife, patient has been having increased weakness and episodes of increased incontinence episodes over the last week. Patient was brought to the emergency room further evaluation. CT head showed no acute intracranial abnormality. CT abdomen pelvis showed left lower lobe pneumonia. He was noted to have temperature of 102.6 and elevated lactic acid 3.9. CTA head and neck was concerning for vertebrobasilar insufficiency. In the emergency room,  stroke team was consulted patient. TNK was not recommended and he was started on heparin gtt. Patient was admitted to the hospital for further evaluation. Today patient is awake and alert to self only. He reports right shoulder pain. He denies dizziness, headache, dysarthria, dysphagia.        Constitutional:   Vitals:    05/15/23 0800 05/15/23 0900 05/15/23 1000 05/15/23 1100   BP: (!) 127/53 120/60 (!) 107/41 (!) 104/53   Pulse: 76 72 82 86   Resp: 25 22 29 19   Temp: 98.7 °F

## 2023-05-15 NOTE — PLAN OF CARE
Problem: Chronic Conditions and Co-morbidities  Goal: Patient's chronic conditions and co-morbidity symptoms are monitored and maintained or improved  Outcome: Progressing  Flowsheets (Taken 5/14/2023 2100)  Care Plan - Patient's Chronic Conditions and Co-Morbidity Symptoms are Monitored and Maintained or Improved: Monitor and assess patient's chronic conditions and comorbid symptoms for stability, deterioration, or improvement

## 2023-05-16 ENCOUNTER — APPOINTMENT (OUTPATIENT)
Dept: GENERAL RADIOLOGY | Age: 78
DRG: 871 | End: 2023-05-16
Payer: MEDICARE

## 2023-05-16 LAB
DEPRECATED RDW RBC AUTO: 14 % (ref 12.4–15.4)
GLUCOSE BLD-MCNC: 126 MG/DL (ref 70–99)
GLUCOSE BLD-MCNC: 128 MG/DL (ref 70–99)
GLUCOSE BLD-MCNC: 143 MG/DL (ref 70–99)
GLUCOSE BLD-MCNC: 94 MG/DL (ref 70–99)
HCT VFR BLD AUTO: 36.5 % (ref 40.5–52.5)
HGB BLD-MCNC: 11.8 G/DL (ref 13.5–17.5)
MCH RBC QN AUTO: 30.1 PG (ref 26–34)
MCHC RBC AUTO-ENTMCNC: 32.5 G/DL (ref 31–36)
MCV RBC AUTO: 92.7 FL (ref 80–100)
NT-PROBNP SERPL-MCNC: 636 PG/ML (ref 0–449)
PERFORMED ON: ABNORMAL
PERFORMED ON: NORMAL
PLATELET # BLD AUTO: 181 K/UL (ref 135–450)
PMV BLD AUTO: 7.7 FL (ref 5–10.5)
RBC # BLD AUTO: 3.93 M/UL (ref 4.2–5.9)
WBC # BLD AUTO: 14.1 K/UL (ref 4–11)

## 2023-05-16 PROCEDURE — 2060000000 HC ICU INTERMEDIATE R&B

## 2023-05-16 PROCEDURE — 85027 COMPLETE CBC AUTOMATED: CPT

## 2023-05-16 PROCEDURE — 6360000002 HC RX W HCPCS: Performed by: HOSPITALIST

## 2023-05-16 PROCEDURE — 97530 THERAPEUTIC ACTIVITIES: CPT

## 2023-05-16 PROCEDURE — 99232 SBSQ HOSP IP/OBS MODERATE 35: CPT

## 2023-05-16 PROCEDURE — 36415 COLL VENOUS BLD VENIPUNCTURE: CPT

## 2023-05-16 PROCEDURE — 83880 ASSAY OF NATRIURETIC PEPTIDE: CPT

## 2023-05-16 PROCEDURE — 71045 X-RAY EXAM CHEST 1 VIEW: CPT

## 2023-05-16 PROCEDURE — 6370000000 HC RX 637 (ALT 250 FOR IP): Performed by: INTERNAL MEDICINE

## 2023-05-16 PROCEDURE — 92526 ORAL FUNCTION THERAPY: CPT

## 2023-05-16 PROCEDURE — 92611 MOTION FLUOROSCOPY/SWALLOW: CPT

## 2023-05-16 PROCEDURE — 6370000000 HC RX 637 (ALT 250 FOR IP): Performed by: HOSPITALIST

## 2023-05-16 PROCEDURE — 74230 X-RAY XM SWLNG FUNCJ C+: CPT

## 2023-05-16 PROCEDURE — 97116 GAIT TRAINING THERAPY: CPT

## 2023-05-16 PROCEDURE — 2580000003 HC RX 258: Performed by: INTERNAL MEDICINE

## 2023-05-16 RX ORDER — LACTULOSE 10 G/15ML
30 SOLUTION ORAL 2 TIMES DAILY PRN
Status: DISCONTINUED | OUTPATIENT
Start: 2023-05-16 | End: 2023-05-17 | Stop reason: HOSPADM

## 2023-05-16 RX ORDER — GUAIFENESIN/DEXTROMETHORPHAN 100-10MG/5
5 SYRUP ORAL EVERY 4 HOURS PRN
Status: DISCONTINUED | OUTPATIENT
Start: 2023-05-16 | End: 2023-05-17 | Stop reason: HOSPADM

## 2023-05-16 RX ADMIN — ENOXAPARIN SODIUM 40 MG: 100 INJECTION SUBCUTANEOUS at 09:12

## 2023-05-16 RX ADMIN — IPRATROPIUM BROMIDE 1 SPRAY: 42 SPRAY, METERED NASAL at 09:12

## 2023-05-16 RX ADMIN — CLOZAPINE 25 MG: 25 TABLET ORAL at 10:32

## 2023-05-16 RX ADMIN — LACTULOSE 30 G: 20 SOLUTION ORAL at 13:21

## 2023-05-16 RX ADMIN — CARBIDOPA AND LEVODOPA 1 TABLET: 25; 100 TABLET ORAL at 15:14

## 2023-05-16 RX ADMIN — CLOZAPINE 25 MG: 25 TABLET ORAL at 21:26

## 2023-05-16 RX ADMIN — ASPIRIN 81 MG: 81 TABLET, COATED ORAL at 09:12

## 2023-05-16 RX ADMIN — DONEPEZIL HYDROCHLORIDE 10 MG: 5 TABLET, FILM COATED ORAL at 20:40

## 2023-05-16 RX ADMIN — FLUTICASONE PROPIONATE 1 SPRAY: 50 SPRAY, METERED NASAL at 21:26

## 2023-05-16 RX ADMIN — CETIRIZINE HYDROCHLORIDE 10 MG: 10 TABLET, FILM COATED ORAL at 09:11

## 2023-05-16 RX ADMIN — MIDODRINE HYDROCHLORIDE 2.5 MG: 5 TABLET ORAL at 09:12

## 2023-05-16 RX ADMIN — CARBIDOPA AND LEVODOPA 1 TABLET: 25; 100 TABLET ORAL at 11:40

## 2023-05-16 RX ADMIN — CARBIDOPA AND LEVODOPA 1 TABLET: 25; 100 TABLET ORAL at 17:13

## 2023-05-16 RX ADMIN — Medication 10 ML: at 09:12

## 2023-05-16 RX ADMIN — SODIUM CHLORIDE 25 ML: 9 INJECTION, SOLUTION INTRAVENOUS at 15:15

## 2023-05-16 RX ADMIN — THERA TABS 1 TABLET: TAB at 09:13

## 2023-05-16 RX ADMIN — Medication 100 MCG: at 09:12

## 2023-05-16 RX ADMIN — CARBIDOPA AND LEVODOPA 1 TABLET: 25; 100 TABLET ORAL at 18:54

## 2023-05-16 RX ADMIN — MIDODRINE HYDROCHLORIDE 2.5 MG: 5 TABLET ORAL at 20:38

## 2023-05-16 RX ADMIN — ATORVASTATIN CALCIUM 80 MG: 80 TABLET, FILM COATED ORAL at 20:39

## 2023-05-16 RX ADMIN — Medication 1 PACKET: at 20:40

## 2023-05-16 RX ADMIN — CARBIDOPA AND LEVODOPA 1 TABLET: 25; 100 TABLET ORAL at 09:12

## 2023-05-16 RX ADMIN — PRIMIDONE 50 MG: 50 TABLET ORAL at 20:39

## 2023-05-16 RX ADMIN — Medication 10 ML: at 20:57

## 2023-05-16 RX ADMIN — CARBIDOPA AND LEVODOPA 1 TABLET: 25; 100 TABLET ORAL at 13:21

## 2023-05-16 RX ADMIN — LEVOFLOXACIN 750 MG: 5 INJECTION, SOLUTION INTRAVENOUS at 15:16

## 2023-05-16 RX ADMIN — MIDODRINE HYDROCHLORIDE 2.5 MG: 5 TABLET ORAL at 15:14

## 2023-05-16 RX ADMIN — ACETAMINOPHEN 650 MG: 325 TABLET ORAL at 20:39

## 2023-05-16 ASSESSMENT — PAIN SCALES - WONG BAKER: WONGBAKER_NUMERICALRESPONSE: 0

## 2023-05-16 ASSESSMENT — PAIN SCALES - GENERAL
PAINLEVEL_OUTOF10: 0
PAINLEVEL_OUTOF10: 2

## 2023-05-16 ASSESSMENT — PAIN DESCRIPTION - ORIENTATION: ORIENTATION: RIGHT

## 2023-05-16 ASSESSMENT — PAIN DESCRIPTION - LOCATION: LOCATION: SHOULDER

## 2023-05-16 NOTE — CONSULTS
Ross Alvarez  5/16/2023  [id number]    Chief Complaint: Acute metabolic encephalopathy    Subjective:   Ross Alvarez is a 70 y.o. male with history of Parkinson's disease, CAD, hyperlipidemia, DM 2, BL carotid artery disease, hypotension on Midodrine who was brought to ER for AMS and worsening tremors. He was found to have LANA, PNA and chronic cough. He is being treated right now for these issues and is currently not ready for ARU. He and his wife are hopeful that he will improve enough to tolerate 3 hours of therapy a day. ROS: No CP, SOB, dyspnea    Objective:  Patient Vitals for the past 24 hrs:   BP Temp Temp src Pulse Resp SpO2 Weight   05/16/23 1114 115/69 98.3 °F (36.8 °C) Oral 73 20 96 % --   05/16/23 0753 -- -- -- -- -- -- 162 lb 3.2 oz (73.6 kg)   05/16/23 0747 (!) 135/56 98.9 °F (37.2 °C) Axillary 74 24 98 % --   05/16/23 0326 116/64 98.3 °F (36.8 °C) Oral 68 22 94 % --   05/16/23 0056 128/62 98.7 °F (37.1 °C) Axillary 65 22 96 % --   05/16/23 0000 (!) 120/49 97.6 °F (36.4 °C) Axillary 68 25 94 % --   05/15/23 2300 (!) 136/56 -- -- 77 23 -- --   05/15/23 2200 (!) 103/49 -- -- 75 27 -- --   05/15/23 2100 (!) 120/52 99.8 °F (37.7 °C) Axillary 77 27 -- --   05/15/23 2000 111/60 97.8 °F (36.6 °C) Temporal 78 28 -- --   05/15/23 1900 (!) 110/50 -- -- 87 30 -- --   05/15/23 1800 (!) 121/49 -- -- 81 29 -- --   05/15/23 1700 119/74 -- -- 84 22 -- --   05/15/23 1600 (!) 111/50 -- -- 79 23 -- --   05/15/23 1500 (!) 108/48 -- -- 78 21 -- --   05/15/23 1400 (!) 113/51 99.6 °F (37.6 °C) Temporal 79 20 -- --     Gen: mild distress, pleasant. HEENT: Normocephalic, atraumatic. CV: No audible murmurs, well perfused extremities  Resp: No respiratory distress. No increased WOB  Abd: Soft, nontender nondistended  Ext: + edema. Neuro: Alert, oriented, appropriately interactive. Laboratory data: Available via EMR.      Therapy progress:    PT    Rolling: Level of difficulty - A Lot   Sit to Stand from

## 2023-05-16 NOTE — CONSULTS
[facility]                     [facility], [address]                                  CONSULTATION    PATIENT NAME: Silvio Flannery                 :        1945  MED REC NO:   [id number]                          ROOM:       9684  ACCOUNT NO:   [de-identified]                           ADMIT DATE: 2023  PROVIDER:     Davidson Sarabia MD    CONSULT DATE:  05/15/2023    REASON FOR CONSULTATION:  Evaluate the patient known to your practice  with a very significant autonomic insufficiency. HISTORY OF PRESENT ILLNESS:  The patient is a 65-year-old gentleman  well-known to my practice, having treated him since  for unstable  angina. At that time, he had PCI of marginal branch. He had last  cardiac catheterization in  with normal left ventricular ejection  fraction of 60% and widely patent marginal stent. He also has been  followed by Neurology for very rapidly progressive Parkinson's disease,  which has left him quite debilitated. He does get around and is able to  participate in some activities, but activities are very significantly  curtailed over the last one to two years. He went to Religious, his wife  noted him to become essentially unresponsive. He presented to the  emergency room with a very severe high temperature. He has now been  diagnosed with pneumonia initially due to right arm weakness. It was  concerning for an acute cerebrovascular event that has been ruled out  and now is being treated for pneumonia. The patient has been on  midodrine t.i.d. and has been doing much better with that. Prior to  that, he was having hypotensive episodes with near syncope. He has  longstanding chronic ventricular ectopy. PAST MEDICAL HISTORY:  Notable for coronary disease with marginal  stenting approximately 18 years ago, last cardiac catheterization in  .   History of recurrent bronchitis, hyperlipidemia, very advanced  Parkinson's

## 2023-05-16 NOTE — PROCEDURES
Facility/Department: [address]  MODIFIED BARIUM SWALLOW EVALUATION    Patient: Sowmya Clark   : 1945   MRN: [id number]      Evaluation Date: 2023   Admitting Diagnosis: Confusion [R41.0]  Severe sepsis ([address] [A41.9, R65.20]  Pneumonia of left lower lobe due to infectious organism [J18.9]  Acute aspiration pneumonia ([address] [J69.0]  Treatment Diagnosis: Dysphagia   Pain: Denies                       Date of Evaluation: 2023  Type of Study: Modified Barium Swallowing Study (MBS)  Diet Prior to Study:  Puree diet with mildly thick liquids per SLP recommendations, pending MBS results    Impression:  Modified Barium Swallow evaluation completed on 2023. Patient presents with moderate oropharyngeal dysphagia with progressive SILENT laryngeal penetration and progressive SILENT aspiration noted with thin liquids in isolation and in combination with other textures. Evidence of mechanism fatigue is noted over successive po trials with increased episodes of SILENT aspiration noted with thin liquids as mechanism fatigue occurs. Sensation for timely and effective reflexive cough is reduced, with spontaneous cough only noted AFTER aspiration had occurred. Impaired pharyngeal sensation is also noted as evidenced by pharyngeal pooling before the swallow and delayed spontaneous pharyngeal clearing after the swallow, with all textures. Pt tolerated all textures > thins with no overt laryngeal penetration or aspiration directly viewed. However, precautions should be followed to minimize aspiration potential due to noted mechanism fatigue over successive po trials.     Aspiration/Penetration Risk:  High risk with thin liquids via straw and with thin liquids given in combination with any other texture    Recommendations:    Diet Level:   Dysphagia III Soft and bite sized  with Mildly (nectar) thick liquids with meals/Allow thin water only, between meals only, with good oral care/No

## 2023-05-16 NOTE — PLAN OF CARE
Pt and spouse updated on current plan of care. Problem: Safety - Adult  Goal: Free from fall injury  5/16/2023 1355 by Hesham Rock RN  Outcome: Progressing     Problem: Chronic Conditions and Co-morbidities  Goal: Patient's chronic conditions and co-morbidity symptoms are monitored and maintained or improved  5/16/2023 1355 by Hesham Rock RN  Outcome: Progressing  Flowsheets (Taken 5/16/2023 0915)  Care Plan - Patient's Chronic Conditions and Co-Morbidity Symptoms are Monitored and Maintained or Improved: Monitor and assess patient's chronic conditions and comorbid symptoms for stability, deterioration, or improvement     Problem: Discharge Planning  Goal: Discharge to home or other facility with appropriate resources  Outcome: Progressing  Flowsheets (Taken 5/16/2023 0915)  Discharge to home or other facility with appropriate resources: Identify barriers to discharge with patient and caregiver     Problem: Skin/Tissue Integrity  Goal: Absence of new skin breakdown  Description: 1. Monitor for areas of redness and/or skin breakdown  2. Assess vascular access sites hourly  3. Every 4-6 hours minimum:  Change oxygen saturation probe site  4. Every 4-6 hours:  If on nasal continuous positive airway pressure, respiratory therapy assess nares and determine need for appliance change or resting period.   5/16/2023 1355 by Hesham Rock RN  Outcome: Progressing     Problem: ABCDS Injury Assessment  Goal: Absence of physical injury  5/16/2023 1355 by Hesham Rock RN  Outcome: Progressing     Problem: Pain  Goal: Verbalizes/displays adequate comfort level or baseline comfort level  Outcome: Progressing

## 2023-05-16 NOTE — PLAN OF CARE
Problem: Safety - Adult  Goal: Free from fall injury  Outcome: Progressing  Note: Safety precautions in place. Bed locked, alarmed, lowest position. Call light in reach. Wife at bedside. No falls or physical injury. Problem: Chronic Conditions and Co-morbidities  Goal: Patient's chronic conditions and co-morbidity symptoms are monitored and maintained or improved  Outcome: Progressing     Problem: Skin/Tissue Integrity  Goal: Absence of new skin breakdown  Description: 1. Monitor for areas of redness and/or skin breakdown  2. Assess vascular access sites hourly  3. Every 4-6 hours minimum:  Change oxygen saturation probe site  4. Every 4-6 hours:  If on nasal continuous positive airway pressure, respiratory therapy assess nares and determine need for appliance change or resting period.   Outcome: Progressing     Problem: ABCDS Injury Assessment  Goal: Absence of physical injury  Outcome: Progressing

## 2023-05-17 ENCOUNTER — HOSPITAL ENCOUNTER (INPATIENT)
Age: 78
LOS: 1 days | Discharge: STILL ADMITTED OR ADMINISTRATIVE | DRG: 072 | End: 2023-05-18
Attending: PHYSICAL MEDICINE & REHABILITATION | Admitting: PHYSICAL MEDICINE & REHABILITATION
Payer: MEDICARE

## 2023-05-17 VITALS
TEMPERATURE: 99 F | HEART RATE: 72 BPM | SYSTOLIC BLOOD PRESSURE: 134 MMHG | BODY MASS INDEX: 24.55 KG/M2 | DIASTOLIC BLOOD PRESSURE: 71 MMHG | HEIGHT: 68 IN | WEIGHT: 162 LBS | OXYGEN SATURATION: 95 % | RESPIRATION RATE: 18 BRPM

## 2023-05-17 VITALS
WEIGHT: 162 LBS | SYSTOLIC BLOOD PRESSURE: 142 MMHG | BODY MASS INDEX: 24.55 KG/M2 | TEMPERATURE: 97.9 F | OXYGEN SATURATION: 95 % | RESPIRATION RATE: 16 BRPM | HEART RATE: 67 BPM | HEIGHT: 68 IN | DIASTOLIC BLOOD PRESSURE: 73 MMHG

## 2023-05-17 LAB
ANION GAP SERPL CALCULATED.3IONS-SCNC: 5 MMOL/L (ref 3–16)
BASOPHILS # BLD: 0 K/UL (ref 0–0.2)
BASOPHILS NFR BLD: 0.1 %
BUN SERPL-MCNC: 13 MG/DL (ref 7–20)
CALCIUM SERPL-MCNC: 8.9 MG/DL (ref 8.3–10.6)
CHLORIDE SERPL-SCNC: 110 MMOL/L (ref 99–110)
CO2 SERPL-SCNC: 27 MMOL/L (ref 21–32)
CREAT SERPL-MCNC: 0.6 MG/DL (ref 0.8–1.3)
DEPRECATED RDW RBC AUTO: 13.9 % (ref 12.4–15.4)
EOSINOPHIL # BLD: 0.4 K/UL (ref 0–0.6)
EOSINOPHIL NFR BLD: 3.4 %
GFR SERPLBLD CREATININE-BSD FMLA CKD-EPI: >60 ML/MIN/{1.73_M2}
GLUCOSE BLD-MCNC: 103 MG/DL (ref 70–99)
GLUCOSE BLD-MCNC: 108 MG/DL (ref 70–99)
GLUCOSE BLD-MCNC: 110 MG/DL (ref 70–99)
GLUCOSE BLD-MCNC: 127 MG/DL (ref 70–99)
GLUCOSE SERPL-MCNC: 124 MG/DL (ref 70–99)
HCT VFR BLD AUTO: 36.9 % (ref 40.5–52.5)
HGB BLD-MCNC: 12.3 G/DL (ref 13.5–17.5)
LYMPHOCYTES # BLD: 1.3 K/UL (ref 1–5.1)
LYMPHOCYTES NFR BLD: 12.1 %
MCH RBC QN AUTO: 30.8 PG (ref 26–34)
MCHC RBC AUTO-ENTMCNC: 33.3 G/DL (ref 31–36)
MCV RBC AUTO: 92.2 FL (ref 80–100)
MONOCYTES # BLD: 0.9 K/UL (ref 0–1.3)
MONOCYTES NFR BLD: 7.8 %
NEUTROPHILS # BLD: 8.3 K/UL (ref 1.7–7.7)
NEUTROPHILS NFR BLD: 76.6 %
PERFORMED ON: ABNORMAL
PLATELET # BLD AUTO: 209 K/UL (ref 135–450)
PMV BLD AUTO: 7.6 FL (ref 5–10.5)
POTASSIUM SERPL-SCNC: 3.8 MMOL/L (ref 3.5–5.1)
RBC # BLD AUTO: 4.01 M/UL (ref 4.2–5.9)
SODIUM SERPL-SCNC: 142 MMOL/L (ref 136–145)
WBC # BLD AUTO: 10.9 K/UL (ref 4–11)

## 2023-05-17 PROCEDURE — 2580000003 HC RX 258: Performed by: PHYSICAL MEDICINE & REHABILITATION

## 2023-05-17 PROCEDURE — 1280000000 HC REHAB R&B

## 2023-05-17 PROCEDURE — 6360000002 HC RX W HCPCS: Performed by: PHYSICAL MEDICINE & REHABILITATION

## 2023-05-17 PROCEDURE — 99231 SBSQ HOSP IP/OBS SF/LOW 25: CPT | Performed by: INTERNAL MEDICINE

## 2023-05-17 PROCEDURE — 80048 BASIC METABOLIC PNL TOTAL CA: CPT

## 2023-05-17 PROCEDURE — 6370000000 HC RX 637 (ALT 250 FOR IP): Performed by: INTERNAL MEDICINE

## 2023-05-17 PROCEDURE — 6360000002 HC RX W HCPCS: Performed by: HOSPITALIST

## 2023-05-17 PROCEDURE — 36415 COLL VENOUS BLD VENIPUNCTURE: CPT

## 2023-05-17 PROCEDURE — 6370000000 HC RX 637 (ALT 250 FOR IP): Performed by: PHYSICAL MEDICINE & REHABILITATION

## 2023-05-17 PROCEDURE — 85025 COMPLETE CBC W/AUTO DIFF WBC: CPT

## 2023-05-17 PROCEDURE — 2580000003 HC RX 258: Performed by: INTERNAL MEDICINE

## 2023-05-17 RX ORDER — CLOZAPINE 25 MG/1
25 TABLET ORAL 2 TIMES DAILY
Status: DISCONTINUED | OUTPATIENT
Start: 2023-05-17 | End: 2023-05-18 | Stop reason: HOSPADM

## 2023-05-17 RX ORDER — LACTULOSE 10 G/15ML
30 SOLUTION ORAL 2 TIMES DAILY PRN
Status: DISCONTINUED | OUTPATIENT
Start: 2023-05-17 | End: 2023-05-18 | Stop reason: HOSPADM

## 2023-05-17 RX ORDER — GUAIFENESIN/DEXTROMETHORPHAN 100-10MG/5
5 SYRUP ORAL EVERY 4 HOURS PRN
Status: DISCONTINUED | OUTPATIENT
Start: 2023-05-17 | End: 2023-05-18 | Stop reason: HOSPADM

## 2023-05-17 RX ORDER — LACTULOSE 10 G/15ML
30 SOLUTION ORAL 2 TIMES DAILY PRN
Status: CANCELLED | OUTPATIENT
Start: 2023-05-17

## 2023-05-17 RX ORDER — DEXTROSE MONOHYDRATE 100 MG/ML
INJECTION, SOLUTION INTRAVENOUS CONTINUOUS PRN
Status: DISCONTINUED | OUTPATIENT
Start: 2023-05-17 | End: 2023-05-18 | Stop reason: HOSPADM

## 2023-05-17 RX ORDER — ONDANSETRON 4 MG/1
4 TABLET, ORALLY DISINTEGRATING ORAL EVERY 8 HOURS PRN
Status: CANCELLED | OUTPATIENT
Start: 2023-05-17

## 2023-05-17 RX ORDER — INSULIN LISPRO 100 [IU]/ML
0-8 INJECTION, SOLUTION INTRAVENOUS; SUBCUTANEOUS
Status: CANCELLED | OUTPATIENT
Start: 2023-05-17

## 2023-05-17 RX ORDER — INSULIN LISPRO 100 [IU]/ML
0-4 INJECTION, SOLUTION INTRAVENOUS; SUBCUTANEOUS NIGHTLY
Status: CANCELLED | OUTPATIENT
Start: 2023-05-17

## 2023-05-17 RX ORDER — NITROGLYCERIN 0.4 MG/1
0.4 TABLET SUBLINGUAL EVERY 5 MIN PRN
Status: DISCONTINUED | OUTPATIENT
Start: 2023-05-17 | End: 2023-05-18 | Stop reason: HOSPADM

## 2023-05-17 RX ORDER — POLYETHYLENE GLYCOL 3350 17 G/17G
17 POWDER, FOR SOLUTION ORAL DAILY PRN
Status: DISCONTINUED | OUTPATIENT
Start: 2023-05-17 | End: 2023-05-18 | Stop reason: HOSPADM

## 2023-05-17 RX ORDER — FUROSEMIDE 10 MG/ML
20 INJECTION INTRAMUSCULAR; INTRAVENOUS ONCE
Status: COMPLETED | OUTPATIENT
Start: 2023-05-17 | End: 2023-05-17

## 2023-05-17 RX ORDER — PRIMIDONE 50 MG/1
50 TABLET ORAL NIGHTLY
Status: DISCONTINUED | OUTPATIENT
Start: 2023-05-17 | End: 2023-05-18 | Stop reason: HOSPADM

## 2023-05-17 RX ORDER — ASPIRIN 81 MG/1
81 TABLET ORAL DAILY
Status: CANCELLED | OUTPATIENT
Start: 2023-05-18

## 2023-05-17 RX ORDER — MULTIVITAMIN WITH IRON
1 TABLET ORAL DAILY
Status: CANCELLED | OUTPATIENT
Start: 2023-05-18

## 2023-05-17 RX ORDER — CETIRIZINE HYDROCHLORIDE 10 MG/1
10 TABLET ORAL DAILY
Status: CANCELLED | OUTPATIENT
Start: 2023-05-18

## 2023-05-17 RX ORDER — ATORVASTATIN CALCIUM 80 MG/1
80 TABLET, FILM COATED ORAL NIGHTLY
Status: CANCELLED | OUTPATIENT
Start: 2023-05-17

## 2023-05-17 RX ORDER — GUAIFENESIN/DEXTROMETHORPHAN 100-10MG/5
5 SYRUP ORAL EVERY 4 HOURS PRN
Status: CANCELLED | OUTPATIENT
Start: 2023-05-17

## 2023-05-17 RX ORDER — SODIUM CHLORIDE 0.9 % (FLUSH) 0.9 %
5-40 SYRINGE (ML) INJECTION EVERY 12 HOURS SCHEDULED
Status: DISCONTINUED | OUTPATIENT
Start: 2023-05-17 | End: 2023-05-18 | Stop reason: HOSPADM

## 2023-05-17 RX ORDER — ONDANSETRON 4 MG/1
4 TABLET, ORALLY DISINTEGRATING ORAL EVERY 8 HOURS PRN
Status: DISCONTINUED | OUTPATIENT
Start: 2023-05-17 | End: 2023-05-18 | Stop reason: HOSPADM

## 2023-05-17 RX ORDER — IPRATROPIUM BROMIDE 42 UG/1
1 SPRAY, METERED NASAL 4 TIMES DAILY
Status: CANCELLED | OUTPATIENT
Start: 2023-05-17

## 2023-05-17 RX ORDER — ASPIRIN 81 MG/1
81 TABLET ORAL DAILY
Status: DISCONTINUED | OUTPATIENT
Start: 2023-05-18 | End: 2023-05-18 | Stop reason: HOSPADM

## 2023-05-17 RX ORDER — SODIUM CHLORIDE 0.9 % (FLUSH) 0.9 %
5-40 SYRINGE (ML) INJECTION PRN
Status: CANCELLED | OUTPATIENT
Start: 2023-05-17

## 2023-05-17 RX ORDER — DONEPEZIL HYDROCHLORIDE 5 MG/1
10 TABLET, FILM COATED ORAL NIGHTLY
Status: DISCONTINUED | OUTPATIENT
Start: 2023-05-17 | End: 2023-05-18 | Stop reason: HOSPADM

## 2023-05-17 RX ORDER — IPRATROPIUM BROMIDE 42 UG/1
1 SPRAY, METERED NASAL 4 TIMES DAILY
Status: DISCONTINUED | OUTPATIENT
Start: 2023-05-17 | End: 2023-05-18 | Stop reason: HOSPADM

## 2023-05-17 RX ORDER — ACETAMINOPHEN 325 MG/1
650 TABLET ORAL EVERY 4 HOURS PRN
Status: CANCELLED | OUTPATIENT
Start: 2023-05-17

## 2023-05-17 RX ORDER — ENOXAPARIN SODIUM 100 MG/ML
40 INJECTION SUBCUTANEOUS DAILY
Status: CANCELLED | OUTPATIENT
Start: 2023-05-17

## 2023-05-17 RX ORDER — ACETAMINOPHEN 325 MG/1
650 TABLET ORAL EVERY 4 HOURS PRN
Status: DISCONTINUED | OUTPATIENT
Start: 2023-05-17 | End: 2023-05-18 | Stop reason: HOSPADM

## 2023-05-17 RX ORDER — CETIRIZINE HYDROCHLORIDE 10 MG/1
10 TABLET ORAL DAILY
Status: DISCONTINUED | OUTPATIENT
Start: 2023-05-18 | End: 2023-05-18 | Stop reason: HOSPADM

## 2023-05-17 RX ORDER — CLOZAPINE 25 MG/1
25 TABLET ORAL 2 TIMES DAILY
Status: CANCELLED | OUTPATIENT
Start: 2023-05-17

## 2023-05-17 RX ORDER — SODIUM CHLORIDE 0.9 % (FLUSH) 0.9 %
5-40 SYRINGE (ML) INJECTION PRN
Status: DISCONTINUED | OUTPATIENT
Start: 2023-05-17 | End: 2023-05-18 | Stop reason: HOSPADM

## 2023-05-17 RX ORDER — FLUTICASONE PROPIONATE 50 MCG
1 SPRAY, SUSPENSION (ML) NASAL NIGHTLY
Status: CANCELLED | OUTPATIENT
Start: 2023-05-17

## 2023-05-17 RX ORDER — PRIMIDONE 50 MG/1
50 TABLET ORAL NIGHTLY
Status: CANCELLED | OUTPATIENT
Start: 2023-05-17

## 2023-05-17 RX ORDER — INSULIN LISPRO 100 [IU]/ML
0-8 INJECTION, SOLUTION INTRAVENOUS; SUBCUTANEOUS
Status: DISCONTINUED | OUTPATIENT
Start: 2023-05-17 | End: 2023-05-18 | Stop reason: HOSPADM

## 2023-05-17 RX ORDER — DEXTROSE MONOHYDRATE 100 MG/ML
INJECTION, SOLUTION INTRAVENOUS CONTINUOUS PRN
Status: CANCELLED | OUTPATIENT
Start: 2023-05-17

## 2023-05-17 RX ORDER — LEVOFLOXACIN 750 MG/1
750 TABLET ORAL DAILY
Qty: 10 TABLET | Refills: 0 | Status: ON HOLD | OUTPATIENT
Start: 2023-05-17 | End: 2023-05-19 | Stop reason: HOSPADM

## 2023-05-17 RX ORDER — ENOXAPARIN SODIUM 100 MG/ML
40 INJECTION SUBCUTANEOUS DAILY
Status: DISCONTINUED | OUTPATIENT
Start: 2023-05-18 | End: 2023-05-18 | Stop reason: HOSPADM

## 2023-05-17 RX ORDER — NITROGLYCERIN 0.4 MG/1
0.4 TABLET SUBLINGUAL EVERY 5 MIN PRN
Status: CANCELLED | OUTPATIENT
Start: 2023-05-17

## 2023-05-17 RX ORDER — BISACODYL 5 MG/1
5 TABLET, DELAYED RELEASE ORAL DAILY
Status: CANCELLED | OUTPATIENT
Start: 2023-05-17

## 2023-05-17 RX ORDER — PANTOPRAZOLE SODIUM 40 MG/1
40 TABLET, DELAYED RELEASE ORAL
Status: CANCELLED | OUTPATIENT
Start: 2023-05-18

## 2023-05-17 RX ORDER — BISACODYL 5 MG/1
5 TABLET, DELAYED RELEASE ORAL DAILY
Status: DISCONTINUED | OUTPATIENT
Start: 2023-05-17 | End: 2023-05-18 | Stop reason: HOSPADM

## 2023-05-17 RX ORDER — ONDANSETRON 2 MG/ML
4 INJECTION INTRAMUSCULAR; INTRAVENOUS EVERY 6 HOURS PRN
Status: CANCELLED | OUTPATIENT
Start: 2023-05-17

## 2023-05-17 RX ORDER — MULTIVITAMIN WITH IRON
1 TABLET ORAL DAILY
Status: DISCONTINUED | OUTPATIENT
Start: 2023-05-18 | End: 2023-05-18 | Stop reason: HOSPADM

## 2023-05-17 RX ORDER — PANTOPRAZOLE SODIUM 40 MG/1
40 TABLET, DELAYED RELEASE ORAL
Status: DISCONTINUED | OUTPATIENT
Start: 2023-05-18 | End: 2023-05-18 | Stop reason: HOSPADM

## 2023-05-17 RX ORDER — MIDODRINE HYDROCHLORIDE 5 MG/1
2.5 TABLET ORAL 3 TIMES DAILY
Status: CANCELLED | OUTPATIENT
Start: 2023-05-17

## 2023-05-17 RX ORDER — ONDANSETRON 2 MG/ML
4 INJECTION INTRAMUSCULAR; INTRAVENOUS EVERY 6 HOURS PRN
Status: DISCONTINUED | OUTPATIENT
Start: 2023-05-17 | End: 2023-05-18 | Stop reason: HOSPADM

## 2023-05-17 RX ORDER — LEVOFLOXACIN 5 MG/ML
750 INJECTION, SOLUTION INTRAVENOUS EVERY 24 HOURS
Status: DISCONTINUED | OUTPATIENT
Start: 2023-05-17 | End: 2023-05-18 | Stop reason: HOSPADM

## 2023-05-17 RX ORDER — INSULIN LISPRO 100 [IU]/ML
0-4 INJECTION, SOLUTION INTRAVENOUS; SUBCUTANEOUS NIGHTLY
Status: DISCONTINUED | OUTPATIENT
Start: 2023-05-17 | End: 2023-05-18 | Stop reason: HOSPADM

## 2023-05-17 RX ORDER — ATORVASTATIN CALCIUM 80 MG/1
80 TABLET, FILM COATED ORAL NIGHTLY
Status: DISCONTINUED | OUTPATIENT
Start: 2023-05-17 | End: 2023-05-18 | Stop reason: HOSPADM

## 2023-05-17 RX ORDER — DONEPEZIL HYDROCHLORIDE 5 MG/1
10 TABLET, FILM COATED ORAL NIGHTLY
Status: CANCELLED | OUTPATIENT
Start: 2023-05-17

## 2023-05-17 RX ORDER — GLUCAGON 1 MG/ML
1 KIT INJECTION PRN
Status: DISCONTINUED | OUTPATIENT
Start: 2023-05-17 | End: 2023-05-18 | Stop reason: HOSPADM

## 2023-05-17 RX ORDER — METHYLDOPA/HYDROCHLOROTHIAZIDE 250MG-15MG
100 TABLET ORAL DAILY
Status: CANCELLED | OUTPATIENT
Start: 2023-05-18

## 2023-05-17 RX ORDER — FLUTICASONE PROPIONATE 50 MCG
1 SPRAY, SUSPENSION (ML) NASAL NIGHTLY
Status: DISCONTINUED | OUTPATIENT
Start: 2023-05-17 | End: 2023-05-18 | Stop reason: HOSPADM

## 2023-05-17 RX ORDER — MIDODRINE HYDROCHLORIDE 5 MG/1
2.5 TABLET ORAL 3 TIMES DAILY
Status: DISCONTINUED | OUTPATIENT
Start: 2023-05-18 | End: 2023-05-18 | Stop reason: HOSPADM

## 2023-05-17 RX ORDER — POLYETHYLENE GLYCOL 3350 17 G/17G
17 POWDER, FOR SOLUTION ORAL DAILY PRN
Status: CANCELLED | OUTPATIENT
Start: 2023-05-17

## 2023-05-17 RX ORDER — METHYLDOPA/HYDROCHLOROTHIAZIDE 250MG-15MG
100 TABLET ORAL DAILY
Status: DISCONTINUED | OUTPATIENT
Start: 2023-05-18 | End: 2023-05-18 | Stop reason: HOSPADM

## 2023-05-17 RX ORDER — LEVOFLOXACIN 5 MG/ML
750 INJECTION, SOLUTION INTRAVENOUS EVERY 24 HOURS
Status: CANCELLED | OUTPATIENT
Start: 2023-05-17 | End: 2023-05-22

## 2023-05-17 RX ORDER — SODIUM CHLORIDE 0.9 % (FLUSH) 0.9 %
5-40 SYRINGE (ML) INJECTION EVERY 12 HOURS SCHEDULED
Status: CANCELLED | OUTPATIENT
Start: 2023-05-17

## 2023-05-17 RX ADMIN — ASPIRIN 81 MG: 81 TABLET, COATED ORAL at 08:59

## 2023-05-17 RX ADMIN — ENOXAPARIN SODIUM 40 MG: 100 INJECTION SUBCUTANEOUS at 09:00

## 2023-05-17 RX ADMIN — Medication 10 ML: at 06:10

## 2023-05-17 RX ADMIN — CLOZAPINE 25 MG: 25 TABLET ORAL at 09:00

## 2023-05-17 RX ADMIN — CARBIDOPA AND LEVODOPA 1 TABLET: 25; 100 TABLET ORAL at 13:06

## 2023-05-17 RX ADMIN — CARBIDOPA AND LEVODOPA 1 TABLET: 25; 100 TABLET ORAL at 17:08

## 2023-05-17 RX ADMIN — DONEPEZIL HYDROCHLORIDE 10 MG: 5 TABLET, FILM COATED ORAL at 21:28

## 2023-05-17 RX ADMIN — CARBIDOPA AND LEVODOPA 1 TABLET: 25; 100 TABLET ORAL at 15:04

## 2023-05-17 RX ADMIN — CARBIDOPA AND LEVODOPA 1 TABLET: 25; 100 TABLET ORAL at 18:49

## 2023-05-17 RX ADMIN — IPRATROPIUM BROMIDE 1 SPRAY: 42 SPRAY, METERED NASAL at 06:11

## 2023-05-17 RX ADMIN — Medication 10 ML: at 09:01

## 2023-05-17 RX ADMIN — CETIRIZINE HYDROCHLORIDE 10 MG: 10 TABLET, FILM COATED ORAL at 09:00

## 2023-05-17 RX ADMIN — Medication 1 PACKET: at 21:28

## 2023-05-17 RX ADMIN — THERA TABS 1 TABLET: TAB at 09:01

## 2023-05-17 RX ADMIN — FLUTICASONE PROPIONATE 1 SPRAY: 50 SPRAY, METERED NASAL at 21:28

## 2023-05-17 RX ADMIN — PANTOPRAZOLE SODIUM 40 MG: 40 TABLET, DELAYED RELEASE ORAL at 06:11

## 2023-05-17 RX ADMIN — CARBIDOPA AND LEVODOPA 1 TABLET: 25; 100 TABLET ORAL at 11:13

## 2023-05-17 RX ADMIN — FUROSEMIDE 20 MG: 10 INJECTION, SOLUTION INTRAMUSCULAR; INTRAVENOUS at 09:00

## 2023-05-17 RX ADMIN — MIDODRINE HYDROCHLORIDE 2.5 MG: 5 TABLET ORAL at 09:00

## 2023-05-17 RX ADMIN — Medication 10 ML: at 09:00

## 2023-05-17 RX ADMIN — Medication 100 MCG: at 08:59

## 2023-05-17 RX ADMIN — MIDODRINE HYDROCHLORIDE 2.5 MG: 5 TABLET ORAL at 15:03

## 2023-05-17 RX ADMIN — CLOZAPINE 25 MG: 25 TABLET ORAL at 21:29

## 2023-05-17 RX ADMIN — IPRATROPIUM BROMIDE 1 SPRAY: 42 SPRAY, METERED NASAL at 21:28

## 2023-05-17 RX ADMIN — Medication 10 ML: at 21:30

## 2023-05-17 RX ADMIN — ATORVASTATIN CALCIUM 80 MG: 80 TABLET, FILM COATED ORAL at 21:28

## 2023-05-17 RX ADMIN — PRIMIDONE 50 MG: 50 TABLET ORAL at 21:28

## 2023-05-17 RX ADMIN — LEVOFLOXACIN 750 MG: 5 INJECTION, SOLUTION INTRAVENOUS at 17:13

## 2023-05-17 RX ADMIN — CARBIDOPA AND LEVODOPA 1 TABLET: 25; 100 TABLET ORAL at 09:00

## 2023-05-17 ASSESSMENT — LIFESTYLE VARIABLES
HOW OFTEN DO YOU HAVE A DRINK CONTAINING ALCOHOL: MONTHLY OR LESS
HOW MANY STANDARD DRINKS CONTAINING ALCOHOL DO YOU HAVE ON A TYPICAL DAY: 1 OR 2

## 2023-05-17 ASSESSMENT — PAIN SCALES - GENERAL: PAINLEVEL_OUTOF10: 0

## 2023-05-17 NOTE — PROGRESS NOTES
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 balance training, functional mobility training, transfer training, gait training, patient/caregiver education, safety education, and equipment evaluation/education    Goals  Patient Goals: None stated   Short Term Goals:  Time Frame: Discharge  Patient will complete bed mobility at minimal assistance   Patient will complete transfers at minimal assistance   Patient will ambulate 25 ft with use of rolling walker at minimal assistance       Therapy Session Time      Individual Group Co-treatment   Time In     0852   Time Out     1002   Minutes     70     Timed Code Treatment Minutes:  55 Minutes  Total Treatment Minutes:  70       Electronically Signed By: Lilly Levy, PT  Lilly Levy, [id number]

## 2023-05-17 NOTE — PLAN OF CARE
Pt and spouse updated on current plan of care. Problem: Safety - Adult  Goal: Free from fall injury  Outcome: Progressing     Problem: Chronic Conditions and Co-morbidities  Goal: Patient's chronic conditions and co-morbidity symptoms are monitored and maintained or improved  Outcome: Progressing  Flowsheets (Taken 5/17/2023 0915)  Care Plan - Patient's Chronic Conditions and Co-Morbidity Symptoms are Monitored and Maintained or Improved: Monitor and assess patient's chronic conditions and comorbid symptoms for stability, deterioration, or improvement     Problem: Discharge Planning  Goal: Discharge to home or other facility with appropriate resources  Outcome: Progressing  Flowsheets (Taken 5/17/2023 0915)  Discharge to home or other facility with appropriate resources: Identify barriers to discharge with patient and caregiver     Problem: Skin/Tissue Integrity  Goal: Absence of new skin breakdown  Description: 1. Monitor for areas of redness and/or skin breakdown  2. Assess vascular access sites hourly  3. Every 4-6 hours minimum:  Change oxygen saturation probe site  4. Every 4-6 hours:  If on nasal continuous positive airway pressure, respiratory therapy assess nares and determine need for appliance change or resting period.   Outcome: Progressing     Problem: ABCDS Injury Assessment  Goal: Absence of physical injury  Outcome: Progressing     Problem: Pain  Goal: Verbalizes/displays adequate comfort level or baseline comfort level  Outcome: Progressing

## 2023-05-17 NOTE — PLAN OF CARE
[address], [address] [place] [address] Drive  [phone]      Ciaran Morning    : 1945  Acct #: [de-identified]  MRN: [id number]  PHYSICIAN:  Benji Richard DO  Primary Problem    Patient Active Problem List   Diagnosis    Coronary artery disease involving native coronary artery of native heart without angina pectoris    Mixed hyperlipidemia    Angina pectoris, unspecified (HCC)    Dizziness    PVC's (premature ventricular contractions)    Hypertension    Bilateral carotid artery stenosis    Parkinson disease ([facility]    Bilateral carotid artery disease, unspecified type ([facility]    Hypotension    Type 2 diabetes mellitus without complication, without long-term current use of insulin (HCC)    COVID-19    Aspiration pneumonia ([facility])    Acute metabolic encephalopathy    Sepsis ([facility]    Acute aspiration pneumonia ([facility]    Pneumonia of left lower lobe due to infectious organism       Rehabilitation Diagnosis:  ***    ADMIT DATE:2023    Patient Goals: ***  Admitting Impairments: ***  Activities: ***  Participation: ***   CARE PLAN     NURSING:  Ciaran Morning while on this unit will:  [] Be continent of bowel and bladder     [x] Have an adequate number of bowel movements  [] Urinate with no urinary retention >300ml in bladder  [] Complete bladder protocol with houser removal  [] Maintain O2 SATs at ___%  [] Have pain managed while on ARU       [] Be pain free by discharge   [x] Have no skin breakdown while on ARU  [] Have improved skin integrity via wound measurements  [] Have no signs/symptoms of infection at the wound site  [x] Be free from injury during hospitalization   [] Complete education with patient/family with understanding demonstrated for:  [] Adjustment   [] Other:     Nursing Interventions will include:  [] bowel/bladder training   [x] education for medical assistive devices   [x] medication education   [] O2 saturation management   [] energy

## 2023-05-18 ENCOUNTER — APPOINTMENT (OUTPATIENT)
Dept: CT IMAGING | Age: 78
DRG: 072 | End: 2023-05-18
Attending: PHYSICAL MEDICINE & REHABILITATION
Payer: MEDICARE

## 2023-05-18 ENCOUNTER — HOSPITAL ENCOUNTER (OUTPATIENT)
Age: 78
Setting detail: OBSERVATION
Discharge: NURSING, REHAB OR LONG-TERM CARE | End: 2023-05-21
Attending: INTERNAL MEDICINE | Admitting: INTERNAL MEDICINE
Payer: MEDICARE

## 2023-05-18 ENCOUNTER — APPOINTMENT (OUTPATIENT)
Dept: MRI IMAGING | Age: 78
End: 2023-05-18
Attending: INTERNAL MEDICINE
Payer: MEDICARE

## 2023-05-18 ENCOUNTER — APPOINTMENT (OUTPATIENT)
Dept: GENERAL RADIOLOGY | Age: 78
End: 2023-05-18
Attending: INTERNAL MEDICINE
Payer: MEDICARE

## 2023-05-18 PROBLEM — G45.9 TIA (TRANSIENT ISCHEMIC ATTACK): Status: ACTIVE | Noted: 2023-05-18

## 2023-05-18 LAB
ANION GAP SERPL CALCULATED.3IONS-SCNC: 7 MMOL/L (ref 3–16)
BACTERIA BLD CULT ORG #2: NORMAL
BACTERIA BLD CULT: NORMAL
BASE EXCESS BLDV CALC-SCNC: 2.9 MMOL/L (ref -3–3)
BASOPHILS # BLD: 0 K/UL (ref 0–0.2)
BASOPHILS NFR BLD: 0.3 %
BUN SERPL-MCNC: 14 MG/DL (ref 7–20)
CALCIUM SERPL-MCNC: 9.3 MG/DL (ref 8.3–10.6)
CHLORIDE SERPL-SCNC: 105 MMOL/L (ref 99–110)
CO2 BLDV-SCNC: 65 MMOL/L
CO2 SERPL-SCNC: 30 MMOL/L (ref 21–32)
COHGB MFR BLDV: 5.2 % (ref 0–1.5)
CREAT SERPL-MCNC: 0.8 MG/DL (ref 0.8–1.3)
DEPRECATED RDW RBC AUTO: 13.9 % (ref 12.4–15.4)
EOSINOPHIL # BLD: 0.5 K/UL (ref 0–0.6)
EOSINOPHIL NFR BLD: 6 %
GFR SERPLBLD CREATININE-BSD FMLA CKD-EPI: >60 ML/MIN/{1.73_M2}
GLUCOSE BLD-MCNC: 115 MG/DL (ref 70–99)
GLUCOSE BLD-MCNC: 129 MG/DL (ref 70–99)
GLUCOSE SERPL-MCNC: 110 MG/DL (ref 70–99)
HCO3 BLDV-SCNC: 27.8 MMOL/L (ref 23–29)
HCT VFR BLD AUTO: 38.6 % (ref 40.5–52.5)
HGB BLD-MCNC: 12.9 G/DL (ref 13.5–17.5)
LYMPHOCYTES # BLD: 1.9 K/UL (ref 1–5.1)
LYMPHOCYTES NFR BLD: 22.6 %
MCH RBC QN AUTO: 30.6 PG (ref 26–34)
MCHC RBC AUTO-ENTMCNC: 33.3 G/DL (ref 31–36)
MCV RBC AUTO: 91.9 FL (ref 80–100)
METHGB MFR BLDV: ABNORMAL %
MONOCYTES # BLD: 0.9 K/UL (ref 0–1.3)
MONOCYTES NFR BLD: 10.7 %
NEUTROPHILS # BLD: 5 K/UL (ref 1.7–7.7)
NEUTROPHILS NFR BLD: 60.4 %
O2 CT VFR BLDV CALC: 18 VOL %
O2 THERAPY: ABNORMAL
PCO2 BLDV: 42.8 MMHG (ref 40–50)
PERFORMED ON: ABNORMAL
PERFORMED ON: ABNORMAL
PH BLDV: 7.42 [PH] (ref 7.35–7.45)
PLATELET # BLD AUTO: 208 K/UL (ref 135–450)
PMV BLD AUTO: 7.6 FL (ref 5–10.5)
PO2 BLDV: 116 MMHG (ref 25–40)
POTASSIUM SERPL-SCNC: 3.9 MMOL/L (ref 3.5–5.1)
RBC # BLD AUTO: 4.2 M/UL (ref 4.2–5.9)
SAO2 % BLDV: 99 %
SODIUM SERPL-SCNC: 142 MMOL/L (ref 136–145)
TROPONIN, HIGH SENSITIVITY: 18 NG/L (ref 0–22)
WBC # BLD AUTO: 8.4 K/UL (ref 4–11)

## 2023-05-18 PROCEDURE — 36415 COLL VENOUS BLD VENIPUNCTURE: CPT

## 2023-05-18 PROCEDURE — G0378 HOSPITAL OBSERVATION PER HR: HCPCS

## 2023-05-18 PROCEDURE — 71045 X-RAY EXAM CHEST 1 VIEW: CPT

## 2023-05-18 PROCEDURE — 94660 CPAP INITIATION&MGMT: CPT

## 2023-05-18 PROCEDURE — 99223 1ST HOSP IP/OBS HIGH 75: CPT

## 2023-05-18 PROCEDURE — 92610 EVALUATE SWALLOWING FUNCTION: CPT

## 2023-05-18 PROCEDURE — 97166 OT EVAL MOD COMPLEX 45 MIN: CPT

## 2023-05-18 PROCEDURE — 82803 BLOOD GASES ANY COMBINATION: CPT

## 2023-05-18 PROCEDURE — 70551 MRI BRAIN STEM W/O DYE: CPT

## 2023-05-18 PROCEDURE — 6360000002 HC RX W HCPCS: Performed by: INTERNAL MEDICINE

## 2023-05-18 PROCEDURE — 85025 COMPLETE CBC W/AUTO DIFF WBC: CPT

## 2023-05-18 PROCEDURE — G0379 DIRECT REFER HOSPITAL OBSERV: HCPCS

## 2023-05-18 PROCEDURE — 70450 CT HEAD/BRAIN W/O DYE: CPT

## 2023-05-18 PROCEDURE — 2580000003 HC RX 258: Performed by: INTERNAL MEDICINE

## 2023-05-18 PROCEDURE — 97530 THERAPEUTIC ACTIVITIES: CPT

## 2023-05-18 PROCEDURE — 92526 ORAL FUNCTION THERAPY: CPT

## 2023-05-18 PROCEDURE — 6370000000 HC RX 637 (ALT 250 FOR IP): Performed by: INTERNAL MEDICINE

## 2023-05-18 PROCEDURE — 97112 NEUROMUSCULAR REEDUCATION: CPT

## 2023-05-18 PROCEDURE — 6370000000 HC RX 637 (ALT 250 FOR IP): Performed by: HOSPITALIST

## 2023-05-18 PROCEDURE — 84484 ASSAY OF TROPONIN QUANT: CPT

## 2023-05-18 PROCEDURE — 80048 BASIC METABOLIC PNL TOTAL CA: CPT

## 2023-05-18 PROCEDURE — 97162 PT EVAL MOD COMPLEX 30 MIN: CPT

## 2023-05-18 RX ORDER — POLYETHYLENE GLYCOL 3350 17 G/17G
17 POWDER, FOR SOLUTION ORAL DAILY PRN
Status: DISCONTINUED | OUTPATIENT
Start: 2023-05-18 | End: 2023-05-21 | Stop reason: HOSPADM

## 2023-05-18 RX ORDER — ASPIRIN 300 MG/1
300 SUPPOSITORY RECTAL DAILY
Status: DISCONTINUED | OUTPATIENT
Start: 2023-05-18 | End: 2023-05-21 | Stop reason: HOSPADM

## 2023-05-18 RX ORDER — CLOZAPINE 25 MG/1
25 TABLET ORAL 2 TIMES DAILY
Status: DISCONTINUED | OUTPATIENT
Start: 2023-05-18 | End: 2023-05-21 | Stop reason: HOSPADM

## 2023-05-18 RX ORDER — LABETALOL HYDROCHLORIDE 5 MG/ML
10 INJECTION, SOLUTION INTRAVENOUS EVERY 10 MIN PRN
Status: DISCONTINUED | OUTPATIENT
Start: 2023-05-18 | End: 2023-05-21 | Stop reason: HOSPADM

## 2023-05-18 RX ORDER — ONDANSETRON 2 MG/ML
4 INJECTION INTRAMUSCULAR; INTRAVENOUS EVERY 6 HOURS PRN
Status: DISCONTINUED | OUTPATIENT
Start: 2023-05-18 | End: 2023-05-21 | Stop reason: HOSPADM

## 2023-05-18 RX ORDER — ATORVASTATIN CALCIUM 40 MG/1
40 TABLET, FILM COATED ORAL NIGHTLY
Status: DISCONTINUED | OUTPATIENT
Start: 2023-05-18 | End: 2023-05-18

## 2023-05-18 RX ORDER — ASPIRIN 81 MG/1
81 TABLET ORAL DAILY
Status: DISCONTINUED | OUTPATIENT
Start: 2023-05-18 | End: 2023-05-21 | Stop reason: HOSPADM

## 2023-05-18 RX ORDER — CARBIDOPA AND LEVODOPA 25; 100 MG/1; MG/1
1 TABLET, EXTENDED RELEASE ORAL 4 TIMES DAILY
Status: DISCONTINUED | OUTPATIENT
Start: 2023-05-18 | End: 2023-05-18 | Stop reason: SDUPTHER

## 2023-05-18 RX ORDER — ATORVASTATIN CALCIUM 80 MG/1
80 TABLET, FILM COATED ORAL NIGHTLY
Status: DISCONTINUED | OUTPATIENT
Start: 2023-05-18 | End: 2023-05-21 | Stop reason: HOSPADM

## 2023-05-18 RX ORDER — DONEPEZIL HYDROCHLORIDE 5 MG/1
10 TABLET, FILM COATED ORAL NIGHTLY
Status: DISCONTINUED | OUTPATIENT
Start: 2023-05-18 | End: 2023-05-21 | Stop reason: HOSPADM

## 2023-05-18 RX ORDER — ONDANSETRON 4 MG/1
4 TABLET, ORALLY DISINTEGRATING ORAL EVERY 8 HOURS PRN
Status: DISCONTINUED | OUTPATIENT
Start: 2023-05-18 | End: 2023-05-21 | Stop reason: HOSPADM

## 2023-05-18 RX ORDER — ENOXAPARIN SODIUM 100 MG/ML
40 INJECTION SUBCUTANEOUS DAILY
Status: DISCONTINUED | OUTPATIENT
Start: 2023-05-18 | End: 2023-05-21 | Stop reason: HOSPADM

## 2023-05-18 RX ORDER — MIDODRINE HYDROCHLORIDE 5 MG/1
2.5 TABLET ORAL 3 TIMES DAILY
Status: DISCONTINUED | OUTPATIENT
Start: 2023-05-18 | End: 2023-05-21 | Stop reason: HOSPADM

## 2023-05-18 RX ADMIN — ASPIRIN 300 MG: 300 SUPPOSITORY RECTAL at 08:14

## 2023-05-18 RX ADMIN — DONEPEZIL HYDROCHLORIDE 10 MG: 5 TABLET, FILM COATED ORAL at 20:45

## 2023-05-18 RX ADMIN — CARBIDOPA AND LEVODOPA 1 TABLET: 25; 100 TABLET ORAL at 20:40

## 2023-05-18 RX ADMIN — CARBIDOPA AND LEVODOPA 1 TABLET: 25; 100 TABLET ORAL at 18:47

## 2023-05-18 RX ADMIN — CARBIDOPA AND LEVODOPA 1 TABLET: 25; 100 TABLET ORAL at 15:46

## 2023-05-18 RX ADMIN — ENOXAPARIN SODIUM 40 MG: 100 INJECTION SUBCUTANEOUS at 08:15

## 2023-05-18 RX ADMIN — ATORVASTATIN CALCIUM 80 MG: 80 TABLET, FILM COATED ORAL at 20:40

## 2023-05-18 RX ADMIN — PIPERACILLIN AND TAZOBACTAM 3375 MG: 3; .375 INJECTION, POWDER, LYOPHILIZED, FOR SOLUTION INTRAVENOUS at 20:42

## 2023-05-18 RX ADMIN — PIPERACILLIN AND TAZOBACTAM 3375 MG: 3; .375 INJECTION, POWDER, LYOPHILIZED, FOR SOLUTION INTRAVENOUS at 06:56

## 2023-05-18 RX ADMIN — PIPERACILLIN AND TAZOBACTAM 3375 MG: 3; .375 INJECTION, POWDER, LYOPHILIZED, FOR SOLUTION INTRAVENOUS at 14:32

## 2023-05-18 RX ADMIN — CLOZAPINE 25 MG: 25 TABLET ORAL at 20:43

## 2023-05-18 ASSESSMENT — PAIN SCALES - GENERAL
PAINLEVEL_OUTOF10: 0

## 2023-05-18 NOTE — PROGRESS NOTES
4 Eyes Skin Assessment     NAME:  Joan Cummins OF BIRTH:  1945  MEDICAL RECORD NUMBER:  [id number]    The patient is being assessed for  Admission    I agree that at least one RN has performed a thorough Head to Toe Skin Assessment on the patient. ALL assessment sites listed below have been assessed. Areas assessed by both nurses:    Head, Face, Ears, Shoulders, Back, Chest, Arms, Elbows, Hands, Sacrum. Buttock, Coccyx, Ischium, and Legs. Feet and Heels        Does the Patient have a Wound?  No noted wound(s)       Goldy Prevention initiated by RN: Yes  Wound Care Orders initiated by RN: No    Pressure Injury (Stage 3,4, Unstageable, DTI, NWPT, and Complex wounds) if present, place Wound referral order by RN under : No    New Ostomies, if present place, Ostomy referral order under : No     Nurse 1 eSignature: Electronically signed by Jeniffer Holman RN on 5/17/23 at 6:32 PM EDT    **SHARE this note so that the co-signing nurse can place an eSignature**    Nurse 2 eSignature: Electronically signed by Judith Lucia RN on 5/17/23 at 7:24 PM EDT
ARU Discharge Assessment    Transportation  \"Has lack of transportation kept you from medical appointments, meetings, work, or from getting things needed for daily living? \"Check all that apply:  [] A.  Yes, it has kept me from medical appointments or from getting my medications  [] B.  Yes, it has kept me from non-medical meetings, appointments, work, or from getting things that I need  [] C.  No  [x] X. Patient unable to respond  [] Y. Patient declines to respond    Provision of Current Reconciled Medication List to Subsequent Provider at Discharge  [x] No, current reconciled medication list not provided to the subsequent provider.  [] Yes, current reconciled medication list provided to the subsequent provider. (**Select route of transmission below**)   [] Via Electronic Health Record   [] [employer]  [] Verbal (e.g. in person, telephone, video conferencing)  [] Paper-based (e.g. fax, copies, printouts)   [] Other Methods (e.g. texting, email, CDs)    Provision of Current Reconciled Medication List to Patient at Discharge  [x] No, current reconciled medication list not provided to the patient, family and/or caregiver.   [] Yes, current reconciled medication list provided to the patient, family and/or caregiver.  (**Select route of transmission below**)   [] [id number] [employer] Record (e.g., electronic access to patient portal)   [] [employer]  [] Verbal (e.g. in person, telephone, video conferencing)  [] Paper-based (e.g. fax, copies, printouts)   [] Other Methods (e.g. texting, email, CDs)    Health Literacy  \"How often do you need to have someone help you when you read instructions, pamphlets, or other written material from your doctor or pharmacy? \"  []  0. Never  []  1. Rarely  []  2. Sometimes  []  3. Often  []  4. Always  []  7. Patient declines to respond  [x]  8.   Patient unable to respond    BIMS - **Must be completed in the
Patient was admitted to room 4909 at 1526. Patient was oriented to the Call Light, Phone, TV, Thermostat, Bed Controls, Bathroom and Emergency Cord. Patient verbalized and demonstrated understanding of all. Patient was also given an overview of Unit Routines for Acute Rehab, including the patient's rights and responsibilities as well as the CMS IRF [place] Privacy Act Statement providing notice of data collection. Patient states that their normal bowel regime is daily. Meal times were explained, including how to order food. The white board, (which is posted on the wall by the door is used for communication) has the Therapy Scheduled that is posted each day along with the name of your doctor, nurse, and therapist for your convenience. We recommend any family that will be care givers or any care givers the patient has, take part in therapy. We have no set visiting hours, we suggest non-caregiver friends and family visitors come after therapy (at 4 PM or later) to allow patient to rest in between sessions.       In conjunction with the patient and patients family, this nurse worked to establish a tailored Fall TIPS plan to ensure patient safety and compliance:    Falls TIPS Completion    Patient identified as increased risk for harm if fall:  [x] Yes     Fall Risks  History of Falls:    [] Yes   Medication Side Effects:   [] Yes   Walking Aid:    [x] Yes   IV Pole or Equipment:   [x] Yes   Unsteady Walk:     [x] Yes   May Forget or Choose Not to Call: [x] Yes     Fall Interventions   Communicate Recent Fall and/or Risk of Harm: [x] Yes   Walking Aids:  Crutches: [] Yes   Cane: [] Yes   Walker: [x] Yes   IV Assistance When Walking: [x] Yes   Toileting Schedule: Every 2 Hours  Bedpan:   [] Yes   Assist to Commode: [] Yes   Assist to Bathroom: [x] Yes   Bed Alarm On: [] Yes   Assistance Out of Bed:  Bedrest: [] Yes   1 Person: [] Yes   2 People: [x] Yes
score 0-27, or enter 99 if unable to complete (if symptom frequency (column 2) is blank for 3 or more items). 0     Social Isolation  \"How often do you feel lonely or isolated from those around you? \"  [x] 0. Never  [] 1. Rarely  [] 2. Sometimes  [] 3. Often  [] 4. Always  [] 7. Patient declines to respond  [] 8. Patient unable to respond    Pain Effect on Sleep  \"Over the past 5 days, how much of the time has pain made it hard for you to sleep at night? \"  []  0. Does not apply - I have not had any pain or hurting in the past 5 days  [x]  1. Rarely or not at all  []  2. Occasionally  []  3. Frequently  []  4. Almost constantly  []  8. Unable to answer    **If the patient answers \"0. Does not apply\" to this question, skip the next two \"Pain Effect. Valri Dyandree Chri Avery \" questions**    Pain Interference with Therapy Activities  \"Over the past 5 days, how often have you limited your participation in rehabilitation therapy sessions due to pain? \"  []  0. Does not apply - I have not received rehabilitation therapy in the past 5 days  [x]  1. Rarely or not at all  []  2. Occasionally  []  3. Frequently  []  4. Almost constantly  []  8. Unable to answer    Pain Interference with Day-to-Day Activities: \"Over the past 5 days, how often have you limited your day-to-day activities (excluding rehabilitation therapy session)? \"  [x]  1. Rarely or not at all  []  2. Occasionally  []  3. Frequently  []  4. Almost constantly  []  8. Unable to answer    Nutritional Approaches  Check all of the following nutritional approaches that apply on admission:  []  A. Parenteral/IV feeding (including IV fluids if needed for hydration, but not as part of dialysis/chemo)  []  B. Feeding tube (e.g., nasogastric or abdominal (PEG))  [x]  C. Mechanically altered diet - requires change in texture of food or liquids (e.g., pureed food, thickened liquids)  []  D. Therapeutic diet (e.g., low salt, diabetic, low cholesterol)  []  Z.   None of

## 2023-05-18 NOTE — DISCHARGE SUMMARY
[facility] DISCHARGE SUMMARY    Patient Demographics    Patient. Lester Mcnally  Date of Birth. 1945  MRN. [id number]     Primary care provider. KEYSHA Ospina - CNP  (Tel: [phone])    Admit date: 5/14/2023    Discharge date (blank if same as Note Date): 5/17/2023  Note Date: 5/18/2023     Reason for Hospitalization. Chief Complaint   Patient presents with    Altered Mental Status     Confusion onset 1230 today, increased tremors starting at 1100. Parkinson's at baseline, BS of 141 at 1230 per wife. Wife states increased incontinence over last week and that she is concerned for a UTI. [facility] Course. Acute metabolic encephalopathy  -Suspect likely secondary sepsis with possible pneumonia aspiration  - acute stroke has been ruled out  -Improving.  -continue parkinsonmeds      Needed     Aspiration pneumonia with sepsis  -treated with zosyn discharge on oral augmentin to acute rhab   speech evaluation          Diabetes continue sliding scale  Parkinson's disease high risk of aspiration     Chronic cough with                                                                  Consults. IP CONSULT TO CARDIOLOGY  IP CONSULT TO NEUROLOGY  IP CONSULT TO PHARMACY  IP CONSULT TO PHYSICAL MEDICINE REHAB  IP CONSULT TO DIETITIAN    Physical examination on discharge day. /71   Pulse 72   Temp 99 °F (37.2 °C) (Oral)   Resp 18   Ht 5' 8\" (1.727 m)   Wt 162 lb (73.5 kg)   SpO2 95%   BMI 24.63 kg/m²   General appearance. Alert. Looks comfortable. HEENT. Sclera clear. Moist mucus membranes. Cardiovascular. Regular rate and rhythm, normal S1, S2. No murmur. Respiratory. Not using accessory muscles. Clear to auscultation bilaterally, no wheeze. Gastrointestinal. Abdomen soft, non-tender, not distended, normal bowel sounds  Neurology. Facial symmetry.  No speech

## 2023-05-19 LAB
ANION GAP SERPL CALCULATED.3IONS-SCNC: 8 MMOL/L (ref 3–16)
BUN SERPL-MCNC: 18 MG/DL (ref 7–20)
CALCIUM SERPL-MCNC: 8.8 MG/DL (ref 8.3–10.6)
CHLORIDE SERPL-SCNC: 110 MMOL/L (ref 99–110)
CHOLEST SERPL-MCNC: 96 MG/DL (ref 0–199)
CO2 SERPL-SCNC: 26 MMOL/L (ref 21–32)
CREAT SERPL-MCNC: 0.6 MG/DL (ref 0.8–1.3)
DEPRECATED RDW RBC AUTO: 13.7 % (ref 12.4–15.4)
DEPRECATED RDW RBC AUTO: 13.8 % (ref 12.4–15.4)
GFR SERPLBLD CREATININE-BSD FMLA CKD-EPI: >60 ML/MIN/{1.73_M2}
GLUCOSE SERPL-MCNC: 104 MG/DL (ref 70–99)
HCT VFR BLD AUTO: 38.3 % (ref 40.5–52.5)
HCT VFR BLD AUTO: 39.2 % (ref 40.5–52.5)
HDLC SERPL-MCNC: 21 MG/DL (ref 40–60)
HGB BLD-MCNC: 12.5 G/DL (ref 13.5–17.5)
HGB BLD-MCNC: 12.6 G/DL (ref 13.5–17.5)
LDLC SERPL CALC-MCNC: 54 MG/DL
MCH RBC QN AUTO: 29.5 PG (ref 26–34)
MCH RBC QN AUTO: 30 PG (ref 26–34)
MCHC RBC AUTO-ENTMCNC: 32.3 G/DL (ref 31–36)
MCHC RBC AUTO-ENTMCNC: 32.7 G/DL (ref 31–36)
MCV RBC AUTO: 91.6 FL (ref 80–100)
MCV RBC AUTO: 91.8 FL (ref 80–100)
PLATELET # BLD AUTO: 232 K/UL (ref 135–450)
PLATELET # BLD AUTO: 236 K/UL (ref 135–450)
PMV BLD AUTO: 7.3 FL (ref 5–10.5)
PMV BLD AUTO: 7.5 FL (ref 5–10.5)
POTASSIUM SERPL-SCNC: 3.7 MMOL/L (ref 3.5–5.1)
RBC # BLD AUTO: 4.17 M/UL (ref 4.2–5.9)
RBC # BLD AUTO: 4.28 M/UL (ref 4.2–5.9)
SODIUM SERPL-SCNC: 144 MMOL/L (ref 136–145)
TRIGL SERPL-MCNC: 103 MG/DL (ref 0–150)
VLDLC SERPL CALC-MCNC: 21 MG/DL
WBC # BLD AUTO: 8 K/UL (ref 4–11)
WBC # BLD AUTO: 8.2 K/UL (ref 4–11)

## 2023-05-19 PROCEDURE — 6370000000 HC RX 637 (ALT 250 FOR IP): Performed by: HOSPITALIST

## 2023-05-19 PROCEDURE — 92523 SPEECH SOUND LANG COMPREHEN: CPT

## 2023-05-19 PROCEDURE — 97530 THERAPEUTIC ACTIVITIES: CPT

## 2023-05-19 PROCEDURE — 80048 BASIC METABOLIC PNL TOTAL CA: CPT

## 2023-05-19 PROCEDURE — G0378 HOSPITAL OBSERVATION PER HR: HCPCS

## 2023-05-19 PROCEDURE — 6370000000 HC RX 637 (ALT 250 FOR IP): Performed by: INTERNAL MEDICINE

## 2023-05-19 PROCEDURE — 6370000000 HC RX 637 (ALT 250 FOR IP)

## 2023-05-19 PROCEDURE — 36415 COLL VENOUS BLD VENIPUNCTURE: CPT

## 2023-05-19 PROCEDURE — 6360000002 HC RX W HCPCS: Performed by: INTERNAL MEDICINE

## 2023-05-19 PROCEDURE — 80061 LIPID PANEL: CPT

## 2023-05-19 PROCEDURE — 97116 GAIT TRAINING THERAPY: CPT

## 2023-05-19 PROCEDURE — 92526 ORAL FUNCTION THERAPY: CPT

## 2023-05-19 PROCEDURE — 99233 SBSQ HOSP IP/OBS HIGH 50: CPT

## 2023-05-19 PROCEDURE — 85027 COMPLETE CBC AUTOMATED: CPT

## 2023-05-19 PROCEDURE — 97535 SELF CARE MNGMENT TRAINING: CPT

## 2023-05-19 PROCEDURE — 2580000003 HC RX 258: Performed by: INTERNAL MEDICINE

## 2023-05-19 RX ORDER — DOCUSATE SODIUM 100 MG/1
100 CAPSULE, LIQUID FILLED ORAL 2 TIMES DAILY
Status: DISCONTINUED | OUTPATIENT
Start: 2023-05-19 | End: 2023-05-21 | Stop reason: HOSPADM

## 2023-05-19 RX ORDER — LEVOFLOXACIN 750 MG/1
750 TABLET ORAL DAILY
Qty: 5 TABLET | Refills: 0 | Status: ON HOLD | OUTPATIENT
Start: 2023-05-19 | End: 2023-05-24

## 2023-05-19 RX ORDER — ROPINIROLE 0.25 MG/1
0.25 TABLET, FILM COATED ORAL 3 TIMES DAILY
Status: DISCONTINUED | OUTPATIENT
Start: 2023-05-19 | End: 2023-05-21 | Stop reason: HOSPADM

## 2023-05-19 RX ORDER — PRIMIDONE 50 MG/1
25 TABLET ORAL NIGHTLY
Status: DISCONTINUED | OUTPATIENT
Start: 2023-05-19 | End: 2023-05-21 | Stop reason: HOSPADM

## 2023-05-19 RX ORDER — ROPINIROLE 0.25 MG/1
0.25 TABLET, FILM COATED ORAL 3 TIMES DAILY
Qty: 90 TABLET | Refills: 3 | Status: ON HOLD | OUTPATIENT
Start: 2023-05-19

## 2023-05-19 RX ORDER — PRIMIDONE 50 MG/1
25 TABLET ORAL NIGHTLY
Qty: 90 TABLET | Refills: 3 | Status: ON HOLD | OUTPATIENT
Start: 2023-05-19

## 2023-05-19 RX ADMIN — PRIMIDONE 25 MG: 50 TABLET ORAL at 20:35

## 2023-05-19 RX ADMIN — DOCUSATE SODIUM 100 MG: 100 CAPSULE, LIQUID FILLED ORAL at 10:22

## 2023-05-19 RX ADMIN — ATORVASTATIN CALCIUM 80 MG: 80 TABLET, FILM COATED ORAL at 20:37

## 2023-05-19 RX ADMIN — PIPERACILLIN AND TAZOBACTAM 3375 MG: 3; .375 INJECTION, POWDER, LYOPHILIZED, FOR SOLUTION INTRAVENOUS at 22:33

## 2023-05-19 RX ADMIN — CARBIDOPA AND LEVODOPA 1 TABLET: 25; 100 TABLET ORAL at 20:36

## 2023-05-19 RX ADMIN — MIDODRINE HYDROCHLORIDE 2.5 MG: 5 TABLET ORAL at 10:22

## 2023-05-19 RX ADMIN — CARBIDOPA AND LEVODOPA 1 TABLET: 25; 100 TABLET ORAL at 10:22

## 2023-05-19 RX ADMIN — CLOZAPINE 25 MG: 25 TABLET ORAL at 13:20

## 2023-05-19 RX ADMIN — ROPINIROLE HYDROCHLORIDE 0.25 MG: 0.25 TABLET, FILM COATED ORAL at 10:22

## 2023-05-19 RX ADMIN — ROPINIROLE HYDROCHLORIDE 0.25 MG: 0.25 TABLET, FILM COATED ORAL at 20:35

## 2023-05-19 RX ADMIN — CARBIDOPA AND LEVODOPA 1 TABLET: 25; 100 TABLET ORAL at 13:20

## 2023-05-19 RX ADMIN — ENOXAPARIN SODIUM 40 MG: 100 INJECTION SUBCUTANEOUS at 10:23

## 2023-05-19 RX ADMIN — DOCUSATE SODIUM 100 MG: 100 CAPSULE, LIQUID FILLED ORAL at 20:37

## 2023-05-19 RX ADMIN — CLOZAPINE 25 MG: 25 TABLET ORAL at 20:35

## 2023-05-19 RX ADMIN — ROPINIROLE HYDROCHLORIDE 0.25 MG: 0.25 TABLET, FILM COATED ORAL at 13:39

## 2023-05-19 RX ADMIN — MIDODRINE HYDROCHLORIDE 2.5 MG: 5 TABLET ORAL at 13:38

## 2023-05-19 RX ADMIN — ASPIRIN 81 MG: 81 TABLET, COATED ORAL at 10:23

## 2023-05-19 RX ADMIN — PIPERACILLIN AND TAZOBACTAM 3375 MG: 3; .375 INJECTION, POWDER, LYOPHILIZED, FOR SOLUTION INTRAVENOUS at 06:14

## 2023-05-19 RX ADMIN — CARBIDOPA AND LEVODOPA 1 TABLET: 25; 100 TABLET ORAL at 22:33

## 2023-05-19 RX ADMIN — DONEPEZIL HYDROCHLORIDE 10 MG: 5 TABLET, FILM COATED ORAL at 20:35

## 2023-05-19 RX ADMIN — CARBIDOPA AND LEVODOPA 1 TABLET: 25; 100 TABLET ORAL at 06:15

## 2023-05-19 RX ADMIN — CARBIDOPA AND LEVODOPA 1 TABLET: 25; 100 TABLET ORAL at 16:21

## 2023-05-19 RX ADMIN — PIPERACILLIN AND TAZOBACTAM 3375 MG: 3; .375 INJECTION, POWDER, LYOPHILIZED, FOR SOLUTION INTRAVENOUS at 13:45

## 2023-05-19 ASSESSMENT — PAIN SCALES - GENERAL
PAINLEVEL_OUTOF10: 0

## 2023-05-20 PROCEDURE — 2580000003 HC RX 258: Performed by: INTERNAL MEDICINE

## 2023-05-20 PROCEDURE — 6360000002 HC RX W HCPCS: Performed by: INTERNAL MEDICINE

## 2023-05-20 PROCEDURE — 6370000000 HC RX 637 (ALT 250 FOR IP): Performed by: INTERNAL MEDICINE

## 2023-05-20 PROCEDURE — G0378 HOSPITAL OBSERVATION PER HR: HCPCS

## 2023-05-20 PROCEDURE — 6370000000 HC RX 637 (ALT 250 FOR IP)

## 2023-05-20 PROCEDURE — 6370000000 HC RX 637 (ALT 250 FOR IP): Performed by: HOSPITALIST

## 2023-05-20 RX ORDER — LACTULOSE 10 G/15ML
20 SOLUTION ORAL 2 TIMES DAILY
Status: DISCONTINUED | OUTPATIENT
Start: 2023-05-20 | End: 2023-05-21 | Stop reason: HOSPADM

## 2023-05-20 RX ADMIN — CARBIDOPA AND LEVODOPA 1 TABLET: 25; 100 TABLET ORAL at 10:54

## 2023-05-20 RX ADMIN — POLYETHYLENE GLYCOL 3350 17 G: 17 POWDER, FOR SOLUTION ORAL at 08:15

## 2023-05-20 RX ADMIN — CARBIDOPA AND LEVODOPA 1 TABLET: 25; 100 TABLET ORAL at 13:10

## 2023-05-20 RX ADMIN — CARBIDOPA AND LEVODOPA 1 TABLET: 25; 100 TABLET ORAL at 07:04

## 2023-05-20 RX ADMIN — ROPINIROLE HYDROCHLORIDE 0.25 MG: 0.25 TABLET, FILM COATED ORAL at 20:36

## 2023-05-20 RX ADMIN — PIPERACILLIN AND TAZOBACTAM 3375 MG: 3; .375 INJECTION, POWDER, LYOPHILIZED, FOR SOLUTION INTRAVENOUS at 05:56

## 2023-05-20 RX ADMIN — ROPINIROLE HYDROCHLORIDE 0.25 MG: 0.25 TABLET, FILM COATED ORAL at 08:16

## 2023-05-20 RX ADMIN — ASPIRIN 81 MG: 81 TABLET, COATED ORAL at 08:16

## 2023-05-20 RX ADMIN — CARBIDOPA AND LEVODOPA 1 TABLET: 25; 100 TABLET ORAL at 18:25

## 2023-05-20 RX ADMIN — MIDODRINE HYDROCHLORIDE 2.5 MG: 5 TABLET ORAL at 13:10

## 2023-05-20 RX ADMIN — PIPERACILLIN AND TAZOBACTAM 3375 MG: 3; .375 INJECTION, POWDER, LYOPHILIZED, FOR SOLUTION INTRAVENOUS at 13:19

## 2023-05-20 RX ADMIN — ROPINIROLE HYDROCHLORIDE 0.25 MG: 0.25 TABLET, FILM COATED ORAL at 15:07

## 2023-05-20 RX ADMIN — LACTULOSE 20 G: 20 SOLUTION ORAL at 20:37

## 2023-05-20 RX ADMIN — CARBIDOPA AND LEVODOPA 1 TABLET: 25; 100 TABLET ORAL at 15:07

## 2023-05-20 RX ADMIN — MIDODRINE HYDROCHLORIDE 2.5 MG: 5 TABLET ORAL at 08:16

## 2023-05-20 RX ADMIN — DOCUSATE SODIUM 100 MG: 100 CAPSULE, LIQUID FILLED ORAL at 08:16

## 2023-05-20 RX ADMIN — PRIMIDONE 25 MG: 50 TABLET ORAL at 20:37

## 2023-05-20 RX ADMIN — ENOXAPARIN SODIUM 40 MG: 100 INJECTION SUBCUTANEOUS at 08:15

## 2023-05-20 RX ADMIN — DONEPEZIL HYDROCHLORIDE 10 MG: 5 TABLET, FILM COATED ORAL at 20:37

## 2023-05-20 RX ADMIN — LACTULOSE 20 G: 20 SOLUTION ORAL at 10:54

## 2023-05-20 RX ADMIN — CLOZAPINE 25 MG: 25 TABLET ORAL at 21:57

## 2023-05-20 RX ADMIN — ATORVASTATIN CALCIUM 80 MG: 80 TABLET, FILM COATED ORAL at 20:37

## 2023-05-20 RX ADMIN — CARBIDOPA AND LEVODOPA 1 TABLET: 25; 100 TABLET ORAL at 21:57

## 2023-05-20 RX ADMIN — PIPERACILLIN AND TAZOBACTAM 3375 MG: 3; .375 INJECTION, POWDER, LYOPHILIZED, FOR SOLUTION INTRAVENOUS at 22:04

## 2023-05-20 RX ADMIN — CLOZAPINE 25 MG: 25 TABLET ORAL at 08:19

## 2023-05-20 RX ADMIN — DOCUSATE SODIUM 100 MG: 100 CAPSULE, LIQUID FILLED ORAL at 20:37

## 2023-05-20 ASSESSMENT — PAIN SCALES - GENERAL
PAINLEVEL_OUTOF10: 0

## 2023-05-21 ENCOUNTER — HOSPITAL ENCOUNTER (INPATIENT)
Age: 78
DRG: 070 | End: 2023-05-21
Attending: PHYSICAL MEDICINE & REHABILITATION | Admitting: PHYSICAL MEDICINE & REHABILITATION
Payer: MEDICARE

## 2023-05-21 VITALS
TEMPERATURE: 97.4 F | SYSTOLIC BLOOD PRESSURE: 134 MMHG | HEIGHT: 68 IN | DIASTOLIC BLOOD PRESSURE: 62 MMHG | OXYGEN SATURATION: 93 % | HEART RATE: 89 BPM | WEIGHT: 197.09 LBS | RESPIRATION RATE: 18 BRPM | BODY MASS INDEX: 29.87 KG/M2

## 2023-05-21 DIAGNOSIS — R09.82 POST-NASAL DRAINAGE: ICD-10-CM

## 2023-05-21 PROBLEM — G20.A1 PARKINSON'S DISEASE: Status: ACTIVE | Noted: 2023-05-21

## 2023-05-21 PROBLEM — G20 PARKINSON'S DISEASE (HCC): Status: ACTIVE | Noted: 2023-05-21

## 2023-05-21 PROCEDURE — 6370000000 HC RX 637 (ALT 250 FOR IP)

## 2023-05-21 PROCEDURE — 6370000000 HC RX 637 (ALT 250 FOR IP): Performed by: PHYSICAL MEDICINE & REHABILITATION

## 2023-05-21 PROCEDURE — 6360000002 HC RX W HCPCS: Performed by: PHYSICAL MEDICINE & REHABILITATION

## 2023-05-21 PROCEDURE — 6370000000 HC RX 637 (ALT 250 FOR IP): Performed by: INTERNAL MEDICINE

## 2023-05-21 PROCEDURE — 6370000000 HC RX 637 (ALT 250 FOR IP): Performed by: HOSPITALIST

## 2023-05-21 PROCEDURE — 2580000003 HC RX 258: Performed by: PHYSICAL MEDICINE & REHABILITATION

## 2023-05-21 PROCEDURE — 1280000000 HC REHAB R&B

## 2023-05-21 PROCEDURE — 94150 VITAL CAPACITY TEST: CPT

## 2023-05-21 PROCEDURE — G0378 HOSPITAL OBSERVATION PER HR: HCPCS

## 2023-05-21 PROCEDURE — 2580000003 HC RX 258: Performed by: INTERNAL MEDICINE

## 2023-05-21 PROCEDURE — 6360000002 HC RX W HCPCS: Performed by: INTERNAL MEDICINE

## 2023-05-21 RX ORDER — CLOZAPINE 25 MG/1
25 TABLET ORAL 2 TIMES DAILY
Status: CANCELLED | OUTPATIENT
Start: 2023-05-21

## 2023-05-21 RX ORDER — ASPIRIN 300 MG/1
300 SUPPOSITORY RECTAL DAILY
Status: DISCONTINUED | OUTPATIENT
Start: 2023-05-22 | End: 2023-06-07 | Stop reason: HOSPADM

## 2023-05-21 RX ORDER — MIDODRINE HYDROCHLORIDE 5 MG/1
2.5 TABLET ORAL 3 TIMES DAILY
Status: DISCONTINUED | OUTPATIENT
Start: 2023-05-21 | End: 2023-05-30

## 2023-05-21 RX ORDER — ATORVASTATIN CALCIUM 80 MG/1
80 TABLET, FILM COATED ORAL NIGHTLY
Status: CANCELLED | OUTPATIENT
Start: 2023-05-21

## 2023-05-21 RX ORDER — PANTOPRAZOLE SODIUM 40 MG/1
40 TABLET, DELAYED RELEASE ORAL
Status: DISCONTINUED | OUTPATIENT
Start: 2023-05-22 | End: 2023-06-07 | Stop reason: HOSPADM

## 2023-05-21 RX ORDER — IPRATROPIUM BROMIDE 42 UG/1
1 SPRAY, METERED NASAL 4 TIMES DAILY PRN
Status: DISCONTINUED | OUTPATIENT
Start: 2023-05-21 | End: 2023-06-07 | Stop reason: HOSPADM

## 2023-05-21 RX ORDER — PRIMIDONE 50 MG/1
25 TABLET ORAL NIGHTLY
Status: DISCONTINUED | OUTPATIENT
Start: 2023-05-21 | End: 2023-06-03

## 2023-05-21 RX ORDER — IPRATROPIUM BROMIDE 42 UG/1
2 SPRAY, METERED NASAL 4 TIMES DAILY PRN
COMMUNITY

## 2023-05-21 RX ORDER — POLYETHYLENE GLYCOL 3350 17 G/17G
17 POWDER, FOR SOLUTION ORAL DAILY PRN
Status: DISCONTINUED | OUTPATIENT
Start: 2023-05-21 | End: 2023-06-07 | Stop reason: HOSPADM

## 2023-05-21 RX ORDER — ASPIRIN 300 MG/1
300 SUPPOSITORY RECTAL DAILY
Status: CANCELLED | OUTPATIENT
Start: 2023-05-22

## 2023-05-21 RX ORDER — ONDANSETRON 2 MG/ML
4 INJECTION INTRAMUSCULAR; INTRAVENOUS EVERY 6 HOURS PRN
Status: CANCELLED | OUTPATIENT
Start: 2023-05-21

## 2023-05-21 RX ORDER — ATORVASTATIN CALCIUM 80 MG/1
80 TABLET, FILM COATED ORAL NIGHTLY
Status: DISCONTINUED | OUTPATIENT
Start: 2023-05-21 | End: 2023-06-07 | Stop reason: HOSPADM

## 2023-05-21 RX ORDER — METHYLDOPA/HYDROCHLOROTHIAZIDE 250MG-15MG
100 TABLET ORAL DAILY
Status: DISCONTINUED | OUTPATIENT
Start: 2023-05-21 | End: 2023-05-25

## 2023-05-21 RX ORDER — PANTOPRAZOLE SODIUM 40 MG/1
40 TABLET, DELAYED RELEASE ORAL
Status: CANCELLED | OUTPATIENT
Start: 2023-05-22

## 2023-05-21 RX ORDER — ROPINIROLE 0.25 MG/1
0.25 TABLET, FILM COATED ORAL 3 TIMES DAILY
Status: DISCONTINUED | OUTPATIENT
Start: 2023-05-21 | End: 2023-06-03

## 2023-05-21 RX ORDER — ONDANSETRON 4 MG/1
4 TABLET, ORALLY DISINTEGRATING ORAL EVERY 8 HOURS PRN
Status: CANCELLED | OUTPATIENT
Start: 2023-05-21

## 2023-05-21 RX ORDER — ACETAMINOPHEN 325 MG/1
650 TABLET ORAL EVERY 6 HOURS PRN
Status: CANCELLED | OUTPATIENT
Start: 2023-05-21

## 2023-05-21 RX ORDER — ACETAMINOPHEN 325 MG/1
650 TABLET ORAL EVERY 6 HOURS PRN
Status: DISCONTINUED | OUTPATIENT
Start: 2023-05-21 | End: 2023-06-07 | Stop reason: HOSPADM

## 2023-05-21 RX ORDER — POLYETHYLENE GLYCOL 3350 17 G/17G
17 POWDER, FOR SOLUTION ORAL DAILY PRN
Status: CANCELLED | OUTPATIENT
Start: 2023-05-21

## 2023-05-21 RX ORDER — ONDANSETRON 4 MG/1
4 TABLET, ORALLY DISINTEGRATING ORAL EVERY 8 HOURS PRN
Status: DISCONTINUED | OUTPATIENT
Start: 2023-05-21 | End: 2023-06-07 | Stop reason: HOSPADM

## 2023-05-21 RX ORDER — DONEPEZIL HYDROCHLORIDE 5 MG/1
10 TABLET, FILM COATED ORAL NIGHTLY
Status: DISCONTINUED | OUTPATIENT
Start: 2023-05-21 | End: 2023-06-07 | Stop reason: HOSPADM

## 2023-05-21 RX ORDER — DOCUSATE SODIUM 100 MG/1
100 CAPSULE, LIQUID FILLED ORAL 2 TIMES DAILY
Status: CANCELLED | OUTPATIENT
Start: 2023-05-21

## 2023-05-21 RX ORDER — DOCUSATE SODIUM 100 MG/1
100 CAPSULE, LIQUID FILLED ORAL 2 TIMES DAILY
Status: DISCONTINUED | OUTPATIENT
Start: 2023-05-21 | End: 2023-06-07 | Stop reason: HOSPADM

## 2023-05-21 RX ORDER — LACTULOSE 10 G/15ML
20 SOLUTION ORAL 2 TIMES DAILY
Status: CANCELLED | OUTPATIENT
Start: 2023-05-21

## 2023-05-21 RX ORDER — LACTULOSE 10 G/15ML
20 SOLUTION ORAL 2 TIMES DAILY
Status: DISCONTINUED | OUTPATIENT
Start: 2023-05-21 | End: 2023-05-25

## 2023-05-21 RX ORDER — BISACODYL 10 MG
10 SUPPOSITORY, RECTAL RECTAL DAILY PRN
Status: CANCELLED | OUTPATIENT
Start: 2023-05-21

## 2023-05-21 RX ORDER — ENOXAPARIN SODIUM 100 MG/ML
40 INJECTION SUBCUTANEOUS DAILY
Status: CANCELLED | OUTPATIENT
Start: 2023-05-21

## 2023-05-21 RX ORDER — ROPINIROLE 0.25 MG/1
0.25 TABLET, FILM COATED ORAL 3 TIMES DAILY
Status: CANCELLED | OUTPATIENT
Start: 2023-05-21

## 2023-05-21 RX ORDER — ASPIRIN 81 MG/1
81 TABLET ORAL DAILY
Status: CANCELLED | OUTPATIENT
Start: 2023-05-22

## 2023-05-21 RX ORDER — PRIMIDONE 50 MG/1
25 TABLET ORAL NIGHTLY
Status: CANCELLED | OUTPATIENT
Start: 2023-05-21

## 2023-05-21 RX ORDER — 0.9 % SODIUM CHLORIDE 0.9 %
100 INTRAVENOUS SOLUTION INTRAVENOUS EVERY 8 HOURS PRN
Status: DISCONTINUED | OUTPATIENT
Start: 2023-05-21 | End: 2023-06-07 | Stop reason: HOSPADM

## 2023-05-21 RX ORDER — HYDRALAZINE HYDROCHLORIDE 10 MG/1
10 TABLET, FILM COATED ORAL EVERY 6 HOURS PRN
Status: CANCELLED | OUTPATIENT
Start: 2023-05-21

## 2023-05-21 RX ORDER — ENOXAPARIN SODIUM 100 MG/ML
40 INJECTION SUBCUTANEOUS DAILY
Status: DISCONTINUED | OUTPATIENT
Start: 2023-05-22 | End: 2023-06-07 | Stop reason: HOSPADM

## 2023-05-21 RX ORDER — ONDANSETRON 2 MG/ML
4 INJECTION INTRAMUSCULAR; INTRAVENOUS EVERY 6 HOURS PRN
Status: DISCONTINUED | OUTPATIENT
Start: 2023-05-21 | End: 2023-06-07 | Stop reason: HOSPADM

## 2023-05-21 RX ORDER — DONEPEZIL HYDROCHLORIDE 5 MG/1
10 TABLET, FILM COATED ORAL NIGHTLY
Status: CANCELLED | OUTPATIENT
Start: 2023-05-21

## 2023-05-21 RX ORDER — HYDRALAZINE HYDROCHLORIDE 10 MG/1
10 TABLET, FILM COATED ORAL EVERY 6 HOURS PRN
Status: DISCONTINUED | OUTPATIENT
Start: 2023-05-21 | End: 2023-06-07 | Stop reason: HOSPADM

## 2023-05-21 RX ORDER — IPRATROPIUM BROMIDE 42 UG/1
1 SPRAY, METERED NASAL 4 TIMES DAILY
Status: DISCONTINUED | OUTPATIENT
Start: 2023-05-21 | End: 2023-05-21

## 2023-05-21 RX ORDER — SODIUM CHLORIDE 0.9 % (FLUSH) 0.9 %
5-40 SYRINGE (ML) INJECTION 2 TIMES DAILY
Status: DISCONTINUED | OUTPATIENT
Start: 2023-05-21 | End: 2023-06-04

## 2023-05-21 RX ORDER — BISACODYL 10 MG
10 SUPPOSITORY, RECTAL RECTAL DAILY PRN
Status: DISCONTINUED | OUTPATIENT
Start: 2023-05-21 | End: 2023-06-07 | Stop reason: HOSPADM

## 2023-05-21 RX ORDER — CLOZAPINE 25 MG/1
25 TABLET ORAL 2 TIMES DAILY
Status: DISCONTINUED | OUTPATIENT
Start: 2023-05-21 | End: 2023-06-07 | Stop reason: HOSPADM

## 2023-05-21 RX ORDER — ASPIRIN 81 MG/1
81 TABLET ORAL DAILY
Status: DISCONTINUED | OUTPATIENT
Start: 2023-05-22 | End: 2023-06-07 | Stop reason: HOSPADM

## 2023-05-21 RX ORDER — MIDODRINE HYDROCHLORIDE 5 MG/1
2.5 TABLET ORAL 3 TIMES DAILY
Status: CANCELLED | OUTPATIENT
Start: 2023-05-21

## 2023-05-21 RX ADMIN — PIPERACILLIN AND TAZOBACTAM 3375 MG: 3; .375 INJECTION, POWDER, LYOPHILIZED, FOR SOLUTION INTRAVENOUS at 16:57

## 2023-05-21 RX ADMIN — ASPIRIN 81 MG: 81 TABLET, COATED ORAL at 08:38

## 2023-05-21 RX ADMIN — CARBIDOPA AND LEVODOPA 1 TABLET: 25; 100 TABLET ORAL at 17:01

## 2023-05-21 RX ADMIN — CARBIDOPA AND LEVODOPA 1 TABLET: 25; 100 TABLET ORAL at 08:38

## 2023-05-21 RX ADMIN — CLOZAPINE 25 MG: 25 TABLET ORAL at 21:13

## 2023-05-21 RX ADMIN — ATORVASTATIN CALCIUM 80 MG: 80 TABLET, FILM COATED ORAL at 21:12

## 2023-05-21 RX ADMIN — CARBIDOPA AND LEVODOPA 1 TABLET: 25; 100 TABLET ORAL at 13:08

## 2023-05-21 RX ADMIN — Medication 10 ML: at 21:11

## 2023-05-21 RX ADMIN — ROPINIROLE HYDROCHLORIDE 0.25 MG: 0.25 TABLET, FILM COATED ORAL at 08:37

## 2023-05-21 RX ADMIN — DOCUSATE SODIUM 100 MG: 100 CAPSULE, LIQUID FILLED ORAL at 08:38

## 2023-05-21 RX ADMIN — ENOXAPARIN SODIUM 40 MG: 100 INJECTION SUBCUTANEOUS at 08:38

## 2023-05-21 RX ADMIN — MIDODRINE HYDROCHLORIDE 2.5 MG: 5 TABLET ORAL at 08:37

## 2023-05-21 RX ADMIN — CARBIDOPA AND LEVODOPA 1 TABLET: 25; 100 TABLET ORAL at 06:49

## 2023-05-21 RX ADMIN — MIDODRINE HYDROCHLORIDE 2.5 MG: 5 TABLET ORAL at 13:08

## 2023-05-21 RX ADMIN — PRIMIDONE 25 MG: 50 TABLET ORAL at 21:12

## 2023-05-21 RX ADMIN — SODIUM CHLORIDE 100 ML: 9 INJECTION, SOLUTION INTRAVENOUS at 16:55

## 2023-05-21 RX ADMIN — DOCUSATE SODIUM 100 MG: 100 CAPSULE, LIQUID FILLED ORAL at 21:12

## 2023-05-21 RX ADMIN — DONEPEZIL HYDROCHLORIDE 10 MG: 5 TABLET, FILM COATED ORAL at 21:12

## 2023-05-21 RX ADMIN — LACTULOSE 20 G: 20 SOLUTION ORAL at 08:38

## 2023-05-21 RX ADMIN — CARBIDOPA AND LEVODOPA 1 TABLET: 25; 100 TABLET ORAL at 19:23

## 2023-05-21 RX ADMIN — CLOZAPINE 25 MG: 25 TABLET ORAL at 13:09

## 2023-05-21 RX ADMIN — PIPERACILLIN AND TAZOBACTAM 3375 MG: 3; .375 INJECTION, POWDER, LYOPHILIZED, FOR SOLUTION INTRAVENOUS at 05:45

## 2023-05-21 RX ADMIN — MIDODRINE HYDROCHLORIDE 2.5 MG: 5 TABLET ORAL at 21:12

## 2023-05-21 RX ADMIN — ROPINIROLE HYDROCHLORIDE 0.25 MG: 0.25 TABLET, FILM COATED ORAL at 13:08

## 2023-05-21 RX ADMIN — ROPINIROLE HYDROCHLORIDE 0.25 MG: 0.25 TABLET, FILM COATED ORAL at 21:12

## 2023-05-21 ASSESSMENT — PAIN DESCRIPTION - PAIN TYPE: TYPE: ACUTE PAIN

## 2023-05-21 ASSESSMENT — PAIN - FUNCTIONAL ASSESSMENT: PAIN_FUNCTIONAL_ASSESSMENT: ACTIVITIES ARE NOT PREVENTED

## 2023-05-21 ASSESSMENT — PAIN DESCRIPTION - ONSET: ONSET: GRADUAL

## 2023-05-21 ASSESSMENT — PAIN DESCRIPTION - DESCRIPTORS: DESCRIPTORS: SORE

## 2023-05-21 ASSESSMENT — PAIN SCALES - GENERAL
PAINLEVEL_OUTOF10: 0
PAINLEVEL_OUTOF10: 0
PAINLEVEL_OUTOF10: 2
PAINLEVEL_OUTOF10: 0

## 2023-05-21 ASSESSMENT — PAIN DESCRIPTION - FREQUENCY: FREQUENCY: INTERMITTENT

## 2023-05-21 ASSESSMENT — PAIN DESCRIPTION - LOCATION: LOCATION: SHOULDER

## 2023-05-21 ASSESSMENT — PAIN DESCRIPTION - ORIENTATION: ORIENTATION: RIGHT

## 2023-05-22 LAB
ANION GAP SERPL CALCULATED.3IONS-SCNC: 11 MMOL/L (ref 3–16)
BASOPHILS # BLD: 0 K/UL (ref 0–0.2)
BASOPHILS NFR BLD: 0.6 %
BUN SERPL-MCNC: 18 MG/DL (ref 7–20)
CALCIUM SERPL-MCNC: 8.8 MG/DL (ref 8.3–10.6)
CHLORIDE SERPL-SCNC: 112 MMOL/L (ref 99–110)
CO2 SERPL-SCNC: 20 MMOL/L (ref 21–32)
CREAT SERPL-MCNC: 0.6 MG/DL (ref 0.8–1.3)
DEPRECATED RDW RBC AUTO: 13.9 % (ref 12.4–15.4)
EOSINOPHIL # BLD: 0.5 K/UL (ref 0–0.6)
EOSINOPHIL NFR BLD: 7.5 %
GFR SERPLBLD CREATININE-BSD FMLA CKD-EPI: >60 ML/MIN/{1.73_M2}
GLUCOSE SERPL-MCNC: 101 MG/DL (ref 70–99)
HCT VFR BLD AUTO: 40.3 % (ref 40.5–52.5)
HGB BLD-MCNC: 12.9 G/DL (ref 13.5–17.5)
LYMPHOCYTES # BLD: 1.9 K/UL (ref 1–5.1)
LYMPHOCYTES NFR BLD: 28.2 %
MCH RBC QN AUTO: 30.1 PG (ref 26–34)
MCHC RBC AUTO-ENTMCNC: 32.1 G/DL (ref 31–36)
MCV RBC AUTO: 93.6 FL (ref 80–100)
MONOCYTES # BLD: 0.7 K/UL (ref 0–1.3)
MONOCYTES NFR BLD: 10 %
NEUTROPHILS # BLD: 3.7 K/UL (ref 1.7–7.7)
NEUTROPHILS NFR BLD: 53.7 %
PLATELET # BLD AUTO: 258 K/UL (ref 135–450)
PMV BLD AUTO: 7.6 FL (ref 5–10.5)
POTASSIUM SERPL-SCNC: 4.2 MMOL/L (ref 3.5–5.1)
PREALB SERPL-MCNC: 17.3 MG/DL (ref 20–40)
RBC # BLD AUTO: 4.3 M/UL (ref 4.2–5.9)
SODIUM SERPL-SCNC: 143 MMOL/L (ref 136–145)
WBC # BLD AUTO: 6.8 K/UL (ref 4–11)

## 2023-05-22 PROCEDURE — 2580000003 HC RX 258: Performed by: PHYSICAL MEDICINE & REHABILITATION

## 2023-05-22 PROCEDURE — 1280000000 HC REHAB R&B

## 2023-05-22 PROCEDURE — 97165 OT EVAL LOW COMPLEX 30 MIN: CPT

## 2023-05-22 PROCEDURE — 80048 BASIC METABOLIC PNL TOTAL CA: CPT

## 2023-05-22 PROCEDURE — 6360000002 HC RX W HCPCS: Performed by: PHYSICAL MEDICINE & REHABILITATION

## 2023-05-22 PROCEDURE — 92526 ORAL FUNCTION THERAPY: CPT

## 2023-05-22 PROCEDURE — 92610 EVALUATE SWALLOWING FUNCTION: CPT

## 2023-05-22 PROCEDURE — 6370000000 HC RX 637 (ALT 250 FOR IP): Performed by: PHYSICAL MEDICINE & REHABILITATION

## 2023-05-22 PROCEDURE — 97535 SELF CARE MNGMENT TRAINING: CPT

## 2023-05-22 PROCEDURE — 97530 THERAPEUTIC ACTIVITIES: CPT

## 2023-05-22 PROCEDURE — 84134 ASSAY OF PREALBUMIN: CPT

## 2023-05-22 PROCEDURE — 92523 SPEECH SOUND LANG COMPREHEN: CPT

## 2023-05-22 PROCEDURE — 85025 COMPLETE CBC W/AUTO DIFF WBC: CPT

## 2023-05-22 PROCEDURE — 97161 PT EVAL LOW COMPLEX 20 MIN: CPT

## 2023-05-22 PROCEDURE — 36415 COLL VENOUS BLD VENIPUNCTURE: CPT

## 2023-05-22 RX ORDER — LISINOPRIL 20 MG/1
20 TABLET ORAL DAILY
Status: DISCONTINUED | OUTPATIENT
Start: 2023-05-22 | End: 2023-05-22

## 2023-05-22 RX ADMIN — MIDODRINE HYDROCHLORIDE 2.5 MG: 5 TABLET ORAL at 21:46

## 2023-05-22 RX ADMIN — PIPERACILLIN AND TAZOBACTAM 3375 MG: 3; .375 INJECTION, POWDER, LYOPHILIZED, FOR SOLUTION INTRAVENOUS at 09:02

## 2023-05-22 RX ADMIN — CARBIDOPA AND LEVODOPA 1 TABLET: 25; 100 TABLET ORAL at 18:53

## 2023-05-22 RX ADMIN — DOCUSATE SODIUM 100 MG: 100 CAPSULE, LIQUID FILLED ORAL at 08:52

## 2023-05-22 RX ADMIN — Medication 10 ML: at 21:47

## 2023-05-22 RX ADMIN — ROPINIROLE HYDROCHLORIDE 0.25 MG: 0.25 TABLET, FILM COATED ORAL at 08:53

## 2023-05-22 RX ADMIN — CARBIDOPA AND LEVODOPA 1 TABLET: 25; 100 TABLET ORAL at 17:14

## 2023-05-22 RX ADMIN — PIPERACILLIN AND TAZOBACTAM 3375 MG: 3; .375 INJECTION, POWDER, LYOPHILIZED, FOR SOLUTION INTRAVENOUS at 17:19

## 2023-05-22 RX ADMIN — ROPINIROLE HYDROCHLORIDE 0.25 MG: 0.25 TABLET, FILM COATED ORAL at 21:46

## 2023-05-22 RX ADMIN — CARBIDOPA AND LEVODOPA 1 TABLET: 25; 100 TABLET ORAL at 12:49

## 2023-05-22 RX ADMIN — ASPIRIN 81 MG: 81 TABLET, COATED ORAL at 08:50

## 2023-05-22 RX ADMIN — ROPINIROLE HYDROCHLORIDE 0.25 MG: 0.25 TABLET, FILM COATED ORAL at 14:45

## 2023-05-22 RX ADMIN — PIPERACILLIN AND TAZOBACTAM 3375 MG: 3; .375 INJECTION, POWDER, LYOPHILIZED, FOR SOLUTION INTRAVENOUS at 00:37

## 2023-05-22 RX ADMIN — CARBIDOPA AND LEVODOPA 1 TABLET: 25; 100 TABLET ORAL at 10:58

## 2023-05-22 RX ADMIN — Medication 1 PACKET: at 09:32

## 2023-05-22 RX ADMIN — ENOXAPARIN SODIUM 40 MG: 100 INJECTION SUBCUTANEOUS at 08:50

## 2023-05-22 RX ADMIN — CLOZAPINE 25 MG: 25 TABLET ORAL at 09:08

## 2023-05-22 RX ADMIN — Medication 10 ML: at 09:08

## 2023-05-22 RX ADMIN — ATORVASTATIN CALCIUM 80 MG: 80 TABLET, FILM COATED ORAL at 21:46

## 2023-05-22 RX ADMIN — PANTOPRAZOLE SODIUM 40 MG: 40 TABLET, DELAYED RELEASE ORAL at 05:00

## 2023-05-22 RX ADMIN — DOCUSATE SODIUM 100 MG: 100 CAPSULE, LIQUID FILLED ORAL at 21:46

## 2023-05-22 RX ADMIN — MIDODRINE HYDROCHLORIDE 2.5 MG: 5 TABLET ORAL at 08:52

## 2023-05-22 RX ADMIN — PRIMIDONE 25 MG: 50 TABLET ORAL at 21:46

## 2023-05-22 RX ADMIN — CLOZAPINE 25 MG: 25 TABLET ORAL at 21:45

## 2023-05-22 RX ADMIN — DONEPEZIL HYDROCHLORIDE 10 MG: 5 TABLET, FILM COATED ORAL at 21:46

## 2023-05-22 RX ADMIN — CARBIDOPA AND LEVODOPA 1 TABLET: 25; 100 TABLET ORAL at 08:52

## 2023-05-22 RX ADMIN — CARBIDOPA AND LEVODOPA 1 TABLET: 25; 100 TABLET ORAL at 14:45

## 2023-05-22 RX ADMIN — MIDODRINE HYDROCHLORIDE 2.5 MG: 5 TABLET ORAL at 14:45

## 2023-05-22 ASSESSMENT — PAIN SCALES - GENERAL
PAINLEVEL_OUTOF10: 0
PAINLEVEL_OUTOF10: 0

## 2023-05-22 NOTE — CARE COORDINATION
Social Work Admission Assessment    Objective:  Met with pt's wife to complete initial assessment and review role of  in rehab process. Pt oriented to unit. Pt states understanding of this. Current Home Situation:  Patient lives  at home with his wife and son. The patient lives in a two level home. The patient's wife reports the patient was independent with mobility prior to admission but the wife would help with other task such as dressing and bathing the patient. The patient's wife is his primary caregiver. Pt's plans re:  Return to work/school/volunteer: The patient is retired from [employer] support. Accessibility to community resources/transportation:  The patient is not a active . The patient's wife informed the SW that the patient is part of a [employer] where he goes out to eat with a group once a week. Prior to admission the patient also did outpatient therapy at the [facility] doing PT/OT. The patient also completed a intense rehab for Parkinsons started on march 6th to April 6th. Has pt experienced a recent loss or signigicant life event that would impact their care or ability to participate? _x_No  __Yes - Explain    Has pt ever been treated for emotional disorders? _x_No  __Yes--How does that affect current situation:    How does pt and family cope with stressful events and this hospitalization? The patient enjoys to eat out, Visit friends, Read and watch TV     Special Problem Areas:  None     Discharge Plan:The goal is for the patient to return home. PT/OT recommended the patient return home with [address]. The SW provided the patient with a [address] list to review. The SW will continue to follow or any discharge needs. Impression/Plan:Pacheco Prince (patient )is a 70year old male that has been admitted to ARU. Provided patient with this SW's card to contact as needed.  Will continue to follow for support and discharge planning

## 2023-05-22 NOTE — H&P
Sal Smart  5/22/2023  [id number]    Chief Complaint: Parkinson's disease ([facility]    Subjective:   Sal Smart is a 70 y.o. male with history of Parkinson's disease, CAD, hyperlipidemia, DM 2, BL carotid artery disease, hypotension on Midodrine who was brought to ER for AMS and worsening tremors. He was found to have LANA, PNA and chronic cough. Patient was admitted to our rehab unit 5 days ago and was transferred off after 5 hours due to a code stroke being called. MRI did not show acutestroke. EKG shows sinus tachycardia. Telemetry had shown ventricular premature beats. Acute pneumonia, likely aspiration in setting of verysevere Parkinson's disease and very likely significant esophageal dysmotility; coronary disease, which is stable; hypertension, which has become hypotension with autonomic insufficiency, highly responsive to midodrine. Patient improved with treatment on the acute floor, and tolerated therapies, and was admitted back to our rehabilitation unit last night, and will get started in rehab unit therapies today. Patient's wife had questions about his DNR status, but now that he is better, she would like a full CODE STATUS for him. She would also like him started on Metamucil daily. Repeat labs this morning look good and are stable. Past Medical History:   has a past medical history of Acute bronchitis, CAD (coronary artery disease), Clostridioides difficile infection, Hyperlipidemia, and Parkinson disease ([facility]      ROS: No CP, SOB, dyspnea    Objective:  Patient Vitals for the past 24 hrs:   BP Temp Temp src Pulse Resp SpO2 Height Weight   05/22/23 0845 (!) 147/51 98.1 °F (36.7 °C) Oral 58 16 96 % -- --   05/22/23 0630 -- -- -- -- -- -- -- 183 lb 9.6 oz (83.3 kg)   05/21/23 2100 104/66 97.6 °F (36.4 °C) Oral 61 16 95 % -- --   05/21/23 1549 120/72 97.8 °F (36.6 °C) Oral 73 17 -- -- --   05/21/23 1546 -- -- -- -- -- -- 5' 8\" (1.727 m) 184 lb 8 oz (83.7 kg)       Gen: mild

## 2023-05-22 NOTE — DISCHARGE SUMMARY
[facility] DISCHARGE SUMMARY    Patient Demographics    Patient. Lamar Perea  Date of Birth. 1945  MRN. [id number]     Primary care provider. KEYSHA Joseph CNP  (Tel: [phone])    Admit date: 5/18/2023    Discharge date (blank if same as Note Date): 5/21/2023  Note Date: 5/22/2023     Reason for Hospitalization. No chief complaint on file. [facility] Course. TIA   -= Acute stroke is ruled out  -Describing potentially could be secondary hypoxia during nighttime patient likely has undiagnosed sleep apnea that could have triggered it  -Patient with chronic Parkinson disease meds been adjusted by neurology received to be doing stable     Aspiration pneumonia currently on IV Zosyn swith to aiugment for 3 more days   -Pulmonary perspective patient seems to be doing better     Parkinson disease  -Adjusting medications neurology recommendation     Appreciate cardiology recommendation    Consults. IP CONSULT TO NEUROLOGY  IP CONSULT TO PHYSICAL MEDICINE REHAB    Physical examination on discharge day. /62   Pulse 89   Temp 97.4 °F (36.3 °C) (Temporal)   Resp 18   Ht 5' 8\" (1.727 m)   Wt 197 lb 1.5 oz (89.4 kg)   SpO2 93%   BMI 29.97 kg/m²   General appearance. Alert. Looks comfortable. HEENT. Sclera clear. Moist mucus membranes. Cardiovascular. Regular rate and rhythm, normal S1, S2. No murmur. Respiratory. Not using accessory muscles. Clear to auscultation bilaterally, no wheeze. Gastrointestinal. Abdomen soft, non-tender, not distended, normal bowel sounds  Neurology. Facial symmetry. No speech deficits. Moving all extremities equally. Extremities. No edema in lower extremities. Skin. Warm, dry, normal turgor    Condition at time of discharge stable     Medication instructions provided to patient at discharge.      Medication List        START taking

## 2023-05-23 PROCEDURE — 6360000002 HC RX W HCPCS: Performed by: PHYSICAL MEDICINE & REHABILITATION

## 2023-05-23 PROCEDURE — 6370000000 HC RX 637 (ALT 250 FOR IP): Performed by: PHYSICAL MEDICINE & REHABILITATION

## 2023-05-23 PROCEDURE — 2580000003 HC RX 258: Performed by: PHYSICAL MEDICINE & REHABILITATION

## 2023-05-23 PROCEDURE — 92526 ORAL FUNCTION THERAPY: CPT

## 2023-05-23 PROCEDURE — 97112 NEUROMUSCULAR REEDUCATION: CPT

## 2023-05-23 PROCEDURE — 97530 THERAPEUTIC ACTIVITIES: CPT

## 2023-05-23 PROCEDURE — 1280000000 HC REHAB R&B

## 2023-05-23 PROCEDURE — 97116 GAIT TRAINING THERAPY: CPT

## 2023-05-23 PROCEDURE — 92507 TX SP LANG VOICE COMM INDIV: CPT

## 2023-05-23 PROCEDURE — 97535 SELF CARE MNGMENT TRAINING: CPT

## 2023-05-23 RX ADMIN — DOCUSATE SODIUM 100 MG: 100 CAPSULE, LIQUID FILLED ORAL at 21:13

## 2023-05-23 RX ADMIN — PIPERACILLIN AND TAZOBACTAM 3375 MG: 3; .375 INJECTION, POWDER, LYOPHILIZED, FOR SOLUTION INTRAVENOUS at 07:59

## 2023-05-23 RX ADMIN — CARBIDOPA AND LEVODOPA 1 TABLET: 25; 100 TABLET ORAL at 18:45

## 2023-05-23 RX ADMIN — LACTULOSE 20 G: 20 SOLUTION ORAL at 21:11

## 2023-05-23 RX ADMIN — MIDODRINE HYDROCHLORIDE 2.5 MG: 5 TABLET ORAL at 07:47

## 2023-05-23 RX ADMIN — ROPINIROLE HYDROCHLORIDE 0.25 MG: 0.25 TABLET, FILM COATED ORAL at 07:46

## 2023-05-23 RX ADMIN — PANTOPRAZOLE SODIUM 40 MG: 40 TABLET, DELAYED RELEASE ORAL at 06:07

## 2023-05-23 RX ADMIN — ASPIRIN 81 MG: 81 TABLET, COATED ORAL at 07:47

## 2023-05-23 RX ADMIN — ENOXAPARIN SODIUM 40 MG: 100 INJECTION SUBCUTANEOUS at 07:46

## 2023-05-23 RX ADMIN — PIPERACILLIN AND TAZOBACTAM 3375 MG: 3; .375 INJECTION, POWDER, LYOPHILIZED, FOR SOLUTION INTRAVENOUS at 16:50

## 2023-05-23 RX ADMIN — PIPERACILLIN AND TAZOBACTAM 3375 MG: 3; .375 INJECTION, POWDER, LYOPHILIZED, FOR SOLUTION INTRAVENOUS at 00:55

## 2023-05-23 RX ADMIN — CARBIDOPA AND LEVODOPA 1 TABLET: 25; 100 TABLET ORAL at 11:36

## 2023-05-23 RX ADMIN — CLOZAPINE 25 MG: 25 TABLET ORAL at 07:52

## 2023-05-23 RX ADMIN — CARBIDOPA AND LEVODOPA 1 TABLET: 25; 100 TABLET ORAL at 16:46

## 2023-05-23 RX ADMIN — DONEPEZIL HYDROCHLORIDE 10 MG: 5 TABLET, FILM COATED ORAL at 21:13

## 2023-05-23 RX ADMIN — ROPINIROLE HYDROCHLORIDE 0.25 MG: 0.25 TABLET, FILM COATED ORAL at 21:22

## 2023-05-23 RX ADMIN — CARBIDOPA AND LEVODOPA 1 TABLET: 25; 100 TABLET ORAL at 14:57

## 2023-05-23 RX ADMIN — MIDODRINE HYDROCHLORIDE 2.5 MG: 5 TABLET ORAL at 13:34

## 2023-05-23 RX ADMIN — PRIMIDONE 25 MG: 50 TABLET ORAL at 21:12

## 2023-05-23 RX ADMIN — CLOZAPINE 25 MG: 25 TABLET ORAL at 21:17

## 2023-05-23 RX ADMIN — ATORVASTATIN CALCIUM 80 MG: 80 TABLET, FILM COATED ORAL at 21:14

## 2023-05-23 RX ADMIN — MIDODRINE HYDROCHLORIDE 2.5 MG: 5 TABLET ORAL at 21:12

## 2023-05-23 RX ADMIN — CARBIDOPA AND LEVODOPA 1 TABLET: 25; 100 TABLET ORAL at 13:34

## 2023-05-23 RX ADMIN — Medication 10 ML: at 07:52

## 2023-05-23 RX ADMIN — ROPINIROLE HYDROCHLORIDE 0.25 MG: 0.25 TABLET, FILM COATED ORAL at 13:34

## 2023-05-23 RX ADMIN — Medication 1 PACKET: at 07:47

## 2023-05-23 RX ADMIN — CARBIDOPA AND LEVODOPA 1 TABLET: 25; 100 TABLET ORAL at 07:47

## 2023-05-23 RX ADMIN — Medication 10 ML: at 21:22

## 2023-05-23 RX ADMIN — DOCUSATE SODIUM 100 MG: 100 CAPSULE, LIQUID FILLED ORAL at 07:47

## 2023-05-23 ASSESSMENT — PAIN SCALES - GENERAL
PAINLEVEL_OUTOF10: 0
PAINLEVEL_OUTOF10: 0

## 2023-05-23 NOTE — CARE COORDINATION
Team conference held today. Spoke with patient  to discuss progress with therapy, barriers to discharge, and plans to return home. Estimated discharge date set for 6/7/2023. Patient anticipates discharging to home needed supports. Will continue to follow for support and discharge planning.

## 2023-05-24 PROCEDURE — 97116 GAIT TRAINING THERAPY: CPT

## 2023-05-24 PROCEDURE — 6370000000 HC RX 637 (ALT 250 FOR IP): Performed by: PHYSICAL MEDICINE & REHABILITATION

## 2023-05-24 PROCEDURE — 97535 SELF CARE MNGMENT TRAINING: CPT

## 2023-05-24 PROCEDURE — 6360000002 HC RX W HCPCS: Performed by: PHYSICAL MEDICINE & REHABILITATION

## 2023-05-24 PROCEDURE — 92526 ORAL FUNCTION THERAPY: CPT

## 2023-05-24 PROCEDURE — 97112 NEUROMUSCULAR REEDUCATION: CPT

## 2023-05-24 PROCEDURE — 97130 THER IVNTJ EA ADDL 15 MIN: CPT

## 2023-05-24 PROCEDURE — 97530 THERAPEUTIC ACTIVITIES: CPT

## 2023-05-24 PROCEDURE — 2580000003 HC RX 258: Performed by: PHYSICAL MEDICINE & REHABILITATION

## 2023-05-24 PROCEDURE — 1280000000 HC REHAB R&B

## 2023-05-24 PROCEDURE — 97129 THER IVNTJ 1ST 15 MIN: CPT

## 2023-05-24 RX ADMIN — ENOXAPARIN SODIUM 40 MG: 100 INJECTION SUBCUTANEOUS at 08:56

## 2023-05-24 RX ADMIN — CARBIDOPA AND LEVODOPA 1 TABLET: 25; 100 TABLET ORAL at 11:27

## 2023-05-24 RX ADMIN — PIPERACILLIN AND TAZOBACTAM 3375 MG: 3; .375 INJECTION, POWDER, LYOPHILIZED, FOR SOLUTION INTRAVENOUS at 00:53

## 2023-05-24 RX ADMIN — ASPIRIN 81 MG: 81 TABLET, COATED ORAL at 08:55

## 2023-05-24 RX ADMIN — DOCUSATE SODIUM 100 MG: 100 CAPSULE, LIQUID FILLED ORAL at 21:48

## 2023-05-24 RX ADMIN — PIPERACILLIN AND TAZOBACTAM 3375 MG: 3; .375 INJECTION, POWDER, LYOPHILIZED, FOR SOLUTION INTRAVENOUS at 09:54

## 2023-05-24 RX ADMIN — ROPINIROLE HYDROCHLORIDE 0.25 MG: 0.25 TABLET, FILM COATED ORAL at 08:55

## 2023-05-24 RX ADMIN — ROPINIROLE HYDROCHLORIDE 0.25 MG: 0.25 TABLET, FILM COATED ORAL at 13:44

## 2023-05-24 RX ADMIN — MIDODRINE HYDROCHLORIDE 2.5 MG: 5 TABLET ORAL at 13:44

## 2023-05-24 RX ADMIN — Medication 1 PACKET: at 08:56

## 2023-05-24 RX ADMIN — CARBIDOPA AND LEVODOPA 1 TABLET: 25; 100 TABLET ORAL at 13:45

## 2023-05-24 RX ADMIN — CARBIDOPA AND LEVODOPA 1 TABLET: 25; 100 TABLET ORAL at 17:20

## 2023-05-24 RX ADMIN — MIDODRINE HYDROCHLORIDE 2.5 MG: 5 TABLET ORAL at 21:49

## 2023-05-24 RX ADMIN — CLOZAPINE 25 MG: 25 TABLET ORAL at 09:47

## 2023-05-24 RX ADMIN — PANTOPRAZOLE SODIUM 40 MG: 40 TABLET, DELAYED RELEASE ORAL at 06:38

## 2023-05-24 RX ADMIN — ATORVASTATIN CALCIUM 80 MG: 80 TABLET, FILM COATED ORAL at 21:48

## 2023-05-24 RX ADMIN — MIDODRINE HYDROCHLORIDE 2.5 MG: 5 TABLET ORAL at 08:55

## 2023-05-24 RX ADMIN — CARBIDOPA AND LEVODOPA 1 TABLET: 25; 100 TABLET ORAL at 08:55

## 2023-05-24 RX ADMIN — Medication 10 ML: at 09:56

## 2023-05-24 RX ADMIN — ROPINIROLE HYDROCHLORIDE 0.25 MG: 0.25 TABLET, FILM COATED ORAL at 21:49

## 2023-05-24 RX ADMIN — DONEPEZIL HYDROCHLORIDE 10 MG: 5 TABLET, FILM COATED ORAL at 21:49

## 2023-05-24 RX ADMIN — LACTULOSE 20 G: 20 SOLUTION ORAL at 21:51

## 2023-05-24 RX ADMIN — CARBIDOPA AND LEVODOPA 1 TABLET: 25; 100 TABLET ORAL at 15:44

## 2023-05-24 RX ADMIN — CLOZAPINE 25 MG: 25 TABLET ORAL at 22:04

## 2023-05-24 RX ADMIN — PIPERACILLIN AND TAZOBACTAM 3375 MG: 3; .375 INJECTION, POWDER, LYOPHILIZED, FOR SOLUTION INTRAVENOUS at 17:19

## 2023-05-24 RX ADMIN — PRIMIDONE 25 MG: 50 TABLET ORAL at 21:48

## 2023-05-24 RX ADMIN — DOCUSATE SODIUM 100 MG: 100 CAPSULE, LIQUID FILLED ORAL at 08:55

## 2023-05-24 RX ADMIN — IPRATROPIUM BROMIDE 1 SPRAY: 42 SPRAY NASAL at 13:30

## 2023-05-24 ASSESSMENT — PAIN DESCRIPTION - DESCRIPTORS: DESCRIPTORS: SORE

## 2023-05-24 ASSESSMENT — PAIN SCALES - GENERAL
PAINLEVEL_OUTOF10: 0
PAINLEVEL_OUTOF10: 7

## 2023-05-24 ASSESSMENT — PAIN DESCRIPTION - LOCATION: LOCATION: SHOULDER

## 2023-05-24 ASSESSMENT — PAIN SCALES - WONG BAKER: WONGBAKER_NUMERICALRESPONSE: 0

## 2023-05-24 ASSESSMENT — PAIN DESCRIPTION - ORIENTATION: ORIENTATION: RIGHT

## 2023-05-24 ASSESSMENT — PAIN - FUNCTIONAL ASSESSMENT: PAIN_FUNCTIONAL_ASSESSMENT: ACTIVITIES ARE NOT PREVENTED

## 2023-05-25 LAB
ANION GAP SERPL CALCULATED.3IONS-SCNC: 7 MMOL/L (ref 3–16)
BASOPHILS # BLD: 0 K/UL (ref 0–0.2)
BASOPHILS NFR BLD: 0.7 %
BUN SERPL-MCNC: 18 MG/DL (ref 7–20)
CALCIUM SERPL-MCNC: 9 MG/DL (ref 8.3–10.6)
CHLORIDE SERPL-SCNC: 114 MMOL/L (ref 99–110)
CO2 SERPL-SCNC: 27 MMOL/L (ref 21–32)
CREAT SERPL-MCNC: 0.7 MG/DL (ref 0.8–1.3)
DEPRECATED RDW RBC AUTO: 13.4 % (ref 12.4–15.4)
EOSINOPHIL # BLD: 0.4 K/UL (ref 0–0.6)
EOSINOPHIL NFR BLD: 6.2 %
GFR SERPLBLD CREATININE-BSD FMLA CKD-EPI: >60 ML/MIN/{1.73_M2}
GLUCOSE SERPL-MCNC: 114 MG/DL (ref 70–99)
HCT VFR BLD AUTO: 39.8 % (ref 40.5–52.5)
HGB BLD-MCNC: 12.7 G/DL (ref 13.5–17.5)
LYMPHOCYTES # BLD: 1.9 K/UL (ref 1–5.1)
LYMPHOCYTES NFR BLD: 27.3 %
MAGNESIUM SERPL-MCNC: 2.1 MG/DL (ref 1.8–2.4)
MCH RBC QN AUTO: 29.5 PG (ref 26–34)
MCHC RBC AUTO-ENTMCNC: 31.9 G/DL (ref 31–36)
MCV RBC AUTO: 92.4 FL (ref 80–100)
MONOCYTES # BLD: 0.7 K/UL (ref 0–1.3)
MONOCYTES NFR BLD: 9.7 %
NEUTROPHILS # BLD: 4 K/UL (ref 1.7–7.7)
NEUTROPHILS NFR BLD: 56.1 %
PLATELET # BLD AUTO: 342 K/UL (ref 135–450)
PMV BLD AUTO: 7.5 FL (ref 5–10.5)
POTASSIUM SERPL-SCNC: 3.7 MMOL/L (ref 3.5–5.1)
RBC # BLD AUTO: 4.3 M/UL (ref 4.2–5.9)
SODIUM SERPL-SCNC: 148 MMOL/L (ref 136–145)
WBC # BLD AUTO: 7 K/UL (ref 4–11)

## 2023-05-25 PROCEDURE — 85025 COMPLETE CBC W/AUTO DIFF WBC: CPT

## 2023-05-25 PROCEDURE — 97129 THER IVNTJ 1ST 15 MIN: CPT

## 2023-05-25 PROCEDURE — 80048 BASIC METABOLIC PNL TOTAL CA: CPT

## 2023-05-25 PROCEDURE — 92526 ORAL FUNCTION THERAPY: CPT

## 2023-05-25 PROCEDURE — 97530 THERAPEUTIC ACTIVITIES: CPT

## 2023-05-25 PROCEDURE — 6370000000 HC RX 637 (ALT 250 FOR IP): Performed by: PHYSICAL MEDICINE & REHABILITATION

## 2023-05-25 PROCEDURE — 92507 TX SP LANG VOICE COMM INDIV: CPT

## 2023-05-25 PROCEDURE — 6360000002 HC RX W HCPCS: Performed by: PHYSICAL MEDICINE & REHABILITATION

## 2023-05-25 PROCEDURE — 2580000003 HC RX 258: Performed by: PHYSICAL MEDICINE & REHABILITATION

## 2023-05-25 PROCEDURE — 97116 GAIT TRAINING THERAPY: CPT

## 2023-05-25 PROCEDURE — 97112 NEUROMUSCULAR REEDUCATION: CPT

## 2023-05-25 PROCEDURE — 36415 COLL VENOUS BLD VENIPUNCTURE: CPT

## 2023-05-25 PROCEDURE — 1280000000 HC REHAB R&B

## 2023-05-25 PROCEDURE — 83735 ASSAY OF MAGNESIUM: CPT

## 2023-05-25 RX ORDER — LACTULOSE 10 G/15ML
20 SOLUTION ORAL 2 TIMES DAILY PRN
Status: DISCONTINUED | OUTPATIENT
Start: 2023-05-25 | End: 2023-06-07 | Stop reason: HOSPADM

## 2023-05-25 RX ORDER — SENNA PLUS 8.6 MG/1
2 TABLET ORAL 2 TIMES DAILY
Status: DISCONTINUED | OUTPATIENT
Start: 2023-05-25 | End: 2023-06-07 | Stop reason: HOSPADM

## 2023-05-25 RX ADMIN — PIPERACILLIN AND TAZOBACTAM 3375 MG: 3; .375 INJECTION, POWDER, LYOPHILIZED, FOR SOLUTION INTRAVENOUS at 01:38

## 2023-05-25 RX ADMIN — ACETAMINOPHEN 650 MG: 325 TABLET ORAL at 15:15

## 2023-05-25 RX ADMIN — ROPINIROLE HYDROCHLORIDE 0.25 MG: 0.25 TABLET, FILM COATED ORAL at 09:13

## 2023-05-25 RX ADMIN — Medication 10 ML: at 09:46

## 2023-05-25 RX ADMIN — SENNOSIDES 17.2 MG: 8.6 TABLET, FILM COATED ORAL at 12:00

## 2023-05-25 RX ADMIN — CARBIDOPA AND LEVODOPA 1 TABLET: 25; 100 TABLET ORAL at 17:35

## 2023-05-25 RX ADMIN — SENNOSIDES 17.2 MG: 8.6 TABLET, FILM COATED ORAL at 21:46

## 2023-05-25 RX ADMIN — CARBIDOPA AND LEVODOPA 1 TABLET: 25; 100 TABLET ORAL at 09:47

## 2023-05-25 RX ADMIN — CLOZAPINE 25 MG: 25 TABLET ORAL at 21:48

## 2023-05-25 RX ADMIN — MIDODRINE HYDROCHLORIDE 2.5 MG: 5 TABLET ORAL at 21:51

## 2023-05-25 RX ADMIN — PIPERACILLIN AND TAZOBACTAM 3375 MG: 3; .375 INJECTION, POWDER, LYOPHILIZED, FOR SOLUTION INTRAVENOUS at 10:08

## 2023-05-25 RX ADMIN — DONEPEZIL HYDROCHLORIDE 10 MG: 5 TABLET, FILM COATED ORAL at 21:47

## 2023-05-25 RX ADMIN — ATORVASTATIN CALCIUM 80 MG: 80 TABLET, FILM COATED ORAL at 21:50

## 2023-05-25 RX ADMIN — ROPINIROLE HYDROCHLORIDE 0.25 MG: 0.25 TABLET, FILM COATED ORAL at 15:12

## 2023-05-25 RX ADMIN — CLOZAPINE 25 MG: 25 TABLET ORAL at 11:59

## 2023-05-25 RX ADMIN — ASPIRIN 81 MG: 81 TABLET, COATED ORAL at 09:47

## 2023-05-25 RX ADMIN — MIDODRINE HYDROCHLORIDE 2.5 MG: 5 TABLET ORAL at 15:12

## 2023-05-25 RX ADMIN — MIDODRINE HYDROCHLORIDE 2.5 MG: 5 TABLET ORAL at 09:13

## 2023-05-25 RX ADMIN — CARBIDOPA AND LEVODOPA 1 TABLET: 25; 100 TABLET ORAL at 15:12

## 2023-05-25 RX ADMIN — PANTOPRAZOLE SODIUM 40 MG: 40 TABLET, DELAYED RELEASE ORAL at 07:47

## 2023-05-25 RX ADMIN — DOCUSATE SODIUM 100 MG: 100 CAPSULE, LIQUID FILLED ORAL at 09:47

## 2023-05-25 RX ADMIN — PIPERACILLIN AND TAZOBACTAM 3375 MG: 3; .375 INJECTION, POWDER, LYOPHILIZED, FOR SOLUTION INTRAVENOUS at 17:42

## 2023-05-25 RX ADMIN — CARBIDOPA AND LEVODOPA 1 TABLET: 25; 100 TABLET ORAL at 13:06

## 2023-05-25 RX ADMIN — ROPINIROLE HYDROCHLORIDE 0.25 MG: 0.25 TABLET, FILM COATED ORAL at 21:47

## 2023-05-25 RX ADMIN — CARBIDOPA AND LEVODOPA 1 TABLET: 25; 100 TABLET ORAL at 11:59

## 2023-05-25 RX ADMIN — CARBIDOPA AND LEVODOPA 1 TABLET: 25; 100 TABLET ORAL at 07:47

## 2023-05-25 RX ADMIN — PRIMIDONE 25 MG: 50 TABLET ORAL at 21:51

## 2023-05-25 RX ADMIN — ENOXAPARIN SODIUM 40 MG: 100 INJECTION SUBCUTANEOUS at 09:45

## 2023-05-25 RX ADMIN — DOCUSATE SODIUM 100 MG: 100 CAPSULE, LIQUID FILLED ORAL at 21:46

## 2023-05-25 RX ADMIN — Medication 1 PACKET: at 09:46

## 2023-05-25 ASSESSMENT — PAIN DESCRIPTION - LOCATION: LOCATION: OTHER (COMMENT)

## 2023-05-25 ASSESSMENT — PAIN SCALES - GENERAL: PAINLEVEL_OUTOF10: 2

## 2023-05-26 PROCEDURE — 97130 THER IVNTJ EA ADDL 15 MIN: CPT

## 2023-05-26 PROCEDURE — 97535 SELF CARE MNGMENT TRAINING: CPT

## 2023-05-26 PROCEDURE — 6370000000 HC RX 637 (ALT 250 FOR IP): Performed by: PHYSICAL MEDICINE & REHABILITATION

## 2023-05-26 PROCEDURE — 2580000003 HC RX 258: Performed by: PHYSICAL MEDICINE & REHABILITATION

## 2023-05-26 PROCEDURE — 6360000002 HC RX W HCPCS: Performed by: PHYSICAL MEDICINE & REHABILITATION

## 2023-05-26 PROCEDURE — 97116 GAIT TRAINING THERAPY: CPT

## 2023-05-26 PROCEDURE — 97530 THERAPEUTIC ACTIVITIES: CPT

## 2023-05-26 PROCEDURE — 92526 ORAL FUNCTION THERAPY: CPT

## 2023-05-26 PROCEDURE — 97129 THER IVNTJ 1ST 15 MIN: CPT

## 2023-05-26 PROCEDURE — 97112 NEUROMUSCULAR REEDUCATION: CPT

## 2023-05-26 PROCEDURE — 1280000000 HC REHAB R&B

## 2023-05-26 RX ORDER — CELECOXIB 100 MG/1
200 CAPSULE ORAL DAILY
Status: DISCONTINUED | OUTPATIENT
Start: 2023-05-26 | End: 2023-06-07 | Stop reason: HOSPADM

## 2023-05-26 RX ADMIN — ROPINIROLE HYDROCHLORIDE 0.25 MG: 0.25 TABLET, FILM COATED ORAL at 14:08

## 2023-05-26 RX ADMIN — Medication 10 ML: at 09:56

## 2023-05-26 RX ADMIN — Medication 10 ML: at 21:38

## 2023-05-26 RX ADMIN — PANTOPRAZOLE SODIUM 40 MG: 40 TABLET, DELAYED RELEASE ORAL at 07:02

## 2023-05-26 RX ADMIN — CELECOXIB 200 MG: 100 CAPSULE ORAL at 17:48

## 2023-05-26 RX ADMIN — ROPINIROLE HYDROCHLORIDE 0.25 MG: 0.25 TABLET, FILM COATED ORAL at 09:52

## 2023-05-26 RX ADMIN — ATORVASTATIN CALCIUM 80 MG: 80 TABLET, FILM COATED ORAL at 21:35

## 2023-05-26 RX ADMIN — MIDODRINE HYDROCHLORIDE 2.5 MG: 5 TABLET ORAL at 14:09

## 2023-05-26 RX ADMIN — PRIMIDONE 25 MG: 50 TABLET ORAL at 21:35

## 2023-05-26 RX ADMIN — CLOZAPINE 25 MG: 25 TABLET ORAL at 21:34

## 2023-05-26 RX ADMIN — CLOZAPINE 25 MG: 25 TABLET ORAL at 11:25

## 2023-05-26 RX ADMIN — DOCUSATE SODIUM 100 MG: 100 CAPSULE, LIQUID FILLED ORAL at 21:34

## 2023-05-26 RX ADMIN — MIDODRINE HYDROCHLORIDE 2.5 MG: 5 TABLET ORAL at 09:16

## 2023-05-26 RX ADMIN — ROPINIROLE HYDROCHLORIDE 0.25 MG: 0.25 TABLET, FILM COATED ORAL at 21:34

## 2023-05-26 RX ADMIN — CARBIDOPA AND LEVODOPA 1 TABLET: 25; 100 TABLET ORAL at 14:08

## 2023-05-26 RX ADMIN — DONEPEZIL HYDROCHLORIDE 10 MG: 5 TABLET, FILM COATED ORAL at 21:34

## 2023-05-26 RX ADMIN — ENOXAPARIN SODIUM 40 MG: 100 INJECTION SUBCUTANEOUS at 09:52

## 2023-05-26 RX ADMIN — SENNOSIDES 17.2 MG: 8.6 TABLET, FILM COATED ORAL at 09:52

## 2023-05-26 RX ADMIN — CARBIDOPA AND LEVODOPA 1 TABLET: 25; 100 TABLET ORAL at 09:16

## 2023-05-26 RX ADMIN — MIDODRINE HYDROCHLORIDE 2.5 MG: 5 TABLET ORAL at 21:34

## 2023-05-26 RX ADMIN — CARBIDOPA AND LEVODOPA 1 TABLET: 25; 100 TABLET ORAL at 17:48

## 2023-05-26 RX ADMIN — CARBIDOPA AND LEVODOPA 1 TABLET: 25; 100 TABLET ORAL at 15:05

## 2023-05-26 RX ADMIN — CARBIDOPA AND LEVODOPA 1 TABLET: 25; 100 TABLET ORAL at 11:26

## 2023-05-26 RX ADMIN — PIPERACILLIN AND TAZOBACTAM 3375 MG: 3; .375 INJECTION, POWDER, LYOPHILIZED, FOR SOLUTION INTRAVENOUS at 01:13

## 2023-05-26 RX ADMIN — ASPIRIN 81 MG: 81 TABLET, COATED ORAL at 09:16

## 2023-05-26 RX ADMIN — SENNOSIDES 17.2 MG: 8.6 TABLET, FILM COATED ORAL at 21:35

## 2023-05-26 RX ADMIN — DOCUSATE SODIUM 100 MG: 100 CAPSULE, LIQUID FILLED ORAL at 09:16

## 2023-05-26 RX ADMIN — PIPERACILLIN AND TAZOBACTAM 3375 MG: 3; .375 INJECTION, POWDER, LYOPHILIZED, FOR SOLUTION INTRAVENOUS at 09:55

## 2023-05-26 RX ADMIN — ACETAMINOPHEN 650 MG: 325 TABLET ORAL at 09:15

## 2023-05-26 RX ADMIN — Medication 1 PACKET: at 09:53

## 2023-05-26 RX ADMIN — CARBIDOPA AND LEVODOPA 1 TABLET: 25; 100 TABLET ORAL at 07:02

## 2023-05-26 ASSESSMENT — PAIN SCALES - GENERAL: PAINLEVEL_OUTOF10: 5

## 2023-05-26 ASSESSMENT — PAIN - FUNCTIONAL ASSESSMENT: PAIN_FUNCTIONAL_ASSESSMENT: ACTIVITIES ARE NOT PREVENTED

## 2023-05-26 ASSESSMENT — PAIN DESCRIPTION - LOCATION: LOCATION: SHOULDER

## 2023-05-26 ASSESSMENT — PAIN DESCRIPTION - FREQUENCY: FREQUENCY: INTERMITTENT

## 2023-05-26 ASSESSMENT — PAIN DESCRIPTION - PAIN TYPE: TYPE: ACUTE PAIN

## 2023-05-26 ASSESSMENT — PAIN DESCRIPTION - DESCRIPTORS: DESCRIPTORS: SORE

## 2023-05-27 PROCEDURE — 2580000003 HC RX 258: Performed by: PHYSICAL MEDICINE & REHABILITATION

## 2023-05-27 PROCEDURE — 6370000000 HC RX 637 (ALT 250 FOR IP): Performed by: PHYSICAL MEDICINE & REHABILITATION

## 2023-05-27 PROCEDURE — 6360000002 HC RX W HCPCS: Performed by: PHYSICAL MEDICINE & REHABILITATION

## 2023-05-27 PROCEDURE — 1280000000 HC REHAB R&B

## 2023-05-27 RX ADMIN — PANTOPRAZOLE SODIUM 40 MG: 40 TABLET, DELAYED RELEASE ORAL at 07:34

## 2023-05-27 RX ADMIN — MIDODRINE HYDROCHLORIDE 2.5 MG: 5 TABLET ORAL at 09:35

## 2023-05-27 RX ADMIN — CLOZAPINE 25 MG: 25 TABLET ORAL at 09:43

## 2023-05-27 RX ADMIN — CARBIDOPA AND LEVODOPA 1 TABLET: 25; 100 TABLET ORAL at 09:35

## 2023-05-27 RX ADMIN — DONEPEZIL HYDROCHLORIDE 10 MG: 5 TABLET, FILM COATED ORAL at 20:09

## 2023-05-27 RX ADMIN — MIDODRINE HYDROCHLORIDE 2.5 MG: 5 TABLET ORAL at 14:12

## 2023-05-27 RX ADMIN — CLOZAPINE 25 MG: 25 TABLET ORAL at 21:21

## 2023-05-27 RX ADMIN — CARBIDOPA AND LEVODOPA 1 TABLET: 25; 100 TABLET ORAL at 11:31

## 2023-05-27 RX ADMIN — CARBIDOPA AND LEVODOPA 1 TABLET: 25; 100 TABLET ORAL at 14:12

## 2023-05-27 RX ADMIN — CARBIDOPA AND LEVODOPA 1 TABLET: 25; 100 TABLET ORAL at 17:37

## 2023-05-27 RX ADMIN — ENOXAPARIN SODIUM 40 MG: 100 INJECTION SUBCUTANEOUS at 09:34

## 2023-05-27 RX ADMIN — ATORVASTATIN CALCIUM 80 MG: 80 TABLET, FILM COATED ORAL at 20:09

## 2023-05-27 RX ADMIN — MIDODRINE HYDROCHLORIDE 2.5 MG: 5 TABLET ORAL at 20:09

## 2023-05-27 RX ADMIN — Medication 10 ML: at 11:31

## 2023-05-27 RX ADMIN — ROPINIROLE HYDROCHLORIDE 0.25 MG: 0.25 TABLET, FILM COATED ORAL at 09:35

## 2023-05-27 RX ADMIN — CELECOXIB 200 MG: 100 CAPSULE ORAL at 09:35

## 2023-05-27 RX ADMIN — DOCUSATE SODIUM 100 MG: 100 CAPSULE, LIQUID FILLED ORAL at 09:35

## 2023-05-27 RX ADMIN — CARBIDOPA AND LEVODOPA 1 TABLET: 25; 100 TABLET ORAL at 15:34

## 2023-05-27 RX ADMIN — DOCUSATE SODIUM 100 MG: 100 CAPSULE, LIQUID FILLED ORAL at 20:09

## 2023-05-27 RX ADMIN — PRIMIDONE 25 MG: 50 TABLET ORAL at 20:09

## 2023-05-27 RX ADMIN — CARBIDOPA AND LEVODOPA 1 TABLET: 25; 100 TABLET ORAL at 07:34

## 2023-05-27 RX ADMIN — Medication 10 ML: at 20:11

## 2023-05-27 RX ADMIN — ROPINIROLE HYDROCHLORIDE 0.25 MG: 0.25 TABLET, FILM COATED ORAL at 20:09

## 2023-05-27 RX ADMIN — ASPIRIN 81 MG: 81 TABLET, COATED ORAL at 09:35

## 2023-05-27 RX ADMIN — Medication 1 PACKET: at 09:34

## 2023-05-27 RX ADMIN — ROPINIROLE HYDROCHLORIDE 0.25 MG: 0.25 TABLET, FILM COATED ORAL at 14:12

## 2023-05-27 ASSESSMENT — PAIN SCALES - GENERAL
PAINLEVEL_OUTOF10: 6
PAINLEVEL_OUTOF10: 6
PAINLEVEL_OUTOF10: 5
PAINLEVEL_OUTOF10: 0

## 2023-05-27 ASSESSMENT — PAIN DESCRIPTION - PAIN TYPE: TYPE: ACUTE PAIN

## 2023-05-27 ASSESSMENT — PAIN - FUNCTIONAL ASSESSMENT: PAIN_FUNCTIONAL_ASSESSMENT: ACTIVITIES ARE NOT PREVENTED

## 2023-05-27 ASSESSMENT — PAIN DESCRIPTION - ORIENTATION: ORIENTATION: RIGHT

## 2023-05-27 ASSESSMENT — PAIN DESCRIPTION - DESCRIPTORS: DESCRIPTORS: SHARP

## 2023-05-27 ASSESSMENT — PAIN DESCRIPTION - FREQUENCY: FREQUENCY: INTERMITTENT

## 2023-05-27 ASSESSMENT — PAIN DESCRIPTION - LOCATION: LOCATION: SHOULDER

## 2023-05-27 ASSESSMENT — PAIN DESCRIPTION - ONSET: ONSET: ON-GOING

## 2023-05-28 VITALS
TEMPERATURE: 97.7 F | HEIGHT: 68 IN | DIASTOLIC BLOOD PRESSURE: 65 MMHG | BODY MASS INDEX: 28.26 KG/M2 | SYSTOLIC BLOOD PRESSURE: 106 MMHG | WEIGHT: 186.5 LBS | OXYGEN SATURATION: 95 % | HEART RATE: 66 BPM | RESPIRATION RATE: 16 BRPM

## 2023-05-28 PROCEDURE — 6370000000 HC RX 637 (ALT 250 FOR IP): Performed by: PHYSICAL MEDICINE & REHABILITATION

## 2023-05-28 PROCEDURE — 97535 SELF CARE MNGMENT TRAINING: CPT

## 2023-05-28 PROCEDURE — 92507 TX SP LANG VOICE COMM INDIV: CPT

## 2023-05-28 PROCEDURE — 97129 THER IVNTJ 1ST 15 MIN: CPT

## 2023-05-28 PROCEDURE — 2580000003 HC RX 258: Performed by: PHYSICAL MEDICINE & REHABILITATION

## 2023-05-28 PROCEDURE — 97112 NEUROMUSCULAR REEDUCATION: CPT

## 2023-05-28 PROCEDURE — 97116 GAIT TRAINING THERAPY: CPT

## 2023-05-28 PROCEDURE — 92526 ORAL FUNCTION THERAPY: CPT

## 2023-05-28 PROCEDURE — 1280000000 HC REHAB R&B

## 2023-05-28 PROCEDURE — 6360000002 HC RX W HCPCS: Performed by: PHYSICAL MEDICINE & REHABILITATION

## 2023-05-28 RX ADMIN — DONEPEZIL HYDROCHLORIDE 10 MG: 5 TABLET, FILM COATED ORAL at 21:20

## 2023-05-28 RX ADMIN — CARBIDOPA AND LEVODOPA 1 TABLET: 25; 100 TABLET ORAL at 17:35

## 2023-05-28 RX ADMIN — ROPINIROLE HYDROCHLORIDE 0.25 MG: 0.25 TABLET, FILM COATED ORAL at 11:18

## 2023-05-28 RX ADMIN — CARBIDOPA AND LEVODOPA 1 TABLET: 25; 100 TABLET ORAL at 14:08

## 2023-05-28 RX ADMIN — CARBIDOPA AND LEVODOPA 1 TABLET: 25; 100 TABLET ORAL at 11:19

## 2023-05-28 RX ADMIN — SENNOSIDES 17.2 MG: 8.6 TABLET, FILM COATED ORAL at 21:21

## 2023-05-28 RX ADMIN — ROPINIROLE HYDROCHLORIDE 0.25 MG: 0.25 TABLET, FILM COATED ORAL at 14:07

## 2023-05-28 RX ADMIN — PANTOPRAZOLE SODIUM 40 MG: 40 TABLET, DELAYED RELEASE ORAL at 07:15

## 2023-05-28 RX ADMIN — CLOZAPINE 25 MG: 25 TABLET ORAL at 21:32

## 2023-05-28 RX ADMIN — Medication 10 ML: at 11:33

## 2023-05-28 RX ADMIN — PRIMIDONE 25 MG: 50 TABLET ORAL at 21:21

## 2023-05-28 RX ADMIN — CARBIDOPA AND LEVODOPA 1 TABLET: 25; 100 TABLET ORAL at 15:51

## 2023-05-28 RX ADMIN — DOCUSATE SODIUM 100 MG: 100 CAPSULE, LIQUID FILLED ORAL at 21:21

## 2023-05-28 RX ADMIN — SENNOSIDES 17.2 MG: 8.6 TABLET, FILM COATED ORAL at 11:18

## 2023-05-28 RX ADMIN — CELECOXIB 200 MG: 100 CAPSULE ORAL at 11:18

## 2023-05-28 RX ADMIN — MIDODRINE HYDROCHLORIDE 2.5 MG: 5 TABLET ORAL at 21:20

## 2023-05-28 RX ADMIN — Medication 1 PACKET: at 11:21

## 2023-05-28 RX ADMIN — ATORVASTATIN CALCIUM 80 MG: 80 TABLET, FILM COATED ORAL at 21:21

## 2023-05-28 RX ADMIN — ROPINIROLE HYDROCHLORIDE 0.25 MG: 0.25 TABLET, FILM COATED ORAL at 21:20

## 2023-05-28 RX ADMIN — CLOZAPINE 25 MG: 25 TABLET ORAL at 11:21

## 2023-05-28 RX ADMIN — MIDODRINE HYDROCHLORIDE 2.5 MG: 5 TABLET ORAL at 11:19

## 2023-05-28 RX ADMIN — Medication 10 ML: at 19:30

## 2023-05-28 RX ADMIN — ENOXAPARIN SODIUM 40 MG: 100 INJECTION SUBCUTANEOUS at 11:21

## 2023-05-28 RX ADMIN — CARBIDOPA AND LEVODOPA 1 TABLET: 25; 100 TABLET ORAL at 07:15

## 2023-05-28 RX ADMIN — ASPIRIN 81 MG: 81 TABLET, COATED ORAL at 11:18

## 2023-05-28 RX ADMIN — DOCUSATE SODIUM 100 MG: 100 CAPSULE, LIQUID FILLED ORAL at 11:19

## 2023-05-28 RX ADMIN — MIDODRINE HYDROCHLORIDE 2.5 MG: 5 TABLET ORAL at 14:08

## 2023-05-29 LAB
ANION GAP SERPL CALCULATED.3IONS-SCNC: 8 MMOL/L (ref 3–16)
BASOPHILS # BLD: 0 K/UL (ref 0–0.2)
BASOPHILS NFR BLD: 0.7 %
BUN SERPL-MCNC: 18 MG/DL (ref 7–20)
CALCIUM SERPL-MCNC: 8.9 MG/DL (ref 8.3–10.6)
CHLORIDE SERPL-SCNC: 110 MMOL/L (ref 99–110)
CO2 SERPL-SCNC: 26 MMOL/L (ref 21–32)
CREAT SERPL-MCNC: 0.6 MG/DL (ref 0.8–1.3)
DEPRECATED RDW RBC AUTO: 13.6 % (ref 12.4–15.4)
EOSINOPHIL # BLD: 0.4 K/UL (ref 0–0.6)
EOSINOPHIL NFR BLD: 6.4 %
GFR SERPLBLD CREATININE-BSD FMLA CKD-EPI: >60 ML/MIN/{1.73_M2}
GLUCOSE SERPL-MCNC: 97 MG/DL (ref 70–99)
HCT VFR BLD AUTO: 38.4 % (ref 40.5–52.5)
HGB BLD-MCNC: 12.6 G/DL (ref 13.5–17.5)
LYMPHOCYTES # BLD: 2.3 K/UL (ref 1–5.1)
LYMPHOCYTES NFR BLD: 33.1 %
MCH RBC QN AUTO: 30.1 PG (ref 26–34)
MCHC RBC AUTO-ENTMCNC: 32.8 G/DL (ref 31–36)
MCV RBC AUTO: 91.9 FL (ref 80–100)
MONOCYTES # BLD: 0.8 K/UL (ref 0–1.3)
MONOCYTES NFR BLD: 11.2 %
NEUTROPHILS # BLD: 3.3 K/UL (ref 1.7–7.7)
NEUTROPHILS NFR BLD: 48.6 %
PLATELET # BLD AUTO: 392 K/UL (ref 135–450)
PMV BLD AUTO: 7.4 FL (ref 5–10.5)
POTASSIUM SERPL-SCNC: 4 MMOL/L (ref 3.5–5.1)
RBC # BLD AUTO: 4.18 M/UL (ref 4.2–5.9)
SODIUM SERPL-SCNC: 144 MMOL/L (ref 136–145)
WBC # BLD AUTO: 6.8 K/UL (ref 4–11)

## 2023-05-29 PROCEDURE — 85025 COMPLETE CBC W/AUTO DIFF WBC: CPT

## 2023-05-29 PROCEDURE — 6370000000 HC RX 637 (ALT 250 FOR IP): Performed by: PHYSICAL MEDICINE & REHABILITATION

## 2023-05-29 PROCEDURE — 6360000002 HC RX W HCPCS: Performed by: PHYSICAL MEDICINE & REHABILITATION

## 2023-05-29 PROCEDURE — 36415 COLL VENOUS BLD VENIPUNCTURE: CPT

## 2023-05-29 PROCEDURE — 1280000000 HC REHAB R&B

## 2023-05-29 PROCEDURE — 80048 BASIC METABOLIC PNL TOTAL CA: CPT

## 2023-05-29 PROCEDURE — 2580000003 HC RX 258: Performed by: PHYSICAL MEDICINE & REHABILITATION

## 2023-05-29 RX ADMIN — PRIMIDONE 25 MG: 50 TABLET ORAL at 21:36

## 2023-05-29 RX ADMIN — ENOXAPARIN SODIUM 40 MG: 100 INJECTION SUBCUTANEOUS at 09:52

## 2023-05-29 RX ADMIN — CARBIDOPA AND LEVODOPA 1 TABLET: 25; 100 TABLET ORAL at 09:49

## 2023-05-29 RX ADMIN — SENNOSIDES 17.2 MG: 8.6 TABLET, FILM COATED ORAL at 21:37

## 2023-05-29 RX ADMIN — Medication 10 ML: at 10:10

## 2023-05-29 RX ADMIN — CARBIDOPA AND LEVODOPA 1 TABLET: 25; 100 TABLET ORAL at 16:19

## 2023-05-29 RX ADMIN — CLOZAPINE 25 MG: 25 TABLET ORAL at 21:36

## 2023-05-29 RX ADMIN — PANTOPRAZOLE SODIUM 40 MG: 40 TABLET, DELAYED RELEASE ORAL at 06:43

## 2023-05-29 RX ADMIN — MIDODRINE HYDROCHLORIDE 2.5 MG: 5 TABLET ORAL at 09:49

## 2023-05-29 RX ADMIN — Medication 10 ML: at 23:15

## 2023-05-29 RX ADMIN — MIDODRINE HYDROCHLORIDE 2.5 MG: 5 TABLET ORAL at 16:18

## 2023-05-29 RX ADMIN — ROPINIROLE HYDROCHLORIDE 0.25 MG: 0.25 TABLET, FILM COATED ORAL at 16:19

## 2023-05-29 RX ADMIN — CARBIDOPA AND LEVODOPA 1 TABLET: 25; 100 TABLET ORAL at 18:15

## 2023-05-29 RX ADMIN — ROPINIROLE HYDROCHLORIDE 0.25 MG: 0.25 TABLET, FILM COATED ORAL at 21:36

## 2023-05-29 RX ADMIN — ATORVASTATIN CALCIUM 80 MG: 80 TABLET, FILM COATED ORAL at 21:37

## 2023-05-29 RX ADMIN — CARBIDOPA AND LEVODOPA 1 TABLET: 25; 100 TABLET ORAL at 12:26

## 2023-05-29 RX ADMIN — CELECOXIB 200 MG: 100 CAPSULE ORAL at 09:48

## 2023-05-29 RX ADMIN — CLOZAPINE 25 MG: 25 TABLET ORAL at 10:01

## 2023-05-29 RX ADMIN — DOCUSATE SODIUM 100 MG: 100 CAPSULE, LIQUID FILLED ORAL at 21:37

## 2023-05-29 RX ADMIN — Medication 1 PACKET: at 10:01

## 2023-05-29 RX ADMIN — DONEPEZIL HYDROCHLORIDE 10 MG: 5 TABLET, FILM COATED ORAL at 21:36

## 2023-05-29 RX ADMIN — DOCUSATE SODIUM 100 MG: 100 CAPSULE, LIQUID FILLED ORAL at 09:49

## 2023-05-29 RX ADMIN — ROPINIROLE HYDROCHLORIDE 0.25 MG: 0.25 TABLET, FILM COATED ORAL at 09:48

## 2023-05-29 RX ADMIN — ASPIRIN 81 MG: 81 TABLET, COATED ORAL at 09:49

## 2023-05-29 RX ADMIN — CARBIDOPA AND LEVODOPA 1 TABLET: 25; 100 TABLET ORAL at 06:43

## 2023-05-29 RX ADMIN — SENNOSIDES 17.2 MG: 8.6 TABLET, FILM COATED ORAL at 09:48

## 2023-05-29 RX ADMIN — MIDODRINE HYDROCHLORIDE 2.5 MG: 5 TABLET ORAL at 21:37

## 2023-05-29 ASSESSMENT — PAIN SCALES - GENERAL: PAINLEVEL_OUTOF10: 0

## 2023-05-30 LAB
ANION GAP SERPL CALCULATED.3IONS-SCNC: 10 MMOL/L (ref 3–16)
BUN SERPL-MCNC: 17 MG/DL (ref 7–20)
CALCIUM SERPL-MCNC: 9.2 MG/DL (ref 8.3–10.6)
CHLORIDE SERPL-SCNC: 109 MMOL/L (ref 99–110)
CO2 SERPL-SCNC: 23 MMOL/L (ref 21–32)
CREAT SERPL-MCNC: 0.7 MG/DL (ref 0.8–1.3)
GFR SERPLBLD CREATININE-BSD FMLA CKD-EPI: >60 ML/MIN/{1.73_M2}
GLUCOSE SERPL-MCNC: 100 MG/DL (ref 70–99)
POTASSIUM SERPL-SCNC: 3.9 MMOL/L (ref 3.5–5.1)
SODIUM SERPL-SCNC: 142 MMOL/L (ref 136–145)

## 2023-05-30 PROCEDURE — 92507 TX SP LANG VOICE COMM INDIV: CPT

## 2023-05-30 PROCEDURE — 6360000002 HC RX W HCPCS: Performed by: PHYSICAL MEDICINE & REHABILITATION

## 2023-05-30 PROCEDURE — 97129 THER IVNTJ 1ST 15 MIN: CPT

## 2023-05-30 PROCEDURE — 6370000000 HC RX 637 (ALT 250 FOR IP): Performed by: PHYSICAL MEDICINE & REHABILITATION

## 2023-05-30 PROCEDURE — 97530 THERAPEUTIC ACTIVITIES: CPT

## 2023-05-30 PROCEDURE — 97535 SELF CARE MNGMENT TRAINING: CPT

## 2023-05-30 PROCEDURE — 2580000003 HC RX 258: Performed by: PHYSICAL MEDICINE & REHABILITATION

## 2023-05-30 PROCEDURE — 97112 NEUROMUSCULAR REEDUCATION: CPT

## 2023-05-30 PROCEDURE — 36415 COLL VENOUS BLD VENIPUNCTURE: CPT

## 2023-05-30 PROCEDURE — 97116 GAIT TRAINING THERAPY: CPT

## 2023-05-30 PROCEDURE — 92526 ORAL FUNCTION THERAPY: CPT

## 2023-05-30 PROCEDURE — 1280000000 HC REHAB R&B

## 2023-05-30 PROCEDURE — 80048 BASIC METABOLIC PNL TOTAL CA: CPT

## 2023-05-30 RX ORDER — MIDODRINE HYDROCHLORIDE 5 MG/1
5 TABLET ORAL 3 TIMES DAILY
Status: DISCONTINUED | OUTPATIENT
Start: 2023-05-30 | End: 2023-06-07 | Stop reason: HOSPADM

## 2023-05-30 RX ADMIN — CARBIDOPA AND LEVODOPA 1 TABLET: 25; 100 TABLET ORAL at 09:28

## 2023-05-30 RX ADMIN — ROPINIROLE HYDROCHLORIDE 0.25 MG: 0.25 TABLET, FILM COATED ORAL at 21:02

## 2023-05-30 RX ADMIN — CLOZAPINE 25 MG: 25 TABLET ORAL at 21:34

## 2023-05-30 RX ADMIN — DOCUSATE SODIUM 100 MG: 100 CAPSULE, LIQUID FILLED ORAL at 09:28

## 2023-05-30 RX ADMIN — CELECOXIB 200 MG: 100 CAPSULE ORAL at 09:26

## 2023-05-30 RX ADMIN — CARBIDOPA AND LEVODOPA 1 TABLET: 25; 100 TABLET ORAL at 06:31

## 2023-05-30 RX ADMIN — ROPINIROLE HYDROCHLORIDE 0.25 MG: 0.25 TABLET, FILM COATED ORAL at 09:28

## 2023-05-30 RX ADMIN — MIDODRINE HYDROCHLORIDE 5 MG: 5 TABLET ORAL at 17:33

## 2023-05-30 RX ADMIN — DOCUSATE SODIUM 100 MG: 100 CAPSULE, LIQUID FILLED ORAL at 21:02

## 2023-05-30 RX ADMIN — ROPINIROLE HYDROCHLORIDE 0.25 MG: 0.25 TABLET, FILM COATED ORAL at 15:23

## 2023-05-30 RX ADMIN — ASPIRIN 81 MG: 81 TABLET, COATED ORAL at 09:27

## 2023-05-30 RX ADMIN — CARBIDOPA AND LEVODOPA 1 TABLET: 25; 100 TABLET ORAL at 17:33

## 2023-05-30 RX ADMIN — IPRATROPIUM BROMIDE 1 SPRAY: 42 SPRAY NASAL at 09:45

## 2023-05-30 RX ADMIN — Medication 10 ML: at 21:05

## 2023-05-30 RX ADMIN — Medication 10 ML: at 09:39

## 2023-05-30 RX ADMIN — PRIMIDONE 25 MG: 50 TABLET ORAL at 21:02

## 2023-05-30 RX ADMIN — Medication 1 PACKET: at 09:38

## 2023-05-30 RX ADMIN — CARBIDOPA AND LEVODOPA 1 TABLET: 25; 100 TABLET ORAL at 11:42

## 2023-05-30 RX ADMIN — CLOZAPINE 25 MG: 25 TABLET ORAL at 09:37

## 2023-05-30 RX ADMIN — ENOXAPARIN SODIUM 40 MG: 100 INJECTION SUBCUTANEOUS at 09:28

## 2023-05-30 RX ADMIN — MIDODRINE HYDROCHLORIDE 5 MG: 5 TABLET ORAL at 09:28

## 2023-05-30 RX ADMIN — DONEPEZIL HYDROCHLORIDE 10 MG: 5 TABLET, FILM COATED ORAL at 21:02

## 2023-05-30 RX ADMIN — CARBIDOPA AND LEVODOPA 1 TABLET: 25; 100 TABLET ORAL at 15:23

## 2023-05-30 RX ADMIN — SENNOSIDES 17.2 MG: 8.6 TABLET, FILM COATED ORAL at 09:27

## 2023-05-30 RX ADMIN — SENNOSIDES 17.2 MG: 8.6 TABLET, FILM COATED ORAL at 21:02

## 2023-05-30 RX ADMIN — PANTOPRAZOLE SODIUM 40 MG: 40 TABLET, DELAYED RELEASE ORAL at 06:31

## 2023-05-30 RX ADMIN — ATORVASTATIN CALCIUM 80 MG: 80 TABLET, FILM COATED ORAL at 21:02

## 2023-05-30 ASSESSMENT — PAIN SCALES - WONG BAKER
WONGBAKER_NUMERICALRESPONSE: 0
WONGBAKER_NUMERICALRESPONSE: 0

## 2023-05-30 ASSESSMENT — PAIN SCALES - GENERAL
PAINLEVEL_OUTOF10: 0
PAINLEVEL_OUTOF10: 0

## 2023-05-31 PROCEDURE — 92526 ORAL FUNCTION THERAPY: CPT

## 2023-05-31 PROCEDURE — 97112 NEUROMUSCULAR REEDUCATION: CPT

## 2023-05-31 PROCEDURE — 2580000003 HC RX 258: Performed by: PHYSICAL MEDICINE & REHABILITATION

## 2023-05-31 PROCEDURE — 97530 THERAPEUTIC ACTIVITIES: CPT

## 2023-05-31 PROCEDURE — 6370000000 HC RX 637 (ALT 250 FOR IP): Performed by: PHYSICAL MEDICINE & REHABILITATION

## 2023-05-31 PROCEDURE — 97116 GAIT TRAINING THERAPY: CPT

## 2023-05-31 PROCEDURE — 97535 SELF CARE MNGMENT TRAINING: CPT

## 2023-05-31 PROCEDURE — 6360000002 HC RX W HCPCS: Performed by: PHYSICAL MEDICINE & REHABILITATION

## 2023-05-31 PROCEDURE — 1280000000 HC REHAB R&B

## 2023-05-31 PROCEDURE — 92507 TX SP LANG VOICE COMM INDIV: CPT

## 2023-05-31 RX ADMIN — MIDODRINE HYDROCHLORIDE 5 MG: 5 TABLET ORAL at 17:39

## 2023-05-31 RX ADMIN — ROPINIROLE HYDROCHLORIDE 0.25 MG: 0.25 TABLET, FILM COATED ORAL at 21:45

## 2023-05-31 RX ADMIN — CELECOXIB 200 MG: 100 CAPSULE ORAL at 07:55

## 2023-05-31 RX ADMIN — MIDODRINE HYDROCHLORIDE 5 MG: 5 TABLET ORAL at 11:51

## 2023-05-31 RX ADMIN — CLOZAPINE 25 MG: 25 TABLET ORAL at 09:01

## 2023-05-31 RX ADMIN — NYSTATIN 500000 UNITS: 100000 SUSPENSION ORAL at 21:45

## 2023-05-31 RX ADMIN — CARBIDOPA AND LEVODOPA 1 TABLET: 25; 100 TABLET ORAL at 11:51

## 2023-05-31 RX ADMIN — DOCUSATE SODIUM 100 MG: 100 CAPSULE, LIQUID FILLED ORAL at 21:46

## 2023-05-31 RX ADMIN — CARBIDOPA AND LEVODOPA 1 TABLET: 25; 100 TABLET ORAL at 13:25

## 2023-05-31 RX ADMIN — ATORVASTATIN CALCIUM 80 MG: 80 TABLET, FILM COATED ORAL at 21:46

## 2023-05-31 RX ADMIN — CLOZAPINE 25 MG: 25 TABLET ORAL at 22:14

## 2023-05-31 RX ADMIN — CARBIDOPA AND LEVODOPA 1 TABLET: 25; 100 TABLET ORAL at 07:54

## 2023-05-31 RX ADMIN — ENOXAPARIN SODIUM 40 MG: 100 INJECTION SUBCUTANEOUS at 07:54

## 2023-05-31 RX ADMIN — CARBIDOPA AND LEVODOPA 1 TABLET: 25; 100 TABLET ORAL at 17:39

## 2023-05-31 RX ADMIN — Medication 10 ML: at 21:49

## 2023-05-31 RX ADMIN — NYSTATIN 500000 UNITS: 100000 SUSPENSION ORAL at 14:09

## 2023-05-31 RX ADMIN — ASPIRIN 81 MG: 81 TABLET, COATED ORAL at 07:54

## 2023-05-31 RX ADMIN — DONEPEZIL HYDROCHLORIDE 10 MG: 5 TABLET, FILM COATED ORAL at 21:46

## 2023-05-31 RX ADMIN — MIDODRINE HYDROCHLORIDE 5 MG: 5 TABLET ORAL at 06:05

## 2023-05-31 RX ADMIN — NYSTATIN 500000 UNITS: 100000 SUSPENSION ORAL at 19:31

## 2023-05-31 RX ADMIN — CARBIDOPA AND LEVODOPA 1 TABLET: 25; 100 TABLET ORAL at 07:27

## 2023-05-31 RX ADMIN — SENNOSIDES 17.2 MG: 8.6 TABLET, FILM COATED ORAL at 21:47

## 2023-05-31 RX ADMIN — Medication 10 ML: at 14:09

## 2023-05-31 RX ADMIN — CARBIDOPA AND LEVODOPA 1 TABLET: 25; 100 TABLET ORAL at 15:42

## 2023-05-31 RX ADMIN — ROPINIROLE HYDROCHLORIDE 0.25 MG: 0.25 TABLET, FILM COATED ORAL at 13:25

## 2023-05-31 RX ADMIN — PRIMIDONE 25 MG: 50 TABLET ORAL at 21:46

## 2023-05-31 RX ADMIN — Medication 1 PACKET: at 07:54

## 2023-05-31 RX ADMIN — SENNOSIDES 17.2 MG: 8.6 TABLET, FILM COATED ORAL at 07:54

## 2023-05-31 RX ADMIN — ROPINIROLE HYDROCHLORIDE 0.25 MG: 0.25 TABLET, FILM COATED ORAL at 07:54

## 2023-05-31 RX ADMIN — PANTOPRAZOLE SODIUM 40 MG: 40 TABLET, DELAYED RELEASE ORAL at 06:05

## 2023-05-31 RX ADMIN — DOCUSATE SODIUM 100 MG: 100 CAPSULE, LIQUID FILLED ORAL at 07:54

## 2023-05-31 ASSESSMENT — PAIN - FUNCTIONAL ASSESSMENT: PAIN_FUNCTIONAL_ASSESSMENT: ACTIVITIES ARE NOT PREVENTED

## 2023-05-31 ASSESSMENT — PAIN SCALES - WONG BAKER
WONGBAKER_NUMERICALRESPONSE: 0

## 2023-05-31 ASSESSMENT — PAIN DESCRIPTION - LOCATION: LOCATION: SHOULDER

## 2023-05-31 ASSESSMENT — PAIN DESCRIPTION - ORIENTATION: ORIENTATION: RIGHT

## 2023-05-31 ASSESSMENT — PAIN SCALES - GENERAL
PAINLEVEL_OUTOF10: 0
PAINLEVEL_OUTOF10: 5
PAINLEVEL_OUTOF10: 0
PAINLEVEL_OUTOF10: 0

## 2023-05-31 ASSESSMENT — PAIN DESCRIPTION - DESCRIPTORS: DESCRIPTORS: ACHING

## 2023-06-01 ENCOUNTER — APPOINTMENT (OUTPATIENT)
Dept: GENERAL RADIOLOGY | Age: 78
DRG: 070 | End: 2023-06-01
Attending: PHYSICAL MEDICINE & REHABILITATION
Payer: MEDICARE

## 2023-06-01 LAB
ANION GAP SERPL CALCULATED.3IONS-SCNC: 6 MMOL/L (ref 3–16)
BASOPHILS # BLD: 0.1 K/UL (ref 0–0.2)
BASOPHILS NFR BLD: 0.9 %
BUN SERPL-MCNC: 15 MG/DL (ref 7–20)
CALCIUM SERPL-MCNC: 9 MG/DL (ref 8.3–10.6)
CHLORIDE SERPL-SCNC: 110 MMOL/L (ref 99–110)
CO2 SERPL-SCNC: 25 MMOL/L (ref 21–32)
CREAT SERPL-MCNC: 0.6 MG/DL (ref 0.8–1.3)
DEPRECATED RDW RBC AUTO: 13.7 % (ref 12.4–15.4)
EOSINOPHIL # BLD: 0.3 K/UL (ref 0–0.6)
EOSINOPHIL NFR BLD: 5.6 %
GFR SERPLBLD CREATININE-BSD FMLA CKD-EPI: >60 ML/MIN/{1.73_M2}
GLUCOSE SERPL-MCNC: 97 MG/DL (ref 70–99)
HCT VFR BLD AUTO: 36.6 % (ref 40.5–52.5)
HGB BLD-MCNC: 11.9 G/DL (ref 13.5–17.5)
LYMPHOCYTES # BLD: 1.9 K/UL (ref 1–5.1)
LYMPHOCYTES NFR BLD: 32.3 %
MCH RBC QN AUTO: 30.1 PG (ref 26–34)
MCHC RBC AUTO-ENTMCNC: 32.6 G/DL (ref 31–36)
MCV RBC AUTO: 92.3 FL (ref 80–100)
MONOCYTES # BLD: 0.7 K/UL (ref 0–1.3)
MONOCYTES NFR BLD: 11.9 %
NEUTROPHILS # BLD: 3 K/UL (ref 1.7–7.7)
NEUTROPHILS NFR BLD: 49.3 %
PLATELET # BLD AUTO: 329 K/UL (ref 135–450)
PMV BLD AUTO: 7.5 FL (ref 5–10.5)
POTASSIUM SERPL-SCNC: 3.9 MMOL/L (ref 3.5–5.1)
RBC # BLD AUTO: 3.97 M/UL (ref 4.2–5.9)
SODIUM SERPL-SCNC: 141 MMOL/L (ref 136–145)
WBC # BLD AUTO: 6 K/UL (ref 4–11)

## 2023-06-01 PROCEDURE — 2580000003 HC RX 258: Performed by: PHYSICAL MEDICINE & REHABILITATION

## 2023-06-01 PROCEDURE — 6370000000 HC RX 637 (ALT 250 FOR IP): Performed by: PHYSICAL MEDICINE & REHABILITATION

## 2023-06-01 PROCEDURE — 92507 TX SP LANG VOICE COMM INDIV: CPT

## 2023-06-01 PROCEDURE — 1280000000 HC REHAB R&B

## 2023-06-01 PROCEDURE — 6360000002 HC RX W HCPCS: Performed by: PHYSICAL MEDICINE & REHABILITATION

## 2023-06-01 PROCEDURE — 97116 GAIT TRAINING THERAPY: CPT

## 2023-06-01 PROCEDURE — 74230 X-RAY XM SWLNG FUNCJ C+: CPT

## 2023-06-01 PROCEDURE — 85025 COMPLETE CBC W/AUTO DIFF WBC: CPT

## 2023-06-01 PROCEDURE — 97530 THERAPEUTIC ACTIVITIES: CPT

## 2023-06-01 PROCEDURE — 36415 COLL VENOUS BLD VENIPUNCTURE: CPT

## 2023-06-01 PROCEDURE — 97112 NEUROMUSCULAR REEDUCATION: CPT

## 2023-06-01 PROCEDURE — 92526 ORAL FUNCTION THERAPY: CPT

## 2023-06-01 PROCEDURE — 92611 MOTION FLUOROSCOPY/SWALLOW: CPT

## 2023-06-01 PROCEDURE — 97535 SELF CARE MNGMENT TRAINING: CPT

## 2023-06-01 PROCEDURE — 80048 BASIC METABOLIC PNL TOTAL CA: CPT

## 2023-06-01 RX ADMIN — NYSTATIN 500000 UNITS: 100000 SUSPENSION ORAL at 09:25

## 2023-06-01 RX ADMIN — ROPINIROLE HYDROCHLORIDE 0.25 MG: 0.25 TABLET, FILM COATED ORAL at 21:41

## 2023-06-01 RX ADMIN — CARBIDOPA AND LEVODOPA 1 TABLET: 25; 100 TABLET ORAL at 16:43

## 2023-06-01 RX ADMIN — CLOZAPINE 25 MG: 25 TABLET ORAL at 21:42

## 2023-06-01 RX ADMIN — ENOXAPARIN SODIUM 40 MG: 100 INJECTION SUBCUTANEOUS at 09:25

## 2023-06-01 RX ADMIN — Medication 1 PACKET: at 09:24

## 2023-06-01 RX ADMIN — MIDODRINE HYDROCHLORIDE 5 MG: 5 TABLET ORAL at 06:53

## 2023-06-01 RX ADMIN — CELECOXIB 200 MG: 100 CAPSULE ORAL at 09:25

## 2023-06-01 RX ADMIN — SENNOSIDES 17.2 MG: 8.6 TABLET, FILM COATED ORAL at 21:42

## 2023-06-01 RX ADMIN — ASPIRIN 81 MG: 81 TABLET, COATED ORAL at 09:24

## 2023-06-01 RX ADMIN — MIDODRINE HYDROCHLORIDE 5 MG: 5 TABLET ORAL at 12:33

## 2023-06-01 RX ADMIN — CLOZAPINE 25 MG: 25 TABLET ORAL at 09:35

## 2023-06-01 RX ADMIN — MIDODRINE HYDROCHLORIDE 5 MG: 5 TABLET ORAL at 18:41

## 2023-06-01 RX ADMIN — DOCUSATE SODIUM 100 MG: 100 CAPSULE, LIQUID FILLED ORAL at 21:42

## 2023-06-01 RX ADMIN — NYSTATIN 500000 UNITS: 100000 SUSPENSION ORAL at 18:41

## 2023-06-01 RX ADMIN — CARBIDOPA AND LEVODOPA 1 TABLET: 25; 100 TABLET ORAL at 18:41

## 2023-06-01 RX ADMIN — CARBIDOPA AND LEVODOPA 1 TABLET: 25; 100 TABLET ORAL at 09:25

## 2023-06-01 RX ADMIN — SENNOSIDES 17.2 MG: 8.6 TABLET, FILM COATED ORAL at 09:25

## 2023-06-01 RX ADMIN — ATORVASTATIN CALCIUM 80 MG: 80 TABLET, FILM COATED ORAL at 21:42

## 2023-06-01 RX ADMIN — NYSTATIN 500000 UNITS: 100000 SUSPENSION ORAL at 12:37

## 2023-06-01 RX ADMIN — CARBIDOPA AND LEVODOPA 1 TABLET: 25; 100 TABLET ORAL at 12:33

## 2023-06-01 RX ADMIN — Medication 10 ML: at 09:36

## 2023-06-01 RX ADMIN — NYSTATIN 500000 UNITS: 100000 SUSPENSION ORAL at 21:41

## 2023-06-01 RX ADMIN — CARBIDOPA AND LEVODOPA 1 TABLET: 25; 100 TABLET ORAL at 06:52

## 2023-06-01 RX ADMIN — ROPINIROLE HYDROCHLORIDE 0.25 MG: 0.25 TABLET, FILM COATED ORAL at 14:08

## 2023-06-01 RX ADMIN — CARBIDOPA AND LEVODOPA 1 TABLET: 25; 100 TABLET ORAL at 14:08

## 2023-06-01 RX ADMIN — DONEPEZIL HYDROCHLORIDE 10 MG: 5 TABLET, FILM COATED ORAL at 21:41

## 2023-06-01 RX ADMIN — DOCUSATE SODIUM 100 MG: 100 CAPSULE, LIQUID FILLED ORAL at 09:36

## 2023-06-01 RX ADMIN — ROPINIROLE HYDROCHLORIDE 0.25 MG: 0.25 TABLET, FILM COATED ORAL at 09:25

## 2023-06-01 RX ADMIN — PRIMIDONE 25 MG: 50 TABLET ORAL at 21:41

## 2023-06-01 RX ADMIN — PANTOPRAZOLE SODIUM 40 MG: 40 TABLET, DELAYED RELEASE ORAL at 06:53

## 2023-06-01 ASSESSMENT — PAIN DESCRIPTION - LOCATION: LOCATION: SHOULDER

## 2023-06-01 ASSESSMENT — PAIN DESCRIPTION - ORIENTATION: ORIENTATION: RIGHT

## 2023-06-01 ASSESSMENT — PAIN - FUNCTIONAL ASSESSMENT: PAIN_FUNCTIONAL_ASSESSMENT: ACTIVITIES ARE NOT PREVENTED

## 2023-06-01 ASSESSMENT — PAIN DESCRIPTION - DESCRIPTORS: DESCRIPTORS: ACHING

## 2023-06-01 ASSESSMENT — PAIN SCALES - GENERAL: PAINLEVEL_OUTOF10: 3

## 2023-06-01 NOTE — PROCEDURES
Facility/Department: [facility] ACUTE REHAB UNIT  MODIFIED BARIUM SWALLOW EVALUATION    Patient: Naa Vance   : 1945   MRN: [id number]      Evaluation Date: 2023   Admitting Diagnosis: Parkinson's disease ([address] [G20]  Treatment Diagnosis: Dysphagia   Pain: Pt denied                        Ordering MD: Dr. Jessica Hinojosa   Radiologist:   Date of Evaluation: 2023  Type of Study: Modified Barium Swallowing Study (MBS)  Diet Prior to Study: Soft and Bite Sized (dysphagia III) diet, Mildly thick (nectar) liquids, Meds whole in puree   Reason for referral/HPI: The patient presents for an instrumental swallow study to re-evaluate oropharyngeal swallow function, assess aspiration risk, and determine least restrictive diet/plan of care. Pt's initial MBS was completed 23 (see report for details). Pt's wife present during procedure. Impression:  Modified Barium Swallow evaluation completed on 2023. Patient presents with improvement noted from previous study showing reduced penetration/ aspiration of thin liquids over successive trials. Pt with x 1 instance of SILENT aspiration when a barium tablet was provided in conjunction with thin barium creating a mixed consistency, cued cough did not appear effective in clearing the aspirated material. Pt presents with a functional swallow but continues with mild-moderate dysphagia secondary to prolonged oral holding, lingual rocking to facilitate bolus transfer due to poor A-P propulsion, reduced palatal retraction, premature spillage to the valleculae, decreased hyolaryngeal elevation, delayed swallow initiation, reduced base of tongue retraction, and reduced laryngeal sensation complicated by advanced age and comorbidities (late stage Parkinson's disease). Pt with min-mod vallecular residue following each bolus presentation > with increased textures which pt was able to minimally clear with use of second cued dry swallow and liquid alternates.  Pharyngeal

## 2023-06-02 PROCEDURE — 6360000002 HC RX W HCPCS: Performed by: PHYSICAL MEDICINE & REHABILITATION

## 2023-06-02 PROCEDURE — 92507 TX SP LANG VOICE COMM INDIV: CPT

## 2023-06-02 PROCEDURE — 1280000000 HC REHAB R&B

## 2023-06-02 PROCEDURE — 92526 ORAL FUNCTION THERAPY: CPT

## 2023-06-02 PROCEDURE — 97130 THER IVNTJ EA ADDL 15 MIN: CPT

## 2023-06-02 PROCEDURE — 97116 GAIT TRAINING THERAPY: CPT

## 2023-06-02 PROCEDURE — 6370000000 HC RX 637 (ALT 250 FOR IP): Performed by: PHYSICAL MEDICINE & REHABILITATION

## 2023-06-02 PROCEDURE — 97112 NEUROMUSCULAR REEDUCATION: CPT

## 2023-06-02 PROCEDURE — 97129 THER IVNTJ 1ST 15 MIN: CPT

## 2023-06-02 PROCEDURE — 97530 THERAPEUTIC ACTIVITIES: CPT

## 2023-06-02 RX ADMIN — NYSTATIN 500000 UNITS: 100000 SUSPENSION ORAL at 17:37

## 2023-06-02 RX ADMIN — CLOZAPINE 25 MG: 25 TABLET ORAL at 11:47

## 2023-06-02 RX ADMIN — CLOZAPINE 25 MG: 25 TABLET ORAL at 21:24

## 2023-06-02 RX ADMIN — DOCUSATE SODIUM 100 MG: 100 CAPSULE, LIQUID FILLED ORAL at 21:23

## 2023-06-02 RX ADMIN — NYSTATIN 500000 UNITS: 100000 SUSPENSION ORAL at 09:44

## 2023-06-02 RX ADMIN — MIDODRINE HYDROCHLORIDE 5 MG: 5 TABLET ORAL at 11:47

## 2023-06-02 RX ADMIN — NYSTATIN 500000 UNITS: 100000 SUSPENSION ORAL at 21:23

## 2023-06-02 RX ADMIN — CARBIDOPA AND LEVODOPA 1 TABLET: 25; 100 TABLET ORAL at 09:38

## 2023-06-02 RX ADMIN — PRIMIDONE 25 MG: 50 TABLET ORAL at 21:23

## 2023-06-02 RX ADMIN — ROPINIROLE HYDROCHLORIDE 0.25 MG: 0.25 TABLET, FILM COATED ORAL at 13:35

## 2023-06-02 RX ADMIN — ROPINIROLE HYDROCHLORIDE 0.25 MG: 0.25 TABLET, FILM COATED ORAL at 09:38

## 2023-06-02 RX ADMIN — CARBIDOPA AND LEVODOPA 1 TABLET: 25; 100 TABLET ORAL at 06:28

## 2023-06-02 RX ADMIN — CELECOXIB 200 MG: 100 CAPSULE ORAL at 09:38

## 2023-06-02 RX ADMIN — DONEPEZIL HYDROCHLORIDE 10 MG: 5 TABLET, FILM COATED ORAL at 21:23

## 2023-06-02 RX ADMIN — Medication 1 PACKET: at 09:38

## 2023-06-02 RX ADMIN — ATORVASTATIN CALCIUM 80 MG: 80 TABLET, FILM COATED ORAL at 21:23

## 2023-06-02 RX ADMIN — DOCUSATE SODIUM 100 MG: 100 CAPSULE, LIQUID FILLED ORAL at 09:38

## 2023-06-02 RX ADMIN — ENOXAPARIN SODIUM 40 MG: 100 INJECTION SUBCUTANEOUS at 09:37

## 2023-06-02 RX ADMIN — CARBIDOPA AND LEVODOPA 1 TABLET: 25; 100 TABLET ORAL at 11:47

## 2023-06-02 RX ADMIN — CARBIDOPA AND LEVODOPA 1 TABLET: 25; 100 TABLET ORAL at 18:28

## 2023-06-02 RX ADMIN — ROPINIROLE HYDROCHLORIDE 0.25 MG: 0.25 TABLET, FILM COATED ORAL at 21:23

## 2023-06-02 RX ADMIN — MIDODRINE HYDROCHLORIDE 5 MG: 5 TABLET ORAL at 17:37

## 2023-06-02 RX ADMIN — CARBIDOPA AND LEVODOPA 1 TABLET: 25; 100 TABLET ORAL at 13:35

## 2023-06-02 RX ADMIN — SENNOSIDES 17.2 MG: 8.6 TABLET, FILM COATED ORAL at 09:38

## 2023-06-02 RX ADMIN — NYSTATIN 500000 UNITS: 100000 SUSPENSION ORAL at 13:35

## 2023-06-02 RX ADMIN — ASPIRIN 81 MG: 81 TABLET, COATED ORAL at 09:38

## 2023-06-02 RX ADMIN — CARBIDOPA AND LEVODOPA 1 TABLET: 25; 100 TABLET ORAL at 16:12

## 2023-06-02 RX ADMIN — SENNOSIDES 17.2 MG: 8.6 TABLET, FILM COATED ORAL at 21:23

## 2023-06-02 RX ADMIN — PANTOPRAZOLE SODIUM 40 MG: 40 TABLET, DELAYED RELEASE ORAL at 06:28

## 2023-06-02 RX ADMIN — MIDODRINE HYDROCHLORIDE 5 MG: 5 TABLET ORAL at 06:29

## 2023-06-02 ASSESSMENT — PAIN SCALES - GENERAL
PAINLEVEL_OUTOF10: 0

## 2023-06-03 PROCEDURE — 99222 1ST HOSP IP/OBS MODERATE 55: CPT | Performed by: NEUROMUSCULOSKELETAL MEDICINE & OMM

## 2023-06-03 PROCEDURE — 6370000000 HC RX 637 (ALT 250 FOR IP): Performed by: NEUROMUSCULOSKELETAL MEDICINE & OMM

## 2023-06-03 PROCEDURE — 6360000002 HC RX W HCPCS: Performed by: PHYSICAL MEDICINE & REHABILITATION

## 2023-06-03 PROCEDURE — 1280000000 HC REHAB R&B

## 2023-06-03 PROCEDURE — 6370000000 HC RX 637 (ALT 250 FOR IP): Performed by: PHYSICAL MEDICINE & REHABILITATION

## 2023-06-03 RX ORDER — ROPINIROLE 0.25 MG/1
0.5 TABLET, FILM COATED ORAL 3 TIMES DAILY
Status: DISCONTINUED | OUTPATIENT
Start: 2023-06-03 | End: 2023-06-07 | Stop reason: HOSPADM

## 2023-06-03 RX ORDER — PRIMIDONE 50 MG/1
50 TABLET ORAL NIGHTLY
Status: DISCONTINUED | OUTPATIENT
Start: 2023-06-03 | End: 2023-06-07 | Stop reason: HOSPADM

## 2023-06-03 RX ADMIN — ROPINIROLE HYDROCHLORIDE 0.25 MG: 0.25 TABLET, FILM COATED ORAL at 08:49

## 2023-06-03 RX ADMIN — SENNOSIDES 17.2 MG: 8.6 TABLET, FILM COATED ORAL at 08:50

## 2023-06-03 RX ADMIN — NYSTATIN 500000 UNITS: 100000 SUSPENSION ORAL at 13:18

## 2023-06-03 RX ADMIN — Medication 1 PACKET: at 08:50

## 2023-06-03 RX ADMIN — CARBIDOPA AND LEVODOPA 1 TABLET: 25; 100 TABLET ORAL at 13:18

## 2023-06-03 RX ADMIN — MIDODRINE HYDROCHLORIDE 5 MG: 5 TABLET ORAL at 06:37

## 2023-06-03 RX ADMIN — MIDODRINE HYDROCHLORIDE 5 MG: 5 TABLET ORAL at 15:47

## 2023-06-03 RX ADMIN — PRIMIDONE 50 MG: 50 TABLET ORAL at 21:28

## 2023-06-03 RX ADMIN — CELECOXIB 200 MG: 100 CAPSULE ORAL at 08:50

## 2023-06-03 RX ADMIN — ENOXAPARIN SODIUM 40 MG: 100 INJECTION SUBCUTANEOUS at 08:50

## 2023-06-03 RX ADMIN — DOCUSATE SODIUM 100 MG: 100 CAPSULE, LIQUID FILLED ORAL at 08:49

## 2023-06-03 RX ADMIN — DONEPEZIL HYDROCHLORIDE 10 MG: 5 TABLET, FILM COATED ORAL at 21:27

## 2023-06-03 RX ADMIN — CARBIDOPA AND LEVODOPA 1 TABLET: 25; 100 TABLET ORAL at 15:47

## 2023-06-03 RX ADMIN — CARBIDOPA AND LEVODOPA 1 TABLET: 25; 100 TABLET ORAL at 11:32

## 2023-06-03 RX ADMIN — SENNOSIDES 17.2 MG: 8.6 TABLET, FILM COATED ORAL at 21:25

## 2023-06-03 RX ADMIN — ROPINIROLE HYDROCHLORIDE 0.5 MG: 0.25 TABLET, FILM COATED ORAL at 13:18

## 2023-06-03 RX ADMIN — NYSTATIN 500000 UNITS: 100000 SUSPENSION ORAL at 08:49

## 2023-06-03 RX ADMIN — NYSTATIN 500000 UNITS: 100000 SUSPENSION ORAL at 18:01

## 2023-06-03 RX ADMIN — NYSTATIN 500000 UNITS: 100000 SUSPENSION ORAL at 21:27

## 2023-06-03 RX ADMIN — CLOZAPINE 25 MG: 25 TABLET ORAL at 21:33

## 2023-06-03 RX ADMIN — CARBIDOPA AND LEVODOPA 1 TABLET: 25; 100 TABLET ORAL at 17:52

## 2023-06-03 RX ADMIN — PANTOPRAZOLE SODIUM 40 MG: 40 TABLET, DELAYED RELEASE ORAL at 06:37

## 2023-06-03 RX ADMIN — CLOZAPINE 25 MG: 25 TABLET ORAL at 09:19

## 2023-06-03 RX ADMIN — CARBIDOPA AND LEVODOPA 1 TABLET: 25; 100 TABLET ORAL at 06:36

## 2023-06-03 RX ADMIN — CARBIDOPA AND LEVODOPA 1 TABLET: 25; 100 TABLET ORAL at 08:49

## 2023-06-03 RX ADMIN — ATORVASTATIN CALCIUM 80 MG: 80 TABLET, FILM COATED ORAL at 21:24

## 2023-06-03 RX ADMIN — ASPIRIN 81 MG: 81 TABLET, COATED ORAL at 08:50

## 2023-06-03 RX ADMIN — IPRATROPIUM BROMIDE 1 SPRAY: 42 SPRAY NASAL at 13:27

## 2023-06-03 RX ADMIN — ROPINIROLE HYDROCHLORIDE 0.5 MG: 0.25 TABLET, FILM COATED ORAL at 21:24

## 2023-06-03 RX ADMIN — DOCUSATE SODIUM 100 MG: 100 CAPSULE, LIQUID FILLED ORAL at 21:25

## 2023-06-03 RX ADMIN — MIDODRINE HYDROCHLORIDE 5 MG: 5 TABLET ORAL at 11:32

## 2023-06-03 ASSESSMENT — PAIN SCALES - WONG BAKER: WONGBAKER_NUMERICALRESPONSE: 0

## 2023-06-03 ASSESSMENT — PAIN SCALES - GENERAL
PAINLEVEL_OUTOF10: 0

## 2023-06-03 NOTE — CONSULTS
In patient Neurology consult                                                                         [facility] Neurology                                              Corinne Snyder  : 1945  Date of Service: 2023  Reason for the consult and CC: PD, tremor  Inpatient Rehab; room 6527    Chief Complaint: wife reports episodes of uncontrollable whole body tremor     HPI:   The patient is a 70y.o.  years old male previously seen by neurology with past medical history of Parkinson's disease on frequent sinemet dosing with short and long acting for a total of 1180 mg Levodopa a day , recently started Requip 25 mg tid and , essential tremors on Mysolin dose decreased from 50 mg to 25 mg a day, dementia on donepezil 10 mg a day. He is also on Clozari 25 mg bd, and hx of CAD, orthostatic hypotension on Midodrine 5 mg tid hyperlipidemia, diabetes and other medical problems who was initially admitted to acute care with acute onset confusion and encephalopathy PNA and chronic cough. MRI did not show acutestroke. EKG shows sinus tachycardia. Telemetry had shown ventricular premature beats. CTA head and neck was concerning for vertebrobasilar insufficiency. Patient improved with treatment on the acute floor, and tolerated therapies, and was admitted  to rehabilitation unit  on  and has been improving slowly but gradually. He has been tolerating Requip will and wife reports 2 episodes of whole body tremor lasting over  2 hours and preventing him from sleep. One was at night a couple of hours after Levodopa dosing and the other one during the days time    ROS: No CP, SOB, dyspnea  He has been otherwise improving and ambulating and reports no nausea or emesis or excessive drowsiness or headache  or freezing from PD         General appearance:        Poor attention span and concentration.   Poor fund of Knowledge  Normal development and appear in no

## 2023-06-04 VITALS
HEART RATE: 63 BPM | SYSTOLIC BLOOD PRESSURE: 114 MMHG | OXYGEN SATURATION: 97 % | BODY MASS INDEX: 28.19 KG/M2 | HEIGHT: 68 IN | DIASTOLIC BLOOD PRESSURE: 77 MMHG | WEIGHT: 186 LBS | RESPIRATION RATE: 20 BRPM | TEMPERATURE: 98.1 F

## 2023-06-04 PROCEDURE — 6370000000 HC RX 637 (ALT 250 FOR IP): Performed by: NEUROMUSCULOSKELETAL MEDICINE & OMM

## 2023-06-04 PROCEDURE — 6360000002 HC RX W HCPCS: Performed by: PHYSICAL MEDICINE & REHABILITATION

## 2023-06-04 PROCEDURE — 6370000000 HC RX 637 (ALT 250 FOR IP): Performed by: PHYSICAL MEDICINE & REHABILITATION

## 2023-06-04 PROCEDURE — 1280000000 HC REHAB R&B

## 2023-06-04 RX ADMIN — CARBIDOPA AND LEVODOPA 1 TABLET: 25; 100 TABLET ORAL at 13:18

## 2023-06-04 RX ADMIN — ATORVASTATIN CALCIUM 80 MG: 80 TABLET, FILM COATED ORAL at 21:59

## 2023-06-04 RX ADMIN — NYSTATIN 500000 UNITS: 100000 SUSPENSION ORAL at 21:58

## 2023-06-04 RX ADMIN — Medication 1 PACKET: at 09:14

## 2023-06-04 RX ADMIN — MIDODRINE HYDROCHLORIDE 5 MG: 5 TABLET ORAL at 17:32

## 2023-06-04 RX ADMIN — NYSTATIN 500000 UNITS: 100000 SUSPENSION ORAL at 13:18

## 2023-06-04 RX ADMIN — CARBIDOPA AND LEVODOPA 1 TABLET: 25; 100 TABLET ORAL at 07:02

## 2023-06-04 RX ADMIN — ROPINIROLE HYDROCHLORIDE 0.5 MG: 0.25 TABLET, FILM COATED ORAL at 09:14

## 2023-06-04 RX ADMIN — ROPINIROLE HYDROCHLORIDE 0.5 MG: 0.25 TABLET, FILM COATED ORAL at 21:59

## 2023-06-04 RX ADMIN — ENOXAPARIN SODIUM 40 MG: 100 INJECTION SUBCUTANEOUS at 09:14

## 2023-06-04 RX ADMIN — NYSTATIN 500000 UNITS: 100000 SUSPENSION ORAL at 09:14

## 2023-06-04 RX ADMIN — CARBIDOPA AND LEVODOPA 1 TABLET: 25; 100 TABLET ORAL at 09:36

## 2023-06-04 RX ADMIN — NYSTATIN 500000 UNITS: 100000 SUSPENSION ORAL at 17:32

## 2023-06-04 RX ADMIN — SENNOSIDES 17.2 MG: 8.6 TABLET, FILM COATED ORAL at 09:14

## 2023-06-04 RX ADMIN — CARBIDOPA AND LEVODOPA 1 TABLET: 25; 100 TABLET ORAL at 11:33

## 2023-06-04 RX ADMIN — ROPINIROLE HYDROCHLORIDE 0.5 MG: 0.25 TABLET, FILM COATED ORAL at 13:18

## 2023-06-04 RX ADMIN — CARBIDOPA AND LEVODOPA 1 TABLET: 25; 100 TABLET ORAL at 15:40

## 2023-06-04 RX ADMIN — CLOZAPINE 25 MG: 25 TABLET ORAL at 09:49

## 2023-06-04 RX ADMIN — ACETAMINOPHEN 650 MG: 325 TABLET ORAL at 09:17

## 2023-06-04 RX ADMIN — ASPIRIN 81 MG: 81 TABLET, COATED ORAL at 09:14

## 2023-06-04 RX ADMIN — CELECOXIB 200 MG: 100 CAPSULE ORAL at 09:14

## 2023-06-04 RX ADMIN — DOCUSATE SODIUM 100 MG: 100 CAPSULE, LIQUID FILLED ORAL at 21:59

## 2023-06-04 RX ADMIN — MIDODRINE HYDROCHLORIDE 5 MG: 5 TABLET ORAL at 07:02

## 2023-06-04 RX ADMIN — SENNOSIDES 17.2 MG: 8.6 TABLET, FILM COATED ORAL at 21:59

## 2023-06-04 RX ADMIN — MIDODRINE HYDROCHLORIDE 5 MG: 5 TABLET ORAL at 11:33

## 2023-06-04 RX ADMIN — CLOZAPINE 25 MG: 25 TABLET ORAL at 22:09

## 2023-06-04 RX ADMIN — PRIMIDONE 50 MG: 50 TABLET ORAL at 21:59

## 2023-06-04 RX ADMIN — CARBIDOPA AND LEVODOPA 1 TABLET: 25; 100 TABLET ORAL at 17:32

## 2023-06-04 RX ADMIN — PANTOPRAZOLE SODIUM 40 MG: 40 TABLET, DELAYED RELEASE ORAL at 07:02

## 2023-06-04 RX ADMIN — DONEPEZIL HYDROCHLORIDE 10 MG: 5 TABLET, FILM COATED ORAL at 21:58

## 2023-06-04 RX ADMIN — DOCUSATE SODIUM 100 MG: 100 CAPSULE, LIQUID FILLED ORAL at 09:14

## 2023-06-04 ASSESSMENT — PAIN SCALES - GENERAL
PAINLEVEL_OUTOF10: 0
PAINLEVEL_OUTOF10: 0
PAINLEVEL_OUTOF10: 7
PAINLEVEL_OUTOF10: 9
PAINLEVEL_OUTOF10: 0
PAINLEVEL_OUTOF10: 4
PAINLEVEL_OUTOF10: 3
PAINLEVEL_OUTOF10: 0
PAINLEVEL_OUTOF10: 0
PAINLEVEL_OUTOF10: 5

## 2023-06-04 ASSESSMENT — PAIN DESCRIPTION - FREQUENCY: FREQUENCY: INTERMITTENT

## 2023-06-04 ASSESSMENT — PAIN DESCRIPTION - ORIENTATION
ORIENTATION: LEFT
ORIENTATION: ANTERIOR

## 2023-06-04 ASSESSMENT — PAIN DESCRIPTION - PAIN TYPE: TYPE: ACUTE PAIN

## 2023-06-04 ASSESSMENT — PAIN DESCRIPTION - LOCATION
LOCATION: HIP
LOCATION: HEAD

## 2023-06-04 ASSESSMENT — PAIN DESCRIPTION - DESCRIPTORS
DESCRIPTORS: ACHING
DESCRIPTORS: ACHING

## 2023-06-04 ASSESSMENT — PAIN - FUNCTIONAL ASSESSMENT: PAIN_FUNCTIONAL_ASSESSMENT: ACTIVITIES ARE NOT PREVENTED

## 2023-06-05 ENCOUNTER — APPOINTMENT (OUTPATIENT)
Dept: GENERAL RADIOLOGY | Age: 78
DRG: 070 | End: 2023-06-05
Attending: PHYSICAL MEDICINE & REHABILITATION
Payer: MEDICARE

## 2023-06-05 LAB
ANION GAP SERPL CALCULATED.3IONS-SCNC: 8 MMOL/L (ref 3–16)
BASOPHILS # BLD: 0 K/UL (ref 0–0.2)
BASOPHILS NFR BLD: 0.7 %
BUN SERPL-MCNC: 15 MG/DL (ref 7–20)
CALCIUM SERPL-MCNC: 9.1 MG/DL (ref 8.3–10.6)
CHLORIDE SERPL-SCNC: 108 MMOL/L (ref 99–110)
CO2 SERPL-SCNC: 24 MMOL/L (ref 21–32)
CREAT SERPL-MCNC: 0.6 MG/DL (ref 0.8–1.3)
DEPRECATED RDW RBC AUTO: 13.5 % (ref 12.4–15.4)
EOSINOPHIL # BLD: 0.4 K/UL (ref 0–0.6)
EOSINOPHIL NFR BLD: 6.6 %
GFR SERPLBLD CREATININE-BSD FMLA CKD-EPI: >60 ML/MIN/{1.73_M2}
GLUCOSE SERPL-MCNC: 100 MG/DL (ref 70–99)
HCT VFR BLD AUTO: 37.7 % (ref 40.5–52.5)
HGB BLD-MCNC: 12.1 G/DL (ref 13.5–17.5)
LYMPHOCYTES # BLD: 2 K/UL (ref 1–5.1)
LYMPHOCYTES NFR BLD: 32.5 %
MCH RBC QN AUTO: 29.6 PG (ref 26–34)
MCHC RBC AUTO-ENTMCNC: 32.2 G/DL (ref 31–36)
MCV RBC AUTO: 92 FL (ref 80–100)
MONOCYTES # BLD: 0.9 K/UL (ref 0–1.3)
MONOCYTES NFR BLD: 14.5 %
NEUTROPHILS # BLD: 2.9 K/UL (ref 1.7–7.7)
NEUTROPHILS NFR BLD: 45.7 %
PLATELET # BLD AUTO: 286 K/UL (ref 135–450)
PMV BLD AUTO: 7.8 FL (ref 5–10.5)
POTASSIUM SERPL-SCNC: 4.1 MMOL/L (ref 3.5–5.1)
RBC # BLD AUTO: 4.09 M/UL (ref 4.2–5.9)
SODIUM SERPL-SCNC: 140 MMOL/L (ref 136–145)
WBC # BLD AUTO: 6.3 K/UL (ref 4–11)

## 2023-06-05 PROCEDURE — 92507 TX SP LANG VOICE COMM INDIV: CPT

## 2023-06-05 PROCEDURE — 97530 THERAPEUTIC ACTIVITIES: CPT

## 2023-06-05 PROCEDURE — 1280000000 HC REHAB R&B

## 2023-06-05 PROCEDURE — 36415 COLL VENOUS BLD VENIPUNCTURE: CPT

## 2023-06-05 PROCEDURE — 6370000000 HC RX 637 (ALT 250 FOR IP): Performed by: PHYSICAL MEDICINE & REHABILITATION

## 2023-06-05 PROCEDURE — 97129 THER IVNTJ 1ST 15 MIN: CPT

## 2023-06-05 PROCEDURE — 80048 BASIC METABOLIC PNL TOTAL CA: CPT

## 2023-06-05 PROCEDURE — 97116 GAIT TRAINING THERAPY: CPT

## 2023-06-05 PROCEDURE — 99233 SBSQ HOSP IP/OBS HIGH 50: CPT | Performed by: PSYCHIATRY & NEUROLOGY

## 2023-06-05 PROCEDURE — 6360000002 HC RX W HCPCS: Performed by: PHYSICAL MEDICINE & REHABILITATION

## 2023-06-05 PROCEDURE — 6370000000 HC RX 637 (ALT 250 FOR IP): Performed by: NEUROMUSCULOSKELETAL MEDICINE & OMM

## 2023-06-05 PROCEDURE — 97130 THER IVNTJ EA ADDL 15 MIN: CPT

## 2023-06-05 PROCEDURE — 85025 COMPLETE CBC W/AUTO DIFF WBC: CPT

## 2023-06-05 PROCEDURE — 71046 X-RAY EXAM CHEST 2 VIEWS: CPT

## 2023-06-05 RX ADMIN — CARBIDOPA AND LEVODOPA 1 TABLET: 25; 100 TABLET ORAL at 14:00

## 2023-06-05 RX ADMIN — CARBIDOPA AND LEVODOPA 1 TABLET: 25; 100 TABLET ORAL at 08:37

## 2023-06-05 RX ADMIN — CLOZAPINE 25 MG: 25 TABLET ORAL at 20:46

## 2023-06-05 RX ADMIN — DOCUSATE SODIUM 100 MG: 100 CAPSULE, LIQUID FILLED ORAL at 20:45

## 2023-06-05 RX ADMIN — NYSTATIN 500000 UNITS: 100000 SUSPENSION ORAL at 20:46

## 2023-06-05 RX ADMIN — NYSTATIN 500000 UNITS: 100000 SUSPENSION ORAL at 08:38

## 2023-06-05 RX ADMIN — SENNOSIDES 17.2 MG: 8.6 TABLET, FILM COATED ORAL at 20:46

## 2023-06-05 RX ADMIN — NYSTATIN 500000 UNITS: 100000 SUSPENSION ORAL at 12:14

## 2023-06-05 RX ADMIN — CELECOXIB 200 MG: 100 CAPSULE ORAL at 08:38

## 2023-06-05 RX ADMIN — DONEPEZIL HYDROCHLORIDE 10 MG: 5 TABLET, FILM COATED ORAL at 20:45

## 2023-06-05 RX ADMIN — PRIMIDONE 50 MG: 50 TABLET ORAL at 20:45

## 2023-06-05 RX ADMIN — PANTOPRAZOLE SODIUM 40 MG: 40 TABLET, DELAYED RELEASE ORAL at 07:27

## 2023-06-05 RX ADMIN — MIDODRINE HYDROCHLORIDE 5 MG: 5 TABLET ORAL at 12:14

## 2023-06-05 RX ADMIN — MIDODRINE HYDROCHLORIDE 5 MG: 5 TABLET ORAL at 07:27

## 2023-06-05 RX ADMIN — ROPINIROLE HYDROCHLORIDE 0.5 MG: 0.25 TABLET, FILM COATED ORAL at 08:37

## 2023-06-05 RX ADMIN — ATORVASTATIN CALCIUM 80 MG: 80 TABLET, FILM COATED ORAL at 20:46

## 2023-06-05 RX ADMIN — ASPIRIN 81 MG: 81 TABLET, COATED ORAL at 08:37

## 2023-06-05 RX ADMIN — ENOXAPARIN SODIUM 40 MG: 100 INJECTION SUBCUTANEOUS at 08:36

## 2023-06-05 RX ADMIN — CARBIDOPA AND LEVODOPA 1 TABLET: 25; 100 TABLET ORAL at 18:31

## 2023-06-05 RX ADMIN — MIDODRINE HYDROCHLORIDE 5 MG: 5 TABLET ORAL at 17:27

## 2023-06-05 RX ADMIN — ROPINIROLE HYDROCHLORIDE 0.5 MG: 0.25 TABLET, FILM COATED ORAL at 14:00

## 2023-06-05 RX ADMIN — SENNOSIDES 17.2 MG: 8.6 TABLET, FILM COATED ORAL at 08:38

## 2023-06-05 RX ADMIN — CLOZAPINE 25 MG: 25 TABLET ORAL at 08:52

## 2023-06-05 RX ADMIN — NYSTATIN 500000 UNITS: 100000 SUSPENSION ORAL at 17:28

## 2023-06-05 RX ADMIN — CARBIDOPA AND LEVODOPA 1 TABLET: 25; 100 TABLET ORAL at 12:14

## 2023-06-05 RX ADMIN — CARBIDOPA AND LEVODOPA 1 TABLET: 25; 100 TABLET ORAL at 16:00

## 2023-06-05 RX ADMIN — Medication 1 PACKET: at 08:37

## 2023-06-05 RX ADMIN — CARBIDOPA AND LEVODOPA 1 TABLET: 25; 100 TABLET ORAL at 07:26

## 2023-06-05 RX ADMIN — DOCUSATE SODIUM 100 MG: 100 CAPSULE, LIQUID FILLED ORAL at 08:52

## 2023-06-05 RX ADMIN — ROPINIROLE HYDROCHLORIDE 0.5 MG: 0.25 TABLET, FILM COATED ORAL at 20:45

## 2023-06-05 NOTE — PATIENT CARE CONFERENCE
[facility]  Inpatient Rehabilitation  Weekly Team Conference Note    Patient Name: Ele Guidry        MRN: [id number]    : 1945  (70 y.o.)  Gender: male           The team conference for this patient was held on 2023 at 11:00am and led by:  Josie Gosselin. MD Hilary    CASE MANAGEMENT:  Assessment:  Patient lives  at home with his wife and son. The patient lives in a two level home. The patient's wife reports the patient was independent with mobility prior to admission but the patient's wife would help with other task such as dressing and bathing the patient. The patient's wife is his primary caregiver. The tentative discharge date is for the patient to return home with [address]. The  provided the patient's wife with a [address] list to review. Discharge date pending at this time. PHYSICAL THERAPY:    Bed Mobility  Sit to Supine: contact guard assistance  Rolling Left: contact guard assistance  Comments:  Bed flat with use of HR. Significant increased time for completion with VC for sequence and initiation. Patient assisted to [facility] with extensive assist of 2 secondary to time constraints and fatigue. Transfers  Sit to stand transfer: minimal assistance, (ranges from CGA to min (A) within session with increased time for completion, max VC and TC for anterior rocking)  Stand to sit transfer: minimal assistance, (CGA/min (A) with occasional VC for hand placement and initiation of sit)  Toilet transfer: minimal assistance, (with use of horizontal grab bar completed to/from elevated toilet surface)  Comments: All transfers completed without use of assistive device. Increased time for motor planning. Ambulation  Surface:level surface  Assistive Device: no device  Assistance: minimal assistance, (CGA/Juventino)  Distance: 21' + 195' + 100 ft + multiple short distances within session/room environment  Gait Mechanics: forward flexed posture with step to progressing to reciprocal pattern.   Pt
[facility]  Inpatient Rehabilitation  Weekly Team Conference Note    Patient Name: Angel Rinne        MRN: [id number]    : 1945  (70 y.o.)  Gender: male           The team conference for this patient was held on 2023 at 11:00am and led by:  Constantine Clifton MD    CASE MANAGEMENT:  Assessment: Patient lives  at home with his wife and son. The patient lives in a two level home. The patient's wife reports the patient was independent with mobility prior to admission but the wife would help with other task such as dressing and bathing the patient. The patient's wife is his primary caregiver. The goal is for the patient to return home. PT/OT recommended the patient return home with [address]. The SW provided the patient with a [address] list to review. The SW will continue to follow or any discharge needs. PHYSICAL THERAPY:    Bed Mobility  Supine to Sit: 2 person assistance with mod A x 2   Sit to Supine: moderate assistance  Rolling Left: moderate assistance  Rolling Right: moderate assistance  Comments:  Transfers  Sit to stand transfer: minimal assistance, moderate assistance  Stand to sit transfer: minimal assistance  Toilet transfer: moderate assistance  Comments:  Ambulation  Surface:level surface  Assistive Device: hand-held assist  Assistance: 2 person assistance with min A x 2   Distance: 10' x 2   Gait Mechanics: FW flexed posture, shuffling gait, bilateral decreased step length/step height, narrow [place]  Comments:  increased time for initiation   Ambulation Trial 2  Surface:level surface  Assistive Device: rolling walker  Assistance: minimal assistance  Distance: 10'  Gait Mechanics: FW flexed posture, shuffling gait, bilateral decreased step length/step height, narrow [place]  Comments: assistance required to maneuver rolling walker, especially when navigating turns  Stair Mobility  Stair mobility not completed on this date.   Comments: patient presents unable to tolerate assessment
48 ft with use of LRAD at supervision - Goal Met 6/5/2023  Patient will ascend/descend 12 stairs with (B) handrail at supervision  Patient will complete car transfer at supervision - Goal Met 6/5/2023     Plan of Care: Pt to be seen 5 out of 7 days per week per ARU protocol ( 60 minutes with PT)                     SPEECH THERAPY:    Diet Level:ADULT ORAL NUTRITION SUPPLEMENT; Breakfast, Lunch, Dinner; Frozen Oral Supplement  ADULT DIET; Dysphagia - Soft and Bite Sized    Assessment: Impressions  Diagnosis: Pt continues to present with improving oropharyngeal dysphagia, moderate dysphonia and moderate to severe cognitive-communication impairment. Pt participated in follow up MBS which revealed improvements and has since been upgraded to thin liquids which he continues to tolerate. He is also tolerating small trials of regular solids but endurance and fatigue are limiting for larger portions. Pt with significant delays in processing speed for basic comprehension and significant word finding impairments, impacting functional communication. Pt with flat affect and limited initiation. Pt responds well to multiple choice questions and simplification of commands. Pt's speech intelligibility is impaired due to reduced breath support, uncoordinated oral motor movements, and dysphonia. Pt's wife reported improvements within therapy in pt's cognitive state and speech intensity. Recommend SLP intervention for safe return to prior level of function. Recommend OP or HH SLP upon discharge for LSVT LOUD intervention. Goals:  Time Frame: 3 weeks    Pt will tolerate recommended diet and advanced trials with no overt s/s of aspiration. GOAL MET   Patient will demonstrate comprehension and appropriate implementation of Ngo water protocol.  GOAL MET, d/c   Pt will coordinate vocal subsystems in heirarchial speech tasks with normal vocal quality and vocal endurance back to his baseline level as subjectively rated by the pt, pt's

## 2023-06-05 NOTE — CARE COORDINATION
HARPREET/GRICEL met with patient and his spouse. PT/OT recommending that patient discharge with home health care PT/OT. Patient has parkinson's disease and spouse would like a home care provider that specializes in treating parkinson patients. Patient's spouse [facility]. HARPREET/GRICEL referred patient.     Electronically signed by EDDIE Renae on 6/5/2023 at 10:16 AM

## 2023-06-06 PROCEDURE — 6370000000 HC RX 637 (ALT 250 FOR IP): Performed by: NEUROMUSCULOSKELETAL MEDICINE & OMM

## 2023-06-06 PROCEDURE — 97530 THERAPEUTIC ACTIVITIES: CPT

## 2023-06-06 PROCEDURE — 97129 THER IVNTJ 1ST 15 MIN: CPT

## 2023-06-06 PROCEDURE — 97110 THERAPEUTIC EXERCISES: CPT

## 2023-06-06 PROCEDURE — 1280000000 HC REHAB R&B

## 2023-06-06 PROCEDURE — 6370000000 HC RX 637 (ALT 250 FOR IP): Performed by: PHYSICAL MEDICINE & REHABILITATION

## 2023-06-06 PROCEDURE — 97130 THER IVNTJ EA ADDL 15 MIN: CPT

## 2023-06-06 PROCEDURE — 97535 SELF CARE MNGMENT TRAINING: CPT

## 2023-06-06 PROCEDURE — 92526 ORAL FUNCTION THERAPY: CPT

## 2023-06-06 PROCEDURE — 97116 GAIT TRAINING THERAPY: CPT

## 2023-06-06 PROCEDURE — 6360000002 HC RX W HCPCS: Performed by: PHYSICAL MEDICINE & REHABILITATION

## 2023-06-06 RX ORDER — PRIMIDONE 50 MG/1
50 TABLET ORAL NIGHTLY
Qty: 90 TABLET | Refills: 3 | Status: SHIPPED | OUTPATIENT
Start: 2023-06-06

## 2023-06-06 RX ORDER — CELECOXIB 200 MG/1
200 CAPSULE ORAL DAILY
Qty: 60 CAPSULE | Refills: 3 | Status: SHIPPED | OUTPATIENT
Start: 2023-06-07

## 2023-06-06 RX ORDER — MIDODRINE HYDROCHLORIDE 5 MG/1
5 TABLET ORAL 3 TIMES DAILY
Qty: 90 TABLET | Refills: 3 | Status: SHIPPED | OUTPATIENT
Start: 2023-06-06

## 2023-06-06 RX ORDER — ROPINIROLE 0.5 MG/1
0.5 TABLET, FILM COATED ORAL 3 TIMES DAILY
Qty: 90 TABLET | Refills: 3 | Status: SHIPPED | OUTPATIENT
Start: 2023-06-06

## 2023-06-06 RX ORDER — CLOZAPINE 25 MG/1
25 TABLET ORAL 2 TIMES DAILY
Qty: 30 TABLET | Refills: 3 | Status: SHIPPED | OUTPATIENT
Start: 2023-06-06

## 2023-06-06 RX ADMIN — CARBIDOPA AND LEVODOPA 1 TABLET: 25; 100 TABLET ORAL at 09:47

## 2023-06-06 RX ADMIN — NYSTATIN 500000 UNITS: 100000 SUSPENSION ORAL at 09:46

## 2023-06-06 RX ADMIN — SENNOSIDES 17.2 MG: 8.6 TABLET, FILM COATED ORAL at 21:26

## 2023-06-06 RX ADMIN — MIDODRINE HYDROCHLORIDE 5 MG: 5 TABLET ORAL at 11:06

## 2023-06-06 RX ADMIN — NYSTATIN 500000 UNITS: 100000 SUSPENSION ORAL at 21:26

## 2023-06-06 RX ADMIN — ROPINIROLE HYDROCHLORIDE 0.5 MG: 0.25 TABLET, FILM COATED ORAL at 09:46

## 2023-06-06 RX ADMIN — Medication 1 PACKET: at 09:47

## 2023-06-06 RX ADMIN — CLOZAPINE 25 MG: 25 TABLET ORAL at 09:46

## 2023-06-06 RX ADMIN — CARBIDOPA AND LEVODOPA 1 TABLET: 25; 100 TABLET ORAL at 11:06

## 2023-06-06 RX ADMIN — ROPINIROLE HYDROCHLORIDE 0.5 MG: 0.25 TABLET, FILM COATED ORAL at 14:47

## 2023-06-06 RX ADMIN — ATORVASTATIN CALCIUM 80 MG: 80 TABLET, FILM COATED ORAL at 21:26

## 2023-06-06 RX ADMIN — ROPINIROLE HYDROCHLORIDE 0.5 MG: 0.25 TABLET, FILM COATED ORAL at 21:26

## 2023-06-06 RX ADMIN — CELECOXIB 200 MG: 100 CAPSULE ORAL at 09:47

## 2023-06-06 RX ADMIN — CLOZAPINE 25 MG: 25 TABLET ORAL at 21:34

## 2023-06-06 RX ADMIN — PRIMIDONE 50 MG: 50 TABLET ORAL at 21:25

## 2023-06-06 RX ADMIN — CARBIDOPA AND LEVODOPA 1 TABLET: 25; 100 TABLET ORAL at 07:36

## 2023-06-06 RX ADMIN — DOCUSATE SODIUM 100 MG: 100 CAPSULE, LIQUID FILLED ORAL at 09:46

## 2023-06-06 RX ADMIN — ASPIRIN 81 MG: 81 TABLET, COATED ORAL at 09:47

## 2023-06-06 RX ADMIN — CARBIDOPA AND LEVODOPA 1 TABLET: 25; 100 TABLET ORAL at 16:34

## 2023-06-06 RX ADMIN — DOCUSATE SODIUM 100 MG: 100 CAPSULE, LIQUID FILLED ORAL at 21:25

## 2023-06-06 RX ADMIN — CARBIDOPA AND LEVODOPA 1 TABLET: 25; 100 TABLET ORAL at 14:46

## 2023-06-06 RX ADMIN — MIDODRINE HYDROCHLORIDE 5 MG: 5 TABLET ORAL at 07:36

## 2023-06-06 RX ADMIN — MIDODRINE HYDROCHLORIDE 5 MG: 5 TABLET ORAL at 16:35

## 2023-06-06 RX ADMIN — NYSTATIN 500000 UNITS: 100000 SUSPENSION ORAL at 14:47

## 2023-06-06 RX ADMIN — SENNOSIDES 17.2 MG: 8.6 TABLET, FILM COATED ORAL at 09:47

## 2023-06-06 RX ADMIN — DONEPEZIL HYDROCHLORIDE 10 MG: 5 TABLET, FILM COATED ORAL at 21:25

## 2023-06-06 RX ADMIN — PANTOPRAZOLE SODIUM 40 MG: 40 TABLET, DELAYED RELEASE ORAL at 07:36

## 2023-06-06 RX ADMIN — NYSTATIN 500000 UNITS: 100000 SUSPENSION ORAL at 16:35

## 2023-06-06 RX ADMIN — ENOXAPARIN SODIUM 40 MG: 100 INJECTION SUBCUTANEOUS at 09:46

## 2023-06-06 ASSESSMENT — PAIN SCALES - GENERAL: PAINLEVEL_OUTOF10: 0

## 2023-06-06 NOTE — DISCHARGE INSTRUCTIONS
Equipment used: tub transfer bench (as shower chair), 3 in 1 bedside commode (as raised toilet seat), grab bars in shower, hand held shower head      []  Independent ? []  Modified Independent ? [x]  Supervision                [x]  Minimal Assistance-LB dressing/bathing ? [x]  Moderate Assistance- eating ? []  Maximal Assistance      Driving Restriction:      [x]  YES   [] NO     Mobility:     Ambulation: Device: None     [x]  Independent ? []  Modified Independent ? []  Supervision                []  Minimal Assistance ? []  Moderate Assistance ? []  Maximal Assistance     Stairs:      Number of stairs: 12    Handrails:    [] Right   [x] Left      [] Bilateral   []  Independent ? [x]  Modified Independent ? []  Supervision                []  Minimal Assistance ? []  Moderate Assistance ? []  Maximal Assistance          Current Diet Consistency:?       []  Regular   [x] Soft and Bite-Sized  ? [] Minced and Moist     ?[]  Puree     Current Liquid Level:?         [x] Thin Liquids     [] Mildly Thick (Nectar) ?     [] Moderately Thick (Honey)    [] Pudding Thick    [] Dayanna Mention Water Protocol     Dietary Restrictions:?      [x]  General Diet   []  Tube Feed, w/ Diet   []  Tube Feed, NPO   []  Carb Control   []  Cardiac   []  Renal    []  Other:

## 2023-06-06 NOTE — CARE COORDINATION
06/06/23 1701   IMM Letter   IMM Letter given to Patient/Family/Significant other/Guardian/POA/by: 2nd IMM Letter was given to Family (spouse) by KATHARINE Mckeon/GRICEL.    IMM Letter date given: 06/06/23   IMM Letter time given: 1631

## 2023-06-06 NOTE — DISCHARGE INSTR - COC
Elimination:  Continence: Bowel: Yes  Bladder: Yes  Urinary Catheter: None   Colostomy/Ileostomy/Ileal Conduit: No       Date of Last BM: 6/7/2023    Intake/Output Summary (Last 24 hours) at 6/6/2023 1055  Last data filed at 6/5/2023 1400  Gross per 24 hour   Intake 240 ml   Output --   Net 240 ml     I/O last 3 completed shifts: In: 480 [P.O.:480]  Out: -     Safety Concerns: At Risk for Falls    Impairments/Disabilities:      Vision    Nutrition Therapy:  Current Nutrition Therapy:   - Oral Diet:  General and Dysphagia - Soft and Bite Sized    Routes of Feeding: Oral  Liquids: Thin Liquids  Daily Fluid Restriction: no  Last Modified Barium Swallow with Video (Video Swallowing Test): not done    Treatments at the Time of Hospital Discharge:   Respiratory Treatments: none  Oxygen Therapy:  is not on home oxygen therapy. Ventilator:    - No ventilator support    Rehab Therapies: Physical Therapy, Occupational Therapy, Speech/Language Therapy, and SN  Weight Bearing Status/Restrictions: No weight bearing restrictions  Other Medical Equipment (for information only, NOT a DME order):      Other Treatments: HOME HEALTH CARE: LEVEL 3 SAFETY       -Initial home health evaluation to occur within 24-48 hours, in patient home   -Home health agency to establish plan of care for patient over 60 day period   -Medication Reconciliation   -PT/OT/Speech evaluations in home within 24-48 hours of discharge; including  -DME and home safety   -Frontload therapy 5 days, then 3x a week   -OT to evaluate if patient has [address] needs for personal care   -PCP Visit scheduled within three to seven days of discharge      Patient's personal belongings (please select all that are sent with patient):       RN SIGNATURE:  Electronically signed by Justina Shannon RN on 6/7/23 at 12:17 PM EDT    CASE MANAGEMENT/SOCIAL WORK SECTION    Inpatient Status Date: 5/21/2023    Readmission Risk Assessment Score:18%  Readmission Risk

## 2023-06-06 NOTE — CARE COORDINATION
Team conference held today. Spoke with patient and his spouse to discuss progress with therapy, barriers to discharge, and plans to return home. Estimated discharge date set for 6/7/2023. Patient anticipates discharging to home with home health care services and needed supports. Will continue to follow for support and discharge planning.     Electronically signed by EDDIE Patel on 6/6/2023 at 3:26 PM

## 2023-06-06 NOTE — CARE COORDINATION
[facility]    Spoke with patient and spouse  regarding [facility] services. Bothaware and agreeable to services. Demographics verified. Will continue to follow for disposition. Electronically signed by Baldomero Ken LPN on 1/0/24 at 50:39 AM EDT      [facility] Transition Nurse  [phone]

## 2023-06-07 ENCOUNTER — TELEPHONE (OUTPATIENT)
Dept: INTERNAL MEDICINE CLINIC | Age: 78
End: 2023-06-07

## 2023-06-07 VITALS
OXYGEN SATURATION: 99 % | DIASTOLIC BLOOD PRESSURE: 51 MMHG | BODY MASS INDEX: 27.93 KG/M2 | WEIGHT: 184.3 LBS | SYSTOLIC BLOOD PRESSURE: 94 MMHG | HEART RATE: 66 BPM | HEIGHT: 68 IN | RESPIRATION RATE: 18 BRPM | TEMPERATURE: 98.2 F

## 2023-06-07 PROCEDURE — 6370000000 HC RX 637 (ALT 250 FOR IP): Performed by: PHYSICAL MEDICINE & REHABILITATION

## 2023-06-07 PROCEDURE — 6370000000 HC RX 637 (ALT 250 FOR IP): Performed by: NEUROMUSCULOSKELETAL MEDICINE & OMM

## 2023-06-07 RX ADMIN — MIDODRINE HYDROCHLORIDE 5 MG: 5 TABLET ORAL at 07:01

## 2023-06-07 RX ADMIN — NYSTATIN 500000 UNITS: 100000 SUSPENSION ORAL at 09:13

## 2023-06-07 RX ADMIN — CARBIDOPA AND LEVODOPA 1 TABLET: 25; 100 TABLET ORAL at 07:01

## 2023-06-07 RX ADMIN — CELECOXIB 200 MG: 100 CAPSULE ORAL at 09:14

## 2023-06-07 RX ADMIN — MIDODRINE HYDROCHLORIDE 5 MG: 5 TABLET ORAL at 11:36

## 2023-06-07 RX ADMIN — Medication 1 PACKET: at 09:13

## 2023-06-07 RX ADMIN — DOCUSATE SODIUM 100 MG: 100 CAPSULE, LIQUID FILLED ORAL at 09:14

## 2023-06-07 RX ADMIN — ROPINIROLE HYDROCHLORIDE 0.5 MG: 0.25 TABLET, FILM COATED ORAL at 09:13

## 2023-06-07 RX ADMIN — CLOZAPINE 25 MG: 25 TABLET ORAL at 10:08

## 2023-06-07 RX ADMIN — ASPIRIN 81 MG: 81 TABLET, COATED ORAL at 09:14

## 2023-06-07 RX ADMIN — CARBIDOPA AND LEVODOPA 1 TABLET: 25; 100 TABLET ORAL at 09:13

## 2023-06-07 RX ADMIN — CARBIDOPA AND LEVODOPA 1 TABLET: 25; 100 TABLET ORAL at 11:36

## 2023-06-07 RX ADMIN — PANTOPRAZOLE SODIUM 40 MG: 40 TABLET, DELAYED RELEASE ORAL at 07:01

## 2023-06-07 RX ADMIN — NYSTATIN 500000 UNITS: 100000 SUSPENSION ORAL at 12:46

## 2023-06-07 RX ADMIN — SENNOSIDES 17.2 MG: 8.6 TABLET, FILM COATED ORAL at 09:14

## 2023-06-07 ASSESSMENT — PAIN SCALES - GENERAL
PAINLEVEL_OUTOF10: 0
PAINLEVEL_OUTOF10: 0

## 2023-06-07 NOTE — CARE COORDINATION
Patient discharged home via car with his spouse  Patient to receive home nursing and therapy from [facility].  No further needs voiced by patient or treatment team.     Mingo Pereira, EDDIE, LSW   [phone]

## 2023-06-07 NOTE — PLAN OF CARE
Problem: Discharge Planning  Goal: Discharge to home or other facility with appropriate resources  5/22/2023 1011 by Melony Shepard RN  Outcome: Progressing  Flowsheets (Taken 5/22/2023 0845)  Discharge to home or other facility with appropriate resources: Identify barriers to discharge with patient and caregiver     Problem: Safety - Adult  Goal: Free from fall injury  5/22/2023 1011 by Melony Shepard RN  Outcome: Progressing     Problem: Skin/Tissue Integrity  Goal: Absence of new skin breakdown  Description: 1. Monitor for areas of redness and/or skin breakdown  2. Assess vascular access sites hourly  3. Every 4-6 hours minimum:  Change oxygen saturation probe site  4. Every 4-6 hours:  If on nasal continuous positive airway pressure, respiratory therapy assess nares and determine need for appliance change or resting period.   5/22/2023 1011 by Melony Shepard RN  Outcome: Progressing
Problem: Discharge Planning  Goal: Discharge to home or other facility with appropriate resources  5/22/2023 1924 by Hal Garcia RN  Outcome: Progressing  5/22/2023 1011 by Raimundo Perez RN  Outcome: Progressing  Flowsheets (Taken 5/22/2023 0845)  Discharge to home or other facility with appropriate resources: Identify barriers to discharge with patient and caregiver     Problem: Safety - Adult  Goal: Free from fall injury  5/22/2023 1924 by Hal Garcia RN  Outcome: Progressing  Flowsheets (Taken 5/22/2023 1922)  Free From Fall Injury: Instruct family/caregiver on patient safety  5/22/2023 1011 by Raimundo Perez RN  Outcome: Progressing     Problem: Skin/Tissue Integrity  Goal: Absence of new skin breakdown  Description: 1. Monitor for areas of redness and/or skin breakdown  2. Assess vascular access sites hourly  3. Every 4-6 hours minimum:  Change oxygen saturation probe site  4. Every 4-6 hours:  If on nasal continuous positive airway pressure, respiratory therapy assess nares and determine need for appliance change or resting period.   5/22/2023 1924 by Hal Garcia RN  Outcome: Progressing  5/22/2023 1011 by Raimundo Perez RN  Outcome: Progressing     Problem: ABCDS Injury Assessment  Goal: Absence of physical injury  Outcome: Progressing  Flowsheets (Taken 5/22/2023 1922)  Absence of Physical Injury: Implement safety measures based on patient assessment     Problem: Pain  Goal: Verbalizes/displays adequate comfort level or baseline comfort level  Outcome: Progressing     Problem: Nutrition Deficit:  Goal: Optimize nutritional status  Outcome: Progressing
Problem: Discharge Planning  Goal: Discharge to home or other facility with appropriate resources  5/24/2023 0934 by Brian Hammond RN  Outcome: Progressing  Flowsheets (Taken 5/24/2023 5826)  Discharge to home or other facility with appropriate resources: Identify barriers to discharge with patient and caregiver  5/24/2023 0025 by Brooks Bhandari RN  Outcome: Progressing  Flowsheets (Taken 5/24/2023 0014)  Discharge to home or other facility with appropriate resources: Identify barriers to discharge with patient and caregiver     Problem: Safety - Adult  Goal: Free from fall injury  5/24/2023 0934 by Brian Hammond RN  Outcome: Progressing  Flowsheets (Taken 5/24/2023 0026 by Brooks Bhandari RN)  Free From Fall Injury: Instruct family/caregiver on patient safety  5/24/2023 0025 by Brooks Bhandari RN  Outcome: Progressing     Problem: Skin/Tissue Integrity  Goal: Absence of new skin breakdown  Description: 1. Monitor for areas of redness and/or skin breakdown  2. Assess vascular access sites hourly  3. Every 4-6 hours minimum:  Change oxygen saturation probe site  4. Every 4-6 hours:  If on nasal continuous positive airway pressure, respiratory therapy assess nares and determine need for appliance change or resting period.   5/24/2023 0934 by Brian Hmamond RN  Outcome: Progressing  5/24/2023 0025 by Brooks Bhandari RN  Outcome: Progressing     Problem: Pain  Goal: Verbalizes/displays adequate comfort level or baseline comfort level  5/24/2023 0934 by Brian Hammond RN  Outcome: Progressing  5/24/2023 0025 by Brooks Bhandari RN  Outcome: Progressing  Flowsheets (Taken 5/23/2023 2109)  Verbalizes/displays adequate comfort level or baseline comfort level: Encourage patient to monitor pain and request assistance     Problem: ABCDS Injury Assessment  Goal: Absence of physical injury  5/24/2023 0934 by Brian Hammond RN  Outcome: Progressing  Flowsheets (Taken 5/24/2023 1064
Problem: Discharge Planning  Goal: Discharge to home or other facility with appropriate resources  5/27/2023 1727 by Cholo Lnyn RN  Outcome: Progressing  Flowsheets (Taken 5/27/2023 1727)  Discharge to home or other facility with appropriate resources:   Identify barriers to discharge with patient and caregiver   Identify discharge learning needs (meds, wound care, etc)   Refer to discharge planning if patient needs post-hospital services based on physician order or complex needs related to functional status, cognitive ability or social support system   Arrange for needed discharge resources and transportation as appropriate  5/27/2023 0420 by Marlen French RN  Outcome: Progressing     Problem: Safety - Adult  Goal: Free from fall injury  5/27/2023 1727 by Cholo Lynn RN  Outcome: Progressing  Flowsheets (Taken 5/27/2023 1727)  Free From Fall Injury: Instruct family/caregiver on patient safety  5/27/2023 0420 by Marlen French RN  Outcome: Progressing     Problem: Skin/Tissue Integrity  Goal: Absence of new skin breakdown  Description: 1. Monitor for areas of redness and/or skin breakdown  2. Assess vascular access sites hourly  3. Every 4-6 hours minimum:  Change oxygen saturation probe site  4. Every 4-6 hours:  If on nasal continuous positive airway pressure, respiratory therapy assess nares and determine need for appliance change or resting period.   5/27/2023 1727 by Cholo Lynn RN  Outcome: Progressing  5/27/2023 0420 by Marlen French RN  Outcome: Progressing     Problem: ABCDS Injury Assessment  Goal: Absence of physical injury  5/27/2023 1727 by Cholo Lynn RN  Outcome: Progressing  Flowsheets (Taken 5/27/2023 1727)  Absence of Physical Injury: Implement safety measures based on patient assessment  5/27/2023 0420 by Marlen French RN  Outcome: Progressing     Problem: Pain  Goal: Verbalizes/displays adequate comfort level or baseline comfort
Problem: Discharge Planning  Goal: Discharge to home or other facility with appropriate resources  5/31/2023 2339 by Ibeth Ramos RN  Outcome: Progressing  5/31/2023 1612 by Carli Huston RN  Outcome: Progressing  Flowsheets (Taken 5/31/2023 1612)  Discharge to home or other facility with appropriate resources:   Identify barriers to discharge with patient and caregiver   Arrange for needed discharge resources and transportation as appropriate   Identify discharge learning needs (meds, wound care, etc)   Arrange for interpreters to assist at discharge as needed
Problem: Discharge Planning  Goal: Discharge to home or other facility with appropriate resources  6/1/2023 0953 by Mike Kern RN  Outcome: Progressing  Flowsheets (Taken 6/1/2023 7424)  Discharge to home or other facility with appropriate resources:   Identify barriers to discharge with patient and caregiver   Identify discharge learning needs (meds, wound care, etc)  5/31/2023 2339 by Deepa Ngo RN  Outcome: Progressing     Problem: Safety - Adult  Goal: Free from fall injury  6/1/2023 0953 by Mike Kern RN  Outcome: Progressing  5/31/2023 2339 by Deepa Ngo RN  Outcome: Progressing     Problem: Skin/Tissue Integrity  Goal: Absence of new skin breakdown  Description: 1. Monitor for areas of redness and/or skin breakdown  2. Assess vascular access sites hourly  3. Every 4-6 hours minimum:  Change oxygen saturation probe site  4. Every 4-6 hours:  If on nasal continuous positive airway pressure, respiratory therapy assess nares and determine need for appliance change or resting period.   6/1/2023 0953 by Mike Kern RN  Outcome: Progressing  5/31/2023 2339 by Deepa Ngo RN  Outcome: Progressing     Problem: ABCDS Injury Assessment  Goal: Absence of physical injury  6/1/2023 0953 by Mike Kern RN  Outcome: Progressing  5/31/2023 2339 by Deepa Ngo RN  Outcome: Progressing     Problem: Nutrition Deficit:  Goal: Optimize nutritional status  6/1/2023 0953 by Mike Kern RN  Outcome: Progressing  5/31/2023 2339 by Deepa Ngo RN  Outcome: Progressing     Problem: Chronic Conditions and Co-morbidities  Goal: Patient's chronic conditions and co-morbidity symptoms are monitored and maintained or improved  6/1/2023 0953 by Mike Kern RN  Outcome: Progressing  Flowsheets (Taken 6/1/2023 0937)  Care Plan - Patient's Chronic Conditions and Co-Morbidity Symptoms are Monitored and Maintained or Improved: Monitor and assess patient's chronic
Problem: Discharge Planning  Goal: Discharge to home or other facility with appropriate resources  6/2/2023 1222 by Walter Joya RN  Outcome: Progressing  Flowsheets (Taken 6/2/2023 1222)  Discharge to home or other facility with appropriate resources:   Identify barriers to discharge with patient and caregiver   Arrange for needed discharge resources and transportation as appropriate   Identify discharge learning needs (meds, wound care, etc)   Refer to discharge planning if patient needs post-hospital services based on physician order or complex needs related to functional status, cognitive ability or social support system  6/2/2023 0032 by Kellee Saini RN  Outcome: Progressing  Flowsheets (Taken 6/1/2023 2139)  Discharge to home or other facility with appropriate resources: Identify barriers to discharge with patient and caregiver     Problem: Safety - Adult  Goal: Free from fall injury  6/2/2023 1222 by Walter Joya RN  Outcome: Progressing  Flowsheets (Taken 6/2/2023 1222)  Free From Fall Injury: Instruct family/caregiver on patient safety  6/2/2023 0032 by Kellee Saini RN  Outcome: Progressing  Flowsheets (Taken 6/1/2023 2139)  Free From Fall Injury: Instruct family/caregiver on patient safety     Problem: Skin/Tissue Integrity  Goal: Absence of new skin breakdown  Description: 1. Monitor for areas of redness and/or skin breakdown  2. Assess vascular access sites hourly  3. Every 4-6 hours minimum:  Change oxygen saturation probe site  4. Every 4-6 hours:  If on nasal continuous positive airway pressure, respiratory therapy assess nares and determine need for appliance change or resting period.   6/2/2023 1222 by Walter Joya RN  Outcome: Progressing  6/2/2023 0032 by Kellee Saini RN  Outcome: Progressing     Problem: ABCDS Injury Assessment  Goal: Absence of physical injury  6/2/2023 1222 by Walter Joya RN  Outcome: Osmin Ruelas
Problem: Discharge Planning  Goal: Discharge to home or other facility with appropriate resources  6/3/2023 1039 by Magan Coley RN  Outcome: Progressing     Problem: Safety - Adult  Goal: Free from fall injury  6/3/2023 1039 by Magan Coley RN  Outcome: Progressing
Problem: Discharge Planning  Goal: Discharge to home or other facility with appropriate resources  6/3/2023 1352 by Tony Richmond RN  Outcome: Progressing  6/3/2023 1039 by Awa Rousseau RN  Outcome: Progressing     Problem: Safety - Adult  Goal: Free from fall injury  6/3/2023 1352 by Tony Richmond RN  Outcome: Progressing  6/3/2023 1039 by Awa Rousseau RN  Outcome: Progressing     Problem: Skin/Tissue Integrity  Goal: Absence of new skin breakdown  Outcome: Progressing     Problem: ABCDS Injury Assessment  Goal: Absence of physical injury  Outcome: Progressing     Problem: Pain  Goal: Verbalizes/displays adequate comfort level or baseline comfort level  Outcome: Progressing     Problem: Nutrition Deficit:  Goal: Optimize nutritional status  Outcome: Progressing     Problem: Chronic Conditions and Co-morbidities  Goal: Patient's chronic conditions and co-morbidity symptoms are monitored and maintained or improved  Outcome: Progressing
Problem: Discharge Planning  Goal: Discharge to home or other facility with appropriate resources  6/4/2023 2350 by Rajat Medrano RN  Outcome: Progressing     Problem: Safety - Adult  Goal: Free from fall injury  6/4/2023 2350 by Rajat Medrano RN  Outcome: Progressing     Problem: Skin/Tissue Integrity  Goal: Absence of new skin breakdown  Description: 1. Monitor for areas of redness and/or skin breakdown  2. Assess vascular access sites hourly  3. Every 4-6 hours minimum:  Change oxygen saturation probe site  4. Every 4-6 hours:  If on nasal continuous positive airway pressure, respiratory therapy assess nares and determine need for appliance change or resting period.   6/4/2023 2350 by Rajat Medrano RN  Outcome: Progressing     Problem: ABCDS Injury Assessment  Goal: Absence of physical injury  6/4/2023 2350 by Rajat Medrano RN  Outcome: Progressing     Problem: Pain  Goal: Verbalizes/displays adequate comfort level or baseline comfort level  6/4/2023 2350 by Rajat Medrano RN  Outcome: Progressing
Problem: Discharge Planning  Goal: Discharge to home or other facility with appropriate resources  Outcome: Completed  Flowsheets (Taken 6/7/2023 1145)  Discharge to home or other facility with appropriate resources:   Identify barriers to discharge with patient and caregiver   Identify discharge learning needs (meds, wound care, etc)   Refer to discharge planning if patient needs post-hospital services based on physician order or complex needs related to functional status, cognitive ability or social support system   Arrange for needed discharge resources and transportation as appropriate     Problem: Safety - Adult  Goal: Free from fall injury  Outcome: Completed  Flowsheets (Taken 6/7/2023 1145)  Free From Fall Injury: Instruct family/caregiver on patient safety     Problem: Skin/Tissue Integrity  Goal: Absence of new skin breakdown  Description: 1. Monitor for areas of redness and/or skin breakdown  2. Assess vascular access sites hourly  3. Every 4-6 hours minimum:  Change oxygen saturation probe site  4. Every 4-6 hours:  If on nasal continuous positive airway pressure, respiratory therapy assess nares and determine need for appliance change or resting period.   Outcome: Completed     Problem: ABCDS Injury Assessment  Goal: Absence of physical injury  Outcome: Completed  Flowsheets (Taken 6/7/2023 1145)  Absence of Physical Injury: Implement safety measures based on patient assessment     Problem: Pain  Goal: Verbalizes/displays adequate comfort level or baseline comfort level  Outcome: Completed  Flowsheets (Taken 6/7/2023 1145)  Verbalizes/displays adequate comfort level or baseline comfort level:   Encourage patient to monitor pain and request assistance   Assess pain using appropriate pain scale   Administer analgesics based on type and severity of pain and evaluate response     Problem: Nutrition Deficit:  Goal: Optimize nutritional status  Outcome: Completed  Flowsheets (Taken 6/7/2023
Problem: Discharge Planning  Goal: Discharge to home or other facility with appropriate resources  Outcome: Progressing     Problem: Safety - Adult  Goal: Free from fall injury  Outcome: Progressing     Problem: Skin/Tissue Integrity  Goal: Absence of new skin breakdown  Description: 1. Monitor for areas of redness and/or skin breakdown  2. Assess vascular access sites hourly  3. Every 4-6 hours minimum:  Change oxygen saturation probe site  4. Every 4-6 hours:  If on nasal continuous positive airway pressure, respiratory therapy assess nares and determine need for appliance change or resting period.   Outcome: Progressing
Problem: Discharge Planning  Goal: Discharge to home or other facility with appropriate resources  Outcome: Progressing     Problem: Safety - Adult  Goal: Free from fall injury  Outcome: Progressing     Problem: Skin/Tissue Integrity  Goal: Absence of new skin breakdown  Description: 1. Monitor for areas of redness and/or skin breakdown  2. Assess vascular access sites hourly  3. Every 4-6 hours minimum:  Change oxygen saturation probe site  4. Every 4-6 hours:  If on nasal continuous positive airway pressure, respiratory therapy assess nares and determine need for appliance change or resting period.   Outcome: Progressing
Problem: Discharge Planning  Goal: Discharge to home or other facility with appropriate resources  Outcome: Progressing     Problem: Safety - Adult  Goal: Free from fall injury  Outcome: Progressing     Problem: Skin/Tissue Integrity  Goal: Absence of new skin breakdown  Description: 1. Monitor for areas of redness and/or skin breakdown  2. Assess vascular access sites hourly  3. Every 4-6 hours minimum:  Change oxygen saturation probe site  4. Every 4-6 hours:  If on nasal continuous positive airway pressure, respiratory therapy assess nares and determine need for appliance change or resting period.   Outcome: Progressing     Problem: ABCDS Injury Assessment  Goal: Absence of physical injury  Outcome: Progressing     Problem: Pain  Goal: Verbalizes/displays adequate comfort level or baseline comfort level  Outcome: Progressing
Problem: Discharge Planning  Goal: Discharge to home or other facility with appropriate resources  Outcome: Progressing     Problem: Safety - Adult  Goal: Free from fall injury  Outcome: Progressing     Problem: Skin/Tissue Integrity  Goal: Absence of new skin breakdown  Description: 1. Monitor for areas of redness and/or skin breakdown  2. Assess vascular access sites hourly  3. Every 4-6 hours minimum:  Change oxygen saturation probe site  4. Every 4-6 hours:  If on nasal continuous positive airway pressure, respiratory therapy assess nares and determine need for appliance change or resting period.   Outcome: Progressing     Problem: ABCDS Injury Assessment  Goal: Absence of physical injury  Outcome: Progressing     Problem: Pain  Goal: Verbalizes/displays adequate comfort level or baseline comfort level  Outcome: Progressing
Problem: Discharge Planning  Goal: Discharge to home or other facility with appropriate resources  Outcome: Progressing     Problem: Safety - Adult  Goal: Free from fall injury  Outcome: Progressing     Problem: Skin/Tissue Integrity  Goal: Absence of new skin breakdown  Description: 1. Monitor for areas of redness and/or skin breakdown  2. Assess vascular access sites hourly  3. Every 4-6 hours minimum:  Change oxygen saturation probe site  4. Every 4-6 hours:  If on nasal continuous positive airway pressure, respiratory therapy assess nares and determine need for appliance change or resting period.   Outcome: Progressing     Problem: ABCDS Injury Assessment  Goal: Absence of physical injury  Outcome: Progressing     Problem: Pain  Goal: Verbalizes/displays adequate comfort level or baseline comfort level  Outcome: Progressing     Problem: Nutrition Deficit:  Goal: Optimize nutritional status  Outcome: Progressing
Problem: Discharge Planning  Goal: Discharge to home or other facility with appropriate resources  Outcome: Progressing  Flowsheets  Taken 5/21/2023 2100 by Tha Marrero RN  Discharge to home or other facility with appropriate resources: Identify barriers to discharge with patient and caregiver  Taken 5/21/2023 1644 by Vidya Thomas RN  Discharge to home or other facility with appropriate resources: Identify barriers to discharge with patient and caregiver  Taken 5/21/2023 1612 by Vidya Thomas RN  Discharge to home or other facility with appropriate resources: Identify barriers to discharge with patient and caregiver     Problem: Safety - Adult  Goal: Free from fall injury  Outcome: Progressing  Flowsheets  Taken 5/22/2023 0040 by Tha Marrero RN  Free From Fall Injury: Instruct family/caregiver on patient safety  Taken 5/21/2023 2220 by Tha Marrero RN  Free From Fall Injury: Instruct family/caregiver on patient safety  Taken 5/21/2023 1548 by Vidya Thomas RN  Free From Fall Injury: Instruct family/caregiver on patient safety     Problem: Skin/Tissue Integrity  Goal: Absence of new skin breakdown  Description: 1. Monitor for areas of redness and/or skin breakdown  2. Assess vascular access sites hourly  3. Every 4-6 hours minimum:  Change oxygen saturation probe site  4. Every 4-6 hours:  If on nasal continuous positive airway pressure, respiratory therapy assess nares and determine need for appliance change or resting period.   Outcome: Progressing     Problem: ABCDS Injury Assessment  Goal: Absence of physical injury  Outcome: Progressing  Flowsheets  Taken 5/22/2023 0040 by Tha Marrero RN  Absence of Physical Injury: Implement safety measures based on patient assessment  Taken 5/21/2023 2220 by Tha Marrero RN  Absence of Physical Injury: Implement safety measures based on patient assessment  Taken 5/21/2023 1548 by Vidya Thomas RN  Absence of Physical Injury:
Problem: Discharge Planning  Goal: Discharge to home or other facility with appropriate resources  Outcome: Progressing  Flowsheets (Taken 5/24/2023 0014)  Discharge to home or other facility with appropriate resources: Identify barriers to discharge with patient and caregiver     Problem: Safety - Adult  Goal: Free from fall injury  Outcome: Progressing     Problem: Skin/Tissue Integrity  Goal: Absence of new skin breakdown  Description: 1. Monitor for areas of redness and/or skin breakdown  2. Assess vascular access sites hourly  3. Every 4-6 hours minimum:  Change oxygen saturation probe site  4. Every 4-6 hours:  If on nasal continuous positive airway pressure, respiratory therapy assess nares and determine need for appliance change or resting period.   Outcome: Progressing     Problem: ABCDS Injury Assessment  Goal: Absence of physical injury  Outcome: Progressing     Problem: Pain  Goal: Verbalizes/displays adequate comfort level or baseline comfort level  Outcome: Progressing  Flowsheets (Taken 5/23/2023 2109)  Verbalizes/displays adequate comfort level or baseline comfort level: Encourage patient to monitor pain and request assistance     Problem: Nutrition Deficit:  Goal: Optimize nutritional status  Outcome: Progressing
Problem: Discharge Planning  Goal: Discharge to home or other facility with appropriate resources  Outcome: Progressing  Flowsheets (Taken 5/29/2023 1014)  Discharge to home or other facility with appropriate resources:   Identify barriers to discharge with patient and caregiver   Identify discharge learning needs (meds, wound care, etc)     Problem: Safety - Adult  Goal: Free from fall injury  Outcome: Progressing     Problem: Skin/Tissue Integrity  Goal: Absence of new skin breakdown  Description: 1. Monitor for areas of redness and/or skin breakdown  2. Assess vascular access sites hourly  3. Every 4-6 hours minimum:  Change oxygen saturation probe site  4. Every 4-6 hours:  If on nasal continuous positive airway pressure, respiratory therapy assess nares and determine need for appliance change or resting period.   Outcome: Progressing     Problem: ABCDS Injury Assessment  Goal: Absence of physical injury  Outcome: Progressing     Problem: Pain  Goal: Verbalizes/displays adequate comfort level or baseline comfort level  Outcome: Progressing  Flowsheets (Taken 5/29/2023 0999)  Verbalizes/displays adequate comfort level or baseline comfort level: Encourage patient to monitor pain and request assistance     Problem: Nutrition Deficit:  Goal: Optimize nutritional status  Outcome: Progressing
Problem: Discharge Planning  Goal: Discharge to home or other facility with appropriate resources  Outcome: Progressing  Flowsheets (Taken 5/30/2023 0940)  Discharge to home or other facility with appropriate resources: Identify barriers to discharge with patient and caregiver     Problem: Safety - Adult  Goal: Free from fall injury  Outcome: Progressing     Problem: Skin/Tissue Integrity  Goal: Absence of new skin breakdown  Description: 1. Monitor for areas of redness and/or skin breakdown  2. Assess vascular access sites hourly  3. Every 4-6 hours minimum:  Change oxygen saturation probe site  4. Every 4-6 hours:  If on nasal continuous positive airway pressure, respiratory therapy assess nares and determine need for appliance change or resting period.   Outcome: Progressing     Problem: ABCDS Injury Assessment  Goal: Absence of physical injury  Outcome: Progressing     Problem: Pain  Goal: Verbalizes/displays adequate comfort level or baseline comfort level  Outcome: Progressing  Flowsheets (Taken 5/30/2023 9223)  Verbalizes/displays adequate comfort level or baseline comfort level: Encourage patient to monitor pain and request assistance     Problem: Nutrition Deficit:  Goal: Optimize nutritional status  Outcome: Progressing
Problem: Discharge Planning  Goal: Discharge to home or other facility with appropriate resources  Outcome: Progressing  Flowsheets (Taken 5/30/2023 2030)  Discharge to home or other facility with appropriate resources: Identify barriers to discharge with patient and caregiver     Problem: Safety - Adult  Goal: Free from fall injury  Outcome: Progressing  Flowsheets (Taken 5/31/2023 0023)  Free From Fall Injury: Instruct family/caregiver on patient safety     Problem: Skin/Tissue Integrity  Goal: Absence of new skin breakdown  Description: 1. Monitor for areas of redness and/or skin breakdown  2. Assess vascular access sites hourly  3. Every 4-6 hours minimum:  Change oxygen saturation probe site  4. Every 4-6 hours:  If on nasal continuous positive airway pressure, respiratory therapy assess nares and determine need for appliance change or resting period.   Outcome: Progressing     Problem: ABCDS Injury Assessment  Goal: Absence of physical injury  Outcome: Progressing  Flowsheets (Taken 5/31/2023 0023)  Absence of Physical Injury: Implement safety measures based on patient assessment     Problem: Pain  Goal: Verbalizes/displays adequate comfort level or baseline comfort level  Outcome: Progressing  Flowsheets (Taken 5/30/2023 2005)  Verbalizes/displays adequate comfort level or baseline comfort level: Assess pain using appropriate pain scale     Problem: Nutrition Deficit:  Goal: Optimize nutritional status  Outcome: Progressing
Problem: Discharge Planning  Goal: Discharge to home or other facility with appropriate resources  Outcome: Progressing  Flowsheets (Taken 5/31/2023 1612)  Discharge to home or other facility with appropriate resources:   Identify barriers to discharge with patient and caregiver   Arrange for needed discharge resources and transportation as appropriate   Identify discharge learning needs (meds, wound care, etc)   Arrange for interpreters to assist at discharge as needed     Problem: Safety - Adult  Goal: Free from fall injury  Outcome: Progressing  Flowsheets (Taken 5/31/2023 1612)  Free From Fall Injury: Instruct family/caregiver on patient safety     Problem: Skin/Tissue Integrity  Goal: Absence of new skin breakdown  Description: 1. Monitor for areas of redness and/or skin breakdown  2. Assess vascular access sites hourly  3. Every 4-6 hours minimum:  Change oxygen saturation probe site  4. Every 4-6 hours:  If on nasal continuous positive airway pressure, respiratory therapy assess nares and determine need for appliance change or resting period.   Outcome: Progressing     Problem: ABCDS Injury Assessment  Goal: Absence of physical injury  Outcome: Progressing  Flowsheets (Taken 5/31/2023 1612)  Absence of Physical Injury: Implement safety measures based on patient assessment     Problem: Pain  Goal: Verbalizes/displays adequate comfort level or baseline comfort level  Outcome: Progressing  Flowsheets (Taken 5/31/2023 1612)  Verbalizes/displays adequate comfort level or baseline comfort level:   Encourage patient to monitor pain and request assistance   Assess pain using appropriate pain scale   Administer analgesics based on type and severity of pain and evaluate response   Implement non-pharmacological measures as appropriate and evaluate response     Problem: Nutrition Deficit:  Goal: Optimize nutritional status  Outcome: Progressing  Flowsheets  Taken 5/31/2023 1612 by Stu Mathis RN  Nutrient
Problem: Discharge Planning  Goal: Discharge to home or other facility with appropriate resources  Outcome: Progressing  Flowsheets (Taken 6/1/2023 2139)  Discharge to home or other facility with appropriate resources: Identify barriers to discharge with patient and caregiver     Problem: Safety - Adult  Goal: Free from fall injury  Outcome: Progressing  Flowsheets (Taken 6/1/2023 2139)  Free From Fall Injury: Instruct family/caregiver on patient safety     Problem: Skin/Tissue Integrity  Goal: Absence of new skin breakdown  Description: 1. Monitor for areas of redness and/or skin breakdown  2. Assess vascular access sites hourly  3. Every 4-6 hours minimum:  Change oxygen saturation probe site  4. Every 4-6 hours:  If on nasal continuous positive airway pressure, respiratory therapy assess nares and determine need for appliance change or resting period.   Outcome: Progressing     Problem: ABCDS Injury Assessment  Goal: Absence of physical injury  Outcome: Progressing  Flowsheets (Taken 6/1/2023 2139)  Absence of Physical Injury: Implement safety measures based on patient assessment     Problem: Pain  Goal: Verbalizes/displays adequate comfort level or baseline comfort level  Outcome: Progressing  Flowsheets (Taken 6/1/2023 2139)  Verbalizes/displays adequate comfort level or baseline comfort level: Encourage patient to monitor pain and request assistance     Problem: Nutrition Deficit:  Goal: Optimize nutritional status  Outcome: Progressing     Problem: Chronic Conditions and Co-morbidities  Goal: Patient's chronic conditions and co-morbidity symptoms are monitored and maintained or improved  Outcome: Progressing  Flowsheets (Taken 6/1/2023 2139)  Care Plan - Patient's Chronic Conditions and Co-Morbidity Symptoms are Monitored and Maintained or Improved: Monitor and assess patient's chronic conditions and comorbid symptoms for stability, deterioration, or improvement
(Taken 6/2/2023 1222)  Care Plan - Patient's Chronic Conditions and Co-Morbidity Symptoms are Monitored and Maintained or Improved:   Monitor and assess patient's chronic conditions and comorbid symptoms for stability, deterioration, or improvement   Collaborate with multidisciplinary team to address chronic and comorbid conditions and prevent exacerbation or deterioration

## 2023-06-07 NOTE — CARE COORDINATION
Aware of discharge with home care. Home care orders sent to [facility]. Discharge planner notified.

## 2023-06-07 NOTE — DISCHARGE SUMMARY
WBC 6.3 06/05/2023    HGB 12.1 (L) 06/05/2023    HCT 37.7 (L) 06/05/2023    MCV 92.0 06/05/2023     06/05/2023       Disposition:  To home with home therapies in the care of his wife. Condition at Discharge:    Stable    Follow-up:  See after visit summary from hospitalization: Patient will follow-up next week with his family doctor and neurology after discharge. Discharge Medications:     Medication List        START taking these medications      celecoxib 200 MG capsule  Commonly known as: CELEBREX  Take 1 capsule by mouth daily            CHANGE how you take these medications      atorvastatin 80 MG tablet  Commonly known as: LIPITOR  TAKE 1 TABLET EVERY DAY  What changed:   how much to take  how to take this  when to take this  additional instructions     cloZAPine 25 MG tablet  Commonly known as: CLOZARIL  Take 1 tablet by mouth 2 times daily  What changed: how to take this     fluticasone 50 MCG/ACT nasal spray  Commonly known as: Flonase  2 sprays by Nasal route daily  What changed:   how much to take  when to take this     midodrine 5 MG tablet  Commonly known as: PROAMATINE  Take 1 tablet by mouth 3 times daily  What changed:   medication strength  how much to take  additional instructions     primidone 50 MG tablet  Commonly known as:  MYSOLINE  Take 1 tablet by mouth nightly  What changed: how much to take     rOPINIRole 0.5 MG tablet  Commonly known as: REQUIP  Take 1 tablet by mouth 3 times daily  What changed:   medication strength  how much to take            CONTINUE taking these medications      aspirin 81 MG tablet  Take 1 tablet by mouth daily     * carbidopa-levodopa  MG per tablet  Commonly known as: SINEMET     * Carbidopa-Levodopa ER 36. MG Cpcr     COQ10 PO     docusate sodium 100 MG capsule  Commonly known as: COLACE     donepezil 10 MG tablet  Commonly known as: ARICEPT     fexofenadine 180 MG tablet  Commonly known as: ALLEGRA     fish oil-omega-3 fatty acids

## 2023-06-07 NOTE — PROGRESS NOTES
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 is better, she would like a full CODE STATUS for him. She would also like him started on Metamucil daily. Repeat labs this point look good and are stable. Past Medical History:  has a past medical history of Acute bronchitis, CAD (coronary artery disease), Clostridioides difficile infection, Hyperlipidemia, and Parkinson disease ([facility] [address] Past Surgical History:  has a past surgical history that includes Diagnostic Cardiac Cath Lab Procedure (2005); Coronary angioplasty with stent (2005); and Cataract extraction.     Discharge
[facility] [address] Department   Phone: [phone]    Speech Therapy    [] Initial Evaluation            [x] Daily Treatment Note         [] Discharge Summary      Patient: Brooklyn Nickerson   : 1945   MRN: [id number]   Date of Service:  2023  Admitting Diagnosis: Parkinson's disease [facility])  Current Admission Summary: Brooklyn Nickerson is a 70 y.o. male with history of Parkinson's disease, CAD, hyperlipidemia, DM 2, BL carotid artery disease, hypotension on Midodrine who was brought to ER for AMS and worsening tremors. He was found to have LANA, PNA and chronic cough. Patient was admitted to our rehab unit 5 days ago and was transferred off after 5 hours due to a code stroke being called. MRI did not show acute  stroke. EKG shows sinus tachycardia. Telemetry had shown ventricular premature beats. Acute pneumonia, likely aspiration in setting of very  severe Parkinson's disease and very likely significant esophageal dysmotility; coronary disease, which is stable; hypertension, which has  become hypotension with autonomic insufficiency, highly responsive to midodrine. Patient improved with treatment on the acute floor, and tolerated therapies, and was admitted back to our rehabilitation unit last night, and will get started in rehab unit therapies today. Patient's wife had questions about his DNR status, but now that he is better, she would like a full CODE STATUS for him. She would also like him started on Metamucil daily. Repeat labs this point look good and are stable. Past Medical History:  has a past medical history of Acute bronchitis, CAD (coronary artery disease), Clostridioides difficile infection, Hyperlipidemia, and Parkinson disease ([facility] [address] Past Surgical History:  has a past surgical history that includes Diagnostic Cardiac Cath Lab Procedure (2005); Coronary angioplasty with stent (2005); and Cataract extraction.   Instrumental Swallow
[facility] Department   Phone: [phone]    Occupational Therapy    [x] Initial Evaluation            [] Daily Treatment Note         [] Discharge Summary      Patient: Vonnie Lujan   : 1945   MRN: [id number]   Date of Service:  2023    Admitting Diagnosis:  Parkinson's disease [facility])  Current Admission Summary:   Vonnie Lujan is a 70 y.o. male with history of Parkinson's disease, CAD, hyperlipidemia, DM 2, BL carotid artery disease, hypotension on Midodrine who was brought to ER for AMS and worsening tremors. He was found to have LANA, PNA and chronic cough. Patient was admitted to our rehab unit 5 days ago and was transferred off after 5 hours due to a code stroke being called. MRI did not show acute  stroke. EKG shows sinus tachycardia. Telemetry had shown ventricular premature beats. Acute pneumonia, likely aspiration in setting of very  severe Parkinson's disease and very likely significant esophageal dysmotility; coronary disease, which is stable; hypertension, which has  become hypotension with autonomic insufficiency, highly responsive to midodrine. Patient improved with treatment on the acute floor, and tolerated therapies, and was admitted back to our rehabilitation unit last night, and will get started in rehab unit therapies today. Patient's wife had questions about his DNR status, but now that he is better, she would like a full CODE STATUS for him. She would also like him started on Metamucil daily. Repeat labs this point look good and are stable. Past Medical History:  has a past medical history of Acute bronchitis, CAD (coronary artery disease), Clostridioides difficile infection, Hyperlipidemia, and Parkinson disease ([facility] [address] Past Surgical History:  has a past surgical history that includes Diagnostic Cardiac Cath Lab Procedure (2005); Coronary angioplasty with stent (2005); and Cataract extraction.     Discharge
[facility] [address] Department   Phone: [phone]    Occupational Therapy    [] Initial Evaluation            [x] Daily Treatment Note         [] Discharge Summary      Patient: Santi Wilkinson   : 1945   MRN: [id number]   Date of Service:  2023    Admitting Diagnosis:  Parkinson's disease [facility])  Current Admission Summary:   Santi Wilkinson is a 70 y.o. male with history of Parkinson's disease, CAD, hyperlipidemia, DM 2, BL carotid artery disease, hypotension on Midodrine who was brought to ER for AMS and worsening tremors. He was found to have LANA, PNA and chronic cough. Patient was admitted to our rehab unit 5 days ago and was transferred off after 5 hours due to a code stroke being called. MRI did not show acute  stroke. EKG shows sinus tachycardia. Telemetry had shown ventricular premature beats. Acute pneumonia, likely aspiration in setting of very  severe Parkinson's disease and very likely significant esophageal dysmotility; coronary disease, which is stable; hypertension, which has  become hypotension with autonomic insufficiency, highly responsive to midodrine. Patient improved with treatment on the acute floor, and tolerated therapies, and was admitted back to our rehabilitation unit last night, and will get started in rehab unit therapies today. Patient's wife had questions about his DNR status, but now that he is better, she would like a full CODE STATUS for him. She would also like him started on Metamucil daily. Repeat labs this point look good and are stable. Past Medical History:  has a past medical history of Acute bronchitis, CAD (coronary artery disease), Clostridioides difficile infection, Hyperlipidemia, and Parkinson disease ([facility] [address] Past Surgical History:  has a past surgical history that includes Diagnostic Cardiac Cath Lab Procedure (2005); Coronary angioplasty with stent (2005); and Cataract extraction.     Discharge
[facility] Department   Phone: [phone]    Physical Therapy    [] Initial Evaluation            [x] Daily Treatment Note         [] Discharge Summary      Patient: Vonnie Lujan   : 1945   MRN: [id number]   Date of Service:  2023  Admitting Diagnosis: Parkinson's disease [facility])    Current Admission Summary: Vonnie Lujan is a 70 y.o. male with history of Parkinson's disease, CAD, hyperlipidemia, DM 2, BL carotid artery disease, hypotension on Midodrine who was brought to ER for AMS and worsening tremors. He was found to have LANA, PNA and chronic cough. Patient was admitted to our rehab unit 5 days ago and was transferred off after 5 hours due to a code stroke being called. MRI did not show acute stroke. EKG shows sinus tachycardia. Telemetry had shown ventricular premature beats. Acute pneumonia, likely aspiration in setting of very  severe Parkinson's disease and very likely significant esophageal dysmotility; coronary disease, which is stable; hypertension, which has  become hypotension with autonomic insufficiency, highly responsive to midodrine. Patient improved with treatment on the acute floor, and tolerated therapies, and was admitted back to our rehabilitation unit last night, and will get started in rehab unit therapies today    Past Medical History:  has a past medical history of Acute bronchitis, CAD (coronary artery disease), Clostridioides difficile infection, Hyperlipidemia, and Parkinson disease ([facility] [address] Past Surgical History:  has a past surgical history that includes Diagnostic Cardiac Cath Lab Procedure (2005); Coronary angioplasty with stent (2005); and Cataract extraction.   Discharge Recommendations: Home Health PT  DME Required For Discharge: DME to be determined pending patient progress  Precautions/Restrictions: high fall risk  Weight Bearing Restrictions: no restrictions  [] Right Upper Extremity  [] Left Upper
[facility] Department   Phone: [phone]    Speech Therapy    [] Initial Evaluation            [x] Daily Treatment Note         [] Discharge Summary      Patient: Deidra Arriola   : 1945   MRN: [id number]   Date of Service:  2023  Admitting Diagnosis: Parkinson's disease ([facility] [address]  Current Admission Summary: Deidra Arriola is a 70 y.o. male with history of Parkinson's disease, CAD, hyperlipidemia, DM 2, BL carotid artery disease, hypotension on Midodrine who was brought to ER for AMS and worsening tremors. He was found to have LANA, PNA and chronic cough. Patient was admitted to our rehab unit 5 days ago and was transferred off after 5 hours due to a code stroke being called. MRI did not show acute  stroke. EKG shows sinus tachycardia. Telemetry had shown ventricular premature beats. Acute pneumonia, likely aspiration in setting of very  severe Parkinson's disease and very likely significant esophageal dysmotility; coronary disease, which is stable; hypertension, which has  become hypotension with autonomic insufficiency, highly responsive to midodrine. Patient improved with treatment on the acute floor, and tolerated therapies, and was admitted back to our rehabilitation unit last night, and will get started in rehab unit therapies today. Patient's wife had questions about his DNR status, but now that he is better, she would like a full CODE STATUS for him. She would also like him started on Metamucil daily. Repeat labs this point look good and are stable. Past Medical History:  has a past medical history of Acute bronchitis, CAD (coronary artery disease), Clostridioides difficile infection, Hyperlipidemia, and Parkinson disease ([facility] [address] Past Surgical History:  has a past surgical history that includes Diagnostic Cardiac Cath Lab Procedure (2005); Coronary angioplasty with stent (2005); and Cataract extraction.   Instrumental Swallow Study:
[facility] Department   Phone: [phone]    Physical Therapy    [] Initial Evaluation            [x] Daily Treatment Note         [] Discharge Summary      Patient: Brooklyn Nickerson   : 1945   MRN: [id number]   Date of Service:  2023  Admitting Diagnosis: Parkinson's disease [facility])    Current Admission Summary: Brooklyn Nickerson is a 70 y.o. male with history of Parkinson's disease, CAD, hyperlipidemia, DM 2, BL carotid artery disease, hypotension on Midodrine who was brought to ER for AMS and worsening tremors. He was found to have LANA, PNA and chronic cough. Patient was admitted to our rehab unit 5 days ago and was transferred off after 5 hours due to a code stroke being called. MRI did not show acute stroke. EKG shows sinus tachycardia. Telemetry had shown ventricular premature beats. Acute pneumonia, likely aspiration in setting of very  severe Parkinson's disease and very likely significant esophageal dysmotility; coronary disease, which is stable; hypertension, which has  become hypotension with autonomic insufficiency, highly responsive to midodrine. Patient improved with treatment on the acute floor, and tolerated therapies, and was admitted back to our rehabilitation unit last night, and will get started in rehab unit therapies today  Past Medical History:  has a past medical history of Acute bronchitis, CAD (coronary artery disease), Clostridioides difficile infection, Hyperlipidemia, and Parkinson disease ([facility] [address] Past Surgical History:  has a past surgical history that includes Diagnostic Cardiac Cath Lab Procedure (2005); Coronary angioplasty with stent (2005); and Cataract extraction.   Discharge Recommendations: Home Health PT  DME Required For Discharge: DME to be determined pending patient progress  Precautions/Restrictions: high fall risk  Weight Bearing Restrictions: no restrictions  [] Right Upper Extremity  [] Left Upper
[facility] Department   Phone: [phone]    Physical Therapy    [] Initial Evaluation            [x] Daily Treatment Note         [] Discharge Summary      Patient: Ammy Ruano   : 1945   MRN: [id number]   Date of Service:  2023  Admitting Diagnosis: Parkinson's disease [facility])    Current Admission Summary: Ammy Ruano is a 70 y.o. male with history of Parkinson's disease, CAD, hyperlipidemia, DM 2, BL carotid artery disease, hypotension on Midodrine who was brought to ER for AMS and worsening tremors. He was found to have LANA, PNA and chronic cough. Patient was admitted to our rehab unit 5 days ago and was transferred off after 5 hours due to a code stroke being called. MRI did not show acute stroke. EKG shows sinus tachycardia. Telemetry had shown ventricular premature beats. Acute pneumonia, likely aspiration in setting of very  severe Parkinson's disease and very likely significant esophageal dysmotility; coronary disease, which is stable; hypertension, which has  become hypotension with autonomic insufficiency, highly responsive to midodrine. Patient improved with treatment on the acute floor, and tolerated therapies, and was admitted back to our rehabilitation unit last night, and will get started in rehab unit therapies today  Past Medical History:  has a past medical history of Acute bronchitis, CAD (coronary artery disease), Clostridioides difficile infection, Hyperlipidemia, and Parkinson disease ([facility] [address] Past Surgical History:  has a past surgical history that includes Diagnostic Cardiac Cath Lab Procedure (2005); Coronary angioplasty with stent (2005); and Cataract extraction.     Discharge Recommendations: Home Health PT  DME Required For Discharge: DME to be determined pending patient progress    Precautions/Restrictions: high fall risk  Weight Bearing Restrictions: no restrictions  Required Braces/Orthotics: no braces
[facility] Department   Phone: [phone]    Occupational Therapy    [] Initial Evaluation            [x] Daily Treatment Note         [] Discharge Summary      Patient: Charan Heath   : 1945   MRN: [id number]   Date of Service:  2023    Admitting Diagnosis:  Parkinson's disease [facility])  Current Admission Summary:   Charan Heath is a 70 y.o. male with history of Parkinson's disease, CAD, hyperlipidemia, DM 2, BL carotid artery disease, hypotension on Midodrine who was brought to ER for AMS and worsening tremors. He was found to have LANA, PNA and chronic cough. Patient was admitted to our rehab unit 5 days ago and was transferred off after 5 hours due to a code stroke being called. MRI did not show acute  stroke. EKG shows sinus tachycardia. Telemetry had shown ventricular premature beats. Acute pneumonia, likely aspiration in setting of very  severe Parkinson's disease and very likely significant esophageal dysmotility; coronary disease, which is stable; hypertension, which has  become hypotension with autonomic insufficiency, highly responsive to midodrine. Patient improved with treatment on the acute floor, and tolerated therapies, and was admitted back to our rehabilitation unit last night, and will get started in rehab unit therapies today. Patient's wife had questions about his DNR status, but now that he is better, she would like a full CODE STATUS for him. She would also like him started on Metamucil daily. Repeat labs this point look good and are stable. Past Medical History:  has a past medical history of Acute bronchitis, CAD (coronary artery disease), Clostridioides difficile infection, Hyperlipidemia, and Parkinson disease ([facility] [address] Past Surgical History:  has a past surgical history that includes Diagnostic Cardiac Cath Lab Procedure (2005); Coronary angioplasty with stent (2005); and Cataract extraction.     Discharge
[facility] Department   Phone: [phone]    Speech Therapy    [] Initial Evaluation            [x] Daily Treatment Note         [] Discharge Summary      Patient: Rebecca Rivera   : 1945   MRN: [id number]   Date of Service:  2023  Admitting Diagnosis: Parkinson's disease [facility])  Current Admission Summary: Rebecca Rivera is a 70 y.o. male with history of Parkinson's disease, CAD, hyperlipidemia, DM 2, BL carotid artery disease, hypotension on Midodrine who was brought to ER for AMS and worsening tremors. He was found to have LANA, PNA and chronic cough. Patient was admitted to our rehab unit 5 days ago and was transferred off after 5 hours due to a code stroke being called. MRI did not show acute  stroke. EKG shows sinus tachycardia. Telemetry had shown ventricular premature beats. Acute pneumonia, likely aspiration in setting of very  severe Parkinson's disease and very likely significant esophageal dysmotility; coronary disease, which is stable; hypertension, which has  become hypotension with autonomic insufficiency, highly responsive to midodrine. Patient improved with treatment on the acute floor, and tolerated therapies, and was admitted back to our rehabilitation unit last night, and will get started in rehab unit therapies today. Patient's wife had questions about his DNR status, but now that he is better, she would like a full CODE STATUS for him. She would also like him started on Metamucil daily. Repeat labs this point look good and are stable. Past Medical History:  has a past medical history of Acute bronchitis, CAD (coronary artery disease), Clostridioides difficile infection, Hyperlipidemia, and Parkinson disease ([facility] [address] Past Surgical History:  has a past surgical history that includes Diagnostic Cardiac Cath Lab Procedure (2005); Coronary angioplasty with stent (2005); and Cataract extraction.   Instrumental Swallow
[facility] Department   Phone: [phone]    Occupational Therapy    [] Initial Evaluation            [x] Daily Treatment Note         [] Discharge Summary      Patient: Lamar Perea   : 1945   MRN: [id number]   Date of Service:  2023    Admitting Diagnosis:  Parkinson's disease [facility])  Current Admission Summary:   Lamar Perea is a 70 y.o. male with history of Parkinson's disease, CAD, hyperlipidemia, DM 2, BL carotid artery disease, hypotension on Midodrine who was brought to ER for AMS and worsening tremors. He was found to have LANA, PNA and chronic cough. Patient was admitted to our rehab unit 5 days ago and was transferred off after 5 hours due to a code stroke being called. MRI did not show acute  stroke. EKG shows sinus tachycardia. Telemetry had shown ventricular premature beats. Acute pneumonia, likely aspiration in setting of very  severe Parkinson's disease and very likely significant esophageal dysmotility; coronary disease, which is stable; hypertension, which has  become hypotension with autonomic insufficiency, highly responsive to midodrine. Patient improved with treatment on the acute floor, and tolerated therapies, and was admitted back to our rehabilitation unit last night, and will get started in rehab unit therapies today. Patient's wife had questions about his DNR status, but now that he is better, she would like a full CODE STATUS for him. She would also like him started on Metamucil daily. Repeat labs this point look good and are stable. Past Medical History:  has a past medical history of Acute bronchitis, CAD (coronary artery disease), Clostridioides difficile infection, Hyperlipidemia, and Parkinson disease ([facility] [address] Past Surgical History:  has a past surgical history that includes Diagnostic Cardiac Cath Lab Procedure (2005); Coronary angioplasty with stent (2005); and Cataract extraction.     Discharge
Morning assessment completed, vss, alert and oriented, denies pain, The care plan and education has been reviewed and mutually agreed upon with the patient. Pt remains free from falls. Fall precautions in place--bed in lowest position, call light within reach, bed alarm in use. Pt aware to call for assistance before getting up.
Morning assessment completed, vss, schedule meds given, The care plan and education has been reviewed and mutually agreed upon with the patient. Pt remains free from falls. Fall precautions in place--bed in lowest position, call light within reach, bed alarm in use. Pt aware to call for assistance before getting up.
Morning assessment completed, vss, schedule meds given, The care plan and education has been reviewed and mutually agreed upon with the patient. Pt remains free from falls. Fall precautions in place--bed in lowest position, call light within reach, bed alarm in use. Pt aware to call for assistance before getting up.
Morning assessment completed, vss, schedule meds given, denies pain, suctioned this morning by night nurse, code status changed to full code per wife. The care plan and education has been reviewed and mutually agreed upon with the patient. Pt remains free from falls. Fall precautions in place--bed in lowest position, call light within reach, bed alarm in use. Pt aware to call for assistance before getting up.
Notified Dr. Pawel Saavedra that patient's wife states patient has been having increased tremors involving more of his body and they are preventing him from sleeping. Pt's wife states that Neurology adjusted Parkinson's meds when pt was in the ICU and she is asking if they need to be adjusted again. Received telephone order from Dr. Pawel Saavedra for neurology consult.
Nutrition Note    RECOMMENDATIONS  PO Diet: Consistency per SLP  ONS: Magic cup TID     NUTRITION ASSESSMENT   Pt admitted to ARU for Parkinson's disease. Receives a soft & bite sized diet with mildly thick liquids. Po intake has been greater than 75% of meals recently. Staff reports wife usually assists with feeding d/t pt's hand tremors. Staff to assist when wife not present, per RN. Wife @ bedside & states pt is eating well, but does not like the hospital food. Family is encouraged to bring in foods pt likes as long as it is consistent with current modified diet. Wt has been stable per spouse, with  lb. Will con't to monitor & encourage po intake. Pt agrees to try magic cups with meals. Will monitor for acceptance. Nutrition Related Findings: Labs reviewed; staff to assisst with feeding d/t tremors. LBM 5/21; Edema: nonpitting generalized, +1 pitting BLE, trace BUE  Wounds:    Nutrition Education:  Education not indicated   Nutrition Goals: PO intake 75% or greater     MALNUTRITION ASSESSMENT   Chronic Illness  Malnutrition Status: At risk for malnutrition (Comment)  Findings of the 6 clinical characteristics of malnutrition:  Energy Intake:  No significant decrease in energy intake  Weight Loss:  Mild weight loss (specify amount and time period) (2.7% since hospitalized, within past few weeks)     Body Fat Loss:  No significant body fat loss     Muscle Mass Loss:  Mild muscle mass loss Clavicles (pectoralis & deltoids)  Fluid Accumulation:  Mild Extremities, Generalized   Strength:  Not Performed      NUTRITION DIAGNOSIS   Unintended weight loss related to catabolic illness (parkinson's) as evidenced by weight loss      CURRENT NUTRITION THERAPIES  ADULT DIET; Dysphagia - Soft and Bite Sized; Mildly Thick (Nectar);  No Drinking Straws     PO Intake: %   PO Supplement Intake:None Ordered    ANTHROPOMETRICS  Current Height: 5' 8\" (172.7 cm)  Current Weight - Scale: 183 lb 9.6 oz (83.3
Nutrition Note    RECOMMENDATIONS  PO Diet: Dysphagia soft & bite sized with mildly thick liquids  ONS: Magic cup TID     NUTRITION ASSESSMENT   Pt remains on a dysphagia soft & bite sized diet with mildly thick liquids. Family continues to provide food items of appropriate textures, that pt enjoys. Pt is also eating % of magic cups TID. Wt @ 185 lb; Wife reports any wt loss prior to admission was intentional, & wt has been stable. Encourage fluid intake d/t elevated Na level. Will con't to monitor for further needs as identified. Nutrition Related Findings: LBM 5/24; +1 pitting edema BLE; Na 148; gluc 114  Wounds: None  Nutrition Education:  Education not indicated   Nutrition Goals: PO intake 75% or greater     MALNUTRITION ASSESSMENT   Chronic Illness  Malnutrition Status: At risk for malnutrition (Comment)    NUTRITION DIAGNOSIS   No nutrition diagnosis at this time     CURRENT NUTRITION THERAPIES  ADULT DIET; Dysphagia - Soft and Bite Sized; Mildly Thick (Nectar); No Drinking Straws  ADULT ORAL NUTRITION SUPPLEMENT; Breakfast, Lunch, Dinner; Frozen Oral Supplement     PO Intake: %   PO Supplement Intake:%    ANTHROPOMETRICS  Current Height: 5' 8\" (172.7 cm)  Current Weight - Scale: 185 lb 3.2 oz (84 kg)    Admission weight:    Ideal Body Weight (IBW): 154 lbs  (70 kg)    Usual Bodyweight 188 lb (85.3 kg)       BMI: 28.1      COMPARATIVE STANDARDS  Energy (kcal):  1660- 2075     Protein (g):  91- 140g       Fluid (mL/day):  1 ml/kcal    The patient will be monitored per nutrition standards of care. Consult dietitian if additional nutrition interventions are needed prior to RD reassessment.      Ryan Lynn RD, LD    Contact: [phone]
Nutrition Note    RECOMMENDATIONS  PO Diet: diet consistency per SLP  ONS: magic cups TID     NUTRITION ASSESSMENT   Pt continues to tolerate dysphagia soft & bite sized diet with mildly thick liquids. Family providing foods from home to supplement mealtrays. Pt continues to consume magic cups TID. Wt is +4 lb from admission. Na WNL. No further nutrition concerns expressed @ this time. Will con't to monitor progress. Nutrition Related Findings: LBM 5/30; labs reviewed; nonpitting generalized edema, trace BLE & nonpitting BLE edema  Wounds: None  Nutrition Education:  Education not indicated   Nutrition Goals: PO intake 75% or greater     MALNUTRITION ASSESSMENT   Chronic Illness  Malnutrition Status: At risk for malnutrition (Comment)    NUTRITION DIAGNOSIS   No nutrition diagnosis at this time     CURRENT NUTRITION THERAPIES  ADULT DIET; Dysphagia - Soft and Bite Sized; Mildly Thick (Nectar); No Drinking Straws  ADULT ORAL NUTRITION SUPPLEMENT; Breakfast, Lunch, Dinner; Frozen Oral Supplement     PO Intake: 1-25%, % (combining foor from home with meal trays)   PO Supplement Intake:%    ANTHROPOMETRICS  Current Height: 5' 8\" (172.7 cm)  Current Weight - Scale: 189 lb (85.7 kg) (4 pillows, 1blanket, 1 sheet)    Ideal Body Weight (IBW): 154 lbs  (70 kg)    Usual Bodyweight 188 lb (85.3 kg)       BMI: 28.7      COMPARATIVE STANDARDS  Energy (kcal):  1660- 2075     Protein (g):  91- 140g       Fluid (mL/day):  1 ml/kcal    The patient will be monitored per nutrition standards of care. Consult dietitian if additional nutrition interventions are needed prior to RD reassessment.      Kerri Zeepda, IEVTTE, LD    Contact: [phone]
Outreach call to pt's wife, Ramiro Prabhjot requesting Living Will, POA paperwork be brought in due to forms missing from admission and pt's wife stating pt was a DNR CCA on admission.      Alexi ANDERSONN, RN   [phone]
Patient discharged to home with home care. No IV at discharge. Discharge instructions provided and explained to patient and wife, all questions answered, patient and wife verbalized understanding. All belongings sent with patient. Patient's prescriptions filled in outpatient pharmacy and sent home with patient. Patient transported to front entrance in wheelchair and transported home by wife in personal vehicle.
Pt ambulated 2 laps on ARU with staff and family. Pt assisted back to bed with bed alarm on and call light in reach.     Agatha Cruz BSN, RN   [phone]
Pt showered, shaved and assisted with oral and ADL's. Pt ambulated 2 laps on ARU and 1 lap on 4T. Pt assisted back to chair. Family present. Call light in reach.     Barrett ANDERSONN, RN   [phone]
Rebecca Rivera  5/29[birth year]20[birth year]  [id number]    Chief Complaint: Parkinson's disease ([address]    Subjective: Patient seen this AM.  Patient thinks he is making good progress in his therapies. Repeat labs this morning look good and are stable. Had repeat labs done yesterday , and they look good and are stable. We think the patient will be ready for discharge to home in about 10 days, on June 7. ROS: No CP, SOB, dyspnea    Objective:  Patient Vitals for the past 24 hrs:   BP Temp Temp src Pulse Resp SpO2 Weight   05/29/23 0630 -- -- -- -- -- -- 189 lb 1.6 oz (85.8 kg)   05/28/23 2115 106/65 97.7 °F (36.5 °C) Oral 66 16 95 % --   05/28/23 1734 -- -- -- -- -- -- 186 lb 8 oz (84.6 kg)   05/28/23 1108 95/61 97.9 °F (36.6 °C) Axillary 77 16 96 % --       Gen: No distress, pleasant. HEENT: Normocephalic, atraumatic. CV: Regular rate and rhythm. No MRG   Resp: No respiratory distress. CTAB   Abd: Soft, nontender   Ext: No edema. Right upper extremity tremor noted. Neuro: Alert, oriented, appropriately interactive. Laboratory data: Available via EMR.      Therapy progress:       PT    Supine to Sit: Partial/moderate assistance  Sit to Supine: Supervision or touching assistance   Sit to Stand: Supervision or touching assistance  Chair/Bed to Chair Transfer: Partial/moderate assistance  Car Transfer:    Ambulation 10 ft: Supervision or touching assistance  Ambulation 50 ft: Supervision or touching assistance  Ambulation 150 ft: Supervision or touching assistance  Stairs - 1 Step: Partial/moderate assistance  Stairs - 4 Step: Partial/moderate assistance  Stairs - 12 Step:      OT    Eating: Substantial/maximal assistance  Oral Hygiene: Supervision or touching assistance  Bathing: Substantial/maximal assistance  Upper Body Dressing: Partial/moderate assistance  Lower Body Dressing: Substantial/maximal assistance  Toilet Transfer: Partial/moderate assistance  Toilet Hygiene: Partial/moderate assistance    Speech
Sharri Sparks  5/31/2023  [id number]    Chief Complaint: Parkinson's disease [facility])    Subjective: Patient seen this AM.  Patient was discussed yesterday in rehab conference with the entire rehab team, and we think the patient will be ready for discharge to home in about a week, on June 7. He is now able to ambulate at 150 feet with verbal cueing and no device. In OT, his wife states he is getting back to his baseline function. Speech notes that they are working with him on swallowing thin liquids better. Patient's blood pressure is better this morning. He does have a white coating on his tongue, so we will start him on nystatin swish and swallow. ROS: No CP, SOB, dyspnea    Objective:  Patient Vitals for the past 24 hrs:   BP Temp Temp src Pulse Resp SpO2 Weight   05/31/23 0600 129/71 -- -- 60 -- -- 189 lb (85.7 kg)   05/30/23 2005 105/64 98.4 °F (36.9 °C) Oral 62 18 95 % --   05/30/23 0923 108/68 97.4 °F (36.3 °C) Oral 71 16 95 % --       Gen: No distress, pleasant. HEENT: Normocephalic, atraumatic. CV: Regular rate and rhythm. No MRG   Resp: No respiratory distress. CTAB   Abd: Soft, nontender   Ext: No edema. Right upper extremity tremor noted. Neuro: Alert, oriented, appropriately interactive. Laboratory data: Available via EMR.      Therapy progress:       PT    Supine to Sit: Partial/moderate assistance  Sit to Supine: Supervision or touching assistance   Sit to Stand: Supervision or touching assistance  Chair/Bed to Chair Transfer: Partial/moderate assistance  Car Transfer:    Ambulation 10 ft: Supervision or touching assistance  Ambulation 50 ft: Supervision or touching assistance  Ambulation 150 ft: Supervision or touching assistance  Stairs - 1 Step: Partial/moderate assistance  Stairs - 4 Step: Supervision or touching assistance  Stairs - 12 Step: Supervision or touching assistance    OT    Eating: Substantial/maximal assistance  Oral Hygiene: Supervision or touching
Sharri Sparks  6/2/2023  [id number]    Chief Complaint: Parkinson's disease ([address]    Subjective: Patient seen this AM.  Patient is seen working with OT this morning, and he is able to stand and sort through pantry items with improved balance and endurance today. Patient was started on nystatin swish and swallow for his coated tongue , exam of his tongue still reveals that it is white today. His diet was increased to thin liquids yesterday. We think the patient will be ready for discharge to home in 5 days , on June 7. ROS: No CP, SOB, dyspnea    Objective:  Patient Vitals for the past 24 hrs:   BP Temp Temp src Pulse Resp SpO2 Weight   06/02/23 0936 129/69 97.8 °F (36.6 °C) Axillary 74 16 97 % --   06/02/23 0631 -- -- -- -- -- -- 185 lb 8 oz (84.1 kg)   06/01/23 2139 107/65 98.3 °F (36.8 °C) Axillary 62 16 97 % --       Gen: No distress, pleasant. HEENT: Normocephalic, atraumatic. CV: Regular rate and rhythm. No MRG   Resp: No respiratory distress. CTAB   Abd: Soft, nontender   Ext: No edema. Right upper extremity tremor noted. Neuro: Alert, oriented, appropriately interactive. Laboratory data: Available via EMR.      Therapy progress:       PT    Supine to Sit: Partial/moderate assistance  Sit to Supine: Supervision or touching assistance   Sit to Stand: Supervision or touching assistance  Chair/Bed to Chair Transfer: Supervision or touching assistance  Car Transfer:    Ambulation 10 ft: Supervision or touching assistance  Ambulation 50 ft: Supervision or touching assistance  Ambulation 150 ft: Supervision or touching assistance  Stairs - 1 Step: Partial/moderate assistance  Stairs - 4 Step: Supervision or touching assistance  Stairs - 12 Step: Supervision or touching assistance    OT    Eating: Partial/moderate assistance  Oral Hygiene: Partial/moderate assistance  Bathing: Partial/moderate assistance  Upper Body Dressing: Partial/moderate assistance  Lower Body Dressing: Partial/moderate
The wife declined to have vit K due to contra indication with Lovenox injection. Discontinued vit K. Had a discussion with the wife about pt's bowel regimen. Changed lactulose to prn and ordered senokot 2 tabs 2 times a day.
Upon going to take vss and do assessment, pt was siting up in the chair with wife at bedside. Blood pressure on low side, wife asked if I could recheck, pt bp remained in the low 80, pt noted feeling tired and some dizziness; md notified he ordered 1 liter of fluids and repeat bmp in the am, however wife stated that's what pt was getting Midrin for low bp, she also stated he got bmp this am, after explaining to the md what wife stated he states to push or encourage fluids and recheck bmp in the am. Wife was satisfied with that.
Recommendations: Home with HHOT and 24 hour supervision     DME Required For Discharge: shower chair with back 3 in 1 commode for raised toilet seat with armrests    Precautions/Restrictions: high fall risk, regular, thin liquid dit   Weight Bearing Restrictions: no restrictions    Required Braces/Orthotics: no braces required  Positional Restrictions:no positional restrictions    Pre-Admission Information   Lives With: spouse, Jocelyn Saleem       Type of Home: house  Home Layout: two level, laundry in basement, bedroom/bathroom upstairs  Home Access:  2 step to enter without rails , garage; 2+1 steps front door without rail  Bathroom Layout: wheelchair accessible, walk in shower  Bathroom Equipment: grab bars in shower, built in shower seat, hand held shower head  Toilet Height: elevated height  Home Equipment: no prior equipment  Transfer Assistance: Independent without use of device- wife provides constant supervision   Ambulation Assistance:Independent without use of device- wife provides constant supervision   ADL Assistance: Wife assists with bathing, LB dressing, grooming and eating. Patient can eat finger foods and drink with straws. Patient was completing toileting independently   IADL Assistance: vacuums, cleaning shower, gets mail, takes out trash  Active :        [] Yes                 [x] No  Hand Dominance: [] Left                 [x] Right  Current Employment: retired.   Occupation: Office work  Hobbies: golfing, going out to eat, cards, reading  Recent Falls: 1 Fall In Dec 2022 walking to get mail in the rain, then slipped on tile floor; wife states that pt goes up/down steps to second floor bathroom 8x a day; wife states that pt is independent, but that she is with him 24/7, assists pt with many tasks  Recently completed [facility] program in April    Examination 5/22  Vision:   Vision Gross Assessment: Impaired and Vision Corrective Device: wears glasses at all times  Hearing:   [facility]  Coordination
Study: 5/16/2023, see report for results   Precautions/Restrictions: high fall risk, modified diet      Pre-Admission Information   Living Status: Pt lives at home with wife and son. Son moved in January of 2023 to help with pt and spouse. Occupation/School: Retired from sales  Medication Management: :  []Primary   []Secondary [x]No  Finance Management: []Primary   []Secondary [x]No  Active :   []Yes         [x]No stopped driving after hallucinations in 2020  Hearing:    WFL  Vision:    Vision Corrective Device: wears glasses at all times      Subjective  General: Pt's spouse present for both sessions. Pain: No pain reported   Safety Interventions: patient left in chair, chair alarm in place, call light within reach, patient at risk for falls, and family/caregiver present      Therapeutic Interventions:     Session 1:       Dysphagia: Severity: Moderate    Meds   - RN dispensed meds to pt during session in applesauce  - pt tolerated without overt s/s of aspiration     Thin water   - pt tolerated 100% presentations (4/4 trials) of thin water  - pt required min cues for double swallow compensatory strategy    Pt's wife reported an episode of coughing while drinking milk yesterday. Reported it was an isolated episode and he was able to clear the cough. Provided education regarding ongoing aspiration risks, current diet recommendations, benefits of strong cough responses, recommendation for ongoing monitoring and dysphagia therapy. Pt's wife questioned methods for carry over of swallow strategies in restaurant setting when she is not present. Encouraged to educate pt's friends regarding strategies and isolate textures for better management. Comprehension: Severity: Moderate    Goal not targeted this session. -pt with improved processing speed in conversation, remains limited during tasks    Expressive Language: Severity: Moderate   Goal not targeted this session.       Motor Speech: Severity:
legs.  Gait/Posture: Shuffling gait        Data:  LABS:   Lab Results   Component Value Date/Time     06/05/2023 06:00 AM    K 4.1 06/05/2023 06:00 AM     06/05/2023 06:00 AM    CO2 24 06/05/2023 06:00 AM    BUN 15 06/05/2023 06:00 AM    CREATININE 0.6 06/05/2023 06:00 AM    GFRAA >60 02/01/2022 09:30 AM    GFRAA >60 11/15/2012 06:26 AM    LABGLOM >60 06/05/2023 06:00 AM    GLUCOSE 100 06/05/2023 06:00 AM    MG 2.10 05/25/2023 05:05 AM    CALCIUM 9.1 06/05/2023 06:00 AM     Lab Results   Component Value Date/Time    WBC 6.3 06/05/2023 06:00 AM    RBC 4.09 06/05/2023 06:00 AM    HGB 12.1 06/05/2023 06:00 AM    HCT 37.7 06/05/2023 06:00 AM    MCV 92.0 06/05/2023 06:00 AM    RDW 13.5 06/05/2023 06:00 AM     06/05/2023 06:00 AM     Lab Results   Component Value Date    INR 1.30 (H) 05/14/2023    PROTIME 16.1 (H) 05/14/2023       Neuroimaging was independently reviewed by me and discussed results with the patient  I reviewed blood testing and other test results and discussed results with the patient      Impression:    Advanced Parkinson's disease with motor fluctuation, continue current Rytary 145 mg 4 times daily and Sinemet dose 6 times daily and 1 additional Sinemet nightly as needed short acting dose for acute worsening of tremors. Continue Requip 0.5 mg 3 times daily  Chronic cognitive impairment  Recent aspiration pneumonia  Oropharyngeal dysphagia  Autonomic dysfunction, on midodrine  Essential tremor, continue primidone 50 mg nightly  Hyperlipidemia  Diabetes  Vertebrobasilar insufficiency, continue aspirin    Recommendation    Continue supportive care  Continue Sinemet and Rytary regimen. 1 additional Sinemet as needed nightly for acute worsening of tremors  Continue Requip 0.5 mg 3 times daily  Continue primidone 50 mg nightly  Midodrine  Aricept  Aspirin  Statin  Blood pressure monitor and control  Blood sugar monitor and control.   Sliding scale insulin  PT and OT  Speech  Aspiration
required  Positional Restrictions:no positional restrictions    Pre-Admission Information   Lives With: spouse, Miguel Yousif       Type of Home: house  Home Layout: two level, laundry in basement, bedroom/bathroom upstairs, 12 stairs with bilateral rails to upstairs  Home Access:  2 step to enter without rails , garage; 2+1 steps front door without rail  Bathroom Layout: wheelchair accessible, walk in shower  Bathroom Equipment: grab bars in shower, built in shower seat, hand held shower head  Toilet Height: elevated height  Home Equipment: no prior equipment  Transfer Assistance: Independent without use of device--wife states that she provides constant supervision  Ambulation Assistance:Independent without use of devicewife states that she provides constant supervision  ADL Assistance: requires assistance with bathing, requires assistance with dressing, requires assistance with grooming, requires assistance with feeding, requires assistance with toileting  IADL Assistance: vacuums, cleaning shower, gets mail, takes out trash  Active :        [] Yes                 [x] No  Hand Dominance: [] Left                 [x] Right  Current Employment: retired. Occupation: Office work  Hobbies: golfing, going out to eat, cards, reading  Recent Falls: 1 Fall In Dec 2022 walking to get mail in the rain, then slipped on tile floor; wife states that pt goes up/down steps to second floor bathroom 8x a day; wife states that pt is independent, but that she is with him 24/7, assists pt with many tasks  Recently completed [facility] program in April       Examination   Vision:   Vision Corrective Device: wears glasses at all times  Hearing:   [facility]      Subjective  General: Patient seated in recliner upon arrival, agreeable to PT session. Wife present throughout session. Pain: 0/10 at rest.    Pain Interventions: patient denies pain interventions      Functional Mobility  Bed Mobility  Bed mobility not completed on this date.   Comments:
= 12-14 days (nearly every day)  **Leave blank if 'No Reponse'**      Enter scores in boxes    Column 1 Column 2   Little interest or pleasure in doing things   0 0   Feeling down, depressed, or hopeless   0 0   **If either A or B in column 2 is coded 2 or 3, CONTINUE asking the questions below. If not, END the interview. **     Trouble falling or staying asleep, or sleeping too much       Feeling tired or having little energy       Poor appetite or overeating       Feeling bad about yourself - or that you are a failure or have let yourself or your family down       Trouble concentrating on things, such as reading the newspaper or watching television       Moving or speaking so slowly that other people could have noticed. Or the opposite- being so fidgety or restless that you have been moving around a lot more than usual.       Thoughts that you would be better off dead, or of hurting yourself in some way. Total Severity: Add scores for all frequency responses in column 2 (possible score 0-27, or enter 99 if unable to complete (if symptom frequency (column 2) is blank for 3 or more items). 0     Social Isolation  \"How often do you feel lonely or isolated from those around you? \"  [x] 0. Never  [] 1. Rarely  [] 2. Sometimes  [] 3. Often  [] 4. Always  [] 7. Patient declines to respond  [] 8. Patient unable to respond    Pain Effect on Sleep  \"Over the past 5 days, how much of the time has pain made it hard for you to sleep at night? \"  [x]  0. Does not apply - I have not had any pain or hurting in the past 5 days  []  1. Rarely or not at all  []  2. Occasionally  []  3. Frequently  []  4. Almost constantly  []  8. Unable to answer    **If the patient answers \"0. Does not apply\" to this question, skip the next two \"Pain Effect. [employer] \" questions**    Pain Interference with Therapy Activities  \"Over the past 5 days, how often have you limited your participation in rehabilitation therapy sessions due
Aspiration pneumonia ([address]    Acute metabolic encephalopathy    Sepsis ([address]    Acute aspiration pneumonia ([address]    Pneumonia of left lower lobe due to infectious organism    TIA (transient ischemic attack)    Parkinson's disease ([address]       The patient has progressed functionally as demonstrated by their improved strength and endurance. Assessment and plan:      Metabolic encephalopathy -monitor     Parkinson disease  -Adjusting medications neurology recommendation     TIA - Acute stroke is ruled out  -Describing potentially could be secondary hypoxia during nighttime patient likely has undiagnosed sleep apnea that could have triggered it  -Patient with chronic Parkinson disease meds been adjusted by neurology received to be doing stable     Aspiration pneumonia currently on IV Zosyn  -Pulmonary perspective patient seems to be doing better     Parkinson disease  -Adjusting medications neurology recommendation      Bowels: Metamucil  Bladder: Per protocol   Pain: none   DVT PPx: ASA   ELOS: June 7      Neena Ruelas MD 6/5/2023, 7:32 AM    * This document was created using dictation software. While all precautions were taken to ensure accuracy, errors may have occurred. Please disregard any typographical errors.
Bilateral carotid artery disease, unspecified type (HCC)    Hypotension    Type 2 diabetes mellitus without complication, without long-term current use of insulin (HCC)    COVID-19    Aspiration pneumonia (HCC)    Acute metabolic encephalopathy    Sepsis ([address]    Acute aspiration pneumonia ([address]    Pneumonia of left lower lobe due to infectious organism    TIA (transient ischemic attack)    Parkinson's disease ([address]       The patient has progressed functionally as demonstrated by their improved strength and endurance. Assessment and plan:      Metabolic encephalopathy -monitor     Parkinson disease  -Adjusting medications neurology recommendation     TIA - Acute stroke is ruled out  -Describing potentially could be secondary hypoxia during nighttime patient likely has undiagnosed sleep apnea that could have triggered it  -Patient with chronic Parkinson disease meds been adjusted by neurology received to be doing stable     Aspiration pneumonia currently on IV Zosyn  -Pulmonary perspective patient seems to be doing better     Parkinson disease  -Adjusting medications neurology recommendation      Bowels: Metamucil  Bladder: Per protocol   Pain: none   DVT PPx: ASA   ELOS: June 7      Radha Montes MD 6/6/2023, 6:44 AM    * This document was created using dictation software. While all precautions were taken to ensure accuracy, errors may have occurred. Please disregard any typographical errors.
Testing:   Coordination and Movement Description: (R) UE, (L) UE, (R) LE, (L) LE, fine motor impairments, gross motor impairments, tremors, decreased speed, decreased accuracy  Unable to formally test secondary to time constraints         Subjective  General: Patient in recliner upon arrival, agreeable to OT session. Wife present for entire session. Wife and patient reporting no concerns with going home. Pain: 0/10  Pain Interventions: not applicable        Activities of Daily Living  Basic Activities of Daily Living  Feeding: moderate assistance  Feeding Comments: requires assistance to bring cup to mouth without spilling due to tremors. Discussed with wife that patient has been able to feed self with finger foods and drink with straw with preferred foods (milkshake). Education provided on how to order weighted utensils at home if patient would like to try them again   Grooming: supervision minimal assistance  Grooming Comments: SUP to comb hair and apply cologne. Juventino during oral care to brush tongue thoroughly. Upper Extremity Bathing: supervision  Lower Extremity Bathing: minimal assistance   Bathing Equipment: none  Bathing Comments: Shower completed while seated on TTB. Juventino to wash/dry feet. Min verbal cueing to complete LB bathing while seated on TTB. Upper Extremity Dressing: supervision  Lower Extremity Dressing: minimal assistance maximum assistance  Dressing Equipment: none  Dressing Comments: Juventino to thread briefs and pants. SBA to pull briefs and pants up over hips in stance. Juventino to doff socks, maxA to don socks and shoes. Toileting: supervision. Toileting Equipment: none  Toileting Comments: SUP during esperanza care hygiene and pulling pants/briefs up over waist in stance. Voided urine while seated on raised toilet seat. General Comments: Extra time required to complete ADL's. Instrumental Activities of Daily Living  No IADL completed on this date.     Functional Mobility  Bed
See above functional mobility. Session begins with ambulation recliner => toilet => sink without device as documented above. Patient assisted with LB clothing management due to time constraints. Pt maintains static supported standing balance during clothing management and [facility]\" alternating toe taps with (B) UE support 1 min x 2 intervals. Patient requires max VC and TC to initiate sequence, once sequence achieved is able to sustain with decreased assistance. Unsupported standing (B) shoulder horizontal abduction/extension, progressing from [facility] to [facility] with assist for increased ROM. Increased difficulty noted on (R) UE. Static stance alternating cross body reaching to 2 targeted blaze pod with opposing UE support: ~ 30 seconds before task terminated due to onset of dizziness. Patient requires max VC and TC to sequence task, CGA to maintain standing balance.      Functional Outcomes                 Cognition  Overall Cognitive Status: Impaired  Arousal/Alertness: delayed responses to stimuli  Following Commands: follows one step commands with repetition, follows one step commands with increased time  Attention Span: difficulty attending to directions, difficulty dividing attention  Safety Judgement: decreased awareness of need for assistance, decreased awareness of need for safety  Problem Solving: assistance required to identify errors made, assistance required to correct errors made  Initiation: requires cues for all  Sequencing: requires cues for all  Command Following:   accurately follows one step commands with increased time and repetition    Education  Barriers To Learning: cognition  Patient Education: patient educated on goals, PT role and benefits, plan of care, general safety, functional mobility training, transfer training  Learning Assessment:  patient verbalizes understanding, would benefit from continued reinforcement    Assessment  Activity Tolerance: Reporting periods
Standing Balance: contact guard assistance. Standing Balance Comment: during Adls, standing activity and mobility   Functional Mobility: .  contact guard assistance, minimal assistance  Functional Mobility Activity: to/from therapy room  Functional Mobility Device Use: no device  Functional Mobility Comment: ~120 ft x2. 2 standing resting breaks during mobility to therapy room. verbal cueing for big steps, upright posture and speed. Juventino during turns     Other Therapeutic Interventions  Patient participated in activities seated edge of mat table and in stance with small yellow ball. Activities completed in order to challenge sitting/standing balance, BUE coordination, BUE ROM, and attention to task. Patient completed 5x2 forward raises seated, 10 curls in stance, and 20 lateral reaches with ball + hand off between BUE seated. Patient required verbal and tactile cueing for full range of exercise and posture. Patient required extended rest breaks between activities. Patient required SBA for sitting balance and CGA for standing balance. Patient with difficulty maintaining grasp on ball with BUE. Patient completed reaching activity for cones on the ground in order to improve functional reaching needed for ADLs and core strength needed for transfers/mobility. Patient completed 2 rounds of 6 cones. Patient able to retreive cone and hand to therapist with increased time and occasional assist for retrieving cone due to tremors. Patient completed clothes pin activity in order to challenge dynamic balance and functional reaching needed for LB ADLs. Patient able to retreive 6 clothes pins from pants and hand to therapist with CGA and verbal cueing. No LOB noted.           Cognition  Overall Cognitive Status: Impaired  Arousal/Alertness: delayed responses to stimuli  Following Commands: follows one step commands with increased time  Attention Span: attends with cues to redirect  Safety Judgement: decreased awareness
assistance. Sitting Balance Comment: ADLs  Standing Balance: stand by assistance, contact guard assistance. Standing Balance Comment: during ADLs and standing activity   Functional Mobility: .  contact guard assistance, minimal assistance  Functional Mobility Activity: to/from bathroom, to/from therapy room  Functional Mobility Device Use: no device  Functional Mobility Comment: verbal cueing for big steps. Juventino during turning      Other Therapeutic Interventions  Patient participated in card matching activity standing at elevated table. Patient required CGA-SBA for task and tolerated 8:40 of standing. Task completed to challenge standing tolerance, functional reaching, fine motor coordination, functional cognition and visual scanning. Patient required intermittent cues to locate card on board and reminders to turn the card before sticking it to the board. Patient required increased time for task and was able to match 9 cards in 8:40. Patient demonstrating decrease in intention/resting tremors during task and improved ability to self correct posture. Patient also demonstrating disorganized scanning pattern. Second session:  Patient seated EOB upon arrival, agreeable to OT session. Wife present for session. No reports of pain at rest. Reporting some discomfort in shoulder at end of session. Patient completed transfer from EOB with CGA and no verbal cues. Patient completed ~75 ft of mobility with CGA and no AD to ADL room to practice toilet transfer with safety frame. Patient completed transfer with CGA and cueing. Information on [address] for home modifications provided to wife. Wife reporting her son will most likely be able to install grab bar. Patient completed bed mobility on mat table with modA for sit to supine and Juventino for supine to sit. Patient required verbal/tactile cueing and increased time for transfer. Patient able to complete without bed rails.  Patient will benefit from
regarding role of SLP, results of assessment, recommendations and general speech pathology plan of care. Provided education regarding diet recommendations and water protocol parameters   Learning Assessment: Pt verbalized understanding   Pt requires ongoing learning   Wife present and verbalized understanding     Assessment  Impairments Requiring Therapeutic Intervention: Dysarthria , Dysphonia , Cognitive-Linguistic Deficits , Expressive Language, and Oropharyngeal Dysphagia   Prognosis: good and fair    Clinical Assessment:  Pt continues to present with moderate to severe oropharyngeal dysphagia, dysphonia and cognitive-communication impairment. Pt improving tolerance of small portions of regular solids but mastication and oral clearance is limited. Endurance and timing impacts his airway protection, specifically with thin liquids. He has progressed towards water protocol with family and staff assistance. Pt with significant delays in processing speed for basic comprehension and significant word finding impairments, impacting functional communication. Pt with flat affect and limited initiation. Pt responds well to multiple choice questions and simplification of commands. Pt's speech intelligibility is impaired due to reduced breath support, uncoordinated oral motor movements, and dysphonia. Pt's wife reported improvements within therapy in pt's cognitive state and speech intensity. Recommend SLP intervention for safe return to prior level of function.      Diet Solids Recommendation:   Liquid Consistency Recommendation:   Recommended Form of Meds:   Dysphagia III Soft and bite sized     Thin liquids     Meds in puree        Recommended Compensatory Swallowing Strategies: Effortful Swallows , Upright as possible with all PO intake , Assist Feed , Small bites/sips , Swallow 2 times per bite , Eat/feed slowly      Plan  Frequency: 60 minutes/day; 5 days per week, as tolerated, until goals met, or discharged from
support  Dynamic Sitting Balance: fair (+): maintains balance at SBA/supervision without use of UE support  Static Standing Balance: fair (-): maintains balance at CGA with use of UE support  Dynamic Standing Balance: poor (+): requires min (A) to maintain balance  Comments: Min A while standing at sink to complete ADLs due to posterior lean    Other Therapeutic Interventions  Sponge bath completed due to patient with IV in (L) UE. Activity completed with OT team member to maximize patient performance and maintain patient safety. Pt requires min A x 2 to ambulate from bed to toilet in order to complete sponge bathing. Toilet transfer completed with mod A. Pt requires SBA assist to maintain static unsupported sitting balance during shower completion in addition to OT assist with ADL task completion. Pt requires SBA assist to maintain dynamic sitting balance during shower completion in addition to OT assist with ADL task completion. Time spent discussing goals for future patient care with spouse, spouse states that her goal is for patient to improve mobility to decrease physical burden of care. She is able to continue to assist patient with cognitive tasks and sequencing. Expresses that patient has intermittently been frustrated with therapy in the past due to seeing lack of carryover of activities into mobility.        Functional Outcomes                 Cognition  Overall Cognitive Status: Impaired  Arousal/Alertness: appropriate responses to stimuli  Memory: decreased recall of biographical information  Safety Judgement: decreased awareness of need for assistance, decreased awareness of need for safety  Problem Solving: assistance required to identify errors made, assistance required to correct errors made  Initiation: requires cues for all  Sequencing: requires cues for all  Orientation:    oriented to person  Command Following:   [facility] with increased time and repetition    Education  Barriers To Learning:
targets throughout exercise and occasionally AAROM at end range. Pt left in recliner with call light at end of session, chair alarm on, wife present. Second session: Pt in recliner with wife prior to therapy. Wife present throughout session. Pt ambulates 135' + 21' + 10' + short distances throughout session with CGA. Gait mechanics similar to first session with less cueing for arm swing throughout. Standing rows with black tubed resistance band 2 x 10 reps. Requires tactile cues for increased ROM. BIG step with twist x 8 ea. Requires max VC, tactile cues, AAROM at end range. Resisted ambulation 325' using cane at shoulder level with CGA. Improved step length and fabien with resisted ambulation. Assisted with toileting at end of session, did not pass a BM. Fair (-) dynamic standing balance using (L) grab bar while helping with LB dressing and hand hygiene. Bed mobility performed at end of session: sit<==> supine at [facility], scooting at CGA, bridging at SBA, rolling (R) at Mod I using the HR. Pt left in side lying in bed with bed alarm on and wife present.         Functional Outcomes                 Cognition  Overall Cognitive Status: Impaired  Arousal/Alertness: delayed responses to stimuli  Following Commands: follows one step commands with repetition, follows one step commands with increased time  Attention Span: difficulty attending to directions, difficulty dividing attention  Safety Judgement: decreased awareness of need for safety  Problem Solving: assistance required to identify errors made, assistance required to correct errors made  Initiation: requires cues for all  Sequencing: requires cues for all  Command Following:   accurately follows one step commands with increased time and repetition    Education  Barriers To Learning: cognition  Patient Education: patient educated on goals, PT role and benefits, plan of care, general safety, functional mobility training, family education, transfer
Compensatory Cognitive intervention , Patient/ Family education , and [facility] Voice Therapy (LSVT)   Discharge Recommendations: 24 hour supervision  Continued SLP at Discharge: Yes  and LSVT     Goals  Patient Goals: Memory  Time Frame: 3 weeks    Pt will tolerate recommended diet and advanced trials with no overt s/s of aspiration. GOAL MET   Patient will demonstrate comprehension and appropriate implementation of Ngo water protocol. GOAL MET, d/c   Pt will coordinate vocal subsystems in heirarchial speech tasks with normal vocal quality and vocal endurance back to his baseline level as subjectively rated by the pt, pt's family and SLP. Ongoing    Patient will complete verbal description and word retrieval tasks with 75% accuracy or given min verbal cues Ongoing    Pt will recall functional information (daily tasks, personal hx, etc.) with 80% accuracy. Ongoing    Pt will respond and/or initiate action upon verbal prompt within 5 seconds in 4 out of 5 opportunities without repetition. Ongoing        Therapy Session Time      Session 1 Session 2   Time In 1000 1245   Time Out 1030 1315   Time Code Minutes 15 15   Individual Minutes 30 30     Timed Code Treatment Minutes: 30  Total Treatment Minutes: 60    Electronically Signed By:   Emilia Coker M.A. CCC-SLP EULALIO [id number]  Speech-Language Pathologist   6/5/2023 2:13 PM     The speech-language pathologist was present, directed the patient's care, made skilled judgment and was responsible for assessment and treatment.     Matthew Brown.,  Speech-Language Pathology
Expressive Language, and Oropharyngeal Dysphagia   Prognosis: good and fair    Clinical Assessment:  Pt presents with moderate to severe oropharyngeal dysphagia, dysphonia and cognitive-communication impairment. Pt improving tolerance of small portions of regular solids but mastication and oral clearance is limited. Endurance and timing impacts his airway protection, specifically with thin liquids. He has progressed towards water protocol with family and staff assistance. Pt with significant delays in processing speed for basic comprehension and significant word finding impairments, impacting functional communication. Pt with flat affect and limited initiation. Pt responds well to multiple choice questions and simplification of commands. Pt's speech intelligibility is impaired due to reduced breath support, uncoordinated oral motor movements, and dysphonia. Pt's wife reported gradual decline in voice, language and cognition in the past two to three years (began with hallucinations in 2020) but with most significant decline since current hospitalization. Recommend SLP  intervention for safe return to prior level of function. Diet Solids Recommendation:   Liquid Consistency Recommendation:   Recommended Form of Meds:   Dysphagia III Soft and bite sized     Mildly (nectar) thick liquids     Meds in puree      *[place] Water Protocol - allow small amounts of water (ice chips, tsps) 30 minutes after meal and good oral care with family/staff assistance     Recommended Compensatory Swallowing Strategies: Effortful Swallows , Upright as possible with all PO intake , Assist Feed , Small bites/sips , Swallow 2 times per bite , Eat/feed slowly      Plan  Frequency: 60 minutes/day; 5 days per week, as tolerated, until goals met, or discharged from ARU.   Therapeutic Interventions: Jonathan Huggins Protocol, Pharyngeal Exercise, Diet Tolerance Monitoring , Patient/Family Education , Therapeutic Trials with SLP  Speech / Motor
since current hospitalization. Recommend SLP intervention for safe return to prior level of function. Diet Solids Recommendation:   Liquid Consistency Recommendation:   Recommended Form of Meds:   Dysphagia III Soft and bite sized     Mildly (nectar) thick liquids     Meds in puree      *Ngo Water Protocol - allow small amounts of water (ice chips, tsps) 30 minutes after meal and good oral care with family/staff assistance     Recommended Compensatory Swallowing Strategies: Effortful Swallows , Upright as possible with all PO intake , Assist Feed , Small bites/sips , Swallow 2 times per bite , Eat/feed slowly      Plan  Frequency: 60 minutes/day; 5 days per week, as tolerated, until goals met, or discharged from ARU. Therapeutic Interventions: Raynald Vermilion Protocol, Pharyngeal Exercise, Diet Tolerance Monitoring , Patient/Family Education , Therapeutic Trials with SLP  Speech / Motor Planning / Voice intervention , Cognitive-Linguistic intervention , Compensatory Cognitive intervention , Patient/ Family education , and [employer] Voice Therapy (LSVT)   Discharge Recommendations: 24 hour supervision  Continued SLP at Discharge: Yes  and LSVT     Goals  Patient Goals: Memory  Time Frame: 3 weeks    Pt will tolerate recommended diet and advanced trials with no overt s/s of aspiration. Ongoing    Patient will demonstrate comprehension and appropriate implementation of Ngo water protocol. Ongoing    Pt will coordinate vocal subsystems in heirarchial speech tasks with normal vocal quality and vocal endurance back to his baseline level as subjectively rated by the pt, pt's family and SLP. Ongoing    Patient will complete verbal description and word retrieval tasks with 75% accuracy or given min verbal cues Ongoing    Pt will recall functional information (daily tasks, personal hx, etc.) with 80% accuracy.  Ongoing    Pt will respond and/or initiate action upon verbal prompt within 5 seconds in 4 out of 5
step commands with increased time and repetition    Education  Barriers To Learning: cognition  Patient Education: patient educated on goals, PT role and benefits, plan of care, general safety, functional mobility training, transfer training  Learning Assessment:  patient verbalizes understanding, would benefit from continued reinforcement    Assessment  Activity Tolerance: Reporting periods of dizziness throughout session. S/p standing activity, /65. RN notified and delivers medication within session in attempt to elevated BP. Patient able to tolerate remainder of session with ongoing intermittent rest breaks. Impairments Requiring Therapeutic Intervention: decreased functional mobility, decreased strength, decreased safety awareness, decreased cognition, decreased endurance, decreased balance, decreased posture  Prognosis: fair  Clinical Assessment:  Patient continues to completes all functional mobility tasks without use of assistive device, but continues to require ongoing min (A) for transfers/ambulation/stair mobility. Patient continues to require increased time for all task completion, with max VC for initiation and sequence of tasks. Transfers noted to improve with VC/TC for rocking/anterior weight shifting. Ambulation distance continues to improve, but requires ongoing intermittent min (A) for balance stabilization. At this time patient will benefit from skilled PT services to increase level of independence and decrease risk of falling in home environment.    Safety Interventions: patient left in bed, bed alarm in place, call light within reach, gait belt, and family/caregiver present    Plan  Frequency: 5 x/week, 60 min/day  Current Treatment Recommendations: strengthening, balance training, functional mobility training, transfer training, gait training, stair training, endurance training, patient/caregiver education, safety education, and equipment evaluation/education    Goals  Patient
understanding, would benefit from continued reinforcement. Wife present for all education. Assessment  Activity Tolerance: Tolerating longer bouts of ambulation and standing during session. Experiences cognitive break down during activities secondary to fatigue towards end of session. Mild c/o of dizziness at conclusion of session. Impairments Requiring Therapeutic Intervention: decreased functional mobility, decreased strength, decreased safety awareness, decreased cognition, decreased endurance, decreased balance, decreased posture  Prognosis: good  Clinical Assessment:  Patient made excellent progress in response to therapy services, meeting all set goals prior to discharge. Patient has progressed to independent level for all household functional mobility without device use including completion of bed mobility, transfers, ambulation and stair mobility. Patient continues to ambulate with reduced fabien, arm swing, and stride length but consistently demonstrates ability to complete task without external balance correction. Patient to d/c home with wife assist and transition to home health therapy services. Safety Interventions: patient left in chair, chair alarm in place, call light within reach, gait belt, and family/caregiver present    Plan  Frequency: 5 x/week, 60 min/day  Current Treatment Recommendations: strengthening, balance training, functional mobility training, transfer training, gait training, stair training, endurance training, patient/caregiver education, safety education, and equipment evaluation/education    Goals  Patient Goals: to go home.  Spouse goal: for patient to increase independence   Short Term Goals:  Time Frame: to be met in 10-14 days  Patient will complete bed mobility at modified independent - Goal Met 6/5/2023  Patient will complete transfers at supervision - Goal Met 6/5/2023  Patient will ambulate 50 ft with use of LRAD at supervision - Goal Met 6/5/2023  Patient will
Pharyngeal Exercise, Diet Tolerance Monitoring , Patient/Family Education , Therapeutic Trials with SLP  Speech / Motor Planning / Voice intervention , Cognitive-Linguistic intervention , Compensatory Cognitive intervention , Patient/ Family education , and Vicci Zoila Voice Therapy (LSVT)   Discharge Recommendations: 24 hour supervision  Continued SLP at Discharge: Yes  and LSVT     Goals  Patient Goals: Memory  Time Frame: 3 weeks    Pt will tolerate recommended diet and advanced trials with no overt s/s of aspiration. Ongoing    Patient will demonstrate comprehension and appropriate implementation of Ngo water protocol. Ongoing    Pt will coordinate vocal subsystems in heirarchial speech tasks with normal vocal quality and vocal endurance back to his baseline level as subjectively rated by the pt, pt's family and SLP. Ongoing    Patient will complete verbal description and word retrieval tasks with 75% accuracy or given min verbal cues Ongoing    Pt will recall functional information (daily tasks, personal hx, etc.) with 80% accuracy. Ongoing    Pt will respond and/or initiate action upon verbal prompt within 5 seconds in 4 out of 5 opportunities without repetition. Ongoing        Therapy Session Time      Session 1 Session 2   Time In 0800 1330   Time Out 0830 1400   Time Code Minutes 0 10   Individual Minutes 30 30     Timed Code Treatment Minutes:  10  Total Treatment Minutes:  60    Electronically Signed By:   First session:  Leia Fothergill, MA. CF- SLP  Speech Language Pathologist  [id number]    Second session:  Agatha Powell M.A., [address] [place] [address]  Speech-Language Pathologist    The speech-language pathologist was present, directed the patient's care, made skilled judgment and was responsible for assessment and treatment.     Amada Costa.,  Speech-Language Pathology
Treatment Minutes: 61 minutes    Total Treatment Minutes: 64 minutes      FLORINDA Bennett   Therapist was present, directed the patient's care, made skilled judgement, and was responsible for assessment and treatment of the patient.          Electronically Signed By: Casi Garcia, PT       Kalyn Bishop PT, DPT #[id number]
Treatment Recommendations: strengthening, balance training, functional mobility training, transfer training, endurance training, neuromuscular re-education, patient/caregiver education, ADL/self-care training, IADL training, safety education, and equipment evaluation/education    Goals  Patient Goals: return to PLOF    Short Term Goals:  Time Frame: 10-14 days   Patient will complete upper body ADL at supervision   Patient will complete lower body ADL at minimal assistance   Patient will complete toileting at stand by assistance   Patient will complete self-feeding at supervision   Patient will complete functional transfers at stand by assistance      Above goals reviewed on 5/23/2023. All goals are ongoing at this time unless indicated above.      Therapy Session Time     Individual Group Co-treatment   Time In    9744   Time Out    5540   Minutes    30     Second Session Therapy Time:   Individual Concurrent Group Co-treatment   Time In 2744        Time Out 1500        Minutes 45            Timed Code Treatment Minutes: 30 + 45 minutes   Total Treatment Minutes: 75 minutes        Electronically Signed By: Liudmila Quintanilla, [address], [facility] OTR/L [id number]
equipment evaluation/education    Goals  Patient Goals: return to PLOF    Short Term Goals:  Time Frame: 10-14 days   Patient will complete upper body ADL at supervision   Patient will complete lower body ADL at minimal assistance   Patient will complete toileting at stand by assistance   Patient will complete self-feeding at supervision   Patient will complete functional transfers at stand by assistance      Above goals reviewed on 6/2/2023. All goals are ongoing at this time unless indicated above. Therapy Session Time  First Session Therapy Time:   Individual Concurrent Group Co-treatment   Time In 1030        Time Out 1100        Minutes 30          Second Session Therapy Time:   Individual Concurrent Group Co-treatment   Time In 1353        Time Out 1423        Minutes 30            Timed Code Treatment Minutes: 30 + 30 minutes   Total Treatment Minutes: 60 minutes        Electronically Signed By:   First session: Milo GASTON/HARESH    OT provided direct supervision to student, facilitated in making skilled judgments throughout duration of session.   VENTURA Rubin OTR/L [id number]     Second session: Shaylee Solomon [address] [place] [address] OTR/L [id number]

## 2023-06-07 NOTE — TELEPHONE ENCOUNTER
Mis Nickerson from [facility] is calling ----pt discharging from [facility] tday---need orders for speech-nursing-PT and OT---please call her at [phone]. Thanks.

## 2023-06-08 ENCOUNTER — CARE COORDINATION (OUTPATIENT)
Dept: CASE MANAGEMENT | Age: 78
End: 2023-06-08

## 2023-06-08 DIAGNOSIS — G20 PARKINSON DISEASE (HCC): Primary | Chronic | ICD-10-CM

## 2023-06-08 PROCEDURE — 1111F DSCHRG MED/CURRENT MED MERGE: CPT | Performed by: NURSE PRACTITIONER

## 2023-06-08 NOTE — CARE COORDINATION
[facility] Care Transitions Initial Follow Up Call    Call within 2 business days of discharge: Yes    Patient Current Location:  Home: [address] [place] [address] [address]    Care Transition Nurse contacted the family by telephone to perform post hospital discharge assessment. Verified name and  with family as identifiers. Provided introduction to self, and explanation of the Care Transition Nurse role. Patient: Amaury Daily Patient : 1945   MRN: [id number]  Reason for Admission:   Discharge Date: 23 RARS: Readmission Risk Score: 17.5      Last Discharge  Street       Date Complaint Diagnosis Description Type Department Provider    23  Post-nasal drainage Admission (Discharged) [address] Susan Rojas, DO            Was this an external facility discharge? No Discharge Facility:     Challenges to be reviewed by the provider   Additional needs identified to be addressed with provider: No  none               Method of communication with provider: none. Wife, HIPAA verified, states doing well, no issues or concerns. HC will be reaching out today with a time they are coming out. F/U appts listed below. Agreed to more CTC f/u calls. Care Transition Nurse reviewed discharge instructions with family who verbalized understanding. The family was given an opportunity to ask questions and does not have any further questions or concerns at this time. Were discharge instructions available to patient? Yes. Reviewed appropriate site of care based on symptoms and resources available to patient including: When to call 911. The family agrees to contact the PCP office for questions related to their healthcare. Advance Care Planning:   Does patient have an Advance Directive: reviewed and current. Medication reconciliation was performed with family, who verbalizes understanding of administration of home medications.  Medications reviewed, 1111F entered: yes    Was patient discharged

## 2023-06-22 ENCOUNTER — CARE COORDINATION (OUTPATIENT)
Dept: CASE MANAGEMENT | Age: 78
End: 2023-06-22

## 2023-06-22 NOTE — CARE COORDINATION
[address] Care Transitions Follow Up Call    Patient Current Location:  Home: [address] [place] [address]    Care Transition Nurse contacted the family by telephone to follow up after admission. Verified name and  with family as identifiers. Patient: Ele Guidry  Patient : 1945   MRN: [id number]  Reason for Admission:   Discharge Date: 23 RARS: Readmission Risk Score: 17.5      Needs to be reviewed by the provider   Additional needs identified to be addressed with provider: No  none             Method of communication with provider: none. Pt's wife, HIPAA verified,  states pt is doing well, no issues or concerns. HC continues to come out to the home. Pt has seen his PCP, next f/u appts listed below. Agreed to more CTC f/u calls    Addressed changes since last contact:  none  Discussed follow-up appointments. If no appointment was previously scheduled, appointment scheduling offered: No.   Is follow up appointment scheduled within 7 days of discharge? Yes. Follow Up  Future Appointments   Date Time Provider Cathi Ball   2023  1:45 PM Neftaly Wright MD [facility]   2023  1:20 PM KEYSHA Anderson - CNP [facility] - DYD     Non-[facility] follow up appointment(s):     Care Transition Nurse reviewed medical action plan with family and discussed any barriers to care and/or understanding of plan of care after discharge. Discussed appropriate site of care based on symptoms and resources available to patient including: When to call 911. The family agrees to contact the PCP office for questions related to their healthcare. Advance Care Planning:   reviewed and current.      Patients top risk factors for readmission: medical condition-Parkinson's disease  Interventions to address risk factors: Assessment and support for treatment adherence and medication management-as above    Offered patient enrollment in the Remote Patient Monitoring (RPM) program for

## 2023-06-27 ENCOUNTER — TELEPHONE (OUTPATIENT)
Dept: INTERNAL MEDICINE CLINIC | Age: 78
End: 2023-06-27

## 2023-06-29 ENCOUNTER — CARE COORDINATION (OUTPATIENT)
Dept: CASE MANAGEMENT | Age: 78
End: 2023-06-29

## 2023-07-18 NOTE — PROGRESS NOTES
[facility]   Cardiac Follow up    Referring Provider:  KEYSHA Spain - CNP     Chief Complaint   Patient presents with    Follow-up    Coronary Artery Disease    Hypertension    Hyperlipidemia      History of Present Illness:  Mr. Jakub Martinez is a 70 y.o. gentleman. His history includes CAD with PCI in 2005, hypertension, and hyperlipidemia. He had a cardiac angiogram in 2015 that showed normal LV function and EF 60%. Coronary stent widely patent. He has Parkinsons, [facility] he has been evaluated by the [facility]. He took an at home COVID test which was positive in July 2022. He was seen in ER 1/12/23 with c/o dizziness and hypotension (SBP 93), EMS in route states SBP was over 100. Pt also had chest tightness earlier that day that spontaneously resolved. The patient said that he has had these episodes in the past, these are not new symptoms for him, as he has been asymptomatic in ER with stable vital signs and a negative work-up, shared medical decision making with the family, they prefer to go home. Pt d/c'd with follow up on outpatient basis with cardiology. Dr. Elizabeth Rico ([facility]) is his neurologist for his Parkinson's. He was admitted May 2023 with worsening tremors. He rehabbed for quite some time, recently discharged. Treated for aspiration pneumonia and had a difficult hospital course    Today, he is here for routine follow up. His wife helps with his care management. He denies exertional chest pain, GONZALEZ/PND, palpitations, light-headedness, edema. He has chronic tremors, not too bad right now. Ambulates slowly, carefully without cane or walker. He is now on midodrine 5mg (an up dose since Jan 2023). -130's. Past Medical History:   has a past medical history of Acute bronchitis, CAD (coronary artery disease), Clostridioides difficile infection, Hyperlipidemia, and Parkinson disease ([address]).  Also parkinson disease    Surgical

## 2023-07-19 ENCOUNTER — OFFICE VISIT (OUTPATIENT)
Dept: CARDIOLOGY CLINIC | Age: 78
End: 2023-07-19
Payer: MEDICARE

## 2023-07-19 VITALS
HEIGHT: 68 IN | WEIGHT: 185 LBS | SYSTOLIC BLOOD PRESSURE: 110 MMHG | OXYGEN SATURATION: 97 % | DIASTOLIC BLOOD PRESSURE: 58 MMHG | BODY MASS INDEX: 28.04 KG/M2 | HEART RATE: 73 BPM

## 2023-07-19 DIAGNOSIS — I25.10 CORONARY ARTERY DISEASE INVOLVING NATIVE CORONARY ARTERY OF NATIVE HEART WITHOUT ANGINA PECTORIS: Primary | ICD-10-CM

## 2023-07-19 PROCEDURE — 1123F ACP DISCUSS/DSCN MKR DOCD: CPT | Performed by: INTERNAL MEDICINE

## 2023-07-19 PROCEDURE — 3074F SYST BP LT 130 MM HG: CPT | Performed by: INTERNAL MEDICINE

## 2023-07-19 PROCEDURE — 1036F TOBACCO NON-USER: CPT | Performed by: INTERNAL MEDICINE

## 2023-07-19 PROCEDURE — 99214 OFFICE O/P EST MOD 30 MIN: CPT | Performed by: INTERNAL MEDICINE

## 2023-07-19 PROCEDURE — G8427 DOCREV CUR MEDS BY ELIG CLIN: HCPCS | Performed by: INTERNAL MEDICINE

## 2023-07-19 PROCEDURE — G8417 CALC BMI ABV UP PARAM F/U: HCPCS | Performed by: INTERNAL MEDICINE

## 2023-07-19 PROCEDURE — 3078F DIAST BP <80 MM HG: CPT | Performed by: INTERNAL MEDICINE

## 2023-07-19 RX ORDER — MIDODRINE HYDROCHLORIDE 5 MG/1
5 TABLET ORAL 3 TIMES DAILY
Qty: 270 TABLET | Refills: 3 | Status: SHIPPED | OUTPATIENT
Start: 2023-07-19

## 2023-07-19 RX ORDER — MIDODRINE HYDROCHLORIDE 5 MG/1
5 TABLET ORAL 3 TIMES DAILY
Qty: 270 TABLET | Refills: 3 | Status: SHIPPED | OUTPATIENT
Start: 2023-07-19 | End: 2023-07-19 | Stop reason: SDUPTHER

## 2023-08-23 ENCOUNTER — OFFICE VISIT (OUTPATIENT)
Dept: INTERNAL MEDICINE CLINIC | Age: 78
End: 2023-08-23
Payer: MEDICARE

## 2023-08-23 VITALS
SYSTOLIC BLOOD PRESSURE: 90 MMHG | HEART RATE: 68 BPM | BODY MASS INDEX: 28.34 KG/M2 | WEIGHT: 187 LBS | OXYGEN SATURATION: 97 % | DIASTOLIC BLOOD PRESSURE: 50 MMHG | HEIGHT: 68 IN

## 2023-08-23 DIAGNOSIS — G20 PARKINSON DISEASE (HCC): ICD-10-CM

## 2023-08-23 DIAGNOSIS — N39.41 URGE INCONTINENCE: ICD-10-CM

## 2023-08-23 DIAGNOSIS — R35.0 URINARY FREQUENCY: Primary | ICD-10-CM

## 2023-08-23 DIAGNOSIS — R13.10 DYSPHAGIA, UNSPECIFIED TYPE: ICD-10-CM

## 2023-08-23 LAB
BILIRUBIN, POC: NEGATIVE
BLOOD URINE, POC: NEGATIVE
CLARITY, POC: NORMAL
COLOR, POC: NORMAL
GLUCOSE URINE, POC: NEGATIVE
KETONES, POC: NEGATIVE
LEUKOCYTE EST, POC: NEGATIVE
NITRITE, POC: NEGATIVE
PH, POC: 5.5
PROTEIN, POC: NEGATIVE
SPECIFIC GRAVITY, POC: >=1.03
UROBILINOGEN, POC: 0.2

## 2023-08-23 PROCEDURE — G8427 DOCREV CUR MEDS BY ELIG CLIN: HCPCS | Performed by: NURSE PRACTITIONER

## 2023-08-23 PROCEDURE — G8417 CALC BMI ABV UP PARAM F/U: HCPCS | Performed by: NURSE PRACTITIONER

## 2023-08-23 PROCEDURE — 99213 OFFICE O/P EST LOW 20 MIN: CPT | Performed by: NURSE PRACTITIONER

## 2023-08-23 PROCEDURE — 3078F DIAST BP <80 MM HG: CPT | Performed by: NURSE PRACTITIONER

## 2023-08-23 PROCEDURE — 1036F TOBACCO NON-USER: CPT | Performed by: NURSE PRACTITIONER

## 2023-08-23 PROCEDURE — 3074F SYST BP LT 130 MM HG: CPT | Performed by: NURSE PRACTITIONER

## 2023-08-23 PROCEDURE — 81002 URINALYSIS NONAUTO W/O SCOPE: CPT | Performed by: NURSE PRACTITIONER

## 2023-08-23 PROCEDURE — 1123F ACP DISCUSS/DSCN MKR DOCD: CPT | Performed by: NURSE PRACTITIONER

## 2023-08-23 SDOH — ECONOMIC STABILITY: INCOME INSECURITY: HOW HARD IS IT FOR YOU TO PAY FOR THE VERY BASICS LIKE FOOD, HOUSING, MEDICAL CARE, AND HEATING?: NOT HARD AT ALL

## 2023-08-23 SDOH — ECONOMIC STABILITY: FOOD INSECURITY: WITHIN THE PAST 12 MONTHS, YOU WORRIED THAT YOUR FOOD WOULD RUN OUT BEFORE YOU GOT MONEY TO BUY MORE.: NEVER TRUE

## 2023-08-23 SDOH — ECONOMIC STABILITY: FOOD INSECURITY: WITHIN THE PAST 12 MONTHS, THE FOOD YOU BOUGHT JUST DIDN'T LAST AND YOU DIDN'T HAVE MONEY TO GET MORE.: NEVER TRUE

## 2023-08-23 SDOH — ECONOMIC STABILITY: HOUSING INSECURITY
IN THE LAST 12 MONTHS, WAS THERE A TIME WHEN YOU DID NOT HAVE A STEADY PLACE TO SLEEP OR SLEPT IN A SHELTER (INCLUDING NOW)?: NO

## 2023-08-23 ASSESSMENT — PATIENT HEALTH QUESTIONNAIRE - PHQ9
SUM OF ALL RESPONSES TO PHQ QUESTIONS 1-9: 1
2. FEELING DOWN, DEPRESSED OR HOPELESS: 1
SUM OF ALL RESPONSES TO PHQ QUESTIONS 1-9: 1

## 2023-09-04 ASSESSMENT — ENCOUNTER SYMPTOMS
GASTROINTESTINAL NEGATIVE: 1
TROUBLE SWALLOWING: 1
RESPIRATORY NEGATIVE: 1

## 2023-09-04 NOTE — PROGRESS NOTES
SUBJECTIVE:    Patient ID: Preeti Finn is a 70 y.o. male. CC: Concern for UTI, dysphagia, Parkinson's    HPI: The patient presents to the office today for concern about possible UTI. There is also concern about dysphagia secondary to Parkinson's. Patient is seen today accompanied by his wife who is his primary caretaker associated with his Parkinson's disease. She reports multiple episodes of incontinence in 1 day which is uncommon for him. He denies any specific urinary symptoms. He denies any dysuria, hematuria. There is no abdominal or back pain. Patient describes this as a sudden need to urinate which he cannot hold before he is incontinent. Patient has a history of Parkinson's. He has had difficulty with swallowing.       Past Medical History:   Diagnosis Date    Acute bronchitis     history of    CAD (coronary artery disease)     Clostridioides difficile infection 07/12/2020    Hyperlipidemia     Parkinson disease (HCC)         Current Outpatient Medications   Medication Sig Dispense Refill    midodrine (PROAMATINE) 5 MG tablet Take 1 tablet by mouth 3 times daily PATIENT IS TO CALL OFFICE IF SBP >160. 270 tablet 3    sennosides-docusate sodium (SENOKOT-S) 8.6-50 MG tablet Take 2 tablets by mouth as needed for Constipation      primidone (MYSOLINE) 50 MG tablet Take 1 tablet by mouth nightly 90 tablet 3    cloZAPine (CLOZARIL) 25 MG tablet Take 1 tablet by mouth 2 times daily 30 tablet 3    ipratropium (ATROVENT) 0.06 % nasal spray 2 sprays by Each Nostril route 4 times daily as needed for Rhinitis      docusate sodium (COLACE) 100 MG capsule Take 1 capsule by mouth as needed for Constipation Twice daily      nitroGLYCERIN (NITROSTAT) 0.4 MG SL tablet Place 1 tablet under the tongue every 5 minutes as needed (prn) 25 tablet 3    atorvastatin (LIPITOR) 80 MG tablet TAKE 1 TABLET EVERY DAY (Patient taking differently: Take 1 tablet by mouth at bedtime) 90 tablet 3    donepezil (ARICEPT) 10

## 2023-10-13 RX ORDER — ATORVASTATIN CALCIUM 80 MG/1
TABLET, FILM COATED ORAL
Qty: 90 TABLET | Refills: 10 | Status: SHIPPED | OUTPATIENT
Start: 2023-10-13

## 2023-10-13 NOTE — TELEPHONE ENCOUNTER
Received refill request for atorvastatin from [address] [place] [address].     Last ov: 7/19/2023 LES    Last labs: 05/19/2023    Last Refill: 09/29/2022 #90 w/ 3 refills    Next appointment: 01/24/2024 LES

## 2023-12-30 ENCOUNTER — APPOINTMENT (OUTPATIENT)
Dept: GENERAL RADIOLOGY | Age: 78
End: 2023-12-30
Payer: MEDICARE

## 2023-12-30 ENCOUNTER — HOSPITAL ENCOUNTER (EMERGENCY)
Age: 78
Discharge: HOME | End: 2023-12-31
Attending: EMERGENCY MEDICINE
Payer: MEDICARE

## 2023-12-30 ENCOUNTER — APPOINTMENT (OUTPATIENT)
Dept: CT IMAGING | Age: 78
End: 2023-12-30
Payer: MEDICARE

## 2023-12-30 DIAGNOSIS — R40.4 TRANSIENT ALTERATION OF AWARENESS: Primary | ICD-10-CM

## 2023-12-30 LAB
ALBUMIN SERPL-MCNC: 3.8 G/DL (ref 3.4–5)
ALP SERPL-CCNC: 96 U/L (ref 40–129)
ALT SERPL-CCNC: <5 U/L (ref 10–40)
ANION GAP SERPL CALCULATED.3IONS-SCNC: 13 MMOL/L (ref 3–16)
AST SERPL-CCNC: 23 U/L (ref 15–37)
BASOPHILS # BLD: 0 K/UL (ref 0–0.2)
BASOPHILS NFR BLD: 0.3 %
BILIRUB DIRECT SERPL-MCNC: <0.2 MG/DL (ref 0–0.3)
BILIRUB INDIRECT SERPL-MCNC: ABNORMAL MG/DL (ref 0–1)
BILIRUB SERPL-MCNC: 0.4 MG/DL (ref 0–1)
BUN SERPL-MCNC: 19 MG/DL (ref 7–20)
CALCIUM SERPL-MCNC: 9.2 MG/DL (ref 8.3–10.6)
CHLORIDE SERPL-SCNC: 103 MMOL/L (ref 99–110)
CO2 SERPL-SCNC: 23 MMOL/L (ref 21–32)
CREAT SERPL-MCNC: 0.6 MG/DL (ref 0.8–1.3)
DEPRECATED RDW RBC AUTO: 13.8 % (ref 12.4–15.4)
EOSINOPHIL # BLD: 0.3 K/UL (ref 0–0.6)
EOSINOPHIL NFR BLD: 3.5 %
FLUAV RNA RESP QL NAA+PROBE: NOT DETECTED
FLUBV RNA RESP QL NAA+PROBE: NOT DETECTED
GFR SERPLBLD CREATININE-BSD FMLA CKD-EPI: >60 ML/MIN/{1.73_M2}
GLUCOSE SERPL-MCNC: 98 MG/DL (ref 70–99)
HCT VFR BLD AUTO: 40.7 % (ref 40.5–52.5)
HGB BLD-MCNC: 13.5 G/DL (ref 13.5–17.5)
LIPASE SERPL-CCNC: 41 U/L (ref 13–60)
LYMPHOCYTES # BLD: 1.8 K/UL (ref 1–5.1)
LYMPHOCYTES NFR BLD: 23.6 %
MAGNESIUM SERPL-MCNC: 1.9 MG/DL (ref 1.8–2.4)
MCH RBC QN AUTO: 30.9 PG (ref 26–34)
MCHC RBC AUTO-ENTMCNC: 33.1 G/DL (ref 31–36)
MCV RBC AUTO: 93.3 FL (ref 80–100)
MONOCYTES # BLD: 0.8 K/UL (ref 0–1.3)
MONOCYTES NFR BLD: 11.1 %
NEUTROPHILS # BLD: 4.6 K/UL (ref 1.7–7.7)
NEUTROPHILS NFR BLD: 61.5 %
NT-PROBNP SERPL-MCNC: 213 PG/ML (ref 0–449)
PLATELET # BLD AUTO: 231 K/UL (ref 135–450)
PMV BLD AUTO: 7.3 FL (ref 5–10.5)
POTASSIUM SERPL-SCNC: 3.9 MMOL/L (ref 3.5–5.1)
PROT SERPL-MCNC: 6.4 G/DL (ref 6.4–8.2)
RBC # BLD AUTO: 4.36 M/UL (ref 4.2–5.9)
SARS-COV-2 RNA RESP QL NAA+PROBE: NOT DETECTED
SODIUM SERPL-SCNC: 139 MMOL/L (ref 136–145)
TROPONIN, HIGH SENSITIVITY: 42 NG/L (ref 0–22)
WBC # BLD AUTO: 7.6 K/UL (ref 4–11)

## 2023-12-30 PROCEDURE — 83880 ASSAY OF NATRIURETIC PEPTIDE: CPT

## 2023-12-30 PROCEDURE — 84443 ASSAY THYROID STIM HORMONE: CPT

## 2023-12-30 PROCEDURE — 93005 ELECTROCARDIOGRAM TRACING: CPT

## 2023-12-30 PROCEDURE — 6370000000 HC RX 637 (ALT 250 FOR IP)

## 2023-12-30 PROCEDURE — 85025 COMPLETE CBC W/AUTO DIFF WBC: CPT

## 2023-12-30 PROCEDURE — 83690 ASSAY OF LIPASE: CPT

## 2023-12-30 PROCEDURE — 80076 HEPATIC FUNCTION PANEL: CPT

## 2023-12-30 PROCEDURE — 2580000003 HC RX 258

## 2023-12-30 PROCEDURE — 71045 X-RAY EXAM CHEST 1 VIEW: CPT

## 2023-12-30 PROCEDURE — 36415 COLL VENOUS BLD VENIPUNCTURE: CPT

## 2023-12-30 PROCEDURE — 70450 CT HEAD/BRAIN W/O DYE: CPT

## 2023-12-30 PROCEDURE — 84484 ASSAY OF TROPONIN QUANT: CPT

## 2023-12-30 PROCEDURE — 83735 ASSAY OF MAGNESIUM: CPT

## 2023-12-30 PROCEDURE — 80048 BASIC METABOLIC PNL TOTAL CA: CPT

## 2023-12-30 PROCEDURE — 99285 EMERGENCY DEPT VISIT HI MDM: CPT

## 2023-12-30 PROCEDURE — 87636 SARSCOV2 & INF A&B AMP PRB: CPT

## 2023-12-30 PROCEDURE — 81001 URINALYSIS AUTO W/SCOPE: CPT

## 2023-12-30 RX ORDER — SODIUM CHLORIDE, SODIUM LACTATE, POTASSIUM CHLORIDE, AND CALCIUM CHLORIDE .6; .31; .03; .02 G/100ML; G/100ML; G/100ML; G/100ML
1000 INJECTION, SOLUTION INTRAVENOUS ONCE
Status: COMPLETED | OUTPATIENT
Start: 2023-12-30 | End: 2023-12-30

## 2023-12-30 RX ORDER — LANOLIN ALCOHOL/MO/W.PET/CERES
3 CREAM (GRAM) TOPICAL NIGHTLY
COMMUNITY

## 2023-12-30 RX ADMIN — SODIUM CHLORIDE, POTASSIUM CHLORIDE, SODIUM LACTATE AND CALCIUM CHLORIDE 1000 ML: 600; 310; 30; 20 INJECTION, SOLUTION INTRAVENOUS at 22:27

## 2023-12-30 RX ADMIN — CARBIDOPA AND LEVODOPA 1.5 TABLET: 25; 100 TABLET ORAL at 22:33

## 2023-12-31 VITALS
HEIGHT: 68 IN | HEART RATE: 57 BPM | DIASTOLIC BLOOD PRESSURE: 53 MMHG | WEIGHT: 187.2 LBS | RESPIRATION RATE: 18 BRPM | SYSTOLIC BLOOD PRESSURE: 116 MMHG | OXYGEN SATURATION: 97 % | TEMPERATURE: 97.5 F | BODY MASS INDEX: 28.37 KG/M2

## 2023-12-31 LAB
BACTERIA URNS QL MICRO: ABNORMAL /HPF
BILIRUB UR QL STRIP.AUTO: NEGATIVE
CLARITY UR: CLEAR
COLOR UR: YELLOW
GLUCOSE UR STRIP.AUTO-MCNC: NEGATIVE MG/DL
HGB UR QL STRIP.AUTO: NEGATIVE
KETONES UR STRIP.AUTO-MCNC: ABNORMAL MG/DL
LEUKOCYTE ESTERASE UR QL STRIP.AUTO: NEGATIVE
NITRITE UR QL STRIP.AUTO: NEGATIVE
PH UR STRIP.AUTO: 6 [PH] (ref 5–8)
PROT UR STRIP.AUTO-MCNC: NEGATIVE MG/DL
RBC #/AREA URNS HPF: ABNORMAL /HPF (ref 0–4)
SP GR UR STRIP.AUTO: 1.02 (ref 1–1.03)
TROPONIN, HIGH SENSITIVITY: 43 NG/L (ref 0–22)
TSH SERPL DL<=0.005 MIU/L-ACNC: 1.36 UIU/ML (ref 0.27–4.2)
UA COMPLETE W REFLEX CULTURE PNL UR: ABNORMAL
UA DIPSTICK W REFLEX MICRO PNL UR: ABNORMAL
URN SPEC COLLECT METH UR: ABNORMAL
UROBILINOGEN UR STRIP-ACNC: 0.2 E.U./DL
WBC #/AREA URNS HPF: ABNORMAL /HPF (ref 0–5)

## 2023-12-31 PROCEDURE — 93005 ELECTROCARDIOGRAM TRACING: CPT

## 2023-12-31 ASSESSMENT — LIFESTYLE VARIABLES
HOW MANY STANDARD DRINKS CONTAINING ALCOHOL DO YOU HAVE ON A TYPICAL DAY: PATIENT DOES NOT DRINK
HOW OFTEN DO YOU HAVE A DRINK CONTAINING ALCOHOL: NEVER

## 2023-12-31 NOTE — DISCHARGE INSTRUCTIONS
You were seen in the emergency department for altered mental status.  Your physical exam, laboratory tests, and imaging results are reassuring.    As discussed, I would like the patient to be follow-up with his cardiologist in 1 to 2 weeks.  We are providing a referral to our cardiology group he can contact at  if the patient does not already have an established cardiologist.  Specifically mention the patient's need for risk stratification with stress testing and his left-sided pleural effusion potentially concerning for heart failure exacerbation with a preserved ejection fraction.  His point-of-care echocardiogram performed in the ED was normal.    Please return to the emergency department if you experience fevers/chills, severe nausea/vomiting, shortness of breath, chest pain, abdominal pain, burning with urination, or any other symptoms concerning to you.

## 2023-12-31 NOTE — ED PROVIDER NOTES
ED Attending Attestation Note     Date of evaluation: 12/30/2023    This patient was seen by the resident.  I have seen and examined the patient, agree with the workup, evaluation, management and diagnosis. The care plan has been discussed.  I have reviewed the ECG and concur with the resident's interpretation.      Patient is a 78-year-old history of Parkinson's disease who presents to the emergency department with worsening tremor as well as confusion.  Patient has had steady progression of confusion however ever he is acutely worse over the last few days.  No fevers or coughs.  No recent falls or trauma.  Patient states that he has felt lightheaded and generally fatigued but denies any specific areas of pain.    On examination fine adult male, speaking in complete sentences.  No increased work of breathing or accessory muscle use during respiration.  Lungs are clear to auscultation bilaterally.  Abdomen soft, nondistended with no focal areas of tenderness.    Will proceed with broad workup for evaluation of possible acute causes of his worsening altered mental status this may be progression of his known chronic diseases.    Rohan Fiore MD MPH   Physician     Rohan Fiore MD  12/30/23 3442    
18, SpO2: 100 %     General:  Well appearing 78 y.o. male not in acute distress.  Head: Normocephalic. Atraumatic.  Eyes:  Anicteric. PERRLA. EOMI. No discharge from eyes.   ENT:  External ears normal. No discharge from nose. OP clear. MMM.  Neck:  Supple. Trachea midline.  Pulmonary: Non-labored breathing. CTAB. No r/r/w.  Cardiac: RRR. No r/m/g.  Abdomen:  ND. Soft. NTTP. No g/r/r. No HSM. No masses palpated. No CVA tenderness.  Musculoskeletal: No long bone deformities.  Extremities: Extremities warm and perfused. 2+ radial & DP pulses b/l. <2 sec capillary refill. No peripheral edema.  Skin:  Warm. Dry. No apparent rashes.  Neuro:  A&O x 4. CN II-XII grossly intact..  Resting tremor at BUE no gross motor deficits, 5/5 strength at triceps/biceps/wrist extensors/wrist flexors/quadriceps/hamstrings/dorsiflexors/plantarflexors. No gross sensory deficits, intact to light touch b/l at distal UE/LE. Finger-to-nose test normal. No pronator drift.    -------------------------------  This note was generated in part utilizing Dragon dictation speech recognition software.  Occasionally, words are mistranscribed and despite editing, the text may contain inaccuracies due to incorrect word recognition       Bob Ramirez MD  Resident  12/31/23 1344

## 2023-12-31 NOTE — ED NOTES
Pt discharged to home, alert and oriented. Denies any questions about discharge instructions. Will follow up as directed. encouraged to return for any worsening symptoms.        Zita De León RN  12/31/23 3523

## 2024-01-01 ENCOUNTER — TELEPHONE (OUTPATIENT)
Dept: INTERNAL MEDICINE CLINIC | Age: 79
End: 2024-01-01

## 2024-01-01 LAB
EKG ATRIAL RATE: 51 BPM
EKG ATRIAL RATE: 55 BPM
EKG DIAGNOSIS: NORMAL
EKG DIAGNOSIS: NORMAL
EKG P AXIS: 80 DEGREES
EKG P AXIS: 93 DEGREES
EKG P-R INTERVAL: 174 MS
EKG P-R INTERVAL: 180 MS
EKG Q-T INTERVAL: 474 MS
EKG Q-T INTERVAL: 478 MS
EKG QRS DURATION: 82 MS
EKG QRS DURATION: 92 MS
EKG QTC CALCULATION (BAZETT): 440 MS
EKG QTC CALCULATION (BAZETT): 453 MS
EKG R AXIS: 52 DEGREES
EKG R AXIS: 63 DEGREES
EKG T AXIS: 69 DEGREES
EKG T AXIS: 71 DEGREES
EKG VENTRICULAR RATE: 51 BPM
EKG VENTRICULAR RATE: 55 BPM

## 2024-01-10 ENCOUNTER — OFFICE VISIT (OUTPATIENT)
Dept: CARDIOLOGY CLINIC | Age: 79
End: 2024-01-10
Payer: MEDICARE

## 2024-01-10 VITALS
SYSTOLIC BLOOD PRESSURE: 102 MMHG | DIASTOLIC BLOOD PRESSURE: 62 MMHG | WEIGHT: 192 LBS | HEART RATE: 64 BPM | HEIGHT: 68 IN | BODY MASS INDEX: 29.1 KG/M2 | OXYGEN SATURATION: 97 %

## 2024-01-10 DIAGNOSIS — E78.2 MIXED HYPERLIPIDEMIA: ICD-10-CM

## 2024-01-10 DIAGNOSIS — I25.118 CORONARY ARTERY DISEASE OF NATIVE ARTERY OF NATIVE HEART WITH STABLE ANGINA PECTORIS (HCC): Primary | ICD-10-CM

## 2024-01-10 DIAGNOSIS — I10 PRIMARY HYPERTENSION: ICD-10-CM

## 2024-01-10 PROCEDURE — 3074F SYST BP LT 130 MM HG: CPT | Performed by: NURSE PRACTITIONER

## 2024-01-10 PROCEDURE — 99214 OFFICE O/P EST MOD 30 MIN: CPT | Performed by: NURSE PRACTITIONER

## 2024-01-10 PROCEDURE — 1123F ACP DISCUSS/DSCN MKR DOCD: CPT | Performed by: NURSE PRACTITIONER

## 2024-01-10 PROCEDURE — 1036F TOBACCO NON-USER: CPT | Performed by: NURSE PRACTITIONER

## 2024-01-10 PROCEDURE — G8417 CALC BMI ABV UP PARAM F/U: HCPCS | Performed by: NURSE PRACTITIONER

## 2024-01-10 PROCEDURE — 3078F DIAST BP <80 MM HG: CPT | Performed by: NURSE PRACTITIONER

## 2024-01-10 PROCEDURE — G8484 FLU IMMUNIZE NO ADMIN: HCPCS | Performed by: NURSE PRACTITIONER

## 2024-01-10 PROCEDURE — G8427 DOCREV CUR MEDS BY ELIG CLIN: HCPCS | Performed by: NURSE PRACTITIONER

## 2024-01-10 NOTE — PATIENT INSTRUCTIONS
Chemical stress test : reassess your heart's circulation     Appt with Dr. Solo depending on the test results

## 2024-01-10 NOTE — PROGRESS NOTES
confused    Subjective:   If he walks, galina outside, he gets chest discomfort. He has no associated radiation that he's noticed. This was his first episode in over 5-6 weeks  He sweats like crazy from his Parkinson's (but does not last long). He's usually dry, warm and pk when he c/o CP    He has chest discomfort after walking long distances; his breathing isn't too bad. The patient denies orthopnea/PND. The patient has minimal leg swelling. The patients weight is stable . The patient is not experiencing palpitations or dizziness.     These symptoms are new since the last OV.   With regard to medication therapy the patient has been compliant with prescribed regimen. They have tolerated therapy to date.     Past Medical History:   Diagnosis Date    Acute bronchitis     history of    CAD (coronary artery disease)     Cancer ([facility])     melanoma    Clostridioides difficile infection 2020    Diabetes mellitus ([facility]) 2022    6.6 A1C    Erectile dysfunction     GERD (gastroesophageal reflux disease)     gerd    Hyperlipidemia     Obesity     Parkinson disease      Social History:    Social History     Tobacco Use   Smoking Status Former    Current packs/day: 0.00    Average packs/day: 0.3 packs/day for 10.0 years (2.5 ttl pk-yrs)    Types: Cigarettes, Cigars    Start date:     Quit date:     Years since quittin.0   Smokeless Tobacco Former     Current Medications:  Current Outpatient Medications   Medication Sig Dispense Refill    melatonin 3 MG TABS tablet Take 1 tablet by mouth nightly      polyethylene glycol (GLYCOLAX) 17 GM/SCOOP powder Take 8.5 g by mouth daily      atorvastatin (LIPITOR) 80 MG tablet TAKE 1 TABLET EVERY DAY 90 tablet 10    midodrine (PROAMATINE) 5 MG tablet Take 1 tablet by mouth 3 times daily PATIENT IS TO CALL OFFICE IF SBP >160. 270 tablet 3    sennosides-docusate sodium (SENOKOT-S) 8.6-50 MG tablet Take 2 tablets by mouth as needed for Constipation      primidone

## 2024-01-12 ENCOUNTER — HOSPITAL ENCOUNTER (OUTPATIENT)
Dept: NON INVASIVE DIAGNOSTICS | Age: 79
Discharge: HOME | End: 2024-01-12
Payer: MEDICARE

## 2024-01-12 DIAGNOSIS — I25.118 CORONARY ARTERY DISEASE OF NATIVE ARTERY OF NATIVE HEART WITH STABLE ANGINA PECTORIS (HCC): ICD-10-CM

## 2024-01-12 PROCEDURE — A9502 TC99M TETROFOSMIN: HCPCS | Performed by: NURSE PRACTITIONER

## 2024-01-12 PROCEDURE — 93017 CV STRESS TEST TRACING ONLY: CPT | Performed by: INTERNAL MEDICINE

## 2024-01-12 PROCEDURE — 78452 HT MUSCLE IMAGE SPECT MULT: CPT

## 2024-01-12 PROCEDURE — 6360000002 HC RX W HCPCS: Performed by: INTERNAL MEDICINE

## 2024-01-12 PROCEDURE — 3430000000 HC RX DIAGNOSTIC RADIOPHARMACEUTICAL: Performed by: NURSE PRACTITIONER

## 2024-01-12 RX ORDER — REGADENOSON 0.08 MG/ML
0.4 INJECTION, SOLUTION INTRAVENOUS
OUTPATIENT
Start: 2024-01-12

## 2024-01-12 RX ORDER — REGADENOSON 0.08 MG/ML
0.4 INJECTION, SOLUTION INTRAVENOUS
Status: COMPLETED | OUTPATIENT
Start: 2024-01-12 | End: 2024-01-12

## 2024-01-12 RX ADMIN — TETROFOSMIN 10 MILLICURIE: 1.38 INJECTION, POWDER, LYOPHILIZED, FOR SOLUTION INTRAVENOUS at 08:45

## 2024-01-12 RX ADMIN — REGADENOSON 0.4 MG: 0.08 INJECTION, SOLUTION INTRAVENOUS at 10:59

## 2024-01-12 RX ADMIN — TETROFOSMIN 30 MILLICURIE: 1.38 INJECTION, POWDER, LYOPHILIZED, FOR SOLUTION INTRAVENOUS at 11:13

## 2024-01-12 NOTE — PROGRESS NOTES
Patient instructed on Modified Seth Protocol Stress Test Procedure including possible side effects and adverse reactions.  Verbalizes knowledge and understanding and denies having any questions.    Patient unable to reach target heart on treadmill with Seth Protocol and unable to go any further or any faster per Patient. See new order.    Instructed on Lexiscan Stress Test Procedure including possible side effects/ adverse reactions. Patient verbalizes  understanding and denies having any questions .See Epic Cardiology

## 2024-01-17 ENCOUNTER — TELEPHONE (OUTPATIENT)
Dept: INTERNAL MEDICINE CLINIC | Age: 79
End: 2024-01-17

## 2024-01-17 DIAGNOSIS — G20.B2 PARKINSON'S DISEASE WITH DYSKINESIA AND FLUCTUATING MANIFESTATIONS: Primary | ICD-10-CM

## 2024-01-17 NOTE — TELEPHONE ENCOUNTER
Daughter, Tiffany Prince calling in requesting any info on any home health services that patient may be able to get. Patient's spouse is helping patient with everything in home such as personal care, medication assistance, etc. Daughter states her mother is wearing herself out with helping patient all the time and would benefit from someone in the home to help with anything. Any assistance/info will be helpful per daughter.    Daughter would like a call back as she states her mother may or may be available to take the call. She can be reached at [phone].

## 2024-01-18 NOTE — PROGRESS NOTES
[facility]   Cardiac Follow up    Referring Provider:  Rohan Watson, APRN - CNP     Chief Complaint   Patient presents with    Hypertension     No cardiac symptoms at this time.    Coronary Artery Disease    Hyperlipidemia     History of Present Illness:  Mr. Pacheco Prince is a 79 y.o. gentleman. His history includes CAD with PCI in 2005, hypertension, and hyperlipidemia.  He had a cardiac angiogram in 2015 that showed normal LV function and EF 60%. Coronary stent widely patent. He has Parkinsons, treated by Dr. Caldera ([place]); he has been evaluated by the [facility]. He took an at home COVID test which was positive in July 2022.    He was seen in ER 1/12/23 with c/o dizziness and hypotension (SBP 93), EMS in route states SBP was over 100. Pt also had chest tightness earlier that day that spontaneously resolved. The patient said that he has had these episodes in the past, these are not new symptoms for him, as he has been asymptomatic in ER with stable vital signs and a negative work-up, shared medical decision making with the family, they prefer to go home.  Pt d/c'd with follow up on outpatient basis with cardiology.     Dr. Mayberry ([facility]) is his neurologist for his Parkinson's. He was admitted May 2023 with worsening tremors. He rehabbed for quite some time, recently discharged.Treated for aspiration pneumonia and had a difficult hospital course    He was evaluated in ER 12/30/23 with complaint of altered mental status that manifested acutely at 1700 that day. The patient has bradycardia historically so this was not a new finding for him. He had a nonfocal neurological exam but wife did endorse generalized weakness, dizziness, malaise over the past few days which may have decreased his threshold for his tremors which acutely worsened overnight. He had chest pain that improved with nitroglycerin the day before but did not have any manifestations since. Evaluated the patient with

## 2024-01-24 ENCOUNTER — OFFICE VISIT (OUTPATIENT)
Dept: CARDIOLOGY CLINIC | Age: 79
End: 2024-01-24

## 2024-01-24 VITALS
SYSTOLIC BLOOD PRESSURE: 112 MMHG | OXYGEN SATURATION: 98 % | HEART RATE: 63 BPM | WEIGHT: 190 LBS | HEIGHT: 68 IN | DIASTOLIC BLOOD PRESSURE: 58 MMHG | BODY MASS INDEX: 28.79 KG/M2

## 2024-01-24 DIAGNOSIS — G20.A1 PARKINSON'S DISEASE, UNSPECIFIED WHETHER DYSKINESIA PRESENT, UNSPECIFIED WHETHER MANIFESTATIONS FLUCTUATE: ICD-10-CM

## 2024-01-24 DIAGNOSIS — I77.9 BILATERAL CAROTID ARTERY DISEASE, UNSPECIFIED TYPE (HCC): ICD-10-CM

## 2024-01-24 DIAGNOSIS — I10 PRIMARY HYPERTENSION: ICD-10-CM

## 2024-01-24 DIAGNOSIS — E78.2 MIXED HYPERLIPIDEMIA: ICD-10-CM

## 2024-01-24 DIAGNOSIS — I25.118 CORONARY ARTERY DISEASE OF NATIVE ARTERY OF NATIVE HEART WITH STABLE ANGINA PECTORIS (HCC): Primary | ICD-10-CM

## 2024-01-24 NOTE — PATIENT INSTRUCTIONS
No medication changes  Continue risk factor modifications.   Call for any change in symptoms, call to report any changes in shortness of breath or development of chest pain with activity.    Follow up in 6 mos

## 2024-02-05 DIAGNOSIS — R35.0 URINARY FREQUENCY: Primary | ICD-10-CM

## 2024-02-05 DIAGNOSIS — E11.9 TYPE 2 DIABETES MELLITUS WITHOUT COMPLICATION, WITHOUT LONG-TERM CURRENT USE OF INSULIN (HCC): ICD-10-CM

## 2024-02-06 DIAGNOSIS — R35.0 URINARY FREQUENCY: ICD-10-CM

## 2024-02-06 DIAGNOSIS — E11.9 TYPE 2 DIABETES MELLITUS WITHOUT COMPLICATION, WITHOUT LONG-TERM CURRENT USE OF INSULIN (HCC): ICD-10-CM

## 2024-02-06 LAB
BACTERIA URNS QL MICRO: ABNORMAL /HPF
BILIRUB UR QL STRIP.AUTO: NEGATIVE
CHARACTER UR: ABNORMAL
CLARITY UR: CLEAR
COLOR UR: ABNORMAL
CRYSTALS URNS MICRO: ABNORMAL /HPF
EPI CELLS #/AREA URNS AUTO: 1 /HPF (ref 0–5)
GLUCOSE UR STRIP.AUTO-MCNC: NEGATIVE MG/DL
HGB UR QL STRIP.AUTO: NEGATIVE
HYALINE CASTS #/AREA URNS AUTO: 0 /LPF (ref 0–8)
KETONES UR STRIP.AUTO-MCNC: ABNORMAL MG/DL
LEUKOCYTE ESTERASE UR QL STRIP.AUTO: NEGATIVE
NITRITE UR QL STRIP.AUTO: NEGATIVE
PH UR STRIP.AUTO: 5.5 [PH] (ref 5–8)
PROT UR STRIP.AUTO-MCNC: ABNORMAL MG/DL
RBC #/AREA URNS HPF: ABNORMAL /HPF (ref 0–4)
SP GR UR STRIP.AUTO: 1.03 (ref 1–1.03)
UA COMPLETE W REFLEX CULTURE PNL UR: ABNORMAL
UA DIPSTICK W REFLEX MICRO PNL UR: YES
URN SPEC COLLECT METH UR: ABNORMAL
UROBILINOGEN UR STRIP-ACNC: 0.2 E.U./DL
WBC #/AREA URNS AUTO: 6 /HPF (ref 0–5)

## 2024-02-07 LAB
EST. AVERAGE GLUCOSE BLD GHB EST-MCNC: 122.6 MG/DL
HBA1C MFR BLD: 5.9 %

## 2024-02-29 ENCOUNTER — APPOINTMENT (OUTPATIENT)
Dept: CT IMAGING | Age: 79
DRG: 871 | End: 2024-02-29
Payer: MEDICARE

## 2024-02-29 ENCOUNTER — HOSPITAL ENCOUNTER (INPATIENT)
Age: 79
LOS: 10 days | Discharge: NURSING, REHAB OR LONG-TERM CARE | DRG: 871 | End: 2024-03-11
Attending: EMERGENCY MEDICINE | Admitting: INTERNAL MEDICINE
Payer: MEDICARE

## 2024-02-29 ENCOUNTER — APPOINTMENT (OUTPATIENT)
Dept: GENERAL RADIOLOGY | Age: 79
DRG: 871 | End: 2024-02-29
Payer: MEDICARE

## 2024-02-29 DIAGNOSIS — R79.89 ELEVATED BRAIN NATRIURETIC PEPTIDE (BNP) LEVEL: ICD-10-CM

## 2024-02-29 DIAGNOSIS — J18.9 PNEUMONIA OF BOTH LOWER LOBES DUE TO INFECTIOUS ORGANISM: Primary | ICD-10-CM

## 2024-02-29 DIAGNOSIS — R09.02 HYPOXEMIA: ICD-10-CM

## 2024-02-29 DIAGNOSIS — R53.1 GENERAL WEAKNESS: ICD-10-CM

## 2024-02-29 DIAGNOSIS — R79.89 ELEVATED TROPONIN: ICD-10-CM

## 2024-02-29 LAB
ALBUMIN SERPL-MCNC: 3.7 G/DL (ref 3.4–5)
ALBUMIN/GLOB SERPL: 1.5 {RATIO} (ref 1.1–2.2)
ALP SERPL-CCNC: 76 U/L (ref 40–129)
ALT SERPL-CCNC: <5 U/L (ref 10–40)
ANION GAP SERPL CALCULATED.3IONS-SCNC: 10 MMOL/L (ref 3–16)
AST SERPL-CCNC: 17 U/L (ref 15–37)
BACTERIA URNS QL MICRO: NORMAL /HPF
BASE EXCESS BLDV CALC-SCNC: 0.6 MMOL/L (ref -3–3)
BASOPHILS # BLD: 0 K/UL (ref 0–0.2)
BASOPHILS NFR BLD: 0.2 %
BILIRUB SERPL-MCNC: 0.4 MG/DL (ref 0–1)
BILIRUB UR QL STRIP.AUTO: NEGATIVE
BUN SERPL-MCNC: 22 MG/DL (ref 7–20)
CALCIUM SERPL-MCNC: 9 MG/DL (ref 8.3–10.6)
CHLORIDE SERPL-SCNC: 105 MMOL/L (ref 99–110)
CLARITY UR: CLEAR
CO2 BLDV-SCNC: 62 MMOL/L
CO2 SERPL-SCNC: 24 MMOL/L (ref 21–32)
COHGB MFR BLDV: 2.2 % (ref 0–1.5)
COLOR UR: ABNORMAL
CREAT SERPL-MCNC: 0.7 MG/DL (ref 0.8–1.3)
D DIMER: 0.65 UG/ML FEU (ref 0–0.6)
DEPRECATED RDW RBC AUTO: 13.7 % (ref 12.4–15.4)
DO-HGB MFR BLDV: 7 %
EOSINOPHIL # BLD: 0 K/UL (ref 0–0.6)
EOSINOPHIL NFR BLD: 0.1 %
EPI CELLS #/AREA URNS AUTO: 1 /HPF (ref 0–5)
FLUAV RNA RESP QL NAA+PROBE: NOT DETECTED
FLUBV RNA RESP QL NAA+PROBE: NOT DETECTED
GFR SERPLBLD CREATININE-BSD FMLA CKD-EPI: >60 ML/MIN/{1.73_M2}
GLUCOSE SERPL-MCNC: 130 MG/DL (ref 70–99)
GLUCOSE UR STRIP.AUTO-MCNC: 250 MG/DL
HCO3 BLDV-SCNC: 26.4 MMOL/L (ref 23–29)
HCT VFR BLD AUTO: 37.6 % (ref 40.5–52.5)
HGB BLD-MCNC: 12.7 G/DL (ref 13.5–17.5)
HGB UR QL STRIP.AUTO: NEGATIVE
HYALINE CASTS #/AREA URNS AUTO: 1 /LPF (ref 0–8)
KETONES UR STRIP.AUTO-MCNC: 15 MG/DL
LACTATE BLDV-SCNC: 1.6 MMOL/L (ref 0.4–1.9)
LEUKOCYTE ESTERASE UR QL STRIP.AUTO: NEGATIVE
LIPASE SERPL-CCNC: 8 U/L (ref 13–60)
LYMPHOCYTES # BLD: 0.7 K/UL (ref 1–5.1)
LYMPHOCYTES NFR BLD: 6.8 %
MCH RBC QN AUTO: 31.1 PG (ref 26–34)
MCHC RBC AUTO-ENTMCNC: 33.8 G/DL (ref 31–36)
MCV RBC AUTO: 91.9 FL (ref 80–100)
METHGB MFR BLDV: 0.3 %
MONOCYTES # BLD: 0.9 K/UL (ref 0–1.3)
MONOCYTES NFR BLD: 9 %
NEUTROPHILS # BLD: 8.6 K/UL (ref 1.7–7.7)
NEUTROPHILS NFR BLD: 83.9 %
NITRITE UR QL STRIP.AUTO: NEGATIVE
NT-PROBNP SERPL-MCNC: 917 PG/ML (ref 0–449)
O2 CT VFR BLDV CALC: 17 VOL %
O2 THERAPY: ABNORMAL
PCO2 BLDV: 46.1 MMHG (ref 40–50)
PH BLDV: 7.37 [PH] (ref 7.35–7.45)
PH UR STRIP.AUTO: 5 [PH] (ref 5–8)
PLATELET # BLD AUTO: 198 K/UL (ref 135–450)
PMV BLD AUTO: 7.5 FL (ref 5–10.5)
PO2 BLDV: 69 MMHG (ref 25–40)
POTASSIUM SERPL-SCNC: 4 MMOL/L (ref 3.5–5.1)
PROCALCITONIN SERPL IA-MCNC: 0.28 NG/ML (ref 0–0.15)
PROT SERPL-MCNC: 6.2 G/DL (ref 6.4–8.2)
PROT UR STRIP.AUTO-MCNC: 30 MG/DL
RBC # BLD AUTO: 4.1 M/UL (ref 4.2–5.9)
RBC CLUMPS #/AREA URNS AUTO: 3 /HPF (ref 0–4)
SAO2 % BLDV: 93 %
SARS-COV-2 RNA RESP QL NAA+PROBE: NOT DETECTED
SODIUM SERPL-SCNC: 139 MMOL/L (ref 136–145)
SP GR UR STRIP.AUTO: >=1.03 (ref 1–1.03)
TROPONIN, HIGH SENSITIVITY: 29 NG/L (ref 0–22)
TROPONIN, HIGH SENSITIVITY: 30 NG/L (ref 0–22)
UA COMPLETE W REFLEX CULTURE PNL UR: ABNORMAL
UA DIPSTICK W REFLEX MICRO PNL UR: YES
URN SPEC COLLECT METH UR: ABNORMAL
UROBILINOGEN UR STRIP-ACNC: 1 E.U./DL
WBC # BLD AUTO: 10.2 K/UL (ref 4–11)
WBC #/AREA URNS AUTO: 1 /HPF (ref 0–5)

## 2024-02-29 PROCEDURE — 93005 ELECTROCARDIOGRAM TRACING: CPT | Performed by: PHYSICIAN ASSISTANT

## 2024-02-29 PROCEDURE — 2580000003 HC RX 258: Performed by: PHYSICIAN ASSISTANT

## 2024-02-29 PROCEDURE — 87040 BLOOD CULTURE FOR BACTERIA: CPT

## 2024-02-29 PROCEDURE — 83605 ASSAY OF LACTIC ACID: CPT

## 2024-02-29 PROCEDURE — 87636 SARSCOV2 & INF A&B AMP PRB: CPT

## 2024-02-29 PROCEDURE — 99285 EMERGENCY DEPT VISIT HI MDM: CPT

## 2024-02-29 PROCEDURE — 84484 ASSAY OF TROPONIN QUANT: CPT

## 2024-02-29 PROCEDURE — 85379 FIBRIN DEGRADATION QUANT: CPT

## 2024-02-29 PROCEDURE — 84145 PROCALCITONIN (PCT): CPT

## 2024-02-29 PROCEDURE — 83880 ASSAY OF NATRIURETIC PEPTIDE: CPT

## 2024-02-29 PROCEDURE — 80053 COMPREHEN METABOLIC PANEL: CPT

## 2024-02-29 PROCEDURE — G1010 CDSM STANSON: HCPCS

## 2024-02-29 PROCEDURE — 83690 ASSAY OF LIPASE: CPT

## 2024-02-29 PROCEDURE — 85025 COMPLETE CBC W/AUTO DIFF WBC: CPT

## 2024-02-29 PROCEDURE — 96361 HYDRATE IV INFUSION ADD-ON: CPT

## 2024-02-29 PROCEDURE — 71045 X-RAY EXAM CHEST 1 VIEW: CPT

## 2024-02-29 PROCEDURE — 82803 BLOOD GASES ANY COMBINATION: CPT

## 2024-02-29 PROCEDURE — 81001 URINALYSIS AUTO W/SCOPE: CPT

## 2024-02-29 PROCEDURE — 6360000004 HC RX CONTRAST MEDICATION: Performed by: PHYSICIAN ASSISTANT

## 2024-02-29 RX ORDER — ACETAMINOPHEN 500 MG
1000 TABLET ORAL ONCE
Status: DISCONTINUED | OUTPATIENT
Start: 2024-02-29 | End: 2024-03-01

## 2024-02-29 RX ORDER — 0.9 % SODIUM CHLORIDE 0.9 %
1000 INTRAVENOUS SOLUTION INTRAVENOUS ONCE
Status: COMPLETED | OUTPATIENT
Start: 2024-02-29 | End: 2024-02-29

## 2024-02-29 RX ADMIN — IOPAMIDOL 75 ML: 755 INJECTION, SOLUTION INTRAVENOUS at 23:10

## 2024-02-29 RX ADMIN — SODIUM CHLORIDE 1000 ML: 9 INJECTION, SOLUTION INTRAVENOUS at 21:12

## 2024-02-29 ASSESSMENT — PAIN - FUNCTIONAL ASSESSMENT: PAIN_FUNCTIONAL_ASSESSMENT: NONE - DENIES PAIN

## 2024-03-01 PROBLEM — J18.9 PNEUMONIA OF BOTH LUNGS DUE TO INFECTIOUS ORGANISM, UNSPECIFIED PART OF LUNG: Status: ACTIVE | Noted: 2024-03-01

## 2024-03-01 LAB
EKG ATRIAL RATE: 72 BPM
EKG DIAGNOSIS: NORMAL
EKG P AXIS: 55 DEGREES
EKG P-R INTERVAL: 160 MS
EKG Q-T INTERVAL: 404 MS
EKG QRS DURATION: 84 MS
EKG QTC CALCULATION (BAZETT): 442 MS
EKG R AXIS: -3 DEGREES
EKG T AXIS: 6 DEGREES
EKG VENTRICULAR RATE: 72 BPM
LEGIONELLA AG UR QL: NORMAL
S PNEUM AG UR QL: NORMAL
VANCOMYCIN SERPL-MCNC: <4 UG/ML

## 2024-03-01 PROCEDURE — 97166 OT EVAL MOD COMPLEX 45 MIN: CPT

## 2024-03-01 PROCEDURE — 6360000002 HC RX W HCPCS: Performed by: NURSE PRACTITIONER

## 2024-03-01 PROCEDURE — 6370000000 HC RX 637 (ALT 250 FOR IP): Performed by: NURSE PRACTITIONER

## 2024-03-01 PROCEDURE — 6360000002 HC RX W HCPCS: Performed by: PHYSICIAN ASSISTANT

## 2024-03-01 PROCEDURE — 6360000002 HC RX W HCPCS: Performed by: STUDENT IN AN ORGANIZED HEALTH CARE EDUCATION/TRAINING PROGRAM

## 2024-03-01 PROCEDURE — 97162 PT EVAL MOD COMPLEX 30 MIN: CPT

## 2024-03-01 PROCEDURE — 1200000000 HC SEMI PRIVATE

## 2024-03-01 PROCEDURE — 80202 ASSAY OF VANCOMYCIN: CPT

## 2024-03-01 PROCEDURE — 93010 ELECTROCARDIOGRAM REPORT: CPT | Performed by: INTERNAL MEDICINE

## 2024-03-01 PROCEDURE — 2580000003 HC RX 258: Performed by: PHYSICIAN ASSISTANT

## 2024-03-01 PROCEDURE — 96374 THER/PROPH/DIAG INJ IV PUSH: CPT

## 2024-03-01 PROCEDURE — 87641 MR-STAPH DNA AMP PROBE: CPT

## 2024-03-01 PROCEDURE — 97530 THERAPEUTIC ACTIVITIES: CPT

## 2024-03-01 PROCEDURE — 92610 EVALUATE SWALLOWING FUNCTION: CPT

## 2024-03-01 PROCEDURE — 97535 SELF CARE MNGMENT TRAINING: CPT

## 2024-03-01 PROCEDURE — 2580000003 HC RX 258: Performed by: STUDENT IN AN ORGANIZED HEALTH CARE EDUCATION/TRAINING PROGRAM

## 2024-03-01 PROCEDURE — 92526 ORAL FUNCTION THERAPY: CPT

## 2024-03-01 PROCEDURE — 2580000003 HC RX 258: Performed by: NURSE PRACTITIONER

## 2024-03-01 PROCEDURE — 36415 COLL VENOUS BLD VENIPUNCTURE: CPT

## 2024-03-01 PROCEDURE — 87449 NOS EACH ORGANISM AG IA: CPT

## 2024-03-01 RX ORDER — SODIUM CHLORIDE 9 MG/ML
INJECTION, SOLUTION INTRAVENOUS PRN
Status: DISCONTINUED | OUTPATIENT
Start: 2024-03-01 | End: 2024-03-11 | Stop reason: HOSPADM

## 2024-03-01 RX ORDER — METRONIDAZOLE 500 MG/100ML
500 INJECTION, SOLUTION INTRAVENOUS EVERY 8 HOURS
Status: DISPENSED | OUTPATIENT
Start: 2024-03-01 | End: 2024-03-07

## 2024-03-01 RX ORDER — MIDODRINE HYDROCHLORIDE 5 MG/1
5 TABLET ORAL 3 TIMES DAILY
Status: DISCONTINUED | OUTPATIENT
Start: 2024-03-01 | End: 2024-03-04

## 2024-03-01 RX ORDER — ACETAMINOPHEN 650 MG/1
650 SUPPOSITORY RECTAL EVERY 6 HOURS PRN
Status: DISCONTINUED | OUTPATIENT
Start: 2024-03-01 | End: 2024-03-11 | Stop reason: HOSPADM

## 2024-03-01 RX ORDER — OMEGA-3/DHA/EPA/FISH OIL 300-1000MG
1 CAPSULE ORAL 2 TIMES DAILY
Status: DISCONTINUED | OUTPATIENT
Start: 2024-03-01 | End: 2024-03-01 | Stop reason: RX

## 2024-03-01 RX ORDER — PANTOPRAZOLE SODIUM 40 MG/1
40 TABLET, DELAYED RELEASE ORAL
Status: DISCONTINUED | OUTPATIENT
Start: 2024-03-01 | End: 2024-03-11 | Stop reason: HOSPADM

## 2024-03-01 RX ORDER — VITAMIN B COMPLEX
2000 TABLET ORAL DAILY
Status: DISCONTINUED | OUTPATIENT
Start: 2024-03-01 | End: 2024-03-11 | Stop reason: HOSPADM

## 2024-03-01 RX ORDER — METHYLDOPA/HYDROCHLOROTHIAZIDE 250MG-15MG
100 TABLET ORAL DAILY
Status: DISCONTINUED | OUTPATIENT
Start: 2024-03-01 | End: 2024-03-01 | Stop reason: RX

## 2024-03-01 RX ORDER — FLUTICASONE PROPIONATE 50 MCG
2 SPRAY, SUSPENSION (ML) NASAL NIGHTLY
Status: DISCONTINUED | OUTPATIENT
Start: 2024-03-01 | End: 2024-03-11 | Stop reason: HOSPADM

## 2024-03-01 RX ORDER — ASPIRIN 81 MG/1
81 TABLET ORAL DAILY
Status: DISCONTINUED | OUTPATIENT
Start: 2024-03-01 | End: 2024-03-08

## 2024-03-01 RX ORDER — CLOZAPINE 25 MG/1
25 TABLET ORAL 2 TIMES DAILY
Status: DISCONTINUED | OUTPATIENT
Start: 2024-03-01 | End: 2024-03-11 | Stop reason: HOSPADM

## 2024-03-01 RX ORDER — ACETAMINOPHEN 325 MG/1
650 TABLET ORAL EVERY 6 HOURS PRN
Status: DISCONTINUED | OUTPATIENT
Start: 2024-03-01 | End: 2024-03-11 | Stop reason: HOSPADM

## 2024-03-01 RX ORDER — SODIUM CHLORIDE 0.9 % (FLUSH) 0.9 %
10 SYRINGE (ML) INJECTION PRN
Status: DISCONTINUED | OUTPATIENT
Start: 2024-03-01 | End: 2024-03-11 | Stop reason: HOSPADM

## 2024-03-01 RX ORDER — ALBUTEROL SULFATE 2.5 MG/3ML
2.5 SOLUTION RESPIRATORY (INHALATION) EVERY 4 HOURS PRN
Status: DISCONTINUED | OUTPATIENT
Start: 2024-03-01 | End: 2024-03-11 | Stop reason: HOSPADM

## 2024-03-01 RX ORDER — MULTIVITAMIN WITH IRON
1 TABLET ORAL DAILY
Status: DISCONTINUED | OUTPATIENT
Start: 2024-03-01 | End: 2024-03-11 | Stop reason: HOSPADM

## 2024-03-01 RX ORDER — ATORVASTATIN CALCIUM 80 MG/1
80 TABLET, FILM COATED ORAL NIGHTLY
Status: DISCONTINUED | OUTPATIENT
Start: 2024-03-01 | End: 2024-03-11 | Stop reason: HOSPADM

## 2024-03-01 RX ORDER — PRIMIDONE 50 MG/1
50 TABLET ORAL NIGHTLY
Status: DISCONTINUED | OUTPATIENT
Start: 2024-03-01 | End: 2024-03-11 | Stop reason: HOSPADM

## 2024-03-01 RX ORDER — DONEPEZIL HYDROCHLORIDE 5 MG/1
10 TABLET, FILM COATED ORAL NIGHTLY
Status: DISCONTINUED | OUTPATIENT
Start: 2024-03-01 | End: 2024-03-11 | Stop reason: HOSPADM

## 2024-03-01 RX ORDER — ENOXAPARIN SODIUM 100 MG/ML
40 INJECTION SUBCUTANEOUS DAILY
Status: DISCONTINUED | OUTPATIENT
Start: 2024-03-01 | End: 2024-03-08

## 2024-03-01 RX ORDER — SODIUM CHLORIDE 0.9 % (FLUSH) 0.9 %
10 SYRINGE (ML) INJECTION EVERY 12 HOURS SCHEDULED
Status: DISCONTINUED | OUTPATIENT
Start: 2024-03-01 | End: 2024-03-10

## 2024-03-01 RX ORDER — NITROGLYCERIN 0.4 MG/1
0.4 TABLET SUBLINGUAL EVERY 5 MIN PRN
Status: DISCONTINUED | OUTPATIENT
Start: 2024-03-01 | End: 2024-03-11 | Stop reason: HOSPADM

## 2024-03-01 RX ORDER — IPRATROPIUM BROMIDE 42 UG/1
2 SPRAY, METERED NASAL 3 TIMES DAILY
Status: DISCONTINUED | OUTPATIENT
Start: 2024-03-01 | End: 2024-03-11 | Stop reason: HOSPADM

## 2024-03-01 RX ORDER — ONDANSETRON 4 MG/1
4 TABLET, ORALLY DISINTEGRATING ORAL EVERY 8 HOURS PRN
Status: DISCONTINUED | OUTPATIENT
Start: 2024-03-01 | End: 2024-03-11 | Stop reason: HOSPADM

## 2024-03-01 RX ORDER — POLYETHYLENE GLYCOL 3350 17 G/17G
8.5 POWDER, FOR SOLUTION ORAL 2 TIMES DAILY
Status: DISCONTINUED | OUTPATIENT
Start: 2024-03-01 | End: 2024-03-11 | Stop reason: HOSPADM

## 2024-03-01 RX ORDER — DOCUSATE SODIUM 100 MG/1
100 CAPSULE, LIQUID FILLED ORAL 2 TIMES DAILY
Status: DISCONTINUED | OUTPATIENT
Start: 2024-03-01 | End: 2024-03-11 | Stop reason: HOSPADM

## 2024-03-01 RX ORDER — ONDANSETRON 2 MG/ML
4 INJECTION INTRAMUSCULAR; INTRAVENOUS EVERY 6 HOURS PRN
Status: DISCONTINUED | OUTPATIENT
Start: 2024-03-01 | End: 2024-03-11 | Stop reason: HOSPADM

## 2024-03-01 RX ORDER — IBUPROFEN 400 MG/1
400 TABLET ORAL EVERY 6 HOURS PRN
Status: DISCONTINUED | OUTPATIENT
Start: 2024-03-01 | End: 2024-03-11 | Stop reason: HOSPADM

## 2024-03-01 RX ORDER — SODIUM CHLORIDE 9 MG/ML
INJECTION, SOLUTION INTRAVENOUS CONTINUOUS
Status: ACTIVE | OUTPATIENT
Start: 2024-03-01 | End: 2024-03-02

## 2024-03-01 RX ORDER — CARBOXYMETHYLCELLULOSE SODIUM 10 MG/ML
1 GEL OPHTHALMIC 2 TIMES DAILY
Status: DISCONTINUED | OUTPATIENT
Start: 2024-03-01 | End: 2024-03-11 | Stop reason: HOSPADM

## 2024-03-01 RX ORDER — GUAIFENESIN/DEXTROMETHORPHAN 100-10MG/5
5 SYRUP ORAL EVERY 4 HOURS PRN
Status: DISCONTINUED | OUTPATIENT
Start: 2024-03-01 | End: 2024-03-11 | Stop reason: HOSPADM

## 2024-03-01 RX ORDER — LANOLIN ALCOHOL/MO/W.PET/CERES
3 CREAM (GRAM) TOPICAL NIGHTLY
Status: DISCONTINUED | OUTPATIENT
Start: 2024-03-01 | End: 2024-03-11 | Stop reason: HOSPADM

## 2024-03-01 RX ADMIN — IBUPROFEN 400 MG: 400 TABLET, FILM COATED ORAL at 21:41

## 2024-03-01 RX ADMIN — ACETAMINOPHEN 650 MG: 325 TABLET ORAL at 01:22

## 2024-03-01 RX ADMIN — Medication 1 TABLET: at 21:46

## 2024-03-01 RX ADMIN — DONEPEZIL HYDROCHLORIDE 10 MG: 5 TABLET, FILM COATED ORAL at 21:41

## 2024-03-01 RX ADMIN — Medication 2000 UNITS: at 10:31

## 2024-03-01 RX ADMIN — VANCOMYCIN HYDROCHLORIDE 1000 MG: 1 INJECTION, POWDER, LYOPHILIZED, FOR SOLUTION INTRAVENOUS at 02:36

## 2024-03-01 RX ADMIN — CEFEPIME 2000 MG: 2 INJECTION, POWDER, FOR SOLUTION INTRAVENOUS at 13:20

## 2024-03-01 RX ADMIN — ATORVASTATIN CALCIUM 80 MG: 80 TABLET, FILM COATED ORAL at 21:41

## 2024-03-01 RX ADMIN — DOCUSATE SODIUM 100 MG: 100 CAPSULE, LIQUID FILLED ORAL at 10:32

## 2024-03-01 RX ADMIN — CLOZAPINE 25 MG: 25 TABLET ORAL at 11:22

## 2024-03-01 RX ADMIN — Medication 1 TABLET: at 11:00

## 2024-03-01 RX ADMIN — Medication 1 TABLET: at 09:00

## 2024-03-01 RX ADMIN — THERA TABS 1 TABLET: TAB at 10:32

## 2024-03-01 RX ADMIN — IPRATROPIUM BROMIDE 2 SPRAY: 42 SPRAY NASAL at 10:32

## 2024-03-01 RX ADMIN — POLYETHYLENE GLYCOL 3350 8.5 G: 17 POWDER, FOR SOLUTION ORAL at 10:31

## 2024-03-01 RX ADMIN — DOCUSATE SODIUM 100 MG: 100 CAPSULE, LIQUID FILLED ORAL at 21:41

## 2024-03-01 RX ADMIN — CLOZAPINE 25 MG: 25 TABLET ORAL at 21:41

## 2024-03-01 RX ADMIN — PRIMIDONE 50 MG: 50 TABLET ORAL at 21:41

## 2024-03-01 RX ADMIN — Medication 1 TABLET: at 15:00

## 2024-03-01 RX ADMIN — CARBOXYMETHYLCELLULOSE SODIUM 1 DROP: 10 GEL OPHTHALMIC at 21:42

## 2024-03-01 RX ADMIN — ASPIRIN 81 MG: 81 TABLET, COATED ORAL at 10:31

## 2024-03-01 RX ADMIN — Medication 1 TABLET: at 03:19

## 2024-03-01 RX ADMIN — MIDODRINE HYDROCHLORIDE 5 MG: 5 TABLET ORAL at 10:32

## 2024-03-01 RX ADMIN — ACETAMINOPHEN 650 MG: 325 TABLET ORAL at 15:07

## 2024-03-01 RX ADMIN — POLYETHYLENE GLYCOL 3350 8.5 G: 17 POWDER, FOR SOLUTION ORAL at 03:16

## 2024-03-01 RX ADMIN — CEFEPIME 2000 MG: 2 INJECTION, POWDER, FOR SOLUTION INTRAVENOUS at 23:44

## 2024-03-01 RX ADMIN — Medication 1 TABLET: at 13:00

## 2024-03-01 RX ADMIN — CARBOXYMETHYLCELLULOSE SODIUM 1 DROP: 10 GEL OPHTHALMIC at 10:32

## 2024-03-01 RX ADMIN — ENOXAPARIN SODIUM 40 MG: 100 INJECTION SUBCUTANEOUS at 10:33

## 2024-03-01 RX ADMIN — SODIUM CHLORIDE, PRESERVATIVE FREE 10 ML: 5 INJECTION INTRAVENOUS at 10:33

## 2024-03-01 RX ADMIN — METRONIDAZOLE 500 MG: 500 INJECTION, SOLUTION INTRAVENOUS at 13:10

## 2024-03-01 RX ADMIN — CEFEPIME 2000 MG: 2 INJECTION, POWDER, FOR SOLUTION INTRAVENOUS at 00:46

## 2024-03-01 RX ADMIN — SODIUM CHLORIDE: 9 INJECTION, SOLUTION INTRAVENOUS at 18:31

## 2024-03-01 RX ADMIN — VANCOMYCIN HYDROCHLORIDE 1500 MG: 1.5 INJECTION, POWDER, LYOPHILIZED, FOR SOLUTION INTRAVENOUS at 15:41

## 2024-03-01 RX ADMIN — METRONIDAZOLE 500 MG: 500 INJECTION, SOLUTION INTRAVENOUS at 21:36

## 2024-03-01 RX ADMIN — METRONIDAZOLE 500 MG: 500 INJECTION, SOLUTION INTRAVENOUS at 04:05

## 2024-03-01 RX ADMIN — DOCUSATE SODIUM 100 MG: 100 CAPSULE, LIQUID FILLED ORAL at 03:16

## 2024-03-01 ASSESSMENT — PAIN SCALES - GENERAL
PAINLEVEL_OUTOF10: 0
PAINLEVEL_OUTOF10: 0

## 2024-03-01 ASSESSMENT — LIFESTYLE VARIABLES
HOW OFTEN DO YOU HAVE A DRINK CONTAINING ALCOHOL: NEVER
HOW MANY STANDARD DRINKS CONTAINING ALCOHOL DO YOU HAVE ON A TYPICAL DAY: PATIENT DOES NOT DRINK

## 2024-03-01 NOTE — H&P
TABS tablet Take 1-2 tablets by mouth nightly    Obey Callahan MD   polyethylene glycol (GLYCOLAX) 17 GM/SCOOP powder Take 8.5 g by mouth 2 times daily Patient received 1/2 cap full twice daily.    Obey Callahan MD   atorvastatin (LIPITOR) 80 MG tablet TAKE 1 TABLET EVERY DAY  Patient taking differently: Take 1 tablet by mouth nightly TAKE 1 TABLET EVERY DAY 10/13/23   Wilver Solo MD   midodrine (PROAMATINE) 5 MG tablet Take 1 tablet by mouth 3 times daily PATIENT IS TO CALL OFFICE IF SBP >160. 7/19/23   Wilver Solo MD   sennosides-docusate sodium (SENOKOT-S) 8.6-50 MG tablet Take 2 tablets by mouth as needed for Constipation  Patient not taking: Reported on 2/29/2024    Obey Callahan MD   primidone (MYSOLINE) 50 MG tablet Take 1 tablet by mouth nightly 6/6/23   Luke Richard MD   cloZAPine (CLOZARIL) 25 MG tablet Take 1 tablet by mouth 2 times daily 6/6/23   Luke Richard MD   ipratropium (ATROVENT) 0.06 % nasal spray 2 sprays by Each Nostril route 3 times daily Patient usually uses just before meals.    Obey Callahan MD   docusate sodium (COLACE) 100 MG capsule Take 1 capsule by mouth 2 times daily Twice daily    Obey Callahan MD   nitroGLYCERIN (NITROSTAT) 0.4 MG SL tablet Place 1 tablet under the tongue every 5 minutes as needed (prn) 1/30/23   Wilver Solo MD   donepezil (ARICEPT) 10 MG tablet Take 1 tablet by mouth nightly 2/10/22   Obye Callahan MD   fluticasone (FLONASE) 50 MCG/ACT nasal spray 2 sprays by Nasal route daily  Patient taking differently: 2 sprays by Nasal route nightly 4/1/22   Rohan Watson, APRN - CNP   fexofenadine (ALLEGRA) 180 MG tablet Take 1 tablet by mouth nightly  Patient not taking: Reported on 2/29/2024    Obey Callahan MD   carbidopa-levodopa (SINEMET)  MG per tablet Take 1 tablet by mouth See Admin Instructions Take one tablet by mouth every 2 hours between 7:00 am and 9:00 pm. Patient may take three

## 2024-03-01 NOTE — DISCHARGE INSTR - COC
Schedule:  Phone:  Fax:    / signature: {Esignature:[id number]}    PHYSICIAN SECTION    Prognosis: Good    Condition at Discharge: Stable    Rehab Potential (if transferring to Rehab): Good    Recommended Labs or Other Treatments After Discharge: PT/OT, strict adherence to Sinemet doses as prescribed, aspiration precautions    Physician Certification: I certify the above information and transfer of Pacheco Prince  is necessary for the continuing treatment of the diagnosis listed and that he requires Acute Rehab for greater 30 days.     Update Admission H&P: No change in H&P    PHYSICIAN SIGNATURE:  Electronically signed by Efren Cole MD on 3/11/24 at 12:44 PM EDT

## 2024-03-01 NOTE — ED NOTES
Per wife and daughter, pt has chronically low BP and takes medications to help support it.   
Pt to CT.  Provided family with warm blanket & offered refreshments.   
new/unusual pain)  Last documented pain score (0-10 scale)    Last documented pain medication administered: None  Mental Status: oriented, alert, and Pt has parkinsons and has trouble verbalizing. Takes time to respond.   Orientation Level:    NIH Score:    C-SSRS: Risk of Suicide: No Risk  Bedside swallow:    Orlando Coma Scale (GCS): Simon Coma Scale  Eye Opening: To speech  Best Verbal Response: Oriented  Best Motor Response: Obeys commands  Orlando Coma Scale Score: 14  Active LDA's:   Peripheral IV 02/29/24 Distal;Right Forearm (Active)     PO Status: Regular  Pertinent or High Risk Medications/Drips: no   If Yes, please provide details: none  Pending Blood Product Administration: no       You may also review the ED PT Care Timeline found under the Summary Nursing Index tab.    Recommendation    Pending orders All ED orders complete  Plan for Discharge (if known):   Additional Comments: Pt is a& o but has decreased verbal responses d/t parkinsons. Has been able to use urinal with assistance. Wife is retired ICU RN. Pt is resting comfortably at this time.  VSS.  If any further questions, please call Sending RN at [phone]    Electronically signed by: Electronically signed by Erika Ng RN on 3/1/2024 at 12:12 AM

## 2024-03-01 NOTE — ED PROVIDER NOTES
[facility] EMERGENCY DEPARTMENT  EMERGENCY DEPARTMENT ENCOUNTER        Pt Name: Pacheco Prince  MRN: [id number]  Birthdate 1945  Date of evaluation: 2/29/2024  Provider: Oc Shearer PA-C  PCP: Rohan Watson APRN - CNP  Note Started: 10:42 PM EST 2/29/24       I have seen and evaluated this patient with my supervising physician Kee Kay MD.      CHIEF COMPLAINT       Chief Complaint   Patient presents with    Shortness of Breath     Pt was on a cruise ship in the [facility] when he developed aspiration pneumonia. Pt requested to come to [facility]. Currently VSS       HISTORY OF PRESENT ILLNESS: 1 or more Elements     History from : Patient and Family (wife)    Limitations to history : None    Pacheco Prince is a 79 y.o. male with a history of hypertension, hyperlipidemia, CAD, diabetes, Parkinson's disease and aspiration pneumonia who presents to the emergency department today via [facility] from the [place] of [facility] where patient was on a cruise and became ill and had to be flown back.  Patient reportedly left for his cruise on 2/24.  He first went to [place] and was doing well.  Wife states yesterday they went to the [place] and patient began having a cough early in the morning and by evening was running a fever and was very fatigued.  They did see the medical staff on the cruise ship and had x-rays completed and was diagnosed with pneumonia and started on Zosyn.  Patient woke up much worse today at the [place] of [facility].  He was given a second dose of Zosyn and the decision was made to fly patient out of [facility].  Flight crew reports patient remained stable and was oxygenating well.  Patient denies having any chest pain, heart palpitations, diaphoresis.  Patient's temperature is 99.7 °F at the time of my exam.  He is oxygenating in the mid 90s on room air.  He is resting comfortably and in no obvious distress.  Family states that patient 
[FreeTextEntry1] : 28 year old male with significant Scheuermann's kyphosis\par \par Clinical findings and x-ray results were reviewed at length with the patient. We discussed at length the natural history, etiology, pathoanatomy and treatment modalities of Kyphosis with patient. Patient's obtained radiographs are remarkable for mild deformity correction about kyphotic curvature in his brace; currently measures 85 degrees with wedging of three consecutive vertebral bodies. Although patient's curvature warrants surgical treatment, we have discussed with patient that we will continue with attempted correction via TLSO bracing before moving toward surgical correction. Brace care instructions were reviewed with patient. I am also recommending patient continue attending physical therapy sessions for back and core strengthening exercises; no new prescription was necessary today. Additionally, I am recommending patient continue with their daily back and core strengthening exercise regimen 4 days a week for at least 30 minutes each day. Exercise sheet was given and exercises were demonstrated during today's visit. Patient may continue participating in all physical activities without restrictions. All questions and concerns were addressed. Patient and parent vocalized understanding and agreement to assessment and treatment plan. We will plan to see NEENA lyn in clinic in approximately 4 months for repeat x-rays and reevaluation. Advised patient to take a 24-hour brace holiday prior to follow-up visit. Natural history of spine deformity discussed. Risk of progression explained.. Risk of back pain explained. Possibility of arthritis discussed. Spine deformity affecting organ systems, lungs, GI etc discussed. Deformity relationship with growth and effect on patient's height explained. Activities impact and limitations discussed. Activity limitations explained. Impact of daily activities- sleeping position, sitting position, lifting heavy weights etc explained. Importance of stretching, exercises, bone health and nutrition explained. Role of genetics and risk of deformity in siblings and progenies explained. \par \par ABHINAV, Riki Arellano, acted solely as a scribe for Dr. Barnett and documented this information on this date; 10/18/2021.
FINDINGS: Lordotic positioning.  The cardiomediastinal silhouette is unchanged in appearance. There is no consolidation, pneumothorax, or evidence of edema. No effusion is appreciated. The osseous structures are unchanged in appearance.     No acute airspace disease identified.        Labs  Results for orders placed or performed during the hospital encounter of 02/29/24   COVID-19 & Influenza Combo    Specimen: Nasopharyngeal Swab   Result Value Ref Range    SARS-CoV-2 RNA, RT PCR NOT DETECTED NOT DETECTED    INFLUENZA A NOT DETECTED NOT DETECTED    INFLUENZA B NOT DETECTED NOT DETECTED   CBC with Auto Differential   Result Value Ref Range    WBC 10.2 4.0 - 11.0 K/uL    RBC 4.10 (L) 4.20 - 5.90 M/uL    Hemoglobin 12.7 (L) 13.5 - 17.5 g/dL    Hematocrit 37.6 (L) 40.5 - 52.5 %    MCV 91.9 80.0 - 100.0 fL    MCH 31.1 26.0 - 34.0 pg    MCHC 33.8 31.0 - 36.0 g/dL    RDW 13.7 12.4 - 15.4 %    Platelets 198 135 - 450 K/uL    MPV 7.5 5.0 - 10.5 fL    Neutrophils % 83.9 %    Lymphocytes % 6.8 %    Monocytes % 9.0 %    Eosinophils % 0.1 %    Basophils % 0.2 %    Neutrophils Absolute 8.6 (H) 1.7 - 7.7 K/uL    Lymphocytes Absolute 0.7 (L) 1.0 - 5.1 K/uL    Monocytes Absolute 0.9 0.0 - 1.3 K/uL    Eosinophils Absolute 0.0 0.0 - 0.6 K/uL    Basophils Absolute 0.0 0.0 - 0.2 K/uL   Comprehensive Metabolic Panel   Result Value Ref Range    Sodium 139 136 - 145 mmol/L    Potassium 4.0 3.5 - 5.1 mmol/L    Chloride 105 99 - 110 mmol/L    CO2 24 21 - 32 mmol/L    Anion Gap 10 3 - 16    Glucose 130 (H) 70 - 99 mg/dL    BUN 22 (H) 7 - 20 mg/dL    Creatinine 0.7 (L) 0.8 - 1.3 mg/dL    Est, Glom Filt Rate >60 >60    Calcium 9.0 8.3 - 10.6 mg/dL    Total Protein 6.2 (L) 6.4 - 8.2 g/dL    Albumin 3.7 3.4 - 5.0 g/dL    Albumin/Globulin Ratio 1.5 1.1 - 2.2    Total Bilirubin 0.4 0.0 - 1.0 mg/dL    Alkaline Phosphatase 76 40 - 129 U/L    ALT <5 (L) 10 - 40 U/L    AST 17 15 - 37 U/L   Lipase   Result Value Ref Range    Lipase 8.0 (L) 13.0 - 60.0

## 2024-03-02 LAB
ANION GAP SERPL CALCULATED.3IONS-SCNC: 9 MMOL/L (ref 3–16)
BASOPHILS # BLD: 0 K/UL (ref 0–0.2)
BASOPHILS NFR BLD: 0.4 %
BUN SERPL-MCNC: 14 MG/DL (ref 7–20)
CALCIUM SERPL-MCNC: 8.7 MG/DL (ref 8.3–10.6)
CHLORIDE SERPL-SCNC: 111 MMOL/L (ref 99–110)
CO2 SERPL-SCNC: 24 MMOL/L (ref 21–32)
CREAT SERPL-MCNC: 0.6 MG/DL (ref 0.8–1.3)
DEPRECATED RDW RBC AUTO: 14 % (ref 12.4–15.4)
EOSINOPHIL # BLD: 0.1 K/UL (ref 0–0.6)
EOSINOPHIL NFR BLD: 0.8 %
GFR SERPLBLD CREATININE-BSD FMLA CKD-EPI: >60 ML/MIN/{1.73_M2}
GLUCOSE SERPL-MCNC: 119 MG/DL (ref 70–99)
HCT VFR BLD AUTO: 36.7 % (ref 40.5–52.5)
HGB BLD-MCNC: 12.3 G/DL (ref 13.5–17.5)
LYMPHOCYTES # BLD: 0.8 K/UL (ref 1–5.1)
LYMPHOCYTES NFR BLD: 11.9 %
MCH RBC QN AUTO: 30.9 PG (ref 26–34)
MCHC RBC AUTO-ENTMCNC: 33.4 G/DL (ref 31–36)
MCV RBC AUTO: 92.5 FL (ref 80–100)
MONOCYTES # BLD: 0.9 K/UL (ref 0–1.3)
MONOCYTES NFR BLD: 13.6 %
MRSA DNA SPEC QL NAA+PROBE: NORMAL
NEUTROPHILS # BLD: 5 K/UL (ref 1.7–7.7)
NEUTROPHILS NFR BLD: 73.3 %
PLATELET # BLD AUTO: 192 K/UL (ref 135–450)
PMV BLD AUTO: 7.4 FL (ref 5–10.5)
POTASSIUM SERPL-SCNC: 3.9 MMOL/L (ref 3.5–5.1)
RBC # BLD AUTO: 3.97 M/UL (ref 4.2–5.9)
SODIUM SERPL-SCNC: 144 MMOL/L (ref 136–145)
WBC # BLD AUTO: 6.8 K/UL (ref 4–11)

## 2024-03-02 PROCEDURE — 6360000002 HC RX W HCPCS: Performed by: STUDENT IN AN ORGANIZED HEALTH CARE EDUCATION/TRAINING PROGRAM

## 2024-03-02 PROCEDURE — 6370000000 HC RX 637 (ALT 250 FOR IP): Performed by: NURSE PRACTITIONER

## 2024-03-02 PROCEDURE — 80048 BASIC METABOLIC PNL TOTAL CA: CPT

## 2024-03-02 PROCEDURE — 92526 ORAL FUNCTION THERAPY: CPT

## 2024-03-02 PROCEDURE — 1200000000 HC SEMI PRIVATE

## 2024-03-02 PROCEDURE — 94669 MECHANICAL CHEST WALL OSCILL: CPT

## 2024-03-02 PROCEDURE — 2580000003 HC RX 258: Performed by: STUDENT IN AN ORGANIZED HEALTH CARE EDUCATION/TRAINING PROGRAM

## 2024-03-02 PROCEDURE — 36415 COLL VENOUS BLD VENIPUNCTURE: CPT

## 2024-03-02 PROCEDURE — 6360000002 HC RX W HCPCS: Performed by: NURSE PRACTITIONER

## 2024-03-02 PROCEDURE — 85025 COMPLETE CBC W/AUTO DIFF WBC: CPT

## 2024-03-02 PROCEDURE — 2580000003 HC RX 258: Performed by: NURSE PRACTITIONER

## 2024-03-02 PROCEDURE — 99223 1ST HOSP IP/OBS HIGH 75: CPT | Performed by: INTERNAL MEDICINE

## 2024-03-02 RX ORDER — SODIUM CHLORIDE 9 MG/ML
INJECTION, SOLUTION INTRAVENOUS CONTINUOUS
Status: DISCONTINUED | OUTPATIENT
Start: 2024-03-02 | End: 2024-03-03

## 2024-03-02 RX ADMIN — SODIUM CHLORIDE: 9 INJECTION, SOLUTION INTRAVENOUS at 12:45

## 2024-03-02 RX ADMIN — DOCUSATE SODIUM 100 MG: 100 CAPSULE, LIQUID FILLED ORAL at 21:24

## 2024-03-02 RX ADMIN — PANTOPRAZOLE SODIUM 40 MG: 40 TABLET, DELAYED RELEASE ORAL at 08:53

## 2024-03-02 RX ADMIN — PRIMIDONE 50 MG: 50 TABLET ORAL at 21:24

## 2024-03-02 RX ADMIN — MIDODRINE HYDROCHLORIDE 5 MG: 5 TABLET ORAL at 08:53

## 2024-03-02 RX ADMIN — CEFEPIME 2000 MG: 2 INJECTION, POWDER, FOR SOLUTION INTRAVENOUS at 12:47

## 2024-03-02 RX ADMIN — METRONIDAZOLE 500 MG: 500 INJECTION, SOLUTION INTRAVENOUS at 04:06

## 2024-03-02 RX ADMIN — DOCUSATE SODIUM 100 MG: 100 CAPSULE, LIQUID FILLED ORAL at 08:53

## 2024-03-02 RX ADMIN — SODIUM CHLORIDE, PRESERVATIVE FREE 10 ML: 5 INJECTION INTRAVENOUS at 09:02

## 2024-03-02 RX ADMIN — ENOXAPARIN SODIUM 40 MG: 100 INJECTION SUBCUTANEOUS at 08:53

## 2024-03-02 RX ADMIN — Medication 2000 UNITS: at 08:53

## 2024-03-02 RX ADMIN — POLYETHYLENE GLYCOL 3350 8.5 G: 17 POWDER, FOR SOLUTION ORAL at 09:01

## 2024-03-02 RX ADMIN — ATORVASTATIN CALCIUM 80 MG: 80 TABLET, FILM COATED ORAL at 21:23

## 2024-03-02 RX ADMIN — METRONIDAZOLE 500 MG: 500 INJECTION, SOLUTION INTRAVENOUS at 12:46

## 2024-03-02 RX ADMIN — CARBOXYMETHYLCELLULOSE SODIUM 1 DROP: 10 GEL OPHTHALMIC at 08:57

## 2024-03-02 RX ADMIN — CLOZAPINE 25 MG: 25 TABLET ORAL at 21:23

## 2024-03-02 RX ADMIN — DONEPEZIL HYDROCHLORIDE 10 MG: 5 TABLET, FILM COATED ORAL at 21:24

## 2024-03-02 RX ADMIN — Medication 1 PACKET: at 18:43

## 2024-03-02 RX ADMIN — Medication 1 TABLET: at 08:52

## 2024-03-02 RX ADMIN — CLOZAPINE 25 MG: 25 TABLET ORAL at 09:49

## 2024-03-02 RX ADMIN — Medication 1 TABLET: at 11:12

## 2024-03-02 RX ADMIN — Medication 1 TABLET: at 18:45

## 2024-03-02 RX ADMIN — ASPIRIN 81 MG: 81 TABLET, COATED ORAL at 08:53

## 2024-03-02 RX ADMIN — Medication 1 TABLET: at 21:22

## 2024-03-02 RX ADMIN — CEFEPIME 2000 MG: 2 INJECTION, POWDER, FOR SOLUTION INTRAVENOUS at 23:58

## 2024-03-02 RX ADMIN — Medication 1 TABLET: at 16:10

## 2024-03-02 RX ADMIN — THERA TABS 1 TABLET: TAB at 08:53

## 2024-03-02 RX ADMIN — SODIUM CHLORIDE: 9 INJECTION, SOLUTION INTRAVENOUS at 11:15

## 2024-03-02 RX ADMIN — ACETAMINOPHEN 650 MG: 325 TABLET ORAL at 08:49

## 2024-03-02 RX ADMIN — VANCOMYCIN HYDROCHLORIDE 1500 MG: 1.5 INJECTION, POWDER, LYOPHILIZED, FOR SOLUTION INTRAVENOUS at 02:29

## 2024-03-02 RX ADMIN — CARBOXYMETHYLCELLULOSE SODIUM 1 DROP: 10 GEL OPHTHALMIC at 21:23

## 2024-03-02 RX ADMIN — Medication 1 TABLET: at 14:58

## 2024-03-02 RX ADMIN — Medication 1 TABLET: at 12:50

## 2024-03-02 RX ADMIN — METRONIDAZOLE 500 MG: 500 INJECTION, SOLUTION INTRAVENOUS at 20:34

## 2024-03-03 LAB
ANION GAP SERPL CALCULATED.3IONS-SCNC: 11 MMOL/L (ref 3–16)
BASOPHILS # BLD: 0 K/UL (ref 0–0.2)
BASOPHILS NFR BLD: 0.2 %
BUN SERPL-MCNC: 11 MG/DL (ref 7–20)
CALCIUM SERPL-MCNC: 8.2 MG/DL (ref 8.3–10.6)
CHLORIDE SERPL-SCNC: 111 MMOL/L (ref 99–110)
CO2 SERPL-SCNC: 21 MMOL/L (ref 21–32)
CREAT SERPL-MCNC: <0.5 MG/DL (ref 0.8–1.3)
DEPRECATED RDW RBC AUTO: 14.3 % (ref 12.4–15.4)
EOSINOPHIL # BLD: 0.3 K/UL (ref 0–0.6)
EOSINOPHIL NFR BLD: 5.9 %
GFR SERPLBLD CREATININE-BSD FMLA CKD-EPI: >60 ML/MIN/{1.73_M2}
GLUCOSE SERPL-MCNC: 109 MG/DL (ref 70–99)
HCT VFR BLD AUTO: 35.4 % (ref 40.5–52.5)
HGB BLD-MCNC: 11.7 G/DL (ref 13.5–17.5)
LYMPHOCYTES # BLD: 1.1 K/UL (ref 1–5.1)
LYMPHOCYTES NFR BLD: 19.4 %
MCH RBC QN AUTO: 30.3 PG (ref 26–34)
MCHC RBC AUTO-ENTMCNC: 33 G/DL (ref 31–36)
MCV RBC AUTO: 91.8 FL (ref 80–100)
MONOCYTES # BLD: 0.7 K/UL (ref 0–1.3)
MONOCYTES NFR BLD: 11.9 %
NEUTROPHILS # BLD: 3.6 K/UL (ref 1.7–7.7)
NEUTROPHILS NFR BLD: 62.6 %
PLATELET # BLD AUTO: 194 K/UL (ref 135–450)
PMV BLD AUTO: 7.3 FL (ref 5–10.5)
POTASSIUM SERPL-SCNC: 3.2 MMOL/L (ref 3.5–5.1)
RBC # BLD AUTO: 3.85 M/UL (ref 4.2–5.9)
SODIUM SERPL-SCNC: 143 MMOL/L (ref 136–145)
WBC # BLD AUTO: 5.8 K/UL (ref 4–11)

## 2024-03-03 PROCEDURE — 80048 BASIC METABOLIC PNL TOTAL CA: CPT

## 2024-03-03 PROCEDURE — 6370000000 HC RX 637 (ALT 250 FOR IP): Performed by: STUDENT IN AN ORGANIZED HEALTH CARE EDUCATION/TRAINING PROGRAM

## 2024-03-03 PROCEDURE — 94669 MECHANICAL CHEST WALL OSCILL: CPT

## 2024-03-03 PROCEDURE — 94761 N-INVAS EAR/PLS OXIMETRY MLT: CPT

## 2024-03-03 PROCEDURE — 6360000002 HC RX W HCPCS: Performed by: NURSE PRACTITIONER

## 2024-03-03 PROCEDURE — 6370000000 HC RX 637 (ALT 250 FOR IP): Performed by: NURSE PRACTITIONER

## 2024-03-03 PROCEDURE — 2580000003 HC RX 258: Performed by: NURSE PRACTITIONER

## 2024-03-03 PROCEDURE — 85025 COMPLETE CBC W/AUTO DIFF WBC: CPT

## 2024-03-03 PROCEDURE — 1200000000 HC SEMI PRIVATE

## 2024-03-03 PROCEDURE — 2580000003 HC RX 258: Performed by: STUDENT IN AN ORGANIZED HEALTH CARE EDUCATION/TRAINING PROGRAM

## 2024-03-03 PROCEDURE — 36415 COLL VENOUS BLD VENIPUNCTURE: CPT

## 2024-03-03 PROCEDURE — 94640 AIRWAY INHALATION TREATMENT: CPT

## 2024-03-03 PROCEDURE — 89220 SPUTUM SPECIMEN COLLECTION: CPT

## 2024-03-03 RX ORDER — POTASSIUM CHLORIDE 20 MEQ/1
40 TABLET, EXTENDED RELEASE ORAL ONCE
Status: COMPLETED | OUTPATIENT
Start: 2024-03-03 | End: 2024-03-03

## 2024-03-03 RX ADMIN — DOCUSATE SODIUM 100 MG: 100 CAPSULE, LIQUID FILLED ORAL at 08:57

## 2024-03-03 RX ADMIN — Medication 1 TABLET: at 18:32

## 2024-03-03 RX ADMIN — ATORVASTATIN CALCIUM 80 MG: 80 TABLET, FILM COATED ORAL at 20:17

## 2024-03-03 RX ADMIN — Medication 1 TABLET: at 08:27

## 2024-03-03 RX ADMIN — CARBOXYMETHYLCELLULOSE SODIUM 1 DROP: 10 GEL OPHTHALMIC at 08:56

## 2024-03-03 RX ADMIN — POTASSIUM CHLORIDE 40 MEQ: 1500 TABLET, EXTENDED RELEASE ORAL at 09:20

## 2024-03-03 RX ADMIN — CARBOXYMETHYLCELLULOSE SODIUM 1 DROP: 10 GEL OPHTHALMIC at 20:40

## 2024-03-03 RX ADMIN — Medication 1 TABLET: at 16:32

## 2024-03-03 RX ADMIN — ALBUTEROL SULFATE 2.5 MG: 2.5 SOLUTION RESPIRATORY (INHALATION) at 09:18

## 2024-03-03 RX ADMIN — POLYETHYLENE GLYCOL 3350 8.5 G: 17 POWDER, FOR SOLUTION ORAL at 20:18

## 2024-03-03 RX ADMIN — Medication 1 TABLET: at 14:25

## 2024-03-03 RX ADMIN — DOCUSATE SODIUM 100 MG: 100 CAPSULE, LIQUID FILLED ORAL at 20:17

## 2024-03-03 RX ADMIN — MELATONIN TAB 3 MG 3 MG: 3 TAB at 20:17

## 2024-03-03 RX ADMIN — SODIUM CHLORIDE, PRESERVATIVE FREE 10 ML: 5 INJECTION INTRAVENOUS at 20:19

## 2024-03-03 RX ADMIN — METRONIDAZOLE 500 MG: 500 INJECTION, SOLUTION INTRAVENOUS at 21:21

## 2024-03-03 RX ADMIN — Medication 1 TABLET: at 12:39

## 2024-03-03 RX ADMIN — ENOXAPARIN SODIUM 40 MG: 100 INJECTION SUBCUTANEOUS at 08:56

## 2024-03-03 RX ADMIN — FLUTICASONE PROPIONATE 2 SPRAY: 50 SPRAY, METERED NASAL at 20:17

## 2024-03-03 RX ADMIN — ASPIRIN 81 MG: 81 TABLET, COATED ORAL at 08:57

## 2024-03-03 RX ADMIN — CEFEPIME 2000 MG: 2 INJECTION, POWDER, FOR SOLUTION INTRAVENOUS at 12:37

## 2024-03-03 RX ADMIN — MIDODRINE HYDROCHLORIDE 5 MG: 5 TABLET ORAL at 14:29

## 2024-03-03 RX ADMIN — METRONIDAZOLE 500 MG: 500 INJECTION, SOLUTION INTRAVENOUS at 12:36

## 2024-03-03 RX ADMIN — Medication 1 PACKET: at 18:31

## 2024-03-03 RX ADMIN — CLOZAPINE 25 MG: 25 TABLET ORAL at 08:57

## 2024-03-03 RX ADMIN — MIDODRINE HYDROCHLORIDE 5 MG: 5 TABLET ORAL at 08:57

## 2024-03-03 RX ADMIN — CLOZAPINE 25 MG: 25 TABLET ORAL at 21:14

## 2024-03-03 RX ADMIN — THERA TABS 1 TABLET: TAB at 08:57

## 2024-03-03 RX ADMIN — SODIUM CHLORIDE: 9 INJECTION, SOLUTION INTRAVENOUS at 03:11

## 2024-03-03 RX ADMIN — PRIMIDONE 50 MG: 50 TABLET ORAL at 20:17

## 2024-03-03 RX ADMIN — Medication 2000 UNITS: at 08:57

## 2024-03-03 RX ADMIN — POLYETHYLENE GLYCOL 3350 8.5 G: 17 POWDER, FOR SOLUTION ORAL at 08:57

## 2024-03-03 RX ADMIN — DONEPEZIL HYDROCHLORIDE 10 MG: 5 TABLET, FILM COATED ORAL at 20:17

## 2024-03-03 RX ADMIN — METRONIDAZOLE 500 MG: 500 INJECTION, SOLUTION INTRAVENOUS at 03:52

## 2024-03-03 RX ADMIN — Medication 1 TABLET: at 20:16

## 2024-03-03 RX ADMIN — SODIUM CHLORIDE, PRESERVATIVE FREE 10 ML: 5 INJECTION INTRAVENOUS at 08:58

## 2024-03-03 RX ADMIN — Medication 1 TABLET: at 10:39

## 2024-03-04 ENCOUNTER — APPOINTMENT (OUTPATIENT)
Dept: CT IMAGING | Age: 79
DRG: 871 | End: 2024-03-04
Payer: MEDICARE

## 2024-03-04 ENCOUNTER — APPOINTMENT (OUTPATIENT)
Dept: GENERAL RADIOLOGY | Age: 79
DRG: 871 | End: 2024-03-04
Payer: MEDICARE

## 2024-03-04 LAB
ANION GAP SERPL CALCULATED.3IONS-SCNC: 8 MMOL/L (ref 3–16)
BACTERIA BLD CULT ORG #2: NORMAL
BACTERIA BLD CULT: NORMAL
BASOPHILS # BLD: 0 K/UL (ref 0–0.2)
BASOPHILS NFR BLD: 0.3 %
BUN SERPL-MCNC: 12 MG/DL (ref 7–20)
CALCIUM SERPL-MCNC: 8.3 MG/DL (ref 8.3–10.6)
CHLORIDE SERPL-SCNC: 111 MMOL/L (ref 99–110)
CO2 SERPL-SCNC: 23 MMOL/L (ref 21–32)
CREAT SERPL-MCNC: <0.5 MG/DL (ref 0.8–1.3)
DEPRECATED RDW RBC AUTO: 13.9 % (ref 12.4–15.4)
EOSINOPHIL # BLD: 0.4 K/UL (ref 0–0.6)
EOSINOPHIL NFR BLD: 6.5 %
GFR SERPLBLD CREATININE-BSD FMLA CKD-EPI: >60 ML/MIN/{1.73_M2}
GLUCOSE BLD-MCNC: 115 MG/DL (ref 70–99)
GLUCOSE SERPL-MCNC: 106 MG/DL (ref 70–99)
HCT VFR BLD AUTO: 36 % (ref 40.5–52.5)
HGB BLD-MCNC: 12.2 G/DL (ref 13.5–17.5)
LYMPHOCYTES # BLD: 1.3 K/UL (ref 1–5.1)
LYMPHOCYTES NFR BLD: 20.9 %
MCH RBC QN AUTO: 30.8 PG (ref 26–34)
MCHC RBC AUTO-ENTMCNC: 33.9 G/DL (ref 31–36)
MCV RBC AUTO: 91 FL (ref 80–100)
MONOCYTES # BLD: 0.8 K/UL (ref 0–1.3)
MONOCYTES NFR BLD: 12.3 %
NEUTROPHILS # BLD: 3.7 K/UL (ref 1.7–7.7)
NEUTROPHILS NFR BLD: 60 %
PERFORMED ON: ABNORMAL
PLATELET # BLD AUTO: 206 K/UL (ref 135–450)
PMV BLD AUTO: 7.1 FL (ref 5–10.5)
POTASSIUM SERPL-SCNC: 3.8 MMOL/L (ref 3.5–5.1)
RBC # BLD AUTO: 3.96 M/UL (ref 4.2–5.9)
SODIUM SERPL-SCNC: 142 MMOL/L (ref 136–145)
WBC # BLD AUTO: 6.1 K/UL (ref 4–11)

## 2024-03-04 PROCEDURE — 97530 THERAPEUTIC ACTIVITIES: CPT

## 2024-03-04 PROCEDURE — 94761 N-INVAS EAR/PLS OXIMETRY MLT: CPT

## 2024-03-04 PROCEDURE — 2580000003 HC RX 258: Performed by: NURSE PRACTITIONER

## 2024-03-04 PROCEDURE — 80048 BASIC METABOLIC PNL TOTAL CA: CPT

## 2024-03-04 PROCEDURE — 74230 X-RAY XM SWLNG FUNCJ C+: CPT

## 2024-03-04 PROCEDURE — 85025 COMPLETE CBC W/AUTO DIFF WBC: CPT

## 2024-03-04 PROCEDURE — 99233 SBSQ HOSP IP/OBS HIGH 50: CPT | Performed by: INTERNAL MEDICINE

## 2024-03-04 PROCEDURE — 97535 SELF CARE MNGMENT TRAINING: CPT

## 2024-03-04 PROCEDURE — 70498 CT ANGIOGRAPHY NECK: CPT

## 2024-03-04 PROCEDURE — 92611 MOTION FLUOROSCOPY/SWALLOW: CPT

## 2024-03-04 PROCEDURE — 6360000002 HC RX W HCPCS: Performed by: NURSE PRACTITIONER

## 2024-03-04 PROCEDURE — 6370000000 HC RX 637 (ALT 250 FOR IP): Performed by: NURSE PRACTITIONER

## 2024-03-04 PROCEDURE — 6360000004 HC RX CONTRAST MEDICATION: Performed by: STUDENT IN AN ORGANIZED HEALTH CARE EDUCATION/TRAINING PROGRAM

## 2024-03-04 PROCEDURE — 97116 GAIT TRAINING THERAPY: CPT

## 2024-03-04 PROCEDURE — 94669 MECHANICAL CHEST WALL OSCILL: CPT

## 2024-03-04 PROCEDURE — 36415 COLL VENOUS BLD VENIPUNCTURE: CPT

## 2024-03-04 PROCEDURE — 1200000000 HC SEMI PRIVATE

## 2024-03-04 PROCEDURE — 2700000000 HC OXYGEN THERAPY PER DAY

## 2024-03-04 PROCEDURE — 70450 CT HEAD/BRAIN W/O DYE: CPT

## 2024-03-04 PROCEDURE — 92526 ORAL FUNCTION THERAPY: CPT

## 2024-03-04 PROCEDURE — 2060000000 HC ICU INTERMEDIATE R&B

## 2024-03-04 RX ORDER — POLYETHYLENE GLYCOL 3350 17 G/17G
17 POWDER, FOR SOLUTION ORAL DAILY PRN
Status: DISCONTINUED | OUTPATIENT
Start: 2024-03-04 | End: 2024-03-11 | Stop reason: HOSPADM

## 2024-03-04 RX ORDER — SODIUM CHLORIDE 9 MG/ML
INJECTION, SOLUTION INTRAVENOUS CONTINUOUS
Status: DISCONTINUED | OUTPATIENT
Start: 2024-03-04 | End: 2024-03-07

## 2024-03-04 RX ORDER — SODIUM CHLORIDE 0.9 % (FLUSH) 0.9 %
5-40 SYRINGE (ML) INJECTION EVERY 12 HOURS SCHEDULED
Status: DISCONTINUED | OUTPATIENT
Start: 2024-03-04 | End: 2024-03-11 | Stop reason: HOSPADM

## 2024-03-04 RX ORDER — SODIUM CHLORIDE 0.9 % (FLUSH) 0.9 %
5-40 SYRINGE (ML) INJECTION PRN
Status: DISCONTINUED | OUTPATIENT
Start: 2024-03-04 | End: 2024-03-11 | Stop reason: HOSPADM

## 2024-03-04 RX ORDER — LABETALOL HYDROCHLORIDE 5 MG/ML
10 INJECTION, SOLUTION INTRAVENOUS EVERY 4 HOURS PRN
Status: DISCONTINUED | OUTPATIENT
Start: 2024-03-04 | End: 2024-03-11 | Stop reason: HOSPADM

## 2024-03-04 RX ORDER — SODIUM CHLORIDE 9 MG/ML
INJECTION, SOLUTION INTRAVENOUS PRN
Status: DISCONTINUED | OUTPATIENT
Start: 2024-03-04 | End: 2024-03-11 | Stop reason: HOSPADM

## 2024-03-04 RX ADMIN — CLOZAPINE 25 MG: 25 TABLET ORAL at 21:41

## 2024-03-04 RX ADMIN — IOPAMIDOL 75 ML: 755 INJECTION, SOLUTION INTRAVENOUS at 19:31

## 2024-03-04 RX ADMIN — THERA TABS 1 TABLET: TAB at 12:25

## 2024-03-04 RX ADMIN — Medication 1 TABLET: at 18:30

## 2024-03-04 RX ADMIN — Medication 1 TABLET: at 12:26

## 2024-03-04 RX ADMIN — Medication 1 TABLET: at 20:30

## 2024-03-04 RX ADMIN — ATORVASTATIN CALCIUM 80 MG: 80 TABLET, FILM COATED ORAL at 21:35

## 2024-03-04 RX ADMIN — SODIUM CHLORIDE, PRESERVATIVE FREE 10 ML: 5 INJECTION INTRAVENOUS at 23:13

## 2024-03-04 RX ADMIN — CARBOXYMETHYLCELLULOSE SODIUM 1 DROP: 10 GEL OPHTHALMIC at 21:35

## 2024-03-04 RX ADMIN — CEFEPIME 2000 MG: 2 INJECTION, POWDER, FOR SOLUTION INTRAVENOUS at 12:39

## 2024-03-04 RX ADMIN — Medication 1 TABLET: at 09:00

## 2024-03-04 RX ADMIN — SODIUM CHLORIDE: 9 INJECTION, SOLUTION INTRAVENOUS at 12:39

## 2024-03-04 RX ADMIN — Medication 1 TABLET: at 22:49

## 2024-03-04 RX ADMIN — SODIUM CHLORIDE, PRESERVATIVE FREE 10 ML: 5 INJECTION INTRAVENOUS at 21:35

## 2024-03-04 RX ADMIN — POLYETHYLENE GLYCOL 3350 8.5 G: 17 POWDER, FOR SOLUTION ORAL at 21:34

## 2024-03-04 RX ADMIN — METRONIDAZOLE 500 MG: 500 INJECTION, SOLUTION INTRAVENOUS at 03:52

## 2024-03-04 RX ADMIN — Medication 2000 UNITS: at 12:25

## 2024-03-04 RX ADMIN — PRIMIDONE 50 MG: 50 TABLET ORAL at 21:35

## 2024-03-04 RX ADMIN — DONEPEZIL HYDROCHLORIDE 10 MG: 5 TABLET, FILM COATED ORAL at 21:35

## 2024-03-04 RX ADMIN — METRONIDAZOLE 500 MG: 500 INJECTION, SOLUTION INTRAVENOUS at 21:38

## 2024-03-04 RX ADMIN — ACETAMINOPHEN 650 MG: 325 TABLET ORAL at 12:25

## 2024-03-04 RX ADMIN — DOCUSATE SODIUM 100 MG: 100 CAPSULE, LIQUID FILLED ORAL at 21:35

## 2024-03-04 RX ADMIN — FLUTICASONE PROPIONATE 2 SPRAY: 50 SPRAY, METERED NASAL at 21:35

## 2024-03-04 RX ADMIN — DOCUSATE SODIUM 100 MG: 100 CAPSULE, LIQUID FILLED ORAL at 12:25

## 2024-03-04 RX ADMIN — SODIUM CHLORIDE: 9 INJECTION, SOLUTION INTRAVENOUS at 21:36

## 2024-03-04 RX ADMIN — METRONIDAZOLE 500 MG: 500 INJECTION, SOLUTION INTRAVENOUS at 15:19

## 2024-03-04 RX ADMIN — Medication 1 TABLET: at 14:00

## 2024-03-04 RX ADMIN — Medication 1 PACKET: at 21:34

## 2024-03-04 RX ADMIN — ENOXAPARIN SODIUM 40 MG: 100 INJECTION SUBCUTANEOUS at 12:25

## 2024-03-04 RX ADMIN — ASPIRIN 81 MG: 81 TABLET, COATED ORAL at 12:25

## 2024-03-04 RX ADMIN — CEFEPIME 2000 MG: 2 INJECTION, POWDER, FOR SOLUTION INTRAVENOUS at 01:03

## 2024-03-04 ASSESSMENT — PAIN SCALES - GENERAL: PAINLEVEL_OUTOF10: 0

## 2024-03-04 NOTE — PROCEDURES
Facility/Department: [address] ONCOLOGY  MODIFIED BARIUM SWALLOW EVALUATION    Patient: Pacheco Prince   : 1945   MRN: [id number]      Evaluation Date: 3/4/2024   Admitting Diagnosis: Hypoxemia [R09.02]  General weakness [R53.1]  Elevated troponin [R79.89]  Elevated brain natriuretic peptide (BNP) level [R79.89]  Pneumonia of both lower lobes due to infectious organism [J18.9]  Pneumonia of both lungs due to infectious organism, unspecified part of lung [J18.9]  Treatment Diagnosis: Oropharyngeal Dysphagia   Pain:  Did not state                           Ordering MD: Shelton Arana MD  Radiologist: Dr. Rooney  Date of Evaluation: 3/4/2024  Type of Study: Modified Barium Swallowing Study (MBS)  Diet Prior to Study:  Dysphagia II Minced and Moist  Thin liquids    Reason for referral/HPI: Pacheco Prince is a 79 y.o. male with a pmh of aspiration pneumonia, Parkinson's, hyperlipidemia, hypertension carotid artery stenosis, expressive aphasia, who presents with worsening SOB, fever, cough and generalized weakness of 2 days duration whilst in a cruise ship.  Found to have bilateral lung pneumonia.       Impression:  Modified Barium Swallow evaluation completed on 3/4/2024. Patient presents with mild oropharyngeal dysphagia secondary to prolonged mastication, premature spillage to pyriform sinuses, reduced AP propulsion, delayed swallow initiation, reduced laryngeal elevation, decreased tongue base retraction and reduced pharyngeal contraction. Pt demonstrated aspiration after the swallow with initial trial of thin liquids via tsp, suspect in relation to sensation although pt demonstrated a hard cough in response. No laryngeal penetration or aspiration was viewed with thin liquids via cup/straw, mildly (nectar) thick liquids, puree, soft solids or regular solids. Trace residue noted within the vallecular space, tongue base and pyriforms sinuses with thin liquids, slightly increased with mildly

## 2024-03-05 LAB
ANION GAP SERPL CALCULATED.3IONS-SCNC: 8 MMOL/L (ref 3–16)
BASOPHILS # BLD: 0 K/UL (ref 0–0.2)
BASOPHILS NFR BLD: 0.4 %
BUN SERPL-MCNC: 10 MG/DL (ref 7–20)
CALCIUM SERPL-MCNC: 8.6 MG/DL (ref 8.3–10.6)
CHLORIDE SERPL-SCNC: 110 MMOL/L (ref 99–110)
CHOLEST SERPL-MCNC: 80 MG/DL (ref 0–199)
CO2 SERPL-SCNC: 25 MMOL/L (ref 21–32)
CREAT SERPL-MCNC: 0.5 MG/DL (ref 0.8–1.3)
DEPRECATED RDW RBC AUTO: 13.8 % (ref 12.4–15.4)
EOSINOPHIL # BLD: 0.4 K/UL (ref 0–0.6)
EOSINOPHIL NFR BLD: 7.9 %
EST. AVERAGE GLUCOSE BLD GHB EST-MCNC: 128.4 MG/DL
GFR SERPLBLD CREATININE-BSD FMLA CKD-EPI: >60 ML/MIN/{1.73_M2}
GLUCOSE SERPL-MCNC: 114 MG/DL (ref 70–99)
HBA1C MFR BLD: 6.1 %
HCT VFR BLD AUTO: 36.6 % (ref 40.5–52.5)
HDLC SERPL-MCNC: 21 MG/DL (ref 40–60)
HGB BLD-MCNC: 12.1 G/DL (ref 13.5–17.5)
LDLC SERPL CALC-MCNC: 46 MG/DL
LYMPHOCYTES # BLD: 1.4 K/UL (ref 1–5.1)
LYMPHOCYTES NFR BLD: 24.4 %
MCH RBC QN AUTO: 30.2 PG (ref 26–34)
MCHC RBC AUTO-ENTMCNC: 33.2 G/DL (ref 31–36)
MCV RBC AUTO: 91 FL (ref 80–100)
MONOCYTES # BLD: 0.7 K/UL (ref 0–1.3)
MONOCYTES NFR BLD: 12.4 %
NEUTROPHILS # BLD: 3.1 K/UL (ref 1.7–7.7)
NEUTROPHILS NFR BLD: 54.9 %
PLATELET # BLD AUTO: 212 K/UL (ref 135–450)
PMV BLD AUTO: 7.3 FL (ref 5–10.5)
POTASSIUM SERPL-SCNC: 3.7 MMOL/L (ref 3.5–5.1)
PROCALCITONIN SERPL IA-MCNC: 0.13 NG/ML (ref 0–0.15)
RBC # BLD AUTO: 4.02 M/UL (ref 4.2–5.9)
SODIUM SERPL-SCNC: 143 MMOL/L (ref 136–145)
TRIGL SERPL-MCNC: 66 MG/DL (ref 0–150)
VLDLC SERPL CALC-MCNC: 13 MG/DL
WBC # BLD AUTO: 5.6 K/UL (ref 4–11)

## 2024-03-05 PROCEDURE — 6360000002 HC RX W HCPCS: Performed by: NURSE PRACTITIONER

## 2024-03-05 PROCEDURE — 92526 ORAL FUNCTION THERAPY: CPT

## 2024-03-05 PROCEDURE — 94669 MECHANICAL CHEST WALL OSCILL: CPT

## 2024-03-05 PROCEDURE — 85025 COMPLETE CBC W/AUTO DIFF WBC: CPT

## 2024-03-05 PROCEDURE — 97530 THERAPEUTIC ACTIVITIES: CPT

## 2024-03-05 PROCEDURE — 1200000000 HC SEMI PRIVATE

## 2024-03-05 PROCEDURE — 80048 BASIC METABOLIC PNL TOTAL CA: CPT

## 2024-03-05 PROCEDURE — 80061 LIPID PANEL: CPT

## 2024-03-05 PROCEDURE — 2580000003 HC RX 258: Performed by: NURSE PRACTITIONER

## 2024-03-05 PROCEDURE — 84145 PROCALCITONIN (PCT): CPT

## 2024-03-05 PROCEDURE — 97116 GAIT TRAINING THERAPY: CPT

## 2024-03-05 PROCEDURE — 83036 HEMOGLOBIN GLYCOSYLATED A1C: CPT

## 2024-03-05 PROCEDURE — 2060000000 HC ICU INTERMEDIATE R&B

## 2024-03-05 PROCEDURE — 97535 SELF CARE MNGMENT TRAINING: CPT

## 2024-03-05 PROCEDURE — 6370000000 HC RX 637 (ALT 250 FOR IP): Performed by: STUDENT IN AN ORGANIZED HEALTH CARE EDUCATION/TRAINING PROGRAM

## 2024-03-05 PROCEDURE — 36415 COLL VENOUS BLD VENIPUNCTURE: CPT

## 2024-03-05 PROCEDURE — 97112 NEUROMUSCULAR REEDUCATION: CPT

## 2024-03-05 PROCEDURE — 2580000003 HC RX 258: Performed by: STUDENT IN AN ORGANIZED HEALTH CARE EDUCATION/TRAINING PROGRAM

## 2024-03-05 PROCEDURE — 99233 SBSQ HOSP IP/OBS HIGH 50: CPT | Performed by: INTERNAL MEDICINE

## 2024-03-05 PROCEDURE — 94640 AIRWAY INHALATION TREATMENT: CPT

## 2024-03-05 PROCEDURE — 6370000000 HC RX 637 (ALT 250 FOR IP): Performed by: NURSE PRACTITIONER

## 2024-03-05 RX ORDER — LACTOBACILLUS RHAMNOSUS GG 10B CELL
1 CAPSULE ORAL 2 TIMES DAILY WITH MEALS
Status: DISCONTINUED | OUTPATIENT
Start: 2024-03-05 | End: 2024-03-11 | Stop reason: HOSPADM

## 2024-03-05 RX ORDER — SODIUM CHLORIDE FOR INHALATION 3 %
4 VIAL, NEBULIZER (ML) INHALATION 2 TIMES DAILY
Status: DISCONTINUED | OUTPATIENT
Start: 2024-03-05 | End: 2024-03-07

## 2024-03-05 RX ADMIN — CLOZAPINE 25 MG: 25 TABLET ORAL at 20:28

## 2024-03-05 RX ADMIN — ALBUTEROL SULFATE 2.5 MG: 2.5 SOLUTION RESPIRATORY (INHALATION) at 11:51

## 2024-03-05 RX ADMIN — POLYETHYLENE GLYCOL 3350 8.5 G: 17 POWDER, FOR SOLUTION ORAL at 08:11

## 2024-03-05 RX ADMIN — CEFEPIME 2000 MG: 2 INJECTION, POWDER, FOR SOLUTION INTRAVENOUS at 00:09

## 2024-03-05 RX ADMIN — METRONIDAZOLE 500 MG: 500 INJECTION, SOLUTION INTRAVENOUS at 08:51

## 2024-03-05 RX ADMIN — CARBOXYMETHYLCELLULOSE SODIUM 1 DROP: 10 GEL OPHTHALMIC at 20:33

## 2024-03-05 RX ADMIN — ATORVASTATIN CALCIUM 80 MG: 80 TABLET, FILM COATED ORAL at 20:28

## 2024-03-05 RX ADMIN — PRIMIDONE 50 MG: 50 TABLET ORAL at 20:28

## 2024-03-05 RX ADMIN — METRONIDAZOLE 500 MG: 500 INJECTION, SOLUTION INTRAVENOUS at 15:35

## 2024-03-05 RX ADMIN — SODIUM CHLORIDE: 9 INJECTION, SOLUTION INTRAVENOUS at 23:38

## 2024-03-05 RX ADMIN — PANTOPRAZOLE SODIUM 40 MG: 40 TABLET, DELAYED RELEASE ORAL at 08:09

## 2024-03-05 RX ADMIN — CEFEPIME 2000 MG: 2 INJECTION, POWDER, FOR SOLUTION INTRAVENOUS at 23:37

## 2024-03-05 RX ADMIN — Medication 1 TABLET: at 18:01

## 2024-03-05 RX ADMIN — Medication 1 TABLET: at 16:13

## 2024-03-05 RX ADMIN — IPRATROPIUM BROMIDE 2 SPRAY: 42 SPRAY NASAL at 08:39

## 2024-03-05 RX ADMIN — CLOZAPINE 25 MG: 25 TABLET ORAL at 10:09

## 2024-03-05 RX ADMIN — Medication 4 ML: at 11:51

## 2024-03-05 RX ADMIN — IPRATROPIUM BROMIDE 2 SPRAY: 42 SPRAY NASAL at 13:54

## 2024-03-05 RX ADMIN — Medication 1 TABLET: at 04:08

## 2024-03-05 RX ADMIN — CARBOXYMETHYLCELLULOSE SODIUM 1 DROP: 10 GEL OPHTHALMIC at 08:37

## 2024-03-05 RX ADMIN — Medication 2000 UNITS: at 08:09

## 2024-03-05 RX ADMIN — Medication 1 CAPSULE: at 08:39

## 2024-03-05 RX ADMIN — CEFEPIME 2000 MG: 2 INJECTION, POWDER, FOR SOLUTION INTRAVENOUS at 12:18

## 2024-03-05 RX ADMIN — POLYETHYLENE GLYCOL 3350 8.5 G: 17 POWDER, FOR SOLUTION ORAL at 20:28

## 2024-03-05 RX ADMIN — DOCUSATE SODIUM 100 MG: 100 CAPSULE, LIQUID FILLED ORAL at 20:28

## 2024-03-05 RX ADMIN — Medication 1 TABLET: at 00:42

## 2024-03-05 RX ADMIN — DONEPEZIL HYDROCHLORIDE 10 MG: 5 TABLET, FILM COATED ORAL at 20:28

## 2024-03-05 RX ADMIN — ASPIRIN 81 MG: 81 TABLET, COATED ORAL at 08:08

## 2024-03-05 RX ADMIN — Medication 1 TABLET: at 10:13

## 2024-03-05 RX ADMIN — Medication 1 TABLET: at 12:17

## 2024-03-05 RX ADMIN — Medication 1 CAPSULE: at 18:01

## 2024-03-05 RX ADMIN — Medication 1 PACKET: at 20:29

## 2024-03-05 RX ADMIN — FLUTICASONE PROPIONATE 2 SPRAY: 50 SPRAY, METERED NASAL at 20:33

## 2024-03-05 RX ADMIN — Medication 1 TABLET: at 13:52

## 2024-03-05 RX ADMIN — THERA TABS 1 TABLET: TAB at 08:08

## 2024-03-05 RX ADMIN — Medication 1 TABLET: at 20:00

## 2024-03-05 RX ADMIN — DOCUSATE SODIUM 100 MG: 100 CAPSULE, LIQUID FILLED ORAL at 08:09

## 2024-03-05 ASSESSMENT — PAIN SCALES - GENERAL
PAINLEVEL_OUTOF10: 0
PAINLEVEL_OUTOF10: 0

## 2024-03-06 LAB
ANION GAP SERPL CALCULATED.3IONS-SCNC: 8 MMOL/L (ref 3–16)
BASOPHILS # BLD: 0 K/UL (ref 0–0.2)
BASOPHILS NFR BLD: 0.4 %
BUN SERPL-MCNC: 9 MG/DL (ref 7–20)
CALCIUM SERPL-MCNC: 8.5 MG/DL (ref 8.3–10.6)
CHLORIDE SERPL-SCNC: 108 MMOL/L (ref 99–110)
CO2 SERPL-SCNC: 26 MMOL/L (ref 21–32)
CREAT SERPL-MCNC: 0.5 MG/DL (ref 0.8–1.3)
DEPRECATED RDW RBC AUTO: 13.8 % (ref 12.4–15.4)
EOSINOPHIL # BLD: 0.3 K/UL (ref 0–0.6)
EOSINOPHIL NFR BLD: 5.7 %
GFR SERPLBLD CREATININE-BSD FMLA CKD-EPI: >60 ML/MIN/{1.73_M2}
GLUCOSE SERPL-MCNC: 130 MG/DL (ref 70–99)
HCT VFR BLD AUTO: 35.2 % (ref 40.5–52.5)
HGB BLD-MCNC: 12 G/DL (ref 13.5–17.5)
LYMPHOCYTES # BLD: 1.4 K/UL (ref 1–5.1)
LYMPHOCYTES NFR BLD: 23.7 %
MAGNESIUM SERPL-MCNC: 1.8 MG/DL (ref 1.8–2.4)
MCH RBC QN AUTO: 30.7 PG (ref 26–34)
MCHC RBC AUTO-ENTMCNC: 33.9 G/DL (ref 31–36)
MCV RBC AUTO: 90.5 FL (ref 80–100)
MONOCYTES # BLD: 0.7 K/UL (ref 0–1.3)
MONOCYTES NFR BLD: 12.6 %
NEUTROPHILS # BLD: 3.4 K/UL (ref 1.7–7.7)
NEUTROPHILS NFR BLD: 57.6 %
PLATELET # BLD AUTO: 223 K/UL (ref 135–450)
PMV BLD AUTO: 7 FL (ref 5–10.5)
POTASSIUM SERPL-SCNC: 3.5 MMOL/L (ref 3.5–5.1)
RBC # BLD AUTO: 3.9 M/UL (ref 4.2–5.9)
SODIUM SERPL-SCNC: 142 MMOL/L (ref 136–145)
WBC # BLD AUTO: 5.8 K/UL (ref 4–11)

## 2024-03-06 PROCEDURE — 2700000000 HC OXYGEN THERAPY PER DAY

## 2024-03-06 PROCEDURE — 6370000000 HC RX 637 (ALT 250 FOR IP): Performed by: STUDENT IN AN ORGANIZED HEALTH CARE EDUCATION/TRAINING PROGRAM

## 2024-03-06 PROCEDURE — 92526 ORAL FUNCTION THERAPY: CPT

## 2024-03-06 PROCEDURE — 83735 ASSAY OF MAGNESIUM: CPT

## 2024-03-06 PROCEDURE — 85025 COMPLETE CBC W/AUTO DIFF WBC: CPT

## 2024-03-06 PROCEDURE — 97112 NEUROMUSCULAR REEDUCATION: CPT

## 2024-03-06 PROCEDURE — 6360000002 HC RX W HCPCS: Performed by: STUDENT IN AN ORGANIZED HEALTH CARE EDUCATION/TRAINING PROGRAM

## 2024-03-06 PROCEDURE — 97530 THERAPEUTIC ACTIVITIES: CPT

## 2024-03-06 PROCEDURE — 94669 MECHANICAL CHEST WALL OSCILL: CPT

## 2024-03-06 PROCEDURE — 36415 COLL VENOUS BLD VENIPUNCTURE: CPT

## 2024-03-06 PROCEDURE — 2580000003 HC RX 258: Performed by: NURSE PRACTITIONER

## 2024-03-06 PROCEDURE — 97116 GAIT TRAINING THERAPY: CPT

## 2024-03-06 PROCEDURE — 94640 AIRWAY INHALATION TREATMENT: CPT

## 2024-03-06 PROCEDURE — 94761 N-INVAS EAR/PLS OXIMETRY MLT: CPT

## 2024-03-06 PROCEDURE — 80048 BASIC METABOLIC PNL TOTAL CA: CPT

## 2024-03-06 PROCEDURE — 6360000002 HC RX W HCPCS: Performed by: NURSE PRACTITIONER

## 2024-03-06 PROCEDURE — 2580000003 HC RX 258: Performed by: STUDENT IN AN ORGANIZED HEALTH CARE EDUCATION/TRAINING PROGRAM

## 2024-03-06 PROCEDURE — 1200000000 HC SEMI PRIVATE

## 2024-03-06 PROCEDURE — 2060000000 HC ICU INTERMEDIATE R&B

## 2024-03-06 PROCEDURE — 6370000000 HC RX 637 (ALT 250 FOR IP): Performed by: NURSE PRACTITIONER

## 2024-03-06 RX ORDER — HYDRALAZINE HYDROCHLORIDE 20 MG/ML
10 INJECTION INTRAMUSCULAR; INTRAVENOUS ONCE
Status: COMPLETED | OUTPATIENT
Start: 2024-03-06 | End: 2024-03-06

## 2024-03-06 RX ADMIN — DOCUSATE SODIUM 100 MG: 100 CAPSULE, LIQUID FILLED ORAL at 09:52

## 2024-03-06 RX ADMIN — SODIUM CHLORIDE, PRESERVATIVE FREE 10 ML: 5 INJECTION INTRAVENOUS at 12:04

## 2024-03-06 RX ADMIN — PRIMIDONE 50 MG: 50 TABLET ORAL at 20:05

## 2024-03-06 RX ADMIN — METRONIDAZOLE 500 MG: 500 INJECTION, SOLUTION INTRAVENOUS at 00:14

## 2024-03-06 RX ADMIN — METRONIDAZOLE 500 MG: 500 INJECTION, SOLUTION INTRAVENOUS at 09:07

## 2024-03-06 RX ADMIN — Medication 1 TABLET: at 07:07

## 2024-03-06 RX ADMIN — CLOZAPINE 25 MG: 25 TABLET ORAL at 21:12

## 2024-03-06 RX ADMIN — ALBUTEROL SULFATE 2.5 MG: 2.5 SOLUTION RESPIRATORY (INHALATION) at 08:09

## 2024-03-06 RX ADMIN — Medication 2000 UNITS: at 09:52

## 2024-03-06 RX ADMIN — DOCUSATE SODIUM 100 MG: 100 CAPSULE, LIQUID FILLED ORAL at 20:05

## 2024-03-06 RX ADMIN — SODIUM CHLORIDE: 9 INJECTION, SOLUTION INTRAVENOUS at 12:03

## 2024-03-06 RX ADMIN — FLUTICASONE PROPIONATE 2 SPRAY: 50 SPRAY, METERED NASAL at 20:11

## 2024-03-06 RX ADMIN — Medication 1 CAPSULE: at 09:52

## 2024-03-06 RX ADMIN — POLYETHYLENE GLYCOL 3350 8.5 G: 17 POWDER, FOR SOLUTION ORAL at 20:06

## 2024-03-06 RX ADMIN — POLYETHYLENE GLYCOL 3350 8.5 G: 17 POWDER, FOR SOLUTION ORAL at 09:53

## 2024-03-06 RX ADMIN — Medication 1 TABLET: at 17:46

## 2024-03-06 RX ADMIN — Medication 1 CAPSULE: at 17:47

## 2024-03-06 RX ADMIN — Medication 1 TABLET: at 09:57

## 2024-03-06 RX ADMIN — DONEPEZIL HYDROCHLORIDE 10 MG: 5 TABLET, FILM COATED ORAL at 20:05

## 2024-03-06 RX ADMIN — PANTOPRAZOLE SODIUM 40 MG: 40 TABLET, DELAYED RELEASE ORAL at 07:06

## 2024-03-06 RX ADMIN — CARBOXYMETHYLCELLULOSE SODIUM 1 DROP: 10 GEL OPHTHALMIC at 10:05

## 2024-03-06 RX ADMIN — ENOXAPARIN SODIUM 40 MG: 100 INJECTION SUBCUTANEOUS at 09:54

## 2024-03-06 RX ADMIN — METRONIDAZOLE 500 MG: 500 INJECTION, SOLUTION INTRAVENOUS at 16:19

## 2024-03-06 RX ADMIN — IPRATROPIUM BROMIDE 2 SPRAY: 42 SPRAY NASAL at 09:57

## 2024-03-06 RX ADMIN — Medication 1 TABLET: at 11:58

## 2024-03-06 RX ADMIN — ASPIRIN 81 MG: 81 TABLET, COATED ORAL at 09:51

## 2024-03-06 RX ADMIN — CLOZAPINE 25 MG: 25 TABLET ORAL at 09:52

## 2024-03-06 RX ADMIN — SODIUM CHLORIDE, PRESERVATIVE FREE 10 ML: 5 INJECTION INTRAVENOUS at 09:00

## 2024-03-06 RX ADMIN — CEFEPIME 2000 MG: 2 INJECTION, POWDER, FOR SOLUTION INTRAVENOUS at 23:30

## 2024-03-06 RX ADMIN — SODIUM CHLORIDE, PRESERVATIVE FREE 10 ML: 5 INJECTION INTRAVENOUS at 11:59

## 2024-03-06 RX ADMIN — METRONIDAZOLE 500 MG: 500 INJECTION, SOLUTION INTRAVENOUS at 23:34

## 2024-03-06 RX ADMIN — ATORVASTATIN CALCIUM 80 MG: 80 TABLET, FILM COATED ORAL at 20:05

## 2024-03-06 RX ADMIN — Medication 1 PACKET: at 20:05

## 2024-03-06 RX ADMIN — HYDRALAZINE HYDROCHLORIDE 10 MG: 20 INJECTION INTRAMUSCULAR; INTRAVENOUS at 16:22

## 2024-03-06 RX ADMIN — ACETAMINOPHEN 650 MG: 325 TABLET ORAL at 07:09

## 2024-03-06 RX ADMIN — CEFEPIME 2000 MG: 2 INJECTION, POWDER, FOR SOLUTION INTRAVENOUS at 12:04

## 2024-03-06 RX ADMIN — CARBOXYMETHYLCELLULOSE SODIUM 1 DROP: 10 GEL OPHTHALMIC at 20:06

## 2024-03-06 RX ADMIN — Medication 4 ML: at 08:09

## 2024-03-06 RX ADMIN — IPRATROPIUM BROMIDE 2 SPRAY: 42 SPRAY NASAL at 14:02

## 2024-03-06 RX ADMIN — THERA TABS 1 TABLET: TAB at 09:52

## 2024-03-06 RX ADMIN — Medication 1 TABLET: at 20:11

## 2024-03-06 RX ADMIN — Medication 1 TABLET: at 14:01

## 2024-03-06 ASSESSMENT — PAIN SCALES - GENERAL: PAINLEVEL_OUTOF10: 0

## 2024-03-06 NOTE — ACP (ADVANCE CARE PLANNING)
Advanced Care Planning Note    Purpose of Encounter: Advanced care planning in light of PNA  Parties In Attendance: Tiffany Newman (POA)  Decisional Capacity: Yes  Subjective: Patient has cough  Objective: Cr 0.5  Goals of Care Determination: Patient wants full support  Plan:  IV Abx, Pulm c/s,   Code Status: Full   Time spent on Advanced care Plannin minutes  Advanced Care Planning Documents: Completed advanced directives on chart, Tiffany, is the POA.    Efren Cole MD  3/6/2024 9:23 AM

## 2024-03-07 ENCOUNTER — APPOINTMENT (OUTPATIENT)
Dept: GENERAL RADIOLOGY | Age: 79
DRG: 871 | End: 2024-03-07
Payer: MEDICARE

## 2024-03-07 LAB
ALBUMIN SERPL-MCNC: 3.3 G/DL (ref 3.4–5)
ALP SERPL-CCNC: 68 U/L (ref 40–129)
ALT SERPL-CCNC: 20 U/L (ref 10–40)
ANION GAP SERPL CALCULATED.3IONS-SCNC: 9 MMOL/L (ref 3–16)
AST SERPL-CCNC: 95 U/L (ref 15–37)
BASE EXCESS BLDA CALC-SCNC: 3.2 MMOL/L (ref -3–3)
BASOPHILS # BLD: 0.1 K/UL (ref 0–0.2)
BASOPHILS NFR BLD: 1.1 %
BILIRUB DIRECT SERPL-MCNC: <0.2 MG/DL (ref 0–0.3)
BILIRUB INDIRECT SERPL-MCNC: ABNORMAL MG/DL (ref 0–1)
BILIRUB SERPL-MCNC: 0.4 MG/DL (ref 0–1)
BUN SERPL-MCNC: 10 MG/DL (ref 7–20)
CALCIUM SERPL-MCNC: 8.7 MG/DL (ref 8.3–10.6)
CHLORIDE SERPL-SCNC: 109 MMOL/L (ref 99–110)
CK SERPL-CCNC: 115 U/L (ref 39–308)
CO2 BLDA-SCNC: 63.6 MMOL/L
CO2 SERPL-SCNC: 23 MMOL/L (ref 21–32)
COHGB MFR BLDA: 1.2 % (ref 0–1.5)
CREAT SERPL-MCNC: 0.5 MG/DL (ref 0.8–1.3)
DEPRECATED RDW RBC AUTO: 13.8 % (ref 12.4–15.4)
EOSINOPHIL # BLD: 0.4 K/UL (ref 0–0.6)
EOSINOPHIL NFR BLD: 6 %
GFR SERPLBLD CREATININE-BSD FMLA CKD-EPI: >60 ML/MIN/{1.73_M2}
GLUCOSE SERPL-MCNC: 110 MG/DL (ref 70–99)
HCO3 BLDA-SCNC: 27.2 MMOL/L (ref 21–29)
HCT VFR BLD AUTO: 38.9 % (ref 40.5–52.5)
HGB BLD-MCNC: 12.8 G/DL (ref 13.5–17.5)
HGB BLDA-MCNC: 13.1 G/DL (ref 13.5–17.5)
INR PPP: 1.23 (ref 0.84–1.16)
LYMPHOCYTES # BLD: 1.4 K/UL (ref 1–5.1)
LYMPHOCYTES NFR BLD: 23.2 %
MAGNESIUM SERPL-MCNC: 1.9 MG/DL (ref 1.8–2.4)
MCH RBC QN AUTO: 30.1 PG (ref 26–34)
MCHC RBC AUTO-ENTMCNC: 33 G/DL (ref 31–36)
MCV RBC AUTO: 91.1 FL (ref 80–100)
METHGB MFR BLDA: 0.3 %
MONOCYTES # BLD: 0.7 K/UL (ref 0–1.3)
MONOCYTES NFR BLD: 11.3 %
NEUTROPHILS # BLD: 3.6 K/UL (ref 1.7–7.7)
NEUTROPHILS NFR BLD: 58.4 %
O2 THERAPY: ABNORMAL
PCO2 BLDA: 38.4 MMHG (ref 35–45)
PH BLDA: 7.46 [PH] (ref 7.35–7.45)
PLATELET # BLD AUTO: 250 K/UL (ref 135–450)
PMV BLD AUTO: 7.1 FL (ref 5–10.5)
PO2 BLDA: 86.1 MMHG (ref 75–108)
POTASSIUM SERPL-SCNC: 3.6 MMOL/L (ref 3.5–5.1)
PROCALCITONIN SERPL IA-MCNC: 0.1 NG/ML (ref 0–0.15)
PROT SERPL-MCNC: 5.1 G/DL (ref 6.4–8.2)
PROTHROMBIN TIME: 15.5 SEC (ref 11.5–14.8)
RBC # BLD AUTO: 4.27 M/UL (ref 4.2–5.9)
SAO2 % BLDA: 97.7 %
SODIUM SERPL-SCNC: 141 MMOL/L (ref 136–145)
WBC # BLD AUTO: 6.3 K/UL (ref 4–11)

## 2024-03-07 PROCEDURE — 82803 BLOOD GASES ANY COMBINATION: CPT

## 2024-03-07 PROCEDURE — 94669 MECHANICAL CHEST WALL OSCILL: CPT

## 2024-03-07 PROCEDURE — 2060000000 HC ICU INTERMEDIATE R&B

## 2024-03-07 PROCEDURE — 97530 THERAPEUTIC ACTIVITIES: CPT

## 2024-03-07 PROCEDURE — 80048 BASIC METABOLIC PNL TOTAL CA: CPT

## 2024-03-07 PROCEDURE — 94640 AIRWAY INHALATION TREATMENT: CPT

## 2024-03-07 PROCEDURE — 80076 HEPATIC FUNCTION PANEL: CPT

## 2024-03-07 PROCEDURE — 2580000003 HC RX 258: Performed by: STUDENT IN AN ORGANIZED HEALTH CARE EDUCATION/TRAINING PROGRAM

## 2024-03-07 PROCEDURE — 74018 RADEX ABDOMEN 1 VIEW: CPT

## 2024-03-07 PROCEDURE — 6360000002 HC RX W HCPCS: Performed by: STUDENT IN AN ORGANIZED HEALTH CARE EDUCATION/TRAINING PROGRAM

## 2024-03-07 PROCEDURE — 71045 X-RAY EXAM CHEST 1 VIEW: CPT

## 2024-03-07 PROCEDURE — 36415 COLL VENOUS BLD VENIPUNCTURE: CPT

## 2024-03-07 PROCEDURE — 36600 WITHDRAWAL OF ARTERIAL BLOOD: CPT

## 2024-03-07 PROCEDURE — 82550 ASSAY OF CK (CPK): CPT

## 2024-03-07 PROCEDURE — 6370000000 HC RX 637 (ALT 250 FOR IP): Performed by: NURSE PRACTITIONER

## 2024-03-07 PROCEDURE — 84145 PROCALCITONIN (PCT): CPT

## 2024-03-07 PROCEDURE — 2580000003 HC RX 258: Performed by: NURSE PRACTITIONER

## 2024-03-07 PROCEDURE — 85025 COMPLETE CBC W/AUTO DIFF WBC: CPT

## 2024-03-07 PROCEDURE — 85610 PROTHROMBIN TIME: CPT

## 2024-03-07 PROCEDURE — 87641 MR-STAPH DNA AMP PROBE: CPT

## 2024-03-07 PROCEDURE — 94761 N-INVAS EAR/PLS OXIMETRY MLT: CPT

## 2024-03-07 PROCEDURE — 6370000000 HC RX 637 (ALT 250 FOR IP): Performed by: STUDENT IN AN ORGANIZED HEALTH CARE EDUCATION/TRAINING PROGRAM

## 2024-03-07 PROCEDURE — 1200000000 HC SEMI PRIVATE

## 2024-03-07 PROCEDURE — 83735 ASSAY OF MAGNESIUM: CPT

## 2024-03-07 RX ORDER — SODIUM CHLORIDE FOR INHALATION 3 %
4 VIAL, NEBULIZER (ML) INHALATION 3 TIMES DAILY
Status: DISCONTINUED | OUTPATIENT
Start: 2024-03-07 | End: 2024-03-11 | Stop reason: HOSPADM

## 2024-03-07 RX ORDER — METRONIDAZOLE 500 MG/100ML
500 INJECTION, SOLUTION INTRAVENOUS EVERY 8 HOURS
Status: DISCONTINUED | OUTPATIENT
Start: 2024-03-07 | End: 2024-03-08

## 2024-03-07 RX ORDER — METRONIDAZOLE 500 MG/100ML
500 INJECTION, SOLUTION INTRAVENOUS ONCE
Status: COMPLETED | OUTPATIENT
Start: 2024-03-07 | End: 2024-03-07

## 2024-03-07 RX ORDER — HYDRALAZINE HYDROCHLORIDE 20 MG/ML
10 INJECTION INTRAMUSCULAR; INTRAVENOUS ONCE
Status: COMPLETED | OUTPATIENT
Start: 2024-03-07 | End: 2024-03-07

## 2024-03-07 RX ADMIN — Medication 1 TABLET: at 23:31

## 2024-03-07 RX ADMIN — DEXTROSE AND SODIUM CHLORIDE: 5; 900 INJECTION, SOLUTION INTRAVENOUS at 14:10

## 2024-03-07 RX ADMIN — METRONIDAZOLE 500 MG: 500 INJECTION, SOLUTION INTRAVENOUS at 09:30

## 2024-03-07 RX ADMIN — METRONIDAZOLE 500 MG: 500 INJECTION, SOLUTION INTRAVENOUS at 18:30

## 2024-03-07 RX ADMIN — CARBIDOPA AND LEVODOPA 1 TABLET: 25; 100 TABLET ORAL at 21:33

## 2024-03-07 RX ADMIN — Medication 4 ML: at 07:41

## 2024-03-07 RX ADMIN — Medication 4 ML: at 20:29

## 2024-03-07 RX ADMIN — CEFEPIME 2000 MG: 2 INJECTION, POWDER, FOR SOLUTION INTRAVENOUS at 20:50

## 2024-03-07 RX ADMIN — CEFEPIME 2000 MG: 2 INJECTION, POWDER, FOR SOLUTION INTRAVENOUS at 14:15

## 2024-03-07 RX ADMIN — CARBIDOPA AND LEVODOPA 1 TABLET: 25; 100 TABLET ORAL at 23:30

## 2024-03-07 RX ADMIN — SODIUM CHLORIDE, PRESERVATIVE FREE 10 ML: 5 INJECTION INTRAVENOUS at 14:16

## 2024-03-07 RX ADMIN — VANCOMYCIN HYDROCHLORIDE 1500 MG: 1.5 INJECTION, POWDER, LYOPHILIZED, FOR SOLUTION INTRAVENOUS at 12:10

## 2024-03-07 RX ADMIN — SODIUM CHLORIDE: 9 INJECTION, SOLUTION INTRAVENOUS at 14:13

## 2024-03-07 RX ADMIN — HYDRALAZINE HYDROCHLORIDE 10 MG: 20 INJECTION INTRAMUSCULAR; INTRAVENOUS at 11:59

## 2024-03-07 RX ADMIN — POLYETHYLENE GLYCOL 3350 8.5 G: 17 POWDER, FOR SOLUTION ORAL at 21:54

## 2024-03-07 RX ADMIN — CLOZAPINE 25 MG: 25 TABLET ORAL at 21:41

## 2024-03-07 RX ADMIN — LABETALOL HYDROCHLORIDE 10 MG: 5 INJECTION, SOLUTION INTRAVENOUS at 05:38

## 2024-03-07 RX ADMIN — Medication 1 TABLET: at 21:33

## 2024-03-07 ASSESSMENT — PAIN SCALES - GENERAL: PAINLEVEL_OUTOF10: 0

## 2024-03-08 ENCOUNTER — ANESTHESIA EVENT (OUTPATIENT)
Dept: ENDOSCOPY | Age: 79
End: 2024-03-08
Payer: MEDICARE

## 2024-03-08 ENCOUNTER — ANESTHESIA (OUTPATIENT)
Dept: ENDOSCOPY | Age: 79
End: 2024-03-08
Payer: MEDICARE

## 2024-03-08 LAB
ALBUMIN SERPL-MCNC: 3.2 G/DL (ref 3.4–5)
ALBUMIN/GLOB SERPL: 1.2 {RATIO} (ref 1.1–2.2)
ALP SERPL-CCNC: 71 U/L (ref 40–129)
ALT SERPL-CCNC: 11 U/L (ref 10–40)
ANION GAP SERPL CALCULATED.3IONS-SCNC: 11 MMOL/L (ref 3–16)
AST SERPL-CCNC: 96 U/L (ref 15–37)
BASOPHILS # BLD: 0.1 K/UL (ref 0–0.2)
BASOPHILS NFR BLD: 0.8 %
BILIRUB SERPL-MCNC: 0.5 MG/DL (ref 0–1)
BUN SERPL-MCNC: 12 MG/DL (ref 7–20)
CALCIUM SERPL-MCNC: 8.4 MG/DL (ref 8.3–10.6)
CHLORIDE SERPL-SCNC: 111 MMOL/L (ref 99–110)
CO2 SERPL-SCNC: 22 MMOL/L (ref 21–32)
CREAT SERPL-MCNC: 0.5 MG/DL (ref 0.8–1.3)
DEPRECATED RDW RBC AUTO: 14.1 % (ref 12.4–15.4)
EOSINOPHIL # BLD: 0 K/UL (ref 0–0.6)
EOSINOPHIL NFR BLD: 0.3 %
GFR SERPLBLD CREATININE-BSD FMLA CKD-EPI: >60 ML/MIN/{1.73_M2}
GLUCOSE BLD-MCNC: 111 MG/DL (ref 70–99)
GLUCOSE SERPL-MCNC: 116 MG/DL (ref 70–99)
HCT VFR BLD AUTO: 37.9 % (ref 40.5–52.5)
HGB BLD-MCNC: 12.5 G/DL (ref 13.5–17.5)
LYMPHOCYTES # BLD: 0.8 K/UL (ref 1–5.1)
LYMPHOCYTES NFR BLD: 5.6 %
MAGNESIUM SERPL-MCNC: 1.9 MG/DL (ref 1.8–2.4)
MCH RBC QN AUTO: 30 PG (ref 26–34)
MCHC RBC AUTO-ENTMCNC: 33.1 G/DL (ref 31–36)
MCV RBC AUTO: 90.6 FL (ref 80–100)
MONOCYTES # BLD: 1 K/UL (ref 0–1.3)
MONOCYTES NFR BLD: 6.9 %
MRSA DNA SPEC QL NAA+PROBE: NORMAL
NEUTROPHILS # BLD: 12.9 K/UL (ref 1.7–7.7)
NEUTROPHILS NFR BLD: 86.4 %
PERFORMED ON: ABNORMAL
PLATELET # BLD AUTO: 291 K/UL (ref 135–450)
PMV BLD AUTO: 7.4 FL (ref 5–10.5)
POTASSIUM SERPL-SCNC: 3.8 MMOL/L (ref 3.5–5.1)
PROT SERPL-MCNC: 5.8 G/DL (ref 6.4–8.2)
RBC # BLD AUTO: 4.18 M/UL (ref 4.2–5.9)
SODIUM SERPL-SCNC: 144 MMOL/L (ref 136–145)
VANCOMYCIN SERPL-MCNC: 16.4 UG/ML
WBC # BLD AUTO: 14.9 K/UL (ref 4–11)

## 2024-03-08 PROCEDURE — 0DH63UZ INSERTION OF FEEDING DEVICE INTO STOMACH, PERCUTANEOUS APPROACH: ICD-10-PCS | Performed by: INTERNAL MEDICINE

## 2024-03-08 PROCEDURE — 80202 ASSAY OF VANCOMYCIN: CPT

## 2024-03-08 PROCEDURE — 1200000000 HC SEMI PRIVATE

## 2024-03-08 PROCEDURE — 2580000003 HC RX 258: Performed by: INTERNAL MEDICINE

## 2024-03-08 PROCEDURE — 2580000003 HC RX 258: Performed by: STUDENT IN AN ORGANIZED HEALTH CARE EDUCATION/TRAINING PROGRAM

## 2024-03-08 PROCEDURE — 6370000000 HC RX 637 (ALT 250 FOR IP): Performed by: INTERNAL MEDICINE

## 2024-03-08 PROCEDURE — 2060000000 HC ICU INTERMEDIATE R&B

## 2024-03-08 PROCEDURE — 3700000000 HC ANESTHESIA ATTENDED CARE: Performed by: INTERNAL MEDICINE

## 2024-03-08 PROCEDURE — 2709999900 HC NON-CHARGEABLE SUPPLY: Performed by: INTERNAL MEDICINE

## 2024-03-08 PROCEDURE — 6360000002 HC RX W HCPCS: Performed by: STUDENT IN AN ORGANIZED HEALTH CARE EDUCATION/TRAINING PROGRAM

## 2024-03-08 PROCEDURE — 6360000002 HC RX W HCPCS: Performed by: INTERNAL MEDICINE

## 2024-03-08 PROCEDURE — 2700000000 HC OXYGEN THERAPY PER DAY

## 2024-03-08 PROCEDURE — 3E0G76Z INTRODUCTION OF NUTRITIONAL SUBSTANCE INTO UPPER GI, VIA NATURAL OR ARTIFICIAL OPENING: ICD-10-PCS | Performed by: INTERNAL MEDICINE

## 2024-03-08 PROCEDURE — 2500000003 HC RX 250 WO HCPCS: Performed by: NURSE ANESTHETIST, CERTIFIED REGISTERED

## 2024-03-08 PROCEDURE — 6360000002 HC RX W HCPCS: Performed by: NURSE ANESTHETIST, CERTIFIED REGISTERED

## 2024-03-08 PROCEDURE — 80053 COMPREHEN METABOLIC PANEL: CPT

## 2024-03-08 PROCEDURE — 6370000000 HC RX 637 (ALT 250 FOR IP): Performed by: NURSE ANESTHETIST, CERTIFIED REGISTERED

## 2024-03-08 PROCEDURE — 7100000000 HC PACU RECOVERY - FIRST 15 MIN: Performed by: INTERNAL MEDICINE

## 2024-03-08 PROCEDURE — 36415 COLL VENOUS BLD VENIPUNCTURE: CPT

## 2024-03-08 PROCEDURE — 3609013300 HC EGD TUBE PLACEMENT: Performed by: INTERNAL MEDICINE

## 2024-03-08 PROCEDURE — 94640 AIRWAY INHALATION TREATMENT: CPT

## 2024-03-08 PROCEDURE — 85025 COMPLETE CBC W/AUTO DIFF WBC: CPT

## 2024-03-08 PROCEDURE — 3700000001 HC ADD 15 MINUTES (ANESTHESIA): Performed by: INTERNAL MEDICINE

## 2024-03-08 PROCEDURE — 7100000001 HC PACU RECOVERY - ADDTL 15 MIN: Performed by: INTERNAL MEDICINE

## 2024-03-08 PROCEDURE — 83735 ASSAY OF MAGNESIUM: CPT

## 2024-03-08 PROCEDURE — 6370000000 HC RX 637 (ALT 250 FOR IP): Performed by: STUDENT IN AN ORGANIZED HEALTH CARE EDUCATION/TRAINING PROGRAM

## 2024-03-08 RX ORDER — SODIUM CHLORIDE 0.9 % (FLUSH) 0.9 %
5-40 SYRINGE (ML) INJECTION PRN
Status: DISCONTINUED | OUTPATIENT
Start: 2024-03-08 | End: 2024-03-08 | Stop reason: HOSPADM

## 2024-03-08 RX ORDER — METRONIDAZOLE 250 MG/1
500 TABLET ORAL EVERY 8 HOURS SCHEDULED
Status: COMPLETED | OUTPATIENT
Start: 2024-03-08 | End: 2024-03-09

## 2024-03-08 RX ORDER — ALBUTEROL SULFATE 90 UG/1
AEROSOL, METERED RESPIRATORY (INHALATION) PRN
Status: DISCONTINUED | OUTPATIENT
Start: 2024-03-08 | End: 2024-03-08 | Stop reason: SDUPTHER

## 2024-03-08 RX ORDER — SODIUM CHLORIDE 9 MG/ML
25 INJECTION, SOLUTION INTRAVENOUS PRN
Status: DISCONTINUED | OUTPATIENT
Start: 2024-03-08 | End: 2024-03-08 | Stop reason: HOSPADM

## 2024-03-08 RX ORDER — ASPIRIN 81 MG/1
81 TABLET, CHEWABLE ORAL DAILY
Status: DISCONTINUED | OUTPATIENT
Start: 2024-03-08 | End: 2024-03-11 | Stop reason: HOSPADM

## 2024-03-08 RX ORDER — ENOXAPARIN SODIUM 100 MG/ML
40 INJECTION SUBCUTANEOUS DAILY
Status: DISCONTINUED | OUTPATIENT
Start: 2024-03-09 | End: 2024-03-11 | Stop reason: HOSPADM

## 2024-03-08 RX ORDER — SODIUM CHLORIDE 0.9 % (FLUSH) 0.9 %
5-40 SYRINGE (ML) INJECTION EVERY 12 HOURS SCHEDULED
Status: DISCONTINUED | OUTPATIENT
Start: 2024-03-08 | End: 2024-03-08 | Stop reason: HOSPADM

## 2024-03-08 RX ORDER — LIDOCAINE HYDROCHLORIDE 20 MG/ML
INJECTION, SOLUTION INFILTRATION; PERINEURAL PRN
Status: DISCONTINUED | OUTPATIENT
Start: 2024-03-08 | End: 2024-03-08 | Stop reason: SDUPTHER

## 2024-03-08 RX ORDER — PROPOFOL 10 MG/ML
INJECTION, EMULSION INTRAVENOUS PRN
Status: DISCONTINUED | OUTPATIENT
Start: 2024-03-08 | End: 2024-03-08 | Stop reason: SDUPTHER

## 2024-03-08 RX ORDER — METRONIDAZOLE 500 MG/100ML
500 INJECTION, SOLUTION INTRAVENOUS EVERY 8 HOURS
Status: DISCONTINUED | OUTPATIENT
Start: 2024-03-08 | End: 2024-03-08

## 2024-03-08 RX ORDER — SODIUM CHLORIDE 9 MG/ML
INJECTION, SOLUTION INTRAVENOUS CONTINUOUS
Status: DISCONTINUED | OUTPATIENT
Start: 2024-03-08 | End: 2024-03-08 | Stop reason: HOSPADM

## 2024-03-08 RX ADMIN — SODIUM CHLORIDE, PRESERVATIVE FREE 10 ML: 5 INJECTION INTRAVENOUS at 13:55

## 2024-03-08 RX ADMIN — SODIUM CHLORIDE, PRESERVATIVE FREE 10 ML: 5 INJECTION INTRAVENOUS at 21:13

## 2024-03-08 RX ADMIN — METRONIDAZOLE 500 MG: 250 TABLET ORAL at 21:14

## 2024-03-08 RX ADMIN — CLOZAPINE 25 MG: 25 TABLET ORAL at 22:14

## 2024-03-08 RX ADMIN — Medication 2 TABLET: at 15:50

## 2024-03-08 RX ADMIN — Medication 1 CAPSULE: at 17:50

## 2024-03-08 RX ADMIN — Medication 2 TABLET: at 19:30

## 2024-03-08 RX ADMIN — METRONIDAZOLE 500 MG: 500 INJECTION, SOLUTION INTRAVENOUS at 01:59

## 2024-03-08 RX ADMIN — PROPOFOL 50 MG: 10 INJECTION, EMULSION INTRAVENOUS at 11:41

## 2024-03-08 RX ADMIN — Medication 4 ML: at 21:15

## 2024-03-08 RX ADMIN — METRONIDAZOLE 500 MG: 250 TABLET ORAL at 13:45

## 2024-03-08 RX ADMIN — CEFEPIME 2000 MG: 2 INJECTION, POWDER, FOR SOLUTION INTRAVENOUS at 23:03

## 2024-03-08 RX ADMIN — FLUTICASONE PROPIONATE 2 SPRAY: 50 SPRAY, METERED NASAL at 21:18

## 2024-03-08 RX ADMIN — PRIMIDONE 50 MG: 50 TABLET ORAL at 21:18

## 2024-03-08 RX ADMIN — ALBUTEROL SULFATE 1 PUFF: 90 AEROSOL, METERED RESPIRATORY (INHALATION) at 11:29

## 2024-03-08 RX ADMIN — Medication 2000 UNITS: at 13:45

## 2024-03-08 RX ADMIN — CARBOXYMETHYLCELLULOSE SODIUM 1 DROP: 10 GEL OPHTHALMIC at 13:45

## 2024-03-08 RX ADMIN — VANCOMYCIN HYDROCHLORIDE 1500 MG: 1.5 INJECTION, POWDER, LYOPHILIZED, FOR SOLUTION INTRAVENOUS at 14:10

## 2024-03-08 RX ADMIN — ATORVASTATIN CALCIUM 80 MG: 80 TABLET, FILM COATED ORAL at 21:14

## 2024-03-08 RX ADMIN — VANCOMYCIN HYDROCHLORIDE 1500 MG: 1.5 INJECTION, POWDER, LYOPHILIZED, FOR SOLUTION INTRAVENOUS at 01:08

## 2024-03-08 RX ADMIN — POLYETHYLENE GLYCOL 3350 8.5 G: 17 POWDER, FOR SOLUTION ORAL at 13:45

## 2024-03-08 RX ADMIN — PROPOFOL 50 MG: 10 INJECTION, EMULSION INTRAVENOUS at 11:34

## 2024-03-08 RX ADMIN — ACETAMINOPHEN 650 MG: 325 TABLET ORAL at 21:14

## 2024-03-08 RX ADMIN — CLOZAPINE 25 MG: 25 TABLET ORAL at 13:45

## 2024-03-08 RX ADMIN — POLYETHYLENE GLYCOL 3350 8.5 G: 17 POWDER, FOR SOLUTION ORAL at 21:14

## 2024-03-08 RX ADMIN — VANCOMYCIN HYDROCHLORIDE 1500 MG: 1.5 INJECTION, POWDER, LYOPHILIZED, FOR SOLUTION INTRAVENOUS at 23:07

## 2024-03-08 RX ADMIN — Medication 2 TABLET: at 13:45

## 2024-03-08 RX ADMIN — DOCUSATE SODIUM 100 MG: 100 CAPSULE, LIQUID FILLED ORAL at 21:14

## 2024-03-08 RX ADMIN — CEFEPIME 2000 MG: 2 INJECTION, POWDER, FOR SOLUTION INTRAVENOUS at 15:45

## 2024-03-08 RX ADMIN — ASPIRIN 81 MG 81 MG: 81 TABLET ORAL at 13:45

## 2024-03-08 RX ADMIN — CEFEPIME 2000 MG: 2 INJECTION, POWDER, FOR SOLUTION INTRAVENOUS at 04:31

## 2024-03-08 RX ADMIN — MELATONIN TAB 3 MG 3 MG: 3 TAB at 21:14

## 2024-03-08 RX ADMIN — LIDOCAINE HYDROCHLORIDE 100 MG: 20 INJECTION, SOLUTION INFILTRATION; PERINEURAL at 11:31

## 2024-03-08 RX ADMIN — DONEPEZIL HYDROCHLORIDE 10 MG: 5 TABLET, FILM COATED ORAL at 21:14

## 2024-03-08 RX ADMIN — Medication 1 CAPSULE: at 13:45

## 2024-03-08 RX ADMIN — SODIUM CHLORIDE, PRESERVATIVE FREE 10 ML: 5 INJECTION INTRAVENOUS at 14:10

## 2024-03-08 RX ADMIN — Medication 1 TABLET: at 21:14

## 2024-03-08 RX ADMIN — PROPOFOL 50 MG: 10 INJECTION, EMULSION INTRAVENOUS at 11:31

## 2024-03-08 RX ADMIN — THERA TABS 1 TABLET: TAB at 13:45

## 2024-03-08 RX ADMIN — PROPOFOL 50 MG: 10 INJECTION, EMULSION INTRAVENOUS at 11:45

## 2024-03-08 RX ADMIN — CARBOXYMETHYLCELLULOSE SODIUM 1 DROP: 10 GEL OPHTHALMIC at 21:18

## 2024-03-08 RX ADMIN — Medication 2 TABLET: at 17:50

## 2024-03-08 ASSESSMENT — ENCOUNTER SYMPTOMS: SHORTNESS OF BREATH: 0

## 2024-03-08 NOTE — BRIEF OP NOTE
Brief Postoperative Note - Full Note in Chart Review/Procedures tab       Patient: Pacheco Prince  YOB: 1945  MRN: [id number]    Date of Procedure: 3/8/2024    Pre-Op Diagnosis Codes:     * Dysphagia, unspecified type [R13.10]    Post-Op Diagnosis: Same       Procedure(s):  ESOPHAGOGASTRODUODENOSCOPY PERCUTANEOUS ENDOSCOPIC GASTROSTOMY TUBE PLACEMENT    Surgeon(s):  Luke Douglas MD    Assistant:  * No surgical staff found *    Anesthesia: Monitor Anesthesia Care    Estimated Blood Loss (mL): Minimal    Complications: None    Specimens:   * No specimens in log *    Implants:  * No implants in log *      Drains:   NG/OG/NJ/NE Tube Nasogastric Right nostril (Active)   Surrounding Skin Clean, dry & intact 03/07/24 2130   Securement device Bridle 03/07/24 2130   Status Clamped 03/07/24 2130   Placement Verified X-Ray (Initial) 03/07/24 2130   NG/OG/NJ/NE External Measurement (cm) 69 cm 03/07/24 2130       Gastrostomy/Enterostomy/Jejunostomy Tube Percutaneous Endoscopic Gastrostomy (PEG) LUQ 20 fr (Active)   $ Gastrostomy insertion $ Yes 03/08/24 1147   External Catheter Length (cm) 2.5 cm 03/08/24 1147   Dressing Status New dressing applied 03/08/24 1147   Dressing Type Dry dressing 03/08/24 1147       Findings:   Normal EGD  Successful 20 Fr. PEG placement (Ponsky-pull technique)    Rec:  See orders  OK to start TF's at 1600  OK to use PEG for meds immediately.  F/u as inpatient      Electronically signed by Luke Douglas MD on 3/8/2024 at 11:55 AM

## 2024-03-08 NOTE — ANESTHESIA PRE PROCEDURE
plan and risks discussed with patient.      Plan discussed with CRNA.                    Jada Ochoa MD   3/8/2024

## 2024-03-08 NOTE — ANESTHESIA POSTPROCEDURE EVALUATION
Department of Anesthesiology  Postprocedure Note    Patient: Pacheco Prince  MRN: [id number]  YOB: 1945  Date of evaluation: 3/8/2024    Procedure Summary       Date: 03/08/24 Room / Location: [place] [address] / [facility]    Anesthesia Start: 1117 Anesthesia Stop: 1157    Procedure: ESOPHAGOGASTRODUODENOSCOPY PERCUTANEOUS ENDOSCOPIC GASTROSTOMY TUBE PLACEMENT (Abdomen) Diagnosis:       Dysphagia, unspecified type      (Dysphagia, unspecified type [R13.10])    Surgeons: Luke Douglas MD Responsible Provider: Jada Ochoa MD    Anesthesia Type: MAC ASA Status: 4            Anesthesia Type: No value filed.    Roslyn Phase I: Roslyn Score: 5    Roslyn Phase II:      Anesthesia Post Evaluation    Patient location during evaluation: PACU  Level of consciousness: awake  Airway patency: patent  Cardiovascular status: hemodynamically stable  Respiratory status: acceptable  There was medical reason for not using a multimodal analgesia pain management approach.    No notable events documented.

## 2024-03-08 NOTE — CONSULTS
Gastroenterology Consult Note        Patient: Pacheco Prince  : 1945  Acct#:      Date:  3/7/2024      1. Pneumonia of both lower lobes due to infectious organism    2. General weakness    3. Elevated troponin    4. Elevated brain natriuretic peptide (BNP) level    5. Hypoxemia        Subjective:       History of Present Illness  Patient is a 79 y.o.  male admitted with Hypoxemia [R09.02]  General weakness [R53.1]  Elevated troponin [R79.89]  Elevated brain natriuretic peptide (BNP) level [R79.89]  Pneumonia of both lower lobes due to infectious organism [J18.9]  Pneumonia of both lungs due to infectious organism, unspecified part of lung [J18.9] who is seen in consult for PEG for recurrent aspiration pneumonia.   History of Parkinson's, hypertension, hyperlipidemia.  Patient was diagnosed with aspiration pneumonia outpatient and started on antibiotics.  Then was admitted on 3/1/2024 for the aspiration pneumonia.  He was treated with antibiotics.  Underwent SLP eval and has been on dysphagia diet.  Then noted to have altered mental status and recurrent aspiration pneumonia.  GI consulted for PEG.  Patient currently getting vest therapy.  He is lethargic due to pneumonia and as he is not able to get his Parkinson's meds.  Wife states no surgeries on the stomach itself.         Past Medical History:   Diagnosis Date    Acute bronchitis     history of    CAD (coronary artery disease)     Cancer (HCC)     melanoma    Clostridioides difficile infection 2020    Diabetes mellitus (HCC) 2022    6.6 A1C    Erectile dysfunction     GERD (gastroesophageal reflux disease)     gerd    Hyperlipidemia     Obesity     Parkinson disease       Past Surgical History:   Procedure Laterality Date    CATARACT EXTRACTION      2021    CORONARY ANGIOPLASTY WITH STENT PLACEMENT  2005    [facility]    DIAGNOSTIC CARDIAC CATH LAB PROCEDURE  2005        EYE SURGERY  2021    
    Clinical Pharmacy Note: Pharmacy to Dose Vancomycin    Pacheco Prince is a 79 y.o. male started on Vancomycin for HAP; consult received from Dr. Cole to manage therapy. Also receiving the following antibiotics: cefepime.    Goal AUC: 400-600 mg/L*hr  Goal Trough Level: 15 mcg/mL    Assessment/Plan:  Patient was on vancomycin earlier this admission  Initiate vancomycin 1500 mg IV every 12 hours. Bayesian modeling predicts an AUC of 529 mg/L*hr and a trough of 14.3 mcg/mL at steady state concentration.  A vancomycin random level has been ordered for 3/8 at 0600  Changes in regimen will be determined based on culture results, renal function, and clinical response.  Pharmacy will continue to monitor and adjust regimen as necessary.    Allergies:  Amoxicillin-pot clavulanate, Sulfacetamide, Tamsulosin, and Penicillins     Recent Labs     03/06/24  0458 03/07/24  0544   CREATININE 0.5* 0.5*       Recent Labs     03/06/24  0458 03/07/24  0544   WBC 5.8 6.3       Ht Readings from Last 1 Encounters:   03/01/24 1.778 m (5' 10\")        Wt Readings from Last 1 Encounters:   03/01/24 83.6 kg (184 lb 6.4 oz)         Estimated Creatinine Clearance: 128 mL/min (A) (based on SCr of 0.5 mg/dL (L)).      Thank you for the consult,    Amanda Yanes, PharmD, BCPS  [id number]  3/7/2024 11:28 AM       
BRIEF NUTRITION NOTE    Consult received for tube Feeding recommendations only- provider to manage.    Pt is s/p PEG tube placement this morning d/t recurrent aspiration pneumonia.     Comparative Standards  Calories: 6055-4466   Protein:  grams  Fluid: 5872-9144 mL     Recommendations  Recommend Jevity 1.5 (standard formula with fiber) to start at 20 mL/hr and advance by 20 mL q 4 hours until goal rate 60 mL/hr is achieved.   Recommend water bolus 165 mL q 4 hours.     For full nutrition assessment, see RD note dated 3/7.     If MD would like RD to order/manage tube feeds, please submit a dietitian consult for \"Tube feeding ordering and management.\"    Electronically signed by Rachele Man MS, RD, LD on 3/8/2024 at 1:44 PM     Contact: [phone]   
Pacheco Prince  3/4/2024  [id number]    Chief Complaint: Pneumonia of both lungs due to infectious organism, unspecified part of lung    Subjective   HPI: Pacheco Prince is a 79 y.o. male with PMHx notable for Parkinson's disease, DM2, CAD who presented on 24 with fever, and cough. He was on a cruise when symptoms developed, was diagnosed with pneumonia and started on IV Zosyn. He continued to decline, requiring medical flight back home to [place]. Etiology of pneumonia is presumed aspiration. MRSA swab was negative. He continues on cefepime and Flagyl currently. CTA Chest was obtained, negative for PE. SLP consulted for swallow eval, recommending minced and moist diet w/ thin liquids. Hospital course complicated by: acute hypoxic respiratory failure, hypertension, hypokalemia, anemia.     ROS limited due to clinical condition.     Past Medical History:   Diagnosis Date    Acute bronchitis     history of    CAD (coronary artery disease)     Cancer ([facility])     melanoma    Clostridioides difficile infection 2020    Diabetes mellitus ([facility]) 2022    6.6 A1C    Erectile dysfunction     GERD (gastroesophageal reflux disease)     gerd    Hyperlipidemia     Obesity     Parkinson disease        Past Surgical History:   Procedure Laterality Date    CATARACT EXTRACTION      2021    CORONARY ANGIOPLASTY WITH STENT PLACEMENT  2005    [facility]    DIAGNOSTIC CARDIAC CATH LAB PROCEDURE  2005        EYE SURGERY  2021    cataracts    PTCA  2002    TONSILLECTOMY  [facility]ilhood    UPPER GASTROINTESTINAL ENDOSCOPY         Social History     Tobacco Use    Smoking status: Former     Current packs/day: 0.00     Average packs/day: 0.3 packs/day for 10.0 years (2.5 ttl pk-yrs)     Types: Cigarettes, Cigars     Start date:      Quit date: 1980     Years since quittin.2    Smokeless tobacco: Former   Vaping Use    Vaping Use: Never used   Substance Use Topics    Alcohol use: 
Review of Systems/Medical History  Patient summary reviewed  Chart reviewed      Cardiovascular  Hyperlipidemia,    Pulmonary  Smoker ex-smoker  ,        GI/Hepatic    Bowel prep       No kidney stones,        Endo/Other  Diabetes well controlled type 2 Diet controlled, History of thyroid disease , hypothyroidism,      GYN       Hematology  Negative hematology ROS      Musculoskeletal  Negative musculoskeletal ROS        Neurology  Negative neurology ROS      Psychology   Negative psychology ROS              Physical Exam    Airway    Mallampati score: I  TM Distance: <3 FB  Neck ROM: full     Dental   upper dentures,     Cardiovascular  Rhythm: regular, Rate: normal, Cardiovascular exam normal    Pulmonary  Pulmonary exam normal     Other Findings        Anesthesia Plan  ASA Score- 2     Anesthesia Type- IV sedation with anesthesia with ASA Monitors  Additional Monitors:   Airway Plan:         Plan Factors- Patient instructed to abstain from smoking on day of procedure  Patient did not smoke on day of surgery  Induction- intravenous  Postoperative Plan-     Informed Consent- Anesthetic plan and risks discussed with patient 
more doses overnight during bathroom breaks if needed.      Menatetrenone (VITAMIN K2) 100 MCG TABS Take 100 mcg by mouth daily      Vitamin D, Cholecalciferol, 50 MCG (2000 UT) CAPS Take 1 capsule by mouth daily      Coenzyme Q10 (COQ10 PO) Take 1 capsule by mouth daily      Carbidopa-Levodopa ER 36. MG CPCR Take 1 tablet by mouth See Admin Instructions Take one tablet by mouth every 2 hours from 7:00 am to 9:00 pm. Patient may take three more doses overnight during bathroom breaks if needed.      omeprazole (PRILOSEC) 20 MG delayed release capsule Take 1 capsule by mouth nightly      aspirin 81 MG tablet Take 1 tablet by mouth daily 30 tablet 0    Psyllium 500 MG CAPS Take 6 capsules by mouth at bedtime Takes @ 6PM      fish oil-omega-3 fatty acids 1000 MG capsule Take 1 capsule by mouth 2 times daily      multivitamin (THERAGRAN) per tablet Take 1 tablet by mouth daily          aspirin  81 mg Oral Daily    atorvastatin  80 mg Oral Nightly    carboxymethylcellulose PF  1 drop Both Eyes BID    cloZAPine  25 mg Oral BID    docusate sodium  100 mg Oral BID    donepezil  10 mg Oral Nightly    fluticasone  2 spray Nasal Nightly    ipratropium  2 spray Each Nostril TID    melatonin  3 mg Oral Nightly    midodrine  5 mg Oral TID    multivitamin  1 tablet Oral Daily    pantoprazole  40 mg Oral QAM AC    polyethylene glycol  8.5 g Oral BID    primidone  50 mg Oral Nightly    psyllium  1 packet Oral QPM    Vitamin D  2,000 Units Oral Daily    sodium chloride flush  10 mL IntraVENous 2 times per day    enoxaparin  40 mg SubCUTAneous Daily    Carbidopa-Levodopa ER  1 tablet Oral Q2H While awake    metroNIDAZOLE  500 mg IntraVENous Q8H    cefepime  2,000 mg IntraVENous Q12H    carbidopa-levodopa  1 tablet Oral Q2H While awake      sodium chloride 75 mL/hr at 03/02/24 1245    sodium chloride       nitroGLYCERIN, sodium chloride flush, sodium chloride, ondansetron **OR** ondansetron, magnesium hydroxide, acetaminophen

## 2024-03-09 LAB
ALBUMIN SERPL-MCNC: 2.9 G/DL (ref 3.4–5)
ALBUMIN/GLOB SERPL: 1.2 {RATIO} (ref 1.1–2.2)
ALP SERPL-CCNC: 63 U/L (ref 40–129)
ALT SERPL-CCNC: 32 U/L (ref 10–40)
ANION GAP SERPL CALCULATED.3IONS-SCNC: 7 MMOL/L (ref 3–16)
AST SERPL-CCNC: 59 U/L (ref 15–37)
BASOPHILS # BLD: 0 K/UL (ref 0–0.2)
BASOPHILS NFR BLD: 0.3 %
BILIRUB SERPL-MCNC: 0.4 MG/DL (ref 0–1)
BUN SERPL-MCNC: 15 MG/DL (ref 7–20)
CALCIUM SERPL-MCNC: 8.2 MG/DL (ref 8.3–10.6)
CHLORIDE SERPL-SCNC: 109 MMOL/L (ref 99–110)
CO2 SERPL-SCNC: 24 MMOL/L (ref 21–32)
CREAT SERPL-MCNC: <0.5 MG/DL (ref 0.8–1.3)
DEPRECATED RDW RBC AUTO: 14.2 % (ref 12.4–15.4)
EOSINOPHIL # BLD: 0.3 K/UL (ref 0–0.6)
EOSINOPHIL NFR BLD: 3.7 %
GFR SERPLBLD CREATININE-BSD FMLA CKD-EPI: >60 ML/MIN/{1.73_M2}
GLUCOSE SERPL-MCNC: 139 MG/DL (ref 70–99)
HCT VFR BLD AUTO: 37.5 % (ref 40.5–52.5)
HGB BLD-MCNC: 12.3 G/DL (ref 13.5–17.5)
LYMPHOCYTES # BLD: 1.2 K/UL (ref 1–5.1)
LYMPHOCYTES NFR BLD: 13.2 %
MAGNESIUM SERPL-MCNC: 2.1 MG/DL (ref 1.8–2.4)
MCH RBC QN AUTO: 30.1 PG (ref 26–34)
MCHC RBC AUTO-ENTMCNC: 32.9 G/DL (ref 31–36)
MCV RBC AUTO: 91.4 FL (ref 80–100)
MONOCYTES # BLD: 0.6 K/UL (ref 0–1.3)
MONOCYTES NFR BLD: 6.9 %
NEUTROPHILS # BLD: 6.8 K/UL (ref 1.7–7.7)
NEUTROPHILS NFR BLD: 75.9 %
PLATELET # BLD AUTO: 276 K/UL (ref 135–450)
PMV BLD AUTO: 7.4 FL (ref 5–10.5)
POTASSIUM SERPL-SCNC: 3.5 MMOL/L (ref 3.5–5.1)
PROT SERPL-MCNC: 5.4 G/DL (ref 6.4–8.2)
RBC # BLD AUTO: 4.11 M/UL (ref 4.2–5.9)
SODIUM SERPL-SCNC: 140 MMOL/L (ref 136–145)
WBC # BLD AUTO: 8.9 K/UL (ref 4–11)

## 2024-03-09 PROCEDURE — 94640 AIRWAY INHALATION TREATMENT: CPT

## 2024-03-09 PROCEDURE — 2580000003 HC RX 258: Performed by: INTERNAL MEDICINE

## 2024-03-09 PROCEDURE — 36415 COLL VENOUS BLD VENIPUNCTURE: CPT

## 2024-03-09 PROCEDURE — 83735 ASSAY OF MAGNESIUM: CPT

## 2024-03-09 PROCEDURE — 80053 COMPREHEN METABOLIC PANEL: CPT

## 2024-03-09 PROCEDURE — 2580000003 HC RX 258: Performed by: STUDENT IN AN ORGANIZED HEALTH CARE EDUCATION/TRAINING PROGRAM

## 2024-03-09 PROCEDURE — 51798 US URINE CAPACITY MEASURE: CPT

## 2024-03-09 PROCEDURE — 94761 N-INVAS EAR/PLS OXIMETRY MLT: CPT

## 2024-03-09 PROCEDURE — 6370000000 HC RX 637 (ALT 250 FOR IP): Performed by: INTERNAL MEDICINE

## 2024-03-09 PROCEDURE — 6360000002 HC RX W HCPCS: Performed by: INTERNAL MEDICINE

## 2024-03-09 PROCEDURE — 2060000000 HC ICU INTERMEDIATE R&B

## 2024-03-09 PROCEDURE — 1200000000 HC SEMI PRIVATE

## 2024-03-09 PROCEDURE — 6360000002 HC RX W HCPCS: Performed by: STUDENT IN AN ORGANIZED HEALTH CARE EDUCATION/TRAINING PROGRAM

## 2024-03-09 PROCEDURE — 85025 COMPLETE CBC W/AUTO DIFF WBC: CPT

## 2024-03-09 PROCEDURE — 6370000000 HC RX 637 (ALT 250 FOR IP): Performed by: STUDENT IN AN ORGANIZED HEALTH CARE EDUCATION/TRAINING PROGRAM

## 2024-03-09 RX ADMIN — POLYETHYLENE GLYCOL 3350 8.5 G: 17 POWDER, FOR SOLUTION ORAL at 09:10

## 2024-03-09 RX ADMIN — PRIMIDONE 50 MG: 50 TABLET ORAL at 19:57

## 2024-03-09 RX ADMIN — CLOZAPINE 25 MG: 25 TABLET ORAL at 21:53

## 2024-03-09 RX ADMIN — CARBOXYMETHYLCELLULOSE SODIUM 1 DROP: 10 GEL OPHTHALMIC at 09:10

## 2024-03-09 RX ADMIN — Medication 1 TABLET: at 13:25

## 2024-03-09 RX ADMIN — SODIUM CHLORIDE, PRESERVATIVE FREE 10 ML: 5 INJECTION INTRAVENOUS at 09:10

## 2024-03-09 RX ADMIN — FLUTICASONE PROPIONATE 2 SPRAY: 50 SPRAY, METERED NASAL at 20:26

## 2024-03-09 RX ADMIN — Medication 1 TABLET: at 06:47

## 2024-03-09 RX ADMIN — CEFEPIME 2000 MG: 2 INJECTION, POWDER, FOR SOLUTION INTRAVENOUS at 15:10

## 2024-03-09 RX ADMIN — Medication 2 TABLET: at 17:45

## 2024-03-09 RX ADMIN — CARBOXYMETHYLCELLULOSE SODIUM 1 DROP: 10 GEL OPHTHALMIC at 19:58

## 2024-03-09 RX ADMIN — Medication 1 CAPSULE: at 17:45

## 2024-03-09 RX ADMIN — ASPIRIN 81 MG 81 MG: 81 TABLET ORAL at 09:10

## 2024-03-09 RX ADMIN — DONEPEZIL HYDROCHLORIDE 10 MG: 5 TABLET, FILM COATED ORAL at 19:57

## 2024-03-09 RX ADMIN — Medication 2000 UNITS: at 09:10

## 2024-03-09 RX ADMIN — CLOZAPINE 25 MG: 25 TABLET ORAL at 09:10

## 2024-03-09 RX ADMIN — POLYETHYLENE GLYCOL 3350 8.5 G: 17 POWDER, FOR SOLUTION ORAL at 19:58

## 2024-03-09 RX ADMIN — Medication 2 TABLET: at 11:20

## 2024-03-09 RX ADMIN — ATORVASTATIN CALCIUM 80 MG: 80 TABLET, FILM COATED ORAL at 19:57

## 2024-03-09 RX ADMIN — Medication 2 TABLET: at 21:53

## 2024-03-09 RX ADMIN — Medication 2 TABLET: at 15:15

## 2024-03-09 RX ADMIN — MELATONIN TAB 3 MG 3 MG: 3 TAB at 21:53

## 2024-03-09 RX ADMIN — Medication 4 ML: at 15:31

## 2024-03-09 RX ADMIN — METRONIDAZOLE 500 MG: 250 TABLET ORAL at 06:46

## 2024-03-09 RX ADMIN — THERA TABS 1 TABLET: TAB at 09:10

## 2024-03-09 RX ADMIN — Medication 2 TABLET: at 09:10

## 2024-03-09 RX ADMIN — PANTOPRAZOLE SODIUM 40 MG: 40 TABLET, DELAYED RELEASE ORAL at 06:46

## 2024-03-09 RX ADMIN — Medication 1 CAPSULE: at 09:10

## 2024-03-09 RX ADMIN — METRONIDAZOLE 500 MG: 250 TABLET ORAL at 21:53

## 2024-03-09 RX ADMIN — Medication 4 ML: at 22:00

## 2024-03-09 RX ADMIN — Medication 2 TABLET: at 19:56

## 2024-03-09 RX ADMIN — METRONIDAZOLE 500 MG: 250 TABLET ORAL at 15:15

## 2024-03-09 RX ADMIN — ENOXAPARIN SODIUM 40 MG: 100 INJECTION SUBCUTANEOUS at 09:10

## 2024-03-09 RX ADMIN — CEFEPIME 2000 MG: 2 INJECTION, POWDER, FOR SOLUTION INTRAVENOUS at 06:53

## 2024-03-10 LAB
ALBUMIN SERPL-MCNC: 2.7 G/DL (ref 3.4–5)
ALBUMIN/GLOB SERPL: 1.1 {RATIO} (ref 1.1–2.2)
ALP SERPL-CCNC: 64 U/L (ref 40–129)
ALT SERPL-CCNC: 32 U/L (ref 10–40)
ANION GAP SERPL CALCULATED.3IONS-SCNC: 6 MMOL/L (ref 3–16)
AST SERPL-CCNC: 56 U/L (ref 15–37)
BASOPHILS # BLD: 0 K/UL (ref 0–0.2)
BASOPHILS NFR BLD: 0.3 %
BILIRUB SERPL-MCNC: <0.2 MG/DL (ref 0–1)
BUN SERPL-MCNC: 14 MG/DL (ref 7–20)
CALCIUM SERPL-MCNC: 8.1 MG/DL (ref 8.3–10.6)
CHLORIDE SERPL-SCNC: 110 MMOL/L (ref 99–110)
CO2 SERPL-SCNC: 25 MMOL/L (ref 21–32)
CREAT SERPL-MCNC: <0.5 MG/DL (ref 0.8–1.3)
DEPRECATED RDW RBC AUTO: 14.3 % (ref 12.4–15.4)
EOSINOPHIL # BLD: 0.3 K/UL (ref 0–0.6)
EOSINOPHIL NFR BLD: 3.8 %
GFR SERPLBLD CREATININE-BSD FMLA CKD-EPI: >60 ML/MIN/{1.73_M2}
GLUCOSE SERPL-MCNC: 159 MG/DL (ref 70–99)
HCT VFR BLD AUTO: 35.8 % (ref 40.5–52.5)
HGB BLD-MCNC: 12.3 G/DL (ref 13.5–17.5)
LYMPHOCYTES # BLD: 1.2 K/UL (ref 1–5.1)
LYMPHOCYTES NFR BLD: 13.9 %
MAGNESIUM SERPL-MCNC: 2 MG/DL (ref 1.8–2.4)
MCH RBC QN AUTO: 31 PG (ref 26–34)
MCHC RBC AUTO-ENTMCNC: 34.3 G/DL (ref 31–36)
MCV RBC AUTO: 90.3 FL (ref 80–100)
MONOCYTES # BLD: 0.7 K/UL (ref 0–1.3)
MONOCYTES NFR BLD: 8.5 %
NEUTROPHILS # BLD: 6.3 K/UL (ref 1.7–7.7)
NEUTROPHILS NFR BLD: 73.5 %
PLATELET # BLD AUTO: 286 K/UL (ref 135–450)
PMV BLD AUTO: 7.2 FL (ref 5–10.5)
POTASSIUM SERPL-SCNC: 3.6 MMOL/L (ref 3.5–5.1)
PROT SERPL-MCNC: 5.1 G/DL (ref 6.4–8.2)
RBC # BLD AUTO: 3.97 M/UL (ref 4.2–5.9)
SODIUM SERPL-SCNC: 141 MMOL/L (ref 136–145)
WBC # BLD AUTO: 8.6 K/UL (ref 4–11)

## 2024-03-10 PROCEDURE — 94669 MECHANICAL CHEST WALL OSCILL: CPT

## 2024-03-10 PROCEDURE — 36415 COLL VENOUS BLD VENIPUNCTURE: CPT

## 2024-03-10 PROCEDURE — 2580000003 HC RX 258: Performed by: INTERNAL MEDICINE

## 2024-03-10 PROCEDURE — 6360000002 HC RX W HCPCS: Performed by: STUDENT IN AN ORGANIZED HEALTH CARE EDUCATION/TRAINING PROGRAM

## 2024-03-10 PROCEDURE — 83735 ASSAY OF MAGNESIUM: CPT

## 2024-03-10 PROCEDURE — 6370000000 HC RX 637 (ALT 250 FOR IP): Performed by: INTERNAL MEDICINE

## 2024-03-10 PROCEDURE — 2580000003 HC RX 258: Performed by: STUDENT IN AN ORGANIZED HEALTH CARE EDUCATION/TRAINING PROGRAM

## 2024-03-10 PROCEDURE — 6360000002 HC RX W HCPCS: Performed by: INTERNAL MEDICINE

## 2024-03-10 PROCEDURE — 43762 RPLC GTUBE NO REVJ TRC: CPT

## 2024-03-10 PROCEDURE — 6370000000 HC RX 637 (ALT 250 FOR IP): Performed by: STUDENT IN AN ORGANIZED HEALTH CARE EDUCATION/TRAINING PROGRAM

## 2024-03-10 PROCEDURE — 2060000000 HC ICU INTERMEDIATE R&B

## 2024-03-10 PROCEDURE — 94761 N-INVAS EAR/PLS OXIMETRY MLT: CPT

## 2024-03-10 PROCEDURE — 92526 ORAL FUNCTION THERAPY: CPT

## 2024-03-10 PROCEDURE — 1200000000 HC SEMI PRIVATE

## 2024-03-10 PROCEDURE — 85025 COMPLETE CBC W/AUTO DIFF WBC: CPT

## 2024-03-10 PROCEDURE — 80053 COMPREHEN METABOLIC PANEL: CPT

## 2024-03-10 PROCEDURE — 94640 AIRWAY INHALATION TREATMENT: CPT

## 2024-03-10 RX ADMIN — Medication 1 CAPSULE: at 09:16

## 2024-03-10 RX ADMIN — POLYETHYLENE GLYCOL 3350 8.5 G: 17 POWDER, FOR SOLUTION ORAL at 09:28

## 2024-03-10 RX ADMIN — Medication 2000 UNITS: at 09:16

## 2024-03-10 RX ADMIN — LEVOFLOXACIN 750 MG: 500 TABLET, FILM COATED ORAL at 09:16

## 2024-03-10 RX ADMIN — SODIUM CHLORIDE, PRESERVATIVE FREE 10 ML: 5 INJECTION INTRAVENOUS at 21:45

## 2024-03-10 RX ADMIN — Medication 1 TABLET: at 14:40

## 2024-03-10 RX ADMIN — Medication 4 ML: at 15:20

## 2024-03-10 RX ADMIN — SODIUM CHLORIDE, PRESERVATIVE FREE 10 ML: 5 INJECTION INTRAVENOUS at 00:33

## 2024-03-10 RX ADMIN — MELATONIN TAB 3 MG 3 MG: 3 TAB at 21:44

## 2024-03-10 RX ADMIN — CARBOXYMETHYLCELLULOSE SODIUM 1 DROP: 10 GEL OPHTHALMIC at 09:17

## 2024-03-10 RX ADMIN — ATORVASTATIN CALCIUM 80 MG: 80 TABLET, FILM COATED ORAL at 21:44

## 2024-03-10 RX ADMIN — Medication 1 TABLET: at 17:30

## 2024-03-10 RX ADMIN — DONEPEZIL HYDROCHLORIDE 10 MG: 5 TABLET, FILM COATED ORAL at 21:44

## 2024-03-10 RX ADMIN — Medication 1 TABLET: at 12:02

## 2024-03-10 RX ADMIN — SODIUM CHLORIDE, PRESERVATIVE FREE 10 ML: 5 INJECTION INTRAVENOUS at 09:28

## 2024-03-10 RX ADMIN — Medication 1 TABLET: at 09:14

## 2024-03-10 RX ADMIN — PRIMIDONE 50 MG: 50 TABLET ORAL at 21:44

## 2024-03-10 RX ADMIN — CLOZAPINE 25 MG: 25 TABLET ORAL at 09:17

## 2024-03-10 RX ADMIN — Medication 2 TABLET: at 06:50

## 2024-03-10 RX ADMIN — CLOZAPINE 25 MG: 25 TABLET ORAL at 21:44

## 2024-03-10 RX ADMIN — Medication 1 CAPSULE: at 17:30

## 2024-03-10 RX ADMIN — ASPIRIN 81 MG 81 MG: 81 TABLET ORAL at 09:16

## 2024-03-10 RX ADMIN — SODIUM CHLORIDE, PRESERVATIVE FREE 10 ML: 5 INJECTION INTRAVENOUS at 09:16

## 2024-03-10 RX ADMIN — CEFEPIME 2000 MG: 2 INJECTION, POWDER, FOR SOLUTION INTRAVENOUS at 00:33

## 2024-03-10 RX ADMIN — THERA TABS 1 TABLET: TAB at 09:16

## 2024-03-10 RX ADMIN — Medication 1.5 TABLET: at 20:00

## 2024-03-10 RX ADMIN — ENOXAPARIN SODIUM 40 MG: 100 INJECTION SUBCUTANEOUS at 09:16

## 2024-03-10 RX ADMIN — Medication 4 ML: at 22:26

## 2024-03-10 RX ADMIN — Medication 1 TABLET: at 12:43

## 2024-03-10 RX ADMIN — CARBIDOPA AND LEVODOPA 3 TABLET: 25; 100 TABLET ORAL at 21:46

## 2024-03-10 RX ADMIN — Medication 1 TABLET: at 23:54

## 2024-03-10 RX ADMIN — CARBOXYMETHYLCELLULOSE SODIUM 1 DROP: 10 GEL OPHTHALMIC at 21:44

## 2024-03-10 RX ADMIN — Medication 4 ML: at 08:35

## 2024-03-10 ASSESSMENT — PAIN SCALES - GENERAL
PAINLEVEL_OUTOF10: 0

## 2024-03-11 ENCOUNTER — HOSPITAL ENCOUNTER (INPATIENT)
Age: 79
DRG: 947 | End: 2024-03-11
Attending: STUDENT IN AN ORGANIZED HEALTH CARE EDUCATION/TRAINING PROGRAM | Admitting: STUDENT IN AN ORGANIZED HEALTH CARE EDUCATION/TRAINING PROGRAM
Payer: MEDICARE

## 2024-03-11 VITALS
RESPIRATION RATE: 16 BRPM | DIASTOLIC BLOOD PRESSURE: 67 MMHG | HEIGHT: 70 IN | HEART RATE: 69 BPM | BODY MASS INDEX: 26.1 KG/M2 | WEIGHT: 182.3 LBS | OXYGEN SATURATION: 95 % | SYSTOLIC BLOOD PRESSURE: 142 MMHG | TEMPERATURE: 99.7 F

## 2024-03-11 PROBLEM — G20.B1 PARKINSON'S DISEASE WITH DYSKINESIA: Status: ACTIVE | Noted: 2024-03-11

## 2024-03-11 LAB
ALBUMIN SERPL-MCNC: 2.8 G/DL (ref 3.4–5)
ALBUMIN/GLOB SERPL: 1.1 {RATIO} (ref 1.1–2.2)
ALP SERPL-CCNC: 68 U/L (ref 40–129)
ALT SERPL-CCNC: 14 U/L (ref 10–40)
ANION GAP SERPL CALCULATED.3IONS-SCNC: 7 MMOL/L (ref 3–16)
AST SERPL-CCNC: 47 U/L (ref 15–37)
BASOPHILS # BLD: 0 K/UL (ref 0–0.2)
BASOPHILS NFR BLD: 0.5 %
BILIRUB SERPL-MCNC: 0.3 MG/DL (ref 0–1)
BUN SERPL-MCNC: 16 MG/DL (ref 7–20)
CALCIUM SERPL-MCNC: 8.2 MG/DL (ref 8.3–10.6)
CHLORIDE SERPL-SCNC: 107 MMOL/L (ref 99–110)
CO2 SERPL-SCNC: 27 MMOL/L (ref 21–32)
CREAT SERPL-MCNC: <0.5 MG/DL (ref 0.8–1.3)
DEPRECATED RDW RBC AUTO: 14 % (ref 12.4–15.4)
EOSINOPHIL # BLD: 0.3 K/UL (ref 0–0.6)
EOSINOPHIL NFR BLD: 3.2 %
GFR SERPLBLD CREATININE-BSD FMLA CKD-EPI: >60 ML/MIN/{1.73_M2}
GLUCOSE BLD-MCNC: 116 MG/DL (ref 70–99)
GLUCOSE SERPL-MCNC: 144 MG/DL (ref 70–99)
HCT VFR BLD AUTO: 35.1 % (ref 40.5–52.5)
HGB BLD-MCNC: 11.9 G/DL (ref 13.5–17.5)
LYMPHOCYTES # BLD: 1.4 K/UL (ref 1–5.1)
LYMPHOCYTES NFR BLD: 16.3 %
MAGNESIUM SERPL-MCNC: 2.2 MG/DL (ref 1.8–2.4)
MCH RBC QN AUTO: 30.7 PG (ref 26–34)
MCHC RBC AUTO-ENTMCNC: 33.9 G/DL (ref 31–36)
MCV RBC AUTO: 90.5 FL (ref 80–100)
MONOCYTES # BLD: 0.9 K/UL (ref 0–1.3)
MONOCYTES NFR BLD: 10.2 %
NEUTROPHILS # BLD: 6.1 K/UL (ref 1.7–7.7)
NEUTROPHILS NFR BLD: 69.8 %
PERFORMED ON: ABNORMAL
PLATELET # BLD AUTO: 311 K/UL (ref 135–450)
PMV BLD AUTO: 7.1 FL (ref 5–10.5)
POTASSIUM SERPL-SCNC: 4 MMOL/L (ref 3.5–5.1)
PROT SERPL-MCNC: 5.4 G/DL (ref 6.4–8.2)
RBC # BLD AUTO: 3.88 M/UL (ref 4.2–5.9)
SODIUM SERPL-SCNC: 141 MMOL/L (ref 136–145)
WBC # BLD AUTO: 8.7 K/UL (ref 4–11)

## 2024-03-11 PROCEDURE — 94761 N-INVAS EAR/PLS OXIMETRY MLT: CPT

## 2024-03-11 PROCEDURE — 36415 COLL VENOUS BLD VENIPUNCTURE: CPT

## 2024-03-11 PROCEDURE — 85025 COMPLETE CBC W/AUTO DIFF WBC: CPT

## 2024-03-11 PROCEDURE — 97535 SELF CARE MNGMENT TRAINING: CPT

## 2024-03-11 PROCEDURE — 1280000000 HC REHAB R&B

## 2024-03-11 PROCEDURE — 92526 ORAL FUNCTION THERAPY: CPT

## 2024-03-11 PROCEDURE — 97110 THERAPEUTIC EXERCISES: CPT

## 2024-03-11 PROCEDURE — 97530 THERAPEUTIC ACTIVITIES: CPT

## 2024-03-11 PROCEDURE — 80053 COMPREHEN METABOLIC PANEL: CPT

## 2024-03-11 PROCEDURE — 2580000003 HC RX 258: Performed by: INTERNAL MEDICINE

## 2024-03-11 PROCEDURE — 6370000000 HC RX 637 (ALT 250 FOR IP): Performed by: STUDENT IN AN ORGANIZED HEALTH CARE EDUCATION/TRAINING PROGRAM

## 2024-03-11 PROCEDURE — 6360000002 HC RX W HCPCS: Performed by: INTERNAL MEDICINE

## 2024-03-11 PROCEDURE — 43762 RPLC GTUBE NO REVJ TRC: CPT

## 2024-03-11 PROCEDURE — 6370000000 HC RX 637 (ALT 250 FOR IP): Performed by: INTERNAL MEDICINE

## 2024-03-11 PROCEDURE — 94669 MECHANICAL CHEST WALL OSCILL: CPT

## 2024-03-11 PROCEDURE — 94640 AIRWAY INHALATION TREATMENT: CPT

## 2024-03-11 PROCEDURE — 83735 ASSAY OF MAGNESIUM: CPT

## 2024-03-11 RX ORDER — ONDANSETRON 4 MG/1
4 TABLET, ORALLY DISINTEGRATING ORAL EVERY 8 HOURS PRN
Status: DISCONTINUED | OUTPATIENT
Start: 2024-03-11 | End: 2024-03-28 | Stop reason: HOSPADM

## 2024-03-11 RX ORDER — POLYETHYLENE GLYCOL 3350 17 G/17G
17 POWDER, FOR SOLUTION ORAL DAILY PRN
Status: CANCELLED | OUTPATIENT
Start: 2024-03-11

## 2024-03-11 RX ORDER — PRIMIDONE 50 MG/1
50 TABLET ORAL NIGHTLY
Status: DISCONTINUED | OUTPATIENT
Start: 2024-03-11 | End: 2024-03-28 | Stop reason: HOSPADM

## 2024-03-11 RX ORDER — LANOLIN ALCOHOL/MO/W.PET/CERES
3 CREAM (GRAM) TOPICAL NIGHTLY
Status: CANCELLED | OUTPATIENT
Start: 2024-03-11

## 2024-03-11 RX ORDER — CARBOXYMETHYLCELLULOSE SODIUM 10 MG/ML
1 GEL OPHTHALMIC 2 TIMES DAILY
Status: CANCELLED | OUTPATIENT
Start: 2024-03-11

## 2024-03-11 RX ORDER — ONDANSETRON 4 MG/1
4 TABLET, ORALLY DISINTEGRATING ORAL EVERY 8 HOURS PRN
Status: CANCELLED | OUTPATIENT
Start: 2024-03-11

## 2024-03-11 RX ORDER — ALBUTEROL SULFATE 2.5 MG/3ML
2.5 SOLUTION RESPIRATORY (INHALATION) EVERY 4 HOURS PRN
Status: DISCONTINUED | OUTPATIENT
Start: 2024-03-11 | End: 2024-03-28 | Stop reason: HOSPADM

## 2024-03-11 RX ORDER — ONDANSETRON 2 MG/ML
4 INJECTION INTRAMUSCULAR; INTRAVENOUS EVERY 6 HOURS PRN
Status: CANCELLED | OUTPATIENT
Start: 2024-03-11

## 2024-03-11 RX ORDER — PRIMIDONE 50 MG/1
50 TABLET ORAL NIGHTLY
Status: CANCELLED | OUTPATIENT
Start: 2024-03-11

## 2024-03-11 RX ORDER — POLYETHYLENE GLYCOL 3350 17 G/17G
17 POWDER, FOR SOLUTION ORAL DAILY PRN
Status: DISCONTINUED | OUTPATIENT
Start: 2024-03-11 | End: 2024-03-28 | Stop reason: HOSPADM

## 2024-03-11 RX ORDER — ASPIRIN 81 MG/1
81 TABLET, CHEWABLE ORAL DAILY
Status: DISCONTINUED | OUTPATIENT
Start: 2024-03-12 | End: 2024-03-28 | Stop reason: HOSPADM

## 2024-03-11 RX ORDER — ALBUTEROL SULFATE 2.5 MG/3ML
2.5 SOLUTION RESPIRATORY (INHALATION) EVERY 4 HOURS PRN
Status: CANCELLED | OUTPATIENT
Start: 2024-03-11

## 2024-03-11 RX ORDER — FLUTICASONE PROPIONATE 50 MCG
2 SPRAY, SUSPENSION (ML) NASAL NIGHTLY
Status: CANCELLED | OUTPATIENT
Start: 2024-03-11

## 2024-03-11 RX ORDER — LEVOFLOXACIN 500 MG/1
750 TABLET, FILM COATED ORAL EVERY OTHER DAY
Status: COMPLETED | OUTPATIENT
Start: 2024-03-12 | End: 2024-03-16

## 2024-03-11 RX ORDER — ATORVASTATIN CALCIUM 80 MG/1
80 TABLET, FILM COATED ORAL NIGHTLY
Status: CANCELLED | OUTPATIENT
Start: 2024-03-11

## 2024-03-11 RX ORDER — NITROGLYCERIN 0.4 MG/1
0.4 TABLET SUBLINGUAL EVERY 5 MIN PRN
Status: CANCELLED | OUTPATIENT
Start: 2024-03-11

## 2024-03-11 RX ORDER — MULTIVITAMIN WITH IRON
1 TABLET ORAL DAILY
Status: CANCELLED | OUTPATIENT
Start: 2024-03-12

## 2024-03-11 RX ORDER — FLUTICASONE PROPIONATE 50 MCG
2 SPRAY, SUSPENSION (ML) NASAL NIGHTLY
Status: DISCONTINUED | OUTPATIENT
Start: 2024-03-11 | End: 2024-03-28 | Stop reason: HOSPADM

## 2024-03-11 RX ORDER — ATORVASTATIN CALCIUM 80 MG/1
80 TABLET, FILM COATED ORAL NIGHTLY
Status: DISCONTINUED | OUTPATIENT
Start: 2024-03-11 | End: 2024-03-28 | Stop reason: HOSPADM

## 2024-03-11 RX ORDER — ACETAMINOPHEN 325 MG/1
650 TABLET ORAL EVERY 4 HOURS PRN
Status: CANCELLED | OUTPATIENT
Start: 2024-03-11

## 2024-03-11 RX ORDER — IPRATROPIUM BROMIDE 42 UG/1
2 SPRAY, METERED NASAL 3 TIMES DAILY
Status: DISCONTINUED | OUTPATIENT
Start: 2024-03-11 | End: 2024-03-28 | Stop reason: HOSPADM

## 2024-03-11 RX ORDER — VITAMIN B COMPLEX
2000 TABLET ORAL DAILY
Status: CANCELLED | OUTPATIENT
Start: 2024-03-12

## 2024-03-11 RX ORDER — CLOZAPINE 25 MG/1
25 TABLET ORAL 2 TIMES DAILY
Status: DISCONTINUED | OUTPATIENT
Start: 2024-03-11 | End: 2024-03-28 | Stop reason: HOSPADM

## 2024-03-11 RX ORDER — LACTOBACILLUS RHAMNOSUS GG 10B CELL
1 CAPSULE ORAL 2 TIMES DAILY WITH MEALS
Status: CANCELLED | OUTPATIENT
Start: 2024-03-11

## 2024-03-11 RX ORDER — ACETAMINOPHEN 325 MG/1
650 TABLET ORAL EVERY 4 HOURS PRN
Status: DISCONTINUED | OUTPATIENT
Start: 2024-03-11 | End: 2024-03-28 | Stop reason: HOSPADM

## 2024-03-11 RX ORDER — LACTOBACILLUS RHAMNOSUS GG 10B CELL
1 CAPSULE ORAL 2 TIMES DAILY WITH MEALS
Qty: 16 CAPSULE | Refills: 0 | Status: ON HOLD | OUTPATIENT
Start: 2024-03-11 | End: 2024-03-19

## 2024-03-11 RX ORDER — GUAIFENESIN/DEXTROMETHORPHAN 100-10MG/5
5 SYRUP ORAL EVERY 4 HOURS PRN
Status: DISCONTINUED | OUTPATIENT
Start: 2024-03-11 | End: 2024-03-12

## 2024-03-11 RX ORDER — GUAIFENESIN/DEXTROMETHORPHAN 100-10MG/5
5 SYRUP ORAL EVERY 4 HOURS PRN
Status: CANCELLED | OUTPATIENT
Start: 2024-03-11

## 2024-03-11 RX ORDER — LANOLIN ALCOHOL/MO/W.PET/CERES
3 CREAM (GRAM) TOPICAL NIGHTLY
Status: DISCONTINUED | OUTPATIENT
Start: 2024-03-11 | End: 2024-03-28 | Stop reason: HOSPADM

## 2024-03-11 RX ORDER — CLOZAPINE 25 MG/1
25 TABLET ORAL 2 TIMES DAILY
Status: CANCELLED | OUTPATIENT
Start: 2024-03-11

## 2024-03-11 RX ORDER — CARBOXYMETHYLCELLULOSE SODIUM 10 MG/ML
1 GEL OPHTHALMIC 2 TIMES DAILY
Status: DISCONTINUED | OUTPATIENT
Start: 2024-03-11 | End: 2024-03-28 | Stop reason: HOSPADM

## 2024-03-11 RX ORDER — ENOXAPARIN SODIUM 100 MG/ML
40 INJECTION SUBCUTANEOUS DAILY
Status: DISCONTINUED | OUTPATIENT
Start: 2024-03-12 | End: 2024-03-28 | Stop reason: HOSPADM

## 2024-03-11 RX ORDER — DONEPEZIL HYDROCHLORIDE 5 MG/1
10 TABLET, FILM COATED ORAL NIGHTLY
Status: DISCONTINUED | OUTPATIENT
Start: 2024-03-11 | End: 2024-03-28 | Stop reason: HOSPADM

## 2024-03-11 RX ORDER — LACTOBACILLUS RHAMNOSUS GG 10B CELL
1 CAPSULE ORAL 2 TIMES DAILY WITH MEALS
Status: DISCONTINUED | OUTPATIENT
Start: 2024-03-12 | End: 2024-03-28 | Stop reason: HOSPADM

## 2024-03-11 RX ORDER — DONEPEZIL HYDROCHLORIDE 5 MG/1
10 TABLET, FILM COATED ORAL NIGHTLY
Status: CANCELLED | OUTPATIENT
Start: 2024-03-11

## 2024-03-11 RX ORDER — ENOXAPARIN SODIUM 100 MG/ML
40 INJECTION SUBCUTANEOUS DAILY
Status: CANCELLED | OUTPATIENT
Start: 2024-03-11

## 2024-03-11 RX ORDER — ONDANSETRON 2 MG/ML
4 INJECTION INTRAMUSCULAR; INTRAVENOUS EVERY 6 HOURS PRN
Status: DISCONTINUED | OUTPATIENT
Start: 2024-03-11 | End: 2024-03-28 | Stop reason: HOSPADM

## 2024-03-11 RX ORDER — IPRATROPIUM BROMIDE 42 UG/1
2 SPRAY, METERED NASAL 3 TIMES DAILY
Status: CANCELLED | OUTPATIENT
Start: 2024-03-11

## 2024-03-11 RX ORDER — ASPIRIN 81 MG/1
81 TABLET, CHEWABLE ORAL DAILY
Status: CANCELLED | OUTPATIENT
Start: 2024-03-12

## 2024-03-11 RX ORDER — VITAMIN B COMPLEX
2000 TABLET ORAL DAILY
Status: DISCONTINUED | OUTPATIENT
Start: 2024-03-12 | End: 2024-03-28 | Stop reason: HOSPADM

## 2024-03-11 RX ORDER — MULTIVITAMIN WITH IRON
1 TABLET ORAL DAILY
Status: DISCONTINUED | OUTPATIENT
Start: 2024-03-12 | End: 2024-03-28 | Stop reason: HOSPADM

## 2024-03-11 RX ORDER — LEVOFLOXACIN 750 MG/1
750 TABLET, FILM COATED ORAL EVERY OTHER DAY
Qty: 4 TABLET | Refills: 0 | Status: ON HOLD | OUTPATIENT
Start: 2024-03-14 | End: 2024-03-22

## 2024-03-11 RX ORDER — NITROGLYCERIN 0.4 MG/1
0.4 TABLET SUBLINGUAL EVERY 5 MIN PRN
Status: DISCONTINUED | OUTPATIENT
Start: 2024-03-11 | End: 2024-03-28 | Stop reason: HOSPADM

## 2024-03-11 RX ADMIN — IPRATROPIUM BROMIDE 2 SPRAY: 42 SPRAY NASAL at 13:31

## 2024-03-11 RX ADMIN — DONEPEZIL HYDROCHLORIDE 10 MG: 5 TABLET, FILM COATED ORAL at 20:25

## 2024-03-11 RX ADMIN — FLUTICASONE PROPIONATE 2 SPRAY: 50 SPRAY, METERED NASAL at 21:01

## 2024-03-11 RX ADMIN — CARBIDOPA AND LEVODOPA 3 TABLET: 25; 100 TABLET ORAL at 22:37

## 2024-03-11 RX ADMIN — Medication 1.5 TABLET: at 11:03

## 2024-03-11 RX ADMIN — MELATONIN TAB 3 MG 3 MG: 3 TAB at 20:25

## 2024-03-11 RX ADMIN — CARBOXYMETHYLCELLULOSE SODIUM 1 DROP: 10 GEL OPHTHALMIC at 20:59

## 2024-03-11 RX ADMIN — Medication 4 ML: at 08:28

## 2024-03-11 RX ADMIN — CARBIDOPA AND LEVODOPA 3 TABLET: 25; 100 TABLET ORAL at 17:07

## 2024-03-11 RX ADMIN — Medication 1 CAPSULE: at 17:07

## 2024-03-11 RX ADMIN — Medication 2000 UNITS: at 09:03

## 2024-03-11 RX ADMIN — CLOZAPINE 25 MG: 25 TABLET ORAL at 09:03

## 2024-03-11 RX ADMIN — ATORVASTATIN CALCIUM 80 MG: 80 TABLET, FILM COATED ORAL at 20:24

## 2024-03-11 RX ADMIN — Medication 1.5 TABLET: at 09:02

## 2024-03-11 RX ADMIN — PRIMIDONE 50 MG: 50 TABLET ORAL at 20:25

## 2024-03-11 RX ADMIN — ASPIRIN 81 MG 81 MG: 81 TABLET ORAL at 09:03

## 2024-03-11 RX ADMIN — ENOXAPARIN SODIUM 40 MG: 100 INJECTION SUBCUTANEOUS at 09:13

## 2024-03-11 RX ADMIN — CARBIDOPA AND LEVODOPA 3 TABLET: 25; 100 TABLET ORAL at 15:16

## 2024-03-11 RX ADMIN — THERA TABS 1 TABLET: TAB at 09:03

## 2024-03-11 RX ADMIN — CARBIDOPA AND LEVODOPA 3 TABLET: 25; 100 TABLET ORAL at 13:16

## 2024-03-11 RX ADMIN — SODIUM CHLORIDE, PRESERVATIVE FREE 10 ML: 5 INJECTION INTRAVENOUS at 09:03

## 2024-03-11 RX ADMIN — Medication 1 CAPSULE: at 09:03

## 2024-03-11 RX ADMIN — Medication 4 ML: at 12:52

## 2024-03-11 RX ADMIN — CARBIDOPA AND LEVODOPA 3 TABLET: 25; 100 TABLET ORAL at 20:22

## 2024-03-11 RX ADMIN — Medication 1.5 TABLET: at 06:54

## 2024-03-11 RX ADMIN — CLOZAPINE 25 MG: 25 TABLET ORAL at 20:26

## 2024-03-11 RX ADMIN — CARBOXYMETHYLCELLULOSE SODIUM 1 DROP: 10 GEL OPHTHALMIC at 11:04

## 2024-03-11 RX ADMIN — Medication 1 TABLET: at 05:09

## 2024-03-11 ASSESSMENT — PAIN SCALES - GENERAL
PAINLEVEL_OUTOF10: 0

## 2024-03-11 NOTE — DISCHARGE INSTRUCTIONS
Please call and make an appointment with your PCP within 1 week  Please call and make an appointment with Neurology  Please take all your medications as prescribed including any new ones on discharge

## 2024-03-11 NOTE — PROGRESS NOTES
Gastroenterology Progress Note            Pacheco Prince is a 79 y.o. male patient.  1. Pneumonia of both lower lobes due to infectious organism    2. General weakness    3. Elevated troponin    4. Elevated brain natriuretic peptide (BNP) level    5. Hypoxemia        SUBJECTIVE:  Somnolent with some tremor.  Tolerating TF at goal rate.    Physical    VITALS:  /65   Pulse 76   Temp 98.4 °F (36.9 °C) (Oral)   Resp 18   Ht 1.778 m (5' 10\")   Wt 83.3 kg (183 lb 11.2 oz)   SpO2 98%   BMI 26.36 kg/m²   TEMPERATURE:  Current - Temp: 98.4 °F (36.9 °C); Max - Temp  Av.8 °F (37.1 °C)  Min: 98.2 °F (36.8 °C)  Max: 99.5 °F (37.5 °C)    Abdomen soft, ND, NT, no HSM, Bowel sounds normal.  PEG site clean and dry -- bumper left at 2.5 cm aixa.  Non tender.    Data      Recent Labs     24  0524  0532 24  0611   WBC 6.3 14.9* 8.9   HGB 12.8* 12.5* 12.3*   HCT 38.9* 37.9* 37.5*   MCV 91.1 90.6 91.4    291 276     Recent Labs     24  0544 24  0532 24  0611    144 140   K 3.6 3.8 3.5    111* 109   CO2 23 22 24   BUN 10 12 15   CREATININE 0.5* 0.5* <0.5*     Recent Labs     2444 24  0532 24  0611   AST 95* 96* 59*   ALT 20 11 32   BILIDIR <0.2  --   --    BILITOT 0.4 0.5 0.4   ALKPHOS 68 71 63     No results for input(s): \"LIPASE\", \"AMYLASE\" in the last 72 hours.          ASSESSMENT :    Oropharyngeal dysphagia - with recurrent aspiration pneumonia.  Post op day 2 s/p PEG placement and tolerating TF at goal rate.  No complications apparent.        PLAN   :  Continue tube feeds at goal rate.  SLP to guide po diet trials  I would see in f/u of G-tube in 6 months in office -- I gave patient's family my card.    GI will sign off.  Please call with questions.    Luke Douglas MD  [facility]  3/9/2024   
      Admit Date: 2/29/2024  Diet: ADULT DIET; Dysphagia - Minced and Moist    CC: PNA    Interval history:   Overnight, there were no acute events.  Patient's vitals remained stable.    Patient was seen this morning in bed with wife at bedside.  Per wife, patient was more lethargic this morning and did not want to awaken.  Per wife, she thinks that is likely because he was not coughing up a lot of his sputum and clearing his lungs.  Once patient started coughing up he was more awake and conversant. Patient was actively coughing with brown sputum production. Patient denies fevers, chills, nausea, vomiting, chest pain, diarrhea, constipation, dysuria, urinary frequency or urgency.     Plan:     -Continue IV cefepime + Flagyl for 1 more day to complete 6 days of IV Abx  -Probiotics  -Chest vest, 3% nebulized saline  -PT/OT  -Aspiration precautions with dysphagia diet-minced and moist diet  -Anticipate discharge to ARU tomorrow    Assessment:   Pacheco Prince is a 79 y.o. male with PMH of aspiration pneumonia, Parkinson's, hyperlipidemia, hypertension carotid artery stenosis, expressive aphasia who was admitted with PNA    Bilateral lung pneumonia.  Likely due to aspiration. Hx of aspiration pneumonia and dysphagia.  [place].  Generalized weakness  Dysphagia  -Continue cefepime and Flagyl.  -MRSA negative, Discontinue Vanc  - strep antigen and Legionella  -Consult speech for swallow evaluation.  -PT, OT consult.  -Supplemental oxygen as needed.  -Aspiration precautions.  -No PE on CT chest.  Showed bilateral pneumonia.  - Pulm consult: incentive spirometry, Acapella every 4 hours, chest PT every 8 hours.   - MBS pending     Parkinson's with anxiety  -Continue home carbidopa-levodopa, Mysoline, Aricept and clozapine     Carotid artery stenosis.  Hyperlipidemia  -Statin     Hypertension  Blood pressure stable.  Keep goal SBP less than 160.  Not on medication    Code Status: Full 
      Admit Date: 2/29/2024  Diet: ADULT TUBE FEEDING; PEG; Standard with Fiber; Continuous; 25; Yes; 20; Q 6 hours; 60; 165; Q 4 hours    CC: PNA    Interval history:   Overnight, there were no acute events.  Patient's vitals remained stable.    Patient was seen this morning in bed with wife at bedside.  Patient was more responsive today.  He will follow commands.  He was able to answer questions appropriately.  He does not have any new complaints today.    Plan:     -P.o. Levaquin every other day for a 5 day course  -Continue tube feeds @ goal  -Probiotics  -Chest vest, 3% nebulized saline TID, NT suctioning daily  -Aspiration precautions    Assessment:   Pacheco Prince is a 79 y.o. male with PMH of aspiration pneumonia, Parkinson's, hyperlipidemia, hypertension carotid artery stenosis, expressive aphasia who was admitted with PNA    Bilateral lung pneumonia.  Likely due to aspiration. Hx of aspiration pneumonia and dysphagia.  [place] Zosyn x2 doses [place] the cruise ship.  Generalized weakness  Dysphagia  -Continue cefepime and Flagyl.  -MRSA negative, Discontinue Vanc  - strep antigen and Legionella  -Consult speech for swallow evaluation.  -PT, OT consult.  -Supplemental oxygen as needed.  -Aspiration precautions.  -No PE on CT chest.  Showed bilateral pneumonia.  - Pulm consult: incentive spirometry, Acapella every 4 hours, chest PT every 8 hours.   - MBS pending     Parkinson's with anxiety  -Continue home carbidopa-levodopa, Mysoline, Aricept and clozapine     Carotid artery stenosis.  Hyperlipidemia  -Statin     Hypertension  Blood pressure stable.  Keep goal SBP less than 160.  Not on medication    Code Status: Full Code   FEN: ADULT TUBE FEEDING; PEG; Standard with Fiber; Continuous; 25; Yes; 20; Q 6 hours; 60; 165; Q 4 hours   DVT PPX: [x] Lovenox, []Heparin, [] SCDs,[] Eliquis, [] Xarelto, [] Coumadin  DISPO: [x] GMF, [] ICU, [] CVU    Efren Cole MD  3/10/2024,  12:15 PM    This note was likely 
      Admit Date: 2/29/2024  Diet: Diet NPO    CC: PNA    Interval history:   Overnight, there were no acute events.  Patient's vitals remained stable.    Patient was seen this morning in bed with wife at bedside.  Patient was lethargic and difficult to arouse, ABG did not show any CO2 retention. CXR done this morning showed new RLL infiltrate.     Plan:     -Start Vanc + IV cefepime + Flagyl for new RLL consolidation  -MRSA nares  -Probiotics  -Chest vest, 3% nebulized saline TID   -Aspiration precautions  -In light of patient being on such high doses of Sinemet, with the risk of NMS in abrupt discontinuation, will place NGT and optimize his Sinemet + Clozapine until his PEG is placed  -Check LFTs and CK  -PEG tomorrow with GI    Assessment:   Pacheco Prince is a 79 y.o. male with PMH of aspiration pneumonia, Parkinson's, hyperlipidemia, hypertension carotid artery stenosis, expressive aphasia who was admitted with PNA    Bilateral lung pneumonia.  Likely due to aspiration. Hx of aspiration pneumonia and dysphagia.  Received Zosyn x2 doses [place] the cruise ship.  Generalized weakness  Dysphagia  -Continue cefepime and Flagyl.  -MRSA negative, Discontinue Vanc  - strep antigen and Legionella  -Consult speech for swallow evaluation.  -PT, OT consult.  -Supplemental oxygen as needed.  -Aspiration precautions.  -No PE on CT chest.  Showed bilateral pneumonia.  - Pulm consult: incentive spirometry, Acapella every 4 hours, chest PT every 8 hours.   - MBS pending     Parkinson's with anxiety  -Continue home carbidopa-levodopa, Mysoline, Aricept and clozapine     Carotid artery stenosis.  Hyperlipidemia  -Statin     Hypertension  Blood pressure stable.  Keep goal SBP less than 160.  Not on medication    Code Status: Full Code   FEN: Diet NPO   DVT PPX: [x] Lovenox, []Heparin, [] SCDs,[] Eliquis, [] Xarelto, [] Coumadin  DISPO: [x] GMF, [] ICU, [] CVU    Efren Cole MD  3/7/2024,  5:24 PM    This note was likely 
    Clinical Pharmacy Note: Pharmacy to Dose Vancomycin    Pacheco Prince is a 79 y.o. male started on Vancomycin for Aspiration PNA; consult received from LONDON Cantor CNP to manage therapy. Also receiving the following antibiotics: cefepime/metronidazole.    Goal AUC: 400-600 mg/L*hr  Goal Trough Level: 15 mcg/mL    Assessment/Plan:  A 1000 mg loading dose was given on 3/1/24 at 0236.  Initiate vancomycin 1000 mg IV every 12 hours. Bayesian modeling predicts an AUC of 457 mg/L*hr and a trough of 12.6 mcg/mL at steady state concentration.  A vancomycin random level has been ordered for 3/1/24 at 0800.  Changes in regimen will be determined based on culture results, renal function, and clinical response.  Pharmacy will continue to monitor and adjust regimen as necessary.    Allergies:  Amoxicillin-pot clavulanate, Sulfacetamide, Tamsulosin, and Penicillins     Recent Labs     02/29/24 2028   CREATININE 0.7*       Recent Labs     02/29/24 2028   WBC 10.2       Ht Readings from Last 1 Encounters:   03/01/24 1.778 m (5' 10\")        Wt Readings from Last 1 Encounters:   03/01/24 83.6 kg (184 lb 6.4 oz)         Estimated Creatinine Clearance: 91 mL/min (A) (based on SCr of 0.7 mg/dL (L)).      Thank you for the consult,    Sp Pelayo, PharmD  [id number]  
    Clinical Pharmacy Note: Pharmacy to Dose Vancomycin    Vancomycin Day: 2  Indication: HAP  Current Dose: 1500 mg q12h  Dosing Method: Bayesian Modeling    Random: 16.4 mcg/mL    Recent Labs     03/07/24  0544 03/08/24  0532   BUN 10 12       Recent Labs     03/07/24  0544 03/08/24  0532   CREATININE 0.5* 0.5*       Recent Labs     03/07/24  0544 03/08/24  0532   WBC 6.3 14.9*       No intake or output data in the 24 hours ending 03/08/24 0749      Ht Readings from Last 1 Encounters:   03/07/24 1.778 m (5' 10\")        Wt Readings from Last 1 Encounters:   03/01/24 83.6 kg (184 lb 6.4 oz)         Body mass index is 26.46 kg/m².    Estimated Creatinine Clearance: 128 mL/min (A) (based on SCr of 0.5 mg/dL (L)).      Assessment/Plan:  Vancomycin level is therapeutic.  Continue current vancomycin regimen of 1500 mg every 12 hours.   Bayesian Modeling predicts an AUC of 442 mg/L*hr and trough of 10 mg/L.  Will repeat vancomycin level if clinically indicated.   Changes in regimen will be determined based on culture results, renal function, and clinical response.  Pharmacy will continue to monitor and adjust regimen as necessary.    Thank you for the consult,    Amanda Yanes, PharmD, BCPS  [id number]  3/8/2024 7:50 AM       
    Clinical Pharmacy Note: Pharmacy to Dose Vancomycin    Vancomycin Day: 2  Indication: pneumonia  Current Dose: 1000 mg q12  Dosing Method: Bayesian Modeling    Random: < 4.0    Recent Labs     02/29/24 2028   BUN 22*       Recent Labs     02/29/24 2028   CREATININE 0.7*       Recent Labs     02/29/24 2028   WBC 10.2       No intake or output data in the 24 hours ending 03/01/24 1030      Ht Readings from Last 1 Encounters:   03/01/24 1.778 m (5' 10\")        Wt Readings from Last 1 Encounters:   03/01/24 83.6 kg (184 lb 6.4 oz)         Body mass index is 26.46 kg/m².    Estimated Creatinine Clearance: 91 mL/min (A) (based on SCr of 0.7 mg/dL (L)).      Assessment/Plan:  Vancomycin level is subtherapeutic.  Increase vancomycin regimen to 1500 mg every 12 hours.   Bayesian Modeling predicts an AUC of 529 mg/L*hr and trough of 15.3 mg/L.  A vancomycin random level has been ordered on 3/04/24 at 1200 for follow-up.  Changes in regimen will be determined based on culture results, renal function, and clinical response.  Pharmacy will continue to monitor and adjust regimen as necessary.    Thank you for the consult,    Natalie Hinton, PharmD, [facility]  [id number]      
    PULMONARY AND CRITICAL CARE MEDICINE PROGRESS NOTE      SUBJECTIVE: Patient is sitting comfortably in the chair.  Much more awake and alert today.  Was able to answer my questions    REVIEW OF SYSTEMS:   CONSTITUTIONAL SYMPTOMS: The patient denies fever, fatigue, night sweats, weight loss or weight gain.   HEENT: No vision changes. No tinnitus, Denies sinus pain. No hoarseness, or dysphagia.   CARDIOVASCULAR: Denies chest pain, palpitation, syncope.  RESPIRATORY: Improved shortness of breath or cough.   GASTROINTESTINAL: Denies nausea, abdominal pain or change in bowel function.  SKIN: No rashes or itching.   MUSCULOSKELETAL: Denies weakness or bone pain.   NEUROLOGICAL: No headaches or seizures.     MEDICATIONS:      lactobacillus  1 capsule Oral BID WC    sodium chloride (Inhalant)  4 mL Nebulization BID    sodium chloride flush  5-40 mL IntraVENous 2 times per day    aspirin  81 mg Oral Daily    atorvastatin  80 mg Oral Nightly    carboxymethylcellulose PF  1 drop Both Eyes BID    cloZAPine  25 mg Oral BID    docusate sodium  100 mg Oral BID    donepezil  10 mg Oral Nightly    fluticasone  2 spray Nasal Nightly    ipratropium  2 spray Each Nostril TID    melatonin  3 mg Oral Nightly    multivitamin  1 tablet Oral Daily    pantoprazole  40 mg Oral QAM AC    polyethylene glycol  8.5 g Oral BID    primidone  50 mg Oral Nightly    psyllium  1 packet Oral QPM    Vitamin D  2,000 Units Oral Daily    sodium chloride flush  10 mL IntraVENous 2 times per day    enoxaparin  40 mg SubCUTAneous Daily    Carbidopa-Levodopa ER  1 tablet Oral Q2H While awake    metroNIDAZOLE  500 mg IntraVENous Q8H    cefepime  2,000 mg IntraVENous Q12H    carbidopa-levodopa  1 tablet Oral Q2H While awake      sodium chloride 50 mL/hr at 03/04/24 2136    sodium chloride      sodium chloride 10 mL/hr at 03/04/24 1239     labetalol, sodium chloride flush, sodium chloride, polyethylene glycol, nitroGLYCERIN, sodium chloride flush, sodium 
    V2.0    [facility] Progress Note      Name:  Pacheco Prince /Age/Sex: 1945  (79 y.o. male)   MRN & CSN:  [id number] & [id number] Encounter Date/Time: 3/4/2024 12:36 PM EST   Location:  [facility]-5559/5559-01 PCP: Rohan Watson, APRN - CNP     Attending:Shelton Arana MD       Hospital Day: 5    Assessment and Recommendations   Pacheco Prince is a 79 y.o. male with pmh of aspiration pneumonia, Parkinson's, hyperlipidemia, hypertension carotid artery stenosis, expressive aphasia who presents with Pneumonia of both lungs due to infectious organism, unspecified part of lung      Plan:   # Bilateral lung pneumonia.  Likely due to aspiration. Hx of aspiration pneumonia and dysphagia.  Received Zosyn x2 doses [place] the cruise ship.  # Generalized weakness  # Dysphagia  -Continue cefepime and Flagyl.  -MRSA negative, Discontinue Vanc  - strep antigen and Legionella  -Consult speech for swallow evaluation.  -PT, OT consult.  -Supplemental oxygen as needed.  -Aspiration precautions.  -No PE on CT chest.  Showed bilateral pneumonia.  - Pulm consult: incentive spirometry, Acapella every 4 hours, chest PT every 8 hours.   - MBS pending     # Parkinson's with anxiety-continue home carbidopa-levodopa, Mysoline, Aricept and clozapine     # Carotid artery stenosis.  # Hyperlipidemia  -Statin     # Hypertension.  Blood pressure stable.  Keep goal SBP less than 160.  Not on medications.      Diet ADULT DIET; Dysphagia - Minced and Moist   DVT Prophylaxis [x] Lovenox, []  Heparin, [] SCDs, [] Ambulation,  [] Eliquis, [] Xarelto  [] Coumadin   Code Status Full Code   Disposition From: Home  Expected Disposition: Home  Estimated Date of Discharge: 2-3 days  Patient requires continued admission due to pneumonia.   Surrogate Decision Maker/ POA  wife     Personally reviewed Lab Studies and Imaging     Discussed management of the case with pulmonologist who recommended MBS.      Imaging that was interpreted personally includes CT 
   03/01/24 0458   RT Protocol   History Pulmonary Disease 0   Respiratory pattern 0   Breath sounds 2   Cough 0   Indications for Bronchodilator Therapy Decreased or absent breath sounds   Bronchodilator Assessment Score 2       
  [facility] - Inpatient Rehabilitation Department   Phone: [phone]    Occupational Therapy    [] Initial Evaluation            [x] Daily Treatment Note         [] Discharge Summary      Patient: Pacheco Prince   : 1945   MRN: [id number]   Date of Service:  3/4/2024    Admitting Diagnosis:  Pneumonia of both lungs due to infectious organism, unspecified part of lung  Current Admission Summary:     Pacheco Prince is a 79 y.o. male with a history of hypertension, hyperlipidemia, CAD, diabetes, Parkinson's disease and aspiration pneumonia who presents to the emergency department today via [facility] from the [facility] where patient was on a cruise and became ill and had to be flown back.  Patient reportedly left for his cruise on .  He first went to [place] and was doing well.  Wife states yesterday they went to the [place] and patient began having a cough early in the morning and by evening was running a fever and was very fatigued.  They did see the medical staff on the cruise ship and had x-rays completed and was diagnosed with pneumonia and started on Zosyn.  Patient woke up much worse today at the island of [facility].  He was given a second dose of Zosyn and the decision was made to fly patient out of [facility].  Flight crew reports patient remained stable and was oxygenating well.  Patient denies having any chest pain, heart palpitations, diaphoresis.  Patient's temperature is 99.7 °F at the time of my exam.  He is oxygenating in the mid 90s on room air.  He is resting comfortably and in no obvious distress.  Family states that patient becomes very weak when ill secondary to his Parkinson's disease        Past Medical History:  has a past medical history of Acute bronchitis, CAD (coronary artery disease), Cancer (HCC), Clostridioides difficile infection, Diabetes mellitus (HCC), Erectile dysfunction, GERD (gastroesophageal reflux disease), Hyperlipidemia, 
  [facility] - Inpatient Rehabilitation Department   Phone: [phone]    Physical Therapy    [] Initial Evaluation            [x] Daily Treatment Note         [] Discharge Summary      Patient: Pacheco Prince   : 1945   MRN: [id number]   Date of Service:  3/6/2024  Admitting Diagnosis: Pneumonia of both lungs due to infectious organism, unspecified part of lung  Current Admission Summary: per EMR \"Worsening SOB, fever, cough, generalized weakness x 2 days.  Pacheco Prince is a 79 y.o. male with pmh of aspiration pneumonia, Parkinson's, hyperlipidemia, hypertension carotid artery stenosis, expressive aphasia, who presents with worsening SOB, fever, cough and generalized weakness of 2 days duration whilst in a cruise ship.  Patient's daughter at bedside and assisted with history.  Per the patient's daughter, the patient and wife in bed on a cruise to the [facility] on .  The patient started experiencing SOB, cough ,fever and progressive weakness x 2 days.  He was taken to the close clinic and was started on Zosyn.  He continues to spike fevers and patient was airlifted here for further treatment.  The patient denies chest pain, dizziness, lightheadedness, nausea, diarrhea and constipation.  CT chest revealed bilateral lower lobes pneumonia.  He has been started on broad-spectrum antibiotics.\"    Past Medical History:  has a past medical history of Acute bronchitis, CAD (coronary artery disease), Cancer ([facility]), Clostridioides difficile infection, Diabetes mellitus ([facility]), Erectile dysfunction, GERD (gastroesophageal reflux disease), Hyperlipidemia, Obesity, and Parkinson disease.  Past Surgical History:  has a past surgical history that includes Diagnostic Cardiac Cath Lab Procedure (2005); Coronary angioplasty with stent (2005); Cataract extraction; eye surgery (2021); Tonsillectomy ([facility]); Percutaneous Transluminal Coronary Angio (); and Upper gastrointestinal 
  [facility] - Inpatient Rehabilitation Department   Phone: [phone]    Physical Therapy    [] Initial Evaluation            [x] Daily Treatment Note         [] Discharge Summary      Patient: Pacheco Prince   : 1945   MRN: [id number]   Date of Service:  3/4/2024  Admitting Diagnosis: Pneumonia of both lungs due to infectious organism, unspecified part of lung  Current Admission Summary: per EMR \"Worsening SOB, fever, cough, generalized weakness x 2 days.  Pacheco Prince is a 79 y.o. male with pmh of aspiration pneumonia, Parkinson's, hyperlipidemia, hypertension carotid artery stenosis, expressive aphasia, who presents with worsening SOB, fever, cough and generalized weakness of 2 days duration whilst in a cruise ship.  Patient's daughter at bedside and assisted with history.  Per the patient's daughter, the patient and wife in bed on a cruise to the [facility] on .  The patient started experiencing SOB, cough ,fever and progressive weakness x 2 days.  He was taken to the close clinic and was started on Zosyn.  He continues to spike fevers and patient was airlifted here for further treatment.  The patient denies chest pain, dizziness, lightheadedness, nausea, diarrhea and constipation.  CT chest revealed bilateral lower lobes pneumonia.  He has been started on broad-spectrum antibiotics.\"    Past Medical History:  has a past medical history of Acute bronchitis, CAD (coronary artery disease), Cancer ([facility]), Clostridioides difficile infection, Diabetes mellitus ([facility]), Erectile dysfunction, GERD (gastroesophageal reflux disease), Hyperlipidemia, Obesity, and Parkinson disease.  Past Surgical History:  has a past surgical history that includes Diagnostic Cardiac Cath Lab Procedure (2005); Coronary angioplasty with stent (2005); Cataract extraction; eye surgery (2021); Tonsillectomy ([facility]); Percutaneous Transluminal Coronary Angio (); and Upper gastrointestinal 
  Pharmacy Note - Extended Infusion Beta-Lactam Adjustment    Cefepime 2000mg Q12h for treatment of Hospital acquired pneumonia. Per [facility] Extended Infusion Beta-Lactam Policy, cefepime will be changed to   2000mg Q8h extended infusion.    Estimated Creatinine Clearance: Estimated Creatinine Clearance: 128 mL/min (A) (based on SCr of 0.5 mg/dL (L)).    BMI: Body mass index is 26.46 kg/m².    Please call with any questions.    Thank you,    Eldon Pate [facility]    
  Physician Progress Note      PATIENT:               JOIE MADDOX  CSN #:                  [id number]  :                       1945  ADMIT DATE:       2024 7:37 PM  DISCH DATE:  RESPONDING  PROVIDER #:        ROMEL JAVIER MD          QUERY TEXT:    Pt admitted with aspiration pna. Pt noted to have elevated temp, RR, procal.   If possible, please document in the progress notes and discharge summary if   you are evaluating and /or treating any of the following:      The medical record reflects the following:  Risk Factors: 80yo with aspiration pna    Clinical Indicators: - RR 18-25, Procal 0.28.  3/1- Axillary temps 101.1,   102.7, 100.6  Pulm 3/4, \"Patient has been sleepy and lethargic every time I have seen the   patient.\"  ED, \"Patient was started on Zosyn and today, he got worse so he was sent to   [facility]. From there, patient had a medical evacuation with [email or web address]   ICU and came home to [place]. His wife used to work at [employer] so   they chose to come straight here from [facility].  Patient said he feels   very weak in general, having difficulty standing up\"    Treatment: Maxipime, Flagyl,  Vanc,1L IVF bolus, blood cx's drawn  Options provided:  -- Sepsis, due to aspiration pna present on admission  -- Aspiration pna without Sepsis  -- Other - I will add my own diagnosis  -- Disagree - Not applicable / Not valid  -- Disagree - Clinically unable to determine / Unknown  -- Refer to Clinical Documentation Reviewer    PROVIDER RESPONSE TEXT:    This patient has sepsis due to aspiration pna which was present on admission.    Query created by: Skye Dos Santos on 3/4/2024 2:16 PM      Electronically signed by:  ROMEL JAVIER MD 3/6/2024 8:49 AM          
  [facility] - Inpatient Rehabilitation Department   Phone: [phone]    Occupational Therapy    [] Initial Evaluation            [x] Daily Treatment Note         [] Discharge Summary      Patient: Pacheco Prince   : 1945   MRN: [id number]   Date of Service:  3/11/2024    Admitting Diagnosis:  Pneumonia of both lungs due to infectious organism, unspecified part of lung  Current Admission Summary:     Pacheco Prince is a 79 y.o. male with a history of hypertension, hyperlipidemia, CAD, diabetes, Parkinson's disease and aspiration pneumonia who presents to the emergency department today via Jet ICU from the [facility] where patient was on a cruise and became ill and had to be flown back.  Patient reportedly left for his cruise on .  He first went to [place] and was doing well.  Wife states yesterday they went to the [place] and patient began having a cough early in the morning and by evening was running a fever and was very fatigued.  They did see the medical staff on the cruise ship and had x-rays completed and was diagnosed with pneumonia and started on Zosyn.  Patient woke up much worse today at the [place] of [facility].  He was given a second dose of Zosyn and the decision was made to fly patient out of [facility].  Flight crew reports patient remained stable and was oxygenating well.  Patient denies having any chest pain, heart palpitations, diaphoresis.  Patient's temperature is 99.7 °F at the time of my exam.  He is oxygenating in the mid 90s on room air.  He is resting comfortably and in no obvious distress.  Family states that patient becomes very weak when ill secondary to his Parkinson's disease        Past Medical History:  has a past medical history of Acute bronchitis, CAD (coronary artery disease), Cancer (HCC), Clostridioides difficile infection, Diabetes mellitus (HCC), Erectile dysfunction, GERD (gastroesophageal reflux disease), 
  [facility] - Inpatient Rehabilitation Department   Phone: [phone]    Occupational Therapy    [x] Initial Evaluation            [] Daily Treatment Note         [] Discharge Summary      Patient: Pacheco Prince   : 1945   MRN: [id number]   Date of Service:  3/1/2024    Admitting Diagnosis:  Pneumonia of both lungs due to infectious organism, unspecified part of lung  Current Admission Summary:   Chief Complaint   Patient presents with    Shortness of Breath       Pt was on a cruise ship in the [facility] when he developed aspiration pneumonia. Pt requested to come to [facility]. Currently VSS         HISTORY OF PRESENT ILLNESS: 1 or more Elements      History from : Patient and Family (wife)     Limitations to history : None     Pacheco Prince is a 79 y.o. male with a history of hypertension, hyperlipidemia, CAD, diabetes, Parkinson's disease and aspiration pneumonia who presents to the emergency department today via Jet ICU from the [facility] where patient was on a cruise and became ill and had to be flown back.  Patient reportedly left for his cruise on .  He first went to [place] and was doing well.  Wife states yesterday they went to the [place] and patient began having a cough early in the morning and by evening was running a fever and was very fatigued.  They did see the medical staff on the cruise ship and had x-rays completed and was diagnosed with pneumonia and started on Zosyn.  Patient woke up much worse today at the island of [facility].  He was given a second dose of Zosyn and the decision was made to fly patient out of [facility].  Flight crew reports patient remained stable and was oxygenating well.  Patient denies having any chest pain, heart palpitations, diaphoresis.  Patient's temperature is 99.7 °F at the time of my exam.  He is oxygenating in the mid 90s on room air.  He is resting comfortably and in no obvious distress.  Family 
4 Eyes Skin Assessment     NAME:  Pacheco Prince  YOB: 1945  MEDICAL RECORD NUMBER:  [id number]    The patient is being assessed for  Admission    I agree that at least one RN has performed a thorough Head to Toe Skin Assessment on the patient. ALL assessment sites listed below have been assessed.      Areas assessed by both nurses:    Head, Face, Ears, Shoulders, Back, Chest, Arms, Elbows, Hands, Sacrum. Buttock, Coccyx, Ischium, Legs. Feet and Heels, and Under Medical Devices         Does the Patient have a Wound? No noted wound(s)       Goldy Prevention initiated by RN: No  Wound Care Orders initiated by RN: No    Pressure Injury (Stage 3,4, Unstageable, DTI, NWPT, and Complex wounds) if present, place Wound referral order by RN under : No    New Ostomies, if present place, Ostomy referral order under : No     Nurse 1 eSignature: Electronically signed by Jaqueline Galvan RN on 3/1/24 at 3:51 AM EST    **SHARE this note so that the co-signing nurse can place an eSignature**    Nurse 2 eSignature: {Esignature:[id number]}   
Abdominal binder applied.   
Arrived from CT per bed. Eyes closed. Able to give name but YOB: 1922. Family at bedside. Thick mucous noted, clear in color. Continuous tremors noted. Family reports normal due to Parkinson's. Repot to be given to primary nurse Jenae Reilly RN per Fausto Key RN.  
Aspiration Screen    Name: Pacheco Prince  : 1945  Medical Diagnosis: Hypoxemia [R09.02]  General weakness [R53.1]  Elevated troponin [R79.89]  Elevated brain natriuretic peptide (BNP) level [R79.89]  Pneumonia of both lower lobes due to infectious organism [J18.9]  Pneumonia of both lungs due to infectious organism, unspecified part of lung [J18.9]    Swallow screen to assess aspiration risk completed per pneumonia protocol. Patient demonstrates some high risk indicators for potential dysphagia / aspiration per swallow screen.  RECOMMEND: Clinical Swallow Evaluation at bedside to assess swallowing function, rule out aspiration, and determine appropriate diet level.     Sanna Magana M.A., CCC-SLP [id number]  Speech-Language Pathologist      
Assessment complete. VSS. Patient resting in bed.Coughing up yellow/brownish mucous. Given Tylenol for axillary temperature of 99.0. Respirations even and easy. Wife feeding him him. Call light in reach. Fall precautions in place. No needs expressed at this time. Will continue to monitor.    
Assessment complete. VSS. Patient resting in bed.Talking and asking questions this morning.  Respirations even and easy. Call light in reach. Fall precautions in place. No needs expressed at this time. Will continue to monitor.    
Call placed to Dr. Cole to update that ER sinemet is not crushable to place through NG tube. Orders received and placed to give additional tablet of immediate release to align with next two doses tonight in place of the IR and ER current orders. Pharmacy aware and Primary RN J Carlos aware of these orders.   
Chest vest not done at this time as patient just had a G tube placed.  
Dr Douglas at bedside to speak with pt and pts family.   
Facility/Department: [address] ONCOLOGY  Speech Language Pathology   Dysphagia Treatment Note    Patient: Pacheco Prince   : 1945   MRN: [id number]      Evaluation Date: 3/10/2024      Admitting Dx: Hypoxemia [R09.02]  General weakness [R53.1]  Elevated troponin [R79.89]  Elevated brain natriuretic peptide (BNP) level [R79.89]  Pneumonia of both lower lobes due to infectious organism [J18.9]  Pneumonia of both lungs due to infectious organism, unspecified part of lung [J18.9]  Treatment Diagnosis: Oropharyngeal Dysphagia   Pain: Did not state                                              Diet and Treatment Recommendations 3/10/2024:  Diet Solids Recommendation:  NPO  Liquid Consistency Recommendation:  NPO  Recommended form of Meds: Meds via alternative means       MBS results:  Modified Barium Swallow evaluation completed on 3/4/2024. Patient presents with mild oropharyngeal dysphagia secondary to prolonged mastication, premature spillage to pyriform sinuses, reduced AP propulsion, delayed swallow initiation, reduced laryngeal elevation, decreased tongue base retraction and reduced pharyngeal contraction. Pt demonstrated aspiration after the swallow with initial trial of thin liquids via tsp, suspect in relation to sensation although pt demonstrated a hard cough in response. No laryngeal penetration or aspiration was viewed with thin liquids via cup/straw, mildly (nectar) thick liquids, puree, soft solids or regular solids. Trace residue noted within the vallecular space, tongue base and pyriforms sinuses with thin liquids, slightly increased with mildly (nectar) thick liquids and improved with a secondary swallow. Full pharyngeal clearing achieved with puree and soft solids, however mild-mod vallecular residue was assessed with regular solids. Pt demonstrated prolonged mastication and reduced/delayed AP propulsion with solids. Thin liquid wash after trials of regular solids appeared to improve AP 
ICU charge RN called to see if they could help in NG tube insertion.  RN Charge stated that they would send someone from night shift over to attempt.   
Multiple nurses made multiple attempts to insert NG tube, however attempts were unsuccessful. Attending made aware.   
Nutrition Note    RECOMMENDATIONS  PO Diet: Dysphagia minced & moist  ONS: N/A     NUTRITION ASSESSMENT   Nutrition intervention triggered for LOS.  Pt receives a Dysphagia minced & moist diet.  Pt sleeping soundly, even with sternal rub per MD.  Wife @ bedside & states pt has an excellent appetite & eats very well.  No significant wt loss identified if CBW accurate at 184 lb. Average wt over past year at 186 lb.  Wife denies need to start ONS at this time.  Will consider CCC diet if blood sugars rise, however current glucose 110, & last A1c 6.1 (3/5/24).  No nutrition concerns @ this time.  Will monitor for consistently adequate po intake.     Nutrition Related Findings: no edema noted; A1 c 6.1, gluc 110  Wounds: None  Nutrition Education:  Education not indicated   Nutrition Goals: PO intake 50% or greater     MALNUTRITION ASSESSMENT      Malnutrition Status: No malnutrition    NUTRITION DIAGNOSIS   No nutrition diagnosis at this time        CURRENT NUTRITION THERAPIES  ADULT DIET; Dysphagia - Minced and Moist     PO Intake: 51-75%   PO Supplement Intake:None Ordered    ANTHROPOMETRICS  Current Height: 177.8 cm (5' 10\")  Current Weight - Scale: 83.6 kg (184 lb 6.4 oz)    Ideal Body Weight (IBW): 166 lbs  (75 kg)    Usual Bodyweight         BMI: 26.4    The patient will be monitored per nutrition standards of care. Consult dietitian if additional nutrition interventions are needed prior to RD reassessment.     Susi Juan, IVETTE, LD    Contact: [phone]      
Occupational Therapy  Pacheco Prince      133: Chart reviewed, pt returned to room around 1230 from procedure, RN consulted and reporting pt not appropriate for OT tx session at this time and requesting to Hold session. Will f/u per POC when pt appropriate to participate.    Thanks,  Zita Bentley, [id number]      
Pacheco Prince  3/11/2024  [id number]    Chief Complaint: Pneumonia of both lungs due to infectious organism, unspecified part of lung    Subjective   Patient underwent PEG placement on 3/8.  It is functioning adequately, he is tolerating tube feeds at goal rate.    Patient reports he is doing well today.  Denies any acute pain complaints.  Denies any fevers, chills, chest pain, shortness of breath.         Objective   Objective:  Patient Vitals for the past 24 hrs:   BP Temp Temp src Pulse Resp SpO2 Weight   03/11/24 1141 124/67 98.4 °F (36.9 °C) Axillary 68 16 96 % --   03/11/24 0835 -- -- -- 63 18 98 % --   03/11/24 0736 -- -- -- -- -- -- 82.7 kg (182 lb 4.8 oz)   03/11/24 0721 134/63 97.8 °F (36.6 °C) Axillary 72 18 93 % --   03/11/24 0508 107/61 98.3 °F (36.8 °C) Axillary 60 20 95 % --   03/10/24 2353 116/63 98.5 °F (36.9 °C) Axillary 82 18 93 % --   03/10/24 2246 -- -- -- 77 18 95 % --   03/10/24 2156 (!) 144/66 98.6 °F (37 °C) Axillary 79 20 94 % --   03/10/24 1520 -- -- -- 82 18 95 % --     Gen: No distress, pleasant.   HEENT: Normocephalic, atraumatic.   CV: No audible murmurs, well perfused extremities  Resp: No respiratory distress. No increased WOB  Abd: Soft, nontender nondistended  Ext: No edema.   Neuro: Awake, alert.  At least 4/5 strength in bilateral upper and lower extremities.  No rest tremor observed.  No significant rigidity.    Laboratory data: Available via EMR.     Therapy progress:    PT    Rolling: Level of difficulty - Total   Sit to Stand from a Chair: Level of difficulty - Total  Supine to Sit: Level of difficulty - Total     Bed to Chair: Physical Assistance Required - Total  Ambulate Across Room: Physical Assistance Required - Total  Ascend 3-5 Steps With HR: Physical Assistance Required - Total    OT    Eating: Physical Assistance Required - Total (G tube feeding 3/11)  Grooming: Physical Assistance Required - A Lot  LB Dressing: Physical Assistance Required - Total  UB Dressing: 
Pacheco Prince  3/7/2024  [id number]    Chief Complaint: Pneumonia of both lungs due to infectious organism, unspecified part of lung    Subjective   Patient more lethargic this am. Noted to have developed recurrent aspiration PNA. NGT to be placed for Sinemet administration. GI consulted, planning for PEG tomorrow.     ROS unable to be obtained due to clinical condition.          Objective   Objective:  Patient Vitals for the past 24 hrs:   BP Temp Temp src Pulse Resp SpO2 Height   03/07/24 1757 (!) 165/58 99.6 °F (37.6 °C) Axillary 61 18 97 % --   03/07/24 1415 (!) 151/65 -- -- -- -- -- --   03/07/24 1412 -- -- -- 98 18 100 % --   03/07/24 1259 (!) 143/53 99.7 °F (37.6 °C) Axillary 96 -- 100 % --   03/07/24 1214 -- -- -- -- -- -- 1.778 m (5' 10\")   03/07/24 1157 (!) 181/73 99.2 °F (37.3 °C) Axillary 60 18 94 % --   03/07/24 1130 -- -- -- 51 15 93 % --   03/07/24 0942 (!) 164/66 98.9 °F (37.2 °C) Axillary 50 20 94 % --   03/07/24 0748 -- -- -- 88 18 98 % --   03/07/24 0655 (!) 162/69 -- -- 58 -- -- --   03/07/24 0534 (!) 173/73 98.6 °F (37 °C) Axillary 60 18 96 % --   03/06/24 2326 127/66 98.7 °F (37.1 °C) Axillary 69 18 93 % --   03/06/24 2003 (!) 145/67 98.8 °F (37.1 °C) Axillary 77 20 94 % --     Gen: No distress, pleasant.   HEENT: Normocephalic, atraumatic.   CV: No audible murmurs, well perfused extremities  Resp: No respiratory distress. No increased WOB  Abd: Soft, nontender nondistended  Ext: No edema.   Neuro: Obtunded. Does not wake to voice or tactile stimuli. Does not follow commands in any extremities.     Laboratory data: Available via EMR.     Therapy progress:    PT    Rolling: Level of difficulty - A Little   Sit to Stand from a Chair: Level of difficulty - A Lot  Supine to Sit: Level of difficulty - A Lot     Bed to Chair: Physical Assistance Required - A Lot  Ambulate Across Room: Physical Assistance Required - A Lot  Ascend 3-5 Steps With HR: Physical Assistance Required - A 
Physical Therapy/Occupational Therapy  Pacheco Prince  Pt is off the floor to radiology at this time. Will follow up as schedule allows.  Thanks, Sanna Martinez, [id number]  Jada Roth, OTR/L [id number]      
Pt arrived from Endo to PACU bay 2. Report received from Endo staff. Pt arousable to voice. Surgical incisions dressings in place to abdomen. Pt on 3L 02, NSR, and VSS. Will continue to monitor.   
Pt has not received his morning med's due to pt not being fully awake.  This nurse has observed that the wife was feeding the pt while the pt was not awake.  Pt was asleep in bed.  The wife would try and arouse the pt by nudging and putting the spoon full of food to his lips prompting the pt to open and eat.   Pt was chewing the food but had not swallowed before the wife placed another spoon full of food in his mouth.   Dr Harrison came to visit the pt who was still asleep with eyes closed. This nurse educated along with Dr. Harrison that while the pt is sleeping,  pt is not to have any food or drink in his mouth.   He isn't alert enough to process the food and effectively swallow.  The wife did verbalize that she understood and would not feed him like that again.  
Pt is still sleeping in bed peacefully.  No medication has been given  
Pt more awake.  Answering questions appropriately.  Pt able to cough and able to bring up sputum.  Patient suctioned.  Tolerated well.  Wife Tiffany and Daughter Carol at bedside.  Updated with plan of care.  Denies needs.  
Pt stable and able to be transferred from PACU to room 3369. A&O , VSS, with no complaints at this time. 3T called and notified that pt is being transferred out of PACU and to room.   
Pt transferred to room 3369 at this time. A&O with no signs of distress. Tele on, with visual on monitor. Report given to 3T RN at [facility]. V/u and denies questions or further needs at this time.    
Rapid Response Quick Summary/ Stroke Alert    Room: 5TN-5559/5559-01    Assessment of concern / patient:  Family reports that the patient now has a right facial droop and is drooling which is new since 1530 after \" taking a nap\"    Physician involved:  Dr Hayes     Interventions:  Monitoring of vitals and neuro exam per MD blood sugar checked. Patient is verbalizing that he wants to stay in the chair and not go to CT scan. After speaking to the MD patient consented to ct scan     Disposition:  ct scan per bed with RN   
Shift assessment completed. Routine vitals stable. Scheduled medications given. Patient is has his eyes closed and did not respond when spoken to. Family at bedside assisting in suctioning patients mouth when he coughs up phelgm. Noted with bilateral hand tremors. Secure message sent to Dr Cole regarding the holding of the TF when Levaquin has to be given, awaiting any changes.  Respirations are easy and unlabored. Patient does not appear to be in distress, resting comfortably at this time. Call light within reach.    
Speech Language Pathology  Attempt     Pacheco Prince  1945     Attempted to see patient for dysphagia therapy. Pt is lethargic upon attempt today, following PEG placement. Will hold and follow up as schedule allows.    Erich Nguyen MA CF-SLP   COND. [id number]  3/9/2024 1:56 PM           
Speech Language Pathology  Attempt    Pacheco LAM [place]  1945      Attempted to see patient for dysphagia therapy. Pt is currently NPO for PEG placement. Will hold and follow up as schedule allows.      Sanna Magana M.A., CCC-SLP [id number]  Speech-Language Pathologist    
Speech Language Pathology  Attempt    Pacheco Prince  1945      Attempted to see patient for dysphagia therapy. Per RN pt is more alert but is currently receiving intervention from RT (chest vest).  Will re attempt as schedule permit and pt is able to participate.       Nancy Gaitan M.A. [facility]-SLP SRobbiP. [id number]  Speech-Language Pathologist   3/11/2024 1:07 PM       
Speech Language Pathology  HOLD    Pacheco LAM [place]  1945      Attempted to see patient for dysphagia therapy. Per RN report there is concern for aspiration based on chest imaging this date. Pt is currently NPO per MD with GI consult for possible PEG tube placement. Will hold and follow up as Pt is medically appropriate and as schedule allows.      Sanna Magana M.A., CCC-SLP [id number]  Speech-Language Pathologist    
Teaching / education initiated regarding perioperative experience, expectations, and pain management during stay. Patient verbalized understanding.    Keli-op interview completed with wife Tiffany and daughter constance.    Bilat soft wrist restraints in place d/t pt attempting to remove NG tube.    
This RN paused continuous tube feed at 0649 3/10/24. Tube feed to be paused two hours before and two hours after Levaquin given, per MAR.   
Tylenol given for temp of 99 axillary.   
Wife states she would like us to wait until tomorrow to do chest vest. She is worried about the fresh PEG.  
(ALLEGRA) 180 MG tablet Take 1 tablet by mouth nightly (Patient not taking: Reported on 2/29/2024)      carbidopa-levodopa (SINEMET)  MG per tablet Take 1 tablet by mouth See Admin Instructions Take one tablet by mouth every 2 hours between 7:00 am and 9:00 pm. Patient may take three more doses overnight during bathroom breaks if needed.      Menatetrenone (VITAMIN K2) 100 MCG TABS Take 100 mcg by mouth daily      Vitamin D, Cholecalciferol, 50 MCG (2000 UT) CAPS Take 1 capsule by mouth daily      Coenzyme Q10 (COQ10 PO) Take 1 capsule by mouth daily      Carbidopa-Levodopa ER 36. MG CPCR Take 1 tablet by mouth See Admin Instructions Take one tablet by mouth every 2 hours from 7:00 am to 9:00 pm. Patient may take three more doses overnight during bathroom breaks if needed.      omeprazole (PRILOSEC) 20 MG delayed release capsule Take 1 capsule by mouth nightly      aspirin 81 MG tablet Take 1 tablet by mouth daily 30 tablet 0    Psyllium 500 MG CAPS Take 6 capsules by mouth at bedtime Takes @ 6PM      fish oil-omega-3 fatty acids 1000 MG capsule Take 1 capsule by mouth 2 times daily      multivitamin (THERAGRAN) per tablet Take 1 tablet by mouth daily         Patient is no longer taking fexofenadine or Senokot-S.    Of note, patient received all meds today up until 7:00 pm: see med list for details on Carbidopa-levo  and Carbidopa-leva ER 36.; patient suffers from extreme Parkinson's. Patient also takes Clozaril 25 mg BID: had one dose today.    Timing of last doses updated.    Thank you,  Sarah Heller, CPhT      
Little    OT    Eating: Physical Assistance Required - A Lot  Grooming: Physical Assistance Required - A Lot  LB Dressing: Physical Assistance Required - Total  UB Dressing: Physical Assistance Required - A Lot  Bathing: Physical Assistance Required - A Lot  Toileting: Physical Assistance Required - Total    SLP  MBS 3/4: No laryngeal penetration or aspiration was viewed with thin liquids via cup/straw, mildly (nectar) thick liquids, puree, soft solids or regular solids.         Body mass index is 26.46 kg/m².       Assessment:  Patient Active Problem List   Diagnosis    Coronary artery disease involving native coronary artery of native heart without angina pectoris    Mixed hyperlipidemia    Dizziness    PVC's (premature ventricular contractions)    Hypertension    Bilateral carotid artery stenosis    Parkinson disease    Bilateral carotid artery disease, unspecified type (HCC)    Hypotension    Type 2 diabetes mellitus without complication, without long-term current use of insulin (HCC)    COVID-19    Aspiration pneumonia (HCC)    Acute metabolic encephalopathy    Sepsis (HCC)    Acute aspiration pneumonia (HCC)    Pneumonia of left lower lobe due to infectious organism    TIA (transient ischemic attack)    Parkinson's disease    Pneumonia of both lungs due to infectious organism, unspecified part of lung       Plan:   Patient is a good candidate for acute inpatient rehab, pending medical stability.     Keon Sheets MD 3/5/2024, 6:16 PM    * This document was created using dictation software.  While all precautions were taken to ensure accuracy, errors may have occurred.  Please disregard any typographical errors.      
Physical Assistance Required - Total  UB Dressing: Physical Assistance Required - A Lot  Bathing: Physical Assistance Required - A Lot  Toileting: Physical Assistance Required - Total    SLP  MBS 3/4: No laryngeal penetration or aspiration was viewed with thin liquids via cup/straw, mildly (nectar) thick liquids, puree, soft solids or regular solids.         Body mass index is 26.46 kg/m².       Assessment:  Patient Active Problem List   Diagnosis    Coronary artery disease involving native coronary artery of native heart without angina pectoris    Mixed hyperlipidemia    Dizziness    PVC's (premature ventricular contractions)    Hypertension    Bilateral carotid artery stenosis    Parkinson disease    Bilateral carotid artery disease, unspecified type (HCC)    Hypotension    Type 2 diabetes mellitus without complication, without long-term current use of insulin (HCC)    COVID-19    Aspiration pneumonia (HCC)    Acute metabolic encephalopathy    Sepsis (HCC)    Acute aspiration pneumonia (HCC)    Pneumonia of left lower lobe due to infectious organism    TIA (transient ischemic attack)    Parkinson's disease    Pneumonia of both lungs due to infectious organism, unspecified part of lung       Plan:   Patient is a good candidate for acute inpatient rehab, pending medical stability.     Keon Sheets MD 3/6/2024, 5:33 PM    * This document was created using dictation software.  While all precautions were taken to ensure accuracy, errors may have occurred.  Please disregard any typographical errors.    
endoscopy (2010).    Discharge Recommendations: Pacheco Prince scored a 12/24 on the AM-PAC short mobility form. Current research shows that an AM-PAC score of 17 or less is typically not associated with a discharge to the patient's home setting. Based on the patient's AM-PAC score and their current functional mobility deficits, it is recommended that the patient have 5-7 sessions per week of Physical Therapy at d/c to increase the patient's independence.  At this time, this patient demonstrates complex nursing, medical, and rehabilitative needs, and would benefit from intensive rehabilitation services upon discharge from the Inpatient setting.  This patient demonstrates the ability to participate in and benefit from an intensive therapy program with a coordinated interdisciplinary team approach to foster frequent, structured, and documented communication among disciplines, who will work together to establish, prioritize, and achieve treatment goals. Please see assessment section for further patient specific details.    If patient discharges prior to next session this note will serve as a discharge summary.  Please see below for the latest assessment towards goals.     Recommend 5-7x/wk pending patient ability to increase participation in therapy     DME Required For Discharge: DME to be determined at next level of care, DME to be determined pending patient progress    Precautions/Restrictions: high fall risk, modified diet- Dysphagia Diet, minced and moist  Weight Bearing Restrictions: no restrictions  [] Right Upper Extremity  [] Left Upper Extremity [] Right Lower Extremity  [] Left Lower Extremity     Required Braces/Orthotics: no braces required   [] Right  [] Left  Positional Restrictions:no positional restrictions    Pre-Admission Information   Lives With: spouse, Tiffany       Type of Home: house  Home Layout: two level, laundry in basement, bedroom/bathroom upstairs, 12 stairs with bilateral rails to 
medication    Code Status: Full Code   FEN: ADULT TUBE FEEDING; PEG; Standard with Fiber; Continuous; 25; Yes; 20; Q 6 hours; 60; 165; Q 4 hours   DVT PPX: [x] Lovenox, []Heparin, [] SCDs,[] Eliquis, [] Xarelto, [] Coumadin  DISPO: [x] GMF, [] ICU, [] CVU    Efren Cole MD  3/9/2024,  1:26 PM    This note was likely completed using voice recognition technology and may contain unintended errors.     Objective:   Vitals:   T-max:  Patient Vitals for the past 8 hrs:   BP Temp Temp src Pulse Resp SpO2   03/09/24 1315 (!) 143/65 98.3 °F (36.8 °C) Axillary 70 18 99 %   03/09/24 0845 132/65 98.4 °F (36.9 °C) Oral 76 18 98 %         Intake/Output Summary (Last 24 hours) at 3/9/2024 1326  Last data filed at 3/9/2024 0600  Gross per 24 hour   Intake 1225 ml   Output 1200 ml   Net 25 ml         Physical Exam  General: NAD, pill rolling tremor noted, patient unable to awake to questioning, coughing  Eyes: EOMI  ENT: neck supple  Cardiovascular: Regular rate.  Respiratory: Clear to auscultation  Gastrointestinal: Soft, non tender.  PEG in place, abdominal binder in place  Genitourinary: no suprapubic tenderness  Musculoskeletal: No edema  Skin: warm, dry  Neuro: Alert.  Psych: Mood appropriate.      Review of Systems  Unable to assess    LABS:    CBC:   Recent Labs     03/07/24  0544 03/08/24  0532 03/09/24  0611   WBC 6.3 14.9* 8.9   HGB 12.8* 12.5* 12.3*   HCT 38.9* 37.9* 37.5*    291 276   MCV 91.1 90.6 91.4       Renal:    Recent Labs     03/07/24  0544 03/08/24  0532 03/09/24  0611    144 140   K 3.6 3.8 3.5    111* 109   CO2 23 22 24   BUN 10 12 15   CREATININE 0.5* 0.5* <0.5*   GLUCOSE 110* 116* 139*   CALCIUM 8.7 8.4 8.2*   MG 1.90 1.90 2.10   ANIONGAP 9 11 7       Hepatic:   Recent Labs     03/07/24  0544 03/08/24  0532 03/09/24  0611   AST 95* 96* 59*   ALT 20 11 32   BILITOT 0.4 0.5 0.4   BILIDIR <0.2  --   --    PROT 5.1* 5.8* 5.4*   LABALBU 3.3* 3.2* 2.9*   ALKPHOS 68 71 63       Troponin: No 
successive trials. Pt with x 1 instance of SILENT aspiration when a barium tablet was provided in conjunction with thin barium creating a mixed consistency, cued cough did not appear effective in clearing the aspirated material. Pt presents with a functional swallow but continues with mild-moderate dysphagia secondary to prolonged oral holding, lingual rocking to facilitate bolus transfer due to poor A-P propulsion, reduced palatal retraction, premature spillage to the valleculae, decreased hyolaryngeal elevation, delayed swallow initiation, reduced base of tongue retraction, and reduced laryngeal sensation complicated by advanced age and comorbidities (late stage Parkinson's disease). Pt with min-mod vallecular residue following each bolus presentation > with increased textures which pt was able to minimally clear with use of second cued dry swallow and liquid alternates. Pharyngeal stasis was non building and was due to remaining residue from BOT. Pt was able to protect his airway provided with thins via tsp, cup, straw in single sips and in succession. Patient is at an increased risk of aspiration due to uncleared pharyngeal residue and suspicion of mechanism fatigue.      History/Prior Level of Function:   Living Status: Home   Prior Dysphagia History: Pt known to us from previous admissions and time on ARU from 5/22/23-6/6/23. Pt was discharged on a Dysphagia III/Soft and Bite-Sized texture diet with Thin Liquids, meds in puree. Most recent MBS completed 6/1/23 (see above). Per daughter report, pt has been consuming a regular texture diet with thin liquids, meds in puree without difficulty in home setting.   Reason for referral: SLP evaluation orders received due to prior hx of dysphagia , new diagnosis of PNA , concern for aspiration , and dysphagia risk     Dysphagia Impressions/Diagnosis: Oropharyngeal Dysphagia   Pt upright on side of bed with support from PT/OT. Daughter present during evaluation and 
Minced and Moist  with Thin liquids , Meds in puree  with ongoing dysphagia intervention for diet tolerance monitoring.    Dysphagia Goals:   Pt will functionally tolerate recommended diet with no overt clinical s/s of aspiration (Ongoing 03/06/24)  Pt will demonstrate understanding of aspiration risk and precautions via education/demonstration with occasional prompting (Ongoing 03/06/24)  Pt will advance to least restrictive diet as indicated (Ongoing 03/06/24)  If clinical s/s of aspiration/penetration continue to be noted, Pt will participate in Modified Barium Swallow Study (MBS)  (GOAL MET, 3/4/24))    Plan:   3-5 times per week during acute care stay.      Patient/Family Education:  Provided education regarding role of SLP, recommendations and general speech pathology plan of care.   [] Pt verbalized understanding and agreement   [x] Pt requires ongoing learning   [x] No evidence of comprehension     Discharge Recommendations:    Recommend ongoing SLP for speech and dysphagia therapy upon discharge from hospital     Treatment time:  Timed Code Treatment Minutes: 0  Total Treatment time: 18 minutes    If patient discharges prior to next session this note will serve as a discharge summary.     Jade Hernandez MA CCC-SLP  Speech-Language Pathologist  [id number]. [id number]      
diet as indicated (Ongoing 03/02/24)  If clinical s/s of aspiration/penetration continue to be noted, Pt will participate in Modified Barium Swallow Study (MBS)  (Ongoing 03/02/24)    Plan:   3-5 times per week during acute care stay.      Patient/Family Education:  Provided education regarding role of SLP, recommendations and general speech pathology plan of care.   [] Pt verbalized understanding and agreement   [x] Pt requires ongoing learning   [x] No evidence of comprehension     Discharge Recommendations:    Recommend ongoing SLP for speech and dysphagia therapy upon discharge from hospital     Treatment time:  Timed Code Treatment Minutes: 0  Total Treatment time: 13 minutes    If patient discharges prior to next session this note will serve as a discharge summary.       Signature:   Saadia Salinas M.S. CCC-SLP #[id number]  Speech-Language Pathologist  
metroNIDAZOLE  500 mg IntraVENous Q8H    cefepime  2,000 mg IntraVENous Q12H    carbidopa-levodopa  1 tablet Oral Q2H While awake      Infusions:    sodium chloride 75 mL/hr at 03/02/24 1115    sodium chloride       PRN Meds: nitroGLYCERIN, 0.4 mg, Q5 Min PRN  sodium chloride flush, 10 mL, PRN  sodium chloride, , PRN  ondansetron, 4 mg, Q8H PRN   Or  ondansetron, 4 mg, Q6H PRN  magnesium hydroxide, 30 mL, Daily PRN  acetaminophen, 650 mg, Q6H PRN   Or  acetaminophen, 650 mg, Q6H PRN  guaiFENesin-dextromethorphan, 5 mL, Q4H PRN  albuterol, 2.5 mg, Q4H PRN  ibuprofen, 400 mg, Q6H PRN        Labs and Imaging   CT CHEST PULMONARY EMBOLISM W CONTRAST    Result Date: 3/1/2024  EXAMINATION: CTA OF THE CHEST 2/29/2024 11:15 pm TECHNIQUE: CTA of the chest was performed after the administration of intravenous contrast.  Multiplanar reformatted images are provided for review.  MIP images are provided for review. Automated exposure control, iterative reconstruction, and/or weight based adjustment of the mA/kV was utilized to reduce the radiation dose to as low as reasonably achievable. COMPARISON: Chest radiograph same day HISTORY: ORDERING SYSTEM PROVIDED HISTORY: SOB - Recent travel TECHNOLOGIST PROVIDED HISTORY: Reason for exam:->SOB - Recent travel Additional Contrast?->1 Reason for Exam: Pt was on a cruise ship in the [facility] when he developed aspiration pneumonia. Pt requested to come to [facility]. Currently VSS FINDINGS: Pulmonary Arteries: Pulmonary arteries are adequately opacified for evaluation.  No evidence of intraluminal filling defect to suggest pulmonary embolism.  Main pulmonary artery is enlarged measuring 39 mm, which may indicate underlying pulmonary hypertension. Mediastinum: No evidence of mediastinal lymphadenopathy.  The heart and pericardium demonstrate no acute abnormality.  There is no acute abnormality of the thoracic aorta.  Small hiatal hernia. Lungs/pleura: Ground-glass opacities are present 
now.    Send sputum for culture.  Legionella and streptococcal urine antigen negative.  Blood cultures negative.  Patient has been sleepy and lethargic every time I have seen the patient.  However, wife states that during the day he is alert sometimes.  This morning, patient has been sleepy again.  Required oxygen at 2 L/min due to desaturation at.  Wife has been feeding the patient when he is asleep.  I believe that he is constantly aspirating while being fed when he is not fully alert and awake.  I again reiterated to the wife that patient should not be fed anything including medications if he is not awake and alert due to risk of aspiration.  Speech is following.  Modified barium swallow is pending.  Patient needs aggressive pulmonary toilet.  However, unclear whether he is able to perform incentive spirometry and Acapella when he is sleepy and lethargic all the time.  Continue chest PT twice daily.      Grecia Harrison MD  Pulmonary Critical Care and Sleep Medicine  3/4/2024, 12:40 PM    This note was completed using dragon medical speech recognition software. Grammatical errors, random word insertions, pronoun errors and incomplete sentences are occasional consequences of this technology due to software limitations. If there are questions or concerns about the content of this note of information contained within the body of this dictation they should be addressed with the provider for clarification.   
with Thin liquids , Meds in puree  with close diet monitoring.    Dysphagia Goals:   Pt will functionally tolerate recommended diet with no overt clinical s/s of aspiration (Ongoing 03/05/24)  Pt will demonstrate understanding of aspiration risk and precautions via education/demonstration with occasional prompting (Ongoing 03/05/24)  Pt will advance to least restrictive diet as indicated (Ongoing 03/05/24)  If clinical s/s of aspiration/penetration continue to be noted, Pt will participate in Modified Barium Swallow Study (MBS)  (GOAL MET, 3/4/24))    Plan:   3-5 times per week during acute care stay.      Patient/Family Education:  Provided education regarding role of SLP, recommendations and general speech pathology plan of care.   [] Pt verbalized understanding and agreement   [x] Pt requires ongoing learning   [x] No evidence of comprehension     Discharge Recommendations:    Recommend ongoing SLP for speech and dysphagia therapy upon discharge from hospital     Treatment time:  Timed Code Treatment Minutes: 0  Total Treatment time: 14 minutes    If patient discharges prior to next session this note will serve as a discharge summary.     Sanna Magana M.A., CCC-SLP [id number]  Speech-Language Pathologist    
  Grossly 90* B shoulders  Strength:   (B) UE strength grossly WFL        Subjective  General: Patient supine in bed on arrival and agreeable for OT tx session. Wife present. MD present during OT tx.  Pain:0/10  Pain Interventions: not applicable      Activities of Daily Living  Basic Activities of Daily Living  Feeding: Setup; SBA (wife later assisting with feeding)  Grooming: Setup/SBA (face washing)  Upper Extremity Bathing: Setup/SBA; increased time  Upper Extremity Dressing: Maximum Assistance (doff/don gown)  Comments: ADL's completed while pt seated EOB with assistance as needed; Increased time and vc's required for completion of task. Wife present.   Instrumental Activities of Daily Living  No IADL completed on this date.    Functional Mobility  Bed Mobility:  Supine to Sit: Maximum Assistance  Scooting: SBA; increased time  Rolling to Right: Maximum Assistance  Comments: HOB elevated; pt tolerated sitting EOB with no c/o dizziness or pain.     Transfers:  Sit to stand transfer:Minimal assistance  Stand to sit transfer: Minimal assistance  Stand step transfer: Minimal assistance  Bed to chair transfer: Minimal assistance  Comments: using RW  Functional Mobility  Functional Mobility Activity: bed to chair transfer  Device Use: RW  Required Assistance: Minimal Assistance  Comment: Increased time for bed to chair transfer using RW with Minimal Assistance and vc's.  Total distance x 3'. Slow, steady steps and forward flexed stand posture noted.       Static Sitting Balance: fair (-): maintains balance at SBA with use of UE support  Dynamic Sitting Balance: fair (-): maintains balance at SBA with use of UE support  Static Standing Balance: poor (+): requires min (A) to maintain balance  Dynamic Standing Balance: poor (+): requires min (A) to maintain balance      Other Therapeutic Interventions    Functional Outcomes           Cognition  Overall Cognitive Status: Impaired  Arousal/Alertness: delayed responses to 
81 mg Oral Daily    atorvastatin  80 mg Oral Nightly    carboxymethylcellulose PF  1 drop Both Eyes BID    cloZAPine  25 mg Oral BID    docusate sodium  100 mg Oral BID    donepezil  10 mg Oral Nightly    fluticasone  2 spray Nasal Nightly    ipratropium  2 spray Each Nostril TID    melatonin  3 mg Oral Nightly    multivitamin  1 tablet Oral Daily    pantoprazole  40 mg Oral QAM AC    polyethylene glycol  8.5 g Oral BID    primidone  50 mg Oral Nightly    psyllium  1 packet Oral QPM    Vitamin D  2,000 Units Oral Daily    sodium chloride flush  10 mL IntraVENous 2 times per day    enoxaparin  40 mg SubCUTAneous Daily    Carbidopa-Levodopa ER  1 tablet Oral Q2H While awake    metroNIDAZOLE  500 mg IntraVENous Q8H    cefepime  2,000 mg IntraVENous Q12H    carbidopa-levodopa  1 tablet Oral Q2H While awake     Continuous Infusions:   sodium chloride 50 mL/hr at 03/04/24 2136    sodium chloride      sodium chloride 10 mL/hr at 03/04/24 1239     PRN Meds:labetalol, sodium chloride flush, sodium chloride, polyethylene glycol, nitroGLYCERIN, sodium chloride flush, sodium chloride, ondansetron **OR** ondansetron, magnesium hydroxide, acetaminophen **OR** acetaminophen, guaiFENesin-dextromethorphan, albuterol, ibuprofen    
M.A. [facility]-SLP #[id number] 3/11/2024 2:11 PM  Speech-Language Pathologist             
tx and remained above 90% the entire tx session; pt is alert; wife present; Agreeable to PT. Tremors throughout tx. Pt spouse reports pt playing golf and walking over .5 mile last week.  Pain: Pain rating taken based on observed faces and behaviors, no pain noticed during session  Pain Interventions: repositioned        Functional Mobility  Bed Mobility:   Bed mobility not completed on this date.  Comments:   Transfers:  Sit to stand transfer: minimal assistance  Stand to sit transfer: minimal assistance  Toilet transfer: minimal assistance  Comments: Pt required occasional HHA to standing and leading sit to stands and during turns. Pt had freezing episode when attempting to sit at toilet. VC/TC for B hand placement. 2 transfers to RW. 4 transfers without AD.  Ambulation:  Surface:level surface  Assistive Device: rolling walker  Assistance: contact guard assistance, minimal assistance  Distance: 40'  Gait Mechanics: forward flexed, small step through pattern, tremors thrioughout, v/c for turning and keeping eyes open;  Comments: VC and RW management for BIG steps.  Trial  Surface:level surface  Assistive Device: No Device  Assistance: contact guard assistance  Distance: 85' + 8'  Gait Mechanics: forward flexed, small step through pattern, tremors thrioughout, v/c for turning and keeping eyes open;  Comments: Pt ambulated to toilet with HHA. VC for BIG B arm swing and steps.  Stair Mobility:  Stair mobility not completed on this date.  Comments:  Wheelchair Mobility:  No w/c mobility completed on this date.  Comments:  Balance:  Static Sitting Balance: fair: maintains balance at CGA without use of UE support  Dynamic Sitting Balance: fair (-): maintains balance at CGA with use of UE support  Static Standing Balance: fair (-): maintains balance at CGA with use of UE support  Dynamic Standing Balance: poor (+): requires min (A) to maintain balance  Comments: Clothing management while standing with CGA/Min A.    Other 
05/19/2023 04:21 AM     Hemoglobin A1C:   Lab Results   Component Value Date/Time    LABA1C 5.9 02/06/2024 04:31 PM     TSH:   Lab Results   Component Value Date/Time    TSH 1.36 12/30/2023 10:37 PM     Troponin: No results found for: \"TROPONINT\"  Lactic Acid: No results for input(s): \"LACTA\" in the last 72 hours.  BNP:   Recent Labs     02/29/24 2028   PROBNP 917*       UA:  Lab Results   Component Value Date/Time    NITRU Negative 02/29/2024 08:28 PM    COLORU DARK YELLOW 02/29/2024 08:28 PM    PHUR 5.0 02/29/2024 08:28 PM    WBCUA 1 02/29/2024 08:28 PM    RBCUA 3 02/29/2024 08:28 PM    MUCUS 3+ 12/15/2022 12:06 PM    BACTERIA None Seen 02/29/2024 08:28 PM    CLARITYU Clear 02/29/2024 08:28 PM    SPECGRAV >=1.030 02/29/2024 08:28 PM    LEUKOCYTESUR Negative 02/29/2024 08:28 PM    UROBILINOGEN 1.0 02/29/2024 08:28 PM    BILIRUBINUR Negative 02/29/2024 08:28 PM    BILIRUBINUR negative 08/23/2023 09:06 AM    BLOODU Negative 02/29/2024 08:28 PM    GLUCOSEU 250 02/29/2024 08:28 PM    KETUA 15 02/29/2024 08:28 PM     Urine Cultures: No results found for: \"LABURIN\"  Blood Cultures:   Lab Results   Component Value Date/Time    BC  02/29/2024 08:28 PM     No Growth to date.  Any change in status will be called.     Lab Results   Component Value Date/Time    BLOODCULT2  02/29/2024 08:28 PM     No Growth to date.  Any change in status will be called.     Organism: No results found for: \"ORG\"      Electronically signed by Shelton Arana MD on 3/3/2024 at 9:08 AM   
Impaired  Arousal/Alertness: delayed responses to stimuli, inconsistent responses to stimuli  Following Commands: follows one step commands with repetition, follows one step commands with increased time  Attention Span: unable to maintain attention  Memory: decreased recall of biographical information, decreased recall of precautions, decreased recall of recent events, decreased short term memory, decreased long term memory  Safety Judgement: decreased awareness of need for assistance, decreased awareness of need for safety  Problem Solving: decreased awareness of errors  Insights: not aware of deficits  Initiation: requires cues for all  Sequencing: requires cues for all  Comments: follows one step commands with increased time  Prior to this admission-- Daughter stated patient can answer simple yes/no questions, has very impaired STM but was golfing last week and walking several miles a day.     Orientation:    oriented to person  Command Following:   accurately follows one step commands with increased time     Education  Barriers To Learning: cognition  Patient Education: patient educated on goals, OT role and benefits, plan of care, precautions, ADL adaptive strategies, family education, disease specific education, transfer training, discharge recommendations  Learning Assessment:  patient is not an independent learner    Assessment  Activity Tolerance: Fair+  Impairments Requiring Therapeutic Intervention: decreased functional mobility, decreased ADL status, decreased safety awareness, decreased cognition, decreased endurance, decreased posture  Prognosis: fair-  Clinical Assessment: Patient presents with the above deficits impacting daily occupational performance and would benefit from continued skilled OT services.  Pt showing progress with acute care rehab as evidenced this date by requiring only 1 person assistance with bed mobility, ADL transfers, and short distance functional mobility using AD.  Pt 
ibuprofen    
Recommendations: balance training, functional mobility training, transfer training, endurance training, patient/caregiver education, ADL/self-care training, cognitive/perceptual training, cognitive reorientation, and equipment evaluation/education    Goals  Patient Goals: Daughter would like further therapy to regain patient's independence again.    Short Term Goals:  Time Frame: until discharge  Patient will complete lower body ADL at minimal assistance   Patient will complete toileting at minimal assistance   Patient will complete functional transfers at minimal assistance   Patient will complete functional mobility at minimal assistance   Patient will increase [facility] ADL score = to or > than 16/24    Above goals reviewed on 3/6/2024.  All goals are ongoing at this time unless indicated above.       Therapy Session Time     Individual Group Co-treatment   Time In 1350     Time Out 1415     Minutes 25          Timed Code Treatment Minutes: 44 minutes  Timed Code Treatment Minutes: 25 Minutes  Total Treatment Minutes:  44 minutes       Electronically Signed By: Chico Jung OT, Chico Jung, OTR/L [id number]            
male admitted to [facility] for pneumonia. Prior to admission the pt was IND without a device, however, had 24/7 supervision. Currently the pt is below his baseline requiring  min A to CGA for all mobility. Pt presents with decreased alertness and flat affect.  He has demonstrated good improvement with rehab stays in the past. Pt will benefit from continued skilled PT to address these concerns.  Safety Interventions: patient left in chair, chair alarm in place, call light within reach, gait belt, patient at risk for falls, nurse notified, and family/caregiver present - wife mary    Plan  Frequency: 3-5 x/per week  Current Treatment Recommendations: strengthening, balance training, functional mobility training, transfer training, gait training, stair training, endurance training, patient/caregiver education, cognitive/perceptual training, cognitive reorientation, home exercise program, safety education, and equipment evaluation/education    Goals  Patient Goals: none stated   Short Term Goals:  Time Frame: prior to d/c  Patient will complete rolling (B) at moderate assistance   Patient will complete supine <> sit at minimal x 2 assistance   Patient will complete STS transfers  at minimal assistance - Goal Met 3/5/24  NEW GOAL:  Patient will complete STS transfers CGA.   Patient will ambulate 50 ft with use of LRAD at minimal assistance - Goal Met 3/5/24  NEW GOAL:  Patient will ambulate 50 ft with use of LRAD at CGA.  Patient to maintain standing at contact guard assistance for 10 minutes.  **No new goals met in full this date, 3/11.  Above goals reviewed on 3/11/2024.  All goals are ongoing at this time unless indicated above.      Therapy Session Time      Individual Group Co-treatment   Time In 0840   0907   Time Out 0906   0955   Minutes 26   48     Timed Code Treatment Minutes:   74 Minutes  Total Treatment Minutes:  74  minutes         Electronically Signed By: EFE HAMMONDS, PT [id number]      
decreased cognition, strength, endurance, posture, and balance. Pt is unsafe to return home 2/2 increased need for assistance. He has demonstrated good improvement with rehab stays in the past. Pt will benefit from continued skilled PT to address these concerns.  Safety Interventions: patient left in bed, bed alarm in place, call light within reach, gait belt, patient at risk for falls, nurse notified, and family/caregiver present    Plan  Frequency: 3-5 x/per week  Current Treatment Recommendations: strengthening, balance training, functional mobility training, transfer training, gait training, stair training, endurance training, patient/caregiver education, cognitive/perceptual training, cognitive reorientation, home exercise program, safety education, and equipment evaluation/education    Goals  Patient Goals: none stated   Short Term Goals:  Time Frame: prior to d/c  Patient will complete rolling (B) at moderate assistance   Patient will complete supine <> sit at minimal x 2 assistance   Patient will complete STS transfers  at minimal assistance   Patient will ambulate 50 ft with use of LRAD at minimal assistance  Patient to maintain standing at contact guard assistance for 10 minutes.    Above goals reviewed on 3/1/2024.  All goals are ongoing at this time unless indicated above.      Therapy Session Time      Individual Group Co-treatment   Time In     0827   Time Out     0950   Minutes     83     Timed Code Treatment Minutes:  68 Minutes  Total Treatment Minutes:  83 minutes       FLORINDA Hollins    Therapist was present, directed the patient's care, made skilled judgement, and was responsible for assessment and treatment of the patient.        Electronically Signed By: Zita Ibrahim, PT      Zita Ibrahim PT, DPT #[id number]

## 2024-03-11 NOTE — PROGRESS NOTES
Patient was admitted to room 4905 at 1850.  Patient was oriented to the Call Light, Phone, TV, Thermostat, Bed Controls, Bathroom and Emergency Cord.  Patient verbalized and demonstrated understanding of all.  Patient was also given an overview of Unit Routines for Acute Rehab, including the patient's rights and responsibilities as well as the CMS IRF [employer] Privacy Act Statement providing notice of data collection.  Patient states that their normal bowel regime is daily. Meal times were explained, including how to order food.  The white board, (which is posted on the wall by the door is used for communication) has the Therapy Scheduled that is posted each day along with the name of your doctor, nurse, and therapist for your convenience.  We recommend any family that will be care givers or any care givers the patient has, take part in therapy.  We have no set visiting hours, we suggest non-caregiver friends and family visitors come after therapy (at 4 PM or later) to allow patient to rest in between sessions.      In conjunction with the patient and patient’s family, this nurse worked to establish a tailored Fall TIPS plan to ensure patient safety and compliance:    Falls TIPS Completion    Patient identified as increased risk for harm if fall:  [x] Yes     Fall Risks  History of Falls:    [x] Yes   Medication Side Effects:   [] Yes   Walking Aid:    [] Yes   IV Pole or Equipment:   [x] Yes   Unsteady Walk:     [x] Yes   May Forget or Choose Not to Call: [x] Yes     Fall Interventions   Communicate Recent Fall and/or Risk of Harm: [x] Yes   Walking Aids:  Crutches: [] Yes   Cane: [] Yes   Walker: [] Yes   IV Assistance When Walking: [] Yes   Toileting Schedule: Every 2 Hours  Bedpan:   [x] Yes   Assist to Commode: [] Yes   Assist to Bathroom: [x] Yes   Bed Alarm On: [x] Yes   Assistance Out of Bed:  Bedrest: [] Yes   1 Person: [] Yes   2 People: [x] Yes     electronic

## 2024-03-11 NOTE — PLAN OF CARE
Problem: Safety - Adult  Goal: Free from fall injury  Outcome: Progressing     Problem: Chronic Conditions and Co-morbidities  Goal: Patient's chronic conditions and co-morbidity symptoms are monitored and maintained or improved  Outcome: Progressing     
  Problem: Safety - Adult  Goal: Free from fall injury  Outcome: Progressing     Problem: Chronic Conditions and Co-morbidities  Goal: Patient's chronic conditions and co-morbidity symptoms are monitored and maintained or improved  Outcome: Progressing     Problem: Discharge Planning  Goal: Discharge to home or other facility with appropriate resources  Outcome: Progressing     Problem: Skin/Tissue Integrity  Goal: Absence of new skin breakdown  Description: 1.  Monitor for areas of redness and/or skin breakdown  2.  Assess vascular access sites hourly  3.  Every 4-6 hours minimum:  Change oxygen saturation probe site  4.  Every 4-6 hours:  If on nasal continuous positive airway pressure, respiratory therapy assess nares and determine need for appliance change or resting period.  Outcome: Progressing     Problem: Safety - Medical Restraint  Goal: Remains free of injury from restraints (Restraint for Interference with Medical Device)  Description: INTERVENTIONS:  1. Determine that other, less restrictive measures have been tried or would not be effective before applying the restraint  2. Evaluate the patient's condition at the time of restraint application  3. Inform patient/family regarding the reason for restraint  4. Q2H: Monitor safety, psychosocial status, comfort, nutrition and hydration  Outcome: Progressing     Problem: Metabolic/Fluid and Electrolytes - Adult  Goal: Electrolytes maintained within normal limits  Outcome: Progressing  Goal: Hemodynamic stability and optimal renal function maintained  Outcome: Progressing     Problem: Hematologic - Adult  Goal: Maintains hematologic stability  Outcome: Progressing     Problem: Neurosensory - Adult  Goal: Achieves stable or improved neurological status  Outcome: Progressing  Goal: Absence of seizures  Outcome: Progressing     Problem: Respiratory - Adult  Goal: Achieves optimal ventilation and oxygenation  Outcome: Progressing     Problem: Skin/Tissue Integrity 
  Problem: Safety - Adult  Goal: Free from fall injury  Outcome: Progressing     Problem: Chronic Conditions and Co-morbidities  Goal: Patient's chronic conditions and co-morbidity symptoms are monitored and maintained or improved  Outcome: Progressing  Flowsheets (Taken 3/10/2024 2156)  Care Plan - Patient's Chronic Conditions and Co-Morbidity Symptoms are Monitored and Maintained or Improved: Monitor and assess patient's chronic conditions and comorbid symptoms for stability, deterioration, or improvement     Problem: Discharge Planning  Goal: Discharge to home or other facility with appropriate resources  Outcome: Progressing  Flowsheets (Taken 3/10/2024 2156)  Discharge to home or other facility with appropriate resources: Identify discharge learning needs (meds, wound care, etc)     Problem: Skin/Tissue Integrity  Goal: Absence of new skin breakdown  Description: 1.  Monitor for areas of redness and/or skin breakdown  2.  Assess vascular access sites hourly  3.  Every 4-6 hours minimum:  Change oxygen saturation probe site  4.  Every 4-6 hours:  If on nasal continuous positive airway pressure, respiratory therapy assess nares and determine need for appliance change or resting period.  Outcome: Progressing     Problem: Safety - Medical Restraint  Goal: Remains free of injury from restraints (Restraint for Interference with Medical Device)  Description: INTERVENTIONS:  1. Determine that other, less restrictive measures have been tried or would not be effective before applying the restraint  2. Evaluate the patient's condition at the time of restraint application  3. Inform patient/family regarding the reason for restraint  4. Q2H: Monitor safety, psychosocial status, comfort, nutrition and hydration  Outcome: Progressing     Problem: Metabolic/Fluid and Electrolytes - Adult  Goal: Electrolytes maintained within normal limits  Outcome: Progressing  Flowsheets (Taken 3/10/2024 2156)  Electrolytes maintained within 
  Problem: Safety - Adult  Goal: Free from fall injury  Outcome: Progressing     Problem: Chronic Conditions and Co-morbidities  Goal: Patient's chronic conditions and co-morbidity symptoms are monitored and maintained or improved  Outcome: Progressing  Flowsheets (Taken 3/6/2024 2003)  Care Plan - Patient's Chronic Conditions and Co-Morbidity Symptoms are Monitored and Maintained or Improved: Monitor and assess patient's chronic conditions and comorbid symptoms for stability, deterioration, or improvement     Problem: Discharge Planning  Goal: Discharge to home or other facility with appropriate resources  Outcome: Progressing  Flowsheets (Taken 3/6/2024 2003)  Discharge to home or other facility with appropriate resources: Identify discharge learning needs (meds, wound care, etc)     Problem: Skin/Tissue Integrity  Goal: Absence of new skin breakdown  Description: 1.  Monitor for areas of redness and/or skin breakdown  2.  Assess vascular access sites hourly  3.  Every 4-6 hours minimum:  Change oxygen saturation probe site  4.  Every 4-6 hours:  If on nasal continuous positive airway pressure, respiratory therapy assess nares and determine need for appliance change or resting period.  Outcome: Progressing     
  Problem: Safety - Adult  Goal: Free from fall injury  Outcome: Progressing     Problem: Chronic Conditions and Co-morbidities  Goal: Patient's chronic conditions and co-morbidity symptoms are monitored and maintained or improved  Outcome: Progressing  Flowsheets (Taken 3/9/2024 2031)  Care Plan - Patient's Chronic Conditions and Co-Morbidity Symptoms are Monitored and Maintained or Improved: Monitor and assess patient's chronic conditions and comorbid symptoms for stability, deterioration, or improvement     Problem: Discharge Planning  Goal: Discharge to home or other facility with appropriate resources  Outcome: Progressing  Flowsheets (Taken 3/9/2024 2031)  Discharge to home or other facility with appropriate resources: Identify discharge learning needs (meds, wound care, etc)     Problem: Skin/Tissue Integrity  Goal: Absence of new skin breakdown  Description: 1.  Monitor for areas of redness and/or skin breakdown  2.  Assess vascular access sites hourly  3.  Every 4-6 hours minimum:  Change oxygen saturation probe site  4.  Every 4-6 hours:  If on nasal continuous positive airway pressure, respiratory therapy assess nares and determine need for appliance change or resting period.  Outcome: Progressing     Problem: Safety - Medical Restraint  Goal: Remains free of injury from restraints (Restraint for Interference with Medical Device)  Description: INTERVENTIONS:  1. Determine that other, less restrictive measures have been tried or would not be effective before applying the restraint  2. Evaluate the patient's condition at the time of restraint application  3. Inform patient/family regarding the reason for restraint  4. Q2H: Monitor safety, psychosocial status, comfort, nutrition and hydration  Outcome: Progressing     Problem: Metabolic/Fluid and Electrolytes - Adult  Goal: Electrolytes maintained within normal limits  Outcome: Progressing  Flowsheets (Taken 3/9/2024 2031)  Electrolytes maintained within 
and hydration     
2031)  Maintains adequate nutritional intake: Monitor intake and output, weight and lab values     Problem: Genitourinary - Adult  Goal: Absence of urinary retention  3/10/2024 0944 by Yvonne Loredo RN  Outcome: Progressing  3/10/2024 0441 by J Carlos Chow RN  Outcome: Progressing  Flowsheets (Taken 3/9/2024 2031)  Absence of urinary retention: Monitor intake/output and perform bladder scan as needed     Problem: Anxiety  Goal: Will report anxiety at manageable levels  Description: INTERVENTIONS:  1. Administer medication as ordered  2. Teach and rehearse alternative coping skills  3. Provide emotional support with 1:1 interaction with staff  3/10/2024 0944 by Yvonne Loredo RN  Outcome: Progressing  3/10/2024 0441 by J Carlos Chow RN  Outcome: Progressing  Flowsheets (Taken 3/9/2024 2031)  Will report anxiety at manageable levels:   Administer medication as ordered   Teach and rehearse alternative coping skills     Problem: Coping  Goal: Pt/Family able to verbalize concerns and demonstrate effective coping strategies  Description: INTERVENTIONS:  1. Assist patient/family to identify coping skills, available support systems and cultural and spiritual values  2. Provide emotional support, including active listening and acknowledgement of concerns of patient and caregivers  3. Reduce environmental stimuli, as able  4. Instruct patient/family in relaxation techniques, as appropriate  5. Assess for spiritual pain/suffering and initiate Spiritual Care, Psychosocial Clinical Specialist consults as needed  3/10/2024 0944 by Yvonne Loredo RN  Outcome: Progressing  3/10/2024 0441 by J Carlos Chow RN  Outcome: Progressing  Flowsheets (Taken 3/9/2024 2031)  Patient/family able to verbalize anxieties, fears, and concerns, and demonstrate effective coping: Reduce environmental stimuli, as able     Problem: Pain  Goal: Verbalizes/displays adequate comfort level or baseline comfort level  Outcome: Progressing

## 2024-03-11 NOTE — RT PROTOCOL NOTE
RT Inhaler-Nebulizer Bronchodilator Protocol Note    There is a bronchodilator order in the chart from a provider indicating to follow the RT Bronchodilator Protocol and there is an “Initiate RT Inhaler-Nebulizer Bronchodilator Protocol” order as well (see protocol at bottom of note).    CXR Findings:  No results found.    The findings from the last RT Protocol Assessment were as follows:   History Pulmonary Disease: None or smoker <15 pack years  Respiratory Pattern: Regular pattern and RR 12-20 bpm  Breath Sounds: Slightly diminished and/or crackles  Cough: Strong, spontaneous, non-productive  Indication for Bronchodilator Therapy: Decreased or absent breath sounds  Bronchodilator Assessment Score: 2    Aerosolized bronchodilator medication orders have been revised according to the RT Inhaler-Nebulizer Bronchodilator Protocol below.    Respiratory Therapist to perform RT Therapy Protocol Assessment initially then follow the protocol.  Repeat RT Therapy Protocol Assessment PRN for score 0-3 or on second treatment, BID, and PRN for scores above 3.    No Indications - adjust the frequency to every 6 hours PRN wheezing or bronchospasm, if no treatments needed after 48 hours then discontinue using Per Protocol order mode.     If indication present, adjust the RT bronchodilator orders based on the Bronchodilator Assessment Score as indicated below.  Use Inhaler orders unless patient has one or more of the following: on home nebulizer, not able to hold breath for 10 seconds, is not alert and oriented, cannot activate and use MDI correctly, or respiratory rate 25 breaths per minute or more, then use the equivalent nebulizer order(s) with same Frequency and PRN reasons based on the score.  If a patient is on this medication at home then do not decrease Frequency below that used at home.    0-3 - enter or revise RT bronchodilator order(s) to equivalent RT Bronchodilator order with Frequency of every 4 hours PRN for wheezing 
RT Inhaler-Nebulizer Bronchodilator Protocol Note    There is a bronchodilator order in the chart from a provider indicating to follow the RT Bronchodilator Protocol and there is an “Initiate RT Inhaler-Nebulizer Bronchodilator Protocol” order as well (see protocol at bottom of note).    CXR Findings:  XR CHEST PORTABLE    Result Date: 3/7/2024  New right lung base infiltrate.       The findings from the last RT Protocol Assessment were as follows:   History Pulmonary Disease: None or smoker <15 pack years  Respiratory Pattern: Regular pattern and RR 12-20 bpm  Breath Sounds: Clear breath sounds  Cough: Strong, productive  Indication for Bronchodilator Therapy: Decreased or absent breath sounds  Bronchodilator Assessment Score: 1    Aerosolized bronchodilator medication orders have been revised according to the RT Inhaler-Nebulizer Bronchodilator Protocol below.    Respiratory Therapist to perform RT Therapy Protocol Assessment initially then follow the protocol.  Repeat RT Therapy Protocol Assessment PRN for score 0-3 or on second treatment, BID, and PRN for scores above 3.    No Indications - adjust the frequency to every 6 hours PRN wheezing or bronchospasm, if no treatments needed after 48 hours then discontinue using Per Protocol order mode.     If indication present, adjust the RT bronchodilator orders based on the Bronchodilator Assessment Score as indicated below.  Use Inhaler orders unless patient has one or more of the following: on home nebulizer, not able to hold breath for 10 seconds, is not alert and oriented, cannot activate and use MDI correctly, or respiratory rate 25 breaths per minute or more, then use the equivalent nebulizer order(s) with same Frequency and PRN reasons based on the score.  If a patient is on this medication at home then do not decrease Frequency below that used at home.    0-3 - enter or revise RT bronchodilator order(s) to equivalent RT Bronchodilator order with Frequency of 
Bronchodilator order with Frequency of every 4 hours PRN for wheezing or increased work of breathing using Per Protocol order mode.        4-6 - enter or revise RT Bronchodilator order(s) to two equivalent RT bronchodilator orders with one order with BID Frequency and one order with Frequency of every 4 hours PRN wheezing or increased work of breathing using Per Protocol order mode.        7-10 - enter or revise RT Bronchodilator order(s) to two equivalent RT bronchodilator orders with one order with TID Frequency and one order with Frequency of every 4 hours PRN wheezing or increased work of breathing using Per Protocol order mode.       11-13 - enter or revise RT Bronchodilator order(s) to one equivalent RT bronchodilator order with QID Frequency and an Albuterol order with Frequency of every 4 hours PRN wheezing or increased work of breathing using Per Protocol order mode.      Greater than 13 - enter or revise RT Bronchodilator order(s) to one equivalent RT bronchodilator order with every 4 hours Frequency and an Albuterol order with Frequency of every 2 hours PRN wheezing or increased work of breathing using Per Protocol order mode.     RT to enter RT Home Evaluation for COPD & MDI Assessment order using Per Protocol order mode.    Electronically signed by MATTHEW DELVALLE RCP on 3/1/2024 at 4:59 AM  
order(s) to two equivalent RT bronchodilator orders with one order with TID Frequency and one order with Frequency of every 4 hours PRN wheezing or increased work of breathing using Per Protocol order mode.       11-13 - enter or revise RT Bronchodilator order(s) to one equivalent RT bronchodilator order with QID Frequency and an Albuterol order with Frequency of every 4 hours PRN wheezing or increased work of breathing using Per Protocol order mode.      Greater than 13 - enter or revise RT Bronchodilator order(s) to one equivalent RT bronchodilator order with every 4 hours Frequency and an Albuterol order with Frequency of every 2 hours PRN wheezing or increased work of breathing using Per Protocol order mode.     RT to enter RT Home Evaluation for COPD & MDI Assessment order using Per Protocol order mode.    Electronically signed by Sarah Concepcion RCP on 3/11/2024 at 2:30 AM

## 2024-03-11 NOTE — DISCHARGE SUMMARY
GM/SCOOP powder  Commonly known as: GLYCOLAX     primidone 50 MG tablet  Commonly known as: MYSOLINE  Take 1 tablet by mouth nightly     Psyllium 500 MG Caps     Vitamin D (Cholecalciferol) 50 MCG (2000 UT) Caps     Vitamin K2 100 MCG Tabs            STOP taking these medications      fexofenadine 180 MG tablet  Commonly known as: ALLEGRA     sennosides-docusate sodium 8.6-50 MG tablet  Commonly known as: SENOKOT-S               Where to Get Your Medications        These medications were sent to [facility] - [place], [place] - [facility] [address] - P [phone] - F [phone]  [address] [facility] [address]      Phone: [phone]   carbidopa-levodopa  MG per tablet  carbidopa-levodopa  MG per tablet  lactobacillus capsule  levoFLOXacin 750 MG tablet        Time Spent Discharging patient 35 minutes    Electronically signed by Efren Cole MD on 3/11/2024 at 7:15 PM

## 2024-03-11 NOTE — CARE COORDINATION
03/11/24 1329   IMM Letter   IMM Letter given to Patient/Family/Significant other/Guardian/POA/by: Alix Rodriguez RN CM - second notice. signed by spouse in room   IMM Letter date given: 03/11/24   IMM Letter time given: 1320  (copy made for hard chart)       
[facility] Received home care referral. Will follow.     
Discharge Planning:     (CM) received a call from Cristino from [facility]. He stated they are going to follow this pt.      Electronically signed by EDDIE Gaspar on 3/4/2024 at 1:36 PM    
interested in outpatient pt/ot.    Pt's wife to transport pt home upon d/c.        The Plan for Transition of Care is related to the following treatment goals of Hypoxemia [R09.02]  General weakness [R53.1]  Elevated troponin [R79.89]  Elevated brain natriuretic peptide (BNP) level [R79.89]  Pneumonia of both lower lobes due to infectious organism [J18.9]  Pneumonia of both lungs due to infectious organism, unspecified part of lung [J18.9]    IF APPLICABLE: The Patient and/or patient representative Pacheco and his family were provided with a choice of provider and agrees with the discharge plan. Freedom of choice list with basic dialogue that supports the patient's individualized plan of care/goals and shares the quality data associated with the providers was provided to:     Patient Representative Name:       The Patient and/or Patient Representative Agree with the Discharge Plan? Yes         Electronically signed by EDDIE Gaspar on 3/2/2024 at 12:28 PM

## 2024-03-12 ENCOUNTER — TELEPHONE (OUTPATIENT)
Dept: INTERNAL MEDICINE CLINIC | Age: 79
End: 2024-03-12

## 2024-03-12 LAB
ALBUMIN SERPL-MCNC: 2.8 G/DL (ref 3.4–5)
ALBUMIN/GLOB SERPL: 1.1 {RATIO} (ref 1.1–2.2)
ALP SERPL-CCNC: 72 U/L (ref 40–129)
ALT SERPL-CCNC: 7 U/L (ref 10–40)
ANION GAP SERPL CALCULATED.3IONS-SCNC: 9 MMOL/L (ref 3–16)
AST SERPL-CCNC: 35 U/L (ref 15–37)
BASOPHILS # BLD: 0 K/UL (ref 0–0.2)
BASOPHILS NFR BLD: 0.6 %
BILIRUB SERPL-MCNC: 0.3 MG/DL (ref 0–1)
BUN SERPL-MCNC: 15 MG/DL (ref 7–20)
CALCIUM SERPL-MCNC: 8.4 MG/DL (ref 8.3–10.6)
CHLORIDE SERPL-SCNC: 104 MMOL/L (ref 99–110)
CO2 SERPL-SCNC: 27 MMOL/L (ref 21–32)
CREAT SERPL-MCNC: <0.5 MG/DL (ref 0.8–1.3)
DEPRECATED RDW RBC AUTO: 14.2 % (ref 12.4–15.4)
EOSINOPHIL # BLD: 0.3 K/UL (ref 0–0.6)
EOSINOPHIL NFR BLD: 4.4 %
GFR SERPLBLD CREATININE-BSD FMLA CKD-EPI: >60 ML/MIN/{1.73_M2}
GLUCOSE BLD-MCNC: 120 MG/DL (ref 70–99)
GLUCOSE BLD-MCNC: 127 MG/DL (ref 70–99)
GLUCOSE SERPL-MCNC: 144 MG/DL (ref 70–99)
HCT VFR BLD AUTO: 34.5 % (ref 40.5–52.5)
HGB BLD-MCNC: 11.9 G/DL (ref 13.5–17.5)
LYMPHOCYTES # BLD: 1.4 K/UL (ref 1–5.1)
LYMPHOCYTES NFR BLD: 18.4 %
MCH RBC QN AUTO: 31 PG (ref 26–34)
MCHC RBC AUTO-ENTMCNC: 34.4 G/DL (ref 31–36)
MCV RBC AUTO: 90.2 FL (ref 80–100)
MONOCYTES # BLD: 0.7 K/UL (ref 0–1.3)
MONOCYTES NFR BLD: 9.3 %
NEUTROPHILS # BLD: 5 K/UL (ref 1.7–7.7)
NEUTROPHILS NFR BLD: 67.3 %
PERFORMED ON: ABNORMAL
PERFORMED ON: ABNORMAL
PLATELET # BLD AUTO: 322 K/UL (ref 135–450)
PMV BLD AUTO: 7.1 FL (ref 5–10.5)
POTASSIUM SERPL-SCNC: 3.9 MMOL/L (ref 3.5–5.1)
PROT SERPL-MCNC: 5.3 G/DL (ref 6.4–8.2)
RBC # BLD AUTO: 3.83 M/UL (ref 4.2–5.9)
SODIUM SERPL-SCNC: 140 MMOL/L (ref 136–145)
WBC # BLD AUTO: 7.5 K/UL (ref 4–11)

## 2024-03-12 PROCEDURE — 94669 MECHANICAL CHEST WALL OSCILL: CPT

## 2024-03-12 PROCEDURE — 92523 SPEECH SOUND LANG COMPREHEN: CPT

## 2024-03-12 PROCEDURE — 43762 RPLC GTUBE NO REVJ TRC: CPT

## 2024-03-12 PROCEDURE — 85025 COMPLETE CBC W/AUTO DIFF WBC: CPT

## 2024-03-12 PROCEDURE — 97162 PT EVAL MOD COMPLEX 30 MIN: CPT

## 2024-03-12 PROCEDURE — 6370000000 HC RX 637 (ALT 250 FOR IP): Performed by: STUDENT IN AN ORGANIZED HEALTH CARE EDUCATION/TRAINING PROGRAM

## 2024-03-12 PROCEDURE — 92610 EVALUATE SWALLOWING FUNCTION: CPT

## 2024-03-12 PROCEDURE — 97166 OT EVAL MOD COMPLEX 45 MIN: CPT

## 2024-03-12 PROCEDURE — 97530 THERAPEUTIC ACTIVITIES: CPT

## 2024-03-12 PROCEDURE — 92526 ORAL FUNCTION THERAPY: CPT

## 2024-03-12 PROCEDURE — 94761 N-INVAS EAR/PLS OXIMETRY MLT: CPT

## 2024-03-12 PROCEDURE — 6360000002 HC RX W HCPCS: Performed by: STUDENT IN AN ORGANIZED HEALTH CARE EDUCATION/TRAINING PROGRAM

## 2024-03-12 PROCEDURE — 97535 SELF CARE MNGMENT TRAINING: CPT

## 2024-03-12 PROCEDURE — 36415 COLL VENOUS BLD VENIPUNCTURE: CPT

## 2024-03-12 PROCEDURE — 80053 COMPREHEN METABOLIC PANEL: CPT

## 2024-03-12 PROCEDURE — 1280000000 HC REHAB R&B

## 2024-03-12 RX ORDER — GUAIFENESIN/DEXTROMETHORPHAN 100-10MG/5
10 SYRUP ORAL 2 TIMES DAILY
Status: DISCONTINUED | OUTPATIENT
Start: 2024-03-12 | End: 2024-03-13

## 2024-03-12 RX ORDER — MIDODRINE HYDROCHLORIDE 5 MG/1
5 TABLET ORAL 2 TIMES DAILY PRN
Status: DISCONTINUED | OUTPATIENT
Start: 2024-03-12 | End: 2024-03-20

## 2024-03-12 RX ADMIN — CARBIDOPA AND LEVODOPA 3 TABLET: 25; 100 TABLET ORAL at 07:08

## 2024-03-12 RX ADMIN — IPRATROPIUM BROMIDE 2 SPRAY: 42 SPRAY NASAL at 15:04

## 2024-03-12 RX ADMIN — ENOXAPARIN SODIUM 40 MG: 100 INJECTION SUBCUTANEOUS at 08:36

## 2024-03-12 RX ADMIN — CARBOXYMETHYLCELLULOSE SODIUM 1 DROP: 10 GEL OPHTHALMIC at 08:39

## 2024-03-12 RX ADMIN — CARBOXYMETHYLCELLULOSE SODIUM 1 DROP: 10 GEL OPHTHALMIC at 21:32

## 2024-03-12 RX ADMIN — ATORVASTATIN CALCIUM 80 MG: 80 TABLET, FILM COATED ORAL at 21:04

## 2024-03-12 RX ADMIN — GUAIFENESIN SYRUP AND DEXTROMETHORPHAN 10 ML: 100; 10 SYRUP ORAL at 21:31

## 2024-03-12 RX ADMIN — FLUTICASONE PROPIONATE 2 SPRAY: 50 SPRAY, METERED NASAL at 21:02

## 2024-03-12 RX ADMIN — CARBIDOPA AND LEVODOPA 3 TABLET: 25; 100 TABLET ORAL at 05:19

## 2024-03-12 RX ADMIN — LEVOFLOXACIN 750 MG: 500 TABLET, FILM COATED ORAL at 11:04

## 2024-03-12 RX ADMIN — CARBIDOPA AND LEVODOPA 3 TABLET: 25; 100 TABLET ORAL at 11:04

## 2024-03-12 RX ADMIN — CARBIDOPA AND LEVODOPA 3 TABLET: 25; 100 TABLET ORAL at 16:59

## 2024-03-12 RX ADMIN — Medication 1 CAPSULE: at 08:36

## 2024-03-12 RX ADMIN — THERA TABS 1 TABLET: TAB at 08:39

## 2024-03-12 RX ADMIN — DONEPEZIL HYDROCHLORIDE 10 MG: 5 TABLET, FILM COATED ORAL at 21:04

## 2024-03-12 RX ADMIN — CLOZAPINE 25 MG: 25 TABLET ORAL at 08:38

## 2024-03-12 RX ADMIN — CARBIDOPA AND LEVODOPA 3 TABLET: 25; 100 TABLET ORAL at 03:46

## 2024-03-12 RX ADMIN — CARBIDOPA AND LEVODOPA 3 TABLET: 25; 100 TABLET ORAL at 15:03

## 2024-03-12 RX ADMIN — CARBIDOPA AND LEVODOPA 3 TABLET: 25; 100 TABLET ORAL at 13:08

## 2024-03-12 RX ADMIN — CLOZAPINE 25 MG: 25 TABLET ORAL at 21:20

## 2024-03-12 RX ADMIN — Medication 2000 UNITS: at 08:38

## 2024-03-12 RX ADMIN — CARBIDOPA AND LEVODOPA 3 TABLET: 25; 100 TABLET ORAL at 00:23

## 2024-03-12 RX ADMIN — MELATONIN TAB 3 MG 3 MG: 3 TAB at 21:04

## 2024-03-12 RX ADMIN — CARBIDOPA AND LEVODOPA 3 TABLET: 25; 100 TABLET ORAL at 21:04

## 2024-03-12 RX ADMIN — CARBIDOPA AND LEVODOPA 3 TABLET: 25; 100 TABLET ORAL at 08:38

## 2024-03-12 RX ADMIN — IPRATROPIUM BROMIDE 2 SPRAY: 42 SPRAY NASAL at 11:04

## 2024-03-12 RX ADMIN — ASPIRIN 81 MG: 81 TABLET, CHEWABLE ORAL at 08:39

## 2024-03-12 RX ADMIN — GUAIFENESIN SYRUP AND DEXTROMETHORPHAN 10 ML: 100; 10 SYRUP ORAL at 13:08

## 2024-03-12 RX ADMIN — CARBIDOPA AND LEVODOPA 3 TABLET: 25; 100 TABLET ORAL at 18:54

## 2024-03-12 RX ADMIN — PRIMIDONE 50 MG: 50 TABLET ORAL at 21:04

## 2024-03-12 RX ADMIN — Medication 1 CAPSULE: at 16:59

## 2024-03-12 ASSESSMENT — PAIN SCALES - GENERAL: PAINLEVEL_OUTOF10: 0

## 2024-03-12 NOTE — PROGRESS NOTES
ARU Admission Assessment    Ethnicity  \"Are you of , /a, or Armenian origin?\"  Check all that apply:  [x] A.  No, not of , /a, or Armenian Origin  [] B.  Yes, Bermudian, Bermudian American, Chicano/a  [] C.  Yes, Nigerian  [] D.  Yes, Mason  [] E.  Yes, another , , or Armenian origin  [] X.  Patient unable to respond  [] Y.  Patient declines to respond    Race  \"What is your race?\"  Check all that apply:  [x] A.  White  [] B.  Black or   [] C.   or   [] D.   Gabonese  [] E.  Chinese  [] F.  Eritrean  [] G. Bangladeshi  [] H.  Divehi  [] I.  Malay  [] J.  Other   [] K.    [] L.  Cook Islander or Demarcus  [] M.  Sudanese  [] N.  Other   [] X.  Patient unable to respond  [] Y.  Patient declines to respond  [] Z.  None of the above    Language  A.  \"What is your preferred language?\"   englis    B.  \"Do you need or want an  to communicate with a doctor or health care staff?\"  Check only one:  [x] 0.  No  [] 1.  Yes  [] 9.  Unable to determine    Transportation  \"Has lack of transportation kept you from medical appointments, meetings, work, or from getting things needed for daily living?\"Check all that apply:  [] A.  Yes, it has kept me from medical appointments or from getting my medications  [] B.  Yes, it has kept me from non-medical meetings, appointments, work, or from getting things that I need  [x] C.  No  [] X.  Patient unable to respond  [] Y.  Patient declines to respond    Hearing  Ability to hear (with hearing aid or hearing appliances if normally used)  [x]  0.  Adequate - no difficulty in normal conversation, social interaction, listening to TV  []  1.  Minimal difficulty - difficulty in some environments (e.g. when person speaks softly or setting is noisy)  []  2.  Moderate difficulty - speaker has to increase volume and speak distinctly   []  3.  Highly impaired - absence of  continuously present, does not fluctuate  []  2.  Behavior present, fluctuates (comes and goes, changes in severity)    D.  Altered level of consciousness - Did the patient have altered level of consciousness as indicated by any of the following criteria?  Vigilant - startled easily to any sound or touch  Lethargic - repeatedly dozed off while being asked questions, but responded to voice or touch  Stuporous - very difficulty to arouse and keep aroused for the interview  Comatose - could not be aroused  [x]  0.  Behavior not present  []  1.  Behavior continuously present, does not fluctuate  []  2.  Behavior present, fluctuates (comes and goes, changes in severity)    Mood    \"Over the last 2 weeks, have you been bothered by any of the following problems?\" 1. Symptom Presence    0 = No  1 = Yes  9 = No Response 2. Symptom Frequency    0 = Never or 1 day  1 = 2-6 days (several days)  2 = 7-11 days (half or more of the days)  3 = 12-14 days (nearly every day)  **Leave blank if 'No Reponse'**      Enter scores in boxes    Column 1 Column 2   Little interest or pleasure in doing things   0 0     Feeling down, depressed, or hopeless   0   0     **If either A or B in column 2 is coded “2” or “3”, CONTINUE asking the questions below.  If not, END the interview.**     Trouble falling or staying asleep, or sleeping too much       Feeling tired or having little energy       Poor appetite or overeating       Feeling bad about yourself - or that you are a failure or have let yourself or your family down       Trouble concentrating on things, such as reading the newspaper or watching television       Moving or speaking so slowly that other people could have noticed.  Or the opposite- being so fidgety or restless that you have been moving around a lot more than usual.       Thoughts that you would be better off dead, or of hurting yourself in some way.       Total Severity: Add scores for all frequency responses in column 2

## 2024-03-12 NOTE — PLAN OF CARE
Problem: Discharge Planning  Goal: Discharge to home or other facility with appropriate resources  Outcome: Progressing  Flowsheets (Taken 3/11/2024 2015 by Scott Yoo RN)  Discharge to home or other facility with appropriate resources: Identify barriers to discharge with patient and caregiver     Problem: Pain  Goal: Verbalizes/displays adequate comfort level or baseline comfort level  Outcome: Progressing

## 2024-03-12 NOTE — H&P
Patient: Pacheco Prince  [id number]  Date: 3/12/2024      Chief Complaint: Aspiration pneumonia, Parkinson's disease    History of Present Illness/Hospital Course:  Pacheco Prince is a 79 y.o. male with PMHx notable for Parkinson's disease, DM2, CAD who presented on 2/29/24 with fever, and cough. He was on a cruise when symptoms developed, was diagnosed with pneumonia and started on IV Zosyn. He continued to decline, requiring medical flight back home to [place]. Etiology of pneumonia is presumed aspiration, was started on broad-spectrum antibiotics with vancomycin and Zosyn. MRSA swab was negative, so vancomycin was discontinued. He was treated with cefepime and Flagyl. CTA Chest was obtained, negative for PE.  He did develop worsening/recurrent aspiration pneumonia, and his diet was downgraded to n.p.o.  He underwent PEG placement on 3/9.  He is now tolerating tube feeds at goal rate, and able to receive his Parkinson's medications via PEG.  Hospital course complicated by: acute hypoxic respiratory failure, dysphagia, hypertension, hypokalemia, anemia, lethargy/AMS, worsening Parkinson's symptoms.     Currently, patient reports that he is doing well.  His wife notes a productive cough with increasing frequency.  He denies any fevers, chills, dyspnea, chest pain.  He is experiencing significant tremor, rigidity and freezing.  He denies any focal numbness or weakness.    Prior Level of Function:  Independent for mobility, and ADLs. Requires constant supervision of wife.   Lives with spouse in a two level house with 2 RICARDA, 12 steps to bed/bath.     Current Level of Function:  PT 3/7  Bed Mobility:   Supine to Sit: maximum assistance  Scooting: maximum assistance  Comments: assistance for (B) LE and trunk elevation   Transfers:  Sit to stand transfer: moderate assistance  Stand to sit transfer: moderate assistance  Toilet transfer: moderate assistance  Comments: VC for hand placement during session,increased  at an intensive level for a  reasonable period of time. I will be completing a detailed individualized  Plan of Care for this patient by day four of the patients stay based upon the  Preadmission Screen, this Post-Admission Evaluation, and the therapy  evaluations.       Rehabilitation Diagnosis:   Aspiration pneumonia, Parkinson's disease    Assessment and Plan:  #. Bilateral aspiration pneumonia  #. Dysphagia, secondary to Parkinson's  - s/p initial course of cefepime and Flagyl  - developed recurrent infection, Levaquin 750 mg every other day (EOT 3/16)  - Albuterol nebs every 4 hours as needed  - Robitussin 10 mL twice daily x 3 days  - Continue SLP for dysphagia therapy, will consider repeat MBS when current infection is resolved    #. Parkinson's disease w/ dyskinesia  -Continue carbidopa-levodopa  mg 3 tablets every 2 hours while awake + 3 tablets 3 times nightly (note patient takes long acting formation at home, converted to short acting formulation which may be given via G-tube)  - Continue home clozapine, primidone, donepezil    #. Orthostatic Hypotension  - Midodrine 5 mg twice daily as needed for systolic blood pressure less than 100 mmHg (note not currently requiring).  At home taking 5 mg 3 times daily.    Chronic Conditions:  CAD, HLD: Atorvastatin 80 mg nightly, ASA 81 mg daily    Diet: ADULT TUBE FEEDING; PEG; Standard with Fiber; Continuous; 25; Yes; 20; Q 6 hours; 60; 165; Q 4 hours  Bowels: Per protocol  Bladder: Per protocol   Sleep: melatonin 3 mg nightly  Pain: Tylenol    DVT PPx: Lovenox 40 mg daily  Follow-ups: Neurology (Dr. Rick), PCP  ELOS: 14 days    Keon Sheets MD 3/12/2024, 8:35 AM     * This document was created using dictation software.  While all precautions were taken to ensure accuracy, errors may have occurred.  Please disregard any typographical errors.

## 2024-03-12 NOTE — PROGRESS NOTES
Morning assessment completed, vss, alert and oriented, doesn't talk a lot, schedule meds via peg tube, dietician to change the continues feeding to nocturnal, The care plan and education has been reviewed and mutually agreed upon with the patient.  Pt remains free from falls. Fall precautions in place--bed in lowest position, call light within reach, bed alarm in use. Pt aware to call for assistance before getting up.

## 2024-03-12 NOTE — PROGRESS NOTES
Nutrition Note    RECOMMENDATIONS  PO Diet: NPO  Nutrition Support:     Change TF to nocturnal feeds: Jevity 1.5 @ 100 ml/hr x 14 hours (4pm- 6am).  Additional 175 ml water flush q 4 hr to meet fluid needs.      NUTRITION ASSESSMENT   Nutrition intervention triggered for new ARU admission with consult for TF order & manage. Pt had PEG placed on 3/8 d/t recurrent aspiration pneumonia.  Receives Jevity 1.5 TF @ 60 ml/hr.  Will adjust to nocturnal TF to avoid interruptions in feeding d/t therapy schedule during day.  Recommend Jevity 1.5 @ 100 ml/hr x 14 hours to meet estimated energy needs. Will monitor for tolerance & ability to receive po diet.     Nutrition Related Findings: LBM 3/10; +1 generalized edema; gluc 127  Wounds: None  Nutrition Education:  Education not indicated   Nutrition Goals: Tolerate nutrition support at goal rate     MALNUTRITION ASSESSMENT   Chronic Illness  Malnutrition Status: No malnutrition    NUTRITION DIAGNOSIS   Inadequate oral intake related to swallowing difficulty as evidenced by nutrition support - enteral nutrition, swallow study results      CURRENT NUTRITION THERAPIES  ADULT TUBE FEEDING; PEG; Standard with Fiber; Cyclic; 100; 4:00 PM; 6:00 AM; 175; Q 4 hours     PO Intake: NPO   PO Supplement Intake:NPO    Current Tube Feeding (TF) Orders:  Current TF & Flush Orders Provides: Jevity 1.5 @ 60 ml/hr provides 1440 ml; 2160 kcal, 92g pro & 1067 ml free water.  Goal TF & Flush Orders Provides: Cyclic TF: Jevity 1.5 @ 100 ml/hr x 14 hours to provide 1400 ml; 2100 kcal, 89 g pro & 1064 ml free water.  Additional 175 ml water flush q 4 hr for fluid needs.    ANTHROPOMETRICS  Current Height: 177.8 cm (5' 10\")  Current Weight - Scale: 82.6 kg (182 lb)    Ideal Body Weight (IBW): 166 lbs  (75 kg)        BMI: 26.1      COMPARATIVE STANDARDS  Total Energy Requirements (kcals/day): 1652- 2065     Protein (g):  90-150g       Fluid (mL/day):  1 ml/kcal    The patient will be monitored per  nutrition standards of care. Consult dietitian if additional nutrition interventions are needed prior to RD reassessment.     Susi Juan RD, LD    Contact: [phone]

## 2024-03-12 NOTE — PROGRESS NOTES
[facility] - Inpatient Rehabilitation Department   Phone: [phone]    Occupational Therapy    [x] Initial Evaluation            [] Daily Treatment Note         [] Discharge Summary      Patient: Pacheco Prince   : 1945   MRN: [id number]   Date of Service:  3/12/2024    Admitting Diagnosis:  Aspiration pneumonia (HCC)  Current Admission Summary: Pacheco Prince is a 79 y.o. male with PMHx notable for Parkinson's disease, DM2, CAD who presented on 24 with fever, and cough. He was on a cruise when symptoms developed, was diagnosed with pneumonia and started on IV Zosyn. He continued to decline, requiring medical flight back home to [place]. Etiology of pneumonia is presumed aspiration, was started on broad-spectrum antibiotics with vancomycin and Zosyn. MRSA swab was negative, so vancomycin was discontinued. He was treated with cefepime and Flagyl. CTA Chest was obtained, negative for PE.  He did develop worsening/recurrent aspiration pneumonia, and his diet was downgraded to n.p.o.  He underwent PEG placement on 3/9.  He is now tolerating tube feeds at goal rate, and able to receive his Parkinson's medications via PEG.  Hospital course complicated by: acute hypoxic respiratory failure, dysphagia, hypertension, hypokalemia, anemia, lethargy/AMS, worsening Parkinson's symptoms.      Currently, patient reports that he is doing well.  His wife notes a productive cough with increasing frequency.  He denies any fevers, chills, dyspnea, chest pain.  He is experiencing significant tremor, rigidity and freezing.  He denies any focal numbness or weakness.  Past Medical History:  has a past medical history of Acute bronchitis, CAD (coronary artery disease), Cancer (HCC), Clostridioides difficile infection, Diabetes mellitus (HCC), Erectile dysfunction, GERD (gastroesophageal reflux disease), Hyperlipidemia, Obesity, and Parkinson disease.  Past Surgical History:  has a past surgical history    Impairments Requiring Therapeutic Intervention: decreased functional mobility, decreased ADL status, decreased ROM, decreased strength, decreased safety awareness, decreased cognition, decreased endurance, decreased balance, decreased vision, decreased IADL, decreased fine motor control, decreased coordination, decreased posture  Prognosis: fair  Clinical Assessment: Pt is 79 y.o. male presenting to ARU w/ prior diagnosis of Parkinson's and currently admitted for pneumonia. Pt wife is highly involved in pt care and requires ed on pt participation w/ therapy vs completion of tasks for pt. Currently requires DEP for bed mobility and ADL completion. MaxA x2 for most functional transfers. Tremors and freezing noted during tasks. Significant time for all communication and commands following w/ repetition. Skilled OT services warranted to maximize IND and safety in all occupational pursuits. Con't per POC.   Safety Interventions: patient left in chair, chair alarm in place, call light within reach, patient at risk for falls, and family/caregiver present    Plan  Frequency: 5 x/week, 60 min/day  Current Treatment Recommendations: strengthening, ROM, balance training, functional mobility training, transfer training, endurance training, patient/caregiver education, ADL/self-care training, IADL training, home management training, cognitive/perceptual training, cognitive reorientation, home exercise program, safety education, equipment evaluation/education, and positioning    Goals  Patient Goals: per wife: \"get back to everything he was doing before\" - pt did not state goals upon questioning   Short Term Goals:  Time Frame: 1 week  Patient will complete upper body ADL at contact guard assistance   Patient will complete lower body ADL at minimal assistance   Patient will complete toileting at minimal assistance   Patient will complete self-feeding at stand by assistance   Patient will complete grooming at stand by assistance    Patient will complete functional transfers at moderate assistance     Long Term Goals:  To be completed in: 2 weeks  Patient will complete upper body ADL at supervision   Patient will complete lower body ADL at stand by assistance   Patient will complete toileting at stand by assistance   Patient will complete self-feeding at set up assistance   Patient will complete grooming at set up assistance   Patient will complete functional transfers at stand by assistance   Patient will increase functional standing balance to 2+ min for improved ADL completion      Above goals reviewed on 3/12/2024.  All goals are ongoing at this time unless indicated above.       Therapy Session Time     Individual Group Co-treatment   Time In 1045  0945   Time Out 1053  1045   Minutes 8  60        Timed Code Treatment Minutes: 45+8  Total Treatment Minutes:  68 minutes       Electronically Signed By: CHENTE Livingston MOT OTR/L, [id number] 3/12/2024 12:34 PM

## 2024-03-12 NOTE — TELEPHONE ENCOUNTER
Care Transitions Initial Follow Up Call    Outreach made within 2 business days of discharge: Yes    Patient: Pacheco Prince Patient : 1945   MRN: [id number]  Reason for Admission: There are no discharge diagnoses documented for the most recent discharge.  Discharge Date: 3/11/24       Spoke with: Spouse    Discharge department/facility: rehab    TCM Interactive Patient Contact:  Was patient able to fill all prescriptions: Yes, Patient went to Rehab  Was patient instructed to bring all medications to the follow-up visit: Yes  Is patient taking all medications as directed in the discharge summary? Patient went to Rehab  Does patient understand their discharge instructions: Yes, Patient went to Rehab  Does patient have questions or concerns that need addressed prior to 7-14 day follow up office visit: no    Scheduled appointment with PCP within 7-14 days    Called patient, spoke to spouse. She stated that the patient was discharged on 3/11/24 from [facility] and went straight to  [facility]. She stated as soon as she finds out what the plan is, she will call to schedule a hospital follow up    Follow Up  Future Appointments   Date Time Provider Department Center   2024 10:20 AM Rohan Watson, APRN - CNP [facility] [place] - [facility]   2024  1:00 PM Wilver Solo MD  [facility]       Emiliana Elliott MA

## 2024-03-12 NOTE — PROGRESS NOTES
4 Eyes Skin Assessment     NAME:  Pacheco Prince  YOB: 1945  MEDICAL RECORD NUMBER:  [id number]    The patient is being assessed for  Admission    I agree that at least one RN has performed a thorough Head to Toe Skin Assessment on the patient. ALL assessment sites listed below have been assessed.      Areas assessed by both nurses:    Head, Face, Ears, Shoulders, Back, Chest, Arms, Elbows, Hands, Sacrum. Buttock, Coccyx, Ischium, and Legs. Feet and Heels        Does the Patient have a Wound? Yes wound(s) were present on assessment. LDA wound assessment was Initiated and completed by RN Blanchable redness on coccyx. Small deep tissue injury on right heel.         Goldy Prevention initiated by RN: Yes  Wound Care Orders initiated by RN: No    Pressure Injury (Stage 3,4, Unstageable, DTI, NWPT, and Complex wounds) if present, place Wound referral order by RN under : yes, DTI Right heel    New Ostomies, if present place, Ostomy referral order under : No     Nurse 1 eSignature: Electronically signed by Scott Yoo RN on 3/12/24 at 2:18 AM EDT    **SHARE this note so that the co-signing nurse can place an eSignature**    Nurse 2 eSignature: Electronically signed by Aubree Sena RN on 3/12/24 at 3:46 AM EDT

## 2024-03-12 NOTE — CARE COORDINATION
Social Work Admission Assessment    Objective:  Met with pt and spouse to complete initial assessment and review role of  in rehab process.  Pt oriented to unit.  Pt states understanding of this.     Current Home Situation:  Patient lives at home with his spouse. They live in a 2 story home and patient must use the upstairs bathroom.  Patient has 4 children who provide support as needed.     Pt's plans are:   Patient is retired.    Accessibility to community resources/transportation: Patient is not active with any community resource programs./Spouse will provide transportation as needed.     Has pt experienced a recent loss or signigicant life event that would impact their care or ability to participate?  No  Has pt ever been treated for emotional disorders?      How does pt and family cope with stressful events and this hospitalization?  Patient's spouse reports that he jojo with stressful situations by using family as a support system.    Special Problem Areas: Parkinson Disease     Discharge Plan:  To home with needed supports. Patient has used [facility] in the past and would like them at discharge.    Impression/Plan:Pacheco Prince is a 79 year old male that has been admitted to ARU. Provided patient with this SW's contact information to contact as needed. Will continue to follow for support and discharge planning.     Electronically signed by EDDIE URIBE on 3/12/2024 at 5:22 PM

## 2024-03-12 NOTE — PLAN OF CARE
[facility] PATIENT [place] [address]  [phone]      Pacheco Prince    : 1945  Acct #: [id number]  MRN: [id number]  PHYSICIAN:  Keon Sheets MD  Primary Problem    Patient Active Problem List   Diagnosis    Coronary artery disease involving native coronary artery of native heart without angina pectoris    Mixed hyperlipidemia    Dizziness    PVC's (premature ventricular contractions)    Hypertension    Bilateral carotid artery stenosis    Parkinson disease    Bilateral carotid artery disease, unspecified type (HCC)    Hypotension    Type 2 diabetes mellitus without complication, without long-term current use of insulin (HCC)    COVID-19    Aspiration pneumonia (HCC)    Acute metabolic encephalopathy    Sepsis (HCC)    Acute aspiration pneumonia (HCC)    Pneumonia of left lower lobe due to infectious organism    TIA (transient ischemic attack)    Parkinson's disease    Pneumonia of both lungs due to infectious organism, unspecified part of lung    Parkinson's disease with dyskinesia       Rehabilitation Diagnosis:      #. Bilateral aspiration pneumonia  #. Dysphagia, secondary to Parkinson's  - s/p initial course of cefepime and Flagyl  - developed recurrent infection, Levaquin 750 mg every other day (EOT 3/16)  - Albuterol nebs every 4 hours as needed  - Robitussin 10 mL twice daily x 3 days  - Continue SLP for dysphagia therapy, will consider repeat MBS when current infection is resolved     #. Parkinson's disease w/ dyskinesia  -Continue carbidopa-levodopa  mg 3 tablets every 2 hours while awake + 3 tablets 3 times nightly (note patient takes long acting formation at home, converted to short acting formulation which may be given via G-tube)  - Continue home clozapine, primidone, donepezil     #. Orthostatic Hypotension  - Midodrine 5 mg twice daily as needed for systolic blood pressure less than 100 mmHg (note not currently  [x] bathroom activities and hygiene  [] Other:    Patient/Caregiver Education for:  [x] Disease/sustained injury/management     [x] Medication Use  [] Surgical intervention  [x] Safety  [x] Body mechanics and or joint protection  [x] Health maintenance     [] Other:     PHYSICAL THERAPY:  Goals:                   Patient Goals: pt did not state goals;' per wife \"get back to everything he was doing before\"   Short Term Goals:  Time Frame: 1 wk   Pt able to roll R and L with moderate assistance of 1.  Pt able to perform supine to/from sitting with moderate assistance of 1.  Pt able to perform STS transfers with moderate assistance of 1.   Pt able to ambulate with an AAD 20 ft with moderate assistance of 1.      Long Term Goals: Time Frame 2 wks  Pt able to perform all bed mobility with SBA.  Pt able to perform STS transfers with SBA.   Pt able to ambulate with AAD 50 ft with CGA.   Pt able to navigate 4 steps with a railing with CGA.  Pt able to perform a car transfer with SBA.               These goals were reviewed with this patient at the time of assessment and Pacheco Prince is in agreement.     Plan of Care: Pt to be seen 5 out of 7 days per week per ARU protocol ( 60 minutes with PT)                     OCCUPATIONAL THERAPY:  Goals:             Short Term Goals:  Time Frame: 1 week  Patient will complete upper body ADL at contact guard assistance   Patient will complete lower body ADL at minimal assistance   Patient will complete toileting at minimal assistance   Patient will complete self-feeding at stand by assistance   Patient will complete grooming at stand by assistance   Patient will complete functional transfers at moderate assistance     Long Term Goals:  To be completed in: 2 weeks  Patient will complete upper body ADL at supervision   Patient will complete lower body ADL at stand by assistance   Patient will complete toileting at stand by assistance   Patient will complete self-feeding at set up  aspiration, was started on broad-spectrum antibiotics with vancomycin and Zosyn. MRSA swab was negative, so vancomycin was discontinued. He was treated with cefepime and Flagyl. CTA Chest was obtained, negative for PE.  He did develop worsening/recurrent aspiration pneumonia, and his diet was downgraded to n.p.o.  He underwent PEG placement on 3/9.  He is now tolerating tube feeds at goal rate, and able to receive his Parkinson's medications via PEG.  Hospital course complicated by: acute hypoxic respiratory failure, dysphagia, hypertension, hypokalemia, anemia, lethargy/AMS, worsening Parkinson's symptoms.       I have reviewed this initial plan of care and agree with its contents:    Title   Name    Date    Time    Physician: Keon Sheets MD 03/14/24 6:41 PM     Case Mgmt: aFye Marmolejo MSW, LSW, 3/12/2024 @ 8:58 AM.    OT: Erika Beltran, VENTURA OTR/L, [id number] 3/12/2024 12:36 PM    PT: Melissa Gibson, PT [id number], 3/12/24, 0655    RN: Scott Yoo RN 3/12/24 1794    ST: Archana Jernigan M.A. CCC-SLP #[id number] 3/12/24, 2318    :  J Carlos Dorsey PT, DPT [id number]  3/13/24  3:44 PM

## 2024-03-12 NOTE — PROGRESS NOTES
[facility] - Inpatient Rehabilitation Department   Phone: [phone]    Physical Therapy    [x] Initial Evaluation            [] Daily Treatment Note         [] Discharge Summary      Patient: Pacheco Prince   : 1945   MRN: [id number]   Date of Service:  3/12/2024  Admitting Diagnosis: Aspiration pneumonia (HCC)  Current Admission Summary: Pacheco Prince is a 79 y.o. male with PMHx notable for Parkinson's disease, DM2, CAD who presented on 24 with fever, and cough. He was on a cruise when symptoms developed, was diagnosed with pneumonia and started on IV Zosyn. He continued to decline, requiring medical flight back home to [place]. Etiology of pneumonia is presumed aspiration, was started on broad-spectrum antibiotics with vancomycin and Zosyn. MRSA swab was negative, so vancomycin was discontinued. He was treated with cefepime and Flagyl. CTA Chest was obtained, negative for PE.  He did develop worsening/recurrent aspiration pneumonia, and his diet was downgraded to n.p.o.  He underwent PEG placement on 3/9.  He is now tolerating tube feeds at goal rate, and able to receive his Parkinson's medications via PEG.  Hospital course complicated by: acute hypoxic respiratory failure, dysphagia, hypertension, hypokalemia, anemia, lethargy/AMS, worsening Parkinson's symptoms.   Past Medical History:  has a past medical history of Acute bronchitis, CAD (coronary artery disease), Cancer (HCC), Clostridioides difficile infection, Diabetes mellitus (HCC), Erectile dysfunction, GERD (gastroesophageal reflux disease), Hyperlipidemia, Obesity, and Parkinson disease.  Past Surgical History:  has a past surgical history that includes Diagnostic Cardiac Cath Lab Procedure (2005); Coronary angioplasty with stent (2005); Cataract extraction; eye surgery (2021); Tonsillectomy ([facility]); Percutaneous Transluminal Coronary Angio (); Upper gastrointestinal endoscopy (); and  a day; wife states that pt is independent, but that she is with him 24/7, assists pt with many tasks  Recently completed [facility] program in April 2023  Wife reports pt was golfing prior to the cruise    Examination   Vision:   Vision Gross Assessment: Impaired and Vision Corrective Device: wears glasses at all times  Hearing:   WFL  Observation:   General Observation:  resting tremors, rigidity with movement initiation/resisting movement; flat affect; delayed responses and movements to cueing  Posture:   Fair, fwd head/rounded shoulders  Sensation:   Unable to formally test secondary to cognition  Tone:   ridigity    ROM:   Unable to fully assess; resisted to passive movement at B hips and knees,  passive ankle to neutral only  Pt was able to sit EOB and in chair with hips/knees at 90/90.   Strength:   Unable to formally assess due to cognition. Was able to WB for standing.   Therapist Clinical Decision Making (Complexity): medium complexity  Clinical Presentation: evolving      Subjective  General: Upright > 30 deg in bed.   Pain: 0/10  Pain Interventions: not applicable       Functional Mobility  Bed Mobility:  Supine to Sit: 2 person assistance with Max A of 2   Sit to Supine: dependent assistance, of 2  Rolling Left: dependent assistance  Rolling Right: dependent assistance  Scooting: dependent assistance  Comments: Rigid vs resisting movement. Unable to get legs into hooklying positions. Needed assist to initiate rolling at shoulders/trunk/legs. If pt grasps railing then he pushes back. Pt able to move legs towards the EOB once in SL.   Transfers:  Sit to stand transfer: stedy utilized requiring mod A of 2 regressing to max A of 2   Stand to sit transfer: 2 person assistance with max A of 2   Toilet transfer: stedy utilized requiring max A of 2   Comments: Assist to place feet fully onto bottom platform of STEDY as well as assist to fully reach towards anterior bar.  Pt does initiate some of that movement.   Increased time to progress cues to stand.  Freezing noted when attempting to go from stand to sit requiring max tc at hips/knee to cue flexion.   Ambulation:  Ambulation not tested on this date secondary to inability to stand without max A of 2 .  Distance:   Gait Mechanics:   Comments:    Stair Mobility:  Stair mobility not completed on this date.  Comments:  Wheelchair Mobility:  No w/c mobility completed on this date.  Comments:  Balance:  Static Sitting Balance: fair (-): maintains balance at CGA with use of UE support  Dynamic Sitting Balance: poor (+): requires min (A) to maintain balance  Static Standing Balance: poor: requires mod (A) to maintain balance  Comments:    Other Therapeutic Interventions    Functional Outcomes                 Cognition  Overall Cognitive Status: Impaired  Arousal/Alertness: delayed responses to stimuli, inconsistent responses to stimuli  Following Commands: inconsistently follows commands  Initiation: requires cues for all  Orientation:    oriented to person with extended time to state birthday  Command Following:   impaired    Education  Barriers To Learning: cognition and physical  Patient Education: patient educated on goals, PT role and benefits, plan of care  Learning Assessment:  patient will require reinforcement due to cognitive deficits    Assessment  Activity Tolerance: poor-rigid and/or resisting movement, difficulty following commands   Impairments Requiring Therapeutic Intervention: decreased functional mobility, decreased ADL status, decreased ROM, decreased strength, decreased cognition, decreased endurance, decreased balance  Prognosis: fair  Clinical Assessment: Pt presenting to ARU with a diagnosis of pneumonia and subsequent worsened functional mobility related to Parkinsons.  Pt currently dependent for bed mobility and transfers. Increased rigidity of extremities and trunk in additional to tremors and freezing episodes.  Increased difficulty processing cues  for mobility and resistant at times.  Continued skilled PT to promote functional improvements towards baseline.   Safety Interventions: chair alarm in place, call light within reach, and family/caregiver present    Plan  Frequency: 5 x/week, 60 min/day  Current Treatment Recommendations: strengthening, ROM, balance training, functional mobility training, transfer training, gait training, stair training, endurance training, manual therapy - soft tissue massage, and patient/caregiver education    Goals  Patient Goals: pt did not state goals;' per wife \"get back to everything he was doing before\"   Short Term Goals:  Time Frame: 1 wk   Pt able to roll R and L with moderate assistance of 1.  Pt able to perform supine to/from sitting with moderate assistance of 1.  Pt able to perform STS transfers with moderate assistance of 1.   Pt able to ambulate with an AAD 20 ft with moderate assistance of 1.      Long Term Goals: Time Frame 2 wks  Pt able to perform all bed mobility with SBA.  Pt able to perform STS transfers with SBA.   Pt able to ambulate with AAD 50 ft with CGA.   Pt able to navigate 4 steps with a railing with CGA.  Pt able to perform a car transfer with SBA.    Above goals reviewed on 3/12/2024.  All goals are ongoing at this time unless indicated above.      Therapy Session Time      Individual Group Co-treatment   Time In     0945   Time Out     1045   Minutes     60     Timed Code Treatment Minutes:  45 Minutes  Total Treatment Minutes:  60       Electronically Signed By: Melissa Gibson, PT

## 2024-03-12 NOTE — PROGRESS NOTES
Admission Period/Goal QM Codes for Pacheco Prince.    QM Admit Code Goal Code   Eating 1 5   Oral Hygiene 1 4   Toileting Hygiene 1 -   Shower/Bathing 1 4   UB Dressing 3 4   LB Dressing 1 4   Putting on/off Footwear 1 3   Rolling Left and Right 1 4   Sit To Lying 1 4   Lying to Sitting on Bedside 1 4   Sit to Stand 1 4   Chair/Bed to Chair Transfer 1 4   Toilet Transfers 1 4   Car Transfers 88 4   Walk 10 Feet 88 4   Walk 50 Feet with Two Turns 88 4   Walk 150 Feet 88 4   Walk 10 Feet on Uneven Surfaces 88 3   1 Step (Curb) 88 4   4 Steps 88 4   12 Steps 88 4   Picking up Object from Floor 88 3   Wheel 50 Feet with 2 Turns 9 -   Type - -   Wheel 150 Feet 9 -   Type - -       The above codes were determined by the treatment team to be the patient's accurate admission assessment codes based on assessment performed soon after the patient's admission and prior to the benefit of services provided by staff, or if appropriate, the patient's usual performance at admission.    OT: VENTURA Livingston OTR/L, [id number] 3/14/2024 12:30 PM     PT:  Robin Wilson, DPT [id number] 3/15/2024 1400     RN:  Olivia Aguilar BSN, CRRN 3/14/24, 1320     ST:  Nancy Gaitan MA CCC-SLP 3/15/2024 1336     :  J Carlos Dorsey PT, DPT [id number]  3/15/24  2:27 PM

## 2024-03-13 LAB
GLUCOSE BLD-MCNC: 101 MG/DL (ref 70–99)
GLUCOSE BLD-MCNC: 110 MG/DL (ref 70–99)
GLUCOSE BLD-MCNC: 111 MG/DL (ref 70–99)
GLUCOSE BLD-MCNC: 133 MG/DL (ref 70–99)
PERFORMED ON: ABNORMAL

## 2024-03-13 PROCEDURE — 6370000000 HC RX 637 (ALT 250 FOR IP): Performed by: STUDENT IN AN ORGANIZED HEALTH CARE EDUCATION/TRAINING PROGRAM

## 2024-03-13 PROCEDURE — 97530 THERAPEUTIC ACTIVITIES: CPT

## 2024-03-13 PROCEDURE — 97129 THER IVNTJ 1ST 15 MIN: CPT

## 2024-03-13 PROCEDURE — 97116 GAIT TRAINING THERAPY: CPT

## 2024-03-13 PROCEDURE — 2500000003 HC RX 250 WO HCPCS: Performed by: STUDENT IN AN ORGANIZED HEALTH CARE EDUCATION/TRAINING PROGRAM

## 2024-03-13 PROCEDURE — 94669 MECHANICAL CHEST WALL OSCILL: CPT

## 2024-03-13 PROCEDURE — 6360000002 HC RX W HCPCS: Performed by: STUDENT IN AN ORGANIZED HEALTH CARE EDUCATION/TRAINING PROGRAM

## 2024-03-13 PROCEDURE — 92526 ORAL FUNCTION THERAPY: CPT

## 2024-03-13 PROCEDURE — 97535 SELF CARE MNGMENT TRAINING: CPT

## 2024-03-13 PROCEDURE — 92507 TX SP LANG VOICE COMM INDIV: CPT

## 2024-03-13 PROCEDURE — 94761 N-INVAS EAR/PLS OXIMETRY MLT: CPT

## 2024-03-13 PROCEDURE — 1280000000 HC REHAB R&B

## 2024-03-13 RX ORDER — GUAIFENESIN 200 MG/10ML
200 LIQUID ORAL 2 TIMES DAILY
Status: COMPLETED | OUTPATIENT
Start: 2024-03-13 | End: 2024-03-15

## 2024-03-13 RX ADMIN — CARBIDOPA AND LEVODOPA 3 TABLET: 25; 100 TABLET ORAL at 11:31

## 2024-03-13 RX ADMIN — CARBIDOPA AND LEVODOPA 3 TABLET: 25; 100 TABLET ORAL at 12:59

## 2024-03-13 RX ADMIN — Medication 1 CAPSULE: at 09:16

## 2024-03-13 RX ADMIN — DONEPEZIL HYDROCHLORIDE 10 MG: 5 TABLET, FILM COATED ORAL at 21:36

## 2024-03-13 RX ADMIN — ATORVASTATIN CALCIUM 80 MG: 80 TABLET, FILM COATED ORAL at 21:36

## 2024-03-13 RX ADMIN — CARBIDOPA AND LEVODOPA 3 TABLET: 25; 100 TABLET ORAL at 15:04

## 2024-03-13 RX ADMIN — CLOZAPINE 25 MG: 25 TABLET ORAL at 21:40

## 2024-03-13 RX ADMIN — IPRATROPIUM BROMIDE 2 SPRAY: 42 SPRAY NASAL at 15:06

## 2024-03-13 RX ADMIN — CARBIDOPA AND LEVODOPA 3 TABLET: 25; 100 TABLET ORAL at 02:58

## 2024-03-13 RX ADMIN — IPRATROPIUM BROMIDE 2 SPRAY: 42 SPRAY NASAL at 09:15

## 2024-03-13 RX ADMIN — CARBIDOPA AND LEVODOPA 3 TABLET: 25; 100 TABLET ORAL at 18:31

## 2024-03-13 RX ADMIN — Medication 1 CAPSULE: at 17:10

## 2024-03-13 RX ADMIN — IPRATROPIUM BROMIDE 2 SPRAY: 42 SPRAY NASAL at 21:35

## 2024-03-13 RX ADMIN — CLOZAPINE 25 MG: 25 TABLET ORAL at 09:16

## 2024-03-13 RX ADMIN — THERA TABS 1 TABLET: TAB at 09:16

## 2024-03-13 RX ADMIN — CARBIDOPA AND LEVODOPA 3 TABLET: 25; 100 TABLET ORAL at 00:12

## 2024-03-13 RX ADMIN — ASPIRIN 81 MG: 81 TABLET, CHEWABLE ORAL at 09:16

## 2024-03-13 RX ADMIN — PRIMIDONE 50 MG: 50 TABLET ORAL at 21:36

## 2024-03-13 RX ADMIN — CARBIDOPA AND LEVODOPA 3 TABLET: 25; 100 TABLET ORAL at 17:10

## 2024-03-13 RX ADMIN — CARBIDOPA AND LEVODOPA 3 TABLET: 25; 100 TABLET ORAL at 06:54

## 2024-03-13 RX ADMIN — GUAIFENESIN 200 MG: 100 SOLUTION ORAL at 11:32

## 2024-03-13 RX ADMIN — CARBIDOPA AND LEVODOPA 3 TABLET: 25; 100 TABLET ORAL at 09:16

## 2024-03-13 RX ADMIN — FLUTICASONE PROPIONATE 2 SPRAY: 50 SPRAY, METERED NASAL at 21:34

## 2024-03-13 RX ADMIN — GUAIFENESIN 200 MG: 100 SOLUTION ORAL at 21:36

## 2024-03-13 RX ADMIN — CARBIDOPA AND LEVODOPA 3 TABLET: 25; 100 TABLET ORAL at 21:45

## 2024-03-13 RX ADMIN — Medication 2000 UNITS: at 09:16

## 2024-03-13 RX ADMIN — ENOXAPARIN SODIUM 40 MG: 100 INJECTION SUBCUTANEOUS at 09:15

## 2024-03-13 RX ADMIN — CARBOXYMETHYLCELLULOSE SODIUM 1 DROP: 10 GEL OPHTHALMIC at 11:32

## 2024-03-13 RX ADMIN — CARBIDOPA AND LEVODOPA 3 TABLET: 25; 100 TABLET ORAL at 05:00

## 2024-03-13 RX ADMIN — CARBOXYMETHYLCELLULOSE SODIUM 1 DROP: 10 GEL OPHTHALMIC at 21:31

## 2024-03-13 RX ADMIN — MELATONIN TAB 3 MG 3 MG: 3 TAB at 21:36

## 2024-03-13 NOTE — CARE COORDINATION
[facility] Contin[facility] Nurse  Consult Note       NAME:  Pacheco Prince  MEDICAL RECORD NUMBER:  [id number]  AGE: 79 y.o.   GENDER: male  : 1945  TODAY'S DATE:  3/13/2024    Subjective   Reason for WOCN Evaluation and Assessment: right heel purple, dti      Pacheco Prince is a 79 y.o. male referred by:   [x] Physician  [x] Nursing  [] Other:     Wound Identification:  Wound Type: pressure  Contributing Factors:  parkinsons,  recent pneumonia    Wound History: present on admission  Current Wound Care Treatment:  foam dressing, float heels,     Patient Goal of Care:  [x] Wound Healing  [] Odor Control  [] Palliative Care  [] Pain Control   [] Other:         PAST MEDICAL HISTORY        Diagnosis Date    Acute bronchitis     history of    CAD (coronary artery disease)     Cancer ([facility])     melanoma    Clostridioides difficile infection 2020    Diabetes mellitus ([facility]) 2022    6.6 A1C    Erectile dysfunction     GERD (gastroesophageal reflux disease)     gerd    Hyperlipidemia     Obesity     Parkinson disease        PAST SURGICAL HISTORY    Past Surgical History:   Procedure Laterality Date    CATARACT EXTRACTION      2021    CORONARY ANGIOPLASTY WITH STENT PLACEMENT  2005    [facility]    DIAGNOSTIC CARDIAC CATH LAB PROCEDURE  2005        EYE SURGERY  2021    cataracts    PTCA  2002    TONSILLECTOMY  [facility]    UPPER GASTROINTESTINAL ENDOSCOPY      UPPER GASTROINTESTINAL ENDOSCOPY N/A 3/8/2024    ESOPHAGOGASTRODUODENOSCOPY PERCUTANEOUS ENDOSCOPIC GASTROSTOMY TUBE PLACEMENT performed by Luke Douglas MD at [facility] ENDOSCOPY       FAMILY HISTORY    Family History   Adopted: Yes   Problem Relation Age of Onset    Cancer Mother         uterine    Alcohol Abuse Mother     Other Father         copd    Alcohol Abuse Father     Other Brother         emphysema    Cancer Sister         thyroid    Diabetes Sister         type 11    Hypertension Sister  aspirin 81 MG tablet Take 1 tablet by mouth daily 30 tablet 0    Psyllium 500 MG CAPS Take 6 capsules by mouth at bedtime Takes @ 6PM      fish oil-omega-3 fatty acids 1000 MG capsule Take 1 capsule by mouth 2 times daily      multivitamin (THERAGRAN) per tablet Take 1 tablet by mouth daily         Objective    /61   Pulse 67   Temp 98.3 °F (36.8 °C) (Oral)   Resp 16   Ht 1.778 m (5' 10\")   Wt 82.6 kg (182 lb)   SpO2 93%   BMI 26.11 kg/m²     LABS:  WBC:    Lab Results   Component Value Date/Time    WBC 7.5 03/12/2024 06:16 AM     H/H:    Lab Results   Component Value Date/Time    HGB 11.9 03/12/2024 06:16 AM    HCT 34.5 03/12/2024 06:16 AM     PTT:    Lab Results   Component Value Date/Time    APTT 69.8 05/15/2023 09:37 AM   [APTT}  PT/INR:    Lab Results   Component Value Date/Time    PROTIME 15.5 03/07/2024 04:03 PM    INR 1.23 03/07/2024 04:03 PM     HgBA1c:    Lab Results   Component Value Date/Time    LABA1C 6.1 03/05/2024 05:07 AM       Assessment   Goldy Risk Score: Goldy Scale Score: 16    Patient Active Problem List   Diagnosis Code    Coronary artery disease involving native coronary artery of native heart without angina pectoris I25.10    Mixed hyperlipidemia E78.2    Dizziness R42    PVC's (premature ventricular contractions) I49.3    Hypertension I10    Bilateral carotid artery stenosis I65.23    Parkinson disease G20.A1    Bilateral carotid artery disease, unspecified type ([facility]) I77.9    Hypotension I95.9    Type 2 diabetes mellitus without complication, without long-term current use of insulin ([facility]) E11.9    COVID-19 U07.1    Aspiration pneumonia (HCC) J69.0    Acute metabolic encephalopathy G93.41    Sepsis ([facility]) A41.9    Acute aspiration pneumonia (HCC) J69.0    Pneumonia of left lower lobe due to infectious organism J18.9    TIA (transient ischemic attack) G45.9    Parkinson's disease G20.A1    Pneumonia of both lungs due to infectious organism, unspecified part of lung J18.9     Parkinson's disease with dyskinesia G20.B1       Measurements:  Wound 03/11/24 Heel Right (Active)   Wound Image   03/13/24 1549   Wound Etiology Deep tissue/Injury 03/13/24 0810   Dressing Status Other (Comment) 03/12/24 2045   Wound Cleansed Not Cleansed 03/13/24 0810   Wound Assessment Purple/maroon;Dry 03/13/24 1549   Drainage Amount None (dry) 03/13/24 1549   Keli-wound Assessment Intact 03/13/24 1549   Margins Defined edges 03/13/24 1549   Number of days: 1       Wound 03/11/24 Heel Left pink, non blanchable, intact skin (Active)   Wound Image   03/13/24 1549   Wound Assessment Pink/red;Dry 03/13/24 1549   Keli-wound Assessment Intact 03/13/24 1549   Number of days: 1      Patient seen for possible dti to right heel.  Patient asleep in bed, wife at bedside agreeable to visit.  Right heel as light purple discoloration. Heel feels mushy to touch. no swelling or blistering noted.  Per wife, patient walked today with no reports of pain.  Also assessed left heel. Left heel pink, skin intact.  Recommend wiping heels with barrier wipes twice a day. Can use a dressing on right heel if tolerated by patient when up out of bed working with therapy.      Left heel    Right heel    Small discoloration to left lateral ankle, possible callus.      Response to treatment:  Well tolerated by patient.     Pain Assessment:  Severity:  0 / 10  Quality of pain: N/A  Wound Pain Timing/Severity: none  Premedicated: No    Plan   Plan of Care:    Right and left heels. Wash with soap and water, dry. Apply sureprep barrier wipe, twice daily.    Specialty Bed Required : No   [] Low Air Loss   [] Pressure Redistribution  [] Fluid Immersion  [] Bariatric  [] Total Pressure Relief  [] Other:     Current Diet: ADULT TUBE FEEDING; PEG; Standard with Fiber; Cyclic; 100; 4:00 PM; 6:00 AM; 175; Q 4 hours  Dietician consult:  No    Discharge Plan:  Placement for patient upon discharge: skilled nursing    Patient appropriate for Outpatient Wound

## 2024-03-13 NOTE — PROGRESS NOTES
[facility] - Inpatient Rehabilitation Department   Phone: [phone]    Physical Therapy    [] Initial Evaluation            [x] Daily Treatment Note         [] Discharge Summary      Patient: Pacheco Prince   : 1945   MRN: [id number]   Date of Service:  3/13/2024  Admitting Diagnosis: Aspiration pneumonia (HCC)  Current Admission Summary: Pacheco Prince is a 79 y.o. male with PMHx notable for Parkinson's disease, DM2, CAD who presented on 24 with fever, and cough. He was on a cruise when symptoms developed, was diagnosed with pneumonia and started on IV Zosyn. He continued to decline, requiring medical flight back home to [place]. Etiology of pneumonia is presumed aspiration, was started on broad-spectrum antibiotics with vancomycin and Zosyn. MRSA swab was negative, so vancomycin was discontinued. He was treated with cefepime and Flagyl. CTA Chest was obtained, negative for PE.  He did develop worsening/recurrent aspiration pneumonia, and his diet was downgraded to n.p.o.  He underwent PEG placement on 3/9.  He is now tolerating tube feeds at goal rate, and able to receive his Parkinson's medications via PEG.  Hospital course complicated by: acute hypoxic respiratory failure, dysphagia, hypertension, hypokalemia, anemia, lethargy/AMS, worsening Parkinson's symptoms.   Past Medical History:  has a past medical history of Acute bronchitis, CAD (coronary artery disease), Cancer (HCC), Clostridioides difficile infection, Diabetes mellitus (HCC), Erectile dysfunction, GERD (gastroesophageal reflux disease), Hyperlipidemia, Obesity, and Parkinson disease.  Past Surgical History:  has a past surgical history that includes Diagnostic Cardiac Cath Lab Procedure (2005); Coronary angioplasty with stent (2005); Cataract extraction; eye surgery (2021); Tonsillectomy ([facility]); Percutaneous Transluminal Coronary Angio (); Upper gastrointestinal endoscopy (); and  Upper gastrointestinal endoscopy (N/A, 3/8/2024).  Discharge Recommendations: home with HHPT 24/7 supervision pending progress  DME Required For Discharge: DME to be determined pending patient progress  Precautions/Restrictions:  NPO with small spoonfuls of water after oral care ; HOB >30 deg when feeding tube running  Weight Bearing Restrictions: no restrictions  [] Right Upper Extremity  [] Left Upper Extremity [] Right Lower Extremity  [] Left Lower Extremity     Required Braces/Orthotics: no braces required   [] Right  [] Left  Positional Restrictions:no positional restrictions    Pre-Admission Information   Lives With: spouse, Tiffany (Tiffany retired RN)  Type of Home: house  Home Layout: two level, laundry in basement, bedroom/bathroom upstairs, 12 stairs with bilateral rails to upstairs  Home Access:  2 step to enter without rails , garage; 2+1 steps front door without rail  Bathroom Layout: wheelchair accessible, walk in shower  Bathroom Equipment: grab bars in shower, built in shower seat, hand held shower head  Toilet Height: elevated height  Home Equipment: no prior equipment  Transfer Assistance: Independent without use of device--wife states that she provides constant supervision  Ambulation Assistance:Independent without use of devicewife states that she provides constant supervision  ADL Assistance: requires assistance with bathing, requires assistance with dressing, requires assistance with grooming, requires assistance with feeding, requires assistance with toileting  IADL Assistance: vacuums, cleaning shower, gets mail, takes out trash  Active :        [] Yes                 [x] No  Hand Dominance: [] Left                 [x] Right  Current Employment: retired.  Occupation: Office work  Hobbies: golfing, going out to eat, cards, reading  Recent Falls: 1 Fall In Dec 2022 walking to get mail in the rain, then slipped on tile floor; wife states that pt goes up/down steps to second floor bathroom 8x

## 2024-03-13 NOTE — PROGRESS NOTES
[facility] - Inpatient Rehabilitation Department   Phone: [phone]    Occupational Therapy    [] Initial Evaluation            [x] Daily Treatment Note         [] Discharge Summary      Patient: Pacheco Prince   : 1945   MRN: [id number]   Date of Service:  3/13/2024    Admitting Diagnosis:  Aspiration pneumonia (HCC)  Current Admission Summary: Pacheco Prince is a 79 y.o. male with PMHx notable for Parkinson's disease, DM2, CAD who presented on 24 with fever, and cough. He was on a cruise when symptoms developed, was diagnosed with pneumonia and started on IV Zosyn. He continued to decline, requiring medical flight back home to [place]. Etiology of pneumonia is presumed aspiration, was started on broad-spectrum antibiotics with vancomycin and Zosyn. MRSA swab was negative, so vancomycin was discontinued. He was treated with cefepime and Flagyl. CTA Chest was obtained, negative for PE.  He did develop worsening/recurrent aspiration pneumonia, and his diet was downgraded to n.p.o.  He underwent PEG placement on 3/9.  He is now tolerating tube feeds at goal rate, and able to receive his Parkinson's medications via PEG.  Hospital course complicated by: acute hypoxic respiratory failure, dysphagia, hypertension, hypokalemia, anemia, lethargy/AMS, worsening Parkinson's symptoms.      Currently, patient reports that he is doing well.  His wife notes a productive cough with increasing frequency.  He denies any fevers, chills, dyspnea, chest pain.  He is experiencing significant tremor, rigidity and freezing.  He denies any focal numbness or weakness.  Past Medical History:  has a past medical history of Acute bronchitis, CAD (coronary artery disease), Cancer (HCC), Clostridioides difficile infection, Diabetes mellitus (HCC), Erectile dysfunction, GERD (gastroesophageal reflux disease), Hyperlipidemia, Obesity, and Parkinson disease.  Past Surgical History:  has a past surgical history  therapist urge to use restroom - unable to make it to toilet in time; DEP for esperanza care and brief change as well as clothing management over/under hips in stance; pt attempted to void additional urine seated on toilet but unable to complete  General Comments: increased time for dressing and toileting completed this date   Instrumental Activities of Daily Living  No IADL completed on this date.    Functional Mobility  Bed Mobility:  Sit to Supine: 2 person assistance with maxA x2   Comments: completed w/ HOB flat  Transfers:  Sit to stand transfer:2 person assistance with modA x2   Stand to sit transfer: 2 person assistance with modA x2   Toilet transfer: 2 person assistance with modA x2   Toilet transfer equipment: standard toilet, grab bars  Toilet transfer comments: [place] to initiate use of grab bar  Comments: maximal cues for initiation, safety, sequencing, and problem solving for all transfers; [place] for hand placement on grab bars, arm rests on w/c; improved descent to surfaces this date  Functional Mobility  Functional Mobility Activity: 270' around ARU and additional short distances to/from room   Device Use: no device  Required Assistance: 2 person assistance with min/modA x2   Comment: pt required hand held assistance from 2 therapist for amb as well as mod cues for sequencing and posture; pt stride length, posture, and control improving as amb con't; maximal TC, verbal and visual cues during amb  Balance:  Static Sitting Balance: fair (-): maintains balance at CGA with use of UE support  Dynamic Sitting Balance: poor (+): requires min (A) to maintain balance  Static Standing Balance: poor: requires mod (A) to maintain balance  Dynamic Standing Balance: poor (-): requires max (A) to maintain balance  Comments: ranging CGA-modA for sitting balance EOM d/t R lateral lean    Other Therapeutic Interventions  Increased edema noted in R hand - R wrist deviation and flexion/extension completed this date w/ PROM - good  correct errors made  Insights: not aware of deficits  Initiation: requires cues for all  Sequencing: requires cues for all  Comments: flat affect  Orientation:    oriented to person; able to state  w/ extended time  Command Following:   impaired     Education  Barriers To Learning: cognition and physical  Patient Education: patient educated on goals, OT role and benefits, plan of care, ADL adaptive strategies, IADL safety, proper use of assistive device/equipment, adaptive device training, energy conservation, orientation, family education, transfer training, discharge recommendations  Learning Assessment:  patient will require reinforcement due to cognitive deficits    Assessment  Activity Tolerance: improved alertness and participation this date - pt presenting w/ decreased attention as session progressed   Impairments Requiring Therapeutic Intervention: decreased functional mobility, decreased ADL status, decreased ROM, decreased strength, decreased safety awareness, decreased cognition, decreased endurance, decreased balance, decreased vision, decreased IADL, decreased fine motor control, decreased coordination, decreased posture  Prognosis: fair  Clinical Assessment: Pt is 79 y.o. male presenting to ARU w/ prior diagnosis of Parkinson's and currently admitted for pneumonia. Con't to be DEP for LB ADL completion and toileting but improving transfers to modA x2 and amb 270' this date w/ modA x2. Decreased freezing noted this date but con't to demo fixed gaze, flat affect, and tremors in B hands. Significant time for all communication and commands following w/ repetition. Skilled OT services warranted to maximize IND and safety in all occupational pursuits. Con't per POC.   Safety Interventions: patient left in chair, chair alarm in place, call light within reach, patient at risk for falls, and family/caregiver present    Plan  Frequency: 5 x/week, 60 min/day  Current Treatment Recommendations: strengthening,  ROM, balance training, functional mobility training, transfer training, endurance training, patient/caregiver education, ADL/self-care training, IADL training, home management training, cognitive/perceptual training, cognitive reorientation, home exercise program, safety education, equipment evaluation/education, and positioning    Goals  Patient Goals: per wife: \"get back to everything he was doing before\" - pt did not state goals upon questioning   Short Term Goals:  Time Frame: 1 week  Patient will complete upper body ADL at contact guard assistance   Patient will complete lower body ADL at minimal assistance   Patient will complete toileting at minimal assistance   Patient will complete self-feeding at stand by assistance   Patient will complete grooming at stand by assistance   Patient will complete functional transfers at moderate assistance     Long Term Goals:  To be completed in: 2 weeks  Patient will complete upper body ADL at supervision   Patient will complete lower body ADL at stand by assistance   Patient will complete toileting at stand by assistance   Patient will complete self-feeding at set up assistance   Patient will complete grooming at set up assistance   Patient will complete functional transfers at stand by assistance   Patient will increase functional standing balance to 2+ min for improved ADL completion      Above goals reviewed on 3/13/2024.  All goals are ongoing at this time unless indicated above.       Therapy Session Time     Individual Group Co-treatment   Time In   1320   Time Out   1425   Minutes   65        Timed Code Treatment Minutes: 65 minutes  Total Treatment Minutes:  65 minutes       Electronically Signed By: CHENTE Livingston MOT OTR/L, [id number] 3/13/2024 4:01 PM

## 2024-03-13 NOTE — PLAN OF CARE
Problem: Safety - Adult  Goal: Free from fall injury  3/12/2024 0956 by Lolly Mars, RN  Outcome: Progressing

## 2024-03-13 NOTE — PROGRESS NOTES
[facility] - Inpatient Rehabilitation Department   Phone: [phone]    Speech Therapy    [] Initial Evaluation            [x] Daily Treatment Note         [] Discharge Summary      Patient: Pacheco Prince   : 1945   MRN: [id number]   Date of Service:  3/13/2024  Admitting Diagnosis: Aspiration pneumonia (HCC)  Current Admission Summary: See MD's summary   Past Medical History:  has a past medical history of Acute bronchitis, CAD (coronary artery disease), Cancer (HCC), Clostridioides difficile infection, Diabetes mellitus (HCC), Erectile dysfunction, GERD (gastroesophageal reflux disease), Hyperlipidemia, Obesity, and Parkinson disease.  Past Surgical History:  has a past surgical history that includes Diagnostic Cardiac Cath Lab Procedure (2005); Coronary angioplasty with stent (2005); Cataract extraction; eye surgery (2021); Tonsillectomy ([facility]); Percutaneous Transluminal Coronary Angio (); Upper gastrointestinal endoscopy (); and Upper gastrointestinal endoscopy (N/A, 3/8/2024).    Recent Head CT completed 3/4/24:  IMPRESSION:  1.  No acute intracranial abnormality.  2. Findings of paranasal sinusitis with right middle ear and mastoid effusion    Recent Chest xray completed 3/7/24:  IMPRESSION:  New right lung base infiltrate.    Instrumental Swallow Study:   Modified Barium Swallow evaluation completed on 3/4/2024. Patient presents with mild oropharyngeal dysphagia secondary to prolonged mastication, premature spillage to pyriform sinuses, reduced AP propulsion, delayed swallow initiation, reduced laryngeal elevation, decreased tongue base retraction and reduced pharyngeal contraction. Pt demonstrated aspiration after the swallow with initial trial of thin liquids via tsp, suspect in relation to sensation although pt demonstrated a hard cough in response. No laryngeal penetration or aspiration was viewed with thin liquids via cup/straw, mildly (nectar)

## 2024-03-13 NOTE — PATIENT CARE CONFERENCE
[facility]  Inpatient Rehabilitation  Weekly Team Conference Note    Patient Name: Pacheco Prince        MRN: [id number]    : 1945  (79 y.o.)  Gender: male           The team conference for this patient was held on 3/14/24 at 1100 am and led by:  Keon Sheets MD     CASE MANAGEMENT:  Assessment:   Patient is a 79 year old male who admitted to ARU on 3/11/2024 with the admitting diagnosis of Aspiration Pneumonia.  Patient resides at home with his spouse.  Patient is retired. Patient would benefit from skilled ARU PT/OT/SLP to promote increased safety and independence in order to return to his prior level of functioning. Patient anticipates discharging to home with home health care services and recommended DME.     PHYSICAL THERAPY:    Bed Mobility:  Sit to Supine: 2 person assistance with max A   Comments: Pt initiated grabbing railing and leaning down towards his right side/arm. B LE and trunk assist.   Transfers:  Sit to stand transfer: 2 person assistance with mod A of 2; varied between HHA vs pushing up from seated surface.    Stand to sit transfer: 2 person assistance with mod A of 2   Stand step transfer: 2 person assistance with mod A of 2   Toilet transfer: 2 person assistance with mod A of 2   Comments: Cues and assist for feet position prior to standing as well has hand placement.  Pt initiating movement more but with delayed response.  Stand step transfers required increased tactile cues at hips for wt shifting to advance LEs.   Ambulation:  Surface:level surface  Assistive Device: hand-held assist  Assistance: 2 person assistance with mod A of 2 progressing to min A of 2 with B HHA   Distance: 270 ft but shorter distances to/from bathroom  Gait Mechanics: small shuffling steps progressing to a partial step-through pattern, narrow [place], and slower fabien  Comments:  Initially with a heavy R lean onto therapist but improved with distance. Gaze downward and towards the

## 2024-03-13 NOTE — PROGRESS NOTES
Pacheco Prince  3/13/2024  [id number]    Chief Complaint: Aspiration pneumonia (HCC)    Subjective:   Patient reports no new complaints today. Cough is improving, wife reports no blood-tinged sputum for several days.  Slept fair overnight, was awake a couple of times.  Tolerating tube feed diet. Denies fevers/chills, chest pain, dyspnea, abdominal pain.     Last BM: Stool Occurrence: 1 (03/13/24 0920)    Objective:  Patient Vitals for the past 24 hrs:   BP Temp Temp src Pulse Resp SpO2   03/13/24 1246 -- -- -- -- 16 93 %   03/13/24 0809 127/61 98.3 °F (36.8 °C) Oral 67 18 94 %   03/13/24 0552 -- -- -- -- -- 95 %   03/12/24 2150 -- -- -- -- -- 94 %   03/12/24 2045 (!) 150/64 97.6 °F (36.4 °C) Oral 64 18 --     Gen: No distress, pleasant.   HEENT: Normocephalic, atraumatic.   CV: Regular rate and rhythm. Extremities warm, well perfused.   Resp: No respiratory distress. CTAB.  Abd: Soft, non-tender.   Ext: No edema.   Neuro: Alert. Appropriate verbal responses given increased time.     Laboratory data: Available via EMR.     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 Due to low vocal volume pt requires intermittent clarification requests when talking in longer phrases. Pt presents with a moderate to severe cognitive-communication impairment. Pt with significant delays in processing speed for  MD Sudeep 3/13/2024, 7:07 PM    * This document was created using dictation software.  While all precautions were taken to ensure accuracy, errors may have occurred.  Please disregard any typographical errors.

## 2024-03-14 ENCOUNTER — APPOINTMENT (OUTPATIENT)
Dept: MRI IMAGING | Age: 79
DRG: 947 | End: 2024-03-14
Attending: STUDENT IN AN ORGANIZED HEALTH CARE EDUCATION/TRAINING PROGRAM
Payer: MEDICARE

## 2024-03-14 LAB
ANION GAP SERPL CALCULATED.3IONS-SCNC: 8 MMOL/L (ref 3–16)
BASOPHILS # BLD: 0 K/UL (ref 0–0.2)
BASOPHILS NFR BLD: 0.5 %
BUN SERPL-MCNC: 16 MG/DL (ref 7–20)
CALCIUM SERPL-MCNC: 8.2 MG/DL (ref 8.3–10.6)
CHLORIDE SERPL-SCNC: 103 MMOL/L (ref 99–110)
CO2 SERPL-SCNC: 28 MMOL/L (ref 21–32)
CREAT SERPL-MCNC: <0.5 MG/DL (ref 0.8–1.3)
DEPRECATED RDW RBC AUTO: 14.2 % (ref 12.4–15.4)
EOSINOPHIL # BLD: 0.3 K/UL (ref 0–0.6)
EOSINOPHIL NFR BLD: 4.3 %
GFR SERPLBLD CREATININE-BSD FMLA CKD-EPI: >60 ML/MIN/{1.73_M2}
GLUCOSE BLD-MCNC: 110 MG/DL (ref 70–99)
GLUCOSE BLD-MCNC: 114 MG/DL (ref 70–99)
GLUCOSE BLD-MCNC: 133 MG/DL (ref 70–99)
GLUCOSE SERPL-MCNC: 159 MG/DL (ref 70–99)
HCT VFR BLD AUTO: 34.9 % (ref 40.5–52.5)
HGB BLD-MCNC: 11.5 G/DL (ref 13.5–17.5)
LYMPHOCYTES # BLD: 1.2 K/UL (ref 1–5.1)
LYMPHOCYTES NFR BLD: 17.6 %
MCH RBC QN AUTO: 30.1 PG (ref 26–34)
MCHC RBC AUTO-ENTMCNC: 33.1 G/DL (ref 31–36)
MCV RBC AUTO: 90.9 FL (ref 80–100)
MONOCYTES # BLD: 0.8 K/UL (ref 0–1.3)
MONOCYTES NFR BLD: 12.5 %
NEUTROPHILS # BLD: 4.3 K/UL (ref 1.7–7.7)
NEUTROPHILS NFR BLD: 65.1 %
PERFORMED ON: ABNORMAL
PLATELET # BLD AUTO: 394 K/UL (ref 135–450)
PMV BLD AUTO: 7.5 FL (ref 5–10.5)
POTASSIUM SERPL-SCNC: 4.4 MMOL/L (ref 3.5–5.1)
RBC # BLD AUTO: 3.84 M/UL (ref 4.2–5.9)
SODIUM SERPL-SCNC: 139 MMOL/L (ref 136–145)
WBC # BLD AUTO: 6.7 K/UL (ref 4–11)

## 2024-03-14 PROCEDURE — 97116 GAIT TRAINING THERAPY: CPT

## 2024-03-14 PROCEDURE — 97129 THER IVNTJ 1ST 15 MIN: CPT

## 2024-03-14 PROCEDURE — 1280000000 HC REHAB R&B

## 2024-03-14 PROCEDURE — 36415 COLL VENOUS BLD VENIPUNCTURE: CPT

## 2024-03-14 PROCEDURE — 97535 SELF CARE MNGMENT TRAINING: CPT

## 2024-03-14 PROCEDURE — 6370000000 HC RX 637 (ALT 250 FOR IP): Performed by: STUDENT IN AN ORGANIZED HEALTH CARE EDUCATION/TRAINING PROGRAM

## 2024-03-14 PROCEDURE — 97530 THERAPEUTIC ACTIVITIES: CPT

## 2024-03-14 PROCEDURE — 92507 TX SP LANG VOICE COMM INDIV: CPT

## 2024-03-14 PROCEDURE — 6360000002 HC RX W HCPCS: Performed by: STUDENT IN AN ORGANIZED HEALTH CARE EDUCATION/TRAINING PROGRAM

## 2024-03-14 PROCEDURE — 70551 MRI BRAIN STEM W/O DYE: CPT

## 2024-03-14 PROCEDURE — 2500000003 HC RX 250 WO HCPCS: Performed by: STUDENT IN AN ORGANIZED HEALTH CARE EDUCATION/TRAINING PROGRAM

## 2024-03-14 PROCEDURE — 85025 COMPLETE CBC W/AUTO DIFF WBC: CPT

## 2024-03-14 PROCEDURE — 92526 ORAL FUNCTION THERAPY: CPT

## 2024-03-14 PROCEDURE — 80048 BASIC METABOLIC PNL TOTAL CA: CPT

## 2024-03-14 RX ORDER — LANSOPRAZOLE 30 MG/1
30 TABLET, ORALLY DISINTEGRATING, DELAYED RELEASE ORAL DAILY
Status: DISCONTINUED | OUTPATIENT
Start: 2024-03-14 | End: 2024-03-28 | Stop reason: HOSPADM

## 2024-03-14 RX ADMIN — CARBIDOPA AND LEVODOPA 3 TABLET: 25; 100 TABLET ORAL at 13:38

## 2024-03-14 RX ADMIN — CARBOXYMETHYLCELLULOSE SODIUM 1 DROP: 10 GEL OPHTHALMIC at 09:10

## 2024-03-14 RX ADMIN — CLOZAPINE 25 MG: 25 TABLET ORAL at 09:10

## 2024-03-14 RX ADMIN — CARBIDOPA AND LEVODOPA 3 TABLET: 25; 100 TABLET ORAL at 00:39

## 2024-03-14 RX ADMIN — CARBIDOPA AND LEVODOPA 3 TABLET: 25; 100 TABLET ORAL at 20:28

## 2024-03-14 RX ADMIN — CARBOXYMETHYLCELLULOSE SODIUM 1 DROP: 10 GEL OPHTHALMIC at 20:30

## 2024-03-14 RX ADMIN — LEVOFLOXACIN 750 MG: 500 TABLET, FILM COATED ORAL at 09:10

## 2024-03-14 RX ADMIN — PRIMIDONE 50 MG: 50 TABLET ORAL at 20:28

## 2024-03-14 RX ADMIN — LANSOPRAZOLE 30 MG: 30 TABLET, ORALLY DISINTEGRATING, DELAYED RELEASE ORAL at 15:17

## 2024-03-14 RX ADMIN — CARBIDOPA AND LEVODOPA 3 TABLET: 25; 100 TABLET ORAL at 11:28

## 2024-03-14 RX ADMIN — IPRATROPIUM BROMIDE 2 SPRAY: 42 SPRAY NASAL at 15:17

## 2024-03-14 RX ADMIN — CARBIDOPA AND LEVODOPA 3 TABLET: 25; 100 TABLET ORAL at 03:28

## 2024-03-14 RX ADMIN — DONEPEZIL HYDROCHLORIDE 10 MG: 5 TABLET, FILM COATED ORAL at 20:29

## 2024-03-14 RX ADMIN — ASPIRIN 81 MG: 81 TABLET, CHEWABLE ORAL at 09:10

## 2024-03-14 RX ADMIN — CARBIDOPA AND LEVODOPA 3 TABLET: 25; 100 TABLET ORAL at 19:13

## 2024-03-14 RX ADMIN — THERA TABS 1 TABLET: TAB at 09:10

## 2024-03-14 RX ADMIN — CARBIDOPA AND LEVODOPA 3 TABLET: 25; 100 TABLET ORAL at 17:25

## 2024-03-14 RX ADMIN — Medication 2000 UNITS: at 09:10

## 2024-03-14 RX ADMIN — FLUTICASONE PROPIONATE 2 SPRAY: 50 SPRAY, METERED NASAL at 20:30

## 2024-03-14 RX ADMIN — CLOZAPINE 25 MG: 25 TABLET ORAL at 20:28

## 2024-03-14 RX ADMIN — IPRATROPIUM BROMIDE 2 SPRAY: 42 SPRAY NASAL at 09:11

## 2024-03-14 RX ADMIN — ACETAMINOPHEN 650 MG: 325 TABLET ORAL at 09:10

## 2024-03-14 RX ADMIN — ATORVASTATIN CALCIUM 80 MG: 80 TABLET, FILM COATED ORAL at 20:29

## 2024-03-14 RX ADMIN — CARBIDOPA AND LEVODOPA 3 TABLET: 25; 100 TABLET ORAL at 07:26

## 2024-03-14 RX ADMIN — Medication 1 CAPSULE: at 09:09

## 2024-03-14 RX ADMIN — MELATONIN TAB 3 MG 3 MG: 3 TAB at 20:29

## 2024-03-14 RX ADMIN — CARBIDOPA AND LEVODOPA 3 TABLET: 25; 100 TABLET ORAL at 09:09

## 2024-03-14 RX ADMIN — Medication 1 CAPSULE: at 19:13

## 2024-03-14 RX ADMIN — CARBIDOPA AND LEVODOPA 3 TABLET: 25; 100 TABLET ORAL at 23:43

## 2024-03-14 RX ADMIN — CARBIDOPA AND LEVODOPA 3 TABLET: 25; 100 TABLET ORAL at 15:17

## 2024-03-14 RX ADMIN — ENOXAPARIN SODIUM 40 MG: 100 INJECTION SUBCUTANEOUS at 09:09

## 2024-03-14 RX ADMIN — GUAIFENESIN 200 MG: 100 SOLUTION ORAL at 20:29

## 2024-03-14 RX ADMIN — CARBIDOPA AND LEVODOPA 3 TABLET: 25; 100 TABLET ORAL at 05:34

## 2024-03-14 RX ADMIN — GUAIFENESIN 200 MG: 100 SOLUTION ORAL at 09:09

## 2024-03-14 ASSESSMENT — PAIN SCALES - GENERAL
PAINLEVEL_OUTOF10: 8
PAINLEVEL_OUTOF10: 0

## 2024-03-14 ASSESSMENT — PAIN - FUNCTIONAL ASSESSMENT: PAIN_FUNCTIONAL_ASSESSMENT: ACTIVITIES ARE NOT PREVENTED

## 2024-03-14 ASSESSMENT — PAIN DESCRIPTION - ORIENTATION: ORIENTATION: LEFT

## 2024-03-14 ASSESSMENT — PAIN DESCRIPTION - LOCATION: LOCATION: HAND

## 2024-03-14 ASSESSMENT — PAIN DESCRIPTION - DESCRIPTORS: DESCRIPTORS: ACHING;DISCOMFORT

## 2024-03-14 NOTE — PROGRESS NOTES
MRI form reviewed, signed and faxed.    1622: pt has a wedding ring that pt's wife mentioned, MRI personelle informed via phone call.     1624: pt left the room for MRI.

## 2024-03-14 NOTE — CARE COORDINATION
Team conference held today. Spoke with patient to discuss progress with therapy, barriers to discharge, and plans to return home. Estimated discharge date set for 3/27/2024. Patient will discharge home with [facility] and any needed DME. Will continue to follow for support and discharge planning.        Electronically signed by EDDIE Gaspar on 3/14/2024 at 12:39 PM

## 2024-03-14 NOTE — PLAN OF CARE
Problem: Discharge Planning  Goal: Discharge to home or other facility with appropriate resources  3/14/2024 0514 by Sarabjit Ramos RN  Outcome: Progressing     Problem: Pain  Goal: Verbalizes/displays adequate comfort level or baseline comfort level  3/14/2024 1003 by Andi Stokes, RN  Outcome: Progressing     Problem: Safety - Adult  Goal: Free from fall injury  3/14/2024 1003 by Andi Stokes, RN  Outcome: Progressing

## 2024-03-14 NOTE — DISCHARGE INSTR - DIET

## 2024-03-14 NOTE — PROGRESS NOTES
[facility] - Inpatient Rehabilitation Department   Phone: [phone]    Speech Therapy    [] Initial Evaluation            [x] Daily Treatment Note         [] Discharge Summary      Patient: Pacheco Prince   : 1945   MRN: [id number]   Date of Service:  3/14/2024  Admitting Diagnosis: Aspiration pneumonia (HCC)  Current Admission Summary: See MD's summary   Past Medical History:  has a past medical history of Acute bronchitis, CAD (coronary artery disease), Cancer (HCC), Clostridioides difficile infection, Diabetes mellitus (HCC), Erectile dysfunction, GERD (gastroesophageal reflux disease), Hyperlipidemia, Obesity, and Parkinson disease.  Past Surgical History:  has a past surgical history that includes Diagnostic Cardiac Cath Lab Procedure (2005); Coronary angioplasty with stent (2005); Cataract extraction; eye surgery (2021); Tonsillectomy ([facility]); Percutaneous Transluminal Coronary Angio (); Upper gastrointestinal endoscopy (); and Upper gastrointestinal endoscopy (N/A, 3/8/2024).    Recent Head CT completed 3/4/24:  IMPRESSION:  1.  No acute intracranial abnormality.  2. Findings of paranasal sinusitis with right middle ear and mastoid effusion    Recent Chest xray completed 3/7/24:  IMPRESSION:  New right lung base infiltrate.    Instrumental Swallow Study:   Modified Barium Swallow evaluation completed on 3/4/2024. Patient presents with mild oropharyngeal dysphagia secondary to prolonged mastication, premature spillage to pyriform sinuses, reduced AP propulsion, delayed swallow initiation, reduced laryngeal elevation, decreased tongue base retraction and reduced pharyngeal contraction. Pt demonstrated aspiration after the swallow with initial trial of thin liquids via tsp, suspect in relation to sensation although pt demonstrated a hard cough in response. 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 Slightly reduced from first session, suspect in relation to increased fatigue.     Cognition: Severity: Moderate   Attention to task  - increased episodes of staring off with SLP or wife redirecting to conversation/presented question  - benefited from name being said and attention averted to speaker prior to asking question; appeared to improve response timing as well   - improvement with pt turning head to direct attention to wife on L side     Additional Interventions:        Education  Barriers To Learning: cognition  Patient Education: Provided education regarding role of SLP, results of assessment, recommendations and general speech pathology plan of care.   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 Due to low vocal volume pt requires intermittent clarification requests when talking in longer phrases. Pt presents with a moderate to severe cognitive-communication impairment. Pt with significant delays in processing speed for basic comprehension and significant word finding impairments/ reduced thought organization, impacting functional communication. Pt responded well to multiple choice questions and simplification/

## 2024-03-14 NOTE — PLAN OF CARE
Problem: Discharge Planning  Goal: Discharge to home or other facility with appropriate resources  Outcome: Progressing     Problem: Pain  Goal: Verbalizes/displays adequate comfort level or baseline comfort level  Outcome: Progressing     Problem: Safety - Adult  Goal: Free from fall injury  Outcome: Progressing     Problem: Confusion  Goal: Confusion, delirium, dementia, or psychosis is improved or at baseline  Description: INTERVENTIONS:  1. Assess for possible contributors to thought disturbance, including medications, impaired vision or hearing, underlying metabolic abnormalities, dehydration, psychiatric diagnoses, and notify attending LIP  2. [place] high risk fall precautions, as indicated  3. Provide frequent short contacts to provide reality reorientation, refocusing and direction  4. Decrease environmental stimuli, including noise as appropriate  5. Monitor and intervene to maintain adequate nutrition, hydration, elimination, sleep and activity  6. If unable to ensure safety without constant attention obtain sitter and review sitter guidelines with assigned personnel  7. Initiate Psychosocial CNS and Spiritual Care consult, as indicated  Outcome: Progressing     Problem: Skin/Tissue Integrity  Goal: Absence of new skin breakdown  Description: 1.  Monitor for areas of redness and/or skin breakdown  2.  Assess vascular access sites hourly  3.  Every 4-6 hours minimum:  Change oxygen saturation probe site  4.  Every 4-6 hours:  If on nasal continuous positive airway pressure, respiratory therapy assess nares and determine need for appliance change or resting period.  Outcome: Progressing     Problem: Chronic Conditions and Co-morbidities  Goal: Patient's chronic conditions and co-morbidity symptoms are monitored and maintained or improved  Outcome: Progressing     Problem: Nutrition Deficit:  Goal: Optimize nutritional status  Outcome: Progressing     Problem: ABCDS Injury Assessment  Goal: Absence of

## 2024-03-14 NOTE — PROGRESS NOTES
[facility] - Inpatient Rehabilitation Department   Phone: [phone]    Physical Therapy    [] Initial Evaluation            [x] Daily Treatment Note         [] Discharge Summary      Patient: Pacheco Prince   : 1945   MRN: [id number]   Date of Service:  3/14/2024  Admitting Diagnosis: Aspiration pneumonia (HCC)  Current Admission Summary: Pacheco Prince is a 79 y.o. male with PMHx notable for Parkinson's disease, DM2, CAD who presented on 24 with fever, and cough. He was on a cruise when symptoms developed, was diagnosed with pneumonia and started on IV Zosyn. He continued to decline, requiring medical flight back home to [place]. Etiology of pneumonia is presumed aspiration, was started on broad-spectrum antibiotics with vancomycin and Zosyn. MRSA swab was negative, so vancomycin was discontinued. He was treated with cefepime and Flagyl. CTA Chest was obtained, negative for PE.  He did develop worsening/recurrent aspiration pneumonia, and his diet was downgraded to n.p.o.  He underwent PEG placement on 3/9.  He is now tolerating tube feeds at goal rate, and able to receive his Parkinson's medications via PEG.  Hospital course complicated by: acute hypoxic respiratory failure, dysphagia, hypertension, hypokalemia, anemia, lethargy/AMS, worsening Parkinson's symptoms.   Past Medical History:  has a past medical history of Acute bronchitis, CAD (coronary artery disease), Cancer (HCC), Clostridioides difficile infection, Diabetes mellitus (HCC), Erectile dysfunction, GERD (gastroesophageal reflux disease), Hyperlipidemia, Obesity, and Parkinson disease.  Past Surgical History:  has a past surgical history that includes Diagnostic Cardiac Cath Lab Procedure (2005); Coronary angioplasty with stent (2005); Cataract extraction; eye surgery (2021); [facility]omy ([facility]); Percutaneous Transluminal Coronary Angio (); Upper gastrointestinal endoscopy (); and

## 2024-03-14 NOTE — DISCHARGE INSTR - COC
Continuity of Care Form    Patient Name: Pacheco Prince   :  1945  MRN:  [id number]    Admit date:  3/11/2024  Discharge date:  3/28/2024    Code Status Order: Full Code   Advance Directives:     Admitting Physician:  Keon Sheets MD  PCP: Rohan Watson, APRN - CNP    Discharging Nurse: Lolly Mars  Discharging Hospital Unit/Room#: ARU-4905/4905-01  Discharging Unit Phone Number: [phone]    Emergency Contact:   Extended Emergency Contact Information  Primary Emergency Contact: Tiffany Prince  Address: [address] [place] [address]            [place] [address] [facility]  Home Phone: [phone]  Relation: Spouse  Secondary Emergency Contact: Pacheco Prince  Address: [address] [place] [address] [facility]  Home Phone: [phone]  Mobile Phone: [phone]  Relation: Child    Past Surgical History:  Past Surgical History:   Procedure Laterality Date    CATARACT EXTRACTION      2021    CORONARY ANGIOPLASTY WITH STENT PLACEMENT  2005    [facility]    DIAGNOSTIC CARDIAC CATH LAB PROCEDURE  2005        EYE SURGERY  2021    cataracts    PTCA  2002    TONSILLECTOMY  [facility]    UPPER GASTROINTESTINAL ENDOSCOPY      UPPER GASTROINTESTINAL ENDOSCOPY N/A 3/8/2024    ESOPHAGOGASTRODUODENOSCOPY PERCUTANEOUS ENDOSCOPIC GASTROSTOMY TUBE PLACEMENT performed by Luke Douglas MD at [facility] END[facility]COPY       Immunization History:   Immunization History   Administered Date(s) Administered    COVID-19, MODERNA, ( formula), (age 12y+), IM, 50mcg/0.5mL 2023    COVID-19, NOVAVAX, ( formula), (age 12y+), IM, 5mcg/0.5mL 2023    COVID-19, PFIZER Bivalent, DO NOT Dilute, (age 12y+), IM, 30 mcg/0.3 mL 10/10/2022    COVID-19, PFIZER PURPLE top, DILUTE for use, (age 12 y+), 30mcg/0.3mL 2021, 2021, 2021    Influenza Vaccine, unspecified formulation 10/15/2015    Influenza, FLUAD,  lb)   SpO2 94%   BMI 26.11 kg/m²     Last documented pain score (0-10 scale): Pain Level: 8  Last Weight:   Wt Readings from Last 1 Encounters:   03/12/24 82.6 kg (182 lb)     Mental Status:  {IP PT MENTAL STATUS:20030}    IV Access:  { MARICHUY IV ACCESS:501148598}    Nursing Mobility/ADLs:  Walking   {CHP DME ADLs:155990057}  Transfer  {CHP DME ADLs:660634912}  Bathing  {CHP DME ADLs:246079875}  Dressing  {CHP DME ADLs:076448089}  Toileting  {CHP DME ADLs:066262955}  Feeding  {CHP DME ADLs:001836222}  Med Admin  {CHP DME ADLs:598459520}  Med Delivery   { MARICHUY MED Delivery:645778668}    Wound Care Documentation and Therapy:  Wound 03/11/24 Heel Right (Active)   Wound Image   03/13/24 1549   Wound Etiology Deep tissue/Injury 03/14/24 0901   Dressing Status Other (Comment) 03/14/24 0901   Wound Cleansed Not Cleansed 03/14/24 0901   Wound Assessment Purple/maroon;Dry 03/14/24 0901   Drainage Amount None (dry) 03/14/24 0901   Keli-wound Assessment Intact 03/14/24 0901   Margins Defined edges 03/14/24 0901   Number of days: 2       Wound 03/11/24 Heel Left pink, non blanchable, intact skin (Active)   Wound Image   03/13/24 1549   Wound Assessment Pink/red;Dry 03/14/24 0901   Keli-wound Assessment Intact 03/14/24 0901   Number of days: 2        Elimination:  Continence:   Bowel: {YES / NO:19727}  Bladder: {YES / NO:19727}  Urinary Catheter: {Urinary Catheter:667581214}   Colostomy/Ileostomy/Ileal Conduit: {YES / NO:19727}       Date of Last BM: ***    Intake/Output Summary (Last 24 hours) at 3/14/2024 1236  Last data filed at 3/14/2024 0639  Gross per 24 hour   Intake 1925 ml   Output 900 ml   Net 1025 ml     I/O last 3 completed shifts:  In: 3300 [NG/GT:3300]  Out: 1950 [Urine:1950]    Safety Concerns:     { MARICHUY Safety Concerns:399692611}    Impairments/Disabilities:      { MARICHUY Impairments/Disabilities:947910079}    Nutrition Therapy:  Current Nutrition Therapy:   { MARICHUY Diet List:034376840}    Routes of Feeding:

## 2024-03-14 NOTE — PROGRESS NOTES
[facility] - Inpatient Rehabilitation Department   Phone: [phone]    Occupational Therapy    [] Initial Evaluation            [x] Daily Treatment Note         [] Discharge Summary      Patient: Pacheco Prince   : 1945   MRN: [id number]   Date of Service:  3/14/2024    Admitting Diagnosis:  Aspiration pneumonia (HCC)  Current Admission Summary: Pacheco Prince is a 79 y.o. male with PMHx notable for Parkinson's disease, DM2, CAD who presented on 24 with fever, and cough. He was on a cruise when symptoms developed, was diagnosed with pneumonia and started on IV Zosyn. He continued to decline, requiring medical flight back home to [place]. Etiology of pneumonia is presumed aspiration, was started on broad-spectrum antibiotics with vancomycin and Zosyn. MRSA swab was negative, so vancomycin was discontinued. He was treated with cefepime and Flagyl. CTA Chest was obtained, negative for PE.  He did develop worsening/recurrent aspiration pneumonia, and his diet was downgraded to n.p.o.  He underwent PEG placement on 3/9.  He is now tolerating tube feeds at goal rate, and able to receive his Parkinson's medications via PEG.  Hospital course complicated by: acute hypoxic respiratory failure, dysphagia, hypertension, hypokalemia, anemia, lethargy/AMS, worsening Parkinson's symptoms.      Currently, patient reports that he is doing well.  His wife notes a productive cough with increasing frequency.  He denies any fevers, chills, dyspnea, chest pain.  He is experiencing significant tremor, rigidity and freezing.  He denies any focal numbness or weakness.  Past Medical History:  has a past medical history of Acute bronchitis, CAD (coronary artery disease), Cancer (HCC), Clostridioides difficile infection, Diabetes mellitus (HCC), Erectile dysfunction, GERD (gastroesophageal reflux disease), Hyperlipidemia, Obesity, and Parkinson disease.  Past Surgical History:  has a past surgical history  toilet  IADL Assistance: vacuums, cleaning shower w/ squeegee, gets mail, takes out trash  Active :        [] Yes                 [x] No  Hand Dominance: [] Left                 [x] Right  Current Employment: retired.  Occupation: Office work  Hobbies: golfing, going out to eat, cards, reading  Recent Falls: 1 Fall In Dec 2022 walking to get mail in the rain, then slipped on tile floor; wife states that pt goes up/down steps to second floor bathroom 8x a day; wife states that pt is independent, but that she is with him 24/7, assists pt with many tasks    Recently completed [facility] program in April 2023 prior to hospitalization to [facility] 2023 per wife report    Wife reporting pt was golfing prior to cruise.      Examination (3/12)  Vision:   Vision Gross Assessment: Impaired and Vision Corrective Device: wears glasses at all times  Perception:   Motor Planning: hand over hand to sequence tasks  Perseveration: perseverates during conversation      Subjective  General: Pt met seated in recliner upon arrival - pt pleasant and agreeable to therapy. Wife present throughout session.   Pain: 0/10  Pain Interventions: not applicable        Activities of Daily Living  Basic Activities of Daily Living  Feeding: maximum assistance  Feeding Comments: assist to bring spoonfulls of water to mouth  Upper Extremity Bathing: maximum assistance  Lower Extremity Bathing: maximum assistance   Bathing Equipment: none  Bathing Comments: bathing completed seated on TTB w/ cut out this date; maxA UB bathing w/ [place] for initiation, sequencing, and thoroughness of bathing of UB and LB; pt more responsive to cues during bathing w/ increased time   Upper Extremity Dressing: moderate assistance requires verbal cueing Increased time to complete task  Lower Extremity Dressing: dependent  Dressing Equipment: none  Dressing Comments: modA UB dressing seated on TTB - pt able to thread B UEs to elbows but required assistance to  by assistance   Patient will complete self-feeding at set up assistance   Patient will complete grooming at set up assistance   Patient will complete functional transfers at stand by assistance   Patient will increase functional standing balance to 2+ min for improved ADL completion      Above goals reviewed on 3/14/2024.  All goals are ongoing at this time unless indicated above.       Therapy Session Time     Individual Group Co-treatment   Time In   0935   Time Out   1035   Minutes   60        Timed Code Treatment Minutes: 60 minutes  Total Treatment Minutes:  60 minutes       Electronically Signed By: CHENTE Livingston MOT OTR/L, [id number] 3/14/2024 3:43 PM

## 2024-03-15 LAB
ANION GAP SERPL CALCULATED.3IONS-SCNC: 9 MMOL/L (ref 3–16)
BASOPHILS # BLD: 0.1 K/UL (ref 0–0.2)
BASOPHILS NFR BLD: 1 %
BUN SERPL-MCNC: 17 MG/DL (ref 7–20)
CALCIUM SERPL-MCNC: 8.6 MG/DL (ref 8.3–10.6)
CHLORIDE SERPL-SCNC: 100 MMOL/L (ref 99–110)
CO2 SERPL-SCNC: 29 MMOL/L (ref 21–32)
CREAT SERPL-MCNC: 0.5 MG/DL (ref 0.8–1.3)
DEPRECATED RDW RBC AUTO: 13.8 % (ref 12.4–15.4)
EOSINOPHIL # BLD: 0.2 K/UL (ref 0–0.6)
EOSINOPHIL NFR BLD: 3.5 %
GFR SERPLBLD CREATININE-BSD FMLA CKD-EPI: >60 ML/MIN/{1.73_M2}
GLUCOSE BLD-MCNC: 121 MG/DL (ref 70–99)
GLUCOSE BLD-MCNC: 130 MG/DL (ref 70–99)
GLUCOSE BLD-MCNC: 135 MG/DL (ref 70–99)
GLUCOSE SERPL-MCNC: 159 MG/DL (ref 70–99)
HCT VFR BLD AUTO: 34.2 % (ref 40.5–52.5)
HGB BLD-MCNC: 11.7 G/DL (ref 13.5–17.5)
LYMPHOCYTES # BLD: 1.4 K/UL (ref 1–5.1)
LYMPHOCYTES NFR BLD: 20 %
MCH RBC QN AUTO: 30.9 PG (ref 26–34)
MCHC RBC AUTO-ENTMCNC: 34.2 G/DL (ref 31–36)
MCV RBC AUTO: 90.4 FL (ref 80–100)
MONOCYTES # BLD: 0.9 K/UL (ref 0–1.3)
MONOCYTES NFR BLD: 12.5 %
NEUTROPHILS # BLD: 4.5 K/UL (ref 1.7–7.7)
NEUTROPHILS NFR BLD: 63 %
PERFORMED ON: ABNORMAL
PLATELET # BLD AUTO: 386 K/UL (ref 135–450)
PMV BLD AUTO: 7.4 FL (ref 5–10.5)
POTASSIUM SERPL-SCNC: 4.1 MMOL/L (ref 3.5–5.1)
RBC # BLD AUTO: 3.78 M/UL (ref 4.2–5.9)
SODIUM SERPL-SCNC: 138 MMOL/L (ref 136–145)
WBC # BLD AUTO: 7.1 K/UL (ref 4–11)

## 2024-03-15 PROCEDURE — 6370000000 HC RX 637 (ALT 250 FOR IP): Performed by: STUDENT IN AN ORGANIZED HEALTH CARE EDUCATION/TRAINING PROGRAM

## 2024-03-15 PROCEDURE — 97116 GAIT TRAINING THERAPY: CPT

## 2024-03-15 PROCEDURE — 36415 COLL VENOUS BLD VENIPUNCTURE: CPT

## 2024-03-15 PROCEDURE — 97530 THERAPEUTIC ACTIVITIES: CPT

## 2024-03-15 PROCEDURE — 2500000003 HC RX 250 WO HCPCS: Performed by: STUDENT IN AN ORGANIZED HEALTH CARE EDUCATION/TRAINING PROGRAM

## 2024-03-15 PROCEDURE — 80048 BASIC METABOLIC PNL TOTAL CA: CPT

## 2024-03-15 PROCEDURE — 92507 TX SP LANG VOICE COMM INDIV: CPT

## 2024-03-15 PROCEDURE — 97535 SELF CARE MNGMENT TRAINING: CPT

## 2024-03-15 PROCEDURE — 85025 COMPLETE CBC W/AUTO DIFF WBC: CPT

## 2024-03-15 PROCEDURE — 1280000000 HC REHAB R&B

## 2024-03-15 PROCEDURE — 94669 MECHANICAL CHEST WALL OSCILL: CPT

## 2024-03-15 PROCEDURE — 6360000002 HC RX W HCPCS: Performed by: STUDENT IN AN ORGANIZED HEALTH CARE EDUCATION/TRAINING PROGRAM

## 2024-03-15 PROCEDURE — 92526 ORAL FUNCTION THERAPY: CPT

## 2024-03-15 RX ADMIN — CARBOXYMETHYLCELLULOSE SODIUM 1 DROP: 10 GEL OPHTHALMIC at 08:58

## 2024-03-15 RX ADMIN — CARBIDOPA AND LEVODOPA 3 TABLET: 25; 100 TABLET ORAL at 08:56

## 2024-03-15 RX ADMIN — IPRATROPIUM BROMIDE 2 SPRAY: 42 SPRAY NASAL at 13:05

## 2024-03-15 RX ADMIN — Medication 1 CAPSULE: at 08:57

## 2024-03-15 RX ADMIN — CARBIDOPA AND LEVODOPA 3 TABLET: 25; 100 TABLET ORAL at 05:01

## 2024-03-15 RX ADMIN — PRIMIDONE 50 MG: 50 TABLET ORAL at 20:55

## 2024-03-15 RX ADMIN — ENOXAPARIN SODIUM 40 MG: 100 INJECTION SUBCUTANEOUS at 08:58

## 2024-03-15 RX ADMIN — ASPIRIN 81 MG: 81 TABLET, CHEWABLE ORAL at 08:58

## 2024-03-15 RX ADMIN — CARBIDOPA AND LEVODOPA 3 TABLET: 25; 100 TABLET ORAL at 20:55

## 2024-03-15 RX ADMIN — CARBOXYMETHYLCELLULOSE SODIUM 1 DROP: 10 GEL OPHTHALMIC at 20:55

## 2024-03-15 RX ADMIN — CARBIDOPA AND LEVODOPA 3 TABLET: 25; 100 TABLET ORAL at 17:05

## 2024-03-15 RX ADMIN — CARBIDOPA AND LEVODOPA 3 TABLET: 25; 100 TABLET ORAL at 11:03

## 2024-03-15 RX ADMIN — THERA TABS 1 TABLET: TAB at 08:56

## 2024-03-15 RX ADMIN — DONEPEZIL HYDROCHLORIDE 10 MG: 5 TABLET, FILM COATED ORAL at 20:55

## 2024-03-15 RX ADMIN — GUAIFENESIN 200 MG: 100 SOLUTION ORAL at 08:57

## 2024-03-15 RX ADMIN — ACETAMINOPHEN 650 MG: 325 TABLET ORAL at 11:03

## 2024-03-15 RX ADMIN — Medication 1 CAPSULE: at 17:05

## 2024-03-15 RX ADMIN — FLUTICASONE PROPIONATE 2 SPRAY: 50 SPRAY, METERED NASAL at 20:54

## 2024-03-15 RX ADMIN — Medication 2000 UNITS: at 08:56

## 2024-03-15 RX ADMIN — CARBIDOPA AND LEVODOPA 3 TABLET: 25; 100 TABLET ORAL at 02:53

## 2024-03-15 RX ADMIN — CARBIDOPA AND LEVODOPA 3 TABLET: 25; 100 TABLET ORAL at 23:57

## 2024-03-15 RX ADMIN — GUAIFENESIN 200 MG: 100 SOLUTION ORAL at 20:55

## 2024-03-15 RX ADMIN — CARBIDOPA AND LEVODOPA 3 TABLET: 25; 100 TABLET ORAL at 13:05

## 2024-03-15 RX ADMIN — MELATONIN TAB 3 MG 3 MG: 3 TAB at 20:55

## 2024-03-15 RX ADMIN — IPRATROPIUM BROMIDE 2 SPRAY: 42 SPRAY NASAL at 08:59

## 2024-03-15 RX ADMIN — CLOZAPINE 25 MG: 25 TABLET ORAL at 08:55

## 2024-03-15 RX ADMIN — CARBIDOPA AND LEVODOPA 3 TABLET: 25; 100 TABLET ORAL at 06:41

## 2024-03-15 RX ADMIN — LANSOPRAZOLE 30 MG: 30 TABLET, ORALLY DISINTEGRATING, DELAYED RELEASE ORAL at 15:11

## 2024-03-15 RX ADMIN — ATORVASTATIN CALCIUM 80 MG: 80 TABLET, FILM COATED ORAL at 20:55

## 2024-03-15 RX ADMIN — CARBIDOPA AND LEVODOPA 3 TABLET: 25; 100 TABLET ORAL at 19:01

## 2024-03-15 RX ADMIN — CARBIDOPA AND LEVODOPA 3 TABLET: 25; 100 TABLET ORAL at 15:11

## 2024-03-15 RX ADMIN — CLOZAPINE 25 MG: 25 TABLET ORAL at 21:06

## 2024-03-15 ASSESSMENT — PAIN DESCRIPTION - LOCATION: LOCATION: HIP

## 2024-03-15 ASSESSMENT — PAIN SCALES - WONG BAKER
WONGBAKER_NUMERICALRESPONSE: NO HURT
WONGBAKER_NUMERICALRESPONSE: 0

## 2024-03-15 ASSESSMENT — PAIN SCALES - GENERAL
PAINLEVEL_OUTOF10: 5
PAINLEVEL_OUTOF10: 0
PAINLEVEL_OUTOF10: 0

## 2024-03-15 ASSESSMENT — PAIN DESCRIPTION - ORIENTATION: ORIENTATION: RIGHT

## 2024-03-15 ASSESSMENT — PAIN DESCRIPTION - DESCRIPTORS: DESCRIPTORS: ACHING

## 2024-03-15 NOTE — PROGRESS NOTES
[facility] - Inpatient Rehabilitation Department   Phone: [phone]    Speech Therapy    [] Initial Evaluation            [x] Daily Treatment Note         [] Discharge Summary      Patient: Pacheco Prince   : 1945   MRN: [id number]   Date of Service:  3/15/2024  Admitting Diagnosis: Aspiration pneumonia (HCC)  Current Admission Summary: See MD's summary   Past Medical History:  has a past medical history of Acute bronchitis, CAD (coronary artery disease), Cancer (HCC), Clostridioides difficile infection, Diabetes mellitus (HCC), Erectile dysfunction, GERD (gastroesophageal reflux disease), Hyperlipidemia, Obesity, and Parkinson disease.  Past Surgical History:  has a past surgical history that includes Diagnostic Cardiac Cath Lab Procedure (2005); Coronary angioplasty with stent (2005); Cataract extraction; eye surgery (2021); Tonsillectomy ([facility]); Percutaneous Transluminal Coronary Angio (); Upper gastrointestinal endoscopy (); and Upper gastrointestinal endoscopy (N/A, 3/8/2024).    Recent Head CT completed 3/4/24:  IMPRESSION:  1.  No acute intracranial abnormality.  2. Findings of paranasal sinusitis with right middle ear and mastoid effusion    Recent Chest xray completed 3/7/24:  IMPRESSION:  New right lung base infiltrate.    Instrumental Swallow Study:   Modified Barium Swallow evaluation completed on 3/4/2024. Patient presents with mild oropharyngeal dysphagia secondary to prolonged mastication, premature spillage to pyriform sinuses, reduced AP propulsion, delayed swallow initiation, reduced laryngeal elevation, decreased tongue base retraction and reduced pharyngeal contraction. Pt demonstrated aspiration after the swallow with initial trial of thin liquids via tsp, suspect in relation to sensation although pt demonstrated a hard cough in response. No laryngeal penetration or aspiration was viewed with thin liquids via cup/straw, mildly (nectar)  as needed. Ongoing    Pt will respond and/or initiate action upon verbal prompt within 5 seconds in 4 out of 5 opportunities without repetition. Ongoing         Therapy Session Time      Session 1 Session 2   Time In 1030 1245   Time Out 1100 1315   Time Code Minutes 0 0   Individual Minutes 30 30     Timed Code Treatment Minutes: 0  Total Treatment Minutes:  60    Electronically Signed By:   Nancy Gaitan M.A. [facility] [place] [id number]  Speech-Language Pathologist   3/15/2024 2:08 PM

## 2024-03-15 NOTE — PROGRESS NOTES
Morning assessment completed, vss, alert and oriented, very sweaty, severe tremors, The care plan and education has been reviewed and mutually agreed upon with the patient.  Pt remains free from falls. Fall precautions in place--bed in lowest position, call light within reach, bed alarm in use. Pt aware to call for assistance before getting up.

## 2024-03-15 NOTE — PROGRESS NOTES
BRIEF NUTRITION NOTE    Pt's wife has expressed concern that pt feels hungry in the afternoon and has requested additional tube feed. Current cyclic overnight regimen meets pt's nutrition needs; but understandable pt may feel hungry during the day while tube feeds hold to allow pt to participate in therapy. Adjusted cyclic overnight tube feed rate to allow additional calories from a bolus feed in the afternoon. If pt tolerates bolus feeds well and prefers them to overnight feeds, can consider changing to bolus feed all together.     Comparative Standards  Calories: 1345-0431  Protein:  grams  Fluid: 8328-4884 mL     Recommendations  Jevity 1.5 at 85 mL/hr x 14 hours (4 pm to 6 am) to provide 1190 mL total volume, 1785 calories, 76 grams protein, 904 mL free water. Water bolus 115 mL q 2 hours while tube feeds run.   Bolus 1 carton Jevity 1.5 at lunch time to provide an additional 240 mL volume, 360 calories, 15 grams protein, 182 mL free water. Flush tube with 90 mL water before and after feed.   Above regimen provides 1430 mL total volume, 2145 calories, 91 grams protein, 2071 mL free water.     Electronically signed by Rachele Man, MS, RD, LD on 3/15/2024 at 12:39 PM   [id number]

## 2024-03-15 NOTE — PROGRESS NOTES
Pacheco Prince  3/14/2024  [id number]    Chief Complaint: Aspiration pneumonia (HCC)    Subjective:   Patient reports that he is doing well. He denies any fevers, chills, chest pain, dyspnea. Cough is stable. Patient's wife and therapists corroborate a left sided visual field deficit/inattention, which is new to this hospitalization. No other new focal numbness, weakness, facial droop.     Last BM: Stool Occurrence: 1 (03/13/24 0920)    Objective:  Patient Vitals for the past 24 hrs:   BP Temp Temp src Pulse Resp SpO2   03/14/24 2010 113/64 97.5 °F (36.4 °C) Axillary 85 16 95 %   03/14/24 0855 (!) 108/59 98 °F (36.7 °C) Axillary 62 18 94 %   03/13/24 2130 (!) 128/56 99.1 °F (37.3 °C) Oral 72 18 91 %     Gen: No distress, pleasant.   HEENT: Normocephalic, atraumatic.   CV: Regular rate and rhythm. Extremities warm, well perfused.   Resp: No respiratory distress. CTAB.  Abd: Soft, non-tender.   Ext: No edema.   Neuro: Alert. Appropriate verbal responses given increased time. Left inattention. Moves bilateral upper and lower extremities spontaneously.     Laboratory data: Available via EMR.     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 Due to low vocal volume pt requires intermittent clarification requests when talking in longer phrases. Pt presents with a moderate to severe  Neurology (Dr. Rick), PCP  EUNICE: 14 days    Interdisciplinary team conference was held today with entire rehab treatment team including PT, OT, SLP (if applicable), Dietician, RN, and SW. Discussion focused on progress toward rehab goals and discharge planning. Making progress.  Barriers include dysphagia, level of assist required, cognitive deficits. We as a medical team, and I as the physician , made a plan to work on these barriers to facilitate safe discharge. Plan will be presented to patient/family (if available).     Keon Sheets MD 3/14/2024, 8:50 PM    * This document was created using dictation software.  While all precautions were taken to ensure accuracy, errors may have occurred.  Please disregard any typographical errors.

## 2024-03-15 NOTE — PLAN OF CARE
Problem: Safety - Adult  Goal: Free from fall injury  3/14/2024 2305 by Taylor Chirinos, RN  Outcome: Progressing     Problem: Skin/Tissue Integrity  Goal: Absence of new skin breakdown  Description: 1.  Monitor for areas of redness and/or skin breakdown  2.  Assess vascular access sites hourly  3.  Every 4-6 hours minimum:  Change oxygen saturation probe site  4.  Every 4-6 hours:  If on nasal continuous positive airway pressure, respiratory therapy assess nares and determine need for appliance change or resting period.  Outcome: Progressing     Problem: ABCDS Injury Assessment  Goal: Absence of physical injury  Outcome: Progressing

## 2024-03-15 NOTE — PROGRESS NOTES
[facility] - Inpatient Rehabilitation Department   Phone: [phone]    Occupational Therapy    [] Initial Evaluation            [x] Daily Treatment Note         [] Discharge Summary      Patient: Pacheco Prince   : 1945   MRN: [id number]   Date of Service:  3/15/2024    Admitting Diagnosis:  Aspiration pneumonia (HCC)  Current Admission Summary: Pacheco Prince is a 79 y.o. male with PMHx notable for Parkinson's disease, DM2, CAD who presented on 24 with fever, and cough. He was on a cruise when symptoms developed, was diagnosed with pneumonia and started on IV Zosyn. He continued to decline, requiring medical flight back home to [place]. Etiology of pneumonia is presumed aspiration, was started on broad-spectrum antibiotics with vancomycin and Zosyn. MRSA swab was negative, so vancomycin was discontinued. He was treated with cefepime and Flagyl. CTA Chest was obtained, negative for PE.  He did develop worsening/recurrent aspiration pneumonia, and his diet was downgraded to n.p.o.  He underwent PEG placement on 3/9.  He is now tolerating tube feeds at goal rate, and able to receive his Parkinson's medications via PEG.  Hospital course complicated by: acute hypoxic respiratory failure, dysphagia, hypertension, hypokalemia, anemia, lethargy/AMS, worsening Parkinson's symptoms.      Currently, patient reports that he is doing well.  His wife notes a productive cough with increasing frequency.  He denies any fevers, chills, dyspnea, chest pain.  He is experiencing significant tremor, rigidity and freezing.  He denies any focal numbness or weakness.  Past Medical History:  has a past medical history of Acute bronchitis, CAD (coronary artery disease), Cancer (HCC), Clostridioides difficile infection, Diabetes mellitus (HCC), Erectile dysfunction, GERD (gastroesophageal reflux disease), Hyperlipidemia, Obesity, and Parkinson disease.  Past Surgical History:  has a past surgical history  that includes Diagnostic Cardiac Cath Lab Procedure (01/01/2005); Coronary angioplasty with stent (01/01/2005); Cataract extraction; eye surgery (January 2021); Tonsillectomy ([facility]); Percutaneous Transluminal Coronary Angio (2002); Upper gastrointestinal endoscopy (2010); and Upper gastrointestinal endoscopy (N/A, 3/8/2024).    Discharge Recommendations: TBD pending pt progress - ongoing OT services    DME Required For Discharge: DME to be determined pending patient progress    Precautions/Restrictions: medium fall risk, NPO - small spoonfuls of water after oral care  Weight Bearing Restrictions: no restrictions  Required Braces/Orthotics: no braces required  Positional Restrictions:no positional restrictions      Pre-Admission Information   Lives With: spouse, Tiffany (Tiffany retired RN)  Type of Home: house  Home Layout: two level, laundry in basement, bedroom/bathroom upstairs, 12 stairs with bilateral rails to upstairs  Home Access:  2 step to enter without rails , garage; 2+1 steps front door without rail  Bathroom Layout: wheelchair accessible, walk in shower  Bathroom Equipment: grab bars in shower, built in shower seat, hand held shower head, BSC in the home but pt does not use  Toilet Height: elevated height  Home Equipment: no prior equipment  Transfer Assistance: Independent without use of device--wife states that she provides constant supervision  Ambulation Assistance:Independent without use of devicewife states that she provides constant supervision  ADL Assistance: requires assistance with bathing - sits for showers at home on shower chair but stands for showers outside of the home - wife assists w/ thoroughness for all showers, requires assistance with dressing- increased assistance for sock and shoe management and pants management - able to roni/doff shirt, requires assistance with grooming, requires assistance with feeding, requires assistance with toileting - toilets on standard elevated  trunk.     Pt left supine in bed w/ bed alarm, call light, and all other needs within reach.     Cognition  Overall Cognitive Status: Impaired  Arousal/Alertness: delayed responses to stimuli, unresponsive to stimuli  Following Commands: inconsistently follows commands, does not follow commands  Attention Span: unable to maintain attention  Memory: decreased recall of recent events, decreased short term memory, decreased long term memory  Safety Judgement: decreased awareness of need for assistance, decreased awareness of need for safety  Problem Solving: assistance required to generate solutions, assistance required to identify errors made, assistance required to correct errors made  Insights: not aware of deficits  Initiation: requires cues for all  Sequencing: requires cues for all  Comments: flat affect  Orientation:    oriented to person; able to state  w/ extended time  Command Following:   impaired     Education  Barriers To Learning: cognition and physical  Patient Education: patient educated on goals, OT role and benefits, plan of care, ADL adaptive strategies, IADL safety, proper use of assistive device/equipment, adaptive device training, energy conservation, orientation, family education, transfer training, discharge recommendations  Learning Assessment:  patient will require reinforcement due to cognitive deficits    Assessment  Activity Tolerance: limited in AM d/t sweating and slight fever  Impairments Requiring Therapeutic Intervention: decreased functional mobility, decreased ADL status, decreased ROM, decreased strength, decreased safety awareness, decreased cognition, decreased endurance, decreased balance, decreased vision, decreased IADL, decreased fine motor control, decreased coordination, decreased posture  Prognosis: fair  Clinical Assessment: Limited improvement this date d/t sweating, tremors, and not feeling well. Reported pt did not have good evening and has been sweating and  assistance   Patient will increase functional standing balance to 2+ min for improved ADL completion      Above goals reviewed on 3/15/2024.  All goals are ongoing at this time unless indicated above.       Therapy Session Time     Individual Group Co-treatment   Time In   0950   Time Out   1023   Minutes   33        Individual Group Co-treatment   Time In   1400   Time Out   1434   Minutes   34        Timed Code Treatment Minutes: 33+34 minutes  Total Treatment Minutes:  67 minutes       Electronically Signed By: CHENTE Livingston MOT OTR/L, [id number] 3/15/2024 4:01 PM

## 2024-03-15 NOTE — PLAN OF CARE
Problem: Discharge Planning  Goal: Discharge to home or other facility with appropriate resources  Outcome: Progressing     Problem: Pain  Goal: Verbalizes/displays adequate comfort level or baseline comfort level  Outcome: Progressing     Problem: Safety - Adult  Goal: Free from fall injury  3/15/2024 0946 by Lolly Mars, RN  Outcome: Progressing

## 2024-03-15 NOTE — PROGRESS NOTES
Pacheco Prince  3/15/2024  [id number]    Chief Complaint: Aspiration pneumonia (HCC)    Subjective:   Patient's wife reports significant diaphoresis overnight, and again this morning. Episodes associated with increased tremor and rigidity. She says these episodes typically correspond with \"off\" phenomenon between Sinemet doses. Breathing comfortably.     Last BM: Stool Occurrence: 1 (03/13/24 0920)    Objective:  Patient Vitals for the past 24 hrs:   BP Temp Temp src Pulse Resp SpO2   03/15/24 0845 (!) 141/82 98 °F (36.7 °C) Axillary 76 18 95 %   03/14/24 2010 113/64 97.5 °F (36.4 °C) Axillary 85 16 95 %     Gen: No distress, pleasant.   HEENT: Normocephalic, atraumatic.   CV: Regular rate and rhythm. Extremities warm, well perfused.   Resp: No respiratory distress. CTAB.  Abd: Soft, non-tender.   Ext: No edema.   Neuro: Alert. Appropriate verbal responses given increased time. Left inattention. Moves bilateral upper and lower extremities spontaneously.     Laboratory data: Available via EMR.     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 Due to low vocal volume pt requires intermittent clarification requests when talking in longer phrases. Pt presents with a moderate to severe cognitive-communication impairment. Pt with

## 2024-03-15 NOTE — PROGRESS NOTES
[facility] - Inpatient Rehabilitation Department   Phone: [phone]    Physical Therapy    [] Initial Evaluation            [x] Daily Treatment Note         [] Discharge Summary      Patient: Pacheco Prince   : 1945   MRN: [id number]   Date of Service:  3/15/2024  Admitting Diagnosis: Aspiration pneumonia (HCC)  Current Admission Summary: Pacheco Prince is a 79 y.o. male with PMHx notable for Parkinson's disease, DM2, CAD who presented on 24 with fever, and cough. He was on a cruise when symptoms developed, was diagnosed with pneumonia and started on IV Zosyn. He continued to decline, requiring medical flight back home to [place]. Etiology of pneumonia is presumed aspiration, was started on broad-spectrum antibiotics with vancomycin and Zosyn. MRSA swab was negative, so vancomycin was discontinued. He was treated with cefepime and Flagyl. CTA Chest was obtained, negative for PE.  He did develop worsening/recurrent aspiration pneumonia, and his diet was downgraded to n.p.o.  He underwent PEG placement on 3/9.  He is now tolerating tube feeds at goal rate, and able to receive his Parkinson's medications via PEG.  Hospital course complicated by: acute hypoxic respiratory failure, dysphagia, hypertension, hypokalemia, anemia, lethargy/AMS, worsening Parkinson's symptoms.   Past Medical History:  has a past medical history of Acute bronchitis, CAD (coronary artery disease), Cancer (HCC), Clostridioides difficile infection, Diabetes mellitus (HCC), Erectile dysfunction, GERD (gastroesophageal reflux disease), Hyperlipidemia, Obesity, and Parkinson disease.  Past Surgical History:  has a past surgical history that includes Diagnostic Cardiac Cath Lab Procedure (2005); Coronary angioplasty with stent (2005); Cataract extraction; eye surgery (2021); Tonsillectomy ([facility]); Percutaneous Transluminal Coronary Angio (); Upper gastrointestinal endoscopy (); and  Melissa Gibson, PT

## 2024-03-16 LAB
GLUCOSE BLD-MCNC: 101 MG/DL (ref 70–99)
GLUCOSE BLD-MCNC: 106 MG/DL (ref 70–99)
GLUCOSE BLD-MCNC: 113 MG/DL (ref 70–99)
PERFORMED ON: ABNORMAL

## 2024-03-16 PROCEDURE — 97530 THERAPEUTIC ACTIVITIES: CPT

## 2024-03-16 PROCEDURE — 6360000002 HC RX W HCPCS: Performed by: STUDENT IN AN ORGANIZED HEALTH CARE EDUCATION/TRAINING PROGRAM

## 2024-03-16 PROCEDURE — 94669 MECHANICAL CHEST WALL OSCILL: CPT

## 2024-03-16 PROCEDURE — 94761 N-INVAS EAR/PLS OXIMETRY MLT: CPT

## 2024-03-16 PROCEDURE — 1280000000 HC REHAB R&B

## 2024-03-16 PROCEDURE — 6370000000 HC RX 637 (ALT 250 FOR IP): Performed by: STUDENT IN AN ORGANIZED HEALTH CARE EDUCATION/TRAINING PROGRAM

## 2024-03-16 PROCEDURE — 92507 TX SP LANG VOICE COMM INDIV: CPT

## 2024-03-16 PROCEDURE — 97116 GAIT TRAINING THERAPY: CPT

## 2024-03-16 PROCEDURE — 97535 SELF CARE MNGMENT TRAINING: CPT

## 2024-03-16 PROCEDURE — 92526 ORAL FUNCTION THERAPY: CPT

## 2024-03-16 PROCEDURE — 97129 THER IVNTJ 1ST 15 MIN: CPT

## 2024-03-16 RX ADMIN — CARBIDOPA AND LEVODOPA 3 TABLET: 25; 100 TABLET ORAL at 18:44

## 2024-03-16 RX ADMIN — CARBIDOPA AND LEVODOPA 3 TABLET: 25; 100 TABLET ORAL at 08:28

## 2024-03-16 RX ADMIN — CARBIDOPA AND LEVODOPA 3 TABLET: 25; 100 TABLET ORAL at 06:41

## 2024-03-16 RX ADMIN — Medication 1 CAPSULE: at 08:27

## 2024-03-16 RX ADMIN — CARBIDOPA AND LEVODOPA 3 TABLET: 25; 100 TABLET ORAL at 11:02

## 2024-03-16 RX ADMIN — ASPIRIN 81 MG: 81 TABLET, CHEWABLE ORAL at 08:28

## 2024-03-16 RX ADMIN — CARBIDOPA AND LEVODOPA 3 TABLET: 25; 100 TABLET ORAL at 05:01

## 2024-03-16 RX ADMIN — PRIMIDONE 50 MG: 50 TABLET ORAL at 21:50

## 2024-03-16 RX ADMIN — ATORVASTATIN CALCIUM 80 MG: 80 TABLET, FILM COATED ORAL at 21:51

## 2024-03-16 RX ADMIN — Medication 2000 UNITS: at 08:28

## 2024-03-16 RX ADMIN — LEVOFLOXACIN 750 MG: 500 TABLET, FILM COATED ORAL at 08:27

## 2024-03-16 RX ADMIN — CARBIDOPA AND LEVODOPA 3 TABLET: 25; 100 TABLET ORAL at 15:40

## 2024-03-16 RX ADMIN — LANSOPRAZOLE 30 MG: 30 TABLET, ORALLY DISINTEGRATING, DELAYED RELEASE ORAL at 15:45

## 2024-03-16 RX ADMIN — CARBIDOPA AND LEVODOPA 3 TABLET: 25; 100 TABLET ORAL at 17:12

## 2024-03-16 RX ADMIN — IPRATROPIUM BROMIDE 2 SPRAY: 42 SPRAY NASAL at 08:28

## 2024-03-16 RX ADMIN — MELATONIN TAB 3 MG 3 MG: 3 TAB at 21:51

## 2024-03-16 RX ADMIN — DONEPEZIL HYDROCHLORIDE 10 MG: 5 TABLET, FILM COATED ORAL at 21:51

## 2024-03-16 RX ADMIN — CLOZAPINE 25 MG: 25 TABLET ORAL at 21:52

## 2024-03-16 RX ADMIN — CLOZAPINE 25 MG: 25 TABLET ORAL at 08:30

## 2024-03-16 RX ADMIN — CARBIDOPA AND LEVODOPA 3 TABLET: 25; 100 TABLET ORAL at 03:01

## 2024-03-16 RX ADMIN — THERA TABS 1 TABLET: TAB at 08:28

## 2024-03-16 RX ADMIN — Medication 1 CAPSULE: at 17:13

## 2024-03-16 RX ADMIN — CARBIDOPA AND LEVODOPA 3 TABLET: 25; 100 TABLET ORAL at 13:23

## 2024-03-16 RX ADMIN — CARBOXYMETHYLCELLULOSE SODIUM 1 DROP: 10 GEL OPHTHALMIC at 21:50

## 2024-03-16 RX ADMIN — ENOXAPARIN SODIUM 40 MG: 100 INJECTION SUBCUTANEOUS at 08:28

## 2024-03-16 RX ADMIN — CARBIDOPA AND LEVODOPA 3 TABLET: 25; 100 TABLET ORAL at 21:51

## 2024-03-16 RX ADMIN — FLUTICASONE PROPIONATE 2 SPRAY: 50 SPRAY, METERED NASAL at 21:50

## 2024-03-16 RX ADMIN — CARBOXYMETHYLCELLULOSE SODIUM 1 DROP: 10 GEL OPHTHALMIC at 08:28

## 2024-03-16 ASSESSMENT — PAIN SCALES - GENERAL: PAINLEVEL_OUTOF10: 0

## 2024-03-16 NOTE — PLAN OF CARE
Problem: Safety - Adult  Goal: Free from fall injury  3/15/2024 2246 by Taylor Chirinos RN  Outcome: Progressing     Problem: Confusion  Goal: Confusion, delirium, dementia, or psychosis is improved or at baseline  Description: INTERVENTIONS:  1. Assess for possible contributors to thought disturbance, including medications, impaired vision or hearing, underlying metabolic abnormalities, dehydration, psychiatric diagnoses, and notify attending LIP  2. Maynard high risk fall precautions, as indicated  3. Provide frequent short contacts to provide reality reorientation, refocusing and direction  4. Decrease environmental stimuli, including noise as appropriate  5. Monitor and intervene to maintain adequate nutrition, hydration, elimination, sleep and activity  6. If unable to ensure safety without constant attention obtain sitter and review sitter guidelines with assigned personnel  7. Initiate Psychosocial CNS and Spiritual Care consult, as indicated  Outcome: Progressing     Problem: Skin/Tissue Integrity  Goal: Absence of new skin breakdown  Description: 1.  Monitor for areas of redness and/or skin breakdown  2.  Assess vascular access sites hourly  3.  Every 4-6 hours minimum:  Change oxygen saturation probe site  4.  Every 4-6 hours:  If on nasal continuous positive airway pressure, respiratory therapy assess nares and determine need for appliance change or resting period.  Outcome: Progressing

## 2024-03-16 NOTE — PROGRESS NOTES
[facility] - Inpatient Rehabilitation Department   Phone: [phone]    Occupational Therapy    [] Initial Evaluation            [x] Daily Treatment Note         [] Discharge Summary      Patient: Pacheco Prince   : 1945   MRN: [id number]   Date of Service:  3/16/2024    Admitting Diagnosis:  Aspiration pneumonia (HCC)  Current Admission Summary: Pacheco Prince is a 79 y.o. male with PMHx notable for Parkinson's disease, DM2, CAD who presented on 24 with fever, and cough. He was on a cruise when symptoms developed, was diagnosed with pneumonia and started on IV Zosyn. He continued to decline, requiring medical flight back home to [place]. Etiology of pneumonia is presumed aspiration, was started on broad-spectrum antibiotics with vancomycin and Zosyn. MRSA swab was negative, so vancomycin was discontinued. He was treated with cefepime and Flagyl. CTA Chest was obtained, negative for PE.  He did develop worsening/recurrent aspiration pneumonia, and his diet was downgraded to n.p.o.  He underwent PEG placement on 3/9.  He is now tolerating tube feeds at goal rate, and able to receive his Parkinson's medications via PEG.  Hospital course complicated by: acute hypoxic respiratory failure, dysphagia, hypertension, hypokalemia, anemia, lethargy/AMS, worsening Parkinson's symptoms.      Currently, patient reports that he is doing well.  His wife notes a productive cough with increasing frequency.  He denies any fevers, chills, dyspnea, chest pain.  He is experiencing significant tremor, rigidity and freezing.  He denies any focal numbness or weakness.  Past Medical History:  has a past medical history of Acute bronchitis, CAD (coronary artery disease), Cancer (HCC), Clostridioides difficile infection, Diabetes mellitus (HCC), Erectile dysfunction, GERD (gastroesophageal reflux disease), Hyperlipidemia, Obesity, and Parkinson disease.  Past Surgical History:  has a past surgical history  toilet  IADL Assistance: vacuums, cleaning shower w/ squeegee, gets mail, takes out trash  Active :        [] Yes                 [x] No  Hand Dominance: [] Left                 [x] Right  Current Employment: retired.  Occupation: Office work  Hobbies: golfing, going out to eat, cards, reading  Recent Falls: 1 Fall In Dec 2022 walking to get mail in the rain, then slipped on tile floor; wife states that pt goes up/down steps to second floor bathroom 8x a day; wife states that pt is independent, but that she is with him , assists pt with many tasks    Recently completed [facility] program in 2023 prior to hospitalization to [facility] 2023 per wife report    Wife reporting pt was golfing prior to cruise.      Examination (3/12)  Vision:   Vision Gross Assessment: Impaired and Vision Corrective Device: wears glasses at all times  Perception:   Motor Planning: hand over hand to sequence tasks  Perseveration: perseverates during conversation      Subjective  General: Pt met supine in bed upon arrival w/ HOB and locked at 30*. Pt wife and daughters were present and supportive throughout therapy session. Pt agreeable to OT/PT co/tx w/ ADL skills training on this date. Pt wife concerned about pt R heal, nursing notified and physician ordering off loading podus boot.   Pain: 0/10  Pain Interventions: not applicable        Activities of Daily Living  Basic Activities of Daily Living  Upper Extremity Bathing: minimal assistance verbal cues for throughness  Lower Extremity Bathin person assist in stance for pericare, mod a for LB bathing   Bathing Equipment: none  Bathing Comments: pt demonstrated the ability to wash UB, tops of legs, front, head/face, with min a/SBA for safety while seated. Pt dep w/ esperanza-care in the back in stance. Verbal cues for hand holding and grab bar use throughout therapy session.   Upper Extremity Dressing: minimal assistance  Lower Extremity Dressing: dependent 2  person  Dressing Comments: [place] for hospital gown change this date; DEP brief change   Toileting: pt attempted to toilet, however unable. .    General Comments: increased time for toileting and bathing this date   Instrumental Activities of Daily Living  No IADL completed on this date.    Functional Mobility  Bed Mobility:  Supine to Sit: 2 person assistance with mod a    Rolling Left: moderate assistance  Comments: pt required assistance w/ B LE's fully off of EOB and for CGA trunk support while seated  Transfers:  Sit to stand transfer:2 person assistance with modA x2   Stand to sit transfer: 2 person assistance with modA x2   Shower transfer: 2 person assistance with mod a x2   Shower transfer equipment: shower seat with back  Shower transfer comments: pt benefits from verbal cues for safety, hand placement and increased time during all transfers.   Comments: maximal cues for initiation, safety, sequencing, and problem solving for all transfers; [place] for hand placement on grab bars. Verbal cues for safety while sitting.   Functional Mobility  Functional Mobility Activity: to/from bathroom  Device Use: hand-held assist  Required Assistance: 2 person assistance with [place] x2   Comment: pt required hand held assistance from 2 therapist for amb as well as mod cues for sequencing and posture; pt stride length, posture, and control improving as amb con't;   Balance:  Static Sitting Balance: fair (-): maintains balance at CGA with use of UE support  Dynamic Sitting Balance: fair (-): maintains balance at CGA with use of UE support  Static Standing Balance: poor (+): requires min (A) to maintain balance  Dynamic Standing Balance: poor (+): requires min (A) to maintain balance  Comments:      Other Therapeutic Interventions:   Heel foam pads placed and socks put on. Nursing was notified and physician looked at heels. Pt was provided with an AFO to off load R heel. Per family reports, pt tremors during the night.     15:27:  Therapists returned to pt room with \"Ambulating AFO\" utilized for pressure relief. Pt has B devices donned while resting and ensured heel pressure relief while in sidelying. Wife present to confirm. Assigned therapist was notified and will follow up on Monday.     Functional Outcomes                                 Cognition  Overall Cognitive Status: Impaired  Arousal/Alertness: delayed responses to stimuli, unresponsive to stimuli  Following Commands: inconsistently follows commands, does not follow commands  Attention Span: unable to maintain attention  Memory: decreased recall of recent events, decreased short term memory, decreased long term memory  Safety Judgement: decreased awareness of need for assistance, decreased awareness of need for safety  Problem Solving: assistance required to generate solutions, assistance required to identify errors made, assistance required to correct errors made  Insights: not aware of deficits  Initiation: requires cues for all  Sequencing: requires cues for all  Comments: flat affect  Orientation:    oriented to person; able to state  w/ extended time  Command Following:   impaired     Education  Barriers To Learning: cognition and physical  Patient Education: patient educated on goals, OT role and benefits, plan of care, ADL adaptive strategies, IADL safety, proper use of assistive device/equipment, adaptive device training, energy conservation, orientation, family education, transfer training, discharge recommendations  Learning Assessment:  patient will require reinforcement due to cognitive deficits    Assessment  Activity Tolerance: limited in AM d/t sweating and slight fever  Impairments Requiring Therapeutic Intervention: decreased functional mobility, decreased ADL status, decreased ROM, decreased strength, decreased safety awareness, decreased cognition, decreased endurance, decreased balance, decreased vision, decreased IADL, decreased fine motor control,

## 2024-03-16 NOTE — PROGRESS NOTES
[facility] - Inpatient Rehabilitation Department   Phone: [phone]    Speech Therapy    [] Initial Evaluation            [x] Daily Treatment Note         [] Discharge Summary      Patient: Pacheco Prince   : 1945   MRN: [id number]   Date of Service:  3/16/2024  Admitting Diagnosis: Aspiration pneumonia (HCC)  Current Admission Summary: See MD's summary   Past Medical History:  has a past medical history of Acute bronchitis, CAD (coronary artery disease), Cancer (HCC), Clostridioides difficile infection, Diabetes mellitus (HCC), Erectile dysfunction, GERD (gastroesophageal reflux disease), Hyperlipidemia, Obesity, and Parkinson disease.  Past Surgical History:  has a past surgical history that includes Diagnostic Cardiac Cath Lab Procedure (2005); Coronary angioplasty with stent (2005); Cataract extraction; eye surgery (2021); T[facility]illectomy ([facility]); Percutaneous Transluminal Coronary Angio (); Upper gastrointestinal endoscopy (); and Upper gastrointestinal endoscopy (N/A, 3/8/2024).    Recent Head CT completed 3/4/24:  IMPRESSION:  1.  No acute intracranial abnormality.  2. Findings of paranasal sinusitis with right middle ear and mastoid effusion    Recent Chest xray completed 3/7/24:  IMPRESSION:  New right lung base infiltrate.    Instrumental Swallow Study:   Modified Barium Swallow evaluation completed on 3/4/2024. Patient presents with mild oropharyngeal dysphagia secondary to prolonged mastication, premature spillage to pyriform sinuses, reduced AP propulsion, delayed swallow initiation, reduced laryngeal elevation, decreased tongue base retraction and reduced pharyngeal contraction. Pt demonstrated aspiration after the swallow with initial trial of thin liquids via tsp, suspect in relation to sensation although pt demonstrated a hard cough in response. No laryngeal penetration or aspiration was viewed with thin liquids via cup/straw, mildly (nectar)  completed multiple times daily, ensure upright positioning and alertness prior to providing small amounts of ice chips/ tsp amounts of water       Plan  Frequency: 60 minutes/day; 5 days per week, as tolerated, until goals met, or discharged from ARU.  Therapeutic Interventions: Laryngeal Exercises , Pharyngeal Exercise, Vital Stim/NMES, Patient/Family Education , Therapeutic Trials with SLP , Instrumental assessment of swallow function (SLP FEES EVALUATION) -- (therapeutic trials and FEES when okayed by rehab MD) Expressive/ Receptive Language intervention , Speech / Motor Planning / Voice intervention , Cognitive-Linguistic intervention , Compensatory Cognitive intervention , Patient/ Family education , and [facility] Voice Therapy (LSVT)  (modified)     Discharge  Barriers to discharge: Inability to effectively communicate in emergent situations (ex: calling 911, stating name, reduced intelligibility, etc)  Inability to communicate or demonstrate problem solving due to cognitive-communicative impairment  Severity of cognition (mild, mod, severe) with reduced insight negatively impacting safety/independence  Discharge Recommendations: TBD   Continued SLP at Discharge: Yes  and LSVT     Goals  Patient Goals: When pt asked unable to provide answer wife chimed in stating pt would like to get back to eating ice cream and pt agreed via head nod    Time Frame: 14-21 days     Pt will complete oropharyngeal strengthening exercises and tolerate po trials of ice/ water consistently without overt clinical s/s of aspiration. Ongoing    Pt will participate in a repeat instrumental swallow assessment (FEES) to further assess the pharyngeal phase of the swallow when cleared by rehab MD. Ongoing  with date TBD   Pt will complete graded speech tasks using LOUD speech throughout targeted tasks and during session with < mod cues. Improving/ Ongoing    Patient will complete verbal description and word retrieval tasks with 75%

## 2024-03-16 NOTE — PLAN OF CARE
Problem: Safety - Adult  Goal: Free from fall injury  3/16/2024 1036 by Monique Crowder RN  Outcome: Progressing  Note: Pt remains free from falls.  Safety precautions in place.  Bed in lowest position, bed/chair wheels locked, call light with in reach, bedside table in reach, bed/chair alarm on, fall risk wrist band on.

## 2024-03-16 NOTE — PROGRESS NOTES
[facility] - Inpatient Rehabilitation Department   Phone: [phone]    Physical Therapy    [] Initial Evaluation            [x] Daily Treatment Note         [] Discharge Summary      Patient: Pacheco Prince   : 1945   MRN: [id number]   Date of Service:  3/16/2024  Admitting Diagnosis: Aspiration pneumonia (HCC)  Current Admission Summary: Pacheco Prince is a 79 y.o. male with PMHx notable for Parkinson's disease, DM2, CAD who presented on 24 with fever, and cough. He was on a cruise when symptoms developed, was diagnosed with pneumonia and started on IV Zosyn. He continued to decline, requiring medical flight back home to [place]. Etiology of pneumonia is presumed aspiration, was started on broad-spectrum antibiotics with vancomycin and Zosyn. MRSA swab was negative, so vancomycin was discontinued. He was treated with cefepime and Flagyl. CTA Chest was obtained, negative for PE.  He did develop worsening/recurrent aspiration pneumonia, and his diet was downgraded to n.p.o.  He underwent PEG placement on 3/9.  He is now tolerating tube feeds at goal rate, and able to receive his Parkinson's medications via PEG.  Hospital course complicated by: acute hypoxic respiratory failure, dysphagia, hypertension, hypokalemia, anemia, lethargy/AMS, worsening Parkinson's symptoms.   Past Medical History:  has a past medical history of Acute bronchitis, CAD (coronary artery disease), Cancer (HCC), Clostridioides difficile infection, Diabetes mellitus (HCC), Erectile dysfunction, GERD (gastroesophageal reflux disease), Hyperlipidemia, Obesity, and Parkinson disease.  Past Surgical History:  has a past surgical history that includes Diagnostic Cardiac Cath Lab Procedure (2005); Coronary angioplasty with stent (2005); Cataract extraction; eye surgery (2021); Tonsillectomy ([facility]); Percutaneous Transluminal Coronary Angio (); Upper gastrointestinal endoscopy (); and

## 2024-03-17 VITALS
OXYGEN SATURATION: 98 % | RESPIRATION RATE: 18 BRPM | DIASTOLIC BLOOD PRESSURE: 42 MMHG | WEIGHT: 182 LBS | HEIGHT: 70 IN | TEMPERATURE: 97.4 F | HEART RATE: 72 BPM | SYSTOLIC BLOOD PRESSURE: 114 MMHG | BODY MASS INDEX: 26.05 KG/M2

## 2024-03-17 LAB
GLUCOSE BLD-MCNC: 106 MG/DL (ref 70–99)
GLUCOSE BLD-MCNC: 120 MG/DL (ref 70–99)
GLUCOSE BLD-MCNC: 158 MG/DL (ref 70–99)
PERFORMED ON: ABNORMAL

## 2024-03-17 PROCEDURE — 6360000002 HC RX W HCPCS: Performed by: STUDENT IN AN ORGANIZED HEALTH CARE EDUCATION/TRAINING PROGRAM

## 2024-03-17 PROCEDURE — 94669 MECHANICAL CHEST WALL OSCILL: CPT

## 2024-03-17 PROCEDURE — 6370000000 HC RX 637 (ALT 250 FOR IP): Performed by: STUDENT IN AN ORGANIZED HEALTH CARE EDUCATION/TRAINING PROGRAM

## 2024-03-17 PROCEDURE — 1280000000 HC REHAB R&B

## 2024-03-17 RX ADMIN — CARBIDOPA AND LEVODOPA 3 TABLET: 25; 100 TABLET ORAL at 18:27

## 2024-03-17 RX ADMIN — CARBIDOPA AND LEVODOPA 3 TABLET: 25; 100 TABLET ORAL at 11:21

## 2024-03-17 RX ADMIN — Medication 1 CAPSULE: at 17:07

## 2024-03-17 RX ADMIN — IPRATROPIUM BROMIDE 2 SPRAY: 42 SPRAY NASAL at 09:07

## 2024-03-17 RX ADMIN — CARBIDOPA AND LEVODOPA 3 TABLET: 25; 100 TABLET ORAL at 15:27

## 2024-03-17 RX ADMIN — PRIMIDONE 50 MG: 50 TABLET ORAL at 21:11

## 2024-03-17 RX ADMIN — CARBIDOPA AND LEVODOPA 3 TABLET: 25; 100 TABLET ORAL at 03:07

## 2024-03-17 RX ADMIN — THERA TABS 1 TABLET: TAB at 09:06

## 2024-03-17 RX ADMIN — NYSTATIN 500000 UNITS: 100000 SUSPENSION ORAL at 13:11

## 2024-03-17 RX ADMIN — ENOXAPARIN SODIUM 40 MG: 100 INJECTION SUBCUTANEOUS at 09:06

## 2024-03-17 RX ADMIN — FLUTICASONE PROPIONATE 2 SPRAY: 50 SPRAY, METERED NASAL at 21:12

## 2024-03-17 RX ADMIN — CARBIDOPA AND LEVODOPA 3 TABLET: 25; 100 TABLET ORAL at 06:40

## 2024-03-17 RX ADMIN — CLOZAPINE 25 MG: 25 TABLET ORAL at 21:12

## 2024-03-17 RX ADMIN — CARBIDOPA AND LEVODOPA 3 TABLET: 25; 100 TABLET ORAL at 05:05

## 2024-03-17 RX ADMIN — ATORVASTATIN CALCIUM 80 MG: 80 TABLET, FILM COATED ORAL at 21:11

## 2024-03-17 RX ADMIN — DONEPEZIL HYDROCHLORIDE 10 MG: 5 TABLET, FILM COATED ORAL at 21:12

## 2024-03-17 RX ADMIN — CLOZAPINE 25 MG: 25 TABLET ORAL at 09:06

## 2024-03-17 RX ADMIN — IPRATROPIUM BROMIDE 2 SPRAY: 42 SPRAY NASAL at 21:14

## 2024-03-17 RX ADMIN — ASPIRIN 81 MG: 81 TABLET, CHEWABLE ORAL at 09:06

## 2024-03-17 RX ADMIN — CARBIDOPA AND LEVODOPA 3 TABLET: 25; 100 TABLET ORAL at 09:06

## 2024-03-17 RX ADMIN — CARBIDOPA AND LEVODOPA 3 TABLET: 25; 100 TABLET ORAL at 21:12

## 2024-03-17 RX ADMIN — MELATONIN TAB 3 MG 3 MG: 3 TAB at 21:12

## 2024-03-17 RX ADMIN — NYSTATIN 500000 UNITS: 100000 SUSPENSION ORAL at 21:12

## 2024-03-17 RX ADMIN — CARBOXYMETHYLCELLULOSE SODIUM 1 DROP: 10 GEL OPHTHALMIC at 09:06

## 2024-03-17 RX ADMIN — LANSOPRAZOLE 30 MG: 30 TABLET, ORALLY DISINTEGRATING, DELAYED RELEASE ORAL at 15:25

## 2024-03-17 RX ADMIN — CARBIDOPA AND LEVODOPA 3 TABLET: 25; 100 TABLET ORAL at 17:07

## 2024-03-17 RX ADMIN — IPRATROPIUM BROMIDE 2 SPRAY: 42 SPRAY NASAL at 13:13

## 2024-03-17 RX ADMIN — NYSTATIN 500000 UNITS: 100000 SUSPENSION ORAL at 17:09

## 2024-03-17 RX ADMIN — CARBIDOPA AND LEVODOPA 3 TABLET: 25; 100 TABLET ORAL at 00:04

## 2024-03-17 RX ADMIN — CARBOXYMETHYLCELLULOSE SODIUM 1 DROP: 10 GEL OPHTHALMIC at 21:13

## 2024-03-17 RX ADMIN — CARBIDOPA AND LEVODOPA 3 TABLET: 25; 100 TABLET ORAL at 13:13

## 2024-03-17 RX ADMIN — Medication 2000 UNITS: at 09:06

## 2024-03-17 RX ADMIN — Medication 1 CAPSULE: at 09:06

## 2024-03-17 ASSESSMENT — PAIN SCALES - GENERAL: PAINLEVEL_OUTOF10: 0

## 2024-03-17 NOTE — PLAN OF CARE
Problem: Pain  Goal: Verbalizes/displays adequate comfort level or baseline comfort level  Outcome: Progressing     Problem: Safety - Adult  Goal: Free from fall injury  3/16/2024 4141 by Taylor Chirinos RN  Outcome: Progressing     Problem: Skin/Tissue Integrity  Goal: Absence of new skin breakdown  Description: 1.  Monitor for areas of redness and/or skin breakdown  2.  Assess vascular access sites hourly  3.  Every 4-6 hours minimum:  Change oxygen saturation probe site  4.  Every 4-6 hours:  If on nasal continuous positive airway pressure, respiratory therapy assess nares and determine need for appliance change or resting period.  Outcome: Progressing

## 2024-03-17 NOTE — PROGRESS NOTES
Pacheco Prince  3/17/2024  [id number]    Chief Complaint: Aspiration pneumonia (HCC)    Subjective:   Family at bedside. Nursing and family express concern with patient new heel wound developed recently while wearing inflatable boots. Discussed PODUS boot placement.    Last BM: Stool Occurrence: 1 (03/13/24 0920)    Objective:  Patient Vitals for the past 24 hrs:   BP Temp Temp src Pulse Resp SpO2   03/16/24 2130 (!) 128/55 98 °F (36.7 °C) Oral 77 16 96 %   03/16/24 0826 137/63 98.2 °F (36.8 °C) Oral 65 16 93 %   03/16/24 0616 -- -- -- 67 18 95 %     Gen: No distress, pleasant.   HEENT: Normocephalic, atraumatic.   CV: Regular rate and rhythm. Extremities warm, well perfused.   Resp: No respiratory distress. CTAB.  Abd: Soft, non-tender.   Ext: No edema.   Neuro: Alert. Appropriate verbal responses given increased time. Left inattention. Moves bilateral upper and lower extremities spontaneously.   Skin: Blood blister on heel area    Laboratory data: Available via EMR.     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 Due to low vocal volume pt requires intermittent clarification requests when talking in longer phrases. Pt presents with a moderate to severe cognitive-communication impairment. Pt with significant delays in processing  speed for basic comprehension and significant word finding impairments/ reduced thought organization, impacting functional communication. Pt responded well to multiple choice questions and simplification/ repetition of commands. Pt's wife reports she was in the process of scheduling pt for LSVT LOUD voice program and rates speech close to pt's baseline, although reports sometimes he resorts to whispering and requires mod-max cues to increase loudness. Pt oriented to self/ time only, disoriented to place, situation and current month. Reduced recall of recent events requiring reminders from wife. Pt's wife reports overall decline in current cognitive function and pt with limited cognitive demands at home. Pt is currently NPO with PEG tube for nutrition and both would like for pt to advance back to a po diet. Pt remains at a high risk for aspiration/ recurrent pna based on multiple factors and co morbidities. Rehab MD ok with pt having small amounts of ice chips/ tsp amounts of water following oral care and with speech therapy utilized during dysphagia intervention, however, wishes to hold off on po trials/ repeat instrumental at this time due to current PNA status. Recommend SLP intervention for safe return to prior level of function.    Body mass index is 26.11 kg/m².    Assessment:    This patient continues to require an ARU level of care from all disciplines to address the following issues:    Patient Active Problem List   Diagnosis    Coronary artery disease involving native coronary artery of native heart without angina pectoris    Mixed hyperlipidemia    Dizziness    PVC's (premature ventricular contractions)    Hypertension    Bilateral carotid artery stenosis    Parkinson disease    Bilateral carotid artery disease, unspecified type (HCC)    Hypotension    Type 2 diabetes mellitus without complication, without long-term current use of insulin (HCC)    COVID-19    Aspiration pneumonia (HCC)    Acute metabolic  encephalopathy    Sepsis (HCC)    Acute aspiration pneumonia (HCC)    Pneumonia of left lower lobe due to infectious organism    TIA (transient ischemic attack)    Parkinson's disease    Pneumonia of both lungs due to infectious organism, unspecified part of lung    Parkinson's disease with dyskinesia       Functional progress: Improving transfers with assistance of 1 person.     Plan:  #. Bilateral aspiration pneumonia  #. Dysphagia, secondary to Parkinson's  - s/p initial course of cefepime and Flagyl  - developed recurrent infection, Levaquin 750 mg every other day (EOT 3/16)  - Albuterol nebs every 4 hours as needed  - Guaifenesin x 3 days  - Continue SLP for dysphagia therapy, will consider repeat MBS when current infection is resolved  - BMP, CBC stable    #Heel wound  -PODUS boot placement  -Wound care consult     #. Left visual field loss vs. inattention  - Noted code stroke called on 3/5 for AMS, right facial droop. CT Head without acute stroke, CTA Head/Neck without large vessel occlusion or significant intracranial stenosis.  - MRI Brain 3/14, motion artifact degraded but without any definite acute infarct.     #. Parkinson's disease w/ dementia  - Continue carbidopa-levodopa  mg 3 tablets every 2 hours while awake + 3 tablets 3 times nightly (note patient takes long acting formation at home, converted to short acting formulation which may be given via G-tube)   - Called patient's Neurologist (Dr. Rick) to discuss dosing, but he was out of office today. Will call back on Monday. In the interrim, plan to proceed with MBS and swallow trials early next week if clinically stable so may be able to resume long-acting formulation patient takes at home.   - Continue home clozapine, primidone, donepezil    #. Orthostatic Hypotension  - Midodrine 5 mg twice daily as needed for systolic blood pressure less than 100 mmHg (note not currently requiring).  At home taking 5 mg 3 times daily.     Chronic

## 2024-03-17 NOTE — PLAN OF CARE
Problem: Safety - Adult  Goal: Free from fall injury  3/17/2024 1157 by Monique Crowder RN  Outcome: Progressing  Note: Pt remains free from falls.  Safety precautions in place.  Bed in lowest position, bed/chair wheels locked, call light with in reach, bedside table in reach, bed/chair alarm on, fall risk wrist band on.

## 2024-03-17 NOTE — PROGRESS NOTES
03/16/24 2022   Treatment   Treatment Type IS  (patient is unable to perform due to parkinson Disease)

## 2024-03-18 LAB
ANION GAP SERPL CALCULATED.3IONS-SCNC: 9 MMOL/L (ref 3–16)
BASOPHILS # BLD: 0 K/UL (ref 0–0.2)
BASOPHILS NFR BLD: 0.8 %
BUN SERPL-MCNC: 16 MG/DL (ref 7–20)
CALCIUM SERPL-MCNC: 8.2 MG/DL (ref 8.3–10.6)
CHLORIDE SERPL-SCNC: 101 MMOL/L (ref 99–110)
CO2 SERPL-SCNC: 26 MMOL/L (ref 21–32)
CREAT SERPL-MCNC: <0.5 MG/DL (ref 0.8–1.3)
DEPRECATED RDW RBC AUTO: 13.8 % (ref 12.4–15.4)
EOSINOPHIL # BLD: 0.3 K/UL (ref 0–0.6)
EOSINOPHIL NFR BLD: 5.7 %
GFR SERPLBLD CREATININE-BSD FMLA CKD-EPI: >60 ML/MIN/{1.73_M2}
GLUCOSE BLD-MCNC: 105 MG/DL (ref 70–99)
GLUCOSE BLD-MCNC: 113 MG/DL (ref 70–99)
GLUCOSE SERPL-MCNC: 165 MG/DL (ref 70–99)
HCT VFR BLD AUTO: 35.1 % (ref 40.5–52.5)
HGB BLD-MCNC: 11.7 G/DL (ref 13.5–17.5)
LYMPHOCYTES # BLD: 1.5 K/UL (ref 1–5.1)
LYMPHOCYTES NFR BLD: 29.8 %
MCH RBC QN AUTO: 30.4 PG (ref 26–34)
MCHC RBC AUTO-ENTMCNC: 33.2 G/DL (ref 31–36)
MCV RBC AUTO: 91.6 FL (ref 80–100)
MONOCYTES # BLD: 0.7 K/UL (ref 0–1.3)
MONOCYTES NFR BLD: 13.6 %
NEUTROPHILS # BLD: 2.5 K/UL (ref 1.7–7.7)
NEUTROPHILS NFR BLD: 50.1 %
PERFORMED ON: ABNORMAL
PERFORMED ON: ABNORMAL
PLATELET # BLD AUTO: 400 K/UL (ref 135–450)
PMV BLD AUTO: 7.7 FL (ref 5–10.5)
POTASSIUM SERPL-SCNC: 4.2 MMOL/L (ref 3.5–5.1)
RBC # BLD AUTO: 3.83 M/UL (ref 4.2–5.9)
SODIUM SERPL-SCNC: 136 MMOL/L (ref 136–145)
WBC # BLD AUTO: 5.1 K/UL (ref 4–11)

## 2024-03-18 PROCEDURE — 6360000002 HC RX W HCPCS: Performed by: STUDENT IN AN ORGANIZED HEALTH CARE EDUCATION/TRAINING PROGRAM

## 2024-03-18 PROCEDURE — 94761 N-INVAS EAR/PLS OXIMETRY MLT: CPT

## 2024-03-18 PROCEDURE — 6370000000 HC RX 637 (ALT 250 FOR IP): Performed by: STUDENT IN AN ORGANIZED HEALTH CARE EDUCATION/TRAINING PROGRAM

## 2024-03-18 PROCEDURE — 92526 ORAL FUNCTION THERAPY: CPT

## 2024-03-18 PROCEDURE — 85025 COMPLETE CBC W/AUTO DIFF WBC: CPT

## 2024-03-18 PROCEDURE — 92507 TX SP LANG VOICE COMM INDIV: CPT

## 2024-03-18 PROCEDURE — 97116 GAIT TRAINING THERAPY: CPT

## 2024-03-18 PROCEDURE — 97535 SELF CARE MNGMENT TRAINING: CPT

## 2024-03-18 PROCEDURE — 1280000000 HC REHAB R&B

## 2024-03-18 PROCEDURE — 97530 THERAPEUTIC ACTIVITIES: CPT

## 2024-03-18 PROCEDURE — 80048 BASIC METABOLIC PNL TOTAL CA: CPT

## 2024-03-18 PROCEDURE — 94669 MECHANICAL CHEST WALL OSCILL: CPT

## 2024-03-18 RX ADMIN — NYSTATIN 500000 UNITS: 100000 SUSPENSION ORAL at 21:04

## 2024-03-18 RX ADMIN — CARBIDOPA AND LEVODOPA 3 TABLET: 25; 100 TABLET ORAL at 05:01

## 2024-03-18 RX ADMIN — CARBIDOPA AND LEVODOPA 3 TABLET: 25; 100 TABLET ORAL at 13:28

## 2024-03-18 RX ADMIN — Medication 1 CAPSULE: at 08:59

## 2024-03-18 RX ADMIN — CARBIDOPA AND LEVODOPA 3 TABLET: 25; 100 TABLET ORAL at 19:21

## 2024-03-18 RX ADMIN — CARBIDOPA AND LEVODOPA 3 TABLET: 25; 100 TABLET ORAL at 23:29

## 2024-03-18 RX ADMIN — ASPIRIN 81 MG: 81 TABLET, CHEWABLE ORAL at 08:59

## 2024-03-18 RX ADMIN — NYSTATIN 500000 UNITS: 100000 SUSPENSION ORAL at 13:28

## 2024-03-18 RX ADMIN — DONEPEZIL HYDROCHLORIDE 10 MG: 5 TABLET, FILM COATED ORAL at 21:04

## 2024-03-18 RX ADMIN — CARBIDOPA AND LEVODOPA 3 TABLET: 25; 100 TABLET ORAL at 03:00

## 2024-03-18 RX ADMIN — MELATONIN TAB 3 MG 3 MG: 3 TAB at 21:04

## 2024-03-18 RX ADMIN — LANSOPRAZOLE 30 MG: 30 TABLET, ORALLY DISINTEGRATING, DELAYED RELEASE ORAL at 15:23

## 2024-03-18 RX ADMIN — ATORVASTATIN CALCIUM 80 MG: 80 TABLET, FILM COATED ORAL at 21:04

## 2024-03-18 RX ADMIN — Medication 1 CAPSULE: at 15:21

## 2024-03-18 RX ADMIN — CARBIDOPA AND LEVODOPA 3 TABLET: 25; 100 TABLET ORAL at 15:21

## 2024-03-18 RX ADMIN — PRIMIDONE 50 MG: 50 TABLET ORAL at 21:04

## 2024-03-18 RX ADMIN — CARBIDOPA AND LEVODOPA 3 TABLET: 25; 100 TABLET ORAL at 07:02

## 2024-03-18 RX ADMIN — CLOZAPINE 25 MG: 25 TABLET ORAL at 21:13

## 2024-03-18 RX ADMIN — CARBIDOPA AND LEVODOPA 3 TABLET: 25; 100 TABLET ORAL at 21:04

## 2024-03-18 RX ADMIN — NYSTATIN 500000 UNITS: 100000 SUSPENSION ORAL at 15:21

## 2024-03-18 RX ADMIN — THERA TABS 1 TABLET: TAB at 08:59

## 2024-03-18 RX ADMIN — NYSTATIN 500000 UNITS: 100000 SUSPENSION ORAL at 09:00

## 2024-03-18 RX ADMIN — CLOZAPINE 25 MG: 25 TABLET ORAL at 08:59

## 2024-03-18 RX ADMIN — Medication 2000 UNITS: at 08:59

## 2024-03-18 RX ADMIN — CARBOXYMETHYLCELLULOSE SODIUM 1 DROP: 10 GEL OPHTHALMIC at 21:04

## 2024-03-18 RX ADMIN — CARBIDOPA AND LEVODOPA 3 TABLET: 25; 100 TABLET ORAL at 17:34

## 2024-03-18 RX ADMIN — CARBIDOPA AND LEVODOPA 3 TABLET: 25; 100 TABLET ORAL at 00:02

## 2024-03-18 RX ADMIN — ENOXAPARIN SODIUM 40 MG: 100 INJECTION SUBCUTANEOUS at 08:59

## 2024-03-18 RX ADMIN — FLUTICASONE PROPIONATE 2 SPRAY: 50 SPRAY, METERED NASAL at 21:05

## 2024-03-18 RX ADMIN — CARBOXYMETHYLCELLULOSE SODIUM 1 DROP: 10 GEL OPHTHALMIC at 09:00

## 2024-03-18 RX ADMIN — CARBIDOPA AND LEVODOPA 3 TABLET: 25; 100 TABLET ORAL at 08:59

## 2024-03-18 RX ADMIN — CARBIDOPA AND LEVODOPA 3 TABLET: 25; 100 TABLET ORAL at 10:53

## 2024-03-18 RX ADMIN — IPRATROPIUM BROMIDE 2 SPRAY: 42 SPRAY NASAL at 09:01

## 2024-03-18 ASSESSMENT — PAIN SCALES - GENERAL: PAINLEVEL_OUTOF10: 0

## 2024-03-18 NOTE — PROGRESS NOTES
[facility] - Inpatient Rehabilitation Department   Phone: [phone]    Speech Therapy    [] Initial Evaluation            [x] Daily Treatment Note         [] Discharge Summary      Patient: Pacheco Prince   : 1945   MRN: [id number]   Date of Service:  3/18/2024  Admitting Diagnosis: Aspiration pneumonia (HCC)  Current Admission Summary: See MD's summary   Past Medical History:  has a past medical history of Acute bronchitis, CAD (coronary artery disease), Cancer (HCC), Clostridioides difficile infection, Diabetes mellitus (HCC), Erectile dysfunction, GERD (gastroesophageal reflux disease), Hyperlipidemia, Obesity, and Parkinson disease.  Past Surgical History:  has a past surgical history that includes Diagnostic Cardiac Cath Lab Procedure (2005); Coronary angioplasty with stent (2005); Cataract extraction; eye surgery (2021); Tonsillectomy ([facility]); Percutaneous Transluminal Coronary Angio (); Upper gastrointestinal endoscopy (); and Upper gastrointestinal endoscopy (N/A, 3/8/2024).    Recent Head CT completed 3/4/24:  IMPRESSION:  1.  No acute intracranial abnormality.  2. Findings of paranasal sinusitis with right middle ear and mastoid effusion    Recent Chest xray completed 3/7/24:  IMPRESSION:  New right lung base infiltrate.    Instrumental Swallow Study:   Modified Barium Swallow evaluation completed on 3/4/2024. Patient presents with mild oropharyngeal dysphagia secondary to prolonged mastication, premature spillage to pyriform sinuses, reduced AP propulsion, delayed swallow initiation, reduced laryngeal elevation, decreased tongue base retraction and reduced pharyngeal contraction. Pt demonstrated aspiration after the swallow with initial trial of thin liquids via tsp, suspect in relation to sensation although pt demonstrated a hard cough in response. No laryngeal penetration or aspiration was viewed with thin liquids via cup/straw, mildly (nectar)  requires intermittent clarification requests when talking in longer phrases. Pt presents with a moderate to severe cognitive-communication impairment. Pt with significant delays in processing speed for basic comprehension and significant word finding impairments/ reduced thought organization, impacting functional communication. Pt responded well to multiple choice questions and simplification/ repetition of commands. Pt's wife reports she was in the process of scheduling pt for LSVT LOUD voice program and rates speech close to pt's baseline, although reports sometimes he resorts to whispering and requires mod-max cues to increase loudness. Pt oriented to self/ time only, disoriented to place, situation and current month. Reduced recall of recent events requiring reminders from wife. Pt's wife reports overall decline in current cognitive function and pt with limited cognitive demands at home. Pt is currently NPO with PEG tube for nutrition and both would like for pt to advance back to a po diet. Pt remains at a high risk for aspiration/ recurrent pna based on multiple factors and co morbidities. Rehab MD ok with pt having small amounts of ice chips/ tsp amounts of water following oral care and with speech therapy utilized during dysphagia intervention, however, wishes to hold off on po trials/ repeat instrumental at this time due to current PNA status. Recommend SLP intervention for safe return to prior level of function.     Diet Solids Recommendation:   Liquid Consistency Recommendation:   Recommended Form of Meds:   NPO    Nutrition via PEG   NPO   Hydration via PEG Meds via alternative means      Modified Ngo Water Protocol -- Allow small amounts of ice chips/ tsp amounts of water for comfort following oral care  Avoid swish and spit method of medication administration - recommend to use a toothette to coat the lingual surface due to pt's high aspiration risk.     Recommended Compensatory Swallowing  Strategies: Oral care to be completed multiple times daily, ensure upright positioning and alertness prior to providing small amounts of ice chips/ tsp amounts of water       Plan  Frequency: 60 minutes/day; 5 days per week, as tolerated, until goals met, or discharged from ARU.  Therapeutic Interventions: Laryngeal Exercises , Pharyngeal Exercise, Vital Stim/NMES, Patient/Family Education , Therapeutic Trials with SLP , Instrumental assessment of swallow function (SLP FEES EVALUATION) -- (therapeutic trials and FEES when okayed by rehab MD) Expressive/ Receptive Language intervention , Speech / Motor Planning / Voice intervention , Cognitive-Linguistic intervention , Compensatory Cognitive intervention , Patient/ Family education , and [facility] Voice Therapy (LSVT)  (modified)     Discharge  Barriers to discharge: Inability to effectively communicate in emergent situations (ex: calling 911, stating name, reduced intelligibility, etc)  Inability to communicate or demonstrate problem solving due to cognitive-communicative impairment  Severity of cognition (mild, mod, severe) with reduced insight negatively impacting safety/independence  Discharge Recommendations: TBD   Continued SLP at Discharge: Yes  and LSVT     Goals  Patient Goals: When pt asked unable to provide answer wife chimed in stating pt would like to get back to eating ice cream and pt agreed via head nod    Time Frame: 14-21 days     Pt will complete oropharyngeal strengthening exercises and tolerate po trials of ice/ water consistently without overt clinical s/s of aspiration. Ongoing    Pt will participate in a repeat instrumental swallow assessment (FEES) to further assess the pharyngeal phase of the swallow when cleared by rehab MD. Ongoing    Pt will complete graded speech tasks using LOUD speech throughout targeted tasks and during session with < mod cues. Ongoing    Patient will complete verbal description and word retrieval tasks with 75%

## 2024-03-18 NOTE — PROGRESS NOTES
Nutrition Note    RECOMMENDATIONS  PO Diet: NPO  Nutrition Support:  Transition to bolus TF: 6 cans/day Jevity 1.5   Recommend flushing 50 ml water before & after each can of TF, as well as 120 ml water flush q 6 hr to meet fluid needs.     NUTRITION ASSESSMENT   Nutrition intervention for f/u assessment. Pt is tolerating current regimen of cyclic TF @ 85 ml/hr x 14 hours with one bolus feeding of 240 ml during day to help keep pt from feeling hungry.  Recommend to change TF order to bolus feeds only.  Recommend 6 cans/dayJevity 1.5 to provide 1440 ml; 2160 kcal, 92g pro & 1094 ml free water.  Recommend flushing feeding tube with 50 ml water before & after each feeding, as well as 120 ml q 6 hr to meet fluid needs.  Will monitor for tolerance & ability to adv diet per SLP.     Nutrition Related Findings: LBM 3/18; gluc 113; lytes WNL; +1 generalized edema.  Wounds: None  Nutrition Education:  Education not indicated   Nutrition Goals: Tolerate nutrition support at goal rate     MALNUTRITION ASSESSMENT   Chronic Illness  Malnutrition Status: No malnutrition    NUTRITION DIAGNOSIS   Inadequate oral intake related to swallowing difficulty as evidenced by nutrition support - enteral nutrition, swallow study results      CURRENT NUTRITION THERAPIES  ADULT TUBE FEEDING; PEG; Standard with Fiber; Cyclic, Bolus; 85; 4:00 PM; 6:00 AM; Other (specify); 1 time daily at lunch time. Flush with 90 mL water before and after.; 240; Gravity; 115; Other (specify); q 2 hours while tube feeds run from 4 pm ...     PO Intake: NPO   PO Supplement Intake:NPO    Current Tube Feeding (TF) Orders:  Current TF & Flush Orders Provides: Cyclic TF: Jevity 1.5 @ 85 ml/hr x 14 hours and one bolus TF at 240 ml.  Total regimen provides 1430 mL total volume, 2145 calories, 91 grams protein, 2071 mL free water.  Goal TF & Flush Orders Provides: Transition to bolus TF: 6 cans/day Jevity 1.5 provides 1440 ml; 2160 kcal, 92g pro & 1094 ml free water.

## 2024-03-18 NOTE — PLAN OF CARE
Problem: Pain  Goal: Verbalizes/displays adequate comfort level or baseline comfort level  Outcome: Progressing  Flowsheets (Taken 3/17/2024 2106)  Verbalizes/displays adequate comfort level or baseline comfort level: Encourage patient to monitor pain and request assistance     Problem: Safety - Adult  Goal: Free from fall injury  Outcome: Progressing     Problem: Confusion  Goal: Confusion, delirium, dementia, or psychosis is improved or at baseline  Description: INTERVENTIONS:  1. Assess for possible contributors to thought disturbance, including medications, impaired vision or hearing, underlying metabolic abnormalities, dehydration, psychiatric diagnoses, and notify attending LIP  2. [place] high risk fall precautions, as indicated  3. Provide frequent short contacts to provide reality reorientation, refocusing and direction  4. Decrease environmental stimuli, including noise as appropriate  5. Monitor and intervene to maintain adequate nutrition, hydration, elimination, sleep and activity  6. If unable to ensure safety without constant attention obtain sitter and review sitter guidelines with assigned personnel  7. Initiate Psychosocial CNS and Spiritual Care consult, as indicated  Outcome: Progressing  Flowsheets (Taken 3/17/2024 2127)  Effect of thought disturbance (confusion, delirium, dementia, or psychosis) are managed with adequate functional status:   [place] high risk fall precautions, as indicated   Provide frequent short contacts to provide reality reorientation, refocusing and direction   Decrease environmental stimuli, including noise as appropriate   Monitor and intervene to maintain adequate nutrition, hydration, elimination, sleep and activity     Problem: Skin/Tissue Integrity  Goal: Absence of new skin breakdown  Description: 1.  Monitor for areas of redness and/or skin breakdown  2.  Assess vascular access sites hourly  3.  Every 4-6 hours minimum:  Change oxygen saturation probe

## 2024-03-18 NOTE — PROGRESS NOTES
Assessment complete. Alert. Oriented to person. Follows commands. Responses delayed. Ambulated from chair to bed without assistive device. This RN and patient's wife stood on either side of the patient and provided cues throughout transfer. Hands present with severe tremors and patient beginning to excessively sweat. Per wife, these symptoms worsen as Sinemet gets out of the patient's system. Per wife and patient, the symptoms may not improve until he falls asleep. Given scheduled pills, including Sinemet, through PEG tube. Heels elevated off bed using pillow and bilateral AFOs. The care plan and education has been reviewed and mutually agreed upon with the patient. In bed, alarm on, bed in lowest position, call light and table within reach. Wife at bedside, spending the night. No further needs expressed at this time.

## 2024-03-18 NOTE — CARE COORDINATION
Another wound care consult placed.  Spoke to nurse JAYCOB, explained that I saw the patient Friday and that I took pictures of the right and left heels.  Wife was in the room at that time.  Explained to the patient's wife we will use skin protectant over the heel's intact skin and will off load to prevent pressure injury.  Chart reviewed today and heel protector boots are ordered. Per nurse UJ, boots are being used. SALAS RAMOS, RN, CWOCN  Inpatient  Wound/Ostomy Care  [phone]       Right heel    Left heel

## 2024-03-18 NOTE — PROGRESS NOTES
[facility] - Inpatient Rehabilitation Department   Phone: [phone]    Physical Therapy    [] Initial Evaluation            [x] Daily Treatment Note         [] Discharge Summary      Patient: Pacheco Prince   : 1945   MRN: [id number]   Date of Service:  3/18/2024  Admitting Diagnosis: Aspiration pneumonia (HCC)  Current Admission Summary: Pacheco Prince is a 79 y.o. male with PMHx notable for Parkinson's disease, DM2, CAD who presented on 24 with fever, and cough. He was on a cruise when symptoms developed, was diagnosed with pneumonia and started on IV Zosyn. He continued to decline, requiring medical flight back home to [place]. Etiology of pneumonia is presumed aspiration, was started on broad-spectrum antibiotics with vancomycin and Zosyn. MRSA swab was negative, so vancomycin was discontinued. He was treated with cefepime and Flagyl. CTA Chest was obtained, negative for PE.  He did develop worsening/recurrent aspiration pneumonia, and his diet was downgraded to n.p.o.  He underwent PEG placement on 3/9.  He is now tolerating tube feeds at goal rate, and able to receive his Parkinson's medications via PEG.  Hospital course complicated by: acute hypoxic respiratory failure, dysphagia, hypertension, hypokalemia, anemia, lethargy/AMS, worsening Parkinson's symptoms.   Past Medical History:  has a past medical history of Acute bronchitis, CAD (coronary artery disease), Cancer (HCC), Clostridioides difficile infection, Diabetes mellitus (HCC), Erectile dysfunction, GERD (gastroesophageal reflux disease), Hyperlipidemia, Obesity, and Parkinson disease.  Past Surgical History:  has a past surgical history that includes Diagnostic Cardiac Cath Lab Procedure (2005); Coronary angioplasty with stent (2005); Cataract extraction; eye surgery (2021); Tonsillectomy ([facility]); Percutaneous Transluminal Coronary Angio (); Upper gastrointestinal endoscopy (); and  with use of UE support  Static Standing Balance: poor (+): requires min (A) to maintain balance  Dynamic Standing Balance: poor (+): requires min (A) to maintain balance  Comments:     Other Therapeutic Interventions    Cotx with OT to maximize functional mobility and safety.    Seated ADL with OT assistance (see details in OT note). Pt sat on shower chair with good upright posture but required some cues not to initiate stands for safety (typically stands to shower). Increased cues needed due to different sequencing of activities than PLOF.   Stood with CGA while pt initiated pulling up his depends and pants.     Standing trunk rotation/tracking activity with pt holding a sensory ball with B UEs and reaching for a target. Pt needed tc and vc for upright stance and slight posterior lean plus hand over hand assist to reach with B UEs towards the target while maintaining grasp of ball.  Decreased scanning continues.    Pt returned to recliner with needs in reach.     2nd session: Pt was seated in the chair at arrival.  Agreed to therapy. Transfers to standing ranged CGA to min A. Pt ambulated 200 ft with CGA to min A (initially had HHA progressing to no assistance).  Pt navigated 14 steps (4 and 6 inch) with B railings and min 1 + CGA.  Pt required some assist to ensure entire foot on step as well as for hand movement along railing. Improved with repetition.  Pt performed them slowly and varied between a step-to and reciprocal pattern.  Standing BUE push/pull activity with min A for posterior balance and tc for upright posture.  PT performed scapular mobilization while OT fitted pt for a hand splint.  Pt left in chair with needs in reach.     Functional Outcomes                 Cognition  Overall Cognitive Status: Impaired  Arousal/Alertness: delayed responses to stimuli, inconsistent responses to stimuli  Following Commands: inconsistently follows commands  Initiation: requires cues for all  Orientation:    oriented

## 2024-03-18 NOTE — PROGRESS NOTES
Pacheco Prince  3/18/2024  [id number]    Chief Complaint: Aspiration pneumonia (HCC)    Subjective:   Patient reports that he is feeling well today.  He is breathing comfortably.  His wife notes some productive cough of creamy whitish sputum over the weekend, better yesterday than on Saturday.  She is concerned with the appearance of his heel deep tissue injury, wondering when wound care will be by to further assess.  Discussed plan for instrumental swallow evaluation this week, patient's wife is in agreement with this plan.    Last BM: Stool Occurrence: 1 (03/18/24 0618)    Objective:  Patient Vitals for the past 24 hrs:   BP Temp Temp src Pulse Resp SpO2   03/18/24 0845 125/62 97.2 °F (36.2 °C) -- 60 16 95 %   03/18/24 0514 -- -- -- 63 18 95 %   03/17/24 2106 (!) 114/42 97.4 °F (36.3 °C) Oral 72 18 98 %   03/17/24 1959 -- -- -- 60 18 96 %     Gen: No distress, pleasant.   HEENT: Normocephalic, atraumatic.  CV: Regular rate and rhythm. Extremities warm, well perfused.   Resp: No respiratory distress. CTAB.  Abd: Soft, non-tender.   Ext: No edema.   Neuro: Alert. Appropriate verbal responses given increased time. Left inattention. Moves bilateral upper and lower extremities spontaneously.   Skin: Deep tissue injury present on right heel.  PRAFO boot and heel cups in place bilaterally.     Laboratory data: Available via EMR.     Therapy progress:       PT    Supine to Sit: Substantial/maximal assistance  Sit to Supine: Dependent   Sit to Stand: Dependent  Chair/Bed to Chair Transfer: Dependent  Car Transfer:    Ambulation 10 ft: Dependent  Ambulation 50 ft: Dependent  Ambulation 150 ft: Dependent  Stairs - 1 Step: Dependent  Stairs - 4 Step: Dependent  Stairs - 12 Step:      OT    Eating: Dependent  Oral Hygiene: Dependent  Bathing: Dependent  Upper Body Dressing: Partial/moderate assistance  Lower Body Dressing: Dependent  Toilet Transfer: Dependent  Toilet Hygiene: Dependent    Speech Therapy    Pt presents  patient's home Parkinson regimen.  If unable to tolerate swallowing capsule, will discuss medication regimen and any necessary adjustments to dosing/frequency with patient's neurologist Dr. Rick.   - Continue home clozapine, primidone, donepezil    #. Orthostatic Hypotension  - Midodrine 5 mg twice daily as needed for systolic blood pressure less than 100 mmHg (note not currently requiring).  At home taking 5 mg 3 times daily.     Chronic Conditions:  CAD, HLD: Atorvastatin 80 mg nightly, ASA 81 mg daily     Diet: ADULT TUBE FEEDING; PEG; Standard with Fiber; Cyclic, Bolus; 85; 4:00 PM; 6:00 AM; Other (specify); 1 time daily at lunch time. Flush with 90 mL water before and after.; 240; Gravity; 115; Other (specify); q 2 hours while tube feeds run from 4 pm ...  Bowels: Per protocol  Bladder: Per protocol   Sleep: melatonin 3 mg nightly  Pain: Tylenol    DVT PPx: Lovenox 40 mg daily  Follow-ups: Neurology (Dr. Rick), PCP  ELOS: 14 days    Keon Sheets MD  3/18/2024, 10:34 AM    * This document was created using dictation software.  While all precautions were taken to ensure accuracy, errors may have occurred.  Please disregard any typographical errors.

## 2024-03-18 NOTE — PROGRESS NOTES
Pacheco Prince  3/17/2024  [id number]    Chief Complaint: Aspiration pneumonia (HCC)    Subjective:   Family at bedside. States he is is feeling well, and that new heel offloading boots have decreased heel discomfort. Wife states he has not yet been visited by wound care consult.    Last BM: Stool Occurrence: 1 (03/13/24 0920)    Objective:  Patient Vitals for the past 24 hrs:   BP Temp Temp src Pulse Resp SpO2   03/17/24 1959 -- -- -- 60 18 96 %   03/17/24 0900 (!) 120/45 98.2 °F (36.8 °C) Oral 64 16 94 %   03/16/24 2130 (!) 128/55 98 °F (36.7 °C) Oral 77 16 96 %     Gen: No distress, pleasant.   HEENT: Normocephalic, atraumatic. White film on tongue  CV: Regular rate and rhythm. Extremities warm, well perfused.   Resp: No respiratory distress. CTAB.  Abd: Soft, non-tender.   Ext: No edema.   Neuro: Alert. Appropriate verbal responses given increased time. Left inattention. Moves bilateral upper and lower extremities spontaneously.   Skin: Blood blister on heel area    Laboratory data: Available via EMR.     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 Due to low vocal volume pt requires intermittent clarification requests when talking in longer phrases. Pt presents with a moderate to severe cognitive-communication impairment. Pt  Aspiration pneumonia (HCC)    Acute metabolic encephalopathy    Sepsis (HCC)    Acute aspiration pneumonia (HCC)    Pneumonia of left lower lobe due to infectious organism    TIA (transient ischemic attack)    Parkinson's disease    Pneumonia of both lungs due to infectious organism, unspecified part of lung    Parkinson's disease with dyskinesia       Functional progress: Improving transfers with assistance of 1 person.     Plan:  #. Bilateral aspiration pneumonia  #. Dysphagia, secondary to Parkinson's  - s/p initial course of cefepime and Flagyl  - developed recurrent infection, Levaquin 750 mg every other day (EOT 3/16)  - Albuterol nebs every 4 hours as needed  - Guaifenesin x 3 days  - Continue SLP for dysphagia therapy, will consider repeat MBS when current infection is resolved  - BMP, CBC stable 3/16    #Heel wound  -PODUS boot placement   -Improved symptoms 3/17  -Wound care consult    #Oral Candidiasis  -Nystatin swish and spit QID     #. Left visual field loss vs. inattention  - Noted code stroke called on 3/5 for AMS, right facial droop. CT Head without acute stroke, CTA Head/Neck without large vessel occlusion or significant intracranial stenosis.  - MRI Brain 3/14, motion artifact degraded but without any definite acute infarct.     #. Parkinson's disease w/ dementia  - Continue carbidopa-levodopa  mg 3 tablets every 2 hours while awake + 3 tablets 3 times nightly (note patient takes long acting formation at home, converted to short acting formulation which may be given via G-tube)   - Called patient's Neurologist (Dr. Rick) to discuss dosing, but he was out of office today. Will call back on Monday. In the interrim, plan to proceed with MBS and swallow trials early next week if clinically stable so may be able to resume long-acting formulation patient takes at home.   - Continue home clozapine, primidone, donepezil    #. Orthostatic Hypotension  - Midodrine 5 mg twice daily as needed for

## 2024-03-18 NOTE — PROGRESS NOTES
[facility] - Inpatient Rehabilitation Department   Phone: [phone]    Occupational Therapy    [] Initial Evaluation            [x] Daily Treatment Note         [] Discharge Summary      Patient: Pacheco Prince   : 1945   MRN: [id number]   Date of Service:  3/18/2024    Admitting Diagnosis:  Aspiration pneumonia (HCC)  Current Admission Summary: Pacheco Prince is a 79 y.o. male with PMHx notable for Parkinson's disease, DM2, CAD who presented on 24 with fever, and cough. He was on a cruise when symptoms developed, was diagnosed with pneumonia and started on IV Zosyn. He continued to decline, requiring medical flight back home to [place]. Etiology of pneumonia is presumed aspiration, was started on broad-spectrum antibiotics with vancomycin and Zosyn. MRSA swab was negative, so vancomycin was discontinued. He was treated with cefepime and Flagyl. CTA Chest was obtained, negative for PE.  He did develop worsening/recurrent aspiration pneumonia, and his diet was downgraded to n.p.o.  He underwent PEG placement on 3/9.  He is now tolerating tube feeds at goal rate, and able to receive his Parkinson's medications via PEG.  Hospital course complicated by: acute hypoxic respiratory failure, dysphagia, hypertension, hypokalemia, anemia, lethargy/AMS, worsening Parkinson's symptoms.      Currently, patient reports that he is doing well.  His wife notes a productive cough with increasing frequency.  He denies any fevers, chills, dyspnea, chest pain.  He is experiencing significant tremor, rigidity and freezing.  He denies any focal numbness or weakness.  Past Medical History:  has a past medical history of Acute bronchitis, CAD (coronary artery disease), Cancer (HCC), Clostridioides difficile infection, Diabetes mellitus (HCC), Erectile dysfunction, GERD (gastroesophageal reflux disease), Hyperlipidemia, Obesity, and Parkinson disease.  Past Surgical History:  has a past surgical history  Impaired  Arousal/Alertness: delayed responses to stimuli, unresponsive to stimuli  Following Commands: inconsistently follows commands, does not follow commands  Attention Span: unable to maintain attention  Memory: decreased recall of recent events, decreased short term memory, decreased long term memory  Safety Judgement: decreased awareness of need for assistance, decreased awareness of need for safety  Problem Solving: assistance required to generate solutions, assistance required to identify errors made, assistance required to correct errors made  Insights: not aware of deficits  Initiation: requires cues for all  Sequencing: requires cues for all  Comments: flat affect  Orientation:    oriented to person; able to state  w/ extended time  Command Following:   impaired     Education  Barriers To Learning: cognition and physical  Patient Education: patient educated on goals, OT role and benefits, plan of care, ADL adaptive strategies, IADL safety, proper use of assistive device/equipment, adaptive device training, energy conservation, orientation, family education, transfer training, discharge recommendations  Learning Assessment:  patient will require reinforcement due to cognitive deficits    Assessment  Activity Tolerance: well - rest breaks required throughout  Impairments Requiring Therapeutic Intervention: decreased functional mobility, decreased ADL status, decreased ROM, decreased strength, decreased safety awareness, decreased cognition, decreased endurance, decreased balance, decreased vision, decreased IADL, decreased fine motor control, decreased coordination, decreased posture  Prognosis: fair  Clinical Assessment: Pt progressing well w/ therapy. Now amb to amb and complete transfers w/ assist of 1 this date. Con't to requiring additional cueing for hand placement, sequencing, and initiation for all transfers, tasks, and functional mobility. Increased alertness during all activity completion.

## 2024-03-19 LAB
GLUCOSE BLD-MCNC: 124 MG/DL (ref 70–99)
GLUCOSE BLD-MCNC: 129 MG/DL (ref 70–99)
GLUCOSE BLD-MCNC: 97 MG/DL (ref 70–99)
GLUCOSE BLD-MCNC: 98 MG/DL (ref 70–99)
PERFORMED ON: ABNORMAL
PERFORMED ON: ABNORMAL
PERFORMED ON: NORMAL
PERFORMED ON: NORMAL

## 2024-03-19 PROCEDURE — 1280000000 HC REHAB R&B

## 2024-03-19 PROCEDURE — 6360000002 HC RX W HCPCS: Performed by: STUDENT IN AN ORGANIZED HEALTH CARE EDUCATION/TRAINING PROGRAM

## 2024-03-19 PROCEDURE — 97530 THERAPEUTIC ACTIVITIES: CPT

## 2024-03-19 PROCEDURE — 6370000000 HC RX 637 (ALT 250 FOR IP): Performed by: STUDENT IN AN ORGANIZED HEALTH CARE EDUCATION/TRAINING PROGRAM

## 2024-03-19 PROCEDURE — 92507 TX SP LANG VOICE COMM INDIV: CPT

## 2024-03-19 PROCEDURE — 92526 ORAL FUNCTION THERAPY: CPT

## 2024-03-19 PROCEDURE — 97535 SELF CARE MNGMENT TRAINING: CPT

## 2024-03-19 PROCEDURE — 97116 GAIT TRAINING THERAPY: CPT

## 2024-03-19 RX ADMIN — NYSTATIN 500000 UNITS: 100000 SUSPENSION ORAL at 09:19

## 2024-03-19 RX ADMIN — Medication 2000 UNITS: at 09:19

## 2024-03-19 RX ADMIN — THERA TABS 1 TABLET: TAB at 09:19

## 2024-03-19 RX ADMIN — CLOZAPINE 25 MG: 25 TABLET ORAL at 23:38

## 2024-03-19 RX ADMIN — ENOXAPARIN SODIUM 40 MG: 100 INJECTION SUBCUTANEOUS at 09:19

## 2024-03-19 RX ADMIN — CLOZAPINE 25 MG: 25 TABLET ORAL at 09:20

## 2024-03-19 RX ADMIN — Medication 1 CAPSULE: at 17:04

## 2024-03-19 RX ADMIN — CARBIDOPA AND LEVODOPA 3 TABLET: 25; 100 TABLET ORAL at 09:19

## 2024-03-19 RX ADMIN — PRIMIDONE 50 MG: 50 TABLET ORAL at 20:58

## 2024-03-19 RX ADMIN — FLUTICASONE PROPIONATE 2 SPRAY: 50 SPRAY, METERED NASAL at 20:59

## 2024-03-19 RX ADMIN — CARBOXYMETHYLCELLULOSE SODIUM 1 DROP: 10 GEL OPHTHALMIC at 20:58

## 2024-03-19 RX ADMIN — NYSTATIN 500000 UNITS: 100000 SUSPENSION ORAL at 13:01

## 2024-03-19 RX ADMIN — NYSTATIN 500000 UNITS: 100000 SUSPENSION ORAL at 17:04

## 2024-03-19 RX ADMIN — CARBIDOPA AND LEVODOPA 3 TABLET: 25; 100 TABLET ORAL at 05:30

## 2024-03-19 RX ADMIN — CARBIDOPA AND LEVODOPA 3 TABLET: 25; 100 TABLET ORAL at 06:54

## 2024-03-19 RX ADMIN — CARBIDOPA AND LEVODOPA 3 TABLET: 25; 100 TABLET ORAL at 20:58

## 2024-03-19 RX ADMIN — CARBOXYMETHYLCELLULOSE SODIUM 1 DROP: 10 GEL OPHTHALMIC at 09:23

## 2024-03-19 RX ADMIN — CARBIDOPA AND LEVODOPA 3 TABLET: 25; 100 TABLET ORAL at 19:13

## 2024-03-19 RX ADMIN — DONEPEZIL HYDROCHLORIDE 10 MG: 5 TABLET, FILM COATED ORAL at 20:58

## 2024-03-19 RX ADMIN — CARBIDOPA AND LEVODOPA 3 TABLET: 25; 100 TABLET ORAL at 03:14

## 2024-03-19 RX ADMIN — CARBIDOPA AND LEVODOPA 3 TABLET: 25; 100 TABLET ORAL at 14:57

## 2024-03-19 RX ADMIN — Medication 1 CAPSULE: at 09:19

## 2024-03-19 RX ADMIN — CARBIDOPA AND LEVODOPA 3 TABLET: 25; 100 TABLET ORAL at 10:52

## 2024-03-19 RX ADMIN — CARBIDOPA AND LEVODOPA 3 TABLET: 25; 100 TABLET ORAL at 17:04

## 2024-03-19 RX ADMIN — LANSOPRAZOLE 30 MG: 30 TABLET, ORALLY DISINTEGRATING, DELAYED RELEASE ORAL at 17:04

## 2024-03-19 RX ADMIN — CARBIDOPA AND LEVODOPA 3 TABLET: 25; 100 TABLET ORAL at 13:01

## 2024-03-19 RX ADMIN — ASPIRIN 81 MG: 81 TABLET, CHEWABLE ORAL at 09:19

## 2024-03-19 RX ADMIN — NYSTATIN 500000 UNITS: 100000 SUSPENSION ORAL at 20:58

## 2024-03-19 RX ADMIN — MELATONIN TAB 3 MG 3 MG: 3 TAB at 20:58

## 2024-03-19 RX ADMIN — IPRATROPIUM BROMIDE 2 SPRAY: 42 SPRAY NASAL at 14:58

## 2024-03-19 RX ADMIN — ATORVASTATIN CALCIUM 80 MG: 80 TABLET, FILM COATED ORAL at 20:58

## 2024-03-19 RX ADMIN — IPRATROPIUM BROMIDE 2 SPRAY: 42 SPRAY NASAL at 09:20

## 2024-03-19 RX ADMIN — CARBIDOPA AND LEVODOPA 3 TABLET: 25; 100 TABLET ORAL at 23:37

## 2024-03-19 ASSESSMENT — PAIN SCALES - GENERAL: PAINLEVEL_OUTOF10: 0

## 2024-03-19 NOTE — PROGRESS NOTES
[facility] - Inpatient Rehabilitation Department   Phone: [phone]    Physical Therapy    [] Initial Evaluation            [x] Daily Treatment Note         [] Discharge Summary      Patient: Pacheco Prince   : 1945   MRN: [id number]   Date of Service:  3/19/2024  Admitting Diagnosis: Aspiration pneumonia (HCC)  Current Admission Summary: Pacheco Prince is a 79 y.o. male with PMHx notable for Parkinson's disease, DM2, CAD who presented on 24 with fever, and cough. He was on a cruise when symptoms developed, was diagnosed with pneumonia and started on IV Zosyn. He continued to decline, requiring medical flight back home to [place]. Etiology of pneumonia is presumed aspiration, was started on broad-spectrum antibiotics with vancomycin and Zosyn. MRSA swab was negative, so vancomycin was discontinued. He was treated with cefepime and Flagyl. CTA Chest was obtained, negative for PE.  He did develop worsening/recurrent aspiration pneumonia, and his diet was downgraded to n.p.o.  He underwent PEG placement on 3/9.  He is now tolerating tube feeds at goal rate, and able to receive his Parkinson's medications via PEG.  Hospital course complicated by: acute hypoxic respiratory failure, dysphagia, hypertension, hypokalemia, anemia, lethargy/AMS, worsening Parkinson's symptoms.   Past Medical History:  has a past medical history of Acute bronchitis, CAD (coronary artery disease), Cancer (HCC), Clostridioides difficile infection, Diabetes mellitus (HCC), Erectile dysfunction, GERD (gastroesophageal reflux disease), Hyperlipidemia, Obesity, and Parkinson disease.  Past Surgical History:  has a past surgical history that includes Diagnostic Cardiac Cath Lab Procedure (2005); Coronary angioplasty with stent (2005); Cataract extraction; eye surgery (2021); Tonsillectomy ([facility]); Percutaneous Transluminal Coronary Angio (); Upper gastrointestinal endoscopy (); and  70      Electronically Signed By: Melissa Gibson, PT, DPT [id number]

## 2024-03-19 NOTE — PLAN OF CARE
Problem: Discharge Planning  Goal: Discharge to home or other facility with appropriate resources  Outcome: Progressing     Problem: Pain  Goal: Verbalizes/displays adequate comfort level or baseline comfort level  Outcome: Progressing     Problem: Safety - Adult  Goal: Free from fall injury  Outcome: Progressing     Problem: Confusion  Goal: Confusion, delirium, dementia, or psychosis is improved or at baseline  Description: INTERVENTIONS:  1. Assess for possible contributors to thought disturbance, including medications, impaired vision or hearing, underlying metabolic abnormalities, dehydration, psychiatric diagnoses, and notify attending LIP  2. [place] high risk fall precautions, as indicated  3. Provide frequent short contacts to provide reality reorientation, refocusing and direction  4. Decrease environmental stimuli, including noise as appropriate  5. Monitor and intervene to maintain adequate nutrition, hydration, elimination, sleep and activity  6. If unable to ensure safety without constant attention obtain sitter and review sitter guidelines with assigned personnel  7. Initiate Psychosocial CNS and Spiritual Care consult, as indicated  Outcome: Progressing     Problem: Skin/Tissue Integrity  Goal: Absence of new skin breakdown  Description: 1.  Monitor for areas of redness and/or skin breakdown  2.  Assess vascular access sites hourly  3.  Every 4-6 hours minimum:  Change oxygen saturation probe site  4.  Every 4-6 hours:  If on nasal continuous positive airway pressure, respiratory therapy assess nares and determine need for appliance change or resting period.  Outcome: Progressing     Problem: Chronic Conditions and Co-morbidities  Goal: Patient's chronic conditions and co-morbidity symptoms are monitored and maintained or improved  Outcome: Progressing     Problem: Nutrition Deficit:  Goal: Optimize nutritional status  Outcome: Progressing     Problem: ABCDS Injury Assessment  Goal: Absence of

## 2024-03-19 NOTE — PROGRESS NOTES
[facility] - Inpatient Rehabilitation Department   Phone: [phone]    Speech Therapy    [] Initial Evaluation            [x] Daily Treatment Note         [] Discharge Summary      Patient: Pacheco Prince   : 1945   MRN: [id number]   Date of Service:  3/19/2024  Admitting Diagnosis: Aspiration pneumonia (HCC)  Current Admission Summary: See MD's summary   Past Medical History:  has a past medical history of Acute bronchitis, CAD (coronary artery disease), Cancer (HCC), Clostridioides difficile infection, Diabetes mellitus (HCC), Erectile dysfunction, GERD (gastroesophageal reflux disease), Hyperlipidemia, Obesity, and Parkinson disease.  Past Surgical History:  has a past surgical history that includes Diagnostic Cardiac Cath Lab Procedure (2005); Coronary angioplasty with stent (2005); Cataract extraction; eye surgery (2021); Tonsillectomy ([facility]); Percutaneous Transluminal Coronary Angio (); Upper gastrointestinal endoscopy (); and Upper gastrointestinal endoscopy (N/A, 3/8/2024).    Recent Head CT completed 3/4/24:  IMPRESSION:  1.  No acute intracranial abnormality.  2. Findings of paranasal sinusitis with right middle ear and mastoid effusion    Recent Chest xray completed 3/7/24:  IMPRESSION:  New right lung base infiltrate.    Instrumental Swallow Study:   Modified Barium Swallow evaluation completed on 3/4/2024. Patient presents with mild oropharyngeal dysphagia secondary to prolonged mastication, premature spillage to pyriform sinuses, reduced AP propulsion, delayed swallow initiation, reduced laryngeal elevation, decreased tongue base retraction and reduced pharyngeal contraction. Pt demonstrated aspiration after the swallow with initial trial of thin liquids via tsp, suspect in relation to sensation although pt demonstrated a hard cough in response. No laryngeal penetration or aspiration was viewed with thin liquids via cup/straw, mildly (nectar)  like for pt to advance back to a po diet. Pt remains at a high risk for aspiration/ recurrent pna based on multiple factors and co morbidities. Rehab MD ok with pt having small amounts of ice chips/ tsp amounts of water following oral care and with speech therapy utilized during dysphagia intervention, however, wishes to hold off on po trials/ repeat instrumental at this time due to current PNA status. Recommend SLP intervention for safe return to prior level of function.     Diet Solids Recommendation:   Liquid Consistency Recommendation:   Recommended Form of Meds:   NPO    Nutrition via PEG   NPO   Hydration via PEG Meds via alternative means      Modified [place] Water Protocol -- Allow small amounts of ice chips/ tsp amounts of water for comfort following oral care  Avoid swish and spit method of medication administration - recommend to use a toothette to coat the lingual surface due to pt's high aspiration risk.     Recommended Compensatory Swallowing Strategies: Oral care to be completed multiple times daily, ensure upright positioning and alertness prior to providing small amounts of ice chips/ tsp amounts of water       Plan  Frequency: 60 minutes/day; 5 days per week, as tolerated, until goals met, or discharged from ARU.  Therapeutic Interventions: Laryngeal Exercises , Pharyngeal Exercise, Vital Stim/NMES, Patient/Family Education , Therapeutic Trials with SLP , Instrumental assessment of swallow function (SLP FEES EVALUATION) -- (therapeutic trials and FEES when okayed by rehab MD) Expressive/ Receptive Language intervention , Speech / Motor Planning / Voice intervention , Cognitive-Linguistic intervention , Compensatory Cognitive intervention , Patient/ Family education , and Bahman Fieldsman Voice Therapy (LSVT)  (modified)     Discharge  Barriers to discharge: Inability to effectively communicate in emergent situations (ex: calling 911, stating name, reduced intelligibility, etc)  Inability to

## 2024-03-19 NOTE — PROGRESS NOTES
Pacheco Prince  3/19/2024  [id number]    Chief Complaint: Aspiration pneumonia (HCC)    Subjective:   Patient reports that he is feeling well today.  Denies any dyspnea. His wife points out an area of concern on his left heel. Francisco denies any pain in the area. He is tolerating bolus tube feeds, feeling less hungry. Discussed plan for small PO trials with SLP, possibly MBS vs FEES to determine least restrictive diet. Discussed the concern that a functional swallow evaluation is only a \"snapshot\" in time, and that Francisco's aspiration risk will continue to be influenced by multiple factors such as wakefulness, positioning.     Last BM: Stool Occurrence: 1 (03/19/24 1134)    Objective:  Patient Vitals for the past 24 hrs:   BP Temp Temp src Pulse Resp SpO2   03/19/24 0815 114/62 98.1 °F (36.7 °C) -- 77 18 96 %   03/18/24 2100 (!) 95/37 (!) 96.6 °F (35.9 °C) Oral (!) 41 18 96 %     Gen: No distress, pleasant.   HEENT: Normocephalic, atraumatic.  CV: Regular rate and rhythm. Extremities warm, well perfused.   Resp: No respiratory distress. CTAB.  Abd: Soft, non-tender.   Ext: No edema.   Neuro: Alert. Appropriate verbal responses given increased time. Moves bilateral upper and lower extremities spontaneously.   Skin: Deep tissue injury present on right heel. Linear eschar/scab present within area of dry/excoriated skin in left calcaneal aspect of heel, nontender to palpation. PRAFO boot and heel cups in place bilaterally.     Laboratory data: Available via EMR.     Therapy progress:       PT    Supine to Sit: Partial/moderate assistance  Sit to Supine: Dependent   Sit to Stand: Partial/moderate assistance  Chair/Bed to Chair Transfer: Partial/moderate assistance  Car Transfer:    Ambulation 10 ft: Partial/moderate assistance  Ambulation 50 ft: Partial/moderate assistance  Ambulation 150 ft: Partial/moderate assistance  Stairs - 1 Step: Dependent  Stairs - 4 Step: Dependent  Stairs - 12 Step:      OT    Eating:

## 2024-03-19 NOTE — PLAN OF CARE
Problem: Discharge Planning  Goal: Discharge to home or other facility with appropriate resources  Outcome: Progressing     Problem: Safety - Adult  Goal: Free from fall injury  Outcome: Progressing     Problem: Confusion  Goal: Confusion, delirium, dementia, or psychosis is improved or at baseline  Description: INTERVENTIONS:  1. Assess for possible contributors to thought disturbance, including medications, impaired vision or hearing, underlying metabolic abnormalities, dehydration, psychiatric diagnoses, and notify attending LIP  2. [place] high risk fall precautions, as indicated  3. Provide frequent short contacts to provide reality reorientation, refocusing and direction  4. Decrease environmental stimuli, including noise as appropriate  5. Monitor and intervene to maintain adequate nutrition, hydration, elimination, sleep and activity  6. If unable to ensure safety without constant attention obtain sitter and review sitter guidelines with assigned personnel  7. Initiate Psychosocial CNS and Spiritual Care consult, as indicated  Outcome: Progressing     Problem: Skin/Tissue Integrity  Goal: Absence of new skin breakdown  Description: 1.  Monitor for areas of redness and/or skin breakdown  2.  Assess vascular access sites hourly  3.  Every 4-6 hours minimum:  Change oxygen saturation probe site  4.  Every 4-6 hours:  If on nasal continuous positive airway pressure, respiratory therapy assess nares and determine need for appliance change or resting period.  Outcome: Progressing     Problem: Chronic Conditions and Co-morbidities  Goal: Patient's chronic conditions and co-morbidity symptoms are monitored and maintained or improved  Outcome: Progressing     Problem: ABCDS Injury Assessment  Goal: Absence of physical injury  Outcome: Progressing

## 2024-03-19 NOTE — PROGRESS NOTES
Checked in on patient to do vest therapy/ wife at bedside / Wife said he had long day and many visitors and would like to let him rest and continue tomorrow . No distress at this time.

## 2024-03-19 NOTE — PROGRESS NOTES
[facility] - Inpatient Rehabilitation Department   Phone: [phone]    Occupational Therapy    [] Initial Evaluation            [x] Daily Treatment Note         [] Discharge Summary      Patient: Pacheco Prince   : 1945   MRN: [id number]   Date of Service:  3/19/2024    Admitting Diagnosis:  Aspiration pneumonia (HCC)  Current Admission Summary: Pacheco Prince is a 79 y.o. male with PMHx notable for Parkinson's disease, DM2, CAD who presented on 24 with fever, and cough. He was on a cruise when symptoms developed, was diagnosed with pneumonia and started on IV Zosyn. He continued to decline, requiring medical flight back home to [place]. Etiology of pneumonia is presumed aspiration, was started on broad-spectrum antibiotics with vancomycin and Zosyn. MRSA swab was negative, so vancomycin was discontinued. He was treated with cefepime and Flagyl. CTA Chest was obtained, negative for PE.  He did develop worsening/recurrent aspiration pneumonia, and his diet was downgraded to n.p.o.  He underwent PEG placement on 3/9.  He is now tolerating tube feeds at goal rate, and able to receive his Parkinson's medications via PEG.  Hospital course complicated by: acute hypoxic respiratory failure, dysphagia, hypertension, hypokalemia, anemia, lethargy/AMS, worsening Parkinson's symptoms.      Currently, patient reports that he is doing well.  His wife notes a productive cough with increasing frequency.  He denies any fevers, chills, dyspnea, chest pain.  He is experiencing significant tremor, rigidity and freezing.  He denies any focal numbness or weakness.  Past Medical History:  has a past medical history of Acute bronchitis, CAD (coronary artery disease), Cancer (HCC), Clostridioides difficile infection, Diabetes mellitus (HCC), Erectile dysfunction, GERD (gastroesophageal reflux disease), Hyperlipidemia, Obesity, and Parkinson disease.  Past Surgical History:  has a past surgical history  3/19/2024 3:46 PM

## 2024-03-20 LAB
ANION GAP SERPL CALCULATED.3IONS-SCNC: 9 MMOL/L (ref 3–16)
BASOPHILS # BLD: 0.1 K/UL (ref 0–0.2)
BASOPHILS NFR BLD: 0.9 %
BUN SERPL-MCNC: 19 MG/DL (ref 7–20)
CALCIUM SERPL-MCNC: 8.9 MG/DL (ref 8.3–10.6)
CHLORIDE SERPL-SCNC: 102 MMOL/L (ref 99–110)
CO2 SERPL-SCNC: 26 MMOL/L (ref 21–32)
CREAT SERPL-MCNC: <0.5 MG/DL (ref 0.8–1.3)
DEPRECATED RDW RBC AUTO: 14.3 % (ref 12.4–15.4)
EOSINOPHIL # BLD: 0.3 K/UL (ref 0–0.6)
EOSINOPHIL NFR BLD: 5 %
GFR SERPLBLD CREATININE-BSD FMLA CKD-EPI: >60 ML/MIN/{1.73_M2}
GLUCOSE BLD-MCNC: 108 MG/DL (ref 70–99)
GLUCOSE BLD-MCNC: 137 MG/DL (ref 70–99)
GLUCOSE BLD-MCNC: 153 MG/DL (ref 70–99)
GLUCOSE SERPL-MCNC: 108 MG/DL (ref 70–99)
HCT VFR BLD AUTO: 38.7 % (ref 40.5–52.5)
HGB BLD-MCNC: 12.7 G/DL (ref 13.5–17.5)
LYMPHOCYTES # BLD: 1.7 K/UL (ref 1–5.1)
LYMPHOCYTES NFR BLD: 28.1 %
MCH RBC QN AUTO: 29.8 PG (ref 26–34)
MCHC RBC AUTO-ENTMCNC: 32.8 G/DL (ref 31–36)
MCV RBC AUTO: 90.9 FL (ref 80–100)
MONOCYTES # BLD: 0.8 K/UL (ref 0–1.3)
MONOCYTES NFR BLD: 13 %
NEUTROPHILS # BLD: 3.2 K/UL (ref 1.7–7.7)
NEUTROPHILS NFR BLD: 53 %
PERFORMED ON: ABNORMAL
PLATELET # BLD AUTO: 385 K/UL (ref 135–450)
PMV BLD AUTO: 7.9 FL (ref 5–10.5)
POTASSIUM SERPL-SCNC: 4.2 MMOL/L (ref 3.5–5.1)
RBC # BLD AUTO: 4.25 M/UL (ref 4.2–5.9)
SODIUM SERPL-SCNC: 137 MMOL/L (ref 136–145)
WBC # BLD AUTO: 6 K/UL (ref 4–11)

## 2024-03-20 PROCEDURE — 92526 ORAL FUNCTION THERAPY: CPT

## 2024-03-20 PROCEDURE — 36415 COLL VENOUS BLD VENIPUNCTURE: CPT

## 2024-03-20 PROCEDURE — 6360000002 HC RX W HCPCS: Performed by: STUDENT IN AN ORGANIZED HEALTH CARE EDUCATION/TRAINING PROGRAM

## 2024-03-20 PROCEDURE — 80048 BASIC METABOLIC PNL TOTAL CA: CPT

## 2024-03-20 PROCEDURE — 6370000000 HC RX 637 (ALT 250 FOR IP): Performed by: STUDENT IN AN ORGANIZED HEALTH CARE EDUCATION/TRAINING PROGRAM

## 2024-03-20 PROCEDURE — 97530 THERAPEUTIC ACTIVITIES: CPT

## 2024-03-20 PROCEDURE — 97116 GAIT TRAINING THERAPY: CPT

## 2024-03-20 PROCEDURE — 97110 THERAPEUTIC EXERCISES: CPT

## 2024-03-20 PROCEDURE — 97535 SELF CARE MNGMENT TRAINING: CPT

## 2024-03-20 PROCEDURE — 51798 US URINE CAPACITY MEASURE: CPT

## 2024-03-20 PROCEDURE — 1280000000 HC REHAB R&B

## 2024-03-20 PROCEDURE — 85025 COMPLETE CBC W/AUTO DIFF WBC: CPT

## 2024-03-20 PROCEDURE — 92507 TX SP LANG VOICE COMM INDIV: CPT

## 2024-03-20 RX ORDER — MIDODRINE HYDROCHLORIDE 5 MG/1
5 TABLET ORAL 2 TIMES DAILY PRN
Status: DISCONTINUED | OUTPATIENT
Start: 2024-03-20 | End: 2024-03-28 | Stop reason: HOSPADM

## 2024-03-20 RX ADMIN — CARBIDOPA AND LEVODOPA 3 TABLET: 25; 100 TABLET ORAL at 16:54

## 2024-03-20 RX ADMIN — NYSTATIN 500000 UNITS: 100000 SUSPENSION ORAL at 20:53

## 2024-03-20 RX ADMIN — CARBOXYMETHYLCELLULOSE SODIUM 1 DROP: 10 GEL OPHTHALMIC at 20:48

## 2024-03-20 RX ADMIN — CARBIDOPA AND LEVODOPA 3 TABLET: 25; 100 TABLET ORAL at 13:51

## 2024-03-20 RX ADMIN — LANSOPRAZOLE 30 MG: 30 TABLET, ORALLY DISINTEGRATING, DELAYED RELEASE ORAL at 15:20

## 2024-03-20 RX ADMIN — NYSTATIN 500000 UNITS: 100000 SUSPENSION ORAL at 09:40

## 2024-03-20 RX ADMIN — IPRATROPIUM BROMIDE 2 SPRAY: 42 SPRAY NASAL at 09:41

## 2024-03-20 RX ADMIN — CARBIDOPA AND LEVODOPA 3 TABLET: 25; 100 TABLET ORAL at 03:05

## 2024-03-20 RX ADMIN — ASPIRIN 81 MG: 81 TABLET, CHEWABLE ORAL at 09:40

## 2024-03-20 RX ADMIN — CARBOXYMETHYLCELLULOSE SODIUM 1 DROP: 10 GEL OPHTHALMIC at 09:39

## 2024-03-20 RX ADMIN — NYSTATIN 500000 UNITS: 100000 SUSPENSION ORAL at 16:54

## 2024-03-20 RX ADMIN — CARBIDOPA AND LEVODOPA 3 TABLET: 25; 100 TABLET ORAL at 05:14

## 2024-03-20 RX ADMIN — CARBIDOPA AND LEVODOPA 3 TABLET: 25; 100 TABLET ORAL at 18:56

## 2024-03-20 RX ADMIN — ENOXAPARIN SODIUM 40 MG: 100 INJECTION SUBCUTANEOUS at 09:41

## 2024-03-20 RX ADMIN — NYSTATIN 500000 UNITS: 100000 SUSPENSION ORAL at 13:51

## 2024-03-20 RX ADMIN — ATORVASTATIN CALCIUM 80 MG: 80 TABLET, FILM COATED ORAL at 20:54

## 2024-03-20 RX ADMIN — MELATONIN TAB 3 MG 3 MG: 3 TAB at 20:54

## 2024-03-20 RX ADMIN — CARBIDOPA AND LEVODOPA 3 TABLET: 25; 100 TABLET ORAL at 11:45

## 2024-03-20 RX ADMIN — CARBIDOPA AND LEVODOPA 3 TABLET: 25; 100 TABLET ORAL at 15:20

## 2024-03-20 RX ADMIN — CARBIDOPA AND LEVODOPA 3 TABLET: 25; 100 TABLET ORAL at 23:37

## 2024-03-20 RX ADMIN — ACETAMINOPHEN 650 MG: 325 TABLET ORAL at 01:31

## 2024-03-20 RX ADMIN — IPRATROPIUM BROMIDE 2 SPRAY: 42 SPRAY NASAL at 13:52

## 2024-03-20 RX ADMIN — CLOZAPINE 25 MG: 25 TABLET ORAL at 20:54

## 2024-03-20 RX ADMIN — Medication 2000 UNITS: at 09:40

## 2024-03-20 RX ADMIN — DONEPEZIL HYDROCHLORIDE 10 MG: 5 TABLET, FILM COATED ORAL at 20:54

## 2024-03-20 RX ADMIN — CARBIDOPA AND LEVODOPA 3 TABLET: 25; 100 TABLET ORAL at 09:40

## 2024-03-20 RX ADMIN — CARBIDOPA AND LEVODOPA 3 TABLET: 25; 100 TABLET ORAL at 06:47

## 2024-03-20 RX ADMIN — CLOZAPINE 25 MG: 25 TABLET ORAL at 09:40

## 2024-03-20 RX ADMIN — THERA TABS 1 TABLET: TAB at 09:40

## 2024-03-20 RX ADMIN — Medication 1 CAPSULE: at 09:40

## 2024-03-20 RX ADMIN — PRIMIDONE 50 MG: 50 TABLET ORAL at 20:54

## 2024-03-20 RX ADMIN — Medication 1 CAPSULE: at 16:54

## 2024-03-20 RX ADMIN — FLUTICASONE PROPIONATE 2 SPRAY: 50 SPRAY, METERED NASAL at 20:47

## 2024-03-20 RX ADMIN — CARBIDOPA AND LEVODOPA 3 TABLET: 25; 100 TABLET ORAL at 20:54

## 2024-03-20 ASSESSMENT — PAIN DESCRIPTION - LOCATION: LOCATION: HAND

## 2024-03-20 ASSESSMENT — PAIN - FUNCTIONAL ASSESSMENT: PAIN_FUNCTIONAL_ASSESSMENT: ACTIVITIES ARE NOT PREVENTED

## 2024-03-20 ASSESSMENT — PAIN SCALES - GENERAL
PAINLEVEL_OUTOF10: 6
PAINLEVEL_OUTOF10: 0

## 2024-03-20 ASSESSMENT — PAIN SCALES - WONG BAKER: WONGBAKER_NUMERICALRESPONSE: NO HURT

## 2024-03-20 ASSESSMENT — PAIN DESCRIPTION - DESCRIPTORS: DESCRIPTORS: PATIENT UNABLE TO DESCRIBE

## 2024-03-20 ASSESSMENT — PAIN DESCRIPTION - ORIENTATION: ORIENTATION: LEFT

## 2024-03-20 NOTE — PROGRESS NOTES
[facility] - Inpatient Rehabilitation Department   Phone: [phone]    Occupational Therapy    [] Initial Evaluation            [x] Daily Treatment Note         [] Discharge Summary      Patient: Pacheco Prince   : 1945   MRN: [id number]   Date of Service:  3/20/2024    Admitting Diagnosis:  Aspiration pneumonia (HCC)  Current Admission Summary: Pacheco Prince is a 79 y.o. male with PMHx notable for Parkinson's disease, DM2, CAD who presented on 24 with fever, and cough. He was on a cruise when symptoms developed, was diagnosed with pneumonia and started on IV Zosyn. He continued to decline, requiring medical flight back home to [place]. Etiology of pneumonia is presumed aspiration, was started on broad-spectrum antibiotics with vancomycin and Zosyn. MRSA swab was negative, so vancomycin was discontinued. He was treated with cefepime and Flagyl. CTA Chest was obtained, negative for PE.  He did develop worsening/recurrent aspiration pneumonia, and his diet was downgraded to n.p.o.  He underwent PEG placement on 3/9.  He is now tolerating tube feeds at goal rate, and able to receive his Parkinson's medications via PEG.  Hospital course complicated by: acute hypoxic respiratory failure, dysphagia, hypertension, hypokalemia, anemia, lethargy/AMS, worsening Parkinson's symptoms.      Currently, patient reports that he is doing well.  His wife notes a productive cough with increasing frequency.  He denies any fevers, chills, dyspnea, chest pain.  He is experiencing significant tremor, rigidity and freezing.  He denies any focal numbness or weakness.  Past Medical History:  has a past medical history of Acute bronchitis, CAD (coronary artery disease), Cancer (HCC), Clostridioides difficile infection, Diabetes mellitus (HCC), Erectile dysfunction, GERD (gastroesophageal reflux disease), Hyperlipidemia, Obesity, and Parkinson disease.  Past Surgical History:  has a past surgical history  cueing Increased time to complete task  Lower Extremity Dressing: dependent  Dressing Equipment: none  Dressing Comments: maxA UB dressing for donning shirt - pt able to thread shirt onto L UE and assist w/ completion overhead - however pt required assist to thread R UE and shirt fully overhead and trunk; DEP for all LB dressing and sock management - totalA for clothing management over/under hips while in stance and CGA-Juventino for balance during completion; maximal cues for pt participation in task  Toileting: maximum assistance.    Toileting Equipment: none  Toileting Comments: pt voiding urine into urinal in stance at EOB this date - pt wife holding urinal for pt w/ CGA-Juventino for balance from OT; totalA for clothing management and esperanza care  General Comments: maximal cues for participation this date; increased assistance required d/t lethargy, fatigue, and increased tremors; no complaints of dizziness or lightheadedness this date  Instrumental Activities of Daily Living  No IADL completed on this date.      Functional Mobility  Bed Mobility:  Supine to Sit: dependent assistance  Sit to Supine: dependent assistance  Scooting: contact guard assistance  Comments: completed w/ HOB flat; cues for participation and encouragement but pt falling asleep during completion  Transfers:  Sit to stand transfer:minimal assistance  Stand to sit transfer: minimal assistance  Shower transfer: minimal assistance  Shower transfer equipment: tub transfer bench, grab bars  Shower transfer comments: use of grab bar on L  Comments: cues for safety, initiation, sequencing, and hand placement; Juventino this date d/t decreased responsiveness  Functional Mobility  Functional Mobility Activity: to/from bathroom  Device Use: no device  Required Assistance: minimal assistance, moderate assistance  Comment: varying min-modA this date d/t increased posterior lean, decreased responsiveness/increased fatigue and lethargy  Balance:  Static Sitting Balance:  to person; able to state  w/ extended time  Command Following:   impaired     Education  Barriers To Learning: cognition and physical  Patient Education: patient educated on goals, OT role and benefits, plan of care, ADL adaptive strategies, IADL safety, proper use of assistive device/equipment, adaptive device training, energy conservation, orientation, family education, transfer training, discharge recommendations  Learning Assessment:  patient will require reinforcement due to cognitive deficits    Assessment  Activity Tolerance: poor - little participation, max cueing, increased assistance required for ADL completion this date  Impairments Requiring Therapeutic Intervention: decreased functional mobility, decreased ADL status, decreased ROM, decreased strength, decreased safety awareness, decreased cognition, decreased endurance, decreased balance, decreased vision, decreased IADL, decreased fine motor control, decreased coordination, decreased posture  Prognosis: fair  Clinical Assessment: Progress limited this date d/t decreased responsiveness, lethargy, decreased motivation, and increased parkinson's symptoms including but not limited to tremors. Pt required increased assistance in all ADL completion and functional mobility this date. Ongoing convo w/ wife regarding her abilities to caregiver at baseline. [facility] ed completed during sessions as wife is present but recommend formal [facility] ed session prior to d/c if pt plans to return home. Ongoing cueing for hand placement, sequencing, and initiation for all transfers, tasks, and functional mobility. Skilled OT services con't to be warranted to maximize IND and safety in all occupational pursuits. Con't per POC.   Safety Interventions: patient left in chair, chair alarm in place, call light within reach, patient at risk for falls, and family/caregiver present    Plan  Frequency: 5 x/week, 60 min/day  Current Treatment Recommendations: strengthening, ROM, balance

## 2024-03-20 NOTE — PLAN OF CARE
Problem: Discharge Planning  Goal: Discharge to home or other facility with appropriate resources  3/20/2024 1159 by Andi Stokes, RN  Outcome: Progressing     Problem: Pain  Goal: Verbalizes/displays adequate comfort level or baseline comfort level  3/20/2024 1159 by Andi Stokes, RN  Outcome: Progressing     Problem: Safety - Adult  Goal: Free from fall injury  3/20/2024 1159 by Andi Stokes, RN  Outcome: Progressing

## 2024-03-20 NOTE — PROGRESS NOTES
Pt comfortably resting in chair. VSS. Denies any pain at this moment. Morning meds given per MAR. Pills crushed in G-tube. AXOX2. Call light and bedside table in reach. Chair locked and alarm on. The care plan and education has been reviewed and mutually agreed upon with the patient.

## 2024-03-20 NOTE — PLAN OF CARE
Problem: Discharge Planning  Goal: Discharge to home or other facility with appropriate resources  3/20/2024 0003 by Aubree Sena RN  Outcome: Progressing     Problem: Safety - Adult  Goal: Free from fall injury  3/20/2024 0003 by Aubree Sena RN  Outcome: Progressing     Problem: Confusion  Goal: Confusion, delirium, dementia, or psychosis is improved or at baseline  Description: INTERVENTIONS:  1. Assess for possible contributors to thought disturbance, including medications, impaired vision or hearing, underlying metabolic abnormalities, dehydration, psychiatric diagnoses, and notify attending LIP  2. [place] high risk fall precautions, as indicated  3. Provide frequent short contacts to provide reality reorientation, refocusing and direction  4. Decrease environmental stimuli, including noise as appropriate  5. Monitor and intervene to maintain adequate nutrition, hydration, elimination, sleep and activity  6. If unable to ensure safety without constant attention obtain sitter and review sitter guidelines with assigned personnel  7. Initiate Psychosocial CNS and Spiritual Care consult, as indicated  3/20/2024 0003 by Aubree Sena RN  Outcome: Progressing     Problem: Skin/Tissue Integrity  Goal: Absence of new skin breakdown  Description: 1.  Monitor for areas of redness and/or skin breakdown  2.  Assess vascular access sites hourly  3.  Every 4-6 hours minimum:  Change oxygen saturation probe site  4.  Every 4-6 hours:  If on nasal continuous positive airway pressure, respiratory therapy assess nares and determine need for appliance change or resting period.  3/20/2024 0003 by Aubree Sena RN  Outcome: Progressing     Problem: Chronic Conditions and Co-morbidities  Goal: Patient's chronic conditions and co-morbidity symptoms are monitored and maintained or improved  3/20/2024 0003 by Aubree Sena RN  Outcome: Progressing     Problem: Pain  Goal: Verbalizes/displays adequate comfort

## 2024-03-20 NOTE — PROGRESS NOTES
[facility] - Inpatient Rehabilitation Department   Phone: [phone]    Speech Therapy    [] Initial Evaluation            [x] Daily Treatment Note         [] Discharge Summary      Patient: Pacheco Prince   : 1945   MRN: [id number]   Date of Service:  3/20/2024  Admitting Diagnosis: Aspiration pneumonia (HCC)  Current Admission Summary: See MD's summary   Past Medical History:  has a past medical history of Acute bronchitis, CAD (coronary artery disease), Cancer (HCC), Clostridioides difficile infection, Diabetes mellitus (HCC), Erectile dysfunction, GERD (gastroesophageal reflux disease), Hyperlipidemia, Obesity, and Parkinson disease.  Past Surgical History:  has a past surgical history that includes Diagnostic Cardiac Cath Lab Procedure (2005); Coronary angioplasty with stent (2005); Cataract extraction; eye surgery (2021); Tonsillectomy ([facility]); Percutaneous Transluminal Coronary Angio (); Upper gastrointestinal endoscopy (); and Upper gastrointestinal endoscopy (N/A, 3/8/2024).    Recent Head CT completed 3/4/24:  IMPRESSION:  1.  No acute intracranial abnormality.  2. Findings of paranasal sinusitis with right middle ear and mastoid effusion    Recent Chest xray completed 3/7/24:  IMPRESSION:  New right lung base infiltrate.    Instrumental Swallow Study:   Modified Barium Swallow evaluation completed on 3/4/2024. Patient presents with mild oropharyngeal dysphagia secondary to prolonged mastication, premature spillage to pyriform sinuses, reduced AP propulsion, delayed swallow initiation, reduced laryngeal elevation, decreased tongue base retraction and reduced pharyngeal contraction. Pt demonstrated aspiration after the swallow with initial trial of thin liquids via tsp, suspect in relation to sensation although pt demonstrated a hard cough in response. No laryngeal penetration or aspiration was viewed with thin liquids via cup/straw, mildly (nectar)  of water for comfort following oral care  Avoid swish and spit method of medication administration - recommend to use a toothette to coat the lingual surface due to pt's high aspiration risk.     Recommended Compensatory Swallowing Strategies: Oral care to be completed multiple times daily, ensure upright positioning and alertness prior to providing small amounts of ice chips/ tsp amounts of water       Plan  Frequency: 60 minutes/day; 5 days per week, as tolerated, until goals met, or discharged from ARU.  Therapeutic Interventions: Laryngeal Exercises , Pharyngeal Exercise, Vital Stim/NMES, Patient/Family Education , Therapeutic Trials with SLP , Instrumental assessment of swallow function (SLP FEES EVALUATION) -- (therapeutic trials and FEES when okayed by rehab MD) Expressive/ Receptive Language intervention , Speech / Motor Planning / Voice intervention , Cognitive-Linguistic intervention , Compensatory Cognitive intervention , Patient/ Family education , and Bahman Radha Voice Therapy (LSVT)  (modified)     Discharge  Barriers to discharge: Inability to effectively communicate in emergent situations (ex: calling 911, stating name, reduced intelligibility, etc)  Inability to communicate or demonstrate problem solving due to cognitive-communicative impairment  Severity of cognition (mild, mod, severe) with reduced insight negatively impacting safety/independence  Discharge Recommendations: 24 hour supervision  Continued SLP at Discharge: Yes  and LSVT     Goals  Patient Goals: When pt asked unable to provide answer wife chimed in stating pt would like to get back to eating ice cream and pt agreed via head nod    Time Frame: 14-21 days     Pt will complete oropharyngeal strengthening exercises and tolerate po trials of ice/ water consistently without overt clinical s/s of aspiration. Ongoing    Pt will participate in a repeat instrumental swallow assessment (FEES) to further assess the pharyngeal phase of the  swallow when cleared by rehab MD. Ongoing    Pt will complete graded speech tasks using LOUD speech throughout targeted tasks and during session with < mod cues. Ongoing    Patient will complete verbal description and word retrieval tasks with 75% accuracy, min cues. Ongoing    Pt will improve orientation and recall functional information (daily tasks, personal hx, etc.) with 80% accuracy with use of cognitive aids as needed. Ongoing    Pt will respond and/or initiate action upon verbal prompt within 5 seconds in 4 out of 5 opportunities without repetition. Ongoing         Therapy Session Time      Session 1 Session 2   Time In 0815    Time Out 0845    Time Code Minutes 0    Individual Minutes 30      Timed Code Treatment Minutes: 0  Total Treatment Minutes:  30  30 minute variance -  inability to maintain adequate alertness to participate x2 attempts. Will plan to schedule additional therapy time tomorrow.     Electronically Signed By:   Nancy Gaitan M.A. [facility]-SLP S.PRobbi [id number]  Speech-Language Pathologist   3/20/2024 9:29 AM

## 2024-03-20 NOTE — CARE COORDINATION
[facility] Contin[facility] Nurse  Follow-up Progress Note       NAME:  Pacheco Prince  MEDICAL RECORD NUMBER:  [id number]  AGE:  79 y.o.   GENDER:  male  :  1945  TODAY'S DATE:  3/20/2024    Subjective:   Wound Identification:  Wound Type:  masd associated skin damage to coccyx, black linear spot to left heel  Contributing Factors:  parkinsons        Patient Goal of Care:  [x] Wound Healing  [] Odor Control  [] Palliative Care  [] Pain Control   [] Other:     Objective:    BP (!) 106/47   Pulse 56   Temp 97.5 °F (36.4 °C) (Axillary)   Resp 20   Ht 1.778 m (5' 10\")   Wt 82.6 kg (182 lb)   SpO2 96%   BMI 26.11 kg/m²   Goldy Risk Score: Goldy Scale Score: 16  Assessment:   Measurements:  Wound 24 Heel Right (Active)   Wound Image   24 1549   Wound Etiology Deep tissue/Injury 24 1000   Dressing Status Clean;Dry;Intact 24 1000   Wound Cleansed Not Cleansed 03/15/24 0845   Dressing/Treatment Foam 24 1000   Offloading for Diabetic Foot Ulcers Other (comment) 24 1000   Wound Assessment Purple/maroon;Dry 24 1000   Drainage Amount None (dry) 24 1000   Odor None 24 1000   Keli-wound Assessment Intact 24 1000   Margins Defined edges 03/15/24 0845   Number of days: 8       Wound 24 Heel Left pink, non blanchable, intact skin (Active)   Wound Image   24 1634   Dressing Status Clean;Dry;Intact 24 1000   Dressing/Treatment Foam 24 1000   Offloading for Diabetic Foot Ulcers Other (comment) 24 1000   Wound Assessment Dry 24 1000   Drainage Amount None (dry) 24 1000   Odor None 24 1000   Keli-wound Assessment Intact 24 1000   Number of days: 8       Wound 24 Coccyx redness and dry skin interguteal cleft. (Active)   Wound Image   24 1634   Wound Etiology Other 24 1634   Number of days: 8     Patient seen per wife's request for linear black spot to left  heel.  Also wanted redness to coccyx evaluated.  Patient is from home, his wife is his caregiver.  Black spot to left heel is under intact skin.  Could be debris if there had been a fissure.  As stated skin is intact. Left heel feels sligthly mushy.  Patient does not report pain.  Recommend wiping with barrier film twice a day.  Also patient utilizing heel protect boots.      Also wife wanted me to look at skin to buttocks.  In intergluteal cleft there is redness, also a patch of dry skin to the right side of the intergluteal cleft. Maybe patient had a wound previous, but there is only scarring.  Patient is incontinent at times and needs to be assisted to bathroom.  Recommend continuing to use barrier cream and absorbent briefs.  Also please help to relieve pressure by assisting patient to reposition every two hours while in bed, use pressure redistribution cushion in chair.    Wife agreeable to recommendations.  Also recommend following up with a podiatrist after discharge.  Patient has dti to right heel that has intact skin, wife unsure if patient will be able to wear shoes.  However patient has been up walking with therapists but using non skid socks.     Right heel  Response to treatment:  Well tolerated by patient.     Pain Assessment:  Severity:  0 / 10  Quality of pain: N/A  Wound Pain Timing/Severity: none  Premedicated: No  Plan:   Plan of Care: Wound 03/11/24 Heel Right-Dressing/Treatment: Foam (AFO in place/ protective barrier)  Wound 03/11/24 Heel Left pink, non blanchable, intact skin-Dressing/Treatment: Foam (AFO in place/ barrier protection)  Buttocks, apply barrier cream twice daily and as needed.  Assist patient to reposition every two hours and as needed.    Specialty Bed Required : Yes   [x] Low Air Loss   [x] Pressure Redistribution  [] Fluid Immersion  [] Bariatric  [] Total Pressure Relief  [] Other:     Current Diet: ADULT TUBE FEEDING; PEG; Standard with Fiber; Bolus; 6 Times Daily; 240;  Gravity; 50; Before and after each bolus  Dietician consult:  Yes    Discharge Plan:  Placement for patient upon discharge: home with support   Patient appropriate for Outpatient Wound Care Center: Yes    Referrals:  []   [] Home Health Care  [] Supplies  [] Other    Patient/Caregiver Teaching: patient wife is his caregiver  Level of patient/caregiver understanding able to:   [x] Indicates understanding       [x] Needs reinforcement  [] Unsuccessful      [] Verbal Understanding  [] Demonstrated understanding       [] No evidence of learning  [] Refused teaching         [] N/A       Electronically signed by SALAS RAMOS, RN, CWOCN  Inpatient  Wound/Ostomy Care  [phone]  on 3/20/2024 at 4:36 PM

## 2024-03-20 NOTE — PROGRESS NOTES
[facility] - Inpatient Rehabilitation Department   Phone: [phone]    Physical Therapy    [] Initial Evaluation            [x] Daily Treatment Note         [] Discharge Summary      Patient: Pacheco Prince   : 1945   MRN: [id number]   Date of Service:  3/20/2024  Admitting Diagnosis: Aspiration pneumonia (HCC)  Current Admission Summary: Pacheco Prince is a 79 y.o. male with PMHx notable for Parkinson's disease, DM2, CAD who presented on 24 with fever, and cough. He was on a cruise when symptoms developed, was diagnosed with pneumonia and started on IV Zosyn. He continued to decline, requiring medical flight back home to [place]. Etiology of pneumonia is presumed aspiration, was started on broad-spectrum antibiotics with vancomycin and Zosyn. MRSA swab was negative, so vancomycin was discontinued. He was treated with cefepime and Flagyl. CTA Chest was obtained, negative for PE.  He did develop worsening/recurrent aspiration pneumonia, and his diet was downgraded to n.p.o.  He underwent PEG placement on 3/9.  He is now tolerating tube feeds at goal rate, and able to receive his Parkinson's medications via PEG.  Hospital course complicated by: acute hypoxic respiratory failure, dysphagia, hypertension, hypokalemia, anemia, lethargy/AMS, worsening Parkinson's symptoms.   Past Medical History:  has a past medical history of Acute bronchitis, CAD (coronary artery disease), Cancer (HCC), Clostridioides difficile infection, Diabetes mellitus (HCC), Erectile dysfunction, GERD (gastroesophageal reflux disease), Hyperlipidemia, Obesity, and Parkinson disease.  Past Surgical History:  has a past surgical history that includes Diagnostic Cardiac Cath Lab Procedure (2005); Coronary angioplasty with stent (2005); Cataract extraction; eye surgery (2021); Tonsillectomy ([facility]); Percutaneous Transluminal Coronary Angio (); Upper gastrointestinal endoscopy (); and

## 2024-03-20 NOTE — PATIENT CARE CONFERENCE
[facility]  Inpatient Rehabilitation  Weekly Team Conference Note    Patient Name: Pacheco Prince        MRN: [id number]    : 1945  (79 y.o.)  Gender: male           The team conference for this patient was held on 3/21/2024 at 11am and led by:  Keon Sheets MD     CASE MANAGEMENT:  Assessment: Pacheco Prince is a 79 year old male that has been admitted to ARU.  Patient lives at home with his spouse. Patient has 4 children who provide support as needed.  The patient's anticipated discharge date is on 3/27/2024. Therapy recommends the patient to return home with home health care. The SW will work with the patient and spouse with navigating  home health care and any other needed supports at home.     PHYSICAL THERAPY:    Transfers:  Sit to stand transfer: minimal assistance  Stand to sit transfer: minimal assistance   Comments:  Increased time to process cues and required assist to initiate the movement today. More delayed than previous sessions.   Ambulation:  Surface:level surface  Assistive Device: hand-held assist  Assistance: contact guard assistance   Distance: 30 ft, 15 ft, 25 ft   Gait Mechanics: slower fabien and smaller steps than previous session, increased tremors, required HHA for continuation of walking   Comments:  Distance limited by dizziness. BP limited by tremors ~122/52 with HR 40     QM:  Roll Left and Right  Assistance Needed: Dependent  CARE Score: 1  Discharge Goal: Supervision or touching assistance  Sit to Lying  Assistance Needed: Dependent  CARE Score: 1  Discharge Goal: Supervision or touching assistance  Lying to Sitting on Side of Bed  Assistance Needed: Partial/moderate assistance  CARE Score: 3  Discharge Goal: Supervision or touching assistance  Sit to Stand  Assistance Needed: Partial/moderate assistance  CARE Score: 3  Discharge Goal: Supervision or touching assistance  Chair/Bed-to-Chair Transfer  Assistance Needed: Partial/moderate  22%    Reagan Fall Risk Score: 98  Wounds/Incisions/Ulcers: Deep tissue injury to heel  Medication Review: Reviewed daily with patient  Pain: Managed with prescribed medication  Consultations/Labs/X-rays: Labs [facility]    Patient/Family Education provided by team:    Discharge Plan   Estimated Length of Stay:6 days  Destination: Home  Pass:No  Services at Discharge: TBD pending pt progress - anticipate HHOT/PT but ongoing OT/PT services at time of d/c  Equipment at Discharge: Pt has all equipment required for d/c  Factors facilitating achievement of predicted outcomes: Family support and Caregiver support  Barriers to the achievement of predicted outcomes: Cognitive deficit, Impulsivity, Limited safety awareness, Limited insight into deficits, Communication deficit, Long standing deficits, Impaired vision, Stairs at home, and Wound Care    Patient Goals:  When pt asked unable to provide answer wife chimed in stating pt would like to get back to eating ice cream and pt agreed via head nod, per wife \"get back to everything he was doing before\", per wife: \"get back to everything he was doing before\" - pt did not state goals upon questioning        Interdisciplinary Individualized Plan of Care Review of Previous Week:    Medical and functional progress towards goals:  Medically the patient has completed antibiotics for prior aspiration pneumonia.  Receiving intermittent Midodrine use due to hypotension.  Routine EKG completed today to monitor baseline bradycardia.  Ongoing deep tissue injury to (R) heel, multi-podus boot use to offload with wound healing supplement within tube feedings.  Low dose of Atarax added for anxiety PRN.  Tentative plan for FEES in the next couple days as tremors will allow.  May benefit from hospital bed at discharge.  Progress limited 3/20 d/t decreased responsiveness, lethargy, decreased motivation, and increased parkinson's symptoms including but not limited to tremors. Pt required increased

## 2024-03-20 NOTE — PROGRESS NOTES
Pacheco Prince  3/20/2024  [id number]    Chief Complaint: Aspiration pneumonia (HCC)    Subjective:   Patient having some increased tremor and rigidity this morning, received Sinemet dose a little off schedule. He is breathing comfortably. Denies any new pain complaints. He has been experiencing more lightheadedness today, associated with lower BP readings while standing.     Last BM: Stool Occurrence: 1 (03/19/24 1134)    Objective:  Patient Vitals for the past 24 hrs:   BP Temp Temp src Pulse Resp SpO2   03/20/24 0930 (!) 106/47 97.5 °F (36.4 °C) Axillary 56 20 96 %   03/19/24 2115 (!) 114/41 98.5 °F (36.9 °C) Oral (!) 40 18 95 %     Gen: No distress, pleasant.   HEENT: Normocephalic, atraumatic.  CV: Regular rate and rhythm. Extremities warm, well perfused.   Resp: No respiratory distress. CTAB.  Abd: Soft, non-tender.   Ext: No edema.   Neuro: Alert. Appropriate verbal responses given increased time. Moves bilateral upper and lower extremities spontaneously. Bilateral upper extremity tremor.   Skin: Deep tissue injury present on right heel. Linear eschar/scab present within area of dry/excoriated skin in left calcaneal aspect of heel, nontender to palpation. PRAFO boot and heel cups in place bilaterally. (Not reassessed on this date).     Laboratory data: Available via EMR.     Therapy progress:       PT    Supine to Sit: Partial/moderate assistance  Sit to Supine: Dependent   Sit to Stand: Partial/moderate assistance  Chair/Bed to Chair Transfer: Partial/moderate assistance  Car Transfer:    Ambulation 10 ft: Partial/moderate assistance  Ambulation 50 ft: Partial/moderate assistance  Ambulation 150 ft: Partial/moderate assistance  Stairs - 1 Step: Dependent  Stairs - 4 Step: Dependent  Stairs - 12 Step:      OT    Eating: Dependent  Oral Hygiene: Dependent  Bathing: Dependent  Upper Body Dressing: Partial/moderate assistance  Lower Body Dressing: Dependent  Toilet Transfer: Dependent  Toilet Hygiene:

## 2024-03-21 LAB
GLUCOSE BLD-MCNC: 101 MG/DL (ref 70–99)
GLUCOSE BLD-MCNC: 130 MG/DL (ref 70–99)
GLUCOSE BLD-MCNC: 142 MG/DL (ref 70–99)
PERFORMED ON: ABNORMAL

## 2024-03-21 PROCEDURE — 97530 THERAPEUTIC ACTIVITIES: CPT

## 2024-03-21 PROCEDURE — 92507 TX SP LANG VOICE COMM INDIV: CPT

## 2024-03-21 PROCEDURE — 93005 ELECTROCARDIOGRAM TRACING: CPT | Performed by: STUDENT IN AN ORGANIZED HEALTH CARE EDUCATION/TRAINING PROGRAM

## 2024-03-21 PROCEDURE — 92526 ORAL FUNCTION THERAPY: CPT

## 2024-03-21 PROCEDURE — 97110 THERAPEUTIC EXERCISES: CPT

## 2024-03-21 PROCEDURE — 97535 SELF CARE MNGMENT TRAINING: CPT

## 2024-03-21 PROCEDURE — 6370000000 HC RX 637 (ALT 250 FOR IP): Performed by: STUDENT IN AN ORGANIZED HEALTH CARE EDUCATION/TRAINING PROGRAM

## 2024-03-21 PROCEDURE — 6360000002 HC RX W HCPCS: Performed by: STUDENT IN AN ORGANIZED HEALTH CARE EDUCATION/TRAINING PROGRAM

## 2024-03-21 PROCEDURE — 1280000000 HC REHAB R&B

## 2024-03-21 PROCEDURE — 97129 THER IVNTJ 1ST 15 MIN: CPT

## 2024-03-21 RX ORDER — HYDROXYZINE HYDROCHLORIDE 10 MG/1
10 TABLET, FILM COATED ORAL 3 TIMES DAILY PRN
Status: DISCONTINUED | OUTPATIENT
Start: 2024-03-21 | End: 2024-03-28 | Stop reason: HOSPADM

## 2024-03-21 RX ADMIN — CARBIDOPA AND LEVODOPA 3 TABLET: 25; 100 TABLET ORAL at 15:29

## 2024-03-21 RX ADMIN — CARBIDOPA AND LEVODOPA 3 TABLET: 25; 100 TABLET ORAL at 12:29

## 2024-03-21 RX ADMIN — CARBIDOPA AND LEVODOPA 3 TABLET: 25; 100 TABLET ORAL at 21:52

## 2024-03-21 RX ADMIN — MELATONIN TAB 3 MG 3 MG: 3 TAB at 21:53

## 2024-03-21 RX ADMIN — CARBIDOPA AND LEVODOPA 3 TABLET: 25; 100 TABLET ORAL at 17:06

## 2024-03-21 RX ADMIN — CLOZAPINE 25 MG: 25 TABLET ORAL at 08:52

## 2024-03-21 RX ADMIN — CARBIDOPA AND LEVODOPA 3 TABLET: 25; 100 TABLET ORAL at 08:38

## 2024-03-21 RX ADMIN — IPRATROPIUM BROMIDE 2 SPRAY: 42 SPRAY NASAL at 12:29

## 2024-03-21 RX ADMIN — Medication 1 CAPSULE: at 08:38

## 2024-03-21 RX ADMIN — CARBIDOPA AND LEVODOPA 3 TABLET: 25; 100 TABLET ORAL at 18:27

## 2024-03-21 RX ADMIN — LANSOPRAZOLE 30 MG: 30 TABLET, ORALLY DISINTEGRATING, DELAYED RELEASE ORAL at 18:28

## 2024-03-21 RX ADMIN — Medication 1 CAPSULE: at 15:29

## 2024-03-21 RX ADMIN — ASPIRIN 81 MG: 81 TABLET, CHEWABLE ORAL at 08:38

## 2024-03-21 RX ADMIN — CARBIDOPA AND LEVODOPA 3 TABLET: 25; 100 TABLET ORAL at 10:36

## 2024-03-21 RX ADMIN — Medication 2000 UNITS: at 08:38

## 2024-03-21 RX ADMIN — IPRATROPIUM BROMIDE 2 SPRAY: 42 SPRAY NASAL at 08:39

## 2024-03-21 RX ADMIN — CLOZAPINE 25 MG: 25 TABLET ORAL at 21:53

## 2024-03-21 RX ADMIN — THERA TABS 1 TABLET: TAB at 08:38

## 2024-03-21 RX ADMIN — CARBOXYMETHYLCELLULOSE SODIUM 1 DROP: 10 GEL OPHTHALMIC at 21:54

## 2024-03-21 RX ADMIN — CARBIDOPA AND LEVODOPA 3 TABLET: 25; 100 TABLET ORAL at 04:52

## 2024-03-21 RX ADMIN — FLUTICASONE PROPIONATE 2 SPRAY: 50 SPRAY, METERED NASAL at 21:53

## 2024-03-21 RX ADMIN — ATORVASTATIN CALCIUM 80 MG: 80 TABLET, FILM COATED ORAL at 21:52

## 2024-03-21 RX ADMIN — PRIMIDONE 50 MG: 50 TABLET ORAL at 21:53

## 2024-03-21 RX ADMIN — ENOXAPARIN SODIUM 40 MG: 100 INJECTION SUBCUTANEOUS at 08:40

## 2024-03-21 RX ADMIN — NYSTATIN 500000 UNITS: 100000 SUSPENSION ORAL at 21:52

## 2024-03-21 RX ADMIN — HYDROXYZINE HYDROCHLORIDE 10 MG: 10 TABLET ORAL at 12:36

## 2024-03-21 RX ADMIN — MIDODRINE HYDROCHLORIDE 5 MG: 5 TABLET ORAL at 08:52

## 2024-03-21 RX ADMIN — CARBIDOPA AND LEVODOPA 3 TABLET: 25; 100 TABLET ORAL at 02:48

## 2024-03-21 RX ADMIN — NYSTATIN 500000 UNITS: 100000 SUSPENSION ORAL at 15:29

## 2024-03-21 RX ADMIN — CARBIDOPA AND LEVODOPA 3 TABLET: 25; 100 TABLET ORAL at 06:47

## 2024-03-21 RX ADMIN — NYSTATIN 500000 UNITS: 100000 SUSPENSION ORAL at 08:38

## 2024-03-21 RX ADMIN — DONEPEZIL HYDROCHLORIDE 10 MG: 5 TABLET, FILM COATED ORAL at 21:53

## 2024-03-21 RX ADMIN — NYSTATIN 500000 UNITS: 100000 SUSPENSION ORAL at 12:28

## 2024-03-21 RX ADMIN — CARBOXYMETHYLCELLULOSE SODIUM 1 DROP: 10 GEL OPHTHALMIC at 08:39

## 2024-03-21 ASSESSMENT — PAIN SCALES - GENERAL: PAINLEVEL_OUTOF10: 0

## 2024-03-21 NOTE — PROGRESS NOTES
[facility] - Inpatient Rehabilitation Department   Phone: [phone]    Physical Therapy    [] Initial Evaluation            [x] Daily Treatment Note         [] Discharge Summary      Patient: Pacheco Prince   : 1945   MRN: [id number]   Date of Service:  3/21/2024  Admitting Diagnosis: Aspiration pneumonia (HCC)  Current Admission Summary: Pacheco Prince is a 79 y.o. male with PMHx notable for Parkinson's disease, DM2, CAD who presented on 24 with fever, and cough. He was on a cruise when symptoms developed, was diagnosed with pneumonia and started on IV Zosyn. He continued to decline, requiring medical flight back home to [place]. Etiology of pneumonia is presumed aspiration, was started on broad-spectrum antibiotics with vancomycin and Zosyn. MRSA swab was negative, so vancomycin was discontinued. He was treated with cefepime and Flagyl. CTA Chest was obtained, negative for PE.  He did develop worsening/recurrent aspiration pneumonia, and his diet was downgraded to n.p.o.  He underwent PEG placement on 3/9.  He is now tolerating tube feeds at goal rate, and able to receive his Parkinson's medications via PEG.  Hospital course complicated by: acute hypoxic respiratory failure, dysphagia, hypertension, hypokalemia, anemia, lethargy/AMS, worsening Parkinson's symptoms.   Past Medical History:  has a past medical history of Acute bronchitis, CAD (coronary artery disease), Cancer (HCC), Clostridioides difficile infection, Diabetes mellitus (HCC), Erectile dysfunction, GERD (gastroesophageal reflux disease), Hyperlipidemia, Obesity, and Parkinson disease.  Past Surgical History:  has a past surgical history that includes Diagnostic Cardiac Cath Lab Procedure (2005); Coronary angioplasty with stent (2005); Cataract extraction; eye surgery (2021); Tonsillectomy ([facility]); Percutaneous Transluminal Coronary Angio (); Upper gastrointestinal endoscopy (); and

## 2024-03-21 NOTE — PLAN OF CARE
Problem: Pain  Goal: Verbalizes/displays adequate comfort level or baseline comfort level  3/20/2024 2138 by Jyoti Cole RN  Outcome: Progressing  3/20/2024 1159 by Andi Stokes RN  Outcome: Progressing     Problem: Safety - Adult  Goal: Free from fall injury  3/20/2024 2138 by Jyoti Cole RN  Outcome: Progressing  3/20/2024 1159 by Andi Stokes RN  Outcome: Progressing     Problem: Confusion  Goal: Confusion, delirium, dementia, or psychosis is improved or at baseline  Description: INTERVENTIONS:  1. Assess for possible contributors to thought disturbance, including medications, impaired vision or hearing, underlying metabolic abnormalities, dehydration, psychiatric diagnoses, and notify attending LIP  2. [place] high risk fall precautions, as indicated  3. Provide frequent short contacts to provide reality reorientation, refocusing and direction  4. Decrease environmental stimuli, including noise as appropriate  5. Monitor and intervene to maintain adequate nutrition, hydration, elimination, sleep and activity  6. If unable to ensure safety without constant attention obtain sitter and review sitter guidelines with assigned personnel  7. Initiate Psychosocial CNS and Spiritual Care consult, as indicated  Outcome: Progressing     Problem: Skin/Tissue Integrity  Goal: Absence of new skin breakdown  Description: 1.  Monitor for areas of redness and/or skin breakdown  2.  Assess vascular access sites hourly  3.  Every 4-6 hours minimum:  Change oxygen saturation probe site  4.  Every 4-6 hours:  If on nasal continuous positive airway pressure, respiratory therapy assess nares and determine need for appliance change or resting period.  Outcome: Progressing     Problem: Chronic Conditions and Co-morbidities  Goal: Patient's chronic conditions and co-morbidity symptoms are monitored and maintained or improved  Outcome: Progressing     Problem: Nutrition Deficit:  Goal: Optimize nutritional status  Outcome:  84

## 2024-03-21 NOTE — CARE COORDINATION
Team conference/Weekly Summary     Team conference held today. Spoke with patient and Wife to discuss progress with therapy, barriers to discharge, and plans to return home. Estimated discharge date set for 3/27/2024. Patient anticipates discharging to home with home healthcare provided by [facility] ([facility] Will continue to follow for support and discharge planning.        Electronically signed by EDDIE Blair on 3/21/2024 at 1:13 PM

## 2024-03-21 NOTE — PROGRESS NOTES
[facility] - Inpatient Rehabilitation Department   Phone: [phone]    Occupational Therapy    [] Initial Evaluation            [x] Daily Treatment Note         [] Discharge Summary      Patient: Pacheco Prince   : 1945   MRN: [id number]   Date of Service:  3/21/2024    Admitting Diagnosis:  Aspiration pneumonia (HCC)  Current Admission Summary: Pacheco Prince is a 79 y.o. male with PMHx notable for Parkinson's disease, DM2, CAD who presented on 24 with fever, and cough. He was on a cruise when symptoms developed, was diagnosed with pneumonia and started on IV Zosyn. He continued to decline, requiring medical flight back home to [place]. Etiology of pneumonia is presumed aspiration, was started on broad-spectrum antibiotics with vancomycin and Zosyn. MRSA swab was negative, so vancomycin was discontinued. He was treated with cefepime and Flagyl. CTA Chest was obtained, negative for PE.  He did develop worsening/recurrent aspiration pneumonia, and his diet was downgraded to n.p.o.  He underwent PEG placement on 3/9.  He is now tolerating tube feeds at goal rate, and able to receive his Parkinson's medications via PEG.  Hospital course complicated by: acute hypoxic respiratory failure, dysphagia, hypertension, hypokalemia, anemia, lethargy/AMS, worsening Parkinson's symptoms.      Currently, patient reports that he is doing well.  His wife notes a productive cough with increasing frequency.  He denies any fevers, chills, dyspnea, chest pain.  He is experiencing significant tremor, rigidity and freezing.  He denies any focal numbness or weakness.  Past Medical History:  has a past medical history of Acute bronchitis, CAD (coronary artery disease), Cancer (HCC), Clostridioides difficile infection, Diabetes mellitus (HCC), Erectile dysfunction, GERD (gastroesophageal reflux disease), Hyperlipidemia, Obesity, and Parkinson disease.  Past Surgical History:  has a past surgical history  assistance   Patient will complete functional transfers at stand by assistance   Patient will increase functional standing balance to 2+ min for improved ADL completion      Above goals reviewed on 3/21/2024.  All goals are ongoing at this time unless indicated above.       Therapy Session Time     Individual Group Co-treatment   Time In 1245     Time Out 1345     Minutes 60          Individual Group Co-treatment   Time In      Time Out      Minutes        Timed Code Treatment Minutes: 60 min  Total Treatment Minutes: 60 min        Electronically Signed By: CHENTE Livingston MOT OTR/L, [id number] 3/21/2024 3:05 PM

## 2024-03-21 NOTE — PROGRESS NOTES
Shift assessment done. All night time medications given via PEG tube, patient tolerated well. All fall precautions in place. All needs attended. Electronically signed by Jyoti Cole RN on 3/20/2024 at 9:39 PM

## 2024-03-21 NOTE — CARE COORDINATION
Discharge Planning:     (CM) called [facility] staff, [place] ([phone]), who agreed to follow patient for possible DME discharge need of a hospital bed. [facility],    [facility], [place] ([phone]), and informed of patient's PEG Tube Feed needs. [facility] to follow for Tube Feed discharge needs.      Electronically signed by EDDIE Blair on 3/21/2024 at 1:29 PM

## 2024-03-21 NOTE — PROGRESS NOTES
[facility] - Inpatient Rehabilitation Department   Phone: [phone]    Speech Therapy    [] Initial Evaluation            [x] Daily Treatment Note         [] Discharge Summary      Patient: Pacheco Prince   : 1945   MRN: [id number]   Date of Service:  3/21/2024  Admitting Diagnosis: Aspiration pneumonia (HCC)  Current Admission Summary: See MD's summary   Past Medical History:  has a past medical history of Acute bronchitis, CAD (coronary artery disease), Cancer (HCC), Clostridioides difficile infection, Diabetes mellitus (HCC), Erectile dysfunction, GERD (gastroesophageal reflux disease), Hyperlipidemia, Obesity, and Parkinson disease.  Past Surgical History:  has a past surgical history that includes Diagnostic Cardiac Cath Lab Procedure (2005); Coronary angioplasty with stent (2005); Cataract extraction; eye surgery (2021); Tonsillectomy ([facility]); Percutaneous Transluminal Coronary Angio (); Upper gastrointestinal endoscopy (); and Upper gastrointestinal endoscopy (N/A, 3/8/2024).    Recent Head CT completed 3/4/24:  IMPRESSION:  1.  No acute intracranial abnormality.  2. Findings of paranasal sinusitis with right middle ear and mastoid effusion    Recent Chest xray completed 3/7/24:  IMPRESSION:  New right lung base infiltrate.    Instrumental Swallow Study:   Modified Barium Swallow evaluation completed on 3/4/2024. Patient presents with mild oropharyngeal dysphagia secondary to prolonged mastication, premature spillage to pyriform sinuses, reduced AP propulsion, delayed swallow initiation, reduced laryngeal elevation, decreased tongue base retraction and reduced pharyngeal contraction. Pt demonstrated aspiration after the swallow with initial trial of thin liquids via tsp, suspect in relation to sensation although pt demonstrated a hard cough in response. No laryngeal penetration or aspiration was viewed with thin liquids via cup/straw, mildly (nectar)  wife and SLP   - tremors would intermittently come and go as session progressed; question if some relation to pt frustration with inability to consistently express wants/needs     Motor Speech/Voice: Severity: Moderate   Not directly targeted.     Cognition: Severity: Moderate   Not directly targeted     Additional Interventions:    Session 2: Archana Jernigan MA CCC-SLP   Dysphagia: Severity: Moderate  and Severe    Trials of mildly thick liquid (chocolate milk) via straw due to increased tremors  - pt seemingly tolerated 4/4 trials with no overt clinical s/s of aspiration or change in vocal quality, however, difficult to assess due to dysphonia and whisper-like vq   - pt with 6-7 second swallow delay on first trial with improved timeliness on subsequent trials, with pt initiating swallow in 3 seconds given ongoing verbal/ visual cues   - pt with consistent use of secondary swallows, which could be indicative of remaining pharyngeal residue/ piecemeal swallows     Oropharyngeal Strengthening  - pt completed 2 effortful swallows provided with verbal/ tactile cues, when asked if pt could do another few reps pt stated, \"don't push your luck\"     Comprehension: Severity: Moderate  and Severe   Processing speed   - Responded to questions in < 10 seconds on approximately 50% of presentations    Expressive Language: Severity: Moderate   Expression of Personal Information  - able to state full name,  and age   - initially stated having 9 children (stating number of grandchildren) provided with f/2 did not state how many children he has  - able to name 2/4 children's names, 3/4 given min phonemic cue    Expressing wants/ needs  - unable to consistently determine what he wanted during session due to increase in sweating/ tremors with pt occasionally attempting to place shirt back on, unable to state if he wanted washcloth on/ off or if he needed to wipe face/ neck when asked     Motor Speech/Voice: Severity: Moderate   - able  strengthening exercises and tolerate po trials of ice/ water consistently without overt clinical s/s of aspiration. Ongoing    Pt will participate in a repeat instrumental swallow assessment (FEES) to further assess the pharyngeal phase of the swallow when cleared by rehab MD. Ongoing    Pt will complete graded speech tasks using LOUD speech throughout targeted tasks and during session with < mod cues. Ongoing    Patient will complete verbal description and word retrieval tasks with 75% accuracy, min cues. Ongoing    Pt will improve orientation and recall functional information (daily tasks, personal hx, etc.) with 80% accuracy with use of cognitive aids as needed. Ongoing    Pt will respond and/or initiate action upon verbal prompt within 5 seconds in 4 out of 5 opportunities without repetition. Ongoing         Therapy Session Time      Session 1 Session 2 Session 3 Session 4   Time In 0800 0945 1145 1345   Time Out 0823 1015 1155 1415   Time Code Minutes 10 0 0 0   Individual Minutes 23 30  10 30      Timed Code Treatment Minutes: 10  Total Treatment Minutes:  93    Electronically Signed By:   Session 1 & 3:    Erich Norwood M.A., CCC-SLP [id number]  Speech-Language Pathologist    Session 2 & 4:   Archana Jernigan M.A. CCC-SLP #[id number] 3/21/2024 4:04 PM  Speech-Language Pathologist

## 2024-03-21 NOTE — PROGRESS NOTES
Pacheco Prince  3/21/2024  [id number]    Chief Complaint: Aspiration pneumonia (HCC)    Subjective:   Patient reports that he is doing well today. He slept well overnight. His wife worries that he is experiencing anxiety at time, manifesting as increased tremor and diaphoresis. He is having more orthostatic hypotension, improved with PRN midodrine. He is having intermittent bradycardia, asymptomatic. He is also experiencing rhinitis, which his wife thinks is related to seasonal allergies. She plans to bring in his Allegra from home.     Last BM: Stool Occurrence: 1 (03/21/24 1315)    Objective:  Patient Vitals for the past 24 hrs:   BP Temp Temp src Pulse Resp SpO2   03/21/24 0845 (!) 103/49 -- -- 77 -- --   03/21/24 0730 -- 97.5 °F (36.4 °C) -- 71 18 97 %   03/20/24 2054 125/63 98.6 °F (37 °C) Axillary 67 18 95 %     Gen: No distress, pleasant.   HEENT: Normocephalic, atraumatic.  CV: Regular rate and rhythm. Extremities warm, well perfused.   Resp: No respiratory distress. CTAB.  Abd: Soft, non-tender.   Ext: No edema.   Neuro: Alert. Appropriate verbal responses given increased time. Moves bilateral upper and lower extremities spontaneously. Bilateral upper extremity tremor.   Skin: Deep tissue injury present on right heel. Linear eschar/scab present within area of dry/excoriated skin in left calcaneal aspect of heel, nontender to palpation. PRAFO boot and heel cups in place bilaterally. (Not reassessed on this date).     Laboratory data: Available via EMR.     Therapy progress:       PT    Supine to Sit: Partial/moderate assistance  Sit to Supine: Dependent   Sit to Stand: Partial/moderate assistance  Chair/Bed to Chair Transfer: Partial/moderate assistance  Car Transfer:    Ambulation 10 ft: Partial/moderate assistance  Ambulation 50 ft: Partial/moderate assistance  Ambulation 150 ft: Partial/moderate assistance  Stairs - 1 Step: Dependent  Stairs - 4 Step: Dependent  Stairs - 12 Step:      OT    Eating:  carotid artery stenosis    Parkinson disease    Bilateral carotid artery disease, unspecified type (HCC)    Hypotension    Type 2 diabetes mellitus without complication, without long-term current use of insulin (HCC)    COVID-19    Aspiration pneumonia (HCC)    Acute metabolic encephalopathy    Sepsis (HCC)    Acute aspiration pneumonia (HCC)    Pneumonia of left lower lobe due to infectious organism    TIA (transient ischemic attack)    Parkinson's disease    Pneumonia of both lungs due to infectious organism, unspecified part of lung    Parkinson's disease with dyskinesia       Functional progress: 340' CGA w/ handheld assist.     Plan:  #. Bilateral aspiration pneumonia  #. Dysphagia, secondary to Parkinson's  - s/p initial course of cefepime and Flagyl  - s/p Levaquin for recurrent infection  - Albuterol nebs every 4 hours as needed  - Continue SLP for dysphagia therapy, likely planning for FEES tomorrow. Noted difficulty passing NGT on acute hospital unit. If FEES unable to be performed, will go with MBS. This will depend on the level of interference with tremors/rigidity.   - BMP, CBC stable 3/20. Anemia improving.     #. Bilateral heel deep tissue injury (R>L)  - PRAFO  - Wound care consult  - Apply moisturizer to dry/cracked feet    #. Oral Candidiasis  - Nystatin     #. Left visual field loss vs. inattention  - Noted code stroke called on 3/5 for AMS, right facial droop. CT Head without acute stroke, CTA Head/Neck without large vessel occlusion or significant intracranial stenosis.  - MRI Brain 3/14, motion artifact degraded but without any definite acute infarct.     #. Parkinson's disease w/ dementia  - Continue carbidopa-levodopa  mg 3 tablets every 2 hours while awake + 3 tablets 3 times nightly (note patient takes long acting formation at home, converted to short acting formulation which may be given via G-tube)  -Plan for instrumental swallow evaluation with SLP this week.  Hopeful to resume  patient's home Parkinson regimen.  If unable to tolerate swallowing capsule, will discuss medication regimen and any necessary adjustments to dosing/frequency with patient's neurologist Dr. Rick.   - Continue home clozapine, primidone, donepezil    #. Orthostatic Hypotension  - Midodrine 5 mg twice daily as needed for systolic blood pressure less than 110 mmHg.  At home taking 5 mg 3 times daily.     #. Sinus bradycardia  - Longstanding  - Avoid BB  - EKG with normal rate and rhythm on 3/21.     Chronic Conditions:  CAD, HLD: Atorvastatin 80 mg nightly, ASA 81 mg daily     Diet: ADULT TUBE FEEDING; PEG; Standard with Fiber; Bolus; 6 Times Daily; 240; Gravity; 50; Before and after each bolus; Wound Healing; 1 Dose; BID  Bowels: Per protocol  Bladder: Per protocol   Sleep: melatonin 3 mg nightly  Pain: Tylenol    DVT PPx: Lovenox 40 mg daily  Follow-ups: Neurology (Dr. Rick), PCP  ELOS: 14 days    Interdisciplinary team conference was held today with entire rehab treatment team including PT, OT, SLP (if applicable), Dietician, RN, and SW. Discussion focused on progress toward rehab goals and discharge planning. Making progress. Barriers include cognitive impairments, lack of PO diet (and method of delivering patient's home Parkinson's medication via preferred formulation). We as a medical team, and I as the physician , made a plan to work on these barriers to facilitate safe discharge. Plan will be presented to patient/family (if available).     Keon Sheets MD  3/21/2024, 5:07 PM    * This document was created using dictation software.  While all precautions were taken to ensure accuracy, errors may have occurred.  Please disregard any typographical errors.

## 2024-03-21 NOTE — PLAN OF CARE
Problem: Discharge Planning  Goal: Discharge to home or other facility with appropriate resources  3/21/2024 0824 by Olivia Aguilar RN  Outcome: Progressing     Problem: Pain  Goal: Verbalizes/displays adequate comfort level or baseline comfort level  3/21/2024 0824 by Olivia Aguilar RN  Outcome: Progressing     Problem: Safety - Adult  Goal: Free from fall injury  3/21/2024 0824 by Olivia Aguilar RN  Outcome: Progressing     Problem: Confusion  Goal: Confusion, delirium, dementia, or psychosis is improved or at baseline  Description: INTERVENTIONS:  1. Assess for possible contributors to thought disturbance, including medications, impaired vision or hearing, underlying metabolic abnormalities, dehydration, psychiatric diagnoses, and notify attending LIP  2. [place] high risk fall precautions, as indicated  3. Provide frequent short contacts to provide reality reorientation, refocusing and direction  4. Decrease environmental stimuli, including noise as appropriate  5. Monitor and intervene to maintain adequate nutrition, hydration, elimination, sleep and activity  6. If unable to ensure safety without constant attention obtain sitter and review sitter guidelines with assigned personnel  7. Initiate Psychosocial CNS and Spiritual Care consult, as indicated  3/21/2024 0824 by Olivia Aguilar RN  Outcome: Progressing     Problem: Skin/Tissue Integrity  Goal: Absence of new skin breakdown  Description: 1.  Monitor for areas of redness and/or skin breakdown  2.  Assess vascular access sites hourly  3.  Every 4-6 hours minimum:  Change oxygen saturation probe site  4.  Every 4-6 hours:  If on nasal continuous positive airway pressure, respiratory therapy assess nares and determine need for appliance change or resting period.  3/21/2024 0824 by Olivia Aguilar RN  Outcome: Progressing     Problem: Chronic Conditions and Co-morbidities  Goal: Patient's chronic conditions and co-morbidity symptoms are monitored and maintained or

## 2024-03-21 NOTE — PROGRESS NOTES
Nutrition Note    RECOMMENDATIONS  PO Diet: NPO  Nutrition Support: Bolus TF: 6 cans/day Jevity 1.5 (50 ml water flush before & after each carton, & additional 120 ml water flush q 6 hr)  Wound healing supplement: Rigoberto BID     NUTRITION ASSESSMENT   Pt is tolerating bolus TF, 6 cans/day of Jevity 1.5.  Pt with increased nutrient needs as DTI noted on both heels.  Will add Rigoberto BID to promote wound healing.  Wt is stable.  Will con't to monitor progress & ability to start po diet when appropriate.     Nutrition Related Findings: gluc 142; LBM 3/18, trace edema BLE  Wounds: Deep Tissue Injury  Nutrition Education:  Education not indicated   Nutrition Goals: Tolerate nutrition support at goal rate     MALNUTRITION ASSESSMENT   Chronic Illness  Malnutrition Status: No malnutrition    NUTRITION DIAGNOSIS   Inadequate oral intake related to swallowing difficulty as evidenced by NPO or clear liquid status due to medical condition, nutrition support - enteral nutrition  Increased nutrient needs related to increase demand for energy/nutrients as evidenced by wounds      CURRENT NUTRITION THERAPIES  ADULT TUBE FEEDING; PEG; Standard with Fiber; Bolus; 6 Times Daily; 240; Gravity; 50; Before and after each bolus; Wound Healing; 1 Dose; BID     PO Intake: NPO   PO Supplement Intake:NPO    Current Tube Feeding (TF) Orders:  Additives/Modulars: Wound Healing (Rigoberto BID)  Current TF & Flush Orders Provides: as below  Goal TF & Flush Orders Provides: Transition to bolus TF: 6 cans/day Jevity 1.5 provides 1440 ml; 2160 kcal, 92g pro & 1094 ml free water.  Recommend flushing 50 ml water before & after each can of TF, as well as 120 ml water flush q 6 hr to meet fluid needs.    ANTHROPOMETRICS  Current Height: 177.8 cm (5' 10\")  Current Weight - Scale: 82.6 kg (182 lb)    Ideal Body Weight (IBW): 166 lbs  (75 kg)        BMI: 26.1      COMPARATIVE STANDARDS  Total Energy Requirements (kcals/day): 1652- 2065     Protein (g):  90-150g

## 2024-03-22 LAB
ANION GAP SERPL CALCULATED.3IONS-SCNC: 10 MMOL/L (ref 3–16)
BACTERIA URNS QL MICRO: ABNORMAL /HPF
BASOPHILS # BLD: 0.1 K/UL (ref 0–0.2)
BASOPHILS NFR BLD: 1.1 %
BILIRUB UR QL STRIP.AUTO: NEGATIVE
BUN SERPL-MCNC: 17 MG/DL (ref 7–20)
CALCIUM SERPL-MCNC: 9 MG/DL (ref 8.3–10.6)
CHLORIDE SERPL-SCNC: 100 MMOL/L (ref 99–110)
CLARITY UR: CLEAR
CO2 SERPL-SCNC: 26 MMOL/L (ref 21–32)
COLOR UR: YELLOW
CREAT SERPL-MCNC: 0.5 MG/DL (ref 0.8–1.3)
DEPRECATED RDW RBC AUTO: 14.3 % (ref 12.4–15.4)
EKG ATRIAL RATE: 77 BPM
EKG DIAGNOSIS: NORMAL
EKG P AXIS: 66 DEGREES
EKG P-R INTERVAL: 140 MS
EKG Q-T INTERVAL: 402 MS
EKG QRS DURATION: 78 MS
EKG QTC CALCULATION (BAZETT): 454 MS
EKG R AXIS: 18 DEGREES
EKG T AXIS: 40 DEGREES
EKG VENTRICULAR RATE: 77 BPM
EOSINOPHIL # BLD: 0.3 K/UL (ref 0–0.6)
EOSINOPHIL NFR BLD: 4 %
EPI CELLS #/AREA URNS AUTO: 0 /HPF (ref 0–5)
GFR SERPLBLD CREATININE-BSD FMLA CKD-EPI: >60 ML/MIN/{1.73_M2}
GLUCOSE SERPL-MCNC: 106 MG/DL (ref 70–99)
GLUCOSE UR STRIP.AUTO-MCNC: NEGATIVE MG/DL
HCT VFR BLD AUTO: 36.2 % (ref 40.5–52.5)
HGB BLD-MCNC: 11.8 G/DL (ref 13.5–17.5)
HGB UR QL STRIP.AUTO: NEGATIVE
HYALINE CASTS #/AREA URNS AUTO: 0 /LPF (ref 0–8)
KETONES UR STRIP.AUTO-MCNC: ABNORMAL MG/DL
LEUKOCYTE ESTERASE UR QL STRIP.AUTO: ABNORMAL
LYMPHOCYTES # BLD: 2 K/UL (ref 1–5.1)
LYMPHOCYTES NFR BLD: 31.5 %
MCH RBC QN AUTO: 29.9 PG (ref 26–34)
MCHC RBC AUTO-ENTMCNC: 32.6 G/DL (ref 31–36)
MCV RBC AUTO: 91.8 FL (ref 80–100)
MONOCYTES # BLD: 0.8 K/UL (ref 0–1.3)
MONOCYTES NFR BLD: 13.1 %
NEUTROPHILS # BLD: 3.2 K/UL (ref 1.7–7.7)
NEUTROPHILS NFR BLD: 50.3 %
NITRITE UR QL STRIP.AUTO: NEGATIVE
PH UR STRIP.AUTO: 8 [PH] (ref 5–8)
PLATELET # BLD AUTO: 351 K/UL (ref 135–450)
PMV BLD AUTO: 8.1 FL (ref 5–10.5)
POTASSIUM SERPL-SCNC: 4.8 MMOL/L (ref 3.5–5.1)
PROT UR STRIP.AUTO-MCNC: NEGATIVE MG/DL
RBC # BLD AUTO: 3.94 M/UL (ref 4.2–5.9)
RBC CLUMPS #/AREA URNS AUTO: 1 /HPF (ref 0–4)
SODIUM SERPL-SCNC: 136 MMOL/L (ref 136–145)
SP GR UR STRIP.AUTO: 1.01 (ref 1–1.03)
UA DIPSTICK W REFLEX MICRO PNL UR: YES
URN SPEC COLLECT METH UR: ABNORMAL
UROBILINOGEN UR STRIP-ACNC: 1 E.U./DL
WBC # BLD AUTO: 6.5 K/UL (ref 4–11)
WBC #/AREA URNS AUTO: 1 /HPF (ref 0–5)

## 2024-03-22 PROCEDURE — 97129 THER IVNTJ 1ST 15 MIN: CPT

## 2024-03-22 PROCEDURE — 6370000000 HC RX 637 (ALT 250 FOR IP): Performed by: STUDENT IN AN ORGANIZED HEALTH CARE EDUCATION/TRAINING PROGRAM

## 2024-03-22 PROCEDURE — 97530 THERAPEUTIC ACTIVITIES: CPT

## 2024-03-22 PROCEDURE — 80048 BASIC METABOLIC PNL TOTAL CA: CPT

## 2024-03-22 PROCEDURE — 97140 MANUAL THERAPY 1/> REGIONS: CPT

## 2024-03-22 PROCEDURE — 6360000002 HC RX W HCPCS: Performed by: STUDENT IN AN ORGANIZED HEALTH CARE EDUCATION/TRAINING PROGRAM

## 2024-03-22 PROCEDURE — 92526 ORAL FUNCTION THERAPY: CPT

## 2024-03-22 PROCEDURE — 92612 ENDOSCOPY SWALLOW (FEES) VID: CPT

## 2024-03-22 PROCEDURE — 36415 COLL VENOUS BLD VENIPUNCTURE: CPT

## 2024-03-22 PROCEDURE — 99222 1ST HOSP IP/OBS MODERATE 55: CPT | Performed by: INTERNAL MEDICINE

## 2024-03-22 PROCEDURE — 85025 COMPLETE CBC W/AUTO DIFF WBC: CPT

## 2024-03-22 PROCEDURE — 93010 ELECTROCARDIOGRAM REPORT: CPT | Performed by: INTERNAL MEDICINE

## 2024-03-22 PROCEDURE — 97116 GAIT TRAINING THERAPY: CPT

## 2024-03-22 PROCEDURE — 97535 SELF CARE MNGMENT TRAINING: CPT

## 2024-03-22 PROCEDURE — 81001 URINALYSIS AUTO W/SCOPE: CPT

## 2024-03-22 PROCEDURE — 1280000000 HC REHAB R&B

## 2024-03-22 RX ORDER — FEXOFENADINE HCL 180 MG/1
180 TABLET ORAL DAILY
Status: DISCONTINUED | OUTPATIENT
Start: 2024-03-22 | End: 2024-03-28 | Stop reason: HOSPADM

## 2024-03-22 RX ADMIN — FLUTICASONE PROPIONATE 2 SPRAY: 50 SPRAY, METERED NASAL at 20:55

## 2024-03-22 RX ADMIN — CARBIDOPA AND LEVODOPA 3 TABLET: 25; 100 TABLET ORAL at 18:57

## 2024-03-22 RX ADMIN — HYDROXYZINE HYDROCHLORIDE 10 MG: 10 TABLET ORAL at 08:50

## 2024-03-22 RX ADMIN — IPRATROPIUM BROMIDE 2 SPRAY: 42 SPRAY NASAL at 17:06

## 2024-03-22 RX ADMIN — CARBIDOPA AND LEVODOPA 3 TABLET: 25; 100 TABLET ORAL at 15:07

## 2024-03-22 RX ADMIN — NYSTATIN 500000 UNITS: 100000 SUSPENSION ORAL at 15:08

## 2024-03-22 RX ADMIN — DONEPEZIL HYDROCHLORIDE 10 MG: 5 TABLET, FILM COATED ORAL at 20:56

## 2024-03-22 RX ADMIN — CARBOXYMETHYLCELLULOSE SODIUM 1 DROP: 10 GEL OPHTHALMIC at 09:17

## 2024-03-22 RX ADMIN — CARBIDOPA AND LEVODOPA 3 TABLET: 25; 100 TABLET ORAL at 03:07

## 2024-03-22 RX ADMIN — Medication 2000 UNITS: at 08:49

## 2024-03-22 RX ADMIN — CARBIDOPA AND LEVODOPA 3 TABLET: 25; 100 TABLET ORAL at 06:43

## 2024-03-22 RX ADMIN — MIDODRINE HYDROCHLORIDE 5 MG: 5 TABLET ORAL at 10:38

## 2024-03-22 RX ADMIN — IPRATROPIUM BROMIDE 2 SPRAY: 42 SPRAY NASAL at 08:49

## 2024-03-22 RX ADMIN — NYSTATIN 500000 UNITS: 100000 SUSPENSION ORAL at 12:25

## 2024-03-22 RX ADMIN — Medication 1 CAPSULE: at 15:07

## 2024-03-22 RX ADMIN — LANSOPRAZOLE 30 MG: 30 TABLET, ORALLY DISINTEGRATING, DELAYED RELEASE ORAL at 15:09

## 2024-03-22 RX ADMIN — MELATONIN TAB 3 MG 3 MG: 3 TAB at 20:56

## 2024-03-22 RX ADMIN — FEXOFENADINE HCL 180 MG: 180 TABLET ORAL at 20:55

## 2024-03-22 RX ADMIN — CLOZAPINE 25 MG: 25 TABLET ORAL at 20:56

## 2024-03-22 RX ADMIN — CARBIDOPA AND LEVODOPA 3 TABLET: 25; 100 TABLET ORAL at 12:25

## 2024-03-22 RX ADMIN — Medication 1 CAPSULE: at 08:49

## 2024-03-22 RX ADMIN — ATORVASTATIN CALCIUM 80 MG: 80 TABLET, FILM COATED ORAL at 20:56

## 2024-03-22 RX ADMIN — CARBIDOPA AND LEVODOPA 3 TABLET: 25; 100 TABLET ORAL at 20:56

## 2024-03-22 RX ADMIN — CARBIDOPA AND LEVODOPA 3 TABLET: 25; 100 TABLET ORAL at 08:48

## 2024-03-22 RX ADMIN — ASPIRIN 81 MG: 81 TABLET, CHEWABLE ORAL at 08:49

## 2024-03-22 RX ADMIN — THERA TABS 1 TABLET: TAB at 08:49

## 2024-03-22 RX ADMIN — CARBIDOPA AND LEVODOPA 3 TABLET: 25; 100 TABLET ORAL at 00:22

## 2024-03-22 RX ADMIN — NYSTATIN 500000 UNITS: 100000 SUSPENSION ORAL at 09:17

## 2024-03-22 RX ADMIN — ACETAMINOPHEN 650 MG: 325 TABLET ORAL at 10:37

## 2024-03-22 RX ADMIN — CARBIDOPA AND LEVODOPA 3 TABLET: 25; 100 TABLET ORAL at 05:42

## 2024-03-22 RX ADMIN — CLOZAPINE 25 MG: 25 TABLET ORAL at 10:37

## 2024-03-22 RX ADMIN — CARBIDOPA AND LEVODOPA 3 TABLET: 25; 100 TABLET ORAL at 10:37

## 2024-03-22 RX ADMIN — CARBIDOPA AND LEVODOPA 3 TABLET: 25; 100 TABLET ORAL at 17:05

## 2024-03-22 RX ADMIN — PRIMIDONE 50 MG: 50 TABLET ORAL at 20:56

## 2024-03-22 RX ADMIN — NYSTATIN 500000 UNITS: 100000 SUSPENSION ORAL at 20:55

## 2024-03-22 RX ADMIN — ENOXAPARIN SODIUM 40 MG: 100 INJECTION SUBCUTANEOUS at 08:48

## 2024-03-22 RX ADMIN — CARBOXYMETHYLCELLULOSE SODIUM 1 DROP: 10 GEL OPHTHALMIC at 21:00

## 2024-03-22 ASSESSMENT — PAIN DESCRIPTION - DESCRIPTORS: DESCRIPTORS: PATIENT UNABLE TO DESCRIBE

## 2024-03-22 ASSESSMENT — PAIN SCALES - GENERAL
PAINLEVEL_OUTOF10: 0
PAINLEVEL_OUTOF10: 3
PAINLEVEL_OUTOF10: 0
PAINLEVEL_OUTOF10: 0

## 2024-03-22 ASSESSMENT — PAIN DESCRIPTION - PAIN TYPE: TYPE: ACUTE PAIN

## 2024-03-22 ASSESSMENT — PAIN DESCRIPTION - FREQUENCY: FREQUENCY: CONTINUOUS

## 2024-03-22 ASSESSMENT — PAIN DESCRIPTION - LOCATION: LOCATION: HEAD

## 2024-03-22 ASSESSMENT — PAIN DESCRIPTION - ONSET: ONSET: GRADUAL

## 2024-03-22 NOTE — PROGRESS NOTES
[facility] - Inpatient Rehabilitation Department   Phone: [phone]    Physical Therapy    [] Initial Evaluation            [x] Daily Treatment Note         [] Discharge Summary      Patient: Pacheco Prince   : 1945   MRN: [id number]   Date of Service:  3/22/2024  Admitting Diagnosis: Aspiration pneumonia (HCC)  Current Admission Summary: Pacheco Prince is a 79 y.o. male with PMHx notable for Parkinson's disease, DM2, CAD who presented on 24 with fever, and cough. He was on a cruise when symptoms developed, was diagnosed with pneumonia and started on IV Zosyn. He continued to decline, requiring medical flight back home to [place]. Etiology of pneumonia is presumed aspiration, was started on broad-spectrum antibiotics with vancomycin and Zosyn. MRSA swab was negative, so vancomycin was discontinued. He was treated with cefepime and Flagyl. CTA Chest was obtained, negative for PE.  He did develop worsening/recurrent aspiration pneumonia, and his diet was downgraded to n.p.o.  He underwent PEG placement on 3/9.  He is now tolerating tube feeds at goal rate, and able to receive his Parkinson's medications via PEG.  Hospital course complicated by: acute hypoxic respiratory failure, dysphagia, hypertension, hypokalemia, anemia, lethargy/AMS, worsening Parkinson's symptoms.   Past Medical History:  has a past medical history of Acute bronchitis, CAD (coronary artery disease), Cancer (HCC), Clostridioides difficile infection, Diabetes mellitus (HCC), Erectile dysfunction, GERD (gastroesophageal reflux disease), Hyperlipidemia, Obesity, and Parkinson disease.  Past Surgical History:  has a past surgical history that includes Diagnostic Cardiac Cath Lab Procedure (2005); Coronary angioplasty with stent (2005); Cataract extraction; eye surgery (2021); [facility] ([facility]); Percutaneous Transluminal Coronary Angio (); Upper gastrointestinal endoscopy (); and  Upper gastrointestinal endoscopy (N/A, 3/8/2024).  Discharge Recommendations: home with HHPT 24/7 supervision pending progress  DME Required For Discharge: DME to be determined pending patient progress  Precautions/Restrictions:  SLP assisting with some feeds; water after oral care by specialized cup ; HOB >30 deg when feeding tube running  Weight Bearing Restrictions: no restrictions      Required Braces/Orthotics:  3/16 pt has worsening (R) heel pressure ulcer; discussed with Dr. Elliott and he ordered a new podus boot with ambulation attachment   [x] Right   Approved to wear shoes for ambulation per wound care nsg  Positional Restrictions:no positional restrictions    Pre-Admission Information   Lives With: spouse, Tiffany (Tiffany retired RN)  Type of Home: house  Home Layout: two level, laundry in basement, bedroom/bathroom upstairs, 12 stairs with bilateral rails to upstairs  Home Access:  2 step to enter without rails , garage; 2+1 steps front door without rail  Bathroom Layout: wheelchair accessible, walk in shower  Bathroom Equipment: grab bars in shower, built in shower seat, hand held shower head  Toilet Height: elevated height  Home Equipment: no prior equipment  Transfer Assistance: Independent without use of device--wife states that she provides constant supervision  Ambulation Assistance:Independent without use of devicewife states that she provides constant supervision  ADL Assistance: requires assistance with bathing, requires assistance with dressing, requires assistance with grooming, requires assistance with feeding, requires assistance with toileting  IADL Assistance: vacuums, cleaning shower, gets mail, takes out trash  Active :        [] Yes                 [x] No  Hand Dominance: [] Left                 [x] Right  Current Employment: retired.  Occupation: Office work  Hobbies: golfing, going out to eat, cards, reading  Recent Falls: 1 Fall In Dec 2022 walking to get mail in the rain, then

## 2024-03-22 NOTE — PROGRESS NOTES
Pacheco Prince  3/22/2024  [id number]    Chief Complaint: Aspiration pneumonia (HCC)    Subjective:   Patient felt unwell today w/ therapies, with recorded vitals of 93/38 mmHg and 40 BPM. Continues to experience significant rigidity and tremor between Sinemet doses. Despite this, he was able to tolerate FEES without evidence of aspiration and was cleared for modified diet. His wife also notes dark brown/black discoloration of urine within external catheter suction tubing.     Last BM: Stool Occurrence: 1 (03/21/24 1315)    Objective:  Patient Vitals for the past 24 hrs:   BP Temp Temp src Pulse Resp SpO2   03/22/24 1240 (!) 130/58 -- -- 64 -- --   03/22/24 1015 (!) 96/38 -- -- (!) 40 -- --   03/22/24 0846 (!) 111/56 98.1 °F (36.7 °C) Oral 62 18 94 %   03/21/24 2150 (!) 113/55 98.5 °F (36.9 °C) Axillary 53 18 95 %     Gen: No distress, pleasant.   HEENT: Normocephalic, atraumatic.  CV: Regular rate and rhythm. Extremities warm, well perfused.   Resp: No respiratory distress. CTAB.  Abd: Soft, non-tender.   Ext: No edema.   Neuro: Alert. Appropriate verbal responses given increased time. Moves bilateral upper and lower extremities spontaneously. Bilateral upper extremity tremor and rigidity.   Skin: Deep tissue injury present on right heel. Linear eschar/scab present within area of dry/excoriated skin in left calcaneal aspect of heel, nontender to palpation. PRAFO boot and heel cups in place bilaterally. (Not reassessed on this date).     Laboratory data: Available via EMR.     Therapy progress:       PT    Supine to Sit: Partial/moderate assistance  Sit to Supine: Dependent   Sit to Stand: Partial/moderate assistance  Chair/Bed to Chair Transfer: Partial/moderate assistance  Car Transfer:    Ambulation 10 ft: Partial/moderate assistance  Ambulation 50 ft: Partial/moderate assistance  Ambulation 150 ft: Partial/moderate assistance  Stairs - 1 Step: Dependent  Stairs - 4 Step: Dependent  Stairs - 12 Step:   without complication, without long-term current use of insulin (HCC)    COVID-19    Aspiration pneumonia (HCC)    Acute metabolic encephalopathy    Sepsis (HCC)    Acute aspiration pneumonia (HCC)    Pneumonia of left lower lobe due to infectious organism    TIA (transient ischemic attack)    Parkinson's disease    Pneumonia of both lungs due to infectious organism, unspecified part of lung    Parkinson's disease with dyskinesia       Functional progress: MaxA required for dressing, limited due to orthostasis and tremors.     Plan:  #. Bilateral aspiration pneumonia  #. Dysphagia, secondary to Parkinson's  - s/p initial course of cefepime and Flagyl  - s/p Levaquin for recurrent infection  - Albuterol nebs every 4 hours as needed  - Continue SLP for dysphagia therapy. FEES on 3/22 with oropharngeal dysphagia, however no overt penetration or aspiration. Cleared for modified diet with strict aspiration precautions. Will arrange for conditional tube feed boluses.  - BMP, CBC stable 3/22.     #. Bilateral heel deep tissue injury (R>L)  - PRAFO  - Wound care consult  - Apply moisturizer to dry/cracked feet    #. Oral Candidiasis  - Nystatin     #. Left visual field loss vs. inattention  - Noted code stroke called on 3/5 for AMS, right facial droop. CT Head without acute stroke, CTA Head/Neck without large vessel occlusion or significant intracranial stenosis.  - MRI Brain 3/14, motion artifact degraded but without any definite acute infarct.     #. Parkinson's disease w/ dementia  - Continue carbidopa-levodopa  mg 3 tablets every 2 hours while awake + 3 tablets 3 times nightly (note patient takes long acting formation at home, converted to short acting formulation which may be given via G-tube)  - Will need to discuss with Pharmacy and/or patient's Neurologist the plan for attempting to resume patient's home Rytari, and the plan if he should not be consistently able to take this medication by mouth as prescribed.

## 2024-03-22 NOTE — PROGRESS NOTES
[facility] - Inpatient Rehabilitation Department   Phone: [phone]    Speech Therapy    [] Initial Evaluation            [x] Daily Treatment Note         [] Discharge Summary      Patient: Pacheco Prince   : 1945   MRN: [id number]   Date of Service:  3/22/2024  Admitting Diagnosis: Aspiration pneumonia (HCC)  Current Admission Summary: See MD's summary   Past Medical History:  has a past medical history of Acute bronchitis, CAD (coronary artery disease), Cancer (HCC), Clostridioides difficile infection, Diabetes mellitus (HCC), Erectile dysfunction, GERD (gastroesophageal reflux disease), Hyperlipidemia, Obesity, and Parkinson disease.  Past Surgical History:  has a past surgical history that includes Diagnostic Cardiac Cath Lab Procedure (2005); Coronary angioplasty with stent (2005); Cataract extraction; eye surgery (2021); Tonsillectomy ([facility]); Percutaneous Transluminal Coronary Angio (); Upper gastrointestinal endoscopy (); and Upper gastrointestinal endoscopy (N/A, 3/8/2024).    Recent Head CT completed 3/4/24:  IMPRESSION:  1.  No acute intracranial abnormality.  2. Findings of paranasal sinusitis with right middle ear and mastoid effusion    Recent Chest xray completed 3/7/24:  IMPRESSION:  New right lung base infiltrate.    Instrumental Swallow Study:   Modified Barium Swallow evaluation completed on 3/4/2024. Patient presents with mild oropharyngeal dysphagia secondary to prolonged mastication, premature spillage to pyriform sinuses, reduced AP propulsion, delayed swallow initiation, reduced laryngeal elevation, decreased tongue base retraction and reduced pharyngeal contraction. Pt demonstrated aspiration after the swallow with initial trial of thin liquids via tsp, suspect in relation to sensation although pt demonstrated a hard cough in response. No laryngeal penetration or aspiration was viewed with thin liquids via cup/straw, mildly (nectar)  eating ice cream and pt agreed via head nod    Time Frame: 14-21 days     Pt will complete oropharyngeal strengthening exercises and tolerate po trials of ice/ water consistently without overt clinical s/s of aspiration. Goal Met   Pt will participate in a repeat instrumental swallow assessment (FEES) to further assess the pharyngeal phase of the swallow when cleared by rehab MD. Goal Met    Pt will functionally tolerate recommended diet with no overt clinical s/s of aspiration or noted difficulty with use of strict aspiration precautions/ safe swallow strategies. New Goal 3/22   Pt will complete graded speech tasks using LOUD speech throughout targeted tasks and during session with < mod cues. Ongoing    Patient will complete verbal description and word retrieval tasks with 75% accuracy, min cues. Ongoing    Pt will improve orientation and recall functional information (daily tasks, personal hx, etc.) with 80% accuracy with use of cognitive aids as needed. Ongoing    Pt will respond and/or initiate action upon verbal prompt within 5 seconds in 4 out of 5 opportunities without repetition. Ongoing         Therapy Session Time      Session 1 Session 2   Time In 1130    Time Out 1200    Time Code Minutes 15    Individual Minutes 30      Timed Code Treatment Minutes: 15  Total Treatment Minutes: 30 (see additional minutes captured on FEES report)     Electronically Signed By:   Archana Jernigan M.A., CCC-SLP #[id number] 3/22/2024 3:35 PM  Speech-Language Pathologist

## 2024-03-22 NOTE — PROGRESS NOTES
[facility] - Inpatient Rehabilitation Department   Phone: [phone]    Occupational Therapy    [] Initial Evaluation            [x] Daily Treatment Note         [] Discharge Summary      Patient: Pacheco Prince   : 1945   MRN: [id number]   Date of Service:  3/22/2024    Admitting Diagnosis:  Aspiration pneumonia (HCC)  Current Admission Summary: Pacheco Prince is a 79 y.o. male with PMHx notable for Parkinson's disease, DM2, CAD who presented on 24 with fever, and cough. He was on a cruise when symptoms developed, was diagnosed with pneumonia and started on IV Zosyn. He continued to decline, requiring medical flight back home to [place]. Etiology of pneumonia is presumed aspiration, was started on broad-spectrum antibiotics with vancomycin and Zosyn. MRSA swab was negative, so vancomycin was discontinued. He was treated with cefepime and Flagyl. CTA Chest was obtained, negative for PE.  He did develop worsening/recurrent aspiration pneumonia, and his diet was downgraded to n.p.o.  He underwent PEG placement on 3/9.  He is now tolerating tube feeds at goal rate, and able to receive his Parkinson's medications via PEG.  Hospital course complicated by: acute hypoxic respiratory failure, dysphagia, hypertension, hypokalemia, anemia, lethargy/AMS, worsening Parkinson's symptoms.      Currently, patient reports that he is doing well.  His wife notes a productive cough with increasing frequency.  He denies any fevers, chills, dyspnea, chest pain.  He is experiencing significant tremor, rigidity and freezing.  He denies any focal numbness or weakness.  Past Medical History:  has a past medical history of Acute bronchitis, CAD (coronary artery disease), Cancer (HCC), Clostridioides difficile infection, Diabetes mellitus (HCC), Erectile dysfunction, GERD (gastroesophageal reflux disease), Hyperlipidemia, Obesity, and Parkinson disease.  Past Surgical History:  has a past surgical history  that includes Diagnostic Cardiac Cath Lab Procedure (01/01/2005); Coronary angioplasty with stent (01/01/2005); Cataract extraction; eye surgery (January 2021); Tonsillectomy ([facility]); Percutaneous Transluminal Coronary Angio (2002); Upper gastrointestinal endoscopy (2010); and Upper gastrointestinal endoscopy (N/A, 3/8/2024).    Discharge Recommendations: TBD pending pt progress - ongoing OT services    DME Required For Discharge: DME to be determined pending patient progress    Precautions/Restrictions: medium fall risk, modified diet  Weight Bearing Restrictions: no restrictions  Required Braces/Orthotics: no braces required  Positional Restrictions:no positional restrictions      Pre-Admission Information   Lives With: spouse, Tiffany (Tiffany retired RN)  Type of Home: house  Home Layout: two level, laundry in basement, bedroom/bathroom upstairs, 12 stairs with bilateral rails to upstairs  Home Access:  2 step to enter without rails , garage; 2+1 steps front door without rail  Bathroom Layout: wheelchair accessible, walk in shower  Bathroom Equipment: grab bars in shower, built in shower seat, hand held shower head, BSC in the home but pt does not use  Toilet Height: elevated height  Home Equipment: no prior equipment  Transfer Assistance: Independent without use of device--wife states that she provides constant supervision  Ambulation Assistance:Independent without use of devicewife states that she provides constant supervision  ADL Assistance: requires assistance with bathing - sits for showers at home on shower chair but stands for showers outside of the home - wife assists w/ thoroughness for all showers, requires assistance with dressing- increased assistance for sock and shoe management and pants management - able to roni/doff shirt, requires assistance with grooming, requires assistance with feeding, requires assistance with toileting - toilets on standard elevated toilet  IADL Assistance: vacuums,  wash face but required assist for UB and periarea. Declined washing lower legs/feet.    Upper Extremity Dressing: maximum assistance  Lower Extremity Dressing: maximum assistance  Dressing Comments: maxA doffing gown and donning shirt. maxA doffing/donning pullup and pants. pt required totalA to doff/roni shoes and AFOs. CGA-Juventino for standing balance during clothing mgmt. Patient attempting to assist with clothing mgmt but limited due to tremors    Toileting Comments: wife reported they voided prior to session   General Comments: Increased assistance required for ADLs due to increased tremors and low BP    Instrumental Activities of Daily Living  No IADL completed on this date.      Functional Mobility  Bed Mobility:  Bed mobility not completed on this date.  Comments:   Transfers:  Sit to stand transfer:minimal assistance  Stand to sit transfer: minimal assistance  Comments: increased time. Verbal and tactile cueing   Functional Mobility  No functional mobility completed on this date secondary to dizziness and low BP.  Balance:  Static Sitting Balance: fair (+): maintains balance at SBA/supervision without use of UE support  Dynamic Sitting Balance: fair (+): maintains balance at SBA/supervision without use of UE support  Static Standing Balance: fair (-): maintains balance at CGA with use of UE support  Dynamic Standing Balance: poor (+): requires min (A) to maintain balance  Comments:      Other Therapeutic Interventions:         Second Session:  Patient in recliner upon arrival, agreeable to OT session with encouragement. Two family friends present for session. No reports of pain or dizziness during session but patient appeared very fatigued. Tremors noted during session but not as severe as morning session.     Patient required total A for doffing AFOs and donning socks/shoes. Patient required CGA for sit to stand from recliner to HHA. Patient completed ~20 ft of mobility with HHA and Juventino progressing to CGA.

## 2024-03-22 NOTE — CONSULTS
[facility]      CONSULTATION  [phone]        Inpatient consult to Cardiology  Consult performed by: Sindhu Sal DO  Consult ordered by: Keon Sheets MD  Reason for consult: bradycardia          CC:  none provided     History of Present Illness:  Pacheco Prince is a 79 y.o. male patient in acute rehab who was recently discharged from the hospital 3/11/2024 with aspiration pneumonia and dysphagia.  Medical history significant for progressively worsening Parkinson's, HTN, HPL.  He underwent PEG placement by GI.  MRI Brain 3/11 degraded by artifact, no definite acute infarct.    Patient is accompanied by a long time family friend in the room who provides history taking.  The patient is alert, soft spoken, tremulous from parkinson's.  He is oriented to person only.  He is unable to provide details of HPI.      Per medical staff and records, patient felt unwell during rehab today.  When his vital signs were checked, they obtained BP 96/38 mmHg and HR 40 bpm.      Past Medical History:   has a past medical history of Acute bronchitis, CAD (coronary artery disease), Cancer ([facility]), Clostridioides difficile infection, Diabetes mellitus ([facility]), Erectile dysfunction, GERD (gastroesophageal reflux disease), Hyperlipidemia, Obesity, and Parkinson disease.  Surgical History:   has a past surgical history that includes Diagnostic Cardiac Cath Lab Procedure (01/01/2005); Coronary angioplasty with stent (01/01/2005); Cataract extraction; eye surgery (January 2021); Tonsillectomy ([facility]); Percutaneous Transluminal Coronary Angio (2002); Upper gastrointestinal endoscopy (2010); and Upper gastrointestinal endoscopy (N/A, 3/8/2024).   Social History:   reports that he quit smoking about 44 years ago. His smoking use included cigarettes and cigars. He started smoking about 54 years ago. He has a 2.5 pack-year smoking history. He has quit using smokeless tobacco. He reports current alcohol use of about 1.0  mg/dL  Hemoglobin A1C   Date Value Ref Range Status   03/05/2024 6.1 See comment % Final     Comment:     Comment:  Diagnosis of Diabetes: > or = 6.5%  Increased risk of diabetes (Prediabetes): 5.7-6.4%  Glycemic Control: Nonpregnant Adults: <7.0%                    Pregnant: <6.0%          Lab Results   Component Value Date    TSHFT4 1.74 12/10/2019    TSH 1.36 12/30/2023    TSHREFLEX 2.36 06/27/2018       SUMMARY OF CURRENT AND RECENT RELEVANT IMAGING:  Additional Imaging:  Results for orders placed during the hospital encounter of 02/29/24    XR CHEST PORTABLE    Narrative  EXAMINATION:  ONE XRAY VIEW OF THE CHEST    3/7/2024 8:22 am    COMPARISON:  02/29/2024    HISTORY:  ORDERING SYSTEM PROVIDED HISTORY: PNA, compare to prior CXR for progression  TECHNOLOGIST PROVIDED HISTORY:  Reason for exam:->PNA, compare to prior CXR for progression    FINDINGS:  Cardiac silhouette is within normal limits. Pulmonary vasculature is within  normal limits. There is a new right lung base infiltrate.  No pneumothorax is  identified. Bony structures are unremarkable.    Impression  New right lung base infiltrate.      CTPE  Results for orders placed during the hospital encounter of 02/29/24    CT CHEST PULMONARY EMBOLISM W CONTRAST    Narrative  EXAMINATION:  CTA OF THE CHEST 2/29/2024 11:15 pm    TECHNIQUE:  CTA of the chest was performed after the administration of intravenous  contrast.  Multiplanar reformatted images are provided for review.  MIP  images are provided for review. Automated exposure control, iterative  reconstruction, and/or weight based adjustment of the mA/kV was utilized to  reduce the radiation dose to as low as reasonably achievable.    COMPARISON:  Chest radiograph same day    HISTORY:  ORDERING SYSTEM PROVIDED HISTORY: SOB - Recent travel  TECHNOLOGIST PROVIDED HISTORY:  Reason for exam:->SOB - Recent travel  Additional Contrast?->1  Reason for Exam: Pt was on a cruise ship in the [facility] when he

## 2024-03-22 NOTE — PROCEDURES
[facility]  Facility/Department: [facility] ACUTE REHAB UNIT  Fiberoptic Endoscopic Evaluation of Swallowing  (FEES)    Patient: Pacheco Prince   : 1945   MRN: [id number]    Account #: [id number]   Referring Physician: Dr. Keon Sheets    Evaluation Date: 3/22/2024   Admitting Diagnosis: Parkinson's disease with dyskinesia [G20.B1]  Treatment Diagnosis: Dysphagia   Diet prior to assessment:ADULT TUBE FEEDING; PEG; Standard with Fiber; Bolus; 6 Times Daily; 240; Gravity; 50; Before and after each bolus; Wound Healing; 1 Dose; BID  Pain: Denies                        Impressions:   Assessment: Patient presents with oropharyngeal dysphagia secondary to oral holding, prolonged munching mastication, pharyngeal pooling, delayed swallow initiation, reduced tongue base retraction and decreased pharyngeal contraction, complicated by parkinson's, cognitive state resulting in observed piecemeal swallows to clear oral cavity, inconsistent swallow timing and vallecular residue following the initial swallow. With solids pt demonstrated prolonged munching mastication with intermittent pharyngeal pooling to the underside of the epiglottis and episodes of prolonged delays in swallow initiation. After the swallow vallecular residue was noted with puree and solid textures. With thin liquids intermittent pooling to the vallecular space was noted with minimally delayed swallow initiation and seemingly adequate airway protection. Pt appeared to better tolerate thin liquids via straw vs cup. No penetration or aspiration was definitively viewed however, pt demonstrates increased risk for episodic airway invasion due to cognitive state, pharyngeal pooling and inconsistent swallow timing. Strategies of small bites/sips, slow rate of intake, alternating liquids and solids and cues for double swallow appeared effective. Based on today's assessment recommend Dysphagia II Minced and Moist  with Thin liquids , Meds in puree  or no response to secretions in pyriform fossae)    Score: (out of 7)  4         Reason for referral/Explanation of Procedure:  Pt was referred for a FEES to assess the efficiency of his swallow function, assess for aspiration, make recommendations regarding safe dietary consistency, effective compensatory and safe eating environment. A flexible endoscope was passed transnasally to obtain a superior view of the hypopharynx for the purpose of functional evaluation of the patient's swallowing ability. The purpose and description of the procedure was explained in addition to the risks and options available to the patient and verbal informed consent was obtained prior to initiation of thin exam. The patient tolerated the procedure well and good views were obtained. A gel based topical lubricant was applied to the insertion tube of the endoscope prior to insertion and the scope was advanced through the right nares.    Swallowing Evaluation:  Testing position :Upright in chair  Consistencies provided: thin liquids, puree , soft solids, and regular solids     Oral Phase:  Decreased bolus control   Prolonged/impaired mastication   Premature bolus loss   Impaired A-P bolus transport   Oral residue     Pharyngeal Phase:  Pharyngeal pooling prior to swallow initiation   Delayed swallow initiation   Decreased pharyngeal contraction   Decreased tongue base retraction   Pharyngeal residue     Penetration/Aspiration Scores across consistencies (Dasha et. al. 1996)    CONSISTENCY  Pen/Asp rating Description    Thin   1) Material does not enter the airway  One instance of questionable trace laryngeal penetration    Mildly (nectar) thick   N/A - consistency not provided     Moderately (honey) thick   N/A - consistency not provided     Puree   1) Material does not enter the airway     Soft Solid   1) Material does not enter the airway     Regular Solid   1) Material does not enter the airway            [facility] [place] Pharyngeal Residue

## 2024-03-22 NOTE — PLAN OF CARE
Problem: Safety - Adult  Goal: Free from fall injury  Outcome: Progressing     Problem: Skin/Tissue Integrity  Goal: Absence of new skin breakdown  Description: 1.  Monitor for areas of redness and/or skin breakdown  2.  Assess vascular access sites hourly  3.  Every 4-6 hours minimum:  Change oxygen saturation probe site  4.  Every 4-6 hours:  If on nasal continuous positive airway pressure, respiratory therapy assess nares and determine need for appliance change or resting period.  Outcome: Progressing     Problem: Chronic Conditions and Co-morbidities  Goal: Patient's chronic conditions and co-morbidity symptoms are monitored and maintained or improved  Outcome: Progressing     Problem: ABCDS Injury Assessment  Goal: Absence of physical injury  Outcome: Progressing

## 2024-03-23 PROCEDURE — 6360000002 HC RX W HCPCS: Performed by: STUDENT IN AN ORGANIZED HEALTH CARE EDUCATION/TRAINING PROGRAM

## 2024-03-23 PROCEDURE — 6370000000 HC RX 637 (ALT 250 FOR IP): Performed by: STUDENT IN AN ORGANIZED HEALTH CARE EDUCATION/TRAINING PROGRAM

## 2024-03-23 PROCEDURE — 1280000000 HC REHAB R&B

## 2024-03-23 RX ADMIN — PRIMIDONE 50 MG: 50 TABLET ORAL at 21:11

## 2024-03-23 RX ADMIN — CARBIDOPA AND LEVODOPA 3 TABLET: 25; 100 TABLET ORAL at 06:40

## 2024-03-23 RX ADMIN — CLOZAPINE 25 MG: 25 TABLET ORAL at 09:01

## 2024-03-23 RX ADMIN — LANSOPRAZOLE 30 MG: 30 TABLET, ORALLY DISINTEGRATING, DELAYED RELEASE ORAL at 15:08

## 2024-03-23 RX ADMIN — ASPIRIN 81 MG: 81 TABLET, CHEWABLE ORAL at 09:01

## 2024-03-23 RX ADMIN — FEXOFENADINE HCL 180 MG: 180 TABLET ORAL at 21:10

## 2024-03-23 RX ADMIN — CARBOXYMETHYLCELLULOSE SODIUM 1 DROP: 10 GEL OPHTHALMIC at 21:10

## 2024-03-23 RX ADMIN — CARBOXYMETHYLCELLULOSE SODIUM 1 DROP: 10 GEL OPHTHALMIC at 09:01

## 2024-03-23 RX ADMIN — CARBIDOPA AND LEVODOPA 3 TABLET: 25; 100 TABLET ORAL at 11:04

## 2024-03-23 RX ADMIN — ATORVASTATIN CALCIUM 80 MG: 80 TABLET, FILM COATED ORAL at 21:11

## 2024-03-23 RX ADMIN — NYSTATIN 500000 UNITS: 100000 SUSPENSION ORAL at 16:58

## 2024-03-23 RX ADMIN — CARBIDOPA AND LEVODOPA 3 TABLET: 25; 100 TABLET ORAL at 15:09

## 2024-03-23 RX ADMIN — CARBIDOPA AND LEVODOPA 3 TABLET: 25; 100 TABLET ORAL at 03:04

## 2024-03-23 RX ADMIN — IPRATROPIUM BROMIDE 2 SPRAY: 42 SPRAY NASAL at 09:02

## 2024-03-23 RX ADMIN — CARBIDOPA AND LEVODOPA 3 TABLET: 25; 100 TABLET ORAL at 12:56

## 2024-03-23 RX ADMIN — CARBIDOPA AND LEVODOPA 3 TABLET: 25; 100 TABLET ORAL at 21:11

## 2024-03-23 RX ADMIN — CLOZAPINE 25 MG: 25 TABLET ORAL at 21:15

## 2024-03-23 RX ADMIN — NYSTATIN 500000 UNITS: 100000 SUSPENSION ORAL at 12:56

## 2024-03-23 RX ADMIN — DONEPEZIL HYDROCHLORIDE 10 MG: 5 TABLET, FILM COATED ORAL at 21:11

## 2024-03-23 RX ADMIN — POLYETHYLENE GLYCOL 3350 17 G: 17 POWDER, FOR SOLUTION ORAL at 18:48

## 2024-03-23 RX ADMIN — CARBIDOPA AND LEVODOPA 3 TABLET: 25; 100 TABLET ORAL at 00:08

## 2024-03-23 RX ADMIN — Medication 2000 UNITS: at 09:01

## 2024-03-23 RX ADMIN — CARBIDOPA AND LEVODOPA 3 TABLET: 25; 100 TABLET ORAL at 05:15

## 2024-03-23 RX ADMIN — CARBIDOPA AND LEVODOPA 3 TABLET: 25; 100 TABLET ORAL at 18:48

## 2024-03-23 RX ADMIN — NYSTATIN 500000 UNITS: 100000 SUSPENSION ORAL at 09:01

## 2024-03-23 RX ADMIN — CARBIDOPA AND LEVODOPA 3 TABLET: 25; 100 TABLET ORAL at 16:58

## 2024-03-23 RX ADMIN — MELATONIN TAB 3 MG 3 MG: 3 TAB at 21:11

## 2024-03-23 RX ADMIN — Medication 1 CAPSULE: at 16:58

## 2024-03-23 RX ADMIN — ENOXAPARIN SODIUM 40 MG: 100 INJECTION SUBCUTANEOUS at 09:01

## 2024-03-23 RX ADMIN — THERA TABS 1 TABLET: TAB at 09:01

## 2024-03-23 RX ADMIN — NYSTATIN 500000 UNITS: 100000 SUSPENSION ORAL at 21:09

## 2024-03-23 RX ADMIN — Medication 1 CAPSULE: at 09:01

## 2024-03-23 RX ADMIN — FLUTICASONE PROPIONATE 2 SPRAY: 50 SPRAY, METERED NASAL at 21:09

## 2024-03-23 RX ADMIN — CARBIDOPA AND LEVODOPA 3 TABLET: 25; 100 TABLET ORAL at 09:01

## 2024-03-23 ASSESSMENT — PAIN SCALES - GENERAL
PAINLEVEL_OUTOF10: 0
PAINLEVEL_OUTOF10: 0

## 2024-03-23 NOTE — PLAN OF CARE
Problem: Discharge Planning  Goal: Discharge to home or other facility with appropriate resources  Outcome: Progressing     Problem: Pain  Goal: Verbalizes/displays adequate comfort level or baseline comfort level  3/23/2024 1025 by Lolly Mars RN  Outcome: Progressing     Problem: Safety - Adult  Goal: Free from fall injury  3/23/2024 1025 by Lolly Mars RN  Outcome: Progressing     Problem: Confusion  Goal: Confusion, delirium, dementia, or psychosis is improved or at baseline  Description: INTERVENTIONS:  1. Assess for possible contributors to thought disturbance, including medications, impaired vision or hearing, underlying metabolic abnormalities, dehydration, psychiatric diagnoses, and notify attending LIP  2. [place] high risk fall precautions, as indicated  3. Provide frequent short contacts to provide reality reorientation, refocusing and direction  4. Decrease environmental stimuli, including noise as appropriate  5. Monitor and intervene to maintain adequate nutrition, hydration, elimination, sleep and activity  6. If unable to ensure safety without constant attention obtain sitter and review sitter guidelines with assigned personnel  7. Initiate Psychosocial CNS and Spiritual Care consult, as indicated  Outcome: Progressing

## 2024-03-23 NOTE — PROGRESS NOTES
CALORIE COUNT and BRIEF NUTRITION NOTE    Patient Name: Pacheco Prince Admission Date: 3/11/2024    Calorie Count x 3 days initiated (3/22 - 3/25). Current meal & supplement consumption estimated to be less than 50% of assessed nutritional needs.      Calorie count requires complete meal documentation from 9 consecutive meals.     Current diet and supplement order   ADULT TUBE FEEDING; PEG; Standard with Fiber; Bolus; 6 Times Daily; 240; Gravity; 50; Before and after each bolus; Wound Healing; 1 Dose; BID  ADULT DIET; Dysphagia - Minced and Moist  ADULT ORAL NUTRITION SUPPLEMENT; Breakfast, Lunch, Dinner; Low Calorie/High Protein Oral Supplement       Comparative Standards  Calories: 1813-9595 kcal  avg 1858 kcal  Protein:  g avg 120 g    Nutrition Goal    Meet greater than 50% of estimated needs by mouth.    Date Consumed PO Intake Kcal %   Kcal met PO Intake grams protein %  Protein Met   Comments   3/22 56 (L/D) 3% 6g (L/D) 5%    3/23 59 (B)  2.1g (B)  Family reported meal intake was interrupted by bolus feeding. Family will try to feed pt before bolus given.                                         Information above may not be true assessment on calorie and protein consumed d/t limited information available.      Intervention & Recommendations:   1.  Results will be posted daily as available      Shaun Valdez RD  Contact Number: [phone]

## 2024-03-24 VITALS
BODY MASS INDEX: 26.05 KG/M2 | SYSTOLIC BLOOD PRESSURE: 95 MMHG | HEART RATE: 66 BPM | OXYGEN SATURATION: 96 % | DIASTOLIC BLOOD PRESSURE: 52 MMHG | RESPIRATION RATE: 18 BRPM | WEIGHT: 182 LBS | HEIGHT: 70 IN | TEMPERATURE: 97.7 F

## 2024-03-24 PROCEDURE — 6370000000 HC RX 637 (ALT 250 FOR IP): Performed by: STUDENT IN AN ORGANIZED HEALTH CARE EDUCATION/TRAINING PROGRAM

## 2024-03-24 PROCEDURE — 6360000002 HC RX W HCPCS: Performed by: STUDENT IN AN ORGANIZED HEALTH CARE EDUCATION/TRAINING PROGRAM

## 2024-03-24 PROCEDURE — 1280000000 HC REHAB R&B

## 2024-03-24 RX ADMIN — NYSTATIN 500000 UNITS: 100000 SUSPENSION ORAL at 17:08

## 2024-03-24 RX ADMIN — CLOZAPINE 25 MG: 25 TABLET ORAL at 21:28

## 2024-03-24 RX ADMIN — FLUTICASONE PROPIONATE 2 SPRAY: 50 SPRAY, METERED NASAL at 21:29

## 2024-03-24 RX ADMIN — IPRATROPIUM BROMIDE 2 SPRAY: 42 SPRAY NASAL at 14:05

## 2024-03-24 RX ADMIN — LANSOPRAZOLE 30 MG: 30 TABLET, ORALLY DISINTEGRATING, DELAYED RELEASE ORAL at 15:18

## 2024-03-24 RX ADMIN — MELATONIN TAB 3 MG 3 MG: 3 TAB at 21:30

## 2024-03-24 RX ADMIN — CARBIDOPA AND LEVODOPA 3 TABLET: 25; 100 TABLET ORAL at 17:08

## 2024-03-24 RX ADMIN — CARBIDOPA AND LEVODOPA 3 TABLET: 25; 100 TABLET ORAL at 15:18

## 2024-03-24 RX ADMIN — ATORVASTATIN CALCIUM 80 MG: 80 TABLET, FILM COATED ORAL at 21:29

## 2024-03-24 RX ADMIN — CARBOXYMETHYLCELLULOSE SODIUM 1 DROP: 10 GEL OPHTHALMIC at 21:29

## 2024-03-24 RX ADMIN — CLOZAPINE 25 MG: 25 TABLET ORAL at 09:21

## 2024-03-24 RX ADMIN — NYSTATIN 500000 UNITS: 100000 SUSPENSION ORAL at 21:35

## 2024-03-24 RX ADMIN — PRIMIDONE 50 MG: 50 TABLET ORAL at 21:30

## 2024-03-24 RX ADMIN — CARBIDOPA AND LEVODOPA 3 TABLET: 25; 100 TABLET ORAL at 09:21

## 2024-03-24 RX ADMIN — FEXOFENADINE HCL 180 MG: 180 TABLET ORAL at 21:31

## 2024-03-24 RX ADMIN — Medication 1 CAPSULE: at 09:21

## 2024-03-24 RX ADMIN — NYSTATIN 500000 UNITS: 100000 SUSPENSION ORAL at 14:04

## 2024-03-24 RX ADMIN — CARBIDOPA AND LEVODOPA 3 TABLET: 25; 100 TABLET ORAL at 03:06

## 2024-03-24 RX ADMIN — CARBIDOPA AND LEVODOPA 3 TABLET: 25; 100 TABLET ORAL at 18:53

## 2024-03-24 RX ADMIN — CARBIDOPA AND LEVODOPA 3 TABLET: 25; 100 TABLET ORAL at 14:04

## 2024-03-24 RX ADMIN — NYSTATIN 500000 UNITS: 100000 SUSPENSION ORAL at 09:20

## 2024-03-24 RX ADMIN — ENOXAPARIN SODIUM 40 MG: 100 INJECTION SUBCUTANEOUS at 09:20

## 2024-03-24 RX ADMIN — DONEPEZIL HYDROCHLORIDE 10 MG: 5 TABLET, FILM COATED ORAL at 21:29

## 2024-03-24 RX ADMIN — ASPIRIN 81 MG: 81 TABLET, CHEWABLE ORAL at 09:21

## 2024-03-24 RX ADMIN — POLYETHYLENE GLYCOL 3350 17 G: 17 POWDER, FOR SOLUTION ORAL at 21:36

## 2024-03-24 RX ADMIN — CARBIDOPA AND LEVODOPA 3 TABLET: 25; 100 TABLET ORAL at 00:00

## 2024-03-24 RX ADMIN — CARBOXYMETHYLCELLULOSE SODIUM 1 DROP: 10 GEL OPHTHALMIC at 09:21

## 2024-03-24 RX ADMIN — CARBIDOPA AND LEVODOPA 3 TABLET: 25; 100 TABLET ORAL at 05:07

## 2024-03-24 RX ADMIN — Medication 2000 UNITS: at 09:21

## 2024-03-24 RX ADMIN — CARBIDOPA AND LEVODOPA 3 TABLET: 25; 100 TABLET ORAL at 11:02

## 2024-03-24 RX ADMIN — CARBIDOPA AND LEVODOPA 3 TABLET: 25; 100 TABLET ORAL at 21:29

## 2024-03-24 RX ADMIN — Medication 1 CAPSULE: at 17:08

## 2024-03-24 RX ADMIN — THERA TABS 1 TABLET: TAB at 09:21

## 2024-03-24 RX ADMIN — CARBIDOPA AND LEVODOPA 3 TABLET: 25; 100 TABLET ORAL at 06:38

## 2024-03-24 RX ADMIN — IPRATROPIUM BROMIDE 2 SPRAY: 42 SPRAY NASAL at 09:22

## 2024-03-24 ASSESSMENT — PAIN SCALES - GENERAL: PAINLEVEL_OUTOF10: 0

## 2024-03-24 NOTE — PLAN OF CARE
Problem: Safety - Adult  Goal: Free from fall injury  3/23/2024 2159 by Taylor Chirinos, RN  Outcome: Progressing     Problem: Skin/Tissue Integrity  Goal: Absence of new skin breakdown  Description: 1.  Monitor for areas of redness and/or skin breakdown  2.  Assess vascular access sites hourly  3.  Every 4-6 hours minimum:  Change oxygen saturation probe site  4.  Every 4-6 hours:  If on nasal continuous positive airway pressure, respiratory therapy assess nares and determine need for appliance change or resting period.  Outcome: Progressing     Problem: ABCDS Injury Assessment  Goal: Absence of physical injury  Outcome: Progressing

## 2024-03-24 NOTE — PLAN OF CARE
Problem: Discharge Planning  Goal: Discharge to home or other facility with appropriate resources  Outcome: Progressing     Problem: Pain  Goal: Verbalizes/displays adequate comfort level or baseline comfort level  Outcome: Progressing     Problem: Safety - Adult  Goal: Free from fall injury  3/24/2024 1005 by Lolly Mars, RN  Outcome: Progressing

## 2024-03-24 NOTE — PROGRESS NOTES
Morning assessment completed, vss, alert and oriented, The care plan and education has been reviewed and mutually agreed upon with the patient.  Fall precautions in place, hourly rounding, call light and belongings in reach, bed in lowest position, wheels locked in place, side rails up x 2, walkways free of clutter

## 2024-03-25 ENCOUNTER — APPOINTMENT (OUTPATIENT)
Dept: GENERAL RADIOLOGY | Age: 79
DRG: 947 | End: 2024-03-25
Attending: STUDENT IN AN ORGANIZED HEALTH CARE EDUCATION/TRAINING PROGRAM
Payer: MEDICARE

## 2024-03-25 LAB
ANION GAP SERPL CALCULATED.3IONS-SCNC: 10 MMOL/L (ref 3–16)
BASOPHILS # BLD: 0 K/UL (ref 0–0.2)
BASOPHILS NFR BLD: 0.3 %
BUN SERPL-MCNC: 27 MG/DL (ref 7–20)
CALCIUM SERPL-MCNC: 9 MG/DL (ref 8.3–10.6)
CHLORIDE SERPL-SCNC: 103 MMOL/L (ref 99–110)
CO2 SERPL-SCNC: 26 MMOL/L (ref 21–32)
CREAT SERPL-MCNC: <0.5 MG/DL (ref 0.8–1.3)
DEPRECATED RDW RBC AUTO: 14.1 % (ref 12.4–15.4)
EOSINOPHIL # BLD: 0.3 K/UL (ref 0–0.6)
EOSINOPHIL NFR BLD: 3.8 %
GFR SERPLBLD CREATININE-BSD FMLA CKD-EPI: >60 ML/MIN/{1.73_M2}
GLUCOSE BLD-MCNC: 119 MG/DL (ref 70–99)
GLUCOSE SERPL-MCNC: 112 MG/DL (ref 70–99)
HCT VFR BLD AUTO: 37 % (ref 40.5–52.5)
HGB BLD-MCNC: 12.2 G/DL (ref 13.5–17.5)
LYMPHOCYTES # BLD: 2.2 K/UL (ref 1–5.1)
LYMPHOCYTES NFR BLD: 28.8 %
MCH RBC QN AUTO: 30.3 PG (ref 26–34)
MCHC RBC AUTO-ENTMCNC: 33 G/DL (ref 31–36)
MCV RBC AUTO: 91.6 FL (ref 80–100)
MONOCYTES # BLD: 0.8 K/UL (ref 0–1.3)
MONOCYTES NFR BLD: 10.6 %
NEUTROPHILS # BLD: 4.3 K/UL (ref 1.7–7.7)
NEUTROPHILS NFR BLD: 56.5 %
PERFORMED ON: ABNORMAL
PLATELET # BLD AUTO: 309 K/UL (ref 135–450)
PMV BLD AUTO: 8.5 FL (ref 5–10.5)
POTASSIUM SERPL-SCNC: 4.2 MMOL/L (ref 3.5–5.1)
RBC # BLD AUTO: 4.04 M/UL (ref 4.2–5.9)
SODIUM SERPL-SCNC: 139 MMOL/L (ref 136–145)
WBC # BLD AUTO: 7.6 K/UL (ref 4–11)

## 2024-03-25 PROCEDURE — 85025 COMPLETE CBC W/AUTO DIFF WBC: CPT

## 2024-03-25 PROCEDURE — 6370000000 HC RX 637 (ALT 250 FOR IP): Performed by: STUDENT IN AN ORGANIZED HEALTH CARE EDUCATION/TRAINING PROGRAM

## 2024-03-25 PROCEDURE — 92507 TX SP LANG VOICE COMM INDIV: CPT

## 2024-03-25 PROCEDURE — 80048 BASIC METABOLIC PNL TOTAL CA: CPT

## 2024-03-25 PROCEDURE — 92526 ORAL FUNCTION THERAPY: CPT

## 2024-03-25 PROCEDURE — 1280000000 HC REHAB R&B

## 2024-03-25 PROCEDURE — 71045 X-RAY EXAM CHEST 1 VIEW: CPT

## 2024-03-25 PROCEDURE — 36415 COLL VENOUS BLD VENIPUNCTURE: CPT

## 2024-03-25 PROCEDURE — 6360000002 HC RX W HCPCS: Performed by: STUDENT IN AN ORGANIZED HEALTH CARE EDUCATION/TRAINING PROGRAM

## 2024-03-25 PROCEDURE — 97530 THERAPEUTIC ACTIVITIES: CPT

## 2024-03-25 PROCEDURE — 97129 THER IVNTJ 1ST 15 MIN: CPT

## 2024-03-25 PROCEDURE — 97535 SELF CARE MNGMENT TRAINING: CPT

## 2024-03-25 RX ADMIN — IPRATROPIUM BROMIDE 2 SPRAY: 42 SPRAY NASAL at 16:36

## 2024-03-25 RX ADMIN — CARBIDOPA AND LEVODOPA 1.5 TABLET: 25; 100 TABLET ORAL at 13:50

## 2024-03-25 RX ADMIN — CARBIDOPA AND LEVODOPA 3 TABLET: 25; 100 TABLET ORAL at 06:49

## 2024-03-25 RX ADMIN — NYSTATIN 500000 UNITS: 100000 SUSPENSION ORAL at 16:35

## 2024-03-25 RX ADMIN — Medication 1 CAPSULE: at 16:36

## 2024-03-25 RX ADMIN — Medication 1 CAPSULE: at 08:23

## 2024-03-25 RX ADMIN — ASPIRIN 81 MG: 81 TABLET, CHEWABLE ORAL at 08:23

## 2024-03-25 RX ADMIN — CARBOXYMETHYLCELLULOSE SODIUM 1 DROP: 10 GEL OPHTHALMIC at 08:24

## 2024-03-25 RX ADMIN — FEXOFENADINE HCL 180 MG: 180 TABLET ORAL at 20:35

## 2024-03-25 RX ADMIN — Medication 2000 UNITS: at 08:22

## 2024-03-25 RX ADMIN — CARBIDOPA AND LEVODOPA 3 TABLET: 25; 100 TABLET ORAL at 11:08

## 2024-03-25 RX ADMIN — CARBIDOPA AND LEVODOPA 3 TABLET: 25; 100 TABLET ORAL at 00:21

## 2024-03-25 RX ADMIN — NYSTATIN 500000 UNITS: 100000 SUSPENSION ORAL at 08:28

## 2024-03-25 RX ADMIN — CARBIDOPA AND LEVODOPA 1.5 TABLET: 25; 100 TABLET ORAL at 18:06

## 2024-03-25 RX ADMIN — CARBOXYMETHYLCELLULOSE SODIUM 1 DROP: 10 GEL OPHTHALMIC at 20:32

## 2024-03-25 RX ADMIN — ATORVASTATIN CALCIUM 80 MG: 80 TABLET, FILM COATED ORAL at 20:44

## 2024-03-25 RX ADMIN — PRIMIDONE 50 MG: 50 TABLET ORAL at 20:44

## 2024-03-25 RX ADMIN — NYSTATIN 500000 UNITS: 100000 SUSPENSION ORAL at 14:16

## 2024-03-25 RX ADMIN — DONEPEZIL HYDROCHLORIDE 10 MG: 5 TABLET, FILM COATED ORAL at 20:32

## 2024-03-25 RX ADMIN — LANSOPRAZOLE 30 MG: 30 TABLET, ORALLY DISINTEGRATING, DELAYED RELEASE ORAL at 16:36

## 2024-03-25 RX ADMIN — MIDODRINE HYDROCHLORIDE 5 MG: 5 TABLET ORAL at 08:23

## 2024-03-25 RX ADMIN — CLOZAPINE 25 MG: 25 TABLET ORAL at 09:17

## 2024-03-25 RX ADMIN — CARBIDOPA AND LEVODOPA 3 TABLET: 25; 100 TABLET ORAL at 05:10

## 2024-03-25 RX ADMIN — POLYETHYLENE GLYCOL 3350 17 G: 17 POWDER, FOR SOLUTION ORAL at 21:22

## 2024-03-25 RX ADMIN — CLOZAPINE 25 MG: 25 TABLET ORAL at 20:31

## 2024-03-25 RX ADMIN — FLUTICASONE PROPIONATE 2 SPRAY: 50 SPRAY, METERED NASAL at 20:32

## 2024-03-25 RX ADMIN — ENOXAPARIN SODIUM 40 MG: 100 INJECTION SUBCUTANEOUS at 08:23

## 2024-03-25 RX ADMIN — IPRATROPIUM BROMIDE 2 SPRAY: 42 SPRAY NASAL at 08:24

## 2024-03-25 RX ADMIN — CARBIDOPA AND LEVODOPA 1.5 TABLET: 25; 100 TABLET ORAL at 16:35

## 2024-03-25 RX ADMIN — CARBIDOPA AND LEVODOPA 1.5 TABLET: 25; 100 TABLET ORAL at 20:43

## 2024-03-25 RX ADMIN — THERA TABS 1 TABLET: TAB at 08:23

## 2024-03-25 RX ADMIN — CARBIDOPA AND LEVODOPA 3 TABLET: 25; 100 TABLET ORAL at 08:28

## 2024-03-25 RX ADMIN — CARBIDOPA AND LEVODOPA 3 TABLET: 25; 100 TABLET ORAL at 02:56

## 2024-03-25 ASSESSMENT — PAIN SCALES - GENERAL: PAINLEVEL_OUTOF10: 0

## 2024-03-25 NOTE — PLAN OF CARE
Problem: Skin/Tissue Integrity  Goal: Absence of new skin breakdown  Description: 1.  Monitor for areas of redness and/or skin breakdown  2.  Assess vascular access sites hourly  3.  Every 4-6 hours minimum:  Change oxygen saturation probe site  4.  Every 4-6 hours:  If on nasal continuous positive airway pressure, respiratory therapy assess nares and determine need for appliance change or resting period.  3/24/2024 2356 by Aubree Sena RN  Outcome: Not Progressing     Problem: Skin/Tissue Integrity  Goal: Absence of new skin breakdown  Description: 1.  Monitor for areas of redness and/or skin breakdown  2.  Assess vascular access sites hourly  3.  Every 4-6 hours minimum:  Change oxygen saturation probe site  4.  Every 4-6 hours:  If on nasal continuous positive airway pressure, respiratory therapy assess nares and determine need for appliance change or resting period.  3/24/2024 2356 by Aubree Sena, RN  Outcome: Not Progressing

## 2024-03-25 NOTE — PROGRESS NOTES
[facility] - Inpatient Rehabilitation Department   Phone: [phone]    Speech Therapy    [] Initial Evaluation            [x] Daily Treatment Note         [] Discharge Summary      Patient: Pachceo Prince   : 1945   MRN: [id number]   Date of Service:  3/25/2024  Admitting Diagnosis: Aspiration pneumonia (HCC)  Current Admission Summary: See MD's summary   Past Medical History:  has a past medical history of Acute bronchitis, CAD (coronary artery disease), Cancer (HCC), Clostridioides difficile infection, Diabetes mellitus (HCC), Erectile dysfunction, GERD (gastroesophageal reflux disease), Hyperlipidemia, Obesity, and Parkinson disease.  Past Surgical History:  has a past surgical history that includes Diagnostic Cardiac Cath Lab Procedure (2005); Coronary angioplasty with stent (2005); Cataract extraction; eye surgery (2021); Tonsillectomy ([facility]); Percutaneous Transluminal Coronary Angio (); Upper gastrointestinal endoscopy (); and Upper gastrointestinal endoscopy (N/A, 3/8/2024).    Recent Head CT completed 3/4/24:  IMPRESSION:  1.  No acute intracranial abnormality.  2. Findings of paranasal sinusitis with right middle ear and mastoid effusion    Recent Chest xray completed 3/7/24:  IMPRESSION:  New right lung base infiltrate.    Instrumental Swallow Study:   Modified Barium Swallow evaluation completed on 3/4/2024. Patient presents with mild oropharyngeal dysphagia secondary to prolonged mastication, premature spillage to pyriform sinuses, reduced AP propulsion, delayed swallow initiation, reduced laryngeal elevation, decreased tongue base retraction and reduced pharyngeal contraction. Pt demonstrated aspiration after the swallow with initial trial of thin liquids via tsp, suspect in relation to sensation although pt demonstrated a hard cough in response. No laryngeal penetration or aspiration was viewed with thin liquids via cup/straw, mildly (nectar)  Treatment Minutes: 30     Electronically Signed By:   Archana Jernigan M.A. CCC-SLP #[id number] 3/25/2024 2:21 PM  Speech-Language Pathologist

## 2024-03-25 NOTE — PROGRESS NOTES
[facility] - Inpatient Rehabilitation Department   Phone: [phone]    Occupational Therapy    [] Initial Evaluation            [x] Daily Treatment Note         [] Discharge Summary      Patient: Pacheco Prince   : 1945   MRN: [id number]   Date of Service:  3/25/2024    Admitting Diagnosis:  Aspiration pneumonia (HCC)  Current Admission Summary: Pacheco Prince is a 79 y.o. male with PMHx notable for Parkinson's disease, DM2, CAD who presented on 24 with fever, and cough. He was on a cruise when symptoms developed, was diagnosed with pneumonia and started on IV Zosyn. He continued to decline, requiring medical flight back home to [place]. Etiology of pneumonia is presumed aspiration, was started on broad-spectrum antibiotics with vancomycin and Zosyn. MRSA swab was negative, so vancomycin was discontinued. He was treated with cefepime and Flagyl. CTA Chest was obtained, negative for PE.  He did develop worsening/recurrent aspiration pneumonia, and his diet was downgraded to n.p.o.  He underwent PEG placement on 3/9.  He is now tolerating tube feeds at goal rate, and able to receive his Parkinson's medications via PEG.  Hospital course complicated by: acute hypoxic respiratory failure, dysphagia, hypertension, hypokalemia, anemia, lethargy/AMS, worsening Parkinson's symptoms.      Currently, patient reports that he is doing well.  His wife notes a productive cough with increasing frequency.  He denies any fevers, chills, dyspnea, chest pain.  He is experiencing significant tremor, rigidity and freezing.  He denies any focal numbness or weakness.  Past Medical History:  has a past medical history of Acute bronchitis, CAD (coronary artery disease), Cancer (HCC), Clostridioides difficile infection, Diabetes mellitus (HCC), Erectile dysfunction, GERD (gastroesophageal reflux disease), Hyperlipidemia, Obesity, and Parkinson disease.  Past Surgical History:  has a past surgical history

## 2024-03-25 NOTE — PROGRESS NOTES
[facility] - Inpatient Rehabilitation Department   Phone: [phone]    Physical Therapy    [] Initial Evaluation            [x] Daily Treatment Note         [] Discharge Summary      Patient: Pacheco Prince   : 1945   MRN: [id number]   Date of Service:  3/25/2024  Admitting Diagnosis: Aspiration pneumonia (HCC)  Current Admission Summary: Pacheco Prince is a 79 y.o. male with PMHx notable for Parkinson's disease, DM2, CAD who presented on 24 with fever, and cough. He was on a cruise when symptoms developed, was diagnosed with pneumonia and started on IV Zosyn. He continued to decline, requiring medical flight back home to [place]. Etiology of pneumonia is presumed aspiration, was started on broad-spectrum antibiotics with vancomycin and Zosyn. MRSA swab was negative, so vancomycin was discontinued. He was treated with cefepime and Flagyl. CTA Chest was obtained, negative for PE.  He did develop worsening/recurrent aspiration pneumonia, and his diet was downgraded to n.p.o.  He underwent PEG placement on 3/9.  He is now tolerating tube feeds at goal rate, and able to receive his Parkinson's medications via PEG.  Hospital course complicated by: acute hypoxic respiratory failure, dysphagia, hypertension, hypokalemia, anemia, lethargy/AMS, worsening Parkinson's symptoms.   Past Medical History:  has a past medical history of Acute bronchitis, CAD (coronary artery disease), Cancer (HCC), Clostridioides difficile infection, Diabetes mellitus (HCC), Erectile dysfunction, GERD (gastroesophageal reflux disease), Hyperlipidemia, Obesity, and Parkinson disease.  Past Surgical History:  has a past surgical history that includes Diagnostic Cardiac Cath Lab Procedure (2005); Coronary angioplasty with stent (2005); Cataract extraction; eye surgery (2021); Tonsillectomy ([facility]); Percutaneous Transluminal Coronary Angio (); Upper gastrointestinal endoscopy (); and  reach, and family/caregiver present    Plan  Frequency: 5 x/week, 60 min/day  Current Treatment Recommendations: strengthening, ROM, balance training, functional mobility training, transfer training, gait training, stair training, endurance training, manual therapy - soft tissue massage, and patient/caregiver education    Goals  Patient Goals: pt did not state goals; ' per wife \"get back to everything he was doing before\"   Short Term Goals:  Time Frame: 1 wk   Pt able to roll R and L with moderate assistance of 1.  Pt able to perform supine to/from sitting with moderate assistance of 1.--MET 3/19  Pt able to perform STS transfers with moderate assistance of 1. --met 3/19  Pt able to ambulate with an AAD 20 ft with moderate assistance of 1.--met 3/19      Long Term Goals: Time Frame 2 wks  Pt able to perform all bed mobility with SBA.  Pt able to perform STS transfers with SBA.   Pt able to ambulate with AAD 50 ft with CGA.   Pt able to navigate 4 steps with a railing with CGA.  Pt able to perform a car transfer with SBA.    Above goals reviewed on 3/25/2024.  All goals are ongoing at this time unless indicated above.      Therapy Session Time      Individual Group Co-treatment   Time In 1245       Time Out 1345       Minutes 60           Timed Code Treatment Minutes:  60    Total Treatment Minutes:  60      Electronically Signed By: Melissa Gibson, PT, DPT [id number]

## 2024-03-25 NOTE — PROGRESS NOTES
Pacheco Prince  3/25/2024  [id number]    Chief Complaint: Aspiration pneumonia (HCC)    Subjective:   Patient doing well overall. Had 2 episodes of cough productive of clear/whitish sputum over the weekend. Denies any pain complaints, fevers/chills, dyspnea.     Last BM: Stool Occurrence: 1 (03/24/24 0919)    Objective:  Patient Vitals for the past 24 hrs:   BP Temp Temp src Pulse Resp SpO2   03/25/24 1000 -- -- -- -- -- 95 %   03/25/24 0720 108/62 97.9 °F (36.6 °C) Oral 72 16 --   03/24/24 2200 (!) 95/52 97.7 °F (36.5 °C) Oral 66 18 96 %     Gen: No distress, pleasant.   HEENT: Normocephalic, atraumatic.  CV: Regular rate and rhythm. Extremities warm, well perfused.   Resp: No respiratory distress. CTAB.  Abd: Soft, non-tender.   Ext: No edema.   Neuro: Alert. Appropriate verbal responses given increased time. Moves bilateral upper and lower extremities spontaneously. Bilateral upper extremity tremor and rigidity.   Skin: Deep tissue injury present on right heel. Linear eschar/scab present within area of dry/excoriated skin in left calcaneal aspect of heel, nontender to palpation. PRAFO boot and heel cups in place bilaterally. (Not reassessed on this date).     Laboratory data: Available via EMR.     Therapy progress:       PT    Supine to Sit: Partial/moderate assistance  Sit to Supine: Dependent   Sit to Stand: Partial/moderate assistance  Chair/Bed to Chair Transfer: Partial/moderate assistance  Car Transfer:    Ambulation 10 ft: Partial/moderate assistance  Ambulation 50 ft: Partial/moderate assistance  Ambulation 150 ft: Partial/moderate assistance  Stairs - 1 Step: Dependent  Stairs - 4 Step: Dependent  Stairs - 12 Step:      OT    Eating: Dependent  Oral Hygiene: Dependent  Bathing: Partial/moderate assistance  Upper Body Dressing: Supervision or touching assistance  Lower Body Dressing: Supervision or touching assistance  Toilet Transfer: Dependent  Toilet Hygiene: Dependent    Speech Therapy    Pt

## 2024-03-25 NOTE — PLAN OF CARE
Problem: Skin/Tissue Integrity  Goal: Absence of new skin breakdown  Description: 1.  Monitor for areas of redness and/or skin breakdown  2.  Assess vascular access sites hourly  3.  Every 4-6 hours minimum:  Change oxygen saturation probe site  4.  Every 4-6 hours:  If on nasal continuous positive airway pressure, respiratory therapy assess nares and determine need for appliance change or resting period.  Outcome: Not Progressing     Problem: Discharge Planning  Goal: Discharge to home or other facility with appropriate resources  3/24/2024 2356 by Aubree Sena RN  Outcome: Progressing     Problem: Pain  Goal: Verbalizes/displays adequate comfort level or baseline comfort level  3/24/2024 2356 by Aburee Sena RN  Outcome: Progressing     Problem: Safety - Adult  Goal: Free from fall injury  3/24/2024 2356 by Aubree Sena RN  Outcome: Progressing     Problem: Confusion  Goal: Confusion, delirium, dementia, or psychosis is improved or at baseline  Description: INTERVENTIONS:  1. Assess for possible contributors to thought disturbance, including medications, impaired vision or hearing, underlying metabolic abnormalities, dehydration, psychiatric diagnoses, and notify attending LIP  2. [place] high risk fall precautions, as indicated  3. Provide frequent short contacts to provide reality reorientation, refocusing and direction  4. Decrease environmental stimuli, including noise as appropriate  5. Monitor and intervene to maintain adequate nutrition, hydration, elimination, sleep and activity  6. If unable to ensure safety without constant attention obtain sitter and review sitter guidelines with assigned personnel  7. Initiate Psychosocial CNS and Spiritual Care consult, as indicated  Outcome: Progressing     Problem: Chronic Conditions and Co-morbidities  Goal: Patient's chronic conditions and co-morbidity symptoms are monitored and maintained or improved  Outcome: Progressing     Problem: ABCDS

## 2024-03-25 NOTE — PROGRESS NOTES
CALORIE COUNT and BRIEF NUTRITION NOTE    Patient Name: Pacheco Prince Admission Date: 3/11/2024    Calorie Count x 3 days initiated (3/22 - 3/25). Current meal & supplement consumption estimated to be less than 50% of assessed nutritional needs.      Calorie count requires complete meal documentation from 9 consecutive meals.     Current diet and supplement order   ADULT TUBE FEEDING; PEG; Standard with Fiber; Bolus; 6 Times Daily; 240; Gravity; 50; Before and after each bolus; Wound Healing; 1 Dose; BID  ADULT DIET; Dysphagia - Minced and Moist  ADULT ORAL NUTRITION SUPPLEMENT; Breakfast, Lunch, Dinner; Low Calorie/High Protein Oral Supplement       Comparative Standards  Calories: 3801-0400 kcal  avg 1858 kcal  Protein:  g avg 120 g    Nutrition Goal    Meet greater than 50% of estimated needs by mouth.    Date Consumed PO Intake Kcal %   Kcal met PO Intake grams protein %  Protein Met   Comments   3/22 56 (L/D) 3% 6g (L/D) 5%    3/23 164 9% 9.1 7.6% X 3 meals   3/24 281 15% 12 10% X 3 meals     3/25                             Information above may not be true assessment on calorie and protein consumed d/t limited information available.      Intervention & Recommendations:   1.  Results will be posted daily as available      Susi Juan RD, LD  Contact Number: [phone]

## 2024-03-26 PROCEDURE — 1280000000 HC REHAB R&B

## 2024-03-26 PROCEDURE — 6370000000 HC RX 637 (ALT 250 FOR IP): Performed by: STUDENT IN AN ORGANIZED HEALTH CARE EDUCATION/TRAINING PROGRAM

## 2024-03-26 PROCEDURE — 6360000002 HC RX W HCPCS: Performed by: STUDENT IN AN ORGANIZED HEALTH CARE EDUCATION/TRAINING PROGRAM

## 2024-03-26 PROCEDURE — 97530 THERAPEUTIC ACTIVITIES: CPT

## 2024-03-26 PROCEDURE — 97535 SELF CARE MNGMENT TRAINING: CPT

## 2024-03-26 PROCEDURE — 92526 ORAL FUNCTION THERAPY: CPT

## 2024-03-26 PROCEDURE — 92507 TX SP LANG VOICE COMM INDIV: CPT

## 2024-03-26 RX ADMIN — CLOZAPINE 25 MG: 25 TABLET ORAL at 21:43

## 2024-03-26 RX ADMIN — Medication 2000 UNITS: at 09:19

## 2024-03-26 RX ADMIN — DONEPEZIL HYDROCHLORIDE 10 MG: 5 TABLET, FILM COATED ORAL at 21:42

## 2024-03-26 RX ADMIN — CARBOXYMETHYLCELLULOSE SODIUM 1 DROP: 10 GEL OPHTHALMIC at 09:24

## 2024-03-26 RX ADMIN — CARBIDOPA AND LEVODOPA 3 TABLET: 25; 100 TABLET ORAL at 15:30

## 2024-03-26 RX ADMIN — ENOXAPARIN SODIUM 40 MG: 100 INJECTION SUBCUTANEOUS at 09:23

## 2024-03-26 RX ADMIN — CARBOXYMETHYLCELLULOSE SODIUM 1 DROP: 10 GEL OPHTHALMIC at 21:54

## 2024-03-26 RX ADMIN — PRIMIDONE 50 MG: 50 TABLET ORAL at 21:42

## 2024-03-26 RX ADMIN — CARBIDOPA AND LEVODOPA 1.5 TABLET: 25; 100 TABLET ORAL at 06:43

## 2024-03-26 RX ADMIN — CARBIDOPA AND LEVODOPA 3 TABLET: 25; 100 TABLET ORAL at 13:52

## 2024-03-26 RX ADMIN — THERA TABS 1 TABLET: TAB at 09:20

## 2024-03-26 RX ADMIN — IPRATROPIUM BROMIDE 2 SPRAY: 42 SPRAY NASAL at 12:09

## 2024-03-26 RX ADMIN — MELATONIN TAB 3 MG 3 MG: 3 TAB at 21:42

## 2024-03-26 RX ADMIN — FEXOFENADINE HCL 180 MG: 180 TABLET ORAL at 21:57

## 2024-03-26 RX ADMIN — ATORVASTATIN CALCIUM 80 MG: 80 TABLET, FILM COATED ORAL at 21:42

## 2024-03-26 RX ADMIN — CARBIDOPA AND LEVODOPA 3 TABLET: 25; 100 TABLET ORAL at 17:42

## 2024-03-26 RX ADMIN — FLUTICASONE PROPIONATE 2 SPRAY: 50 SPRAY, METERED NASAL at 21:55

## 2024-03-26 RX ADMIN — Medication 1 CAPSULE: at 09:19

## 2024-03-26 RX ADMIN — Medication 1 CAPSULE: at 15:30

## 2024-03-26 RX ADMIN — LANSOPRAZOLE 30 MG: 30 TABLET, ORALLY DISINTEGRATING, DELAYED RELEASE ORAL at 15:30

## 2024-03-26 RX ADMIN — CARBIDOPA AND LEVODOPA 1.5 TABLET: 25; 100 TABLET ORAL at 23:58

## 2024-03-26 RX ADMIN — ACETAMINOPHEN 650 MG: 325 TABLET ORAL at 21:59

## 2024-03-26 RX ADMIN — CARBIDOPA AND LEVODOPA 3 TABLET: 25; 100 TABLET ORAL at 19:26

## 2024-03-26 RX ADMIN — CARBIDOPA AND LEVODOPA 3 TABLET: 25; 100 TABLET ORAL at 21:42

## 2024-03-26 RX ADMIN — CARBIDOPA AND LEVODOPA 1.5 TABLET: 25; 100 TABLET ORAL at 09:19

## 2024-03-26 RX ADMIN — CARBIDOPA AND LEVODOPA 3 TABLET: 25; 100 TABLET ORAL at 11:59

## 2024-03-26 RX ADMIN — ASPIRIN 81 MG: 81 TABLET, CHEWABLE ORAL at 09:19

## 2024-03-26 ASSESSMENT — PAIN SCALES - GENERAL: PAINLEVEL_OUTOF10: 0

## 2024-03-26 NOTE — PROGRESS NOTES
Physical Therapy  PT attempted therapy session at 915 and again at 1100.  Pt upright in bed with increased tremors and sweating. Pt would open his eyes to verbal stimuli but not maintain opening. Minimally responsive to verbal stimuli. Pt's wife does not feel comfortable with getting pt up at this time thus therapy declined.    Variance: 60 minutes.   Thanks, Melissa Gibson PT, DPT [id number]

## 2024-03-26 NOTE — PROGRESS NOTES
[facility] - Inpatient Rehabilitation Department   Phone: [phone]    Speech Therapy    [] Initial Evaluation            [x] Daily Treatment Note         [] Discharge Summary      Patient: Pacheco Prince   : 1945   MRN: [id number]   Date of Service:  3/26/2024  Admitting Diagnosis: Aspiration pneumonia (HCC)  Current Admission Summary: See MD's summary   Past Medical History:  has a past medical history of Acute bronchitis, CAD (coronary artery disease), Cancer (HCC), Clostridioides difficile infection, Diabetes mellitus (HCC), Erectile dysfunction, GERD (gastroesophageal reflux disease), Hyperlipidemia, Obesity, and Parkinson disease.  Past Surgical History:  has a past surgical history that includes Diagnostic Cardiac Cath Lab Procedure (2005); Coronary angioplasty with stent (2005); Cataract extraction; eye surgery (2021); Tonsillectomy ([facility]); Percutaneous Transluminal Coronary Angio (); Upper gastrointestinal endoscopy (); and Upper gastrointestinal endoscopy (N/A, 3/8/2024).    Recent Head CT completed 3/4/24:  IMPRESSION:  1.  No acute intracranial abnormality.  2. Findings of paranasal sinusitis with right middle ear and mastoid effusion    Recent Chest xray completed 3/7/24:  IMPRESSION:  New right lung base infiltrate.    Instrumental Swallow Study:   Modified Barium Swallow evaluation completed on 3/4/2024. Patient presents with mild oropharyngeal dysphagia secondary to prolonged mastication, premature spillage to pyriform sinuses, reduced AP propulsion, delayed swallow initiation, reduced laryngeal elevation, decreased tongue base retraction and reduced pharyngeal contraction. Pt demonstrated aspiration after the swallow with initial trial of thin liquids via tsp, suspect in relation to sensation although pt demonstrated a hard cough in response. No laryngeal penetration or aspiration was viewed with thin liquids via cup/straw, mildly (nectar)

## 2024-03-26 NOTE — PROGRESS NOTES
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 Due to low vocal volume pt requires intermittent clarification requests when talking in longer phrases. Pt presents with a moderate to severe cognitive-communication impairment. Pt with significant delays in processing speed for basic comprehension and significant word finding impairments/ reduced thought organization, impacting functional communication. His alertness fluctuates significantly based upon time and medication administration. Alertness significantly impacts his responses and ability to adequately participate in dysphagia and cognitive communication therapy. FEES completed previous date 3/22 while pt was alert and when having limited amount of tremors, see report for details. Per discussion with rehab MD ok to initiate a minced and moist po diet with thins liquids and meds provided whole in puree with 1:1 feeds and strict aspiration precautions only when pt is awake/ alert and upright. Recommend SLP intervention for safe return to prior level of function.    Body mass index is 26.11 kg/m².    Assessment:    This patient continues to require an ARU level of care from all disciplines to address the following issues:    Patient Active Problem List   Diagnosis    Coronary artery disease involving native coronary artery of native heart without angina pectoris    Mixed hyperlipidemia    Dizziness    PVC's (premature ventricular contractions)    Hypertension    Bilateral carotid artery stenosis    Parkinson disease    Bilateral carotid artery disease, unspecified type (HCC)    Hypotension    Type 2 diabetes mellitus without complication, without long-term current use of insulin (HCC)    COVID-19    Aspiration pneumonia (HCC)    Acute

## 2024-03-26 NOTE — PROGRESS NOTES
CALORIE COUNT and BRIEF NUTRITION NOTE    Patient Name: Pacheco Prince Admission Date: 3/11/2024    Calorie Count (started 3/22). Current meal & supplement consumption estimated to be less than 50% of assessed nutritional needs.      Calorie count requires complete meal documentation from 9 consecutive meals.     Current diet and supplement order   ADULT DIET; Dysphagia - Minced and Moist  ADULT ORAL NUTRITION SUPPLEMENT; Breakfast, Lunch, Dinner; Low Calorie/High Protein Oral Supplement  ADULT TUBE FEEDING; PEG; Standard with Fiber; Bolus; 6 Times Daily; 240; Gravity; 50; Before and after each bolus; Wound Healing; 1 Dose; BID       Comparative Standards  Calories: 2559-8657 kcal  avg 1858 kcal  Protein:  g avg 120 g    Nutrition Goal    Meet greater than 50% of estimated needs by mouth.    Date Consumed PO Intake Kcal %   Kcal met PO Intake grams protein %  Protein Met   Comments   3/22 56 (L/D) 3% 6g (L/D) 5%    3/23 164 9% 9.1 7.6% X 3 meals   3/24 281 15% 12 10% X 3 meals     3/25 225 12% 1 1% X 2 meals                        Information above may not be true assessment on calorie and protein consumed d/t limited information available.      Intervention & Recommendations:   1.  Results will be posted daily as available  2.  Recommend to con't TF @ goal rate & to offer food for pleasure as pt desires.     Susi Juan RD, LD  Contact Number: [phone]

## 2024-03-26 NOTE — PROGRESS NOTES
[facility] - Inpatient Rehabilitation Department   Phone: [phone]    Occupational Therapy    [] Initial Evaluation            [x] Daily Treatment Note         [] Discharge Summary      Patient: Pacheco Prince   : 1945   MRN: [id number]   Date of Service:  3/26/2024    Admitting Diagnosis:  Aspiration pneumonia (HCC)  Current Admission Summary: Pacheco Prince is a 79 y.o. male with PMHx notable for Parkinson's disease, DM2, CAD who presented on 24 with fever, and cough. He was on a cruise when symptoms developed, was diagnosed with pneumonia and started on IV Zosyn. He continued to decline, requiring medical flight back home to [place]. Etiology of pneumonia is presumed aspiration, was started on broad-spectrum antibiotics with vancomycin and Zosyn. MRSA swab was negative, so vancomycin was discontinued. He was treated with cefepime and Flagyl. CTA Chest was obtained, negative for PE.  He did develop worsening/recurrent aspiration pneumonia, and his diet was downgraded to n.p.o.  He underwent PEG placement on 3/9.  He is now tolerating tube feeds at goal rate, and able to receive his Parkinson's medications via PEG.  Hospital course complicated by: acute hypoxic respiratory failure, dysphagia, hypertension, hypokalemia, anemia, lethargy/AMS, worsening Parkinson's symptoms.      Currently, patient reports that he is doing well.  His wife notes a productive cough with increasing frequency.  He denies any fevers, chills, dyspnea, chest pain.  He is experiencing significant tremor, rigidity and freezing.  He denies any focal numbness or weakness.  Past Medical History:  has a past medical history of Acute bronchitis, CAD (coronary artery disease), Cancer (HCC), Clostridioides difficile infection, Diabetes mellitus (HCC), Erectile dysfunction, GERD (gastroesophageal reflux disease), Hyperlipidemia, Obesity, and Parkinson disease.  Past Surgical History:  has a past surgical history  upper body ADL at contact guard assistance  - goal met 3/25  Patient will complete lower body ADL at minimal assistance - goal met 3/25  Patient will complete toileting at minimal assistance   Patient will complete self-feeding at stand by assistance  - goal met 3/25  Patient will complete grooming at stand by assistance   Patient will complete functional transfers at moderate assistance - goal met 3/19    Long Term Goals:  To be completed in: 2 weeks  Patient will complete upper body ADL at supervision - goal met 3/25  Patient will complete lower body ADL at stand by assistance   Patient will complete toileting at stand by assistance   Patient will complete self-feeding at set up assistance   Patient will complete grooming at set up assistance   Patient will complete functional transfers at stand by assistance   Patient will increase functional standing balance to 2+ min for improved ADL completion - goal met 3/25      Above goals reviewed on 3/26/2024.  All goals are ongoing at this time unless indicated above.       Therapy Session Time  First  Session Therapy Time:   Individual Concurrent Group Co-treatment   Time In 1245        Time Out 1320        Minutes 65           Individual Group Co-treatment   Time In      Time Out      Minutes        Timed Code Treatment Minutes: 65 min  Total Treatment Minutes: 65 min        Electronically Signed By: CHENTE Livingston MOT OTR/L, [id number] 3/26/2024 3:25 PM

## 2024-03-26 NOTE — PLAN OF CARE
Problem: Discharge Planning  Goal: Discharge to home or other facility with appropriate resources  Outcome: Progressing     Problem: Pain  Goal: Verbalizes/displays adequate comfort level or baseline comfort level  Outcome: Progressing     Problem: Safety - Adult  Goal: Free from fall injury  Outcome: Progressing     Problem: Confusion  Goal: Confusion, delirium, dementia, or psychosis is improved or at baseline  Description: INTERVENTIONS:  1. Assess for possible contributors to thought disturbance, including medications, impaired vision or hearing, underlying metabolic abnormalities, dehydration, psychiatric diagnoses, and notify attending LIP  2. [place] high risk fall precautions, as indicated  3. Provide frequent short contacts to provide reality reorientation, refocusing and direction  4. Decrease environmental stimuli, including noise as appropriate  5. Monitor and intervene to maintain adequate nutrition, hydration, elimination, sleep and activity  6. If unable to ensure safety without constant attention obtain sitter and review sitter guidelines with assigned personnel  7. Initiate Psychosocial CNS and Spiritual Care consult, as indicated  Outcome: Progressing     Problem: Skin/Tissue Integrity  Goal: Absence of new skin breakdown  Description: 1.  Monitor for areas of redness and/or skin breakdown  2.  Assess vascular access sites hourly  3.  Every 4-6 hours minimum:  Change oxygen saturation probe site  4.  Every 4-6 hours:  If on nasal continuous positive airway pressure, respiratory therapy assess nares and determine need for appliance change or resting period.  Outcome: Progressing     Problem: Chronic Conditions and Co-morbidities  Goal: Patient's chronic conditions and co-morbidity symptoms are monitored and maintained or improved  Outcome: Progressing     Problem: Nutrition Deficit:  Goal: Optimize nutritional status  Outcome: Progressing     Problem: ABCDS Injury Assessment  Goal: Absence of

## 2024-03-26 NOTE — PLAN OF CARE
Problem: Discharge Planning  Goal: Discharge to home or other facility with appropriate resources  3/25/2024 2332 by Aubree Sena RN  Outcome: Progressing     Problem: Pain  Goal: Verbalizes/displays adequate comfort level or baseline comfort level  3/25/2024 2332 by Aubree Sena RN  Outcome: Progressing     Problem: Safety - Adult  Goal: Free from fall injury  Outcome: Progressing     Problem: Confusion  Goal: Confusion, delirium, dementia, or psychosis is improved or at baseline  Description: INTERVENTIONS:  1. Assess for possible contributors to thought disturbance, including medications, impaired vision or hearing, underlying metabolic abnormalities, dehydration, psychiatric diagnoses, and notify attending LIP  2. [place] high risk fall precautions, as indicated  3. Provide frequent short contacts to provide reality reorientation, refocusing and direction  4. Decrease environmental stimuli, including noise as appropriate  5. Monitor and intervene to maintain adequate nutrition, hydration, elimination, sleep and activity  6. If unable to ensure safety without constant attention obtain sitter and review sitter guidelines with assigned personnel  7. Initiate Psychosocial CNS and Spiritual Care consult, as indicated  Outcome: Progressing     Problem: Skin/Tissue Integrity  Goal: Absence of new skin breakdown  Description: 1.  Monitor for areas of redness and/or skin breakdown  2.  Assess vascular access sites hourly  3.  Every 4-6 hours minimum:  Change oxygen saturation probe site  4.  Every 4-6 hours:  If on nasal continuous positive airway pressure, respiratory therapy assess nares and determine need for appliance change or resting period.  Outcome: Progressing     Problem: Chronic Conditions and Co-morbidities  Goal: Patient's chronic conditions and co-morbidity symptoms are monitored and maintained or improved  Outcome: Progressing     Problem: ABCDS Injury Assessment  Goal: Absence of physical

## 2024-03-27 LAB
ANION GAP SERPL CALCULATED.3IONS-SCNC: 12 MMOL/L (ref 3–16)
BASOPHILS # BLD: 0.1 K/UL (ref 0–0.2)
BASOPHILS NFR BLD: 0.9 %
BUN SERPL-MCNC: 34 MG/DL (ref 7–20)
CALCIUM SERPL-MCNC: 9.3 MG/DL (ref 8.3–10.6)
CHLORIDE SERPL-SCNC: 102 MMOL/L (ref 99–110)
CO2 SERPL-SCNC: 26 MMOL/L (ref 21–32)
CREAT SERPL-MCNC: <0.5 MG/DL (ref 0.8–1.3)
DEPRECATED RDW RBC AUTO: 14.5 % (ref 12.4–15.4)
EOSINOPHIL # BLD: 0.3 K/UL (ref 0–0.6)
EOSINOPHIL NFR BLD: 4.5 %
GFR SERPLBLD CREATININE-BSD FMLA CKD-EPI: >90 ML/MIN/{1.73_M2}
GLUCOSE SERPL-MCNC: 102 MG/DL (ref 70–99)
HCT VFR BLD AUTO: 36.4 % (ref 40.5–52.5)
HGB BLD-MCNC: 11.9 G/DL (ref 13.5–17.5)
LYMPHOCYTES # BLD: 2 K/UL (ref 1–5.1)
LYMPHOCYTES NFR BLD: 30.1 %
MCH RBC QN AUTO: 30.1 PG (ref 26–34)
MCHC RBC AUTO-ENTMCNC: 32.7 G/DL (ref 31–36)
MCV RBC AUTO: 91.9 FL (ref 80–100)
MONOCYTES # BLD: 0.7 K/UL (ref 0–1.3)
MONOCYTES NFR BLD: 9.8 %
NEUTROPHILS # BLD: 3.6 K/UL (ref 1.7–7.7)
NEUTROPHILS NFR BLD: 54.7 %
PLATELET # BLD AUTO: 304 K/UL (ref 135–450)
PMV BLD AUTO: 8.4 FL (ref 5–10.5)
POTASSIUM SERPL-SCNC: 4.2 MMOL/L (ref 3.5–5.1)
RBC # BLD AUTO: 3.96 M/UL (ref 4.2–5.9)
SODIUM SERPL-SCNC: 140 MMOL/L (ref 136–145)
WBC # BLD AUTO: 6.6 K/UL (ref 4–11)

## 2024-03-27 PROCEDURE — 92507 TX SP LANG VOICE COMM INDIV: CPT

## 2024-03-27 PROCEDURE — 36415 COLL VENOUS BLD VENIPUNCTURE: CPT

## 2024-03-27 PROCEDURE — 6360000002 HC RX W HCPCS: Performed by: STUDENT IN AN ORGANIZED HEALTH CARE EDUCATION/TRAINING PROGRAM

## 2024-03-27 PROCEDURE — 80048 BASIC METABOLIC PNL TOTAL CA: CPT

## 2024-03-27 PROCEDURE — 85025 COMPLETE CBC W/AUTO DIFF WBC: CPT

## 2024-03-27 PROCEDURE — 97535 SELF CARE MNGMENT TRAINING: CPT

## 2024-03-27 PROCEDURE — 92526 ORAL FUNCTION THERAPY: CPT

## 2024-03-27 PROCEDURE — 1280000000 HC REHAB R&B

## 2024-03-27 PROCEDURE — 97530 THERAPEUTIC ACTIVITIES: CPT

## 2024-03-27 PROCEDURE — 97129 THER IVNTJ 1ST 15 MIN: CPT

## 2024-03-27 PROCEDURE — 6370000000 HC RX 637 (ALT 250 FOR IP): Performed by: STUDENT IN AN ORGANIZED HEALTH CARE EDUCATION/TRAINING PROGRAM

## 2024-03-27 PROCEDURE — 94760 N-INVAS EAR/PLS OXIMETRY 1: CPT

## 2024-03-27 RX ADMIN — CARBIDOPA AND LEVODOPA 1.5 TABLET: 25; 100 TABLET ORAL at 15:41

## 2024-03-27 RX ADMIN — Medication 1 CAPSULE: at 08:56

## 2024-03-27 RX ADMIN — ASPIRIN 81 MG: 81 TABLET, CHEWABLE ORAL at 08:56

## 2024-03-27 RX ADMIN — ATORVASTATIN CALCIUM 80 MG: 80 TABLET, FILM COATED ORAL at 20:54

## 2024-03-27 RX ADMIN — IPRATROPIUM BROMIDE 2 SPRAY: 42 SPRAY NASAL at 13:18

## 2024-03-27 RX ADMIN — Medication 2000 UNITS: at 08:56

## 2024-03-27 RX ADMIN — CARBIDOPA AND LEVODOPA 3 TABLET: 25; 100 TABLET ORAL at 08:56

## 2024-03-27 RX ADMIN — CLOZAPINE 25 MG: 25 TABLET ORAL at 08:55

## 2024-03-27 RX ADMIN — LANSOPRAZOLE 30 MG: 30 TABLET, ORALLY DISINTEGRATING, DELAYED RELEASE ORAL at 17:11

## 2024-03-27 RX ADMIN — CARBIDOPA AND LEVODOPA 1.5 TABLET: 25; 100 TABLET ORAL at 23:59

## 2024-03-27 RX ADMIN — Medication 1 CAPSULE: at 17:11

## 2024-03-27 RX ADMIN — THERA TABS 1 TABLET: TAB at 08:56

## 2024-03-27 RX ADMIN — CARBIDOPA AND LEVODOPA 1.5 TABLET: 25; 100 TABLET ORAL at 17:11

## 2024-03-27 RX ADMIN — CARBIDOPA AND LEVODOPA 1.5 TABLET: 25; 100 TABLET ORAL at 05:12

## 2024-03-27 RX ADMIN — CARBIDOPA AND LEVODOPA 1.5 TABLET: 25; 100 TABLET ORAL at 18:50

## 2024-03-27 RX ADMIN — FLUTICASONE PROPIONATE 2 SPRAY: 50 SPRAY, METERED NASAL at 20:53

## 2024-03-27 RX ADMIN — DONEPEZIL HYDROCHLORIDE 10 MG: 5 TABLET, FILM COATED ORAL at 20:55

## 2024-03-27 RX ADMIN — PRIMIDONE 50 MG: 50 TABLET ORAL at 20:55

## 2024-03-27 RX ADMIN — CARBIDOPA AND LEVODOPA 3 TABLET: 25; 100 TABLET ORAL at 07:00

## 2024-03-27 RX ADMIN — CARBIDOPA AND LEVODOPA 1.5 TABLET: 25; 100 TABLET ORAL at 11:28

## 2024-03-27 RX ADMIN — MELATONIN TAB 3 MG 3 MG: 3 TAB at 20:54

## 2024-03-27 RX ADMIN — FEXOFENADINE HCL 180 MG: 180 TABLET ORAL at 20:53

## 2024-03-27 RX ADMIN — ENOXAPARIN SODIUM 40 MG: 100 INJECTION SUBCUTANEOUS at 08:56

## 2024-03-27 RX ADMIN — CARBIDOPA AND LEVODOPA 1.5 TABLET: 25; 100 TABLET ORAL at 03:07

## 2024-03-27 RX ADMIN — IPRATROPIUM BROMIDE 2 SPRAY: 42 SPRAY NASAL at 08:56

## 2024-03-27 RX ADMIN — CARBOXYMETHYLCELLULOSE SODIUM 1 DROP: 10 GEL OPHTHALMIC at 08:56

## 2024-03-27 RX ADMIN — CLOZAPINE 25 MG: 25 TABLET ORAL at 20:54

## 2024-03-27 RX ADMIN — CARBIDOPA AND LEVODOPA 1.5 TABLET: 25; 100 TABLET ORAL at 13:16

## 2024-03-27 RX ADMIN — CARBOXYMETHYLCELLULOSE SODIUM 1 DROP: 10 GEL OPHTHALMIC at 20:53

## 2024-03-27 RX ADMIN — CARBIDOPA AND LEVODOPA 1.5 TABLET: 25; 100 TABLET ORAL at 20:54

## 2024-03-27 NOTE — PROGRESS NOTES
Pt. Woke up very lethargic this a.m. nurse was unable to give Sinemet ER P.O. due to drowsiness and worried pt. May aspirate.  Sinemet IR given per peg tube.

## 2024-03-27 NOTE — PLAN OF CARE
Problem: Safety - Adult  Goal: Free from fall injury  3/27/2024 1059 by Monique Crowder RN  Outcome: Progressing  Note: Pt remains free from falls.  Safety precautions in place.  Bed in lowest position, bed/chair wheels locked, call light with in reach, bedside table in reach, bed/chair alarm on, fall risk wrist band on.

## 2024-03-27 NOTE — CARE COORDINATION
[facility] received a referral to this patient for a semi electric hospital bed.  Hospital bed has been arranged for delivery on 3/28/24.        Thank you for the referral.  Electronically signed by Bereket Ward on 3/27/2024 at 2:23 PM  Cell ph# [phone]    NOTE: After 5:00 pm, Weekends, Holidays: Call [facility] at [phone] to coordinate delivery of home medical equipment.

## 2024-03-27 NOTE — PROGRESS NOTES
Pacheco Prince  3/27/2024  [id number]    Chief Complaint: Aspiration pneumonia (HCC)    Subjective:   Patient reports that he is doing well today. Continues to experience tremor and diaphoresis. More alert overall. Denies dyspnea. Discussed plan for hospital discharge with patient's wife. She notes that he has a standing order for routine outpatient CBC early next week for monitoring while on clozapine. Discussed that patient's CBC has not been concerning for agranulocytosis at any point this admission, including today's check.     Last BM: Stool Occurrence: 2 (03/26/24 1338)    Objective:  Patient Vitals for the past 24 hrs:   BP Temp Temp src Pulse Resp SpO2 Weight   03/27/24 0936 -- -- -- -- 18 98 % --   03/27/24 0845 118/60 98.2 °F (36.8 °C) Oral 86 18 98 % --   03/27/24 0500 -- -- -- -- -- -- 81.6 kg (179 lb 14.3 oz)   03/26/24 1900 130/70 97.9 °F (36.6 °C) Oral 84 20 98 % --   03/26/24 1745 -- -- -- -- -- -- 81.4 kg (179 lb 8 oz)     Gen: Diaphoretic.  HEENT: Normocephalic, atraumatic.  CV: Regular rate and rhythm. Extremities warm, well perfused.   Resp: No respiratory distress. CTAB.  Abd: Soft, non-tender.   Ext: No edema.   Neuro: Alert. Appropriate verbal responses given increased time. Moves bilateral upper and lower extremities spontaneously. Bilateral upper extremity tremor and rigidity.   Skin: Deep tissue injury present on right heel. Linear eschar/scab present within area of dry/excoriated skin in left calcaneal aspect of heel, nontender to palpation. PRAFO boot and heel cups in place bilaterally. (Not reassessed on this date).     Laboratory data: Available via EMR.     Therapy progress:       PT    Supine to Sit: Partial/moderate assistance  Sit to Supine: Partial/moderate assistance   Sit to Stand: Partial/moderate assistance  Chair/Bed to Chair Transfer: Partial/moderate assistance  Car Transfer: Partial/moderate assistance  Ambulation 10 ft: Partial/moderate assistance  Ambulation 50 ft:

## 2024-03-27 NOTE — DISCHARGE INSTRUCTIONS
We hope your stay on rehab has exceeded your expectations. Once again the entire Acute Rehab Staff at [facility] wish to thank you for allowing us the privilege to care for you.         A few days after you are discharged from Rehab, you will receive a survey (Eyad  Gantammy) in the mail or through email.  This is a nationally distributed survey sent to thousands of rehab patients throughout the nation.              It is very important to everyone on the rehab unit that we receive feedback based on your experience.          Thank you, we wish you good health always,         Acute Rehab Team      Hospital Preference:     __[facility]        Medical Diagnosis/Conditions    _______________________ (free text)    Emergency Contact:    ________________________________________Phone#________________________      Advanced Directives:    Code Status: ?  []  Full Code  ?  []  DNR  ? []  DNRCC  ? []  DNRCC - Arrest    (as of date of discharge:  _________)      Medical POA: ?  []   Yes ______________________________ ?  []   No                                       (Name and phone number)                     Living Will:   ?   []   Yes    ?  []   No        Insurance Information:    _______________________ (free text)      Individual Responsible  for the coordination of the discharge/follow up:    ______________________________________________________    Functional Status:    VISUAL DEFICITS:    Yes []  No  []       If yes, assisted device:   Wears Glasses Yes []  No  []  Wears Contacts  Yes []  No  []  Legally Blind Yes []  No  []    HEARING DEFICITS:    Yes []  No  []       If yes, assisted device:   Wears Hearing Aids Yes []  No  []  Pocket Talker  Yes []  No  []       Physical Therapist & Contact #: Melissa Gibson, [place] [id number], [phone]  OccupationalTherapist & Contact #: VENTURA Livingston OTR/L, [id number] [phone]  Speech Therapist & Contact #:       Activities of Daily Living:     ADL's -  Adaptive Equipment used grab bars, hand held shower head, bedside commode, shower chair with back     []  Independent ?  []  Modified Independent ?  []  Supervision                [x]  Minimal Assistance ? []  Moderate Assistance ? []  Maximal Assistance   *Patient requires total assistance with shoe and sock management. He also required minimal assistance for lower body dressing and toileting for esperanza care. He is able to manage his clothing over and under his buttocks with cueing and increased time. Encourage pt participation in bathing and dressing tasks. Continue to provide cueing and significant time for processing and task completion. Tactile, verbal, and visual cues are all helpful and have been successful with patient.        Mobility:  Functional mobility is variable throughout the day due to fatigue.  Increased processing and cueing required for transfers.  Recommend sitting prior to lying down for bed mobility secondary to difficulty \"crawling\" into bed like he normally does.  Recommend having additional assistance for 1st attempt on navigating the full flight of stairs plus hand hold assist on the Right due to having not attempted all 12 steps at one time due to history of dizziness during therapy session. Also recommend staying on one level more throughout the day. Have hospital bed and consider a transport chair available as needed.       Ambulation: Device _NONE     []  Independent ?  []  Modified Independent ?  []  Supervision                [x]  Minimal Assistance ? []  Moderate Assistance ? []  Maximal Assistance     Stairs:  Device NONE    Number of stairs: 12    Handrails:    [] Right   [x] Left      [] Bilateral   []  Independent ?  []  Modified Independent ?  []  Supervision                [x]  Minimal Assistance ? []  Moderate Assistance ? []  Maximal Assistance            Current Diet Consistency:?       []  Regular   [] Soft and Bite-Sized  ?[] Minced and Moist     ?[]  Puree     Current

## 2024-03-27 NOTE — PROGRESS NOTES
[facility] - Inpatient Rehabilitation Department   Phone: [phone]    Physical Therapy    [] Initial Evaluation            [x] Daily Treatment Note         [x] Discharge Summary      Patient: Pacheco Prince   : 1945   MRN: [id number]   Date of Service:  3/27/2024  Admitting Diagnosis: Aspiration pneumonia (HCC)  Current Admission Summary: Pacheco Prince is a 79 y.o. male with PMHx notable for Parkinson's disease, DM2, CAD who presented on 24 with fever, and cough. He was on a cruise when symptoms developed, was diagnosed with pneumonia and started on IV Zosyn. He continued to decline, requiring medical flight back home to [place]. Etiology of pneumonia is presumed aspiration, was started on broad-spectrum antibiotics with vancomycin and Zosyn. MRSA swab was negative, so vancomycin was discontinued. He was treated with cefepime and Flagyl. CTA Chest was obtained, negative for PE.  He did develop worsening/recurrent aspiration pneumonia, and his diet was downgraded to n.p.o.  He underwent PEG placement on 3/9.  He is now tolerating tube feeds at goal rate, and able to receive his Parkinson's medications via PEG.  Hospital course complicated by: acute hypoxic respiratory failure, dysphagia, hypertension, hypokalemia, anemia, lethargy/AMS, worsening Parkinson's symptoms.   Past Medical History:  has a past medical history of Acute bronchitis, CAD (coronary artery disease), Cancer (HCC), Clostridioides difficile infection, Diabetes mellitus (HCC), Erectile dysfunction, GERD (gastroesophageal reflux disease), Hyperlipidemia, Obesity, and Parkinson disease.  Past Surgical History:  has a past surgical history that includes Diagnostic Cardiac Cath Lab Procedure (2005); Coronary angioplasty with stent (2005); Cataract extraction; eye surgery (2021); Tonsillectomy ([facility]); Percutaneous Transluminal Coronary Angio (); Upper gastrointestinal endoscopy (); and  Functional mobility waxes and waned throughout the day. The amount of assistance is variable based on fatigue and the ability to follow commands.  Recommendations for pt to stay on the main level vs navigating the stairs throughout the day. LTCs not consistently met.   Safety Interventions: patient left in chair, chair alarm in place, call light within reach, and family/caregiver present    Plan  Frequency: 5 x/week, 60 min/day  Current Treatment Recommendations: strengthening, ROM, balance training, functional mobility training, transfer training, gait training, stair training, endurance training, manual therapy - soft tissue massage, and patient/caregiver education    Goals  Patient Goals: pt did not state goals; ' per wife \"get back to everything he was doing before\"   Short Term Goals:  Time Frame: 1 wk   Pt able to roll R and L with moderate assistance of 1.  Pt able to perform supine to/from sitting with moderate assistance of 1.--MET 3/19  Pt able to perform STS transfers with moderate assistance of 1. --met 3/19  Pt able to ambulate with an AAD 20 ft with moderate assistance of 1.--met 3/19      Long Term Goals: Time Frame 2 wks (NOT MET)  Pt able to perform all bed mobility with SBA.  Pt able to perform STS transfers with SBA.   Pt able to ambulate with AAD 50 ft with CGA.   Pt able to navigate 4 steps with a railing with CGA.  Pt able to perform a car transfer with SBA.    Above goals reviewed on 3/27/2024.  All goals are ongoing at this time unless indicated above.      Therapy Session Time      Individual Group Co-treatment   Time In 1030       Time Out 1130       Minutes 60           Timed Code Treatment Minutes:  60    Total Treatment Minutes:  60      Electronically Signed By: Melissa iGbson, PT, DPT [id number]

## 2024-03-27 NOTE — PROGRESS NOTES
[facility] - Inpatient Rehabilitation Department   Phone: [phone]    Speech Therapy    [] Initial Evaluation            [x] Daily Treatment Note         [x] Discharge Summary      Patient: Pacheco Prince   : 1945   MRN: [id number]   Date of Service:  3/27/2024  Admitting Diagnosis: Aspiration pneumonia (HCC)  Current Admission Summary: See MD's summary   Past Medical History:  has a past medical history of Acute bronchitis, CAD (coronary artery disease), Cancer (HCC), Clostridioides difficile infection, Diabetes mellitus (HCC), Erectile dysfunction, GERD (gastroesophageal reflux disease), Hyperlipidemia, Obesity, and Parkinson disease.  Past Surgical History:  has a past surgical history that includes Diagnostic Cardiac Cath Lab Procedure (2005); Coronary angioplasty with stent (2005); Cataract extraction; eye surgery (2021); Tonsillectomy ([facility]); Percutaneous Transluminal Coronary Angio (); Upper gastrointestinal endoscopy (); and Upper gastrointestinal endoscopy (N/A, 3/8/2024).    Recent Head CT completed 3/4/24:  IMPRESSION:  1.  No acute intracranial abnormality.  2. Findings of paranasal sinusitis with right middle ear and mastoid effusion    Recent Chest xray completed 3/7/24:  IMPRESSION:  New right lung base infiltrate.    Instrumental Swallow Study:   Modified Barium Swallow evaluation completed on 3/4/2024. Patient presents with mild oropharyngeal dysphagia secondary to prolonged mastication, premature spillage to pyriform sinuses, reduced AP propulsion, delayed swallow initiation, reduced laryngeal elevation, decreased tongue base retraction and reduced pharyngeal contraction. Pt demonstrated aspiration after the swallow with initial trial of thin liquids via tsp, suspect in relation to sensation although pt demonstrated a hard cough in response. No laryngeal penetration or aspiration was viewed with thin liquids via cup/straw, mildly (nectar)  date     Thins via straw  - pt as upright in recliner but demonstrated significant full body tremors therefore limited trials provided  - pt with an immediate cough x 1 out of 5 trials and wet vocal quality x 1 out of 5   - adequate oral acceptance noted with intermittent oral holding and suspected poor bolus loss due to tremors  - variable timing of swallow initiation across trials with a consistent audible swallow and secondary swallow per presentation      Comprehension: Severity: Moderate   Processing speed   - despite increase in tremors, pt with consistent verbal output throughout session in response to personal/preferential questions  - variable response times noted throughout questions; pt continues to benefit from repetition of question/command    Expressive Language: Severity: Moderate   Pt with increased expression of thoughts this session reporting, \"I can't do this\" when attempting to sort children/grandchildren. Consistently able to indicate that he did not have to go to the bathroom, was not in pain and did not want something to eat/drink.      Confrontational naming: naming items in room given attribute    - 29% accuracy (2/7)    - required mod-max cues for visually scanning room to locate items in room    Motor Speech/Voice: Severity: Moderate   Primarily utilized whisper or low volume voice but would intermittently utilize LOUD voice when verbally cued for response.     Cognition: Severity: Moderate   Organizing family members into categories by child vs grandchild   - pt sorted all children into grandchild category and only categories grandchild in correct category in 1/5 opportunities  - suspect pt becomes frustrated with difficulty of task and being unable to be sure about placement of names in categories      Orientation  - required min verbal/ visual cues to state month/ ROBER/date/place     Additional Interventions:    Session 2:     Dysphagia: Severity: Moderate  and Severe   Diet tolerance

## 2024-03-27 NOTE — PROGRESS NOTES
CALORIE COUNT and BRIEF NUTRITION NOTE    Patient Name: Pacheco Prince Admission Date: 3/11/2024    Calorie Count (started 3/22). Current meal & supplement consumption estimated to be less than 50% of assessed nutritional needs.      Calorie count requires complete meal documentation from 9 consecutive meals.     Current diet and supplement order   ADULT ORAL NUTRITION SUPPLEMENT; Breakfast, Lunch, Dinner; Low Calorie/High Protein Oral Supplement  ADULT TUBE FEEDING; PEG; Standard with Fiber; Bolus; 6 Times Daily; 240; Gravity; 50; Before and after each bolus; Wound Healing; 1 Dose; BID  ADULT DIET; Dysphagia - Minced and Moist       Comparative Standards  Calories: 7783-2880 kcal  avg 1858 kcal  Protein:  g avg 120 g    Nutrition Goal    Meet greater than 50% of estimated needs by mouth.    Date Consumed PO Intake Kcal %   Kcal met PO Intake grams protein %  Protein Met   Comments   3/22 56 (L/D) 3% 6g (L/D) 5%    3/23 164 9% 9.1 7.6% X 3 meals   3/24 281 15% 12 10% X 3 meals     3/25 225 12% 1 1% X 2 meals   3/26 80 4% 2 2% X1 meal   3/27 83  3  BKF        Information above may not be true assessment on calorie and protein consumed d/t limited information available.      Intervention & Recommendations:   1.  Results will be posted daily as available  2.  Recommend to con't TF @ goal rate & to offer food for pleasure as pt desires.     MICHELLE DASH RD, LD  Contact Number: [phone]

## 2024-03-27 NOTE — PLAN OF CARE
Pt alert and oriented. .  Shift assessment done.   Moderate tremors noted, Transferred pt from chair to bed with assistance of 2 and gait belt.  All due meds given as prescribed via peg tube with flushes of water.  Purewick placed. Incontinent of urine.  Complaints of pain at the leg, 6/10, dull pain. Tylenol given as prescribed  Wife at bedside, really helpful and cooperative.   No other complaints at the time of writing.    Problem: Discharge Planning  Goal: Discharge to home or other facility with appropriate resources  3/27/2024 0328 by Tien Terrell, RN  Outcome: Progressing     Problem: Pain  Goal: Verbalizes/displays adequate comfort level or baseline comfort level  3/27/2024 0328 by Tien Terrell, RN  Outcome: Progressing  Flowsheets (Taken 3/26/2024 1900)  Verbalizes/displays adequate comfort level or baseline comfort level:   Encourage patient to monitor pain and request assistance   Assess pain using appropriate pain scale   Administer analgesics based on type and severity of pain and evaluate response   Implement non-pharmacological measures as appropriate and evaluate response     Problem: Safety - Adult  Goal: Free from fall injury  3/27/2024 0328 by Tien Terrell, RN  Outcome: Progressing    Problem: Confusion  Goal: Confusion, delirium, dementia, or psychosis is improved or at baseline  Description: INTERVENTIONS:  1. Assess for possible contributors to thought disturbance, including medications, impaired vision or hearing, underlying metabolic abnormalities, dehydration, psychiatric diagnoses, and notify attending LIP  2. [place] high risk fall precautions, as indicated  3. Provide frequent short contacts to provide reality reorientation, refocusing and direction  4. Decrease environmental stimuli, including noise as appropriate  5. Monitor and intervene to maintain adequate nutrition, hydration, elimination, sleep and activity  6. If unable to ensure safety without

## 2024-03-27 NOTE — PATIENT CARE CONFERENCE
[facility]  Inpatient Rehabilitation  Weekly Team Conference Note    Patient Name: Pacheco Prince        MRN: [id number]    : 1945  (79 y.o.)  Gender: male           The team conference for this patient was held on 3/28/2024 at 11am and led by:  Keon Sheets MD     CASE MANAGEMENT:  Assessment: Pacheco Prince is a 79 year old male that has been admitted to ARU. Patient lives at home with his spouse. Patient has 4 children who provide support as needed. The patient to discharge on 3/28/2024 to home with [facility]. [facility] to provide tube feeds and [facility] will deliver the hospital bed to the patient's home. The SW will continue to follow and update the discharge plan as needed.     PHYSICAL THERAPY:    Bed Mobility:  Supine to Sit: maximum assistance  Sit to Supine: maximum assistance  Comments: Pt and wife wanted to attempt getting into bed the typical way pt does at home which is to place his leg onto the bed and then roll.  Pt was able to lift his left leg onto the bed and place hands on the bed. Pt then froze and could not complete transfer.  Thus PT had to provide max A to lie down safely and also to sit back upright due to poor positioning.  See OT note for a more typical attempt at bed mobility.  Advised wife to instruct pt to sit and then lie down vs attempting his previous way due to increased difficulty.   Transfers:  Sit to stand transfer: minimal assistance  Stand to sit transfer: minimal assistance  Toilet transfer: minimal assistance, with grab bar, increased time/cues to sit down  Car transfer: minimal assistance (pt's preference is to step into the car, pt was able to lift his leg into the car but then couldn't turn his hips to sit safety, PT assisted)   Comments:   Ambulation:  Surface:level surface  Assistive Device: hand-held assist  Assistance: minimal assistance   Distance: 200 ft, 270 ft   Gait Mechanics: fabien variable, partial  Pacheco Prince is in agreement.     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 Due to low vocal volume pt requires intermittent clarification requests when talking in longer phrases. Pt presents with a moderate to severe cognitive-communication impairment. Pt with significant delays in processing speed for basic comprehension and significant word finding impairments/ reduced thought organization, impacting functional communication. His alertness fluctuates significantly based upon time and medication administration. Alertness significantly impacts his responses and ability to adequately participate in dysphagia and cognitive communication therapy. FEES completed previous date 3/22 while pt was alert and when having limited amount of tremors, see report for details. Pt has been tolerating a minced and moist po diet with thin liquids and meds provided whole in puree with 1:1 feeds and strict aspiration precautions only when pt is awake/ alert and upright. Recommend ongoing SLP intervention at discharge for safe return to prior level of function    Goals:  Time Frame: 14-21 days   Pt will complete oropharyngeal strengthening exercises and tolerate po trials of ice/ water consistently without overt clinical s/s of aspiration. Goal Met   Pt will participate in a repeat instrumental swallow assessment (FEES) to further assess the pharyngeal phase of

## 2024-03-27 NOTE — CARE COORDINATION
Discharge Planning.     The SW called and spoke with Amira with [facility] and informed her there is documentation by the MD with the patient's tubefeed information.Amira stated she will review and run the patient's tubefeed Benefits.    The SW also called Bereket from [facility] and informed him the DME order for the hospital bed is in. Bereket stated he will reach out to the patient's wife to have everything coordinated  at discharge for the hospital bed to be delivered.       Electronically signed by EDDIE Gomez on 3/27/2024 at 1:24 PM

## 2024-03-27 NOTE — PLAN OF CARE
Patient requires enteral nutrition via PEG after hospital discharge to meet basic caloric needs, due to Parkinson's disease with inadequate oral intake. His estimated caloric needs are 9240-2513 kcal per day, with 90 to 150 g of protein. He is currently only consuming 0 to 15% of his estimated caloric needs by mouth.  Estimated duration of need is at least 6 weeks, but may be indefinite due to disease progression.     Keon Sheets MD 03/27/24 1:03 PM     ----------------------    Addendum 3/28:    Patient requires tube feeds for greater than 90 days.  Any oral intake at this point is for pleasure, patient is not able to meet his caloric needs by mouth due to presence of dysphagia.  He requires greater than 2000 kcal/day to promote weight gain and strength improvement, wound healing.    Keon Sheets MD 03/28/24 8:02 AM

## 2024-03-27 NOTE — PROGRESS NOTES
Nurse was scanning pt. Medications and placing them on the patients end table. Wife had begun to give medications to the patient. Nurse noticed that she didn't have all the medications and left the room to retrieve the rest.  When nurse returned to the room the wife had a medication wrapper in her hand and asked if this was the patients primidone.  Nurse quickly responded no and asked wife if she had given the medication. Wife responded yes. Nurse went to the computer to see if the given medication(risperidone) was ordered. Nurse could not find evidence of the medication being ordered or scanned as given.  The nurse quickly called pharmacy to inquire about major side effects.  The response was that the major side effect would be drowsiness. Reviewed the medication draws in [employer] and didn't find any medications in the wrong draw.

## 2024-03-27 NOTE — PROGRESS NOTES
required to generate solutions, assistance required to identify errors made, assistance required to correct errors made  Insights: not aware of deficits  Initiation: requires cues for all  Sequencing: requires cues for all  Comments: flat affect  Orientation:    oriented to person; able to state  w/ extended time  Command Following:   impaired     Education  Barriers To Learning: cognition and physical  Patient Education: patient educated on goals, OT role and benefits, plan of care, ADL adaptive strategies, IADL safety, proper use of assistive device/equipment, adaptive device training, energy conservation, orientation, family education, transfer training, discharge recommendations  Learning Assessment:  patient will require reinforcement due to cognitive deficits    Assessment  Activity Tolerance: well   Impairments Requiring Therapeutic Intervention: decreased functional mobility, decreased ADL status, decreased ROM, decreased strength, decreased safety awareness, decreased cognition, decreased endurance, decreased balance, decreased vision, decreased IADL, decreased fine motor control, decreased coordination, decreased posture  Prognosis: fair  Clinical Assessment: Pt has made limited progress during ARU stay. Progress limited d/t fluctuations in parkinson's symptoms, alertness, tremors, etc. Wife is primary caregiver at home and feels equip to care for pt. She had demo'd and verb recommendations and abilities to assist pt w/ transfers and ADLs. Ongoing cues required for pt safety and sequencing, and intermittent initiation during ADL completion. Con't to require CGA-Juventino for transfers and mobility. Min-modA for LB ADLs and SBA and significant time for all UB ADLs. Hospital bed required at home d/t PEG tube feed requirements and pt fluctuations in function. Skilled HHOT services warranted to increase pt IND and safety in all occupational pursuits and decrease caregiver burden. Con't per POC.   Safety  Individual Concurrent Group Co-treatment   Time In 0730        Time Out 0840        Minutes 70           Individual Group Co-treatment   Time In      Time Out      Minutes        Timed Code Treatment Minutes: 70 min  Total Treatment Minutes: 70 min        Electronically Signed By: CHENTE Livingston MOT OTR/L, [id number] 3/27/2024 11:41 AM

## 2024-03-27 NOTE — PLAN OF CARE
Pacheco Prince was evaluated today and a DME order was entered for a semi-electric hospital bed because he requires assistance for positioning needs not possible in an ordinary bed, complexity of body positioning needs, requirement of elevation of head of bed more than 30 degrees for the diagnosis of dysphagia .  Patient requires frequent and immediate positioning changes for relief of pain and care needs related to medical diagnoses.  Patient needs variability of bed height requirements to perform patient transfers and for eating, bathing, toileting, and personal cares.  Current body Weight - Scale: 81.6 kg (179 lb 14.3 oz).  The need for this equipment was discussed with the patient and he understands and is in agreement.     Keon Sheets MD 03/27/24 8:07 AM

## 2024-03-28 ENCOUNTER — TELEPHONE (OUTPATIENT)
Dept: INTERNAL MEDICINE CLINIC | Age: 79
End: 2024-03-28

## 2024-03-28 VITALS
WEIGHT: 179.9 LBS | BODY MASS INDEX: 25.75 KG/M2 | TEMPERATURE: 97.9 F | HEIGHT: 70 IN | DIASTOLIC BLOOD PRESSURE: 75 MMHG | SYSTOLIC BLOOD PRESSURE: 120 MMHG | RESPIRATION RATE: 18 BRPM | OXYGEN SATURATION: 95 % | HEART RATE: 75 BPM

## 2024-03-28 LAB
ANION GAP SERPL CALCULATED.3IONS-SCNC: 9 MMOL/L (ref 3–16)
BUN SERPL-MCNC: 32 MG/DL (ref 7–20)
CALCIUM SERPL-MCNC: 9.1 MG/DL (ref 8.3–10.6)
CHLORIDE SERPL-SCNC: 103 MMOL/L (ref 99–110)
CO2 SERPL-SCNC: 28 MMOL/L (ref 21–32)
CREAT SERPL-MCNC: <0.5 MG/DL (ref 0.8–1.3)
GFR SERPLBLD CREATININE-BSD FMLA CKD-EPI: >90 ML/MIN/{1.73_M2}
GLUCOSE SERPL-MCNC: 105 MG/DL (ref 70–99)
POTASSIUM SERPL-SCNC: 4 MMOL/L (ref 3.5–5.1)
SODIUM SERPL-SCNC: 140 MMOL/L (ref 136–145)

## 2024-03-28 PROCEDURE — 6370000000 HC RX 637 (ALT 250 FOR IP): Performed by: STUDENT IN AN ORGANIZED HEALTH CARE EDUCATION/TRAINING PROGRAM

## 2024-03-28 PROCEDURE — 80048 BASIC METABOLIC PNL TOTAL CA: CPT

## 2024-03-28 PROCEDURE — 36415 COLL VENOUS BLD VENIPUNCTURE: CPT

## 2024-03-28 PROCEDURE — 6360000002 HC RX W HCPCS: Performed by: STUDENT IN AN ORGANIZED HEALTH CARE EDUCATION/TRAINING PROGRAM

## 2024-03-28 RX ORDER — CLOZAPINE 25 MG/1
25 TABLET ORAL 2 TIMES DAILY
Qty: 30 TABLET | Refills: 3 | Status: SHIPPED | OUTPATIENT
Start: 2024-03-28

## 2024-03-28 RX ADMIN — CARBIDOPA AND LEVODOPA 1.5 TABLET: 25; 100 TABLET ORAL at 02:56

## 2024-03-28 RX ADMIN — CARBIDOPA AND LEVODOPA 1.5 TABLET: 25; 100 TABLET ORAL at 04:58

## 2024-03-28 RX ADMIN — Medication 1 CAPSULE: at 09:13

## 2024-03-28 RX ADMIN — ENOXAPARIN SODIUM 40 MG: 100 INJECTION SUBCUTANEOUS at 09:12

## 2024-03-28 RX ADMIN — CARBIDOPA AND LEVODOPA 1.5 TABLET: 25; 100 TABLET ORAL at 06:51

## 2024-03-28 RX ADMIN — CARBIDOPA AND LEVODOPA 1.5 TABLET: 25; 100 TABLET ORAL at 09:13

## 2024-03-28 RX ADMIN — THERA TABS 1 TABLET: TAB at 09:13

## 2024-03-28 RX ADMIN — ASPIRIN 81 MG: 81 TABLET, CHEWABLE ORAL at 09:13

## 2024-03-28 RX ADMIN — CLOZAPINE 25 MG: 25 TABLET ORAL at 09:13

## 2024-03-28 RX ADMIN — Medication 2000 UNITS: at 09:13

## 2024-03-28 RX ADMIN — CARBOXYMETHYLCELLULOSE SODIUM 1 DROP: 10 GEL OPHTHALMIC at 09:13

## 2024-03-28 RX ADMIN — CARBIDOPA AND LEVODOPA 1.5 TABLET: 25; 100 TABLET ORAL at 11:28

## 2024-03-28 RX ADMIN — IPRATROPIUM BROMIDE 2 SPRAY: 42 SPRAY NASAL at 09:13

## 2024-03-28 ASSESSMENT — PAIN SCALES - GENERAL: PAINLEVEL_OUTOF10: 0

## 2024-03-28 NOTE — DISCHARGE SUMMARY
Physical Medicine & Rehabilitation  Discharge Summary     Patient Identification:  Pacheco Prince  : 1945  Admit date: 3/11/2024  Discharge date: 3/28/2024  Attending provider: No att. providers found        Primary care provider: Rohan Watson, KEYSHA - CNP     Discharge Diagnoses:   Patient Active Problem List   Diagnosis    Coronary artery disease involving native coronary artery of native heart without angina pectoris    Mixed hyperlipidemia    Dizziness    PVC's (premature ventricular contractions)    Hypertension    Bilateral carotid artery stenosis    Parkinson disease    Bilateral carotid artery disease, unspecified type (HCC)    Hypotension    Type 2 diabetes mellitus without complication, without long-term current use of insulin (HCC)    COVID-19    Aspiration pneumonia (HCC)    Acute metabolic encephalopathy    Sepsis (HCC)    Acute aspiration pneumonia (HCC)    Pneumonia of left lower lobe due to infectious organism    TIA (transient ischemic attack)    Parkinson's disease    Pneumonia of both lungs due to infectious organism, unspecified part of lung    Parkinson's disease with dyskinesia       Discharge Functional Status:      Physical therapy:  Supine to Sit: Partial/moderate assistance  Sit to Supine: Partial/moderate assistance      Sit to Stand: Partial/moderate assistance  Chair/Bed to Chair Transfer: Partial/moderate assistance  Car Transfer: Partial/moderate assistance     Ambulation 10 ft: Partial/moderate assistance  Ambulation 50 ft: Partial/moderate assistance  Ambulation 150 ft: Partial/moderate assistance  Stairs - 1 Step: Partial/moderate assistance  Stairs - 4 Step: Partial/moderate assistance  Stairs - 12 Step: Partial/moderate assistance    Occupational therapy:  Eating: Substantial/maximal assistance  Oral Hygiene: Partial/moderate assistance  Bathing: Partial/moderate assistance  Upper Body Dressing: Supervision or touching assistance  Lower Body Dressing:  diet was downgraded to n.p.o.  He underwent PEG placement on 3/9.  He is now tolerating tube feeds at goal rate, and able to receive his Parkinson's medications via PEG.  Hospital course complicated by: acute hypoxic respiratory failure, dysphagia, hypertension, hypokalemia, anemia, lethargy/AMS, worsening Parkinson's symptoms.        Inpatient Rehabilitation Course:   Pacheco Prince is a 79 y.o. male admitted to inpatient rehabilitation on 3/11/2024 s/p Aspiration pneumonia (HCC).  The patient participated in an aggressive multidisciplinary inpatient rehabilitation program involving 3 hours of therapy per day, at least 5 days per week.     Impairments: Cognitive impairment, dysphagia, generalized weakness, decreased functional endurance, impaired coordination, impaired balance limiting independence with mobility, self-care.      Medical Management:  #. Bilateral aspiration pneumonia  - s/p initial course of cefepime and Flagyl  - s/p Levaquin for recurrent infection  - CBC stable on 3/27     #. Dysphagia, secondary to Parkinson's  - Continue SLP for dysphagia therapy. FEES on 3/22 with oropharngeal dysphagia, however no overt penetration or aspiration.   - Pleasure feeds only.   - Diet: ADULT ORAL NUTRITION SUPPLEMENT; Breakfast, Lunch, Dinner; Low Calorie/High Protein Oral Supplement  ADULT DIET; Dysphagia - Minced and Moist  ADULT TUBE FEEDING; PEG; Standard with Fiber; Bolus; 6 Times Daily; 240; Gravity; 50; Before and after each bolus; Wound Healing; 1 Dose; BID    #. Parkinson's disease w/ dementia  - Resumed home carbidopa-levodopa ER (Rytary) 36. mg 1 capsule 8 times per day + carbidopa-levodopa (Sinemet)  mg 1.5 tablet 8 times per day + carbidopa-levodopa (Sinemet)  mg 1.5 tablet 3 times overnight via PEG.  - Continue home clozapine, primidone, donepezil     #. Prerenal azotemia  - Likely due to losses from diaphoresis.  - Further increase free water flushes.   - Recheck BMP at least

## 2024-03-28 NOTE — PROGRESS NOTES
Morning assessment completed, vss, alert and oriented, schedule meds given, denies pain, The care plan and education has been reviewed and mutually agreed upon with the patient.  Pt remains free from falls. Fall precautions in place--bed in lowest position, call light within reach, bed alarm in use. Pt aware to call for assistance before getting up.

## 2024-03-28 NOTE — CARE COORDINATION
Case Management -  Discharge Note      Patient Name: Pacheco Prince                   YOB: 1945            Readmission Risk (Low < 19, Mod (19-27), High > 27): Readmission Risk Score: 22.6    Current PCP: Rohan Watson APRN - CNP    ([facility]) Important Message from Medicare:    Date: 3/28/2024    PT AM-PAC:   /24  OT AM-PAC:   /24    Patient/patient representative has been educated on the benefits of Home Health Care as well as the possible risks of declining recommended services. Patient/patient representative has acknowledged the information provided and decided on the following discharge plan. Patient/ patient representative has been provided freedom of choice regarding service provider, supported by basic dialogue that supports the patient's individualized plan of care/goals.    Home Care Information:   Home Care Agency:   [facility] ([facility])  [address] [address]  Phone: [phone]  Fax: [phone]          Services: PT/OT/RN/SP  Home Health Order Obtained: Yes    Home health agency notified of discharge.      Tube Feed information:    Tubefeed  Provider:  [facility]  [address]  [place] [id number]  Phone: [phone]  Fax: [phone]     Financial    Payor: MEDICARE / Plan: MEDICARE PART A AND B / Product Type: *No Product type* /     Pharmacy:  Potential assistance Purchasing Medications:    Meds-to-Beds request:        [facility] - [facility] [address] - P [phone] - F [phone]  [address] [address]  Phone: [phone] Fax: [phone]    [id number] - [place], [place] - [address] - P [phone] - F [phone]  [address]  [facility] [address]  Phone: [phone] Fax: [phone]      Notes:    Additional Case Management Notes: Quang [phone] from [facility] stated the patient's Tubefeed supplies are 100% covered by

## 2024-03-28 NOTE — PROGRESS NOTES
ARU Discharge Assessment    Transportation  \"Has lack of transportation kept you from medical appointments, meetings, work, or from getting things needed for daily living?\"Check all that apply:  [] A.  Yes, it has kept me from medical appointments or from getting my medications  [] B.  Yes, it has kept me from non-medical meetings, appointments, work, or from getting things that I need  [x] C.  No  [] X.  Patient unable to respond  [] Y.  Patient declines to respond    Provision of Current Reconciled Medication List to Subsequent Provider at Discharge  [] No, current reconciled medication list not provided to the subsequent provider.  [x] Yes, current reconciled medication list provided to the subsequent provider. (**Select route of transmission below**)   [x] Via Electronic Health Record   [] Via Health Information Exchange Organization  [] Verbal (e.g. in person, telephone, video conferencing)  [] Paper-based (e.g. fax, copies, printouts)   [] Other Methods (e.g. texting, email, CDs)    Provision of Current Reconciled Medication List to Patient at Discharge  [] No, current reconciled medication list not provided to the patient, family and/or caregiver.   [x] Yes, current reconciled medication list provided to the patient, family and/or caregiver.  (**Select route of transmission below**)   [] Via Electronic Health Record (e.g., electronic access to patient portal)   [] Via Health Information Exchange Organization  [x] Verbal (e.g. in person, telephone, video conferencing)  [x] Paper-based (e.g. fax, copies, printouts)   [] Other Methods (e.g. texting, email, CDs)    Health Literacy  \"How often do you need to have someone help you when you read instructions, pamphlets, or other written material from your doctor or pharmacy?\"  [x]  0.  Never  []  1.  Rarely  []  2.  Sometimes  []  3.  Often  []  4.  Always  []  7.  Patient declines to respond  []  8.  Patient unable to respond    BIMS - **Must be completed in the  to pain?\"  []  0.  Does not apply - I have not received rehabilitation therapy in the past 5 days  []  1.  Rarely or not at all  []  2.  Occasionally  []  3.  Frequently  []  4.  Almost constantly  []  8.  Unable to answer    Pain Interference with Day-to-Day Activities:  \"Over the past 5 days, how often have you limited your day-to-day activities (excluding rehabilitation therapy session)?\"  []  1.  Rarely or not at all  []  2.  Occasionally  []  3.  Frequently  []  4.  Almost constantly  []  8.  Unable to answer    Nutritional Approaches  Last 7 Days - Check all of the following nutritional approaches that were received within the last 7 days:  []  A.  Parenteral/IV feeding  []  B.  Feeding tube (e.g., nasogastric or abdominal (PEG))  [x]  C.  Mechanically altered diet - requires change in texture of food or liquids (e.g., pureed food, thickened liquids)  []  D.  Therapeutic diet (e.g., low salt, diabetic, low cholesterol)  []  Z.  None of the above    At time of Discharge - Check all of the following nutritional approaches that were being received at discharge:  []  A.  Parenteral/IV feeding (including IV fluids if needed for hydration, but not as part of dialysis/chemo)  []  B.  Feeding tube (e.g., nasogastric or abdominal (PEG))  [x]  C.  Mechanically altered diet - requires change in texture of food or liquids (e.g., pureed food, thickened liquids)  []  D.  Therapeutic diet (e.g., low salt, diabetic, low cholesterol  []  Z.  None of the above    High Risk Drug Classes:  Use and Indication (THROUGHOUT LAST 3 DAYS OF STAY)    Is taking: Check if the pt is taking any medications by pharmacological classification, not how it is used, in the following classes  Indication noted: If column 1 is checked, check if there is an indication noted for all meds in the drug class Is taking  (check all that apply) Indication noted (check all that apply)   Antipsychotic [] []   Anticoagulant [x] [x]   Antibiotic [] []   Opioid

## 2024-03-28 NOTE — TELEPHONE ENCOUNTER
Called and spoke with (spouse), she stated that patient as discharged from rehab facility 3/28/24. Hospital/Rehab follow up appointment made for 4/1/24

## 2024-03-28 NOTE — CARE COORDINATION
[facility] Received home care referral. Spoke with pt and re: home care plan of Aware of discharge with home care. Home care orders sent to [facility].   Discharge planner notified. care/services. Agreeable. Demographic's verified.

## 2024-03-28 NOTE — PLAN OF CARE
Problem: Discharge Planning  Goal: Discharge to home or other facility with appropriate resources  3/28/2024 0955 by Lolly Mars RN  Outcome: Progressing     Problem: Pain  Goal: Verbalizes/displays adequate comfort level or baseline comfort level  3/28/2024 0955 by Lolly Mars RN  Outcome: Progressing     Problem: Safety - Adult  Goal: Free from fall injury  3/28/2024 0955 by Lolly Mars RN  Outcome: Progressing

## 2024-03-28 NOTE — TELEPHONE ENCOUNTER
Pt to be discharged from [facility] today.    Natacha from [facility] calling in for orders for Nursing, PT/OT/ST, HHA. She was made aware that patient will need to be seen for HFU visit first for orders. She stated the patient/spouse were also aware to make a HFU appt. She can be reached at [phone].

## 2024-03-28 NOTE — CARE COORDINATION
03/28/24 1215   IMM Letter   IMM Letter given to Patient/Family/Significant other/Guardian/POA/by: IMM Given to the patient's spouse.   IMM Letter date given: 03/28/24   IMM Letter time given: 1215

## 2024-03-28 NOTE — PLAN OF CARE
Problem: Discharge Planning  Goal: Discharge to home or other facility with appropriate resources  Outcome: Progressing     Problem: Pain  Goal: Verbalizes/displays adequate comfort level or baseline comfort level  Outcome: Progressing     Problem: Safety - Adult  Goal: Free from fall injury  3/27/2024 2300 by Sarah Bailey RN  Outcome: Progressing  3/27/2024 1059 by Monique Crowder RN  Outcome: Progressing  Note: Pt remains free from falls.  Safety precautions in place.  Bed in lowest position, bed/chair wheels locked, call light with in reach, bedside table in reach, bed/chair alarm on, fall risk wrist band on.       Problem: Confusion  Goal: Confusion, delirium, dementia, or psychosis is improved or at baseline  Description: INTERVENTIONS:  1. Assess for possible contributors to thought disturbance, including medications, impaired vision or hearing, underlying metabolic abnormalities, dehydration, psychiatric diagnoses, and notify attending LIP  2. [place] high risk fall precautions, as indicated  3. Provide frequent short contacts to provide reality reorientation, refocusing and direction  4. Decrease environmental stimuli, including noise as appropriate  5. Monitor and intervene to maintain adequate nutrition, hydration, elimination, sleep and activity  6. If unable to ensure safety without constant attention obtain sitter and review sitter guidelines with assigned personnel  7. Initiate Psychosocial CNS and Spiritual Care consult, as indicated  Outcome: Progressing     Problem: Skin/Tissue Integrity  Goal: Absence of new skin breakdown  Description: 1.  Monitor for areas of redness and/or skin breakdown  2.  Assess vascular access sites hourly  3.  Every 4-6 hours minimum:  Change oxygen saturation probe site  4.  Every 4-6 hours:  If on nasal continuous positive airway pressure, respiratory therapy assess nares and determine need for appliance change or resting period.  Outcome: Progressing

## 2024-03-29 ENCOUNTER — TELEPHONE (OUTPATIENT)
Dept: INTERNAL MEDICINE CLINIC | Age: 79
End: 2024-03-29

## 2024-03-29 NOTE — TELEPHONE ENCOUNTER
Anabella from [facility]---needs verbal orders for skilled nursing-PT-OT and speech--let her know pt has appt with you on 4/1---she can be reached at [phone].  Thanks.

## 2024-03-29 NOTE — TELEPHONE ENCOUNTER
Care Transitions Initial Follow Up Call    Outreach made within 2 business days of discharge: Yes    Patient: Pacheco Prince Patient : 1945   MRN: [id number]  Reason for Admission: There are no discharge diagnoses documented for the most recent discharge.  Discharge Date: 3/28/24       HFU visit already scheduled for . Call made to complete TCM requirements-no answer and unable to leave VM.    Scheduled appointment with PCP within 7-14 days    Follow Up  Future Appointments   Date Time Provider Department Center   2024  3:00 PM Rohan Watson APRN - CNP [facility] - DY[facility]   2024 10:20 AM Rohan Watson APRN - CNP [facility] - DYWALDO   2024  1:00 PM Wilver Solo MD [facility] Cardio MMA       Malgorzata Goetz LPN

## 2024-04-01 ENCOUNTER — OFFICE VISIT (OUTPATIENT)
Dept: INTERNAL MEDICINE CLINIC | Age: 79
End: 2024-04-01

## 2024-04-01 VITALS
WEIGHT: 181.6 LBS | BODY MASS INDEX: 26 KG/M2 | OXYGEN SATURATION: 97 % | HEART RATE: 69 BPM | HEIGHT: 70 IN | DIASTOLIC BLOOD PRESSURE: 60 MMHG | SYSTOLIC BLOOD PRESSURE: 108 MMHG

## 2024-04-01 DIAGNOSIS — G20.B2 PARKINSON'S DISEASE WITH DYSKINESIA AND FLUCTUATING MANIFESTATIONS (HCC): ICD-10-CM

## 2024-04-01 DIAGNOSIS — J69.0 ASPIRATION PNEUMONIA OF BOTH LUNGS DUE TO GASTRIC SECRETIONS, UNSPECIFIED PART OF LUNG (HCC): ICD-10-CM

## 2024-04-01 DIAGNOSIS — E11.9 TYPE 2 DIABETES MELLITUS WITHOUT COMPLICATION, WITHOUT LONG-TERM CURRENT USE OF INSULIN (HCC): ICD-10-CM

## 2024-04-01 DIAGNOSIS — Z86.73 HISTORY OF STROKE: ICD-10-CM

## 2024-04-01 DIAGNOSIS — R13.10 DYSPHAGIA, UNSPECIFIED TYPE: ICD-10-CM

## 2024-04-01 DIAGNOSIS — Z09 HOSPITAL DISCHARGE FOLLOW-UP: Primary | ICD-10-CM

## 2024-04-01 DIAGNOSIS — R53.1 GENERALIZED WEAKNESS: ICD-10-CM

## 2024-04-01 DIAGNOSIS — I25.119 ATHEROSCLEROSIS OF NATIVE CORONARY ARTERY OF NATIVE HEART WITH ANGINA PECTORIS (HCC): ICD-10-CM

## 2024-04-01 DIAGNOSIS — I10 PRIMARY HYPERTENSION: ICD-10-CM

## 2024-04-01 ASSESSMENT — PATIENT HEALTH QUESTIONNAIRE - PHQ9
5. POOR APPETITE OR OVEREATING: NOT AT ALL
2. FEELING DOWN, DEPRESSED OR HOPELESS: NOT AT ALL
SUM OF ALL RESPONSES TO PHQ QUESTIONS 1-9: 0
SUM OF ALL RESPONSES TO PHQ QUESTIONS 1-9: 0
7. TROUBLE CONCENTRATING ON THINGS, SUCH AS READING THE NEWSPAPER OR WATCHING TELEVISION: SEVERAL DAYS
SUM OF ALL RESPONSES TO PHQ9 QUESTIONS 1 & 2: 0
10. IF YOU CHECKED OFF ANY PROBLEMS, HOW DIFFICULT HAVE THESE PROBLEMS MADE IT FOR YOU TO DO YOUR WORK, TAKE CARE OF THINGS AT HOME, OR GET ALONG WITH OTHER PEOPLE: SOMEWHAT DIFFICULT
8. MOVING OR SPEAKING SO SLOWLY THAT OTHER PEOPLE COULD HAVE NOTICED. OR THE OPPOSITE, BEING SO FIGETY OR RESTLESS THAT YOU HAVE BEEN MOVING AROUND A LOT MORE THAN USUAL: NOT AT ALL
4. FEELING TIRED OR HAVING LITTLE ENERGY: SEVERAL DAYS
SUM OF ALL RESPONSES TO PHQ QUESTIONS 1-9: 0
3. TROUBLE FALLING OR STAYING ASLEEP: NOT AT ALL
SUM OF ALL RESPONSES TO PHQ QUESTIONS 1-9: 2
1. LITTLE INTEREST OR PLEASURE IN DOING THINGS: NOT AT ALL
SUM OF ALL RESPONSES TO PHQ QUESTIONS 1-9: 0
9. THOUGHTS THAT YOU WOULD BE BETTER OFF DEAD, OR OF HURTING YOURSELF: NOT AT ALL
SUM OF ALL RESPONSES TO PHQ QUESTIONS 1-9: 2
6. FEELING BAD ABOUT YOURSELF - OR THAT YOU ARE A FAILURE OR HAVE LET YOURSELF OR YOUR FAMILY DOWN: NOT AT ALL
2. FEELING DOWN, DEPRESSED OR HOPELESS: NOT AT ALL

## 2024-04-01 NOTE — TELEPHONE ENCOUNTER
Called Anabella @ [facility] to let here know that patient has appointment today. Verbal Orders mentioned

## 2024-04-01 NOTE — PROGRESS NOTES
Post-Discharge Transitional Care Management Services      Pacheco Prince   YOB: 1945    Date of Visit:  4/1/2024    Allergies   Allergen Reactions    Amoxicillin-Pot Clavulanate Other (See Comments)     Tolerates zosyn 5/18/23    Sulfacetamide Other (See Comments)     ophthalmic   Conjunctivitis         Tamsulosin Diarrhea and Angioedema    Penicillins Hives and Nausea And Vomiting     Cant tolerate Amoxicillin, Augmentin  Does tolerate Zosyn 5/18/23     Outpatient Medications Marked as Taking for the 4/1/24 encounter (Office Visit) with Rohan Watson, KEYSHA - CNP   Medication Sig Dispense Refill    cloZAPine (CLOZARIL) 25 MG tablet Take 1 tablet by mouth 2 times daily 30 tablet 3    carboxymethylcellulose 1 % ophthalmic solution Place 1 drop into both eyes 2 times daily      melatonin 3 MG TABS tablet Take 1-2 tablets by mouth nightly      polyethylene glycol (GLYCOLAX) 17 GM/SCOOP powder Take 8.5 g by mouth 2 times daily Patient received 1/2 cap full twice daily.      atorvastatin (LIPITOR) 80 MG tablet TAKE 1 TABLET EVERY DAY (Patient taking differently: Take 1 tablet by mouth nightly TAKE 1 TABLET EVERY DAY) 90 tablet 10    midodrine (PROAMATINE) 5 MG tablet Take 1 tablet by mouth 3 times daily PATIENT IS TO CALL OFFICE IF SBP >160. 270 tablet 3    primidone (MYSOLINE) 50 MG tablet Take 1 tablet by mouth nightly 90 tablet 3    ipratropium (ATROVENT) 0.06 % nasal spray 2 sprays by Each Nostril route 3 times daily Patient usually uses just before meals.      docusate sodium (COLACE) 100 MG capsule Take 1 capsule by mouth 2 times daily Twice daily      nitroGLYCERIN (NITROSTAT) 0.4 MG SL tablet Place 1 tablet under the tongue every 5 minutes as needed (prn) 25 tablet 3    donepezil (ARICEPT) 10 MG tablet Take 1 tablet by mouth nightly      fluticasone (FLONASE) 50 MCG/ACT nasal spray 2 sprays by Nasal route daily (Patient taking differently: 2 sprays by Nasal route nightly) 16 g 5

## 2024-04-02 ENCOUNTER — HOSPITAL ENCOUNTER (OUTPATIENT)
Age: 79
Setting detail: SPECIMEN
Discharge: HOME | End: 2024-04-02
Payer: MEDICARE

## 2024-04-02 LAB
ANION GAP SERPL CALCULATED.3IONS-SCNC: 9 MMOL/L (ref 3–16)
BASOPHILS # BLD: 0 K/UL (ref 0–0.2)
BASOPHILS NFR BLD: 0.4 %
BUN SERPL-MCNC: 29 MG/DL (ref 7–20)
CALCIUM SERPL-MCNC: 9.6 MG/DL (ref 8.3–10.6)
CHLORIDE SERPL-SCNC: 103 MMOL/L (ref 99–110)
CO2 SERPL-SCNC: 28 MMOL/L (ref 21–32)
CREAT SERPL-MCNC: 0.6 MG/DL (ref 0.8–1.3)
DEPRECATED RDW RBC AUTO: 14.7 % (ref 12.4–15.4)
EOSINOPHIL # BLD: 0.2 K/UL (ref 0–0.6)
EOSINOPHIL NFR BLD: 1.7 %
GFR SERPLBLD CREATININE-BSD FMLA CKD-EPI: >90 ML/MIN/{1.73_M2}
GLUCOSE SERPL-MCNC: 91 MG/DL (ref 70–99)
HCT VFR BLD AUTO: 40.3 % (ref 40.5–52.5)
HGB BLD-MCNC: 12.9 G/DL (ref 13.5–17.5)
LYMPHOCYTES # BLD: 1.4 K/UL (ref 1–5.1)
LYMPHOCYTES NFR BLD: 15.1 %
MAGNESIUM SERPL-MCNC: 2.2 MG/DL (ref 1.8–2.4)
MCH RBC QN AUTO: 29.7 PG (ref 26–34)
MCHC RBC AUTO-ENTMCNC: 31.9 G/DL (ref 31–36)
MCV RBC AUTO: 93 FL (ref 80–100)
MONOCYTES # BLD: 0.8 K/UL (ref 0–1.3)
MONOCYTES NFR BLD: 8.6 %
NEUTROPHILS # BLD: 7.1 K/UL (ref 1.7–7.7)
NEUTROPHILS NFR BLD: 74.2 %
PHOSPHATE SERPL-MCNC: 3 MG/DL (ref 2.5–4.9)
PLATELET # BLD AUTO: 250 K/UL (ref 135–450)
PMV BLD AUTO: 8.8 FL (ref 5–10.5)
POTASSIUM SERPL-SCNC: 4.7 MMOL/L (ref 3.5–5.1)
RBC # BLD AUTO: 4.33 M/UL (ref 4.2–5.9)
SODIUM SERPL-SCNC: 140 MMOL/L (ref 136–145)
WBC # BLD AUTO: 9.5 K/UL (ref 4–11)

## 2024-04-02 PROCEDURE — 85025 COMPLETE CBC W/AUTO DIFF WBC: CPT

## 2024-04-02 PROCEDURE — 83735 ASSAY OF MAGNESIUM: CPT

## 2024-04-02 PROCEDURE — 36415 COLL VENOUS BLD VENIPUNCTURE: CPT

## 2024-04-02 PROCEDURE — 84100 ASSAY OF PHOSPHORUS: CPT

## 2024-04-02 PROCEDURE — 80048 BASIC METABOLIC PNL TOTAL CA: CPT

## 2024-04-03 ENCOUNTER — TELEPHONE (OUTPATIENT)
Dept: INTERNAL MEDICINE CLINIC | Age: 79
End: 2024-04-03

## 2024-04-03 NOTE — TELEPHONE ENCOUNTER
Ant Pagan (OT) at [facility], left message to okay verbal orders for 4 weeks of OT

## 2024-04-03 NOTE — TELEPHONE ENCOUNTER
Latasha salas OT from [facility] called requesting verbal orders for 4 weeks for OT. Please advise. [phone].

## 2024-04-05 ENCOUNTER — TELEPHONE (OUTPATIENT)
Dept: INTERNAL MEDICINE CLINIC | Age: 79
End: 2024-04-05

## 2024-04-05 NOTE — TELEPHONE ENCOUNTER
SURJIT Merida from [facility] calling in for orders for PT 2x/wk x 2 weeks then 1x/wk x 2 weeks. Verbal orders given. [facility] can be reached at [phone] for any other questions/concerns.

## 2024-04-09 LAB
BUN BLDV-MCNC: 27 MG/DL
CALCIUM SERPL-MCNC: 9.2 MG/DL
CHLORIDE BLD-SCNC: 105 MMOL/L
CO2: 30 MMOL/L
CREAT SERPL-MCNC: 0.55 MG/DL
EGFR: 102
GLUCOSE BLD-MCNC: 92 MG/DL
MAGNESIUM: 1.9 MG/DL
PHOSPHORUS: 3 MG/DL
POTASSIUM SERPL-SCNC: 4.4 MMOL/L
SODIUM BLD-SCNC: 14 MMOL/L

## 2024-04-11 ENCOUNTER — TELEPHONE (OUTPATIENT)
Dept: INTERNAL MEDICINE CLINIC | Age: 79
End: 2024-04-11

## 2024-04-11 DIAGNOSIS — B37.0 THRUSH: Primary | ICD-10-CM

## 2024-04-11 NOTE — TELEPHONE ENCOUNTER
Nurse Jackelin from [facility] calling to report pt has white patches to his tongue and asking for Nystatin swish and swallow. Will Rohan order without a visit? Pt last seen 4/1/24.    Jackelin can be reached at [phone].

## 2024-04-11 NOTE — TELEPHONE ENCOUNTER
Bonnie from [employer] calling. Asking if Rohan would sign statement of medical necessity for enteral feeds. Please advise and call Bonnie at [phone] ext. [phone].

## 2024-04-11 NOTE — TELEPHONE ENCOUNTER
Does he have esophageal symptoms?  If not, I would recommend swish and spit.  If so, ok for swish and swallow.  I will send in medication

## 2024-04-16 ENCOUNTER — HOSPITAL ENCOUNTER (OUTPATIENT)
Age: 79
Setting detail: SPECIMEN
Discharge: HOME | End: 2024-04-16
Payer: MEDICARE

## 2024-04-16 PROBLEM — Z86.73 HISTORY OF STROKE: Status: ACTIVE | Noted: 2024-04-16

## 2024-04-16 PROBLEM — I25.119 ATHEROSCLEROTIC HEART DISEASE OF NATIVE CORONARY ARTERY WITH UNSPECIFIED ANGINA PECTORIS (HCC): Status: ACTIVE | Noted: 2024-04-16

## 2024-04-16 LAB
ANION GAP SERPL CALCULATED.3IONS-SCNC: 12 MMOL/L (ref 3–16)
BUN SERPL-MCNC: 22 MG/DL (ref 7–20)
CALCIUM SERPL-MCNC: 9.7 MG/DL (ref 8.3–10.6)
CHLORIDE SERPL-SCNC: 103 MMOL/L (ref 99–110)
CO2 SERPL-SCNC: 26 MMOL/L (ref 21–32)
CREAT SERPL-MCNC: 0.6 MG/DL (ref 0.8–1.3)
GFR SERPLBLD CREATININE-BSD FMLA CKD-EPI: >90 ML/MIN/{1.73_M2}
GLUCOSE SERPL-MCNC: 99 MG/DL (ref 70–99)
MAGNESIUM SERPL-MCNC: 2.1 MG/DL (ref 1.8–2.4)
PHOSPHATE SERPL-MCNC: 3.6 MG/DL (ref 2.5–4.9)
POTASSIUM SERPL-SCNC: 4.3 MMOL/L (ref 3.5–5.1)
SODIUM SERPL-SCNC: 141 MMOL/L (ref 136–145)

## 2024-04-16 PROCEDURE — 36415 COLL VENOUS BLD VENIPUNCTURE: CPT

## 2024-04-16 PROCEDURE — 83735 ASSAY OF MAGNESIUM: CPT

## 2024-04-16 PROCEDURE — 80048 BASIC METABOLIC PNL TOTAL CA: CPT

## 2024-04-16 PROCEDURE — 84100 ASSAY OF PHOSPHORUS: CPT

## 2024-04-23 LAB
ANION GAP SERPL CALCULATED.3IONS-SCNC: 12 MMOL/L (ref 3–16)
BASOPHILS # BLD: 0 K/UL (ref 0–0.2)
BASOPHILS NFR BLD: 0.5 %
BUN SERPL-MCNC: 20 MG/DL (ref 7–20)
CALCIUM SERPL-MCNC: 9.6 MG/DL (ref 8.3–10.6)
CHLORIDE SERPL-SCNC: 104 MMOL/L (ref 99–110)
CO2 SERPL-SCNC: 25 MMOL/L (ref 21–32)
CREAT SERPL-MCNC: <0.5 MG/DL (ref 0.8–1.3)
DEPRECATED RDW RBC AUTO: 14.7 % (ref 12.4–15.4)
EOSINOPHIL # BLD: 0.1 K/UL (ref 0–0.6)
EOSINOPHIL NFR BLD: 2.3 %
GFR SERPLBLD CREATININE-BSD FMLA CKD-EPI: >90 ML/MIN/{1.73_M2}
GLUCOSE SERPL-MCNC: 97 MG/DL (ref 70–99)
HCT VFR BLD AUTO: 39.7 % (ref 40.5–52.5)
HGB BLD-MCNC: 12.9 G/DL (ref 13.5–17.5)
LYMPHOCYTES # BLD: 1.3 K/UL (ref 1–5.1)
LYMPHOCYTES NFR BLD: 22.4 %
MAGNESIUM SERPL-MCNC: 2 MG/DL (ref 1.8–2.4)
MCH RBC QN AUTO: 30.5 PG (ref 26–34)
MCHC RBC AUTO-ENTMCNC: 32.5 G/DL (ref 31–36)
MCV RBC AUTO: 93.8 FL (ref 80–100)
MONOCYTES # BLD: 0.5 K/UL (ref 0–1.3)
MONOCYTES NFR BLD: 9.5 %
NEUTROPHILS # BLD: 3.7 K/UL (ref 1.7–7.7)
NEUTROPHILS NFR BLD: 65.3 %
PHOSPHATE SERPL-MCNC: 3.5 MG/DL (ref 2.5–4.9)
PLATELET # BLD AUTO: 251 K/UL (ref 135–450)
PMV BLD AUTO: 8.6 FL (ref 5–10.5)
POTASSIUM SERPL-SCNC: 4.2 MMOL/L (ref 3.5–5.1)
RBC # BLD AUTO: 4.23 M/UL (ref 4.2–5.9)
SODIUM SERPL-SCNC: 141 MMOL/L (ref 136–145)
WBC # BLD AUTO: 5.7 K/UL (ref 4–11)

## 2024-04-24 ENCOUNTER — HOSPITAL ENCOUNTER (OUTPATIENT)
Age: 79
Setting detail: SPECIMEN
Discharge: HOME | End: 2024-04-24
Payer: MEDICARE

## 2024-04-24 LAB
BACTERIA URNS QL MICRO: NORMAL /HPF
BILIRUB UR QL STRIP.AUTO: NEGATIVE
CLARITY UR: CLEAR
COLOR UR: YELLOW
EPI CELLS #/AREA URNS AUTO: 0 /HPF (ref 0–5)
GLUCOSE UR STRIP.AUTO-MCNC: NEGATIVE MG/DL
HGB UR QL STRIP.AUTO: NEGATIVE
HYALINE CASTS #/AREA URNS AUTO: 0 /LPF (ref 0–8)
KETONES UR STRIP.AUTO-MCNC: NEGATIVE MG/DL
LEUKOCYTE ESTERASE UR QL STRIP.AUTO: ABNORMAL
NITRITE UR QL STRIP.AUTO: NEGATIVE
PH UR STRIP.AUTO: 7 [PH] (ref 5–8)
PROT UR STRIP.AUTO-MCNC: NEGATIVE MG/DL
RBC CLUMPS #/AREA URNS AUTO: 2 /HPF (ref 0–4)
SP GR UR STRIP.AUTO: 1.01 (ref 1–1.03)
UA DIPSTICK W REFLEX MICRO PNL UR: YES
URN SPEC COLLECT METH UR: ABNORMAL
UROBILINOGEN UR STRIP-ACNC: 0.2 E.U./DL
WBC #/AREA URNS AUTO: 1 /HPF (ref 0–5)

## 2024-04-24 PROCEDURE — 87086 URINE CULTURE/COLONY COUNT: CPT

## 2024-04-24 PROCEDURE — 81001 URINALYSIS AUTO W/SCOPE: CPT

## 2024-04-25 LAB — BACTERIA UR CULT: NORMAL

## 2024-04-29 DIAGNOSIS — J69.0 ASPIRATION PNEUMONIA OF BOTH LUNGS DUE TO GASTRIC SECRETIONS, UNSPECIFIED PART OF LUNG (HCC): ICD-10-CM

## 2024-04-29 DIAGNOSIS — R13.10 DYSPHAGIA, UNSPECIFIED TYPE: ICD-10-CM

## 2024-04-29 DIAGNOSIS — G20.B2 PARKINSON'S DISEASE WITH DYSKINESIA AND FLUCTUATING MANIFESTATIONS (HCC): ICD-10-CM

## 2024-04-29 DIAGNOSIS — J69.0 ASPIRATION PNEUMONIA OF BOTH LUNGS DUE TO GASTRIC SECRETIONS, UNSPECIFIED PART OF LUNG (HCC): Primary | ICD-10-CM

## 2024-04-30 LAB
ANION GAP SERPL CALCULATED.3IONS-SCNC: 12 MMOL/L (ref 3–16)
BASOPHILS # BLD: 0 K/UL (ref 0–0.2)
BASOPHILS NFR BLD: 0.5 %
BUN SERPL-MCNC: 19 MG/DL (ref 7–20)
CALCIUM SERPL-MCNC: 9.4 MG/DL (ref 8.3–10.6)
CHLORIDE SERPL-SCNC: 103 MMOL/L (ref 99–110)
CO2 SERPL-SCNC: 27 MMOL/L (ref 21–32)
CREAT SERPL-MCNC: <0.5 MG/DL (ref 0.8–1.3)
DEPRECATED RDW RBC AUTO: 14.5 % (ref 12.4–15.4)
EOSINOPHIL # BLD: 0.2 K/UL (ref 0–0.6)
EOSINOPHIL NFR BLD: 2.7 %
GFR SERPLBLD CREATININE-BSD FMLA CKD-EPI: >90 ML/MIN/{1.73_M2}
GLUCOSE SERPL-MCNC: 92 MG/DL (ref 70–99)
HCT VFR BLD AUTO: 40.1 % (ref 40.5–52.5)
HGB BLD-MCNC: 13.3 G/DL (ref 13.5–17.5)
LYMPHOCYTES # BLD: 1.4 K/UL (ref 1–5.1)
LYMPHOCYTES NFR BLD: 21 %
MAGNESIUM SERPL-MCNC: 2 MG/DL (ref 1.8–2.4)
MCH RBC QN AUTO: 31 PG (ref 26–34)
MCHC RBC AUTO-ENTMCNC: 33.1 G/DL (ref 31–36)
MCV RBC AUTO: 93.8 FL (ref 80–100)
MONOCYTES # BLD: 0.6 K/UL (ref 0–1.3)
MONOCYTES NFR BLD: 9.2 %
NEUTROPHILS # BLD: 4.5 K/UL (ref 1.7–7.7)
NEUTROPHILS NFR BLD: 66.6 %
PHOSPHATE SERPL-MCNC: 4 MG/DL (ref 2.5–4.9)
PLATELET # BLD AUTO: 256 K/UL (ref 135–450)
PMV BLD AUTO: 8.6 FL (ref 5–10.5)
POTASSIUM SERPL-SCNC: 4.1 MMOL/L (ref 3.5–5.1)
RBC # BLD AUTO: 4.27 M/UL (ref 4.2–5.9)
SODIUM SERPL-SCNC: 142 MMOL/L (ref 136–145)
WBC # BLD AUTO: 6.8 K/UL (ref 4–11)

## 2024-05-14 ENCOUNTER — TELEPHONE (OUTPATIENT)
Dept: INTERNAL MEDICINE CLINIC | Age: 79
End: 2024-05-14

## 2024-05-14 NOTE — TELEPHONE ENCOUNTER
Spouse, Tiffany calling back. Not quite sure what info was needed to relay to spouse re the mattress pressure pad. She was advised Bowen will call her back tomorrow as she is gone for the day.

## 2024-05-14 NOTE — TELEPHONE ENCOUNTER
Left message for pts wife Tiffany regarding additional medical info. On an order for Pressure Pads.

## 2024-05-19 ENCOUNTER — APPOINTMENT (OUTPATIENT)
Dept: CT IMAGING | Age: 79
End: 2024-05-19
Payer: MEDICARE

## 2024-05-19 ENCOUNTER — HOSPITAL ENCOUNTER (EMERGENCY)
Age: 79
Discharge: HOME | End: 2024-05-20
Payer: MEDICARE

## 2024-05-19 DIAGNOSIS — R53.1 GENERAL WEAKNESS: Primary | ICD-10-CM

## 2024-05-19 LAB
ALBUMIN SERPL-MCNC: 3.8 G/DL (ref 3.4–5)
ALBUMIN/GLOB SERPL: 1.4 {RATIO} (ref 1.1–2.2)
ALP SERPL-CCNC: 100 U/L (ref 40–129)
ALT SERPL-CCNC: <5 U/L (ref 10–40)
ANION GAP SERPL CALCULATED.3IONS-SCNC: 9 MMOL/L (ref 3–16)
AST SERPL-CCNC: 19 U/L (ref 15–37)
BASE EXCESS BLDV CALC-SCNC: 3.9 MMOL/L (ref -3–3)
BASOPHILS # BLD: 0 K/UL (ref 0–0.2)
BASOPHILS NFR BLD: 0.4 %
BILIRUB SERPL-MCNC: 0.3 MG/DL (ref 0–1)
BILIRUB UR QL STRIP.AUTO: NEGATIVE
BUN SERPL-MCNC: 18 MG/DL (ref 7–20)
CALCIUM SERPL-MCNC: 9.1 MG/DL (ref 8.3–10.6)
CHLORIDE SERPL-SCNC: 102 MMOL/L (ref 99–110)
CLARITY UR: CLEAR
CO2 BLDV-SCNC: 71 MMOL/L
CO2 SERPL-SCNC: 28 MMOL/L (ref 21–32)
COHGB MFR BLDV: 3 % (ref 0–1.5)
COLOR UR: YELLOW
CREAT SERPL-MCNC: <0.5 MG/DL (ref 0.8–1.3)
DEPRECATED RDW RBC AUTO: 14.2 % (ref 12.4–15.4)
DO-HGB MFR BLDV: 13 %
EOSINOPHIL # BLD: 0.2 K/UL (ref 0–0.6)
EOSINOPHIL NFR BLD: 1.9 %
GFR SERPLBLD CREATININE-BSD FMLA CKD-EPI: >90 ML/MIN/{1.73_M2}
GLUCOSE SERPL-MCNC: 101 MG/DL (ref 70–99)
GLUCOSE UR STRIP.AUTO-MCNC: NEGATIVE MG/DL
HCO3 BLDV-SCNC: 29.9 MMOL/L (ref 23–29)
HCT VFR BLD AUTO: 38.6 % (ref 40.5–52.5)
HGB BLD-MCNC: 12.6 G/DL (ref 13.5–17.5)
HGB UR QL STRIP.AUTO: NEGATIVE
KETONES UR STRIP.AUTO-MCNC: NEGATIVE MG/DL
LACTATE BLDV-SCNC: 1.5 MMOL/L (ref 0.4–1.9)
LEUKOCYTE ESTERASE UR QL STRIP.AUTO: NEGATIVE
LIPASE SERPL-CCNC: 13 U/L (ref 13–60)
LYMPHOCYTES # BLD: 1.4 K/UL (ref 1–5.1)
LYMPHOCYTES NFR BLD: 13.3 %
MCH RBC QN AUTO: 29.9 PG (ref 26–34)
MCHC RBC AUTO-ENTMCNC: 32.6 G/DL (ref 31–36)
MCV RBC AUTO: 91.8 FL (ref 80–100)
METHGB MFR BLDV: 0.5 %
MONOCYTES # BLD: 1 K/UL (ref 0–1.3)
MONOCYTES NFR BLD: 9.5 %
NEUTROPHILS # BLD: 7.9 K/UL (ref 1.7–7.7)
NEUTROPHILS NFR BLD: 74.9 %
NITRITE UR QL STRIP.AUTO: NEGATIVE
NT-PROBNP SERPL-MCNC: 355 PG/ML (ref 0–449)
O2 CT VFR BLDV CALC: 15 VOL %
O2 THERAPY: ABNORMAL
PCO2 BLDV: 50.3 MMHG (ref 40–50)
PH BLDV: 7.38 [PH] (ref 7.35–7.45)
PH UR STRIP.AUTO: 6.5 [PH] (ref 5–8)
PLATELET # BLD AUTO: 256 K/UL (ref 135–450)
PMV BLD AUTO: 7.6 FL (ref 5–10.5)
PO2 BLDV: 53.4 MMHG (ref 25–40)
POTASSIUM SERPL-SCNC: 3.8 MMOL/L (ref 3.5–5.1)
PROT SERPL-MCNC: 6.6 G/DL (ref 6.4–8.2)
PROT UR STRIP.AUTO-MCNC: NEGATIVE MG/DL
RBC # BLD AUTO: 4.2 M/UL (ref 4.2–5.9)
SAO2 % BLDV: 87 %
SODIUM SERPL-SCNC: 139 MMOL/L (ref 136–145)
SP GR UR STRIP.AUTO: <=1.005 (ref 1–1.03)
TROPONIN, HIGH SENSITIVITY: 26 NG/L (ref 0–22)
UA COMPLETE W REFLEX CULTURE PNL UR: NORMAL
UA DIPSTICK W REFLEX MICRO PNL UR: NORMAL
URN SPEC COLLECT METH UR: NORMAL
UROBILINOGEN UR STRIP-ACNC: 0.2 E.U./DL
WBC # BLD AUTO: 10.5 K/UL (ref 4–11)

## 2024-05-19 PROCEDURE — 84484 ASSAY OF TROPONIN QUANT: CPT

## 2024-05-19 PROCEDURE — 93005 ELECTROCARDIOGRAM TRACING: CPT | Performed by: EMERGENCY MEDICINE

## 2024-05-19 PROCEDURE — 85025 COMPLETE CBC W/AUTO DIFF WBC: CPT

## 2024-05-19 PROCEDURE — 80053 COMPREHEN METABOLIC PANEL: CPT

## 2024-05-19 PROCEDURE — 83880 ASSAY OF NATRIURETIC PEPTIDE: CPT

## 2024-05-19 PROCEDURE — 99284 EMERGENCY DEPT VISIT MOD MDM: CPT

## 2024-05-19 PROCEDURE — 82803 BLOOD GASES ANY COMBINATION: CPT

## 2024-05-19 PROCEDURE — 2580000003 HC RX 258: Performed by: PHYSICIAN ASSISTANT

## 2024-05-19 PROCEDURE — 70450 CT HEAD/BRAIN W/O DYE: CPT

## 2024-05-19 PROCEDURE — 71250 CT THORAX DX C-: CPT

## 2024-05-19 PROCEDURE — 81003 URINALYSIS AUTO W/O SCOPE: CPT

## 2024-05-19 PROCEDURE — 87040 BLOOD CULTURE FOR BACTERIA: CPT

## 2024-05-19 PROCEDURE — 36415 COLL VENOUS BLD VENIPUNCTURE: CPT

## 2024-05-19 PROCEDURE — 83605 ASSAY OF LACTIC ACID: CPT

## 2024-05-19 PROCEDURE — 83690 ASSAY OF LIPASE: CPT

## 2024-05-19 RX ORDER — 0.9 % SODIUM CHLORIDE 0.9 %
1000 INTRAVENOUS SOLUTION INTRAVENOUS ONCE
Status: COMPLETED | OUTPATIENT
Start: 2024-05-19 | End: 2024-05-20

## 2024-05-19 RX ADMIN — SODIUM CHLORIDE 1000 ML: 9 INJECTION, SOLUTION INTRAVENOUS at 22:51

## 2024-05-19 ASSESSMENT — PAIN - FUNCTIONAL ASSESSMENT: PAIN_FUNCTIONAL_ASSESSMENT: NONE - DENIES PAIN

## 2024-05-20 VITALS
SYSTOLIC BLOOD PRESSURE: 124 MMHG | TEMPERATURE: 98.2 F | HEART RATE: 63 BPM | HEIGHT: 68 IN | OXYGEN SATURATION: 96 % | WEIGHT: 174.4 LBS | BODY MASS INDEX: 26.43 KG/M2 | RESPIRATION RATE: 20 BRPM | DIASTOLIC BLOOD PRESSURE: 61 MMHG

## 2024-05-20 LAB
EKG ATRIAL RATE: 357 BPM
EKG DIAGNOSIS: NORMAL
EKG P AXIS: 66 DEGREES
EKG Q-T INTERVAL: 438 MS
EKG QRS DURATION: 82 MS
EKG QTC CALCULATION (BAZETT): 455 MS
EKG R AXIS: 22 DEGREES
EKG T AXIS: 47 DEGREES
EKG VENTRICULAR RATE: 65 BPM

## 2024-05-20 PROCEDURE — 93010 ELECTROCARDIOGRAM REPORT: CPT | Performed by: INTERNAL MEDICINE

## 2024-05-20 NOTE — ED PROVIDER NOTES
EKG My Reading:  Interpretation significantly limited by motion artifact  Rate: 65  Rhythm: sinus  ST Segments: T wave flattening lead AVL  STEMI: no  QTc: 455 (minimally prolonged)  Comparison to prior study from 03/2024  - Extrasystoles have decreased  - Otherwise not substantially changed     Donovan Emanuel MD  05/19/24 9824    
REFERRED TO:  Rohan Watson, APRN - CNP  [address] [address]  [phone]    Schedule an appointment as soon as possible for a visit in 2 days      [facility] Emergency Department  [address]  [employer] [address]  [phone]    As needed, If symptoms worsen      DISCHARGE MEDICATIONS:  Discharge Medication List as of 5/19/2024 11:51 PM          DISCONTINUED MEDICATIONS:  Discharge Medication List as of 5/19/2024 11:51 PM                 (Please note that portions of this note were completed with a voice recognition program.  Efforts were made to edit the dictations but occasionally words are mis-transcribed.)    Natalie King PA-C (electronically signed)        Natalie King PA-C  05/20/24 0110

## 2024-05-20 NOTE — ED NOTES
Pt discharged back home, reviewed discharged instructions with pt follow up care , pain control and return precautions discussed , pt verbalized understanding. Pt wheeled out of the department and assisted into car

## 2024-05-24 LAB — BACTERIA BLD CULT: NORMAL

## 2024-05-28 ENCOUNTER — TELEPHONE (OUTPATIENT)
Dept: INTERNAL MEDICINE CLINIC | Age: 79
End: 2024-05-28

## 2024-05-28 NOTE — TELEPHONE ENCOUNTER
CHENTE Pagan from [facility] asking for verbal orders to continue OT 1x/wk x 4 more weeks. She can be reached at [phone].

## 2024-06-13 RX ORDER — LORATADINE 10 MG/1
10 TABLET ORAL DAILY
COMMUNITY

## 2024-06-13 NOTE — PROGRESS NOTES
Patient reached __X__ yes--spoke with wife, Tiffany.      Date ___6/14/24______  Time __1330_____  Arrival _1200  [facility]    Nothing to eat or drink after midnight-follow your doctors prep instructions. Nothing per g-tube after midnight except meds  Responsible adult 18 or older to stay on site while you are here-drive you home-stay with you after  Follow any instructions your doctors office has given you  Bring a complete list of all your medications and supplements including name,dose,how often taken the day of your procedure  If you normally take the following medications in the morning please do so the AM of your procedure with a small sip of water       Heart,blood pressure,seizure,thyroid or breathing medications-use your inhalers-bring any rescue inhalers with you DOS       DO NOT take blood pressure medications ending in \"jennifer\" or \"pril\" the AM of procedure or evening prior  Dr Galeano patients are not to take any medications the AM of surgery  Take half or your normal dose of any long acting insulins the night before your procedure-do not take any diabetic medications the AM of procedure  Follow your doctors instructions regarding stopping or taking  any blood thinners-if you do not have instructions-call them  Any questions call your doctor  Other ____take sinemet, clozapine, midodrine prn am of procedure__________________________________________________________    VISITOR POLICY(subject to change)             The current policy is 2 visitors per patient.There are no children allowed.Mask at discretion of facility. Visiting hours are 8a-8p.Overnight visitors will be at the discretion of the nurse. All policies are subject to change.

## 2024-06-14 ENCOUNTER — ANESTHESIA EVENT (OUTPATIENT)
Dept: ENDOSCOPY | Age: 79
End: 2024-06-14
Payer: MEDICARE

## 2024-06-14 ENCOUNTER — ANESTHESIA (OUTPATIENT)
Dept: ENDOSCOPY | Age: 79
End: 2024-06-14
Payer: MEDICARE

## 2024-06-14 ENCOUNTER — HOSPITAL ENCOUNTER (OUTPATIENT)
Age: 79
Setting detail: OUTPATIENT SURGERY
Discharge: HOME | End: 2024-06-14
Attending: INTERNAL MEDICINE | Admitting: INTERNAL MEDICINE
Payer: MEDICARE

## 2024-06-14 VITALS
WEIGHT: 171 LBS | DIASTOLIC BLOOD PRESSURE: 73 MMHG | RESPIRATION RATE: 16 BRPM | TEMPERATURE: 97.5 F | BODY MASS INDEX: 25.91 KG/M2 | SYSTOLIC BLOOD PRESSURE: 127 MMHG | OXYGEN SATURATION: 98 % | HEIGHT: 68 IN | HEART RATE: 62 BPM

## 2024-06-14 PROCEDURE — 6360000002 HC RX W HCPCS: Performed by: NURSE ANESTHETIST, CERTIFIED REGISTERED

## 2024-06-14 PROCEDURE — 3700000001 HC ADD 15 MINUTES (ANESTHESIA): Performed by: INTERNAL MEDICINE

## 2024-06-14 PROCEDURE — 2580000003 HC RX 258: Performed by: INTERNAL MEDICINE

## 2024-06-14 PROCEDURE — 3700000000 HC ANESTHESIA ATTENDED CARE: Performed by: INTERNAL MEDICINE

## 2024-06-14 PROCEDURE — 2709999900 HC NON-CHARGEABLE SUPPLY: Performed by: INTERNAL MEDICINE

## 2024-06-14 PROCEDURE — 2500000003 HC RX 250 WO HCPCS: Performed by: NURSE ANESTHETIST, CERTIFIED REGISTERED

## 2024-06-14 PROCEDURE — 3609013300 HC EGD TUBE PLACEMENT: Performed by: INTERNAL MEDICINE

## 2024-06-14 PROCEDURE — 7100000011 HC PHASE II RECOVERY - ADDTL 15 MIN: Performed by: INTERNAL MEDICINE

## 2024-06-14 PROCEDURE — 7100000010 HC PHASE II RECOVERY - FIRST 15 MIN: Performed by: INTERNAL MEDICINE

## 2024-06-14 RX ORDER — PROPOFOL 10 MG/ML
INJECTION, EMULSION INTRAVENOUS PRN
Status: DISCONTINUED | OUTPATIENT
Start: 2024-06-14 | End: 2024-06-14 | Stop reason: SDUPTHER

## 2024-06-14 RX ORDER — LIDOCAINE HYDROCHLORIDE 20 MG/ML
INJECTION, SOLUTION EPIDURAL; INFILTRATION; INTRACAUDAL; PERINEURAL PRN
Status: DISCONTINUED | OUTPATIENT
Start: 2024-06-14 | End: 2024-06-14 | Stop reason: SDUPTHER

## 2024-06-14 RX ORDER — SODIUM CHLORIDE 9 MG/ML
INJECTION, SOLUTION INTRAVENOUS CONTINUOUS
Status: DISCONTINUED | OUTPATIENT
Start: 2024-06-14 | End: 2024-06-14 | Stop reason: HOSPADM

## 2024-06-14 RX ADMIN — SODIUM CHLORIDE: 9 INJECTION, SOLUTION INTRAVENOUS at 13:29

## 2024-06-14 RX ADMIN — PROPOFOL 50 MG: 10 INJECTION, EMULSION INTRAVENOUS at 13:55

## 2024-06-14 RX ADMIN — PROPOFOL 140 MCG/KG/MIN: 10 INJECTION, EMULSION INTRAVENOUS at 13:56

## 2024-06-14 RX ADMIN — LIDOCAINE HYDROCHLORIDE 50 MG: 20 INJECTION, SOLUTION EPIDURAL; INFILTRATION; INTRACAUDAL; PERINEURAL at 13:55

## 2024-06-14 ASSESSMENT — PAIN - FUNCTIONAL ASSESSMENT
PAIN_FUNCTIONAL_ASSESSMENT: 0-10

## 2024-06-14 ASSESSMENT — ENCOUNTER SYMPTOMS: SHORTNESS OF BREATH: 0

## 2024-06-14 NOTE — DISCHARGE INSTRUCTIONS
EGD DISCHARGE INSTRUCTIONS    Use salt water gargle, lozenges, or Chloraseptic spray as needed for sore throat.    If you have fever, chills, excessive bleeding, severe chest pain, or abdominal pain, or any other problems, contact your physician's office immediately at [phone].    If you had an esophageal dilatation, and experience fever, chills, excessive bleeding, shortness of breath, chest or abdominal pain, or any other unusual symptom, call the office immediately at [phone].    Continue home medications as directed.    Call physician's office in 14 business days for biopsy results or further instructions.    Call your physician at [phone] if any problems or concerns.    See your physician's report for procedure details and recommendations.      ANESTHESIA DISCHARGE INSTRUCTIONS    Wear your seatbelt home.    A responsible adult needs to be with you for 24 hours  You are under the influence of drugs-do not drink alcohol, drive ,operate machinery,or make any important decisions or sign any legal documentsfor 24 hours. You may resume normal activities tomorrow.    You may experience lightheadedness,dizziness,or sleepiness following surgery.  Rest at home today - increase activity as tolerated. It is recommended to take a four hour nap after procedure.    Progress slowly to a regular diet unless your physician has instructed you otherwise. Avoid spicy and greasy food on first meal.  Drink plenty of water.  If nausea becomes a problem call your physician.    Call your doctor if concerns arise.

## 2024-06-14 NOTE — ANESTHESIA POSTPROCEDURE EVALUATION
Department of Anesthesiology  Postprocedure Note    Patient: Pacheco Prince  MRN: [id number]  YOB: 1945  Date of evaluation: 6/14/2024    Procedure Summary       Date: 06/14/24 Room / Location: [place] [id number] / [facility]    Anesthesia Start: 1350 Anesthesia Stop: 1413    Procedure: ESOPHAGOGASTRODUODENOSCOPY WITH PERCUTANEOUS ENDOSCOPIC GASTROSTOMY (Abdomen) Diagnosis:       Malfunction of gastrostomy tube (HCC)      (Malfunction of gastrostomy tube (HCC) [K94.23])    Surgeons: Luke Douglas MD Responsible Provider: Ric Truong MD    Anesthesia Type: MAC ASA Status: 4            Anesthesia Type: MAC    Roslyn Phase I: Roslyn Score: 10    Roslyn Phase II:      Anesthesia Post Evaluation    Patient location during evaluation: PACU  Patient participation: complete - patient participated  Level of consciousness: awake and alert  Airway patency: patent  Nausea & Vomiting: no vomiting and no nausea  Cardiovascular status: hemodynamically stable  Respiratory status: acceptable  Hydration status: stable  Pain management: adequate    No notable events documented.

## 2024-06-14 NOTE — ANESTHESIA PRE PROCEDURE
Department of Anesthesiology  Preprocedure Note       Name:  Pacheco Prince   Age:  79 y.o.  :  1945                                          MRN:  [id number]         Date:  2024      Surgeon: Surgeon(s):  Luke Douglas MD    Procedure: Procedure(s):  ESOPHAGOGASTRODUODENOSCOPY WITH PERCUTANEOUS ENDOSCOPIC GASTROSTOMY    Medications prior to admission:   Prior to Admission medications    Medication Sig Start Date End Date Taking? Authorizing Provider   loratadine (CLARITIN) 10 MG tablet Take 1 tablet by mouth daily   Yes Obey Callahan MD   cloZAPine (CLOZARIL) 25 MG tablet Take 1 tablet by mouth 2 times daily 3/28/24   Keon Sheets MD   carboxymethylcellulose 1 % ophthalmic solution Place 1 drop into both eyes 2 times daily    Obey Callahan MD   melatonin 3 MG TABS tablet Take 1-2 tablets by mouth nightly    Obey Callahan MD   polyethylene glycol (GLYCOLAX) 17 GM/SCOOP powder Take 8.5 g by mouth 2 times daily Patient received 1/2 cap full twice daily.    Obey Callahan MD   atorvastatin (LIPITOR) 80 MG tablet TAKE 1 TABLET EVERY DAY  Patient taking differently: Take 1 tablet by mouth nightly TAKE 1 TABLET EVERY DAY 10/13/23   Wilver Solo MD   midodrine (PROAMATINE) 5 MG tablet Take 1 tablet by mouth 3 times daily PATIENT IS TO CALL OFFICE IF SBP >160. 23   Wilver Solo MD   primidone (MYSOLINE) 50 MG tablet Take 1 tablet by mouth nightly 23   Luke Richard MD   ipratropium (ATROVENT) 0.06 % nasal spray 2 sprays by Each Nostril route 3 times daily Patient usually uses just before meals.    Obey Callahan MD   docusate sodium (COLACE) 100 MG capsule Take 1 capsule by mouth 2 times daily Twice daily    Obey Callahan MD   nitroGLYCERIN (NITROSTAT) 0.4 MG SL tablet Place 1 tablet under the tongue every 5 minutes as needed (prn) 23   Wilver Solo MD   donepezil (ARICEPT) 10 MG tablet Take 1 tablet by mouth nightly 2/10/22

## 2024-06-14 NOTE — PROGRESS NOTES
Post-procedure education reviewed with patient and visitor, including resuming of medication regimen, and they verbalized understanding.

## 2024-06-14 NOTE — BRIEF OP NOTE
Brief Postoperative Note - Full Note in Chart Review/Procedures tab       Patient: Pacheco Prince  YOB: 1945  MRN: [id number]    Date of Procedure: 6/14/2024    Pre-Op Diagnosis Codes:     * Malfunction of gastrostomy tube (HCC) [K94.23]    Post-Op Diagnosis: Same       Procedure(s):  ESOPHAGOGASTRODUODENOSCOPY WITH PERCUTANEOUS ENDOSCOPIC GASTROSTOMY    Surgeon(s):  Luke Douglas MD    Assistant:  * No surgical staff found *    Anesthesia: Monitor Anesthesia Care    Estimated Blood Loss (mL): Minimal    Complications: None    Specimens:   * No specimens in log *    Implants:  * No implants in log *      Drains:   Gastrostomy/Enterostomy/Jejunostomy Tube Percutaneous Endoscopic Gastrostomy (PEG) LUQ 20 fr (Active)       Findings:  Infection Present At Time Of Surgery (PATOS) (choose all levels that have infection present):  No infection present  Other Findings:   Normal EGD  Successful replacement of 20 Fr PEG tube as above.    Rec:  Continue present diet  Continue present medications  OK to use gastrostomy and eat now  Follow up with me as needed  Follow up with primary care physician as usual.    Electronically signed by Luke Douglas MD on 6/14/2024 at 2:17 PM

## 2024-06-14 NOTE — H&P
[facility]   Pre-operative History and Physical    Patient: Pacheco Prince  : 1945  CSN:     History Obtained From: patient and/or guardian.      HISTORY OF PRESENT ILLNESS:    The patient is a 79 y.o. male s/p PEG placement here for EGD and PEG replacement.   Past Medical History:        Diagnosis Date    Acute bronchitis     history of    CAD (coronary artery disease)     Cancer (HCC)     melanoma    Clostridioides difficile infection 2020    Diabetes mellitus (HCC) 2022    6.6 A1C    Erectile dysfunction     GERD (gastroesophageal reflux disease)     gerd    Hyperlipidemia     Obesity     Parkinson disease (HCC)      Past Surgical History:        Procedure Laterality Date    CATARACT EXTRACTION      2021    CORONARY ANGIOPLASTY WITH STENT PLACEMENT  2005    [facility]    DIAGNOSTIC CARDIAC CATH LAB PROCEDURE  2005        EYE SURGERY  2021    cataracts    PTCA      TONSILLECTOMY  [facility]    UPPER GASTROINTESTINAL ENDOSCOPY      UPPER GASTROINTESTINAL ENDOSCOPY N/A 3/8/2024    ESOPHAGOGASTRODUODENOSCOPY PERCUTANEOUS ENDOSCOPIC GASTROSTOMY TUBE PLACEMENT performed by Luke Douglas MD at [facility] ENDOSCOPY     Medications Prior to Admission:   No current facility-administered medications on file prior to encounter.     Current Outpatient Medications on File Prior to Encounter   Medication Sig Dispense Refill    loratadine (CLARITIN) 10 MG tablet Take 1 tablet by mouth daily      cloZAPine (CLOZARIL) 25 MG tablet Take 1 tablet by mouth 2 times daily 30 tablet 3    carboxymethylcellulose 1 % ophthalmic solution Place 1 drop into both eyes 2 times daily      melatonin 3 MG TABS tablet Take 1-2 tablets by mouth nightly      polyethylene glycol (GLYCOLAX) 17 GM/SCOOP powder Take 8.5 g by mouth 2 times daily Patient received 1/2 cap full twice daily.      atorvastatin (LIPITOR) 80 MG tablet TAKE 1 TABLET EVERY DAY (Patient taking  (Patient not taking: Reported on 2024)      multivitamin (THERAGRAN) per tablet Take 1 tablet by mouth daily       Allergies:  Amoxicillin-pot clavulanate, Sulfacetamide, Tamsulosin, and Penicillins        Social History:   Social History     Tobacco Use    Smoking status: Former     Current packs/day: 0.00     Average packs/day: 0.3 packs/day for 10.0 years (2.5 ttl pk-yrs)     Types: Cigarettes, Cigars     Start date:      Quit date:      Years since quittin.4    Smokeless tobacco: Former   Substance Use Topics    Alcohol use: Yes     Alcohol/week: 1.0 standard drink of alcohol     Types: 1 Cans of beer per week     Comment: occas.     Family History:   Family History   Adopted: Yes   Problem Relation Age of Onset    Cancer Mother         uterine    Alcohol Abuse Mother     Other Father         copd    Alcohol Abuse Father     Other Brother         emphysema    Cancer Sister         thyroid    Diabetes Sister         type 11    Hypertension Sister     Other Brother         joint problems    Diabetes Sister         Type11    Hypertension Sister     Heart Disease Neg Hx     High Blood Pressure Neg Hx     High Cholesterol Neg Hx        PHYSICAL EXAM:      BP (!) 119/50   Pulse 51   Temp 96.9 °F (36.1 °C) (Temporal)   Resp 16   Ht 1.727 m (5' 8\")   Wt 77.6 kg (171 lb)   SpO2 96%   BMI 26.00 kg/m²  I        Heart:  RRR,  Normal S1S2    Lungs:  CTA Bilat, normal effort    Abdomen:  ND NT      ASA Grade:  ASA 4 - Patient with severe systemic disease that is a constant threat to life    Mallampati Class:  Class I: Soft palate, uvula, fauces, pillars visible  ____X_____  Class II: Soft palate, uvula, fauces visible  __________   Class III: Soft palate, base of uvula visible  __________  Class IV: Hard palate only visible   __________      ASSESSMENT AND PLAN:    1.  Patient is a 79 y.o. male here for EGD with anesthesia  2.  Procedure options, risks and benefits reviewed with guardian.  Guardian

## 2024-06-17 ENCOUNTER — TELEPHONE (OUTPATIENT)
Dept: INTERNAL MEDICINE CLINIC | Age: 79
End: 2024-06-17

## 2024-06-17 NOTE — TELEPHONE ENCOUNTER
Jada calling from [facility] for a verbal order for an open area on his Coccyx which is open again. She wants to apply Alligant and a border dressing Metilex. Ok'd per Rohan Buck, CNP

## 2024-06-21 ENCOUNTER — OFFICE VISIT (OUTPATIENT)
Dept: INTERNAL MEDICINE CLINIC | Age: 79
End: 2024-06-21
Payer: MEDICARE

## 2024-06-21 VITALS
WEIGHT: 176 LBS | OXYGEN SATURATION: 96 % | DIASTOLIC BLOOD PRESSURE: 58 MMHG | HEART RATE: 67 BPM | SYSTOLIC BLOOD PRESSURE: 110 MMHG | BODY MASS INDEX: 26.67 KG/M2 | HEIGHT: 68 IN

## 2024-06-21 DIAGNOSIS — G47.01 INSOMNIA DUE TO MEDICAL CONDITION: ICD-10-CM

## 2024-06-21 DIAGNOSIS — G20.B2 PARKINSON'S DISEASE WITH DYSKINESIA AND FLUCTUATING MANIFESTATIONS (HCC): Primary | ICD-10-CM

## 2024-06-21 DIAGNOSIS — F41.9 ANXIETY: ICD-10-CM

## 2024-06-21 DIAGNOSIS — I10 PRIMARY HYPERTENSION: ICD-10-CM

## 2024-06-21 DIAGNOSIS — E78.2 MIXED HYPERLIPIDEMIA: ICD-10-CM

## 2024-06-21 DIAGNOSIS — E11.9 TYPE 2 DIABETES MELLITUS WITHOUT COMPLICATION, WITHOUT LONG-TERM CURRENT USE OF INSULIN (HCC): ICD-10-CM

## 2024-06-21 PROCEDURE — 99214 OFFICE O/P EST MOD 30 MIN: CPT | Performed by: NURSE PRACTITIONER

## 2024-06-21 PROCEDURE — 3078F DIAST BP <80 MM HG: CPT | Performed by: NURSE PRACTITIONER

## 2024-06-21 PROCEDURE — 1036F TOBACCO NON-USER: CPT | Performed by: NURSE PRACTITIONER

## 2024-06-21 PROCEDURE — G8417 CALC BMI ABV UP PARAM F/U: HCPCS | Performed by: NURSE PRACTITIONER

## 2024-06-21 PROCEDURE — 3044F HG A1C LEVEL LT 7.0%: CPT | Performed by: NURSE PRACTITIONER

## 2024-06-21 PROCEDURE — 3074F SYST BP LT 130 MM HG: CPT | Performed by: NURSE PRACTITIONER

## 2024-06-21 PROCEDURE — 1123F ACP DISCUSS/DSCN MKR DOCD: CPT | Performed by: NURSE PRACTITIONER

## 2024-06-21 PROCEDURE — G8427 DOCREV CUR MEDS BY ELIG CLIN: HCPCS | Performed by: NURSE PRACTITIONER

## 2024-06-21 RX ORDER — HYDROXYZINE HYDROCHLORIDE 10 MG/1
10 TABLET, FILM COATED ORAL 3 TIMES DAILY PRN
Qty: 90 TABLET | Refills: 3 | Status: SHIPPED | OUTPATIENT
Start: 2024-06-21

## 2024-06-24 DIAGNOSIS — D64.9 LOW HEMOGLOBIN AND LOW HEMATOCRIT: ICD-10-CM

## 2024-06-24 DIAGNOSIS — D64.9 LOW HEMOGLOBIN AND LOW HEMATOCRIT: Primary | ICD-10-CM

## 2024-06-24 NOTE — PROGRESS NOTES
Pt stopped into the office.  States he was supposed to have an order for a CBC to be drawn by home care. Lab ordered.

## 2024-06-24 NOTE — TELEPHONE ENCOUNTER
Received refill request for midodrine from [facility] pharmacy.    Last ov: 01/25/2024 LES    Last Refill: 07/19/2023 #270 w/ 3     Next appointment: 07/16/2024 LES

## 2024-06-25 LAB
DEPRECATED RDW RBC AUTO: 14.1 % (ref 12.4–15.4)
HCT VFR BLD AUTO: 39.1 % (ref 40.5–52.5)
HGB BLD-MCNC: 12.9 G/DL (ref 13.5–17.5)
MCH RBC QN AUTO: 30.5 PG (ref 26–34)
MCHC RBC AUTO-ENTMCNC: 33 G/DL (ref 31–36)
MCV RBC AUTO: 92.6 FL (ref 80–100)
PLATELET # BLD AUTO: 239 K/UL (ref 135–450)
PMV BLD AUTO: 8 FL (ref 5–10.5)
RBC # BLD AUTO: 4.22 M/UL (ref 4.2–5.9)
WBC # BLD AUTO: 5.8 K/UL (ref 4–11)

## 2024-06-25 RX ORDER — MIDODRINE HYDROCHLORIDE 5 MG/1
5 TABLET ORAL 3 TIMES DAILY
Qty: 270 TABLET | Refills: 3 | Status: SHIPPED | OUTPATIENT
Start: 2024-06-25

## 2024-07-06 ENCOUNTER — HOSPITAL ENCOUNTER (OUTPATIENT)
Age: 79
Discharge: HOME | End: 2024-07-06

## 2024-07-06 PROCEDURE — 87086 URINE CULTURE/COLONY COUNT: CPT

## 2024-07-09 LAB
BACTERIA UR CULT: ABNORMAL
BACTERIA UR CULT: ABNORMAL
ORGANISM: ABNORMAL
ORGANISM: ABNORMAL

## 2024-07-15 NOTE — PROGRESS NOTES
clubbing, cyanosis of the extremities.  Mild LE edema  Femoral Arteries: 2+ and equal  Pedal Pulses: 2+ and equal   Abdomen:  No masses or tenderness  Liver/Spleen: No Abnormalities Noted  Neurological/Psychiatric:  Alert and oriented in all spheres  Moves all extremities well  Exhibits normal gait balance and coordination  No abnormalities of mood, affect, memory, mentation, or behavior are noted  Positive for tremors    1/12/24 stress test  Summary -Myocardial perfusion if felt to be normal. -There is a fixed inferior apical defect on the upright images that resolves on the supine image suggesting that this is artifactual. -LV function is normal with EF=68% and no regional abnormalities. -Low risk study    Assessment:    1. Coronary atherosclerosis of native coronary artery:   Stable  denies anginal symptoms   History of occasional chest pain.  Reports episodes can be weeks or months apart.  Continue ASA/statin  -EKG 1/17/22> NSR with lateral ischemia   -Cath 2005> stent in LAD  -Cath 2006> Patent stent in lad -nonobstructive circumflex disease.   -GXT Myoview 2010> Normal perfusion. An angiogram was normal.   -Cath 4/2015> Normal LV function with EF 60, widely patent LAD stent, 30% distal CFx. -  -Lexiscan karina 7/17/19> Normal LV size and systolic function. No evidence of ischemia.   -ECHO 1/15/10: EF 55%, mild mitral and aortic insufficiency  -[place] 2/1/22 Normal EF. LAD stent widely patent. Otherwise only 30 %LAD stenosis  -stress test 1/12/24 > results above  Continue ASA/statin     2. Hypertension:   Stable.  No c/o dizziness today   Blood pressure (!) 104/58, pulse 78, height 1.727 m (5' 7.99\"), weight 81.2 kg (179 lb), SpO2 92 %.  Rechecked by me- 106 systolic with standing  Remain on midodrine 5 mg TID but if BP above 160, call the office.     3. Hyperlipemia:   Well controlled    Remain on Lipitor 80mg and fish oil.   6/2021>  TG 93 HDL 40 LDL 71  11/16/22 >  TG 79 HDL 41 LDL 65  5/19/23> TC 96

## 2024-07-16 ENCOUNTER — OFFICE VISIT (OUTPATIENT)
Dept: CARDIOLOGY CLINIC | Age: 79
End: 2024-07-16
Payer: MEDICARE

## 2024-07-16 VITALS
SYSTOLIC BLOOD PRESSURE: 104 MMHG | BODY MASS INDEX: 27.13 KG/M2 | HEART RATE: 78 BPM | DIASTOLIC BLOOD PRESSURE: 58 MMHG | HEIGHT: 68 IN | OXYGEN SATURATION: 92 % | WEIGHT: 179 LBS

## 2024-07-16 DIAGNOSIS — I10 PRIMARY HYPERTENSION: ICD-10-CM

## 2024-07-16 DIAGNOSIS — E78.2 MIXED HYPERLIPIDEMIA: ICD-10-CM

## 2024-07-16 DIAGNOSIS — G20.A1 PARKINSON'S DISEASE, UNSPECIFIED WHETHER DYSKINESIA PRESENT, UNSPECIFIED WHETHER MANIFESTATIONS FLUCTUATE (HCC): Chronic | ICD-10-CM

## 2024-07-16 DIAGNOSIS — I65.23 BILATERAL CAROTID ARTERY STENOSIS: Chronic | ICD-10-CM

## 2024-07-16 DIAGNOSIS — I25.10 CORONARY ARTERY DISEASE INVOLVING NATIVE CORONARY ARTERY OF NATIVE HEART WITHOUT ANGINA PECTORIS: Primary | ICD-10-CM

## 2024-07-16 PROCEDURE — 1036F TOBACCO NON-USER: CPT | Performed by: INTERNAL MEDICINE

## 2024-07-16 PROCEDURE — G8427 DOCREV CUR MEDS BY ELIG CLIN: HCPCS | Performed by: INTERNAL MEDICINE

## 2024-07-16 PROCEDURE — 1123F ACP DISCUSS/DSCN MKR DOCD: CPT | Performed by: INTERNAL MEDICINE

## 2024-07-16 PROCEDURE — 99214 OFFICE O/P EST MOD 30 MIN: CPT | Performed by: INTERNAL MEDICINE

## 2024-07-16 PROCEDURE — 3078F DIAST BP <80 MM HG: CPT | Performed by: INTERNAL MEDICINE

## 2024-07-16 PROCEDURE — G8417 CALC BMI ABV UP PARAM F/U: HCPCS | Performed by: INTERNAL MEDICINE

## 2024-07-16 PROCEDURE — 3074F SYST BP LT 130 MM HG: CPT | Performed by: INTERNAL MEDICINE

## 2024-07-17 DIAGNOSIS — S31.809A WOUND OF BUTTOCK, UNSPECIFIED LATERALITY, INITIAL ENCOUNTER: Primary | ICD-10-CM

## 2024-07-17 DIAGNOSIS — N30.00 ACUTE CYSTITIS WITHOUT HEMATURIA: ICD-10-CM

## 2024-07-17 RX ORDER — LEVOFLOXACIN 500 MG/1
500 TABLET, FILM COATED ORAL DAILY
Qty: 7 TABLET | Refills: 0 | Status: SHIPPED | OUTPATIENT
Start: 2024-07-17 | End: 2024-07-24

## 2024-07-22 ENCOUNTER — HOSPITAL ENCOUNTER (OUTPATIENT)
Age: 79
Setting detail: SPECIMEN
Discharge: HOME | End: 2024-07-22
Payer: MEDICARE

## 2024-07-22 ENCOUNTER — TELEPHONE (OUTPATIENT)
Dept: INTERNAL MEDICINE CLINIC | Age: 79
End: 2024-07-22

## 2024-07-22 LAB
BASOPHILS # BLD: 0 K/UL (ref 0–0.2)
BASOPHILS NFR BLD: 0.7 %
DEPRECATED RDW RBC AUTO: 13.8 % (ref 12.4–15.4)
EOSINOPHIL # BLD: 0.1 K/UL (ref 0–0.6)
EOSINOPHIL NFR BLD: 2.6 %
HCT VFR BLD AUTO: 36 % (ref 40.5–52.5)
HGB BLD-MCNC: 12.2 G/DL (ref 13.5–17.5)
LYMPHOCYTES # BLD: 1 K/UL (ref 1–5.1)
LYMPHOCYTES NFR BLD: 17.6 %
MCH RBC QN AUTO: 30.8 PG (ref 26–34)
MCHC RBC AUTO-ENTMCNC: 33.8 G/DL (ref 31–36)
MCV RBC AUTO: 91.2 FL (ref 80–100)
MONOCYTES # BLD: 0.5 K/UL (ref 0–1.3)
MONOCYTES NFR BLD: 9.6 %
NEUTROPHILS # BLD: 3.8 K/UL (ref 1.7–7.7)
NEUTROPHILS NFR BLD: 69.5 %
PLATELET # BLD AUTO: 271 K/UL (ref 135–450)
PMV BLD AUTO: 7.7 FL (ref 5–10.5)
RBC # BLD AUTO: 3.95 M/UL (ref 4.2–5.9)
WBC # BLD AUTO: 5.5 K/UL (ref 4–11)

## 2024-07-22 PROCEDURE — 36415 COLL VENOUS BLD VENIPUNCTURE: CPT

## 2024-07-22 PROCEDURE — 85025 COMPLETE CBC W/AUTO DIFF WBC: CPT

## 2024-07-22 ASSESSMENT — ENCOUNTER SYMPTOMS
TROUBLE SWALLOWING: 1
RESPIRATORY NEGATIVE: 1
GASTROINTESTINAL NEGATIVE: 1

## 2024-07-22 NOTE — TELEPHONE ENCOUNTER
Rachele from [facility] is calling --did pt re certification today and is needing verbal orders to continue skilled nursing---please call her at [phone].  Thanks.

## 2024-07-22 NOTE — PROGRESS NOTES
SUBJECTIVE:    Patient ID: Pacheco Prince is a 79 y.o. male.    CC: Parkinson's, anxiety, insomnia, hypertension, hyperlipidemia, diabetes    HPI: The patient presents to the office today for routine follow-up of chronic medical conditions.    Patient is seen today accompanied by his wife who is his primary caretaker associated with his Parkinson's disease.  Disease has manifested in issues with dyskinesia, swallowing, continence.    Main concern today is anxiety and insomnia.  This is felt to be related to above issue.    He has a history of hypertension and hyperlipidemia.  He denies any chest pain or shortness of breath.  He is compliant with his medication regimen.    He has a history of diabetes which has been under good control with most recent A1c 6.1.      Past Medical History:   Diagnosis Date    Acute bronchitis     history of    CAD (coronary artery disease)     Cancer ([facility]) 2020    melanoma    Clostridioides difficile infection 07/12/2020    Diabetes mellitus ([facility]) 01/2022    6.6 A1C    Erectile dysfunction     GERD (gastroesophageal reflux disease) 2010    gerd    Hyperlipidemia     Obesity     Parkinson disease ([facility])         Current Outpatient Medications   Medication Sig Dispense Refill    hydrOXYzine HCl (ATARAX) 10 MG tablet Take 1 tablet by mouth 3 times daily as needed for Anxiety 90 tablet 3    loratadine (CLARITIN) 10 MG tablet Take 1 tablet by mouth daily      cloZAPine (CLOZARIL) 25 MG tablet Take 1 tablet by mouth 2 times daily 30 tablet 3    carboxymethylcellulose 1 % ophthalmic solution Place 1 drop into both eyes 2 times daily      melatonin 3 MG TABS tablet Take 1-2 tablets by mouth nightly      polyethylene glycol (GLYCOLAX) 17 GM/SCOOP powder Take 8.5 g by mouth 2 times daily Patient received 1/2 cap full twice daily.      atorvastatin (LIPITOR) 80 MG tablet TAKE 1 TABLET EVERY DAY (Patient taking differently: Take 1 tablet by mouth nightly TAKE 1 TABLET EVERY DAY) 90 tablet 10

## 2024-07-26 NOTE — PATIENT INSTRUCTIONS
[facility]  [address]   [place], [place] [address]  Telephone: [phone]     FAX [phone]    Discharge Instructions    Important reminders:    **If you have any signs and symptoms of illness (Cough, fever, congestion, nausea, vomiting, diarrhea, etc.) please call the wound care center prior to your appointment.    1. Increase Protein intake for optimal wound healing  2. No added salt to reduce any swelling  3. If diabetic, maintain good glucose control  4. If you smoke, smoking prohibits wound healing, we ask that you refrain from smoking.  5. When taking antibiotics take the entire prescription as ordered. Do not stop taking until medication is all gone unless otherwise instructed.   6. Exercise as tolerated.   7. Keep weight off wounds and reposition every 2 hours if applicable.  8. If wound(s) is on your lower extremity, elevate legs to the level of the heart or above for 30 minutes 4-5 times a day and/or when sitting. Avoid standing for long periods of time.   9. Do not get wounds wet in bath or shower unless otherwise instructed by your physician. If your wound is on your foot or leg, you may purchase a cast bag. Please ask at the pharmacy.      If Vascular testing is ordered, please call [phone] to schedule.    Vascular tests ordered by Wound Care Physicians may take up to 2 hours to complete. Please keep that in mind when scheduling.     If Vascular testing is scheduled, please bring supplies to replace your dressing after testing is done. The vascular department does not stock supplies.     Wound: buttock     With each dressing change, rinse wounds with 0.9% Saline. (May use wound wash or soft contact solution. Both can be purchased at a local drug store). If unable to obtain saline, may use a gentle soap and water.    Dressing care: Keep pressure off of area. Rotate frequently. Continue what you are using - Medihoney gel,. Dry dressing. Change 3 times a week. In

## 2024-07-31 ENCOUNTER — HOSPITAL ENCOUNTER (OUTPATIENT)
Dept: WOUND CARE | Age: 79
Discharge: HOME | End: 2024-07-31
Attending: SURGERY
Payer: MEDICARE

## 2024-07-31 VITALS
HEART RATE: 62 BPM | TEMPERATURE: 97 F | RESPIRATION RATE: 16 BRPM | DIASTOLIC BLOOD PRESSURE: 58 MMHG | SYSTOLIC BLOOD PRESSURE: 114 MMHG

## 2024-07-31 DIAGNOSIS — L89.153 SACRAL DECUBITUS ULCER, STAGE III (HCC): Primary | ICD-10-CM

## 2024-07-31 PROCEDURE — 11042 DBRDMT SUBQ TIS 1ST 20SQCM/<: CPT | Performed by: SURGERY

## 2024-07-31 PROCEDURE — 11042 DBRDMT SUBQ TIS 1ST 20SQCM/<: CPT

## 2024-07-31 PROCEDURE — 99213 OFFICE O/P EST LOW 20 MIN: CPT

## 2024-07-31 RX ORDER — CLOBETASOL PROPIONATE 0.5 MG/G
OINTMENT TOPICAL ONCE
OUTPATIENT
Start: 2024-07-31 | End: 2024-07-31

## 2024-07-31 RX ORDER — BACITRACIN ZINC AND POLYMYXIN B SULFATE 500; 1000 [USP'U]/G; [USP'U]/G
OINTMENT TOPICAL ONCE
OUTPATIENT
Start: 2024-07-31 | End: 2024-07-31

## 2024-07-31 RX ORDER — LIDOCAINE 40 MG/G
CREAM TOPICAL ONCE
OUTPATIENT
Start: 2024-07-31 | End: 2024-07-31

## 2024-07-31 RX ORDER — GINSENG 100 MG
CAPSULE ORAL ONCE
OUTPATIENT
Start: 2024-07-31 | End: 2024-07-31

## 2024-07-31 RX ORDER — LIDOCAINE HYDROCHLORIDE 20 MG/ML
JELLY TOPICAL ONCE
OUTPATIENT
Start: 2024-07-31 | End: 2024-07-31

## 2024-07-31 RX ORDER — BETAMETHASONE DIPROPIONATE 0.5 MG/G
CREAM TOPICAL ONCE
OUTPATIENT
Start: 2024-07-31 | End: 2024-07-31

## 2024-07-31 RX ORDER — TRIAMCINOLONE ACETONIDE 1 MG/G
OINTMENT TOPICAL ONCE
OUTPATIENT
Start: 2024-07-31 | End: 2024-07-31

## 2024-07-31 RX ORDER — GENTAMICIN SULFATE 1 MG/G
OINTMENT TOPICAL ONCE
OUTPATIENT
Start: 2024-07-31 | End: 2024-07-31

## 2024-07-31 RX ORDER — LIDOCAINE 50 MG/G
OINTMENT TOPICAL ONCE
OUTPATIENT
Start: 2024-07-31 | End: 2024-07-31

## 2024-07-31 RX ORDER — LIDOCAINE HYDROCHLORIDE 40 MG/ML
SOLUTION TOPICAL ONCE
OUTPATIENT
Start: 2024-07-31 | End: 2024-07-31

## 2024-07-31 RX ORDER — SODIUM CHLOR/HYPOCHLOROUS ACID 0.033 %
SOLUTION, IRRIGATION IRRIGATION ONCE
OUTPATIENT
Start: 2024-07-31 | End: 2024-07-31

## 2024-07-31 RX ORDER — IBUPROFEN 200 MG
TABLET ORAL ONCE
OUTPATIENT
Start: 2024-07-31 | End: 2024-07-31

## 2024-07-31 NOTE — PLAN OF CARE
Replaced by [facility] Fax # [phone]        Patient Instructions     [facility]  [address]   [place], [place] [address]  Telephone: [phone]     FAX [phone]    Discharge Instructions    Important reminders:    **If you have any signs and symptoms of illness (Cough, fever, congestion, nausea, vomiting, diarrhea, etc.) please call the wound care center prior to your appointment.    1. Increase Protein intake for optimal wound healing  2. No added salt to reduce any swelling  3. If diabetic, maintain good glucose control  4. If you smoke, smoking prohibits wound healing, we ask that you refrain from smoking.  5. When taking antibiotics take the entire prescription as ordered. Do not stop taking until medication is all gone unless otherwise instructed.   6. Exercise as tolerated.   7. Keep weight off wounds and reposition every 2 hours if applicable.  8. If wound(s) is on your lower extremity, elevate legs to the level of the heart or above for 30 minutes 4-5 times a day and/or when sitting. Avoid standing for long periods of time.   9. Do not get wounds wet in bath or shower unless otherwise instructed by your physician. If your wound is on your foot or leg, you may purchase a cast bag. Please ask at the pharmacy.      If Vascular testing is ordered, please call [phone] to schedule.    Vascular tests ordered by Wound Care Physicians may take up to 2 hours to complete. Please keep that in mind when scheduling.     If Vascular testing is scheduled, please bring supplies to replace your dressing after testing is done. The vascular department does not stock supplies.     Wound: buttock     With each dressing change, rinse wounds with 0.9% Saline. (May use wound wash or soft contact solution. Both can be purchased at a local drug store). If unable to obtain saline, may use a gentle soap and water.    Dressing care: Keep pressure off of area. Rotate frequently. Continue what you are using - 
Discharge instructions given.  Patient verbalized understanding.  Return to [facility] in 1 week(s).  Called/faxed orders to  [facility]      
None

## 2024-07-31 NOTE — PROGRESS NOTES
[facility] Progress and Procedure Note    Pacheco Prince Sr.  AGE: 79 y.o.     GENDER: male    : 1945  TODAY'S DATE: 2024    Chief Complaint   Patient presents with    Wound Check     buttocks        History of Present Illness     Pacheco Prince Sr. is a 79 y.o. male who presents today for wound evaluation.   History of Wound and Wound Type: pressure and diabetic wound located on the sacral decubitus ulcer stage 3 .   Wound Pain:  mild  Severity: 2/10   Modifying Factors:  diabetes, chronic pressure, and decreased mobility  Associated Signs/Symptoms:  pain    Past Medical History:   Diagnosis Date    Acute bronchitis     history of    CAD (coronary artery disease)     Cancer (HCC)     melanoma    Clostridioides difficile infection 2020    Diabetes mellitus (HCC) 2022    6.6 A1C    Erectile dysfunction     GERD (gastroesophageal reflux disease)     gerd    Hyperlipidemia     Obesity     Parkinson disease (HCC)      Past Surgical History:   Procedure Laterality Date    CATARACT EXTRACTION      2021    CORONARY ANGIOPLASTY WITH STENT PLACEMENT  2005    [facility]    DIAGNOSTIC CARDIAC CATH LAB PROCEDURE  2005        EYE SURGERY  2021    cataracts    PTCA  2002    TONSILLECTOMY  [facility]    UPPER GASTROINTESTINAL ENDOSCOPY      UPPER GASTROINTESTINAL ENDOSCOPY N/A 3/8/2024    ESOPHAGOGASTRODUODENOSCOPY PERCUTANEOUS ENDOSCOPIC GASTROSTOMY TUBE PLACEMENT performed by Luke Douglas MD at [facility] ENDOSCOPY    UPPER GASTROINTESTINAL ENDOSCOPY N/A 2024    ESOPHAGOGASTRODUODENOSCOPY WITH PERCUTANEOUS ENDOSCOPIC GASTROSTOMY performed by Luke Douglas MD at [facility] ENDOSCOPY     Current Outpatient Medications   Medication Sig Dispense Refill    midodrine (PROAMATINE) 5 MG tablet TAKE 1 TABLET THREE TIMES DAILY 270 tablet 3    hydrOXYzine HCl (ATARAX) 10 MG tablet Take 1 tablet by mouth 3 times daily as needed for

## 2024-08-07 ENCOUNTER — HOSPITAL ENCOUNTER (OUTPATIENT)
Dept: WOUND CARE | Age: 79
Discharge: HOME | End: 2024-08-07
Attending: SURGERY
Payer: MEDICARE

## 2024-08-07 VITALS — SYSTOLIC BLOOD PRESSURE: 86 MMHG | HEART RATE: 68 BPM | TEMPERATURE: 95.3 F | DIASTOLIC BLOOD PRESSURE: 35 MMHG

## 2024-08-07 DIAGNOSIS — L89.153 SACRAL DECUBITUS ULCER, STAGE III (HCC): Primary | ICD-10-CM

## 2024-08-07 PROCEDURE — 11042 DBRDMT SUBQ TIS 1ST 20SQCM/<: CPT | Performed by: SURGERY

## 2024-08-07 PROCEDURE — 11042 DBRDMT SUBQ TIS 1ST 20SQCM/<: CPT

## 2024-08-07 RX ORDER — LIDOCAINE 40 MG/G
CREAM TOPICAL ONCE
OUTPATIENT
Start: 2024-08-07 | End: 2024-08-07

## 2024-08-07 RX ORDER — LIDOCAINE 40 MG/G
CREAM TOPICAL ONCE
Status: DISCONTINUED | OUTPATIENT
Start: 2024-08-07 | End: 2024-08-08 | Stop reason: HOSPADM

## 2024-08-07 RX ORDER — TRIAMCINOLONE ACETONIDE 1 MG/G
OINTMENT TOPICAL ONCE
OUTPATIENT
Start: 2024-08-07 | End: 2024-08-07

## 2024-08-07 RX ORDER — GINSENG 100 MG
CAPSULE ORAL ONCE
OUTPATIENT
Start: 2024-08-07 | End: 2024-08-07

## 2024-08-07 RX ORDER — LIDOCAINE HYDROCHLORIDE 20 MG/ML
JELLY TOPICAL ONCE
OUTPATIENT
Start: 2024-08-07 | End: 2024-08-07

## 2024-08-07 RX ORDER — LIDOCAINE 50 MG/G
OINTMENT TOPICAL ONCE
OUTPATIENT
Start: 2024-08-07 | End: 2024-08-07

## 2024-08-07 RX ORDER — SODIUM CHLOR/HYPOCHLOROUS ACID 0.033 %
SOLUTION, IRRIGATION IRRIGATION ONCE
OUTPATIENT
Start: 2024-08-07 | End: 2024-08-07

## 2024-08-07 RX ORDER — CLOBETASOL PROPIONATE 0.5 MG/G
OINTMENT TOPICAL ONCE
OUTPATIENT
Start: 2024-08-07 | End: 2024-08-07

## 2024-08-07 RX ORDER — GENTAMICIN SULFATE 1 MG/G
OINTMENT TOPICAL ONCE
OUTPATIENT
Start: 2024-08-07 | End: 2024-08-07

## 2024-08-07 RX ORDER — IBUPROFEN 200 MG
TABLET ORAL ONCE
OUTPATIENT
Start: 2024-08-07 | End: 2024-08-07

## 2024-08-07 RX ORDER — BETAMETHASONE DIPROPIONATE 0.5 MG/G
CREAM TOPICAL ONCE
OUTPATIENT
Start: 2024-08-07 | End: 2024-08-07

## 2024-08-07 RX ORDER — BACITRACIN ZINC AND POLYMYXIN B SULFATE 500; 1000 [USP'U]/G; [USP'U]/G
OINTMENT TOPICAL ONCE
OUTPATIENT
Start: 2024-08-07 | End: 2024-08-07

## 2024-08-07 RX ORDER — LIDOCAINE HYDROCHLORIDE 40 MG/ML
SOLUTION TOPICAL ONCE
OUTPATIENT
Start: 2024-08-07 | End: 2024-08-07

## 2024-08-07 NOTE — PATIENT INSTRUCTIONS
[facility]  [address]   [place], [place] [address]  Telephone: [phone]     FAX [phone]    Discharge Instructions    Important reminders:    **If you have any signs and symptoms of illness (Cough, fever, congestion, nausea, vomiting, diarrhea, etc.) please call the wound care center prior to your appointment.    1. Increase Protein intake for optimal wound healing  2. No added salt to reduce any swelling  3. If diabetic, maintain good glucose control  4. If you smoke, smoking prohibits wound healing, we ask that you refrain from smoking.  5. When taking antibiotics take the entire prescription as ordered. Do not stop taking until medication is all gone unless otherwise instructed.   6. Exercise as tolerated.   7. Keep weight off wounds and reposition every 2 hours if applicable.  8. If wound(s) is on your lower extremity, elevate legs to the level of the heart or above for 30 minutes 4-5 times a day and/or when sitting. Avoid standing for long periods of time.   9. Do not get wounds wet in bath or shower unless otherwise instructed by your physician. If your wound is on your foot or leg, you may purchase a cast bag. Please ask at the pharmacy.      If Vascular testing is ordered, please call [phone] to schedule.    Vascular tests ordered by Wound Care Physicians may take up to 2 hours to complete. Please keep that in mind when scheduling.     If Vascular testing is scheduled, please bring supplies to replace your dressing after testing is done. The vascular department does not stock supplies.     Wound: buttock     With each dressing change, rinse wounds with 0.9% Saline. (May use wound wash or soft contact solution. Both can be purchased at a local drug store). If unable to obtain saline, may use a gentle soap and water.    Dressing care: Keep pressure off of area. Rotate frequently. Medihoney gel, Dry dressing. Change 3 times a week. In office, can use [employer] [facility]

## 2024-08-07 NOTE — PROGRESS NOTES
[facility] Progress and Procedure Note    Pacheco Prince Sr.  AGE: 79 y.o.     GENDER: male    : 1945  TODAY'S DATE: 2024    Chief Complaint   Patient presents with    Wound Check     Buttock  F/U        History of Present Illness     Pacheco Prince Sr. is a 79 y.o. male who presents today for wound evaluation.   History of Wound and Wound Type: pressure and diabetic wound located on the sacral decubitus ulcer stage 3 .   Wound Pain:  mild  Severity: 2/10   Modifying Factors:  diabetes, chronic pressure, and decreased mobility  Associated Signs/Symptoms:  pain    Past Medical History:   Diagnosis Date    Acute bronchitis     history of    CAD (coronary artery disease)     Cancer (HCC)     melanoma    Clostridioides difficile infection 2020    Diabetes mellitus (HCC) 2022    6.6 A1C    Erectile dysfunction     GERD (gastroesophageal reflux disease)     gerd    Hyperlipidemia     Obesity     Parkinson disease (HCC)      Past Surgical History:   Procedure Laterality Date    CATARACT EXTRACTION      2021    CORONARY ANGIOPLASTY WITH STENT PLACEMENT  2005    Germania Ennis    DIAGNOSTIC CARDIAC CATH LAB PROCEDURE  2005        EYE SURGERY  2021    cataracts    PTCA  2002    TONSILLECTOMY  chilhood    UPPER GASTROINTESTINAL ENDOSCOPY      UPPER GASTROINTESTINAL ENDOSCOPY N/A 3/8/2024    ESOPHAGOGASTRODUODENOSCOPY PERCUTANEOUS ENDOSCOPIC GASTROSTOMY TUBE PLACEMENT performed by Luke Douglas MD at [facility] ENDOSCOPY    UPPER GASTROINTESTINAL ENDOSCOPY N/A 2024    ESOPHAGOGASTRODUODENOSCOPY WITH PERCUTANEOUS ENDOSCOPIC GASTROSTOMY performed by Luke Douglas MD at [facility] ENDOSCOPY     Current Outpatient Medications   Medication Sig Dispense Refill    midodrine (PROAMATINE) 5 MG tablet TAKE 1 TABLET THREE TIMES DAILY 270 tablet 3    hydrOXYzine HCl (ATARAX) 10 MG tablet Take 1 tablet by mouth 3 times daily as needed for

## 2024-08-07 NOTE — PLAN OF CARE
Discharge instructions given.  Patient verbalized understanding.  Return to [facility] in 1 week(s).

## 2024-08-12 ENCOUNTER — HOSPITAL ENCOUNTER (OUTPATIENT)
Age: 79
Setting detail: SPECIMEN
Discharge: HOME | End: 2024-08-12
Payer: MEDICARE

## 2024-08-12 LAB
BILIRUB UR QL STRIP.AUTO: NEGATIVE
CLARITY UR: CLEAR
COLOR UR: YELLOW
GLUCOSE UR STRIP.AUTO-MCNC: NEGATIVE MG/DL
HGB UR QL STRIP.AUTO: NEGATIVE
KETONES UR STRIP.AUTO-MCNC: ABNORMAL MG/DL
LEUKOCYTE ESTERASE UR QL STRIP.AUTO: NEGATIVE
NITRITE UR QL STRIP.AUTO: NEGATIVE
PH UR STRIP.AUTO: 6.5 [PH] (ref 5–8)
PROT UR STRIP.AUTO-MCNC: NEGATIVE MG/DL
SP GR UR STRIP.AUTO: 1.01 (ref 1–1.03)
UA DIPSTICK W REFLEX MICRO PNL UR: ABNORMAL
URN SPEC COLLECT METH UR: ABNORMAL
UROBILINOGEN UR STRIP-ACNC: 1 E.U./DL

## 2024-08-12 PROCEDURE — 81003 URINALYSIS AUTO W/O SCOPE: CPT

## 2024-08-12 PROCEDURE — 87086 URINE CULTURE/COLONY COUNT: CPT

## 2024-08-13 LAB — BACTERIA UR CULT: NORMAL

## 2024-08-13 NOTE — PATIENT INSTRUCTIONS
Turn & reposition every 1-2 hours and as needed for pressure relief and comfort.     Home Care Agency[facility]    Your wound-care supplies will be provided by: [facility]  Please note, depending on your insurance coverage, you may have out-of-pocket expenses for these supplies. Someone from the company should call you to confirm your order and discuss those potential costs before they ship your products -- please anticipate that call. If your out-of-pocket cost could be substantial, Many companies have financial hardship programs for patients who qualify, so please ask about that if you might need a hand. If you have any questions about your supplies or your potential out-of-pocket costs, or if you need to place an order for a refill of supplies (typically monthly), please call the company directly.     Your  is Saadia Alicea up with Dr De Jesus In 1 week(s) in the wound care center.       Wound Care Center Information: Should you experience any significant changes in your wound(s) or have questions about your wound care, please contact the [facility] at [phone] Monday  - Thursday 8:00 am - 4:00 pm and Friday 8:00 am - 1:00pm. If you need help with your wound outside these hours and cannot wait until we are again available, contact your PCP or go to the hospital emergency room.     PLEASE NOTE: IF YOU ARE UNABLE TO OBTAIN WOUND SUPPLIES, CONTINUE TO USE THE SUPPLIES YOU HAVE AVAILABLE UNTIL YOU ARE ABLE TO REACH US. IT IS MOST IMPORTANT TO KEEP THE WOUND COVERED AT ALL TIMES.    Patient Experience    Thank you for trusting us with your care.  You may receive a survey from a company called [employer] asking for your feedback.  We would appreciate it if you took a few minutes to share your experience.  Your input is very valuable to us.

## 2024-08-14 ENCOUNTER — HOSPITAL ENCOUNTER (OUTPATIENT)
Dept: WOUND CARE | Age: 79
Discharge: HOME | End: 2024-08-14
Attending: SURGERY
Payer: MEDICARE

## 2024-08-14 DIAGNOSIS — L89.153 SACRAL DECUBITUS ULCER, STAGE III (HCC): Primary | ICD-10-CM

## 2024-08-14 PROCEDURE — 11042 DBRDMT SUBQ TIS 1ST 20SQCM/<: CPT

## 2024-08-14 PROCEDURE — 11042 DBRDMT SUBQ TIS 1ST 20SQCM/<: CPT | Performed by: SURGERY

## 2024-08-14 RX ORDER — LIDOCAINE 40 MG/G
CREAM TOPICAL ONCE
OUTPATIENT
Start: 2024-08-14 | End: 2024-08-14

## 2024-08-14 RX ORDER — LIDOCAINE HYDROCHLORIDE 20 MG/ML
JELLY TOPICAL ONCE
OUTPATIENT
Start: 2024-08-14 | End: 2024-08-14

## 2024-08-14 RX ORDER — CLOBETASOL PROPIONATE 0.5 MG/G
OINTMENT TOPICAL ONCE
OUTPATIENT
Start: 2024-08-14 | End: 2024-08-14

## 2024-08-14 RX ORDER — LIDOCAINE HYDROCHLORIDE 40 MG/ML
SOLUTION TOPICAL ONCE
OUTPATIENT
Start: 2024-08-14 | End: 2024-08-14

## 2024-08-14 RX ORDER — SODIUM CHLOR/HYPOCHLOROUS ACID 0.033 %
SOLUTION, IRRIGATION IRRIGATION ONCE
OUTPATIENT
Start: 2024-08-14 | End: 2024-08-14

## 2024-08-14 RX ORDER — BETAMETHASONE DIPROPIONATE 0.5 MG/G
CREAM TOPICAL ONCE
OUTPATIENT
Start: 2024-08-14 | End: 2024-08-14

## 2024-08-14 RX ORDER — TRIAMCINOLONE ACETONIDE 1 MG/G
OINTMENT TOPICAL ONCE
OUTPATIENT
Start: 2024-08-14 | End: 2024-08-14

## 2024-08-14 RX ORDER — LIDOCAINE 50 MG/G
OINTMENT TOPICAL ONCE
OUTPATIENT
Start: 2024-08-14 | End: 2024-08-14

## 2024-08-14 RX ORDER — BACITRACIN ZINC AND POLYMYXIN B SULFATE 500; 1000 [USP'U]/G; [USP'U]/G
OINTMENT TOPICAL ONCE
OUTPATIENT
Start: 2024-08-14 | End: 2024-08-14

## 2024-08-14 RX ORDER — GINSENG 100 MG
CAPSULE ORAL ONCE
OUTPATIENT
Start: 2024-08-14 | End: 2024-08-14

## 2024-08-14 RX ORDER — IBUPROFEN 200 MG
TABLET ORAL ONCE
OUTPATIENT
Start: 2024-08-14 | End: 2024-08-14

## 2024-08-14 RX ORDER — GENTAMICIN SULFATE 1 MG/G
OINTMENT TOPICAL ONCE
OUTPATIENT
Start: 2024-08-14 | End: 2024-08-14

## 2024-08-14 RX ORDER — LIDOCAINE 40 MG/G
CREAM TOPICAL ONCE
Status: DISCONTINUED | OUTPATIENT
Start: 2024-08-14 | End: 2024-08-15 | Stop reason: HOSPADM

## 2024-08-14 NOTE — PROGRESS NOTES
cm^2 08/14/24 1032   Change in Wound Size % (l*w) 18.18 08/14/24 1032   Wound Volume (cm^3) 0.153 cm^3 08/14/24 1032   Wound Healing % 18 08/14/24 1032   Post-Procedure Length (cm) 1.7 cm 08/14/24 1043   Post-Procedure Width (cm) 0.9 cm 08/14/24 1043   Post-Procedure Depth (cm) 0.1 cm 08/14/24 1043   Post-Procedure Surface Area (cm^2) 1.53 cm^2 08/14/24 1043   Post-Procedure Volume (cm^3) 0.153 cm^3 08/14/24 1043   Wound Assessment Bleeding 08/14/24 1043   Drainage Amount Moderate (25-50%) 08/14/24 1043   Drainage Description Yellow 08/14/24 1032   Odor None 08/14/24 1032   Keli-wound Assessment Intact 08/14/24 1032   Margins Attached edges;Defined edges 08/14/24 1032   Number of days: 14       Percent of Wound(s)/Ulcer(s) Debrided: 100 %    Total Surface Area Debrided:  1.53 sq cm     Bleeding: Minimal    Hemostasis Achieved: Pressure    Pre Procedural Pain:  2/10     Post Procedural Pain: 2/10     Response to treatment: Well tolerated by patient    Assessment:     Wound looks Improved    Patient tolerated procedure well and was given proper instruction.    The nature of the patient's condition was explained in depth. The patient was informed that their compliance to the treatment plan is paramount to successful healing and prevention of further ulceration and/or infection     Plan:     Treatment Plan:       Treatment Note please see attached Discharge Instructions    Written patient dismissal instructions given to patient and signed by patient or POA.         Patient Instructions     [facility]  [address], [place] [address]  Telephone: [phone]     FAX [phone]    Discharge Instructions    Important reminders:    **If you have any signs and symptoms of illness (Cough, fever, congestion, nausea, vomiting, diarrhea, etc.) please call the wound care center prior to your appointment.    1. Increase Protein intake for optimal wound healing  2. No added salt to reduce any

## 2024-08-19 ENCOUNTER — HOSPITAL ENCOUNTER (OUTPATIENT)
Age: 79
Setting detail: SPECIMEN
Discharge: HOME | End: 2024-08-19
Payer: MEDICARE

## 2024-08-19 LAB
BASOPHILS # BLD: 0 K/UL (ref 0–0.2)
BASOPHILS NFR BLD: 0.4 %
DEPRECATED RDW RBC AUTO: 14.4 % (ref 12.4–15.4)
EOSINOPHIL # BLD: 0.2 K/UL (ref 0–0.6)
EOSINOPHIL NFR BLD: 2.4 %
HCT VFR BLD AUTO: 41.2 % (ref 40.5–52.5)
HGB BLD-MCNC: 13.4 G/DL (ref 13.5–17.5)
LYMPHOCYTES # BLD: 1 K/UL (ref 1–5.1)
LYMPHOCYTES NFR BLD: 13.4 %
MCH RBC QN AUTO: 29.5 PG (ref 26–34)
MCHC RBC AUTO-ENTMCNC: 32.5 G/DL (ref 31–36)
MCV RBC AUTO: 90.7 FL (ref 80–100)
MONOCYTES # BLD: 0.7 K/UL (ref 0–1.3)
MONOCYTES NFR BLD: 9 %
NEUTROPHILS # BLD: 5.6 K/UL (ref 1.7–7.7)
NEUTROPHILS NFR BLD: 74.8 %
PLATELET # BLD AUTO: 277 K/UL (ref 135–450)
PMV BLD AUTO: 7.9 FL (ref 5–10.5)
RBC # BLD AUTO: 4.54 M/UL (ref 4.2–5.9)
WBC # BLD AUTO: 7.5 K/UL (ref 4–11)

## 2024-08-19 PROCEDURE — 36415 COLL VENOUS BLD VENIPUNCTURE: CPT

## 2024-08-19 PROCEDURE — 85025 COMPLETE CBC W/AUTO DIFF WBC: CPT

## 2024-08-19 NOTE — PATIENT INSTRUCTIONS
[facility]  [address]   [place], [place] [address]  Telephone: [phone]     FAX [phone]    Discharge Instructions    Important reminders:    **If you have any signs and symptoms of illness (Cough, fever, congestion, nausea, vomiting, diarrhea, etc.) please call the wound care center prior to your appointment.    1. Increase Protein intake for optimal wound healing  2. No added salt to reduce any swelling  3. If diabetic, maintain good glucose control  4. If you smoke, smoking prohibits wound healing, we ask that you refrain from smoking.  5. When taking antibiotics take the entire prescription as ordered. Do not stop taking until medication is all gone unless otherwise instructed.   6. Exercise as tolerated.   7. Keep weight off wounds and reposition every 2 hours if applicable.  8. If wound(s) is on your lower extremity, elevate legs to the level of the heart or above for 30 minutes 4-5 times a day and/or when sitting. Avoid standing for long periods of time.   9. Do not get wounds wet in bath or shower unless otherwise instructed by your physician. If your wound is on your foot or leg, you may purchase a cast bag. Please ask at the pharmacy.      If Vascular testing is ordered, please call [phone] to schedule.    Vascular tests ordered by Wound Care Physicians may take up to 2 hours to complete. Please keep that in mind when scheduling.     If Vascular testing is scheduled, please bring supplies to replace your dressing after testing is done. The vascular department does not stock supplies.     Wound: buttock     With each dressing change, rinse wounds with 0.9% Saline. (May use wound wash or soft contact solution. Both can be purchased at a local drug store). If unable to obtain saline, may use a gentle soap and water.    Dressing care: Keep pressure off of area. Rotate frequently. Medihoney gel, Dry dressing. Change 3 times a week. In office, can use [employer]. Turn &

## 2024-08-21 ENCOUNTER — HOSPITAL ENCOUNTER (OUTPATIENT)
Dept: WOUND CARE | Age: 79
Discharge: HOME | End: 2024-08-21
Attending: SURGERY
Payer: MEDICARE

## 2024-08-21 DIAGNOSIS — L89.153 SACRAL DECUBITUS ULCER, STAGE III (HCC): Primary | ICD-10-CM

## 2024-08-21 PROCEDURE — 11042 DBRDMT SUBQ TIS 1ST 20SQCM/<: CPT

## 2024-08-21 PROCEDURE — 11042 DBRDMT SUBQ TIS 1ST 20SQCM/<: CPT | Performed by: SURGERY

## 2024-08-21 RX ORDER — SODIUM CHLOR/HYPOCHLOROUS ACID 0.033 %
SOLUTION, IRRIGATION IRRIGATION ONCE
OUTPATIENT
Start: 2024-08-21 | End: 2024-08-21

## 2024-08-21 RX ORDER — LIDOCAINE 40 MG/G
CREAM TOPICAL ONCE
Status: DISCONTINUED | OUTPATIENT
Start: 2024-08-21 | End: 2024-08-22 | Stop reason: HOSPADM

## 2024-08-21 RX ORDER — BETAMETHASONE DIPROPIONATE 0.5 MG/G
CREAM TOPICAL ONCE
OUTPATIENT
Start: 2024-08-21 | End: 2024-08-21

## 2024-08-21 RX ORDER — GINSENG 100 MG
CAPSULE ORAL ONCE
OUTPATIENT
Start: 2024-08-21 | End: 2024-08-21

## 2024-08-21 RX ORDER — LIDOCAINE HYDROCHLORIDE 20 MG/ML
JELLY TOPICAL ONCE
OUTPATIENT
Start: 2024-08-21 | End: 2024-08-21

## 2024-08-21 RX ORDER — IBUPROFEN 200 MG
TABLET ORAL ONCE
OUTPATIENT
Start: 2024-08-21 | End: 2024-08-21

## 2024-08-21 RX ORDER — TRIAMCINOLONE ACETONIDE 1 MG/G
OINTMENT TOPICAL ONCE
OUTPATIENT
Start: 2024-08-21 | End: 2024-08-21

## 2024-08-21 RX ORDER — BACITRACIN ZINC AND POLYMYXIN B SULFATE 500; 1000 [USP'U]/G; [USP'U]/G
OINTMENT TOPICAL ONCE
OUTPATIENT
Start: 2024-08-21 | End: 2024-08-21

## 2024-08-21 RX ORDER — CLOBETASOL PROPIONATE 0.5 MG/G
OINTMENT TOPICAL ONCE
OUTPATIENT
Start: 2024-08-21 | End: 2024-08-21

## 2024-08-21 RX ORDER — GENTAMICIN SULFATE 1 MG/G
OINTMENT TOPICAL ONCE
OUTPATIENT
Start: 2024-08-21 | End: 2024-08-21

## 2024-08-21 RX ORDER — LIDOCAINE HYDROCHLORIDE 40 MG/ML
SOLUTION TOPICAL ONCE
OUTPATIENT
Start: 2024-08-21 | End: 2024-08-21

## 2024-08-21 RX ORDER — LIDOCAINE 40 MG/G
CREAM TOPICAL ONCE
OUTPATIENT
Start: 2024-08-21 | End: 2024-08-21

## 2024-08-21 RX ORDER — LIDOCAINE 50 MG/G
OINTMENT TOPICAL ONCE
OUTPATIENT
Start: 2024-08-21 | End: 2024-08-21

## 2024-08-21 NOTE — PLAN OF CARE
Discharge instructions given.  Patient verbalized understanding.  Return to [facility] in 1 week(s).

## 2024-08-21 NOTE — PROGRESS NOTES
any swelling  3. If diabetic, maintain good glucose control  4. If you smoke, smoking prohibits wound healing, we ask that you refrain from smoking.  5. When taking antibiotics take the entire prescription as ordered. Do not stop taking until medication is all gone unless otherwise instructed.   6. Exercise as tolerated.   7. Keep weight off wounds and reposition every 2 hours if applicable.  8. If wound(s) is on your lower extremity, elevate legs to the level of the heart or above for 30 minutes 4-5 times a day and/or when sitting. Avoid standing for long periods of time.   9. Do not get wounds wet in bath or shower unless otherwise instructed by your physician. If your wound is on your foot or leg, you may purchase a cast bag. Please ask at the pharmacy.      If Vascular testing is ordered, please call [phone] to schedule.    Vascular tests ordered by Wound Care Physicians may take up to 2 hours to complete. Please keep that in mind when scheduling.     If Vascular testing is scheduled, please bring supplies to replace your dressing after testing is done. The vascular department does not stock supplies.     Wound: buttock     With each dressing change, rinse wounds with 0.9% Saline. (May use wound wash or soft contact solution. Both can be purchased at a local drug store). If unable to obtain saline, may use a gentle soap and water.    Dressing care: Keep pressure off of area. Rotate frequently. Medihoney gel, Dry dressing. Change 3 times a week. In office, can use [employer]. Turn & reposition every 1-2 hours and as needed for pressure relief and comfort.     Home Care Agency/Facility: [facility]    Your wound-care supplies will be provided by: [facility]  Please note, depending on your insurance coverage, you may have out-of-pocket expenses for these supplies. Someone from the company should call you to confirm your order and discuss those potential costs before they ship your products -- please anticipate that call. If

## 2024-08-27 ENCOUNTER — APPOINTMENT (OUTPATIENT)
Dept: CT IMAGING | Age: 79
DRG: 871 | End: 2024-08-27
Payer: MEDICARE

## 2024-08-27 ENCOUNTER — HOSPITAL ENCOUNTER (INPATIENT)
Age: 79
LOS: 1 days | Discharge: HOSPICE | DRG: 871 | End: 2024-08-29
Attending: EMERGENCY MEDICINE | Admitting: INTERNAL MEDICINE
Payer: MEDICARE

## 2024-08-27 ENCOUNTER — APPOINTMENT (OUTPATIENT)
Dept: GENERAL RADIOLOGY | Age: 79
DRG: 871 | End: 2024-08-27
Payer: MEDICARE

## 2024-08-27 DIAGNOSIS — R40.4 TRANSIENT ALTERATION OF AWARENESS: ICD-10-CM

## 2024-08-27 DIAGNOSIS — F02.811 DEMENTIA DUE TO PARKINSON'S DISEASE, WITH AGITATION, UNSPECIFIED DEMENTIA SEVERITY (HCC): Primary | ICD-10-CM

## 2024-08-27 DIAGNOSIS — G20.A1 DEMENTIA DUE TO PARKINSON'S DISEASE, WITH AGITATION, UNSPECIFIED DEMENTIA SEVERITY (HCC): Primary | ICD-10-CM

## 2024-08-27 LAB
ALBUMIN SERPL-MCNC: 4.1 G/DL (ref 3.4–5)
ALBUMIN/GLOB SERPL: 1.6 {RATIO} (ref 1.1–2.2)
ALP SERPL-CCNC: 114 U/L (ref 40–129)
ALT SERPL-CCNC: <5 U/L (ref 10–40)
ANION GAP SERPL CALCULATED.3IONS-SCNC: 11 MMOL/L (ref 3–16)
AST SERPL-CCNC: 12 U/L (ref 15–37)
BASOPHILS # BLD: 0.1 K/UL (ref 0–0.2)
BASOPHILS NFR BLD: 0.7 %
BILIRUB SERPL-MCNC: 0.4 MG/DL (ref 0–1)
BILIRUB UR QL STRIP.AUTO: NEGATIVE
BUN SERPL-MCNC: 19 MG/DL (ref 7–20)
CALCIUM SERPL-MCNC: 8.9 MG/DL (ref 8.3–10.6)
CHLORIDE SERPL-SCNC: 100 MMOL/L (ref 99–110)
CLARITY UR: CLEAR
CO2 SERPL-SCNC: 25 MMOL/L (ref 21–32)
COLOR UR: YELLOW
CREAT SERPL-MCNC: <0.5 MG/DL (ref 0.8–1.3)
DEPRECATED RDW RBC AUTO: 14.6 % (ref 12.4–15.4)
EOSINOPHIL # BLD: 0.2 K/UL (ref 0–0.6)
EOSINOPHIL NFR BLD: 2.4 %
GFR SERPLBLD CREATININE-BSD FMLA CKD-EPI: >90 ML/MIN/{1.73_M2}
GLUCOSE BLD-MCNC: 90 MG/DL (ref 70–99)
GLUCOSE SERPL-MCNC: 105 MG/DL (ref 70–99)
GLUCOSE UR STRIP.AUTO-MCNC: NEGATIVE MG/DL
HCT VFR BLD AUTO: 39.4 % (ref 40.5–52.5)
HGB BLD-MCNC: 12.7 G/DL (ref 13.5–17.5)
HGB UR QL STRIP.AUTO: NEGATIVE
KETONES UR STRIP.AUTO-MCNC: 15 MG/DL
LEUKOCYTE ESTERASE UR QL STRIP.AUTO: NEGATIVE
LYMPHOCYTES # BLD: 0.7 K/UL (ref 1–5.1)
LYMPHOCYTES NFR BLD: 9.6 %
MCH RBC QN AUTO: 29.6 PG (ref 26–34)
MCHC RBC AUTO-ENTMCNC: 32.3 G/DL (ref 31–36)
MCV RBC AUTO: 91.7 FL (ref 80–100)
MONOCYTES # BLD: 0.9 K/UL (ref 0–1.3)
MONOCYTES NFR BLD: 11.7 %
NEUTROPHILS # BLD: 5.8 K/UL (ref 1.7–7.7)
NEUTROPHILS NFR BLD: 75.6 %
NITRITE UR QL STRIP.AUTO: NEGATIVE
PERFORMED ON: NORMAL
PH UR STRIP.AUTO: 6 [PH] (ref 5–8)
PLATELET # BLD AUTO: 260 K/UL (ref 135–450)
PMV BLD AUTO: 7.9 FL (ref 5–10.5)
POTASSIUM SERPL-SCNC: 4.2 MMOL/L (ref 3.5–5.1)
PROT SERPL-MCNC: 6.7 G/DL (ref 6.4–8.2)
PROT UR STRIP.AUTO-MCNC: NEGATIVE MG/DL
RBC # BLD AUTO: 4.3 M/UL (ref 4.2–5.9)
SODIUM SERPL-SCNC: 136 MMOL/L (ref 136–145)
SP GR UR STRIP.AUTO: >=1.03 (ref 1–1.03)
UA COMPLETE W REFLEX CULTURE PNL UR: ABNORMAL
UA DIPSTICK W REFLEX MICRO PNL UR: ABNORMAL
URN SPEC COLLECT METH UR: ABNORMAL
UROBILINOGEN UR STRIP-ACNC: 0.2 E.U./DL
WBC # BLD AUTO: 7.7 K/UL (ref 4–11)

## 2024-08-27 PROCEDURE — 80053 COMPREHEN METABOLIC PANEL: CPT

## 2024-08-27 PROCEDURE — 6370000000 HC RX 637 (ALT 250 FOR IP): Performed by: EMERGENCY MEDICINE

## 2024-08-27 PROCEDURE — 82607 VITAMIN B-12: CPT

## 2024-08-27 PROCEDURE — 82533 TOTAL CORTISOL: CPT

## 2024-08-27 PROCEDURE — 71045 X-RAY EXAM CHEST 1 VIEW: CPT

## 2024-08-27 PROCEDURE — 82746 ASSAY OF FOLIC ACID SERUM: CPT

## 2024-08-27 PROCEDURE — 84443 ASSAY THYROID STIM HORMONE: CPT

## 2024-08-27 PROCEDURE — 81003 URINALYSIS AUTO W/O SCOPE: CPT

## 2024-08-27 PROCEDURE — 6360000002 HC RX W HCPCS

## 2024-08-27 PROCEDURE — 96376 TX/PRO/DX INJ SAME DRUG ADON: CPT

## 2024-08-27 PROCEDURE — 6360000002 HC RX W HCPCS: Performed by: EMERGENCY MEDICINE

## 2024-08-27 PROCEDURE — 85025 COMPLETE CBC W/AUTO DIFF WBC: CPT

## 2024-08-27 PROCEDURE — 96374 THER/PROPH/DIAG INJ IV PUSH: CPT

## 2024-08-27 PROCEDURE — 70450 CT HEAD/BRAIN W/O DYE: CPT

## 2024-08-27 RX ORDER — HALOPERIDOL 5 MG/ML
5 INJECTION INTRAMUSCULAR ONCE
Status: COMPLETED | OUTPATIENT
Start: 2024-08-27 | End: 2024-08-27

## 2024-08-27 RX ORDER — HALOPERIDOL 5 MG/ML
2.5 INJECTION INTRAMUSCULAR ONCE
Status: COMPLETED | OUTPATIENT
Start: 2024-08-27 | End: 2024-08-27

## 2024-08-27 RX ORDER — QUETIAPINE FUMARATE 25 MG/1
50 TABLET, FILM COATED ORAL ONCE
Status: COMPLETED | OUTPATIENT
Start: 2024-08-27 | End: 2024-08-27

## 2024-08-27 RX ADMIN — HALOPERIDOL LACTATE 2.5 MG: 5 INJECTION, SOLUTION INTRAMUSCULAR at 22:40

## 2024-08-27 RX ADMIN — HALOPERIDOL LACTATE 5 MG: 5 INJECTION, SOLUTION INTRAMUSCULAR at 21:36

## 2024-08-27 RX ADMIN — QUETIAPINE FUMARATE 50 MG: 25 TABLET ORAL at 21:09

## 2024-08-27 RX ADMIN — HALOPERIDOL LACTATE 5 MG: 5 INJECTION, SOLUTION INTRAMUSCULAR at 21:54

## 2024-08-27 ASSESSMENT — ENCOUNTER SYMPTOMS
BACK PAIN: 0
NAUSEA: 0
DIARRHEA: 0
BLOOD IN STOOL: 0
VOMITING: 0
CONSTIPATION: 0

## 2024-08-27 ASSESSMENT — PAIN - FUNCTIONAL ASSESSMENT: PAIN_FUNCTIONAL_ASSESSMENT: NONE - DENIES PAIN

## 2024-08-27 NOTE — ED NOTES
IV placed   Ambulated patient to and from bathroom with a standby assist   Urine and blood sent to lab at this time.      Porfirio Roche RN  08/27/24 9232
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
## 2024-08-28 ENCOUNTER — APPOINTMENT (OUTPATIENT)
Dept: GENERAL RADIOLOGY | Age: 79
DRG: 871 | End: 2024-08-28
Payer: MEDICARE

## 2024-08-28 PROBLEM — Z51.5 ENCOUNTER FOR PALLIATIVE CARE: Status: ACTIVE | Noted: 2024-08-28

## 2024-08-28 PROBLEM — R41.82 AMS (ALTERED MENTAL STATUS): Status: ACTIVE | Noted: 2024-08-28

## 2024-08-28 PROBLEM — F02.811: Status: ACTIVE | Noted: 2023-05-21

## 2024-08-28 LAB
ALBUMIN SERPL-MCNC: 3.6 G/DL (ref 3.4–5)
ALBUMIN/GLOB SERPL: 1.5 {RATIO} (ref 1.1–2.2)
ALP SERPL-CCNC: 73 U/L (ref 40–129)
ALT SERPL-CCNC: <5 U/L (ref 10–40)
ANION GAP SERPL CALCULATED.3IONS-SCNC: 12 MMOL/L (ref 3–16)
AST SERPL-CCNC: 25 U/L (ref 15–37)
BILIRUB SERPL-MCNC: 0.5 MG/DL (ref 0–1)
BUN SERPL-MCNC: 23 MG/DL (ref 7–20)
CALCIUM SERPL-MCNC: 8.5 MG/DL (ref 8.3–10.6)
CHLORIDE SERPL-SCNC: 106 MMOL/L (ref 99–110)
CK SERPL-CCNC: 1056 U/L (ref 39–308)
CK SERPL-CCNC: 648 U/L (ref 39–308)
CO2 SERPL-SCNC: 24 MMOL/L (ref 21–32)
CORTIS SERPL-MCNC: 16.4 UG/DL
CREAT SERPL-MCNC: 0.7 MG/DL (ref 0.8–1.3)
FOLATE SERPL-MCNC: 8.98 NG/ML (ref 4.78–24.2)
GFR SERPLBLD CREATININE-BSD FMLA CKD-EPI: >90 ML/MIN/{1.73_M2}
GLUCOSE BLD-MCNC: 111 MG/DL (ref 70–99)
GLUCOSE BLD-MCNC: 134 MG/DL (ref 70–99)
GLUCOSE SERPL-MCNC: 88 MG/DL (ref 70–99)
LACTATE BLDV-SCNC: 2.7 MMOL/L (ref 0.4–2)
LACTATE BLDV-SCNC: 3.3 MMOL/L (ref 0.4–2)
PERFORMED ON: ABNORMAL
PERFORMED ON: ABNORMAL
POTASSIUM SERPL-SCNC: 3.7 MMOL/L (ref 3.5–5.1)
PROT SERPL-MCNC: 6 G/DL (ref 6.4–8.2)
SODIUM SERPL-SCNC: 142 MMOL/L (ref 136–145)
TSH SERPL DL<=0.005 MIU/L-ACNC: 2.11 UIU/ML (ref 0.27–4.2)
VIT B12 SERPL-MCNC: 736 PG/ML (ref 211–911)

## 2024-08-28 PROCEDURE — APPNB45 APP NON BILLABLE 31-45 MINUTES

## 2024-08-28 PROCEDURE — 6360000002 HC RX W HCPCS

## 2024-08-28 PROCEDURE — 99285 EMERGENCY DEPT VISIT HI MDM: CPT

## 2024-08-28 PROCEDURE — 71045 X-RAY EXAM CHEST 1 VIEW: CPT

## 2024-08-28 PROCEDURE — 2000000000 HC ICU R&B

## 2024-08-28 PROCEDURE — 2580000003 HC RX 258

## 2024-08-28 PROCEDURE — 6360000002 HC RX W HCPCS: Performed by: HOSPITALIST

## 2024-08-28 PROCEDURE — 6370000000 HC RX 637 (ALT 250 FOR IP): Performed by: HOSPITALIST

## 2024-08-28 PROCEDURE — 6370000000 HC RX 637 (ALT 250 FOR IP): Performed by: NURSE PRACTITIONER

## 2024-08-28 PROCEDURE — 99291 CRITICAL CARE FIRST HOUR: CPT | Performed by: NURSE PRACTITIONER

## 2024-08-28 PROCEDURE — 6360000002 HC RX W HCPCS: Performed by: NURSE PRACTITIONER

## 2024-08-28 PROCEDURE — 31720 CLEARANCE OF AIRWAYS: CPT

## 2024-08-28 PROCEDURE — 36415 COLL VENOUS BLD VENIPUNCTURE: CPT

## 2024-08-28 PROCEDURE — 2580000003 HC RX 258: Performed by: HOSPITALIST

## 2024-08-28 PROCEDURE — 80053 COMPREHEN METABOLIC PANEL: CPT

## 2024-08-28 PROCEDURE — 82550 ASSAY OF CK (CPK): CPT

## 2024-08-28 PROCEDURE — 99223 1ST HOSP IP/OBS HIGH 75: CPT | Performed by: INTERNAL MEDICINE

## 2024-08-28 PROCEDURE — 2700000000 HC OXYGEN THERAPY PER DAY

## 2024-08-28 PROCEDURE — 94761 N-INVAS EAR/PLS OXIMETRY MLT: CPT

## 2024-08-28 PROCEDURE — 83605 ASSAY OF LACTIC ACID: CPT

## 2024-08-28 PROCEDURE — 6370000000 HC RX 637 (ALT 250 FOR IP)

## 2024-08-28 PROCEDURE — 87633 RESP VIRUS 12-25 TARGETS: CPT

## 2024-08-28 PROCEDURE — 0202U NFCT DS 22 TRGT SARS-COV-2: CPT

## 2024-08-28 PROCEDURE — 2500000003 HC RX 250 WO HCPCS: Performed by: STUDENT IN AN ORGANIZED HEALTH CARE EDUCATION/TRAINING PROGRAM

## 2024-08-28 PROCEDURE — 2580000003 HC RX 258: Performed by: STUDENT IN AN ORGANIZED HEALTH CARE EDUCATION/TRAINING PROGRAM

## 2024-08-28 PROCEDURE — 99222 1ST HOSP IP/OBS MODERATE 55: CPT | Performed by: NURSE PRACTITIONER

## 2024-08-28 RX ORDER — LEVETIRACETAM 500 MG/5ML
1000 INJECTION, SOLUTION, CONCENTRATE INTRAVENOUS EVERY 12 HOURS
Status: DISCONTINUED | OUTPATIENT
Start: 2024-08-28 | End: 2024-08-29 | Stop reason: HOSPADM

## 2024-08-28 RX ORDER — CARBIDOPA AND LEVODOPA 25; 100 MG/1; MG/1
3 TABLET ORAL PRN
Status: DISCONTINUED | OUTPATIENT
Start: 2024-08-28 | End: 2024-08-29 | Stop reason: HOSPADM

## 2024-08-28 RX ORDER — LORAZEPAM 2 MG/ML
4 INJECTION INTRAMUSCULAR EVERY 6 HOURS PRN
Status: DISCONTINUED | OUTPATIENT
Start: 2024-08-28 | End: 2024-08-29 | Stop reason: HOSPADM

## 2024-08-28 RX ORDER — POTASSIUM CHLORIDE 7.45 MG/ML
10 INJECTION INTRAVENOUS PRN
Status: DISCONTINUED | OUTPATIENT
Start: 2024-08-28 | End: 2024-08-28

## 2024-08-28 RX ORDER — SODIUM CHLORIDE 0.9 % (FLUSH) 0.9 %
5-40 SYRINGE (ML) INJECTION EVERY 12 HOURS SCHEDULED
Status: DISCONTINUED | OUTPATIENT
Start: 2024-08-28 | End: 2024-08-29 | Stop reason: HOSPADM

## 2024-08-28 RX ORDER — ATORVASTATIN CALCIUM 80 MG/1
80 TABLET, FILM COATED ORAL NIGHTLY
Status: DISCONTINUED | OUTPATIENT
Start: 2024-08-28 | End: 2024-08-29

## 2024-08-28 RX ORDER — ENOXAPARIN SODIUM 100 MG/ML
40 INJECTION SUBCUTANEOUS DAILY
Status: CANCELLED | OUTPATIENT
Start: 2024-08-28

## 2024-08-28 RX ORDER — POLYETHYLENE GLYCOL 3350 17 G/17G
17 POWDER, FOR SOLUTION ORAL DAILY PRN
Status: CANCELLED | OUTPATIENT
Start: 2024-08-28

## 2024-08-28 RX ORDER — SODIUM CHLORIDE 9 MG/ML
INJECTION, SOLUTION INTRAVENOUS PRN
Status: DISCONTINUED | OUTPATIENT
Start: 2024-08-28 | End: 2024-08-29 | Stop reason: HOSPADM

## 2024-08-28 RX ORDER — ONDANSETRON 2 MG/ML
4 INJECTION INTRAMUSCULAR; INTRAVENOUS EVERY 6 HOURS PRN
Status: DISCONTINUED | OUTPATIENT
Start: 2024-08-28 | End: 2024-08-28

## 2024-08-28 RX ORDER — CARBIDOPA AND LEVODOPA 25; 100 MG/1; MG/1
1.5 TABLET ORAL SEE ADMIN INSTRUCTIONS
Status: DISCONTINUED | OUTPATIENT
Start: 2024-08-28 | End: 2024-08-28

## 2024-08-28 RX ORDER — LORAZEPAM 2 MG/ML
INJECTION INTRAMUSCULAR
Status: COMPLETED
Start: 2024-08-28 | End: 2024-08-28

## 2024-08-28 RX ORDER — SODIUM CHLORIDE FOR INHALATION 3 %
4 VIAL, NEBULIZER (ML) INHALATION PRN
Status: DISCONTINUED | OUTPATIENT
Start: 2024-08-28 | End: 2024-08-28

## 2024-08-28 RX ORDER — SODIUM CHLORIDE 9 MG/ML
INJECTION, SOLUTION INTRAVENOUS CONTINUOUS
Status: DISCONTINUED | OUTPATIENT
Start: 2024-08-28 | End: 2024-08-29 | Stop reason: HOSPADM

## 2024-08-28 RX ORDER — LORAZEPAM 2 MG/ML
4 INJECTION INTRAMUSCULAR ONCE
Status: DISCONTINUED | OUTPATIENT
Start: 2024-08-28 | End: 2024-08-29 | Stop reason: HOSPADM

## 2024-08-28 RX ORDER — 0.9 % SODIUM CHLORIDE 0.9 %
500 INTRAVENOUS SOLUTION INTRAVENOUS ONCE
Status: COMPLETED | OUTPATIENT
Start: 2024-08-28 | End: 2024-08-28

## 2024-08-28 RX ORDER — ACETAMINOPHEN 325 MG/1
650 TABLET ORAL EVERY 6 HOURS PRN
Status: CANCELLED | OUTPATIENT
Start: 2024-08-28

## 2024-08-28 RX ORDER — MIDODRINE HYDROCHLORIDE 5 MG/1
5 TABLET ORAL 3 TIMES DAILY
Status: CANCELLED | OUTPATIENT
Start: 2024-08-28

## 2024-08-28 RX ORDER — CARBIDOPA AND LEVODOPA 25; 100 MG/1; MG/1
3 TABLET ORAL
Status: DISCONTINUED | OUTPATIENT
Start: 2024-08-28 | End: 2024-08-29 | Stop reason: HOSPADM

## 2024-08-28 RX ORDER — SODIUM CHLORIDE 0.9 % (FLUSH) 0.9 %
5-40 SYRINGE (ML) INJECTION PRN
Status: CANCELLED | OUTPATIENT
Start: 2024-08-28

## 2024-08-28 RX ORDER — FLUTICASONE PROPIONATE 50 MCG
2 SPRAY, SUSPENSION (ML) NASAL DAILY
Status: ON HOLD | COMMUNITY
End: 2024-08-29 | Stop reason: HOSPADM

## 2024-08-28 RX ORDER — POTASSIUM CHLORIDE 1500 MG/1
40 TABLET, EXTENDED RELEASE ORAL PRN
Status: DISCONTINUED | OUTPATIENT
Start: 2024-08-28 | End: 2024-08-28

## 2024-08-28 RX ORDER — ACETAMINOPHEN 325 MG/1
650 TABLET ORAL EVERY 6 HOURS PRN
Status: DISCONTINUED | OUTPATIENT
Start: 2024-08-28 | End: 2024-08-29 | Stop reason: HOSPADM

## 2024-08-28 RX ORDER — ACETAMINOPHEN 650 MG/1
650 SUPPOSITORY RECTAL EVERY 6 HOURS PRN
Status: CANCELLED | OUTPATIENT
Start: 2024-08-28

## 2024-08-28 RX ORDER — SODIUM CHLORIDE FOR INHALATION 3 %
4 VIAL, NEBULIZER (ML) INHALATION
Status: DISCONTINUED | OUTPATIENT
Start: 2024-08-28 | End: 2024-08-29 | Stop reason: HOSPADM

## 2024-08-28 RX ORDER — ONDANSETRON 4 MG/1
4 TABLET, ORALLY DISINTEGRATING ORAL EVERY 8 HOURS PRN
Status: DISCONTINUED | OUTPATIENT
Start: 2024-08-28 | End: 2024-08-28

## 2024-08-28 RX ORDER — PSYLLIUM HUSK 0.4 G
1 CAPSULE ORAL DAILY PRN
Status: ON HOLD | COMMUNITY
End: 2024-08-29 | Stop reason: HOSPADM

## 2024-08-28 RX ORDER — THIAMINE HYDROCHLORIDE 100 MG/ML
100 INJECTION, SOLUTION INTRAMUSCULAR; INTRAVENOUS DAILY
Status: DISCONTINUED | OUTPATIENT
Start: 2024-08-28 | End: 2024-08-29

## 2024-08-28 RX ORDER — SODIUM CHLORIDE 0.9 % (FLUSH) 0.9 %
5-40 SYRINGE (ML) INJECTION EVERY 12 HOURS SCHEDULED
Status: CANCELLED | OUTPATIENT
Start: 2024-08-28

## 2024-08-28 RX ORDER — CARBIDOPA AND LEVODOPA 25; 100 MG/1; MG/1
3 TABLET ORAL SEE ADMIN INSTRUCTIONS
Status: DISCONTINUED | OUTPATIENT
Start: 2024-08-28 | End: 2024-08-28

## 2024-08-28 RX ORDER — ACETYLCYSTEINE 200 MG/ML
600 SOLUTION ORAL; RESPIRATORY (INHALATION)
Status: DISCONTINUED | OUTPATIENT
Start: 2024-08-28 | End: 2024-08-29 | Stop reason: HOSPADM

## 2024-08-28 RX ORDER — SCOLOPAMINE TRANSDERMAL SYSTEM 1 MG/1
1 PATCH, EXTENDED RELEASE TRANSDERMAL ONCE
Status: DISCONTINUED | OUTPATIENT
Start: 2024-08-28 | End: 2024-08-28

## 2024-08-28 RX ORDER — SODIUM CHLORIDE 9 MG/ML
INJECTION, SOLUTION INTRAVENOUS PRN
Status: CANCELLED | OUTPATIENT
Start: 2024-08-28

## 2024-08-28 RX ORDER — ONDANSETRON 2 MG/ML
4 INJECTION INTRAMUSCULAR; INTRAVENOUS EVERY 6 HOURS PRN
Status: CANCELLED | OUTPATIENT
Start: 2024-08-28

## 2024-08-28 RX ORDER — LEVOFLOXACIN 5 MG/ML
750 INJECTION, SOLUTION INTRAVENOUS EVERY 24 HOURS
Status: DISCONTINUED | OUTPATIENT
Start: 2024-08-28 | End: 2024-08-28

## 2024-08-28 RX ORDER — MAGNESIUM SULFATE IN WATER 40 MG/ML
2000 INJECTION, SOLUTION INTRAVENOUS PRN
Status: DISCONTINUED | OUTPATIENT
Start: 2024-08-28 | End: 2024-08-28

## 2024-08-28 RX ORDER — ONDANSETRON 4 MG/1
4 TABLET, ORALLY DISINTEGRATING ORAL EVERY 8 HOURS PRN
Status: CANCELLED | OUTPATIENT
Start: 2024-08-28

## 2024-08-28 RX ORDER — PRIMIDONE 50 MG/1
50 TABLET ORAL NIGHTLY
Status: CANCELLED | OUTPATIENT
Start: 2024-08-28

## 2024-08-28 RX ORDER — SENNOSIDES 8.8 MG/5ML
5 LIQUID ORAL 2 TIMES DAILY PRN
Status: CANCELLED | OUTPATIENT
Start: 2024-08-28

## 2024-08-28 RX ORDER — LORAZEPAM 2 MG/ML
0.5 INJECTION INTRAMUSCULAR ONCE
Status: COMPLETED | OUTPATIENT
Start: 2024-08-28 | End: 2024-08-28

## 2024-08-28 RX ORDER — ENOXAPARIN SODIUM 100 MG/ML
40 INJECTION SUBCUTANEOUS DAILY
Status: DISCONTINUED | OUTPATIENT
Start: 2024-08-28 | End: 2024-08-29

## 2024-08-28 RX ORDER — ASPIRIN 81 MG/1
81 TABLET, CHEWABLE ORAL DAILY
Status: DISCONTINUED | OUTPATIENT
Start: 2024-08-28 | End: 2024-08-29 | Stop reason: HOSPADM

## 2024-08-28 RX ORDER — M-VIT,TX,IRON,MINS/CALC/FOLIC 27MG-0.4MG
1 TABLET ORAL DAILY
Status: ON HOLD | COMMUNITY
End: 2024-08-29 | Stop reason: HOSPADM

## 2024-08-28 RX ORDER — CASTOR OIL AND BALSAM, PERU 788; 87 MG/G; MG/G
OINTMENT TOPICAL 2 TIMES DAILY
Status: DISCONTINUED | OUTPATIENT
Start: 2024-08-28 | End: 2024-08-29

## 2024-08-28 RX ORDER — ACETAMINOPHEN 650 MG/1
650 SUPPOSITORY RECTAL EVERY 6 HOURS PRN
Status: DISCONTINUED | OUTPATIENT
Start: 2024-08-28 | End: 2024-08-29 | Stop reason: HOSPADM

## 2024-08-28 RX ORDER — SODIUM CHLORIDE 0.9 % (FLUSH) 0.9 %
5-40 SYRINGE (ML) INJECTION PRN
Status: DISCONTINUED | OUTPATIENT
Start: 2024-08-28 | End: 2024-08-29 | Stop reason: HOSPADM

## 2024-08-28 RX ORDER — POLYETHYLENE GLYCOL 3350 17 G/17G
17 POWDER, FOR SOLUTION ORAL DAILY PRN
Status: DISCONTINUED | OUTPATIENT
Start: 2024-08-28 | End: 2024-08-29 | Stop reason: HOSPADM

## 2024-08-28 RX ADMIN — CARBIDOPA AND LEVODOPA 1.5 TABLET: 25; 100 TABLET ORAL at 05:34

## 2024-08-28 RX ADMIN — VANCOMYCIN HYDROCHLORIDE 2000 MG: 10 INJECTION, POWDER, LYOPHILIZED, FOR SOLUTION INTRAVENOUS at 13:00

## 2024-08-28 RX ADMIN — DANTROLENE SODIUM 200 MG: 20 INJECTION, POWDER, LYOPHILIZED, FOR SOLUTION INTRAVENOUS at 16:38

## 2024-08-28 RX ADMIN — CARBIDOPA AND LEVODOPA 3 TABLET: 25; 100 TABLET ORAL at 13:01

## 2024-08-28 RX ADMIN — CARBIDOPA AND LEVODOPA 3 TABLET: 25; 100 TABLET ORAL at 17:33

## 2024-08-28 RX ADMIN — CARBIDOPA AND LEVODOPA 1.5 TABLET: 25; 100 TABLET ORAL at 07:50

## 2024-08-28 RX ADMIN — ACYCLOVIR SODIUM 800 MG: 50 INJECTION, SOLUTION INTRAVENOUS at 20:20

## 2024-08-28 RX ADMIN — CARBIDOPA AND LEVODOPA 3 TABLET: 25; 100 TABLET ORAL at 18:32

## 2024-08-28 RX ADMIN — SODIUM CHLORIDE, PRESERVATIVE FREE 10 ML: 5 INJECTION INTRAVENOUS at 07:55

## 2024-08-28 RX ADMIN — ATORVASTATIN CALCIUM 80 MG: 80 TABLET, FILM COATED ORAL at 20:57

## 2024-08-28 RX ADMIN — LORAZEPAM: 2 INJECTION INTRAMUSCULAR; INTRAVENOUS at 13:34

## 2024-08-28 RX ADMIN — ACETAMINOPHEN 650 MG: 325 TABLET ORAL at 05:45

## 2024-08-28 RX ADMIN — CARBIDOPA AND LEVODOPA 3 TABLET: 25; 100 TABLET ORAL at 14:53

## 2024-08-28 RX ADMIN — LEVETIRACETAM 4000 MG: 100 INJECTION INTRAVENOUS at 13:52

## 2024-08-28 RX ADMIN — ENOXAPARIN SODIUM 40 MG: 100 INJECTION SUBCUTANEOUS at 07:51

## 2024-08-28 RX ADMIN — LEVETIRACETAM 1000 MG: 100 INJECTION INTRAVENOUS at 20:58

## 2024-08-28 RX ADMIN — ACETAMINOPHEN 650 MG: 650 SUPPOSITORY RECTAL at 13:21

## 2024-08-28 RX ADMIN — MEROPENEM 2000 MG: 2 INJECTION, POWDER, FOR SOLUTION INTRAVENOUS at 09:54

## 2024-08-28 RX ADMIN — LORAZEPAM 0.5 MG: 2 INJECTION INTRAMUSCULAR; INTRAVENOUS at 11:00

## 2024-08-28 RX ADMIN — THIAMINE HYDROCHLORIDE 100 MG: 100 INJECTION, SOLUTION INTRAMUSCULAR; INTRAVENOUS at 07:51

## 2024-08-28 RX ADMIN — MEROPENEM 2000 MG: 2 INJECTION, POWDER, FOR SOLUTION INTRAVENOUS at 17:36

## 2024-08-28 RX ADMIN — ASPIRIN 81 MG: 81 TABLET, CHEWABLE ORAL at 07:50

## 2024-08-28 RX ADMIN — SODIUM CHLORIDE: 9 INJECTION, SOLUTION INTRAVENOUS at 08:36

## 2024-08-28 RX ADMIN — SODIUM CHLORIDE 500 ML: 900 INJECTION, SOLUTION INTRAVENOUS at 08:51

## 2024-08-28 RX ADMIN — DANTROLENE SODIUM 80 MG: 20 INJECTION, POWDER, LYOPHILIZED, FOR SOLUTION INTRAVENOUS at 22:50

## 2024-08-28 RX ADMIN — LORAZEPAM: 2 INJECTION INTRAMUSCULAR; INTRAVENOUS at 13:36

## 2024-08-28 RX ADMIN — SODIUM CHLORIDE 1500 MG: 9 INJECTION, SOLUTION INTRAVENOUS at 23:53

## 2024-08-28 RX ADMIN — LEVOFLOXACIN 750 MG: 750 INJECTION, SOLUTION INTRAVENOUS at 05:23

## 2024-08-28 RX ADMIN — CARBIDOPA AND LEVODOPA 3 TABLET: 25; 100 TABLET ORAL at 20:57

## 2024-08-28 RX ADMIN — Medication: at 20:58

## 2024-08-28 RX ADMIN — ACYCLOVIR SODIUM 800 MG: 50 INJECTION, SOLUTION INTRAVENOUS at 11:04

## 2024-08-28 RX ADMIN — CARBIDOPA AND LEVODOPA 3 TABLET: 25; 100 TABLET ORAL at 10:21

## 2024-08-28 NOTE — PROGRESS NOTES
Doses:    Date Time Dose Type of Level / Level Interpretation   8/29  1500 mg IV Q12h Random =             Note: Serum levels collected for AUC-based dosing may be high if collected in close proximity to the dose administered. This is not necessarily indicative of toxicity.    Cultures & Sensitivities:    Date Site Micro Susceptibility / Result   8/28 Blood x2 Pending    8/28 Respiratory Panel Pending    8/28 Urine  Pending      Recent Labs     08/27/24  1901   CREATININE <0.5*   BUN 19   WBC 7.7       Estimated Creatinine Clearance: 120 mL/min (based on SCr of 0.5 mg/dL).    Additional Lab Values / Findings of Note:    No results for input(s): \"PROCAL\" in the last 72 hours.

## 2024-08-28 NOTE — CONSULTS
Neurology Attending Note:    Attempted to see the patient and was just transferred to ICU and chart reviewed, events noted. I have personally discussed patient's clinical condition with the NP and reviewed the note and confirmed the documented findings via my own chart review. I agree with the NP's assessment and plan as documented. Will re-attempt to see in the morning.    Plan:   Continue Keppra 1500 mg BID and continue lorazepam 4 mg every 6 hours p.r.n. for seizures and seizure precautions.  2.   Agree with holding Rytary and continue Sinemet 25/100 same dosage as recommended by his  Parkinson's specialist, Dr. Rick.  3.   Will consider further evaluation for worsening of mental status.    Thank you for allowing me to participate in the care of the patient. Please feel free to call for any questions at your convenience.    Joisane Estes MD  Board Certified Neurologist  [facility]  [phone]

## 2024-08-28 NOTE — CONSULTS
[facility]  Palliative Medicine Consultation Note      Date Of Admission:8/27/2024  Date of consult: 08/28/24  Seen by PC in the past:  No    Recommendations:        Went to see pt at the bedside. Introduced palliative care to wife. Pt appeared to be violently shaking, per wife this is not typical for him. Additionally we were unable to obtain O2 sat. RN called for rapid response. ICU and neuro came to bedside to evaluate. Pt was given 4mg of ativan with resolution of his shaking however he continued to be in respiratory distress. Pt was placed on NRB. D/w wife and daughter at bedside in depth - they are adamant that pt would not want intubation. They endorsed significant history of aspiration and understand that nothing can be done to completely prevent aspiration. Pt's code status was changed to limited. We did discuss potential hospice referral - family requested a referral to [facility] hospice. Called ANGIE Coppola with [facility] who will come see family at the bedside in ICU.     D/w Dr. Woodward, Dr. Pierce, Dr. Hartman, Neuro NP Day    1. Goals of Care/Advanced Care planning/Code status: Limited x4, discussed in depth today with pt's wife and daughters. Goals are directed at quality of life. Family is considering hospice and will meet with them today.   2. Pain: pt unable to state  3. SOB, acute respiratory failure 2/2 possible aspiration PNA: Pt unable to state, however he is clearly in respiratory distress. He is on NRB. Breathing calmed after ativan. There was consideration of possible bronchoscopy however pt is unstable. After explaining that pt would likely need to be on the vent for a minimum of a few days, and potentially longer, following bronchoscopy family reported pt would not want that. Pt has a history of recurrent aspiration PNA, family understands that this is difficult to prevent.   4. Disposition: TBD, family is considering possible hospice.     Reason for Consult:         [x]       08/27/24  1901   AST 12*   ALT <5*   BILITOT 0.4   ALKPHOS 114     Troponin: No results for input(s): \"TROPONINI\" in the last 72 hours.  BNP: No results for input(s): \"BNP\" in the last 72 hours.  ABGs: No results for input(s): \"PHART\", \"EFM6KBQ\", \"PO2ART\" in the last 72 hours.  INR: No results for input(s): \"INR\" in the last 72 hours.    U/A:  Recent Labs     08/27/24  1901   COLORU Yellow   PHUR 6.0   CLARITYU Clear   LEUKOCYTESUR Negative   UROBILINOGEN 0.2   BILIRUBINUR Negative   BLOODU Negative   GLUCOSEU Negative       XR CHEST PORTABLE   Final Result   1. Complete opacification of the left hemithorax, new from prior study   2. There is volume loss with shift of the mediastinum to the left   3. Right lung is clear      .      Electronically signed by Baltazar Pettit      CT HEAD WO CONTRAST   Final Result      No acute intracranial pathology        Atrophy and ischemic leukoencephalopathy      Electronically signed by Geovanna Melendez MD      XR CHEST PORTABLE   Final Result      No acute pulmonary disease.         Electronically signed by Trevor Cabrales            Conclusion/Time spent:         Recommendations see above    Time spent with patient and/or family: 30  Time reviewing records: 10 min   Time communicating with staff: 15 min     A total of 55 minutes spent with the patient and family on unit greater than 50% in counseling regarding palliative care and in goals of care for the patient.    Thank you to Dr. Woodward for this consultation. We will continue to follow Mr. Prince's care as needed.      Hanna Soriano [facility]  Inpatient Palliative Care  [phone]

## 2024-08-28 NOTE — H&P
Internal Medicine H&P    Date:   2024   Patient:  Pacheco Prince Sr.   :   1945     CC:  Altered Mental Status and Fatigue (Pt came into the ED with new onset of confusion, family states he has become aggressive and lethargic. Hx of Parkinsons)       Source of HPI: Daughter    Subjective     HPI:  Mr. Pacheco Prince Sr. is a 79 y.o. male with a PMHx significant for Parkinson's disease and Aspiration Pneumonia, who presented to the ED on 24 with AMS. He became violent with his wife and son, which prompted them to bring him to the hospital.      ED Course:  On arrival to the ED, he was found to be alert but minimally verbal, significantly tremulous consistent with his Parkinson's. He is initially redirectable but becomes more agitated.  Labs were significant for:  Hgb 12.7  UA +ve for 15 ketones  Imaging not significant  While in the ED, he received Haldol and Seroquel      PMHx:      Diagnosis Date    Acute bronchitis     history of    CAD (coronary artery disease)     Cancer ([facility])     melanoma    Clostridioides difficile infection 2020    Diabetes mellitus ([facility]) 2022    6.6 A1C    Erectile dysfunction     GERD (gastroesophageal reflux disease)     gerd    Hyperlipidemia     Obesity     Parkinson disease (HCC)        PSurgHx:      Procedure Laterality Date    CATARACT EXTRACTION      2021    CORONARY ANGIOPLASTY WITH STENT PLACEMENT  2005    Germania Ennis    DIAGNOSTIC CARDIAC CATH LAB PROCEDURE  2005        EYE SURGERY  2021    cataracts    PTCA  2002    TONSILLECTOMY  chilhood    UPPER GASTROINTESTINAL ENDOSCOPY      UPPER GASTROINTESTINAL ENDOSCOPY N/A 3/8/2024    ESOPHAGOGASTRODUODENOSCOPY PERCUTANEOUS ENDOSCOPIC GASTROSTOMY TUBE PLACEMENT performed by Luke Douglas MD at [facility] ENDOSCOPY    UPPER GASTROINTESTINAL ENDOSCOPY N/A 2024    ESOPHAGOGASTRODUODENOSCOPY WITH PERCUTANEOUS ENDOSCOPIC GASTROSTOMY performed by Misael  -- -- -- -- 98 %   08/27/24 2242 (!) 157/57 -- -- 85 18 95 %   08/27/24 2213 -- -- -- 68 18 98 %           PHYSICAL EXAM:    Physical Exam  Constitutional:       Comments: Patient is non-responsive, and seemingly agitated   HENT:      Head: Normocephalic and atraumatic.   Cardiovascular:      Rate and Rhythm: Normal rate and regular rhythm.      Pulses: Normal pulses.      Heart sounds: Normal heart sounds.   Pulmonary:      Effort: Pulmonary effort is normal.      Breath sounds: Normal breath sounds.   Abdominal:      Palpations: Abdomen is soft.   Musculoskeletal:      Comments: Rigidity with tremors   Skin:     General: Skin is warm and dry.   Neurological:      Comments: Non-responsive; on haldol and seroquel          MEDICATION:  Scheduled Meds:   aspirin  81 mg Oral Daily    atorvastatin  80 mg Oral Nightly    carbidopa-levodopa  1.5 tablet Oral See Admin Instructions    Carbidopa-Levodopa ER  2 capsule Oral See Admin Instructions    sodium chloride flush  5-40 mL IntraVENous 2 times per day    enoxaparin  40 mg SubCUTAneous Daily    thiamine  100 mg IntraVENous Daily    levofloxacin  750 mg IntraVENous Q24H     Continuous Infusions:   sodium chloride           LABS:  CBC:   Recent Labs     08/27/24 1901   WBC 7.7   HGB 12.7*   HCT 39.4*          RFP:   Recent Labs     08/27/24 1901      K 4.2      CO2 25   BUN 19   CREATININE <0.5*   GLUCOSE 105*     Magnesium: No results for input(s): \"MG\" in the last 72 hours.  LFT's:   Recent Labs     08/27/24 1901   AST 12*   ALT <5*   BILITOT 0.4   ALKPHOS 114       Troponin: No results for input(s): \"TROPHS\" in the last 72 hours.  Lactic acid: Invalid input(s): \"LACTICACIDSEPSIS\"     Pro-BNP: No results for input(s): \"PROBNP\" in the last 72 hours.    Procalcitonin: No results for input(s): \"PROCAL\" in the last 72 hours.  CRP: No results for input(s): \"CRP\" in the last 72 hours.  ESR: No results for input(s): \"SEDRATE\" in the last 72 hours.    ABGs:

## 2024-08-28 NOTE — CONSULTS
Clinical Pharmacy Consult Note  Medication History     Admit Date: 8/27/2024    Pharmacy consulted to verify home medication list by Fabi Massey.    List of of current medications patient is taking is complete. Home Medication list in EPIC updated to reflect changes noted below.    Source of information: jennyfer Dawn    Patient's home pharmacy: [facility]; [facility] ([place])     Changes made to medication list:   Medications removed: (include reason, ex: therapy completed, patient no longer taking, etc.)  None at this time  Medications added:   Psyllium  Multivitamin  Medication doses adjusted:   Carbidopa- levodopa IR 1.5 t Q2h 9941-3075 -> 1.5 t Q2h 6975-1936 + 1.5 t in middle of night (~0200)  Ipratropium TID -> TID PRN  Melatonin 6 mg -> 10 mg  PEG 3350 BID -> QPM  Other notes:   Wife noted hydroxyzine rarely used; finds to be ineffective  Per wife, SBP usually in 90s-110s  Meds that cannot be crushed and eye gtt only given on occasion due to cooperation. Counseled on some alternatives to follow up on outpatient to decrease PO med administration and help coordinate drops  Uses docusate TID when tube feeds reduced given decreased fiber intake  Uses Sinemet 7495-9068 while inpatient given waking earlier      Current Outpatient Medications   Medication Instructions    aspirin 81 mg, Oral, DAILY    atorvastatin (LIPITOR) 80 MG tablet TAKE 1 TABLET EVERY DAY    carbidopa-levodopa (SINEMET)  MG per tablet 1.5 tablets, Oral, SEE ADMIN INSTRUCTIONS, Q2H 5423-2108 + once in middle of night    Carbidopa-Levodopa ER 36. MG CPCR 2 capsules, Oral, SEE ADMIN INSTRUCTIONS, Q2h 9707-1533    carboxymethylcellulose 1 % ophthalmic solution 1 drop, Both Eyes, 2 TIMES DAILY    cloZAPine (CLOZARIL) 25 mg, Oral, 2 TIMES DAILY    docusate sodium (COLACE) 100 mg, Oral, 2 TIMES DAILY, TID if less tube feeds    donepezil (ARICEPT) 10 mg, Oral, NIGHTLY    fluticasone (FLONASE) 50 MCG/ACT nasal spray 2

## 2024-08-28 NOTE — PROGRESS NOTES
Secure message sent to medical resident to request review of sinemet order due to pt not currently being on a timed schedule. Awaiting response/orders.

## 2024-08-28 NOTE — PROGRESS NOTES
Pt admitted to the unit from ED. VS taken and recorded. Pt is very agitated, combative and spitting to the nursing staff. VS taken and recorded. 4 eyes, head to toe and admission assessment done. Pt is alert to voice and very slurred speech. Spo2 was at 89 % so kept @1 L/M via nasal canula. Daughter present at the bedside. All fall precautions in place, call light within reach, free from fall injury. Plan of care ongoing.

## 2024-08-28 NOTE — ED NOTES
ED TO INPATIENT SBAR HANDOFF    Patient Name: Pacheco Prince Sr.   :  1945  79 y.o.   MRN:  [id number]  Preferred Name  Pacheco  ED Room #:  B19/B19-19  Family/Caregiver Present yes   Restraints no   Sitter no   Sepsis Risk Score      Situation  Code Status: Prior No additional code details.    Allergies: Tamsulosin, Sulfacetamide, and Penicillins  Weight: Patient Vitals for the past 96 hrs (Last 3 readings):   Weight   24 1757 80 kg (176 lb 5.9 oz)     Arrived from: home  Chief Complaint:   Chief Complaint   Patient presents with    Altered Mental Status    Fatigue     Pt came into the ED with new onset of confusion, family states he has become aggressive and lethargic. [facility] Problem/Diagnosis:  Principal Problem:    AMS (altered mental status)  Resolved Problems:    * No resolved hospital problems. *    Imaging:   CT HEAD WO CONTRAST   Final Result      No acute intracranial pathology        Atrophy and ischemic leukoencephalopathy      Electronically signed by Geovanna Melendez MD      XR CHEST PORTABLE   Final Result      No acute pulmonary disease.         Electronically signed by Trevor Cabrales        Abnormal labs:   Abnormal Labs Reviewed   CBC WITH AUTO DIFFERENTIAL - Abnormal; Notable for the following components:       Result Value    Hemoglobin 12.7 (*)     Hematocrit 39.4 (*)     Lymphocytes Absolute 0.7 (*)     All other components within normal limits   COMPREHENSIVE METABOLIC PANEL W/ REFLEX TO MG FOR LOW K - Abnormal; Notable for the following components:    Glucose 105 (*)     Creatinine <0.5 (*)     ALT <5 (*)     AST 12 (*)     All other components within normal limits   URINALYSIS WITH REFLEX TO CULTURE - Abnormal; Notable for the following components:    Ketones, Urine 15 (*)     All other components within normal limits     Critical values: no     Abnormal Assessment Findings: Dementia, Parkinson's disease    Background  History:   Past Medical History:    Diagnosis Date    Acute bronchitis     history of    CAD (coronary artery disease)     Cancer ([facility]) 2020    melanoma    Clostridioides difficile infection 07/12/2020    Diabetes mellitus ([facility]) 01/2022    6.6 A1C    Erectile dysfunction     GERD (gastroesophageal reflux disease) 2010    gerd    Hyperlipidemia     Obesity     Parkinson disease ([facility])        Assessment    Vitals/MEWS: MEWS Score: 1  Level of Consciousness: Alert (0)   Vitals:    08/27/24 2242 08/27/24 2300 08/28/24 0030 08/28/24 0100   BP: (!) 157/57 (!) 179/52 (!) 151/60 112/74   Pulse: 85      Resp: 18      Temp:       TempSrc:       SpO2: 95% 98% 99% 91%   Weight:       Height:         FiO2 (%):   O2 Flow Rate: O2 Device: None (Room air)    Cardiac Rhythm:    Pain Assessment:  [x] Verbal [] Chaudhry Paris Scale  Pain Scale: Pain Assessment  Pain Assessment: None - Denies Pain  Last documented pain score (0-10 scale)    Last documented pain medication administered:   Mental Status: disoriented  Orientation Level:    NIH Score:    C-SSRS: Risk of Suicide: No Risk  Bedside swallow:    Simon Coma Scale (GCS): Bristow Coma Scale  Eye Opening: Spontaneous  Best Verbal Response: Oriented  Best Motor Response: Obeys commands  Simon Coma Scale Score: 15  Active LDA's:   Peripheral IV 08/27/24 Right Forearm (Active)   Site Assessment Clean, dry & intact;Clean;Dry;Intact 08/27/24 1905   Line Status Blood return noted;Flushed 08/27/24 1905      Gastrostomy/Enterostomy/Jejunostomy Tube Percutaneous Endoscopic Gastrostomy (PEG) LUQ 20 fr (Active)               PO Status: Regular  Pertinent or High Risk Medications/Drips: no   If Yes, please provide details:   Pending Blood Product Administration: no       You may also review the ED PT Care Timeline found under the Summary Nursing Index tab.    Recommendation    Pending orders   Plan for Discharge (if known):   Additional Comments: Pt with dementia and parkinson's disease, given haldol for tremors, ambulatory at

## 2024-08-28 NOTE — PROGRESS NOTES
Rapid response called due to pt presenting with a new fixed upward gaze and more rigorous tremors than prior. Residents at bedside to assess. Wife and daughter at bedside and updated on plan of care.

## 2024-08-28 NOTE — PROGRESS NOTES
Spiritual Health Assessment/Progress Note  [facility]    (P) Initial Encounter, Crisis, Spiritual/Emotional Needs, Rituals, Rites and Sacraments, (P) Rapid Response,  ,      Name: Pacheco Prince Sr. MRN: [id number]    Age: 79 y.o.     Sex: male   Language: English   Uatsdin: Confucianist   AMS (altered mental status)     Date: 8/28/2024            Total Time Calculated: (P) 108 min              Spiritual Assessment began in [facility] ICU        Referral/Consult From: (P) Multi-disciplinary team   Encounter Overview/Reason: (P) Initial Encounter, Crisis, Spiritual/Emotional Needs, Rituals, Rites and Sacraments  Service Provided For: (P) Patient and family together    Eloise, Belief, Meaning:   Patient identifies as spiritual, is connected with a eloise tradition or spiritual practice, and has beliefs or practices that help with coping during difficult times  Family/Friends identify as spiritual, are connected with a eloise tradition or spiritual practice, and have beliefs or practices that help with coping during difficult times      Importance and Influence:  Patient has spiritual/personal beliefs that influence decisions regarding their health  Family/Friends have spiritual/personal beliefs that influence decisions regarding the patient's health    Community:  Patient is connected with a spiritual community and feels well-supported. Support system includes: Spouse/Partner, Children, Eloise Community, and Extended family  Family/Friends are connected with a spiritual community: and feel well-supported. Support system includes: Children, Eloise Community, and Extended family    Assessment and Plan of Care:     Family/Friends Interventions include: Family/Friends Interventions, Explored spiritual coping/struggle/distress and theological reflection, Affirmed coping skills/support systems, Provided sacramental/Restorationist ritual, Engaged in life review and/or legacy, and Other: Called  to eduin pt. Pt

## 2024-08-28 NOTE — CONSULTS
Neurocritical Care Consult Note    Patient: Pacheco Prince Sr. MRN: [id number]    YOB: 1945  Age: 79 y.o.  Sex: male   [facility]  Room/Bed: 5508/5508-01 Location: [facility]    Date of Consultation: 8/28/2024  Date of Admission: 8/27/2024  6:03 PM ( LOS: 0 days )  Primary Care Physician: Rohan Watson, APRKELBY - CNP   Consult Requested By: Terrell Woodward MD    Reason for Consult:     worsening mentation; violent behavior; hx of parkinson's     IMPRESSION & RECOMMENDATIONS     IMPRESSION:  Pacheco Prince is a 80 y/o with Parkinson's disease and recurrent aspiration pneumonia who presents with one week of progressively altered mental status and high fevers. It is unclear if he has missed any doses of his Sinemet as his wife gave me a different history than was collected in ER. Regardless, the Rytary (extended release Sinemet) cannot be given through PEG and thus his regular sinemet (which can be given through PEG) will need to be adjusted.     The etiology of his symptoms are unclear but this constellation of symptoms can be seen in neuroleptic malignant syndrome, the treatment of which is supportive and involves AVOIDING dopamine antagonists and ensuring he receives his regular Sinemet dosing. Other etiologies of his fever should be thoroughly evaluated but this I defer to the primary team.     RECOMMENDATIONS:  - Given his GCS is 8 on my visit, I immediately discussed his code status w/ nursing and his primary attending.   Dr. Woodward is to confirm he is DNR-CCA w/ wife who is at the bedside.    - Agree with starting broad spectrum CNS antimicrobials.   It is doubtful he would tolerate a LP at this time and CNS infection cannot be excluded.   This will be initiated by primary team (discussed w/ Dr. Woodward).  - I discussed his home sinemet dosing with patient's wife and primary neurologist. Since he cannot have Rytary PO, and this cannot be given through PEG, will

## 2024-08-28 NOTE — PROGRESS NOTES
Rectal temp of 103.3, secure message sent to primary resident team as well as Day Wong NP. Ice packs applied to axillary areas, groin, and behind neck. Will continue to monitor.

## 2024-08-28 NOTE — PROGRESS NOTES
08/28/24 0906   Oxygen Therapy/Pulse Ox   O2 Therapy Oxygen humidified   $Oxygen $Daily Charge   O2 Device (S)  High flow nasal cannula   O2 Flow Rate (L/min) (S)  12 L/min   Pulse 100   SpO2 90 %   Pulse Oximeter Device Mode Continuous   Pulse Oximeter Device Location Toe   $Pulse Oximeter $Spot check (multiple/continuous)

## 2024-08-28 NOTE — CONSULTS
PULMONARY MEDICINE - INITIAL CONSULT NOTE  Reason for Consult: acute hypoxic respiratory failure    Requesting Physician: Trace CUNNINGHAM    SUBJECTIVE:     CHIEF COMPLAINT / HPI:    The patient is a 79 y.o. male with significant past medical history of parkinson's disease that was admitted to the hospital for evaluation of AMS.  Pulmonary medicine consulted for acute hypoxic respiratory failure.  Patient developed acute worsening respiratory status with largely elevated O2 requirements, CXR revealed total L lung opacification with volume loss.     Past Medical History:      Diagnosis Date    Acute bronchitis     history of    CAD (coronary artery disease)     Cancer ([facility]) 2020    melanoma    Clostridioides difficile infection 07/12/2020    Diabetes mellitus ([facility]) 01/2022    6.6 A1C    Erectile dysfunction     GERD (gastroesophageal reflux disease) 2010    gerd    Hyperlipidemia     Obesity     Parkinson disease (HCC)       Past Surgical History:        Procedure Laterality Date    CATARACT EXTRACTION      jan 2021    CORONARY ANGIOPLASTY WITH STENT PLACEMENT  01/01/2005    Germania Ennis    DIAGNOSTIC CARDIAC CATH LAB PROCEDURE  01/01/2005        EYE SURGERY  January 2021    cataracts    PTCA  2002    TONSILLECTOMY  chilhood    UPPER GASTROINTESTINAL ENDOSCOPY  2010    UPPER GASTROINTESTINAL ENDOSCOPY N/A 3/8/2024    ESOPHAGOGASTRODUODENOSCOPY PERCUTANEOUS ENDOSCOPIC GASTROSTOMY TUBE PLACEMENT performed by Luke Douglas MD at [facility] ENDOSCOPY    UPPER GASTROINTESTINAL ENDOSCOPY N/A 6/14/2024    ESOPHAGOGASTRODUODENOSCOPY WITH PERCUTANEOUS ENDOSCOPIC GASTROSTOMY performed by Luke Douglas MD at [facility] ENDOSCOPY     Current Medications:     [Held by provider] aspirin  81 mg Oral Daily    atorvastatin  80 mg Oral Nightly    [Held by provider] Carbidopa-Levodopa ER  2 capsule Oral See Admin Instructions    sodium chloride flush  5-40 mL IntraVENous 2 times per day    enoxaparin  40 mg SubCUTAneous Daily       Discussed with family, will proceed with bronchoscopy today for therapeutic aspiration. They understand the patient may need to go to ICU under mechanical ventilation afterwards, they were emphatic about wanting to \"fix the lung problem\"   Bronchoscopy for therapeutic aspiration today at 1400h.     UPDATE:  Respiratory and neurological status acutely worsened, appeared to be displaying seizure activity although could have been parkinsonian tremor, transferred to ICU for further management per Neuro[facility].   Discussed with family, won't pursue invasive management of atelectasis, bronch cancelled.   Pulmonary toilet: Percussion and postural drainage with vest, 3% nebs BID, and Mucomyst nebs BID    The note was completed using EMR and Dragon dictation system. Every effort was made to ensure accuracy; however, inadvertent computerized transcription errors may be present.    Dionte Evans \"Silas\" Devan Palacios MD  Pulmonary and Critical Care Medicine

## 2024-08-28 NOTE — ED NOTES
Pt remains agitated and diaphoretic   Patient family states this happens every night and there neurologist is aware of this/     Porfirio Roche RN  08/27/24 9544

## 2024-08-28 NOTE — PROGRESS NOTES
Speech-Language Pathology    Received evaluation order, reviewed chart. Per discussion with RN, will hold and re-attempt at later time as pt unable to participate now.    Arianna Marcos, SLP, [id number]  Ph. # [phone]

## 2024-08-28 NOTE — PROGRESS NOTES
08/28/24 0919   Oxygen Therapy/Pulse Ox   O2 Device High flow nasal cannula   O2 Flow Rate (L/min) 15 L/min   SpO2 90 %

## 2024-08-28 NOTE — PROGRESS NOTES
Internal Medicine Progress Note    Date: 8/28/2024   Patient: Pacheco Prince Sr.   Hospital Day: 0      CC: Altered Mental Status and Fatigue (Pt came into the ED with new onset of confusion, family states he has become aggressive and lethargic. Hx of Parkinsons)       Interval Hx   Patient has significantly increased O2 requirements now (15L O2)  High grade fever overnight   Pt not following commands  Rigidity in arms and shaking of extremities and trunk present  HD stable   CXR ordered and reviewed. Shows left side atelactasis most likely 2./2 mucus plugging  Pulm consulted and bronch planned          Objective     Vital Signs:  Patient Vitals for the past 8 hrs:   BP Temp Temp src Pulse Resp SpO2   08/28/24 1208 -- -- -- -- -- 91 %   08/28/24 1140 -- -- -- -- -- 92 %   08/28/24 1115 (!) 153/67 -- -- -- -- --   08/28/24 1112 -- -- -- 96 20 96 %   08/28/24 1100 -- (!) 103.3 °F (39.6 °C) Rectal -- -- --   08/28/24 0919 -- -- -- -- -- 90 %   08/28/24 0915 -- -- -- -- -- (!) 89 %   08/28/24 0910 -- -- -- -- -- 90 %   08/28/24 0906 -- -- -- 100 -- 90 %   08/28/24 0905 -- -- -- (!) 102 -- 91 %   08/28/24 0837 (!) 145/62 99 °F (37.2 °C) Oral (!) 106 20 (!) 89 %   08/28/24 0830 -- -- -- -- -- (!) 89 %   08/28/24 0731 (!) 173/65 98.4 °F (36.9 °C) Oral (!) 108 20 91 %   08/28/24 0727 -- (!) 102.3 °F (39.1 °C) Axillary -- -- --   08/28/24 0636 -- (!) 102.5 °F (39.2 °C) Axillary (!) 104 -- --   08/28/24 0530 (!) 155/62 (!) 103 °F (39.4 °C) Axillary (!) 105 18 92 %       Physical Exam  Constitutional:       Appearance: He is ill-appearing.   HENT:      Mouth/Throat:      Mouth: Mucous membranes are moist.   Cardiovascular:      Rate and Rhythm: Normal rate and regular rhythm.      Pulses: Normal pulses.      Heart sounds: Normal heart sounds.   Pulmonary:      Effort: No respiratory distress.      Breath sounds: Wheezing present.   Abdominal:      Palpations: Abdomen is soft.   Musculoskeletal:      Right lower leg:

## 2024-08-28 NOTE — ED NOTES
Pt allowed this Rn to place him on continuous spo2 monitoring,   Patient asleep but able to be aroused to stimulation      Porfirio Roche, ANGIE  08/27/24 1682

## 2024-08-28 NOTE — SIGNIFICANT EVENT
BRIEF NOTE    Notified by team that patient was having a new onset, generalized tonic clonic seizure - eyes open, with rhythmic 4 limb jerking. Seizure was aborted w/ 4 mg lorazepam.   Wife and daughter at bedside.     Temp 103.3F. Extremely diaphoretic upon arrival.     Discussed with wife, daughter, NCC attending, neurologist, Dr. Woodward, Dr. Hartman, Hanna WAKEFIELD.     Patient meets every major clinical manifestation for neuroleptic malignant syndrome. Possibly provoked by dopamine antagonists.     Despite code status, should be transferred to ICU for neurologic monitoring, treatment, and further supportive care not available on medical floor.    Will consider dantrolene, will d/w ICU pharmacy.  Keppra ordered 1500 mg BID  Lorazepam ordered 4 mg q6h PRN for seizures - please call neurology for any further seizures  He MUST be given his Sinemet - can be given through PEG.  Q1h neuro checks    All in agreement w/ plan.    Day Wong NP  NCC

## 2024-08-28 NOTE — PROGRESS NOTES
Patient admitted to ICU. Bedside handoff completed. Patient unresponsive, unable to follow command, 15L high flow o2. CHG bath and new tele electrodes completed. Family at bedside

## 2024-08-28 NOTE — SIGNIFICANT EVENT
V2.0    [facility] Progress Note      Rapid response for possible seizures. Patient was having generalized shaking and he was staring to the left side.  At baseline he does have a pretty significant resting tremor that makes it difficult to interpret but this was definitely much more violent and worse than before and is concerning for seizure.  He was given 4 mg of Ativan and his shaking improved supporting the diagnosis of seizure.  Neurology team was in the room and recommended transfer to the ICU for closer monitoring.  Will load him with keppra.     We discussed with the family again about possible intubation and they were clear that they do not want any intubation.  Although earlier when Dr. Hartman talked to them about doing a bronchoscopy and possible  intubation after that ,they were okay with that, but upon discussion now they were clear that they dont want intubation. Palliative team was in the room and discussed possible hospice .    Terrell Messinaist
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
## 2024-08-28 NOTE — PROGRESS NOTES
ICU ACCEPTANCE NOTE    Admit Date: 8/27/2024  Day: 2  Vent Day: None  IV Access:Peripheral  IV Fluids:  Vasopressors:               Antibiotics:   Diet: Diet NPO    CC: worsening mentation; violent behavior and one episode of seizure   Interval history:   Pacheco Prince is a 80 y/o male with past medical history of Parkinson's disease,anxiety, insomnia, hypertension, hyperlipidemia and diabetes.who presented to the ED (8/27) for altered mental status While in the ER, he was agitated,he received Haldol three times and 50 mg Seroquel .  Today on the floor he has been tachycardic on 100 and hypertensive with a systolic on 170,also he presented a new onset of generalized tonic clonic seizure that was aborted with lorazepam 4 mg and he was transfer to the ICU for neurologic monitoring for possible neuroleptic malignant syndrome .    Medications:     Scheduled Meds:   [Held by provider] aspirin  81 mg Oral Daily    atorvastatin  80 mg Oral Nightly    sodium chloride flush  5-40 mL IntraVENous 2 times per day    enoxaparin  40 mg SubCUTAneous Daily    thiamine  100 mg IntraVENous Daily    acyclovir  10 mg/kg IntraVENous Q8H    meropenem  2,000 mg IntraVENous Q8H    carbidopa-levodopa  3 tablet PEG Tube 8 times per day    vancomycin  1,500 mg IntraVENous Q12H    LORazepam  4 mg IntraVENous Once    levETIRAcetam  1,000 mg IntraVENous Q12H    acetylcysteine  600 mg Inhalation BID RT    sodium chloride (Inhalant)  4 mL Nebulization BID RT    dantrolene (DANTRIUM) 200 mg in sterile water 600 mL IVPB  2.5 mg/kg IntraVENous Once    dantrolene (DANTRIUM) 80 mg in sterile water 240 mL IVPB  1 mg/kg IntraVENous Q6H     Continuous Infusions:   sodium chloride      sodium chloride 100 mL/hr at 08/28/24 0836     PRN Meds:sodium chloride flush, sodium chloride, potassium chloride **OR** potassium alternative oral replacement **OR** potassium chloride, magnesium sulfate, ondansetron **OR** ondansetron, polyethylene glycol,

## 2024-08-28 NOTE — PROGRESS NOTES
Dr. Retana, resident, notified that lab unable to obtain blood work and ABG due to tremors and agitation.

## 2024-08-28 NOTE — PROGRESS NOTES
Secure message sent to medical resident to report increased need for oxygenation. Awaiting response/orders.

## 2024-08-28 NOTE — ED NOTES
Patient wondering the miles getting agitated   Md made aware and medication ordered.      Porfirio Roche, RN  08/27/24 4291
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
## 2024-08-28 NOTE — SIGNIFICANT EVENT
Significant Event Note    Patient Name: Pacheco Prince Sr.   Admit Date: 8/27/2024   Code:Limited  PCP: Rohan Watson, APRN - CNP   Attending: Terrell Woodward MD    Chief Complaint: Altered Mental Status and Fatigue (Pt came into the ED with new onset of confusion, family states he has become aggressive and lethargic. Hx of Parkinsons)     Encounter Diagnosis:     ICD-10-CM    1. Dementia due to Parkinson's disease, with agitation, unspecified dementia severity (HCC)  G20.A1     F02.811       2. Transient alteration of awareness  R40.4             [facility]apid was called for room 5508 at 1:35 PM    On arrival, patient was seizing all over his body    Initial assessment:  Patient was seizing, shaking of his body with eyes rolled up.  Patient lost tablet to control his bowel motion.    Vital signs were BP of 176/71 with HR of 121. Blood glucose came back with 139  breath sounds was not clear breath sounds was not clear due to moaning vitals of the patient.       Interventions:  - 2 mg x 2 Ativan was given for the patient and he partially responded with less shaking.  - Keppra 4 mg IV was given as well to the patient.   - Neurology nurse practitioner was there talking to the family trying to get consent from them about intubation in case he needed that after multiple doses of Ativan but the family rejected that.  -Family acknowledged their wishes about being the patient limited without resuscitation or intubation.     Assessment and Plan  Discussions with the ICU attending was done for admitting the patient for more frequent neurochecks on the patient.       Dean Garcia MD, PGY-1  08/28/24  1:56 PM

## 2024-08-28 NOTE — PROGRESS NOTES
Physician Progress Note      PATIENT:               JOIE MADDOX  CSN #:                  [id number]  :                       1945  ADMIT DATE:       2024 6:03 PM  DISCH DATE:  RESPONDING  PROVIDER #:        Terrell Woodward MD          QUERY TEXT:    Pt admitted with aspiration pneumonia. Pt noted to have Tmax 103.3 rectal, HR   108, resp 20. If possible, please document in the progress notes and discharge   summary if you are evaluating and /or treating any of the following:    The medical record reflects the following:  Risk Factors: 78 yo, hx of aspiration pneumonia  Clinical Indicators: Acute metabolic encephalopathy, acute hypoxic respiratory   failure  Treatment: IVF, IV Zovirax, IV Levaquin, IV Merrem, IV Vancomycin    Divine Downey, JUSTINN, RN, CRCR  Clinical   Options provided:  -- Sepsis due to aspiration pneumonia, present on admission  -- Aspiration pneumonia, without Sepsis  -- Other - I will add my own diagnosis  -- Disagree - Not applicable / Not valid  -- Disagree - Clinically unable to determine / Unknown  -- Refer to Clinical Documentation Reviewer    PROVIDER RESPONSE TEXT:    This patient has sepsis due to aspiration pneumonia which was present on   admission.    Query created by: Divine Downey on 2024 3:55 PM      Electronically signed by:  Terrell Woodward MD 2024 6:37 PM

## 2024-08-28 NOTE — PROGRESS NOTES
4 Eyes Skin Assessment     NAME:  Pacheco Prince Sr.  YOB: 1945  MEDICAL RECORD NUMBER:  [id number]    The patient is being assessed for  Admission    I agree that at least one RN has performed a thorough Head to Toe Skin Assessment on the patient. ALL assessment sites listed below have been assessed.      Areas assessed by both nurses:    Head, Face, Ears, Shoulders, Back, Chest, Arms, Elbows, Hands, Sacrum. Buttock, Coccyx, Ischium, Legs. Feet and Heels, Under Medical Devices , and Other na         Does the Patient have a Wound? Yes wound(s) were present on assessment. LDA wound assessment was Initiated and completed by RN  Non blanchable redness on the both heels.   Stage III on Sacrum and coccyx.   Blanchable redness on both elbow   PEG Tube.     Posterior upper side of heel   Goldy Prevention initiated by RN: Yes  Wound Care Orders initiated by RN: No    Pressure Injury (Stage 3,4, Unstageable, DTI, NWPT, and Complex wounds) if present, place Wound referral order by RN under : Yes    New Ostomies, if present place, Ostomy referral order under : No     Nurse 1 eSignature: Electronically signed by Raina Prasad RN on 8/28/24 at 2:15 AM EDT    **SHARE this note so that the co-signing nurse can place an eSignature**    Nurse 2 eSignature: Electronically signed by Bebeto Alfredo RN on 8/28/24 at 3:30 AM EDT

## 2024-08-28 NOTE — PROGRESS NOTES
Responded to rapid response to assist in respiratory needs of patient. Patient placed on NRB 15L to maintain SpO2. NT suction provided.

## 2024-08-28 NOTE — PLAN OF CARE
Problem: Discharge Planning  Goal: Discharge to home or other facility with appropriate resources  8/28/2024 0406 by Raina Prasad, RN  Outcome: Progressing     Problem: Skin/Tissue Integrity  Goal: Absence of new skin breakdown  Description: 1.  Monitor for areas of redness and/or skin breakdown  2.  Assess vascular access sites hourly  3.  Every 4-6 hours minimum:  Change oxygen saturation probe site  4.  Every 4-6 hours:  If on nasal continuous positive airway pressure, respiratory therapy assess nares and determine need for appliance change or resting period.  8/28/2024 0406 by Raina Prasad, RN  Outcome: Progressing     Problem: Safety - Adult  Goal: Free from fall injury  8/28/2024 0406 by Raina Prasad, RN  Outcome: Progressing     Problem: ABCDS Injury Assessment  Goal: Absence of physical injury  8/28/2024 0406 by Raina Prasad, RN  Outcome: Progressing

## 2024-08-29 ENCOUNTER — TELEPHONE (OUTPATIENT)
Dept: INTERNAL MEDICINE CLINIC | Age: 79
End: 2024-08-29

## 2024-08-29 ENCOUNTER — HOSPITAL ENCOUNTER (INPATIENT)
Age: 79
LOS: 1 days | Discharge: HOME | End: 2024-08-29
Attending: HOSPITALIST | Admitting: HOSPITALIST
Payer: OTHER MISCELLANEOUS

## 2024-08-29 VITALS
DIASTOLIC BLOOD PRESSURE: 55 MMHG | WEIGHT: 176.37 LBS | HEART RATE: 70 BPM | SYSTOLIC BLOOD PRESSURE: 127 MMHG | BODY MASS INDEX: 26.73 KG/M2 | HEIGHT: 68 IN | TEMPERATURE: 98.1 F | OXYGEN SATURATION: 100 % | RESPIRATION RATE: 24 BRPM

## 2024-08-29 VITALS
HEART RATE: 69 BPM | TEMPERATURE: 98.4 F | DIASTOLIC BLOOD PRESSURE: 54 MMHG | OXYGEN SATURATION: 100 % | SYSTOLIC BLOOD PRESSURE: 114 MMHG | RESPIRATION RATE: 21 BRPM

## 2024-08-29 DIAGNOSIS — Z51.5 ENCOUNTER FOR PALLIATIVE CARE: Primary | ICD-10-CM

## 2024-08-29 LAB
ANION GAP SERPL CALCULATED.3IONS-SCNC: 11 MMOL/L (ref 3–16)
BASOPHILS # BLD: 0 K/UL (ref 0–0.2)
BASOPHILS NFR BLD: 0 %
BUN SERPL-MCNC: 19 MG/DL (ref 7–20)
CALCIUM SERPL-MCNC: 8.8 MG/DL (ref 8.3–10.6)
CHLORIDE SERPL-SCNC: 106 MMOL/L (ref 99–110)
CK SERPL-CCNC: 884 U/L (ref 39–308)
CO2 SERPL-SCNC: 24 MMOL/L (ref 21–32)
CREAT SERPL-MCNC: 0.6 MG/DL (ref 0.8–1.3)
DEPRECATED RDW RBC AUTO: 15.2 % (ref 12.4–15.4)
EOSINOPHIL # BLD: 0 K/UL (ref 0–0.6)
EOSINOPHIL NFR BLD: 0 %
GFR SERPLBLD CREATININE-BSD FMLA CKD-EPI: >90 ML/MIN/{1.73_M2}
GLUCOSE SERPL-MCNC: 104 MG/DL (ref 70–99)
HCT VFR BLD AUTO: 43.6 % (ref 40.5–52.5)
HGB BLD-MCNC: 14 G/DL (ref 13.5–17.5)
LYMPHOCYTES # BLD: 0.5 K/UL (ref 1–5.1)
LYMPHOCYTES NFR BLD: 6 %
MCH RBC QN AUTO: 30 PG (ref 26–34)
MCHC RBC AUTO-ENTMCNC: 32.2 G/DL (ref 31–36)
MCV RBC AUTO: 93.1 FL (ref 80–100)
METAMYELOCYTES NFR BLD MANUAL: 1 %
MONOCYTES # BLD: 1 K/UL (ref 0–1.3)
MONOCYTES NFR BLD: 11 %
NEUTROPHILS # BLD: 7.6 K/UL (ref 1.7–7.7)
NEUTROPHILS NFR BLD: 82 %
ORGANISM: ABNORMAL
PATH INTERP BLD-IMP: NO
PLATELET # BLD AUTO: 194 K/UL (ref 135–450)
PLATELET BLD QL SMEAR: ADEQUATE
PMV BLD AUTO: 8.5 FL (ref 5–10.5)
POTASSIUM SERPL-SCNC: 4.8 MMOL/L (ref 3.5–5.1)
RBC # BLD AUTO: 4.68 M/UL (ref 4.2–5.9)
RBC MORPH BLD: NORMAL
REPORT: NORMAL
REPORT: NORMAL
RESP PATH DNA+RNA PNL L RESP NAA+NON-PRB: NORMAL
RESP PATH DNA+RNA PNL NPH NAA+NON-PROBE: ABNORMAL
SLIDE REVIEW: ABNORMAL
SODIUM SERPL-SCNC: 141 MMOL/L (ref 136–145)
VANCOMYCIN SERPL-MCNC: 18.5 UG/ML
WBC # BLD AUTO: 9.1 K/UL (ref 4–11)

## 2024-08-29 PROCEDURE — 36415 COLL VENOUS BLD VENIPUNCTURE: CPT

## 2024-08-29 PROCEDURE — 80048 BASIC METABOLIC PNL TOTAL CA: CPT

## 2024-08-29 PROCEDURE — 2580000003 HC RX 258: Performed by: HOSPITALIST

## 2024-08-29 PROCEDURE — 2580000003 HC RX 258

## 2024-08-29 PROCEDURE — 94761 N-INVAS EAR/PLS OXIMETRY MLT: CPT

## 2024-08-29 PROCEDURE — 6360000002 HC RX W HCPCS: Performed by: NURSE PRACTITIONER

## 2024-08-29 PROCEDURE — 1250000000 HC SEMI PRIVATE HOSPICE R&B

## 2024-08-29 PROCEDURE — 2580000003 HC RX 258: Performed by: STUDENT IN AN ORGANIZED HEALTH CARE EDUCATION/TRAINING PROGRAM

## 2024-08-29 PROCEDURE — 2700000000 HC OXYGEN THERAPY PER DAY

## 2024-08-29 PROCEDURE — 6370000000 HC RX 637 (ALT 250 FOR IP)

## 2024-08-29 PROCEDURE — 6370000000 HC RX 637 (ALT 250 FOR IP): Performed by: NURSE PRACTITIONER

## 2024-08-29 PROCEDURE — 6360000002 HC RX W HCPCS: Performed by: HOSPITALIST

## 2024-08-29 PROCEDURE — 6360000002 HC RX W HCPCS

## 2024-08-29 PROCEDURE — 80202 ASSAY OF VANCOMYCIN: CPT

## 2024-08-29 PROCEDURE — 2500000003 HC RX 250 WO HCPCS: Performed by: STUDENT IN AN ORGANIZED HEALTH CARE EDUCATION/TRAINING PROGRAM

## 2024-08-29 PROCEDURE — 82550 ASSAY OF CK (CPK): CPT

## 2024-08-29 PROCEDURE — 85025 COMPLETE CBC W/AUTO DIFF WBC: CPT

## 2024-08-29 PROCEDURE — 2500000003 HC RX 250 WO HCPCS

## 2024-08-29 RX ORDER — HYOSCYAMINE SULFATE 0.12 MG/ML
4 LIQUID ORAL EVERY 4 HOURS PRN
Qty: 30 ML | Refills: 0 | Status: SHIPPED | OUTPATIENT
Start: 2024-08-29 | End: 2024-08-29 | Stop reason: HOSPADM

## 2024-08-29 RX ORDER — CARBIDOPA AND LEVODOPA 25; 100 MG/1; MG/1
3 TABLET ORAL
Status: CANCELLED | OUTPATIENT
Start: 2024-08-29

## 2024-08-29 RX ORDER — ACETAMINOPHEN 325 MG/1
650 TABLET ORAL EVERY 6 HOURS PRN
Status: DISCONTINUED | OUTPATIENT
Start: 2024-08-29 | End: 2024-08-29 | Stop reason: HOSPADM

## 2024-08-29 RX ORDER — SODIUM CHLORIDE 9 MG/ML
INJECTION, SOLUTION INTRAVENOUS PRN
Status: CANCELLED | OUTPATIENT
Start: 2024-08-29

## 2024-08-29 RX ORDER — SODIUM CHLORIDE 9 MG/ML
INJECTION, SOLUTION INTRAVENOUS CONTINUOUS
Status: CANCELLED | OUTPATIENT
Start: 2024-08-29

## 2024-08-29 RX ORDER — MORPHINE SULFATE 20 MG/ML
10 SOLUTION ORAL
Qty: 30 ML | Refills: 0 | Status: SHIPPED | OUTPATIENT
Start: 2024-08-29 | End: 2024-09-03

## 2024-08-29 RX ORDER — MORPHINE SULFATE 20 MG/ML
10 SOLUTION ORAL
Status: DISCONTINUED | OUTPATIENT
Start: 2024-08-29 | End: 2024-08-29 | Stop reason: HOSPADM

## 2024-08-29 RX ORDER — CARBIDOPA AND LEVODOPA 25; 100 MG/1; MG/1
3 TABLET ORAL PRN
Status: CANCELLED | OUTPATIENT
Start: 2024-08-29

## 2024-08-29 RX ORDER — SODIUM CHLORIDE 0.9 % (FLUSH) 0.9 %
5-40 SYRINGE (ML) INJECTION PRN
Status: CANCELLED | OUTPATIENT
Start: 2024-08-29

## 2024-08-29 RX ORDER — LORAZEPAM 2 MG/ML
1 INJECTION INTRAMUSCULAR EVERY 6 HOURS PRN
Status: DISCONTINUED | OUTPATIENT
Start: 2024-08-29 | End: 2024-08-29 | Stop reason: HOSPADM

## 2024-08-29 RX ORDER — LORAZEPAM 2 MG/ML
1 INJECTION INTRAMUSCULAR EVERY 6 HOURS PRN
Status: CANCELLED | OUTPATIENT
Start: 2024-08-29

## 2024-08-29 RX ORDER — LEVETIRACETAM 500 MG/5ML
1000 INJECTION, SOLUTION, CONCENTRATE INTRAVENOUS EVERY 12 HOURS
Status: CANCELLED | OUTPATIENT
Start: 2024-08-29

## 2024-08-29 RX ORDER — SODIUM CHLORIDE 0.9 % (FLUSH) 0.9 %
5-40 SYRINGE (ML) INJECTION PRN
Status: DISCONTINUED | OUTPATIENT
Start: 2024-08-29 | End: 2024-08-29 | Stop reason: HOSPADM

## 2024-08-29 RX ORDER — MORPHINE SULFATE 20 MG/ML
10 SOLUTION ORAL
Status: CANCELLED | OUTPATIENT
Start: 2024-08-29

## 2024-08-29 RX ORDER — SODIUM CHLORIDE 9 MG/ML
INJECTION, SOLUTION INTRAVENOUS PRN
Status: DISCONTINUED | OUTPATIENT
Start: 2024-08-29 | End: 2024-08-29 | Stop reason: HOSPADM

## 2024-08-29 RX ORDER — SODIUM CHLORIDE 9 MG/ML
INJECTION, SOLUTION INTRAVENOUS CONTINUOUS
Status: DISCONTINUED | OUTPATIENT
Start: 2024-08-29 | End: 2024-08-29 | Stop reason: HOSPADM

## 2024-08-29 RX ORDER — POLYETHYLENE GLYCOL 3350 17 G/17G
17 POWDER, FOR SOLUTION ORAL DAILY PRN
Status: DISCONTINUED | OUTPATIENT
Start: 2024-08-29 | End: 2024-08-29 | Stop reason: HOSPADM

## 2024-08-29 RX ORDER — LORAZEPAM 2 MG/ML
1 CONCENTRATE ORAL EVERY 4 HOURS PRN
Qty: 10 ML | Refills: 0 | Status: SHIPPED | OUTPATIENT
Start: 2024-08-29 | End: 2024-09-05

## 2024-08-29 RX ORDER — CARBIDOPA AND LEVODOPA 25; 100 MG/1; MG/1
3 TABLET ORAL
Status: DISCONTINUED | OUTPATIENT
Start: 2024-08-29 | End: 2024-08-29 | Stop reason: HOSPADM

## 2024-08-29 RX ORDER — ACETAMINOPHEN 325 MG/1
650 TABLET ORAL EVERY 6 HOURS PRN
Status: CANCELLED | OUTPATIENT
Start: 2024-08-29

## 2024-08-29 RX ORDER — ACETAMINOPHEN 650 MG/1
650 SUPPOSITORY RECTAL EVERY 6 HOURS PRN
Status: DISCONTINUED | OUTPATIENT
Start: 2024-08-29 | End: 2024-08-29 | Stop reason: HOSPADM

## 2024-08-29 RX ORDER — LORAZEPAM 2 MG/ML
4 INJECTION INTRAMUSCULAR EVERY 6 HOURS PRN
Status: DISCONTINUED | OUTPATIENT
Start: 2024-08-29 | End: 2024-08-29 | Stop reason: HOSPADM

## 2024-08-29 RX ORDER — SODIUM CHLORIDE 0.9 % (FLUSH) 0.9 %
5-40 SYRINGE (ML) INJECTION EVERY 12 HOURS SCHEDULED
Status: DISCONTINUED | OUTPATIENT
Start: 2024-08-29 | End: 2024-08-29 | Stop reason: HOSPADM

## 2024-08-29 RX ORDER — ACETAMINOPHEN 650 MG/1
650 SUPPOSITORY RECTAL EVERY 6 HOURS PRN
Status: CANCELLED | OUTPATIENT
Start: 2024-08-29

## 2024-08-29 RX ORDER — LEVETIRACETAM 500 MG/5ML
1000 INJECTION, SOLUTION, CONCENTRATE INTRAVENOUS EVERY 12 HOURS
Status: DISCONTINUED | OUTPATIENT
Start: 2024-08-29 | End: 2024-08-29 | Stop reason: HOSPADM

## 2024-08-29 RX ORDER — POLYETHYLENE GLYCOL 3350 17 G/17G
17 POWDER, FOR SOLUTION ORAL DAILY PRN
Status: CANCELLED | OUTPATIENT
Start: 2024-08-29

## 2024-08-29 RX ORDER — CARBIDOPA AND LEVODOPA 25; 100 MG/1; MG/1
3 TABLET ORAL PRN
Status: DISCONTINUED | OUTPATIENT
Start: 2024-08-29 | End: 2024-08-29 | Stop reason: HOSPADM

## 2024-08-29 RX ORDER — SODIUM CHLORIDE 0.9 % (FLUSH) 0.9 %
5-40 SYRINGE (ML) INJECTION EVERY 12 HOURS SCHEDULED
Status: CANCELLED | OUTPATIENT
Start: 2024-08-29

## 2024-08-29 RX ORDER — LORAZEPAM 2 MG/ML
4 INJECTION INTRAMUSCULAR EVERY 6 HOURS PRN
Status: CANCELLED | OUTPATIENT
Start: 2024-08-29

## 2024-08-29 RX ADMIN — CARBIDOPA AND LEVODOPA 3 TABLET: 25; 100 TABLET ORAL at 13:58

## 2024-08-29 RX ADMIN — MORPHINE SULFATE 10 MG: 10 SOLUTION ORAL at 17:31

## 2024-08-29 RX ADMIN — LEVETIRACETAM 1000 MG: 100 INJECTION INTRAVENOUS at 08:39

## 2024-08-29 RX ADMIN — ACYCLOVIR SODIUM 800 MG: 50 INJECTION, SOLUTION INTRAVENOUS at 04:22

## 2024-08-29 RX ADMIN — CARBIDOPA AND LEVODOPA 3 TABLET: 25; 100 TABLET ORAL at 11:06

## 2024-08-29 RX ADMIN — SODIUM CHLORIDE: 9 INJECTION, SOLUTION INTRAVENOUS at 11:20

## 2024-08-29 RX ADMIN — WATER 80 MG: 100 INJECTION, SOLUTION INTRAVENOUS at 11:20

## 2024-08-29 RX ADMIN — LORAZEPAM 1 MG: 2 INJECTION INTRAMUSCULAR; INTRAVENOUS at 09:56

## 2024-08-29 RX ADMIN — CARBIDOPA AND LEVODOPA 3 TABLET: 25; 100 TABLET ORAL at 17:31

## 2024-08-29 RX ADMIN — CARBIDOPA AND LEVODOPA 3 TABLET: 25; 100 TABLET ORAL at 08:41

## 2024-08-29 RX ADMIN — MEROPENEM 2000 MG: 2 INJECTION, POWDER, FOR SOLUTION INTRAVENOUS at 01:18

## 2024-08-29 RX ADMIN — MEROPENEM 2000 MG: 2 INJECTION, POWDER, FOR SOLUTION INTRAVENOUS at 08:39

## 2024-08-29 RX ADMIN — MORPHINE SULFATE 10 MG: 10 SOLUTION ORAL at 11:39

## 2024-08-29 RX ADMIN — DANTROLENE SODIUM 80 MG: 20 INJECTION, POWDER, LYOPHILIZED, FOR SOLUTION INTRAVENOUS at 05:09

## 2024-08-29 RX ADMIN — CARBIDOPA AND LEVODOPA 3 TABLET: 25; 100 TABLET ORAL at 06:30

## 2024-08-29 RX ADMIN — CARBIDOPA AND LEVODOPA 3 TABLET: 25; 100 TABLET ORAL at 15:29

## 2024-08-29 NOTE — PROGRESS NOTES
RT to bedside for NT suction. No secretions out. Pt making purposeful movement towards face with BUE during NT suction.

## 2024-08-29 NOTE — DISCHARGE SUMMARY
very sick looking, in resp distress   Eyes: EOMI  ENT: neck supple  Cardiovascular: Regular rate.  Respiratory: Clear to auscultation  Gastrointestinal: feeding tube   Genitourinary: no suprapubic tenderness  Musculoskeletal: No edema  Skin: warm, dry  Neuro: rigid, resting tremors .  Psych: deferred         Labs and Imaging   XR CHEST PORTABLE    Result Date: 8/28/2024  EXAM: PORTABLE AP CHEST X-RAY, 8/28/2024 11:28 AM INDICATION: Increase o2 requirements Parkinson's disease without dyskinesia, without mention of fluctuations / Reason for exam:->Increase o2 requirements COMPARISON: 8/27/2024 FINDINGS: Trachea and mediastinum are deviated to the left Opacification with complete whiteout of the left lung, consolidative pneumonia or atelectasis. Endobronchial occlusion is considered distal to the left mainstem bronchus The right lung is clear and slightly hyperinflated.     1. Complete opacification of the left hemithorax, new from prior study 2. There is volume loss with shift of the mediastinum to the left 3. Right lung is clear . Electronically signed by Baltazar Pettit    CT HEAD WO CONTRAST    Result Date: 8/27/2024  EXAM: CT HEAD WO CONTRAST ON 8/27/2024 INDICATION: concern for unwitnessed fall COMPARISON: CT head 5/19/2024 TECHNICAL: Axial CT imaging obtained from vertex to skull base. Axial images and multiplanar reformatted images reviewed.   Dose modulation, iterative reconstruction and/or weight based adjustments of the mA/kV were utilized to reduce radiation dose to as low as reasonably achievable IV Contrast: None. LIMITATIONS: None FINDINGS: : No additional abnormality INTRACRANIAL HEMORRHAGE: No intra- or extra-axial hemorrhage. VENTRICLES: The ventricles are mildly enlarged. BRAIN PARENCHYMA: There is moderate atrophy. Decreased attenuation is seen in the periventricular white matter. There is no evidence of acute edema. SKULL: No destructive osseous process or fracture. PARANASAL SINUSES AND  MASTOIDS: There is mild inflammation in the ethmoid air cells. ORBITS: Normal as visualized. EXTRACRANIAL SOFT TISSUES: Unremarkable.     No acute intracranial pathology  Atrophy and ischemic leukoencephalopathy Electronically signed by Geovanna Melendez MD    XR CHEST PORTABLE    Result Date: 8/27/2024  EXAM: PORTABLE AP CHEST X-RAY INDICATION: confusion, r/o infection, COMPARISON: 5/19/2024 FINDINGS: There is no focal consolidation, pleural effusion, or pneumothorax. The cardiomediastinal silhouette is normal. The visible bony thorax is intact.     No acute pulmonary disease. Electronically signed by Trevor Cabrales      CBC:   Recent Labs     08/27/24  1901 08/29/24  0342   WBC 7.7 9.1   HGB 12.7* 14.0    194     BMP:    Recent Labs     08/27/24  1901 08/28/24  1645 08/29/24  0342    142 141   K 4.2 3.7 4.8    106 106   CO2 25 24 24   BUN 19 23* 19   CREATININE <0.5* 0.7* 0.6*   GLUCOSE 105* 88 104*     Hepatic:   Recent Labs     08/27/24  1901 08/28/24  1645   AST 12* 25   ALT <5* <5*   BILITOT 0.4 0.5   ALKPHOS 114 73     Lipids:   Lab Results   Component Value Date/Time    CHOL 80 03/05/2024 05:07 AM    HDL 21 03/05/2024 05:07 AM    HDL 39 05/08/2012 06:27 AM    TRIG 66 03/05/2024 05:07 AM     Hemoglobin A1C:   Lab Results   Component Value Date/Time    LABA1C 6.1 03/05/2024 05:07 AM     TSH:   Lab Results   Component Value Date/Time    TSH 2.11 08/27/2024 07:01 PM     Troponin: No results found for: \"TROPONINT\"  Lactic Acid:   Recent Labs     08/28/24  1258 08/28/24  1645   LACTA 3.3* 2.7*     BNP: No results for input(s): \"PROBNP\" in the last 72 hours.  UA:  Lab Results   Component Value Date/Time    NITRU Negative 08/27/2024 07:01 PM    COLORU Yellow 08/27/2024 07:01 PM    PHUR 6.0 08/27/2024 07:01 PM    PHUR 7.0 04/24/2024 09:30 AM    WBCUA 1 04/24/2024 09:30 AM    RBCUA 2 04/24/2024 09:30 AM    MUCUS 3+ 12/15/2022 12:06 PM    BACTERIA None Seen 04/24/2024 09:30 AM    CLARITYU Clear

## 2024-08-29 NOTE — PLAN OF CARE
Problem: Discharge Planning  Goal: Discharge to home or other facility with appropriate resources  8/29/2024 0836 by Anna Jung RN  Outcome: Progressing     Problem: Skin/Tissue Integrity  Goal: Absence of new skin breakdown  Description: 1.  Monitor for areas of redness and/or skin breakdown  2.  Assess vascular access sites hourly  3.  Every 4-6 hours minimum:  Change oxygen saturation probe site  4.  Every 4-6 hours:  If on nasal continuous positive airway pressure, respiratory therapy assess nares and determine need for appliance change or resting period.  8/29/2024 0836 by Anna Jung RN  Outcome: Progressing     Problem: Safety - Adult  Goal: Free from fall injury  8/29/2024 0836 by Anna Jung RN  Outcome: Progressing     Problem: ABCDS Injury Assessment  Goal: Absence of physical injury  8/29/2024 0836 by Anna Jung RN  Outcome: Progressing     Problem: Chronic Conditions and Co-morbidities  Goal: Patient's chronic conditions and co-morbidity symptoms are monitored and maintained or improved  8/29/2024 0836 by Anna Jung RN  Outcome: Progressing     Problem: Pain  Goal: Verbalizes/displays adequate comfort level or baseline comfort level  8/29/2024 0836 by Anna Jung RN  Outcome: Progressing

## 2024-08-29 NOTE — PROGRESS NOTES
Pt requiring nonrebreather and high flow nasal cannula after bath. Lung sounds are diminished and rhonchorus. Accessory muscle use and retractions. RR 25. ICU team aware. No new orders

## 2024-08-29 NOTE — PROGRESS NOTES
Pt desatting to 79% on 15L high flow nasal cannula. Pt placed on nonrebreather. Pt requiring more than 15 minutes to recover. Family educated on vapotherm; refused. ICU resident to the bedside for plan of care discussion.

## 2024-08-29 NOTE — H&P
Obey Callahan MD   cloZAPine (CLOZARIL) 25 MG tablet Take 1 tablet by mouth 2 times daily 3/28/24   Keon Sheets MD   carboxymethylcellulose 1 % ophthalmic solution Place 1 drop into both eyes 2 times daily    Obey Callahan MD   Melatonin 10 MG TABS Take 10 mg by mouth nightly    Obey Callahan MD   polyethylene glycol (GLYCOLAX) 17 GM/SCOOP powder Take 8.5 g by mouth nightly    Obey Callahan MD   atorvastatin (LIPITOR) 80 MG tablet TAKE 1 TABLET EVERY DAY 10/13/23   Wilver Solo MD   primidone (MYSOLINE) 50 MG tablet Take 1 tablet by mouth nightly 6/6/23   Luke Richard MD   ipratropium (ATROVENT) 0.06 % nasal spray 2 sprays by Each Nostril route 3 times daily as needed for Rhinitis    Obey Callahan MD   docusate sodium (COLACE) 100 MG capsule Take 1 capsule by mouth 2 times daily TID if less tube feeds    Obey Callahan MD   nitroGLYCERIN (NITROSTAT) 0.4 MG SL tablet Place 1 tablet under the tongue every 5 minutes as needed (prn) 1/30/23   Wilver Solo MD   donepezil (ARICEPT) 10 MG tablet Take 1 tablet by mouth nightly 2/10/22   Obey Callahan MD   carbidopa-levodopa (SINEMET)  MG per tablet Take 1.5 tablets by mouth See Admin Instructions Q2H 7942-3334 + once in middle of night    Obey Callahan MD   Menatetrenone (VITAMIN K2) 100 MCG TABS Take 100 mcg by mouth daily    Obey Callahan MD   Vitamin D, Cholecalciferol, 50 MCG (2000 UT) CAPS Take 1 capsule by mouth daily    Obey Callahan MD   Carbidopa-Levodopa ER 36. MG CPCR Take 2 capsules by mouth See Admin Instructions Q2h 4303-9337    Obey Callahan MD   omeprazole (PRILOSEC) 20 MG delayed release capsule Take 1 capsule by mouth nightly    Obey Callahan MD   aspirin 81 MG tablet Take 1 tablet by mouth daily 12/14/15   Wilver Solo MD       Physical Exam:    Physical Exam     General: very sick looking  Eyes: EOMI  ENT: neck      Lab Results   Component Value Date/Time    BLOODCULT2 No Growth after 4 days of incubation. 02/29/2024 08:28 PM     Organism:   Lab Results   Component Value Date/Time    ORG SARS CoV 2  by PCR 08/28/2024 12:20 PM       Imaging/Diagnostics Last 24 Hours   XR CHEST PORTABLE    Result Date: 8/28/2024  EXAM: PORTABLE AP CHEST X-RAY, 8/28/2024 11:28 AM INDICATION: Increase o2 requirements Parkinson's disease without dyskinesia, without mention of fluctuations / Reason for exam:->Increase o2 requirements COMPARISON: 8/27/2024 FINDINGS: Trachea and mediastinum are deviated to the left Opacification with complete whiteout of the left lung, consolidative pneumonia or atelectasis. Endobronchial occlusion is considered distal to the left mainstem bronchus The right lung is clear and slightly hyperinflated.     1. Complete opacification of the left hemithorax, new from prior study 2. There is volume loss with shift of the mediastinum to the left 3. Right lung is clear . Electronically signed by Baltazar Pettit    CT HEAD WO CONTRAST    Result Date: 8/27/2024  EXAM: CT HEAD WO CONTRAST ON 8/27/2024 INDICATION: concern for unwitnessed fall COMPARISON: CT head 5/19/2024 TECHNICAL: Axial CT imaging obtained from vertex to skull base. Axial images and multiplanar reformatted images reviewed.   Dose modulation, iterative reconstruction and/or weight based adjustments of the mA/kV were utilized to reduce radiation dose to as low as reasonably achievable IV Contrast: None. LIMITATIONS: None FINDINGS: : No additional abnormality INTRACRANIAL HEMORRHAGE: No intra- or extra-axial hemorrhage. VENTRICLES: The ventricles are mildly enlarged. BRAIN PARENCHYMA: There is moderate atrophy. Decreased attenuation is seen in the periventricular white matter. There is no evidence of acute edema. SKULL: No destructive osseous process or fracture. PARANASAL SINUSES AND MASTOIDS: There is mild inflammation in the ethmoid air cells. ORBITS:

## 2024-08-29 NOTE — PROGRESS NOTES
Palliative Care Chart Review  and Check in Note:     NAME:  Pacheco Prince Sr.  Admit Date: 8/27/2024  Hospital Day:  Hospital Day: 3   Current Code status: DNR-CC    Palliative care is continuing to following Mr. Prince for symptom management,  and goals of care discussion as needed. Patient's chart reviewed today 8/29/24.      Received message from ANGIE Castillo this morning, pt's family have elected to transition him to comfort care and are requesting to speak with hospice. I have reached out to [facility] to have a nurse come to bedside today to enroll with hospice.       The following are the currently established goals/code status, and Symptom management.     Goals of care: Goals are comfort directed    Code status: DNRCC    Discharge plan: Likely hospice GIP vs hospice IPU, pending stability      KEYSHA Gamez CNP  08/29/24  7:54 AM

## 2024-08-29 NOTE — DISCHARGE SUMMARY
INTERNAL MEDICINE DEPARTMENT AT THE [facility]  DISCHARGE SUMMARY    Patient ID: Pacheco Prince Sr.                                             Discharge Date: 8/29/24  Patient's PCP: Rohan Watson, APRN - CNP                                          Discharge Physician: Terrell Woodward  Admit Date: 8/27/2024   Admitting Physician: Casey Webber MD    PROBLEMS DURING HOSPITALIZATION:  Present on Admission:   AMS (altered mental status)   Dementia due to Parkinson's disease, with agitation (HCC)   Encounter for palliative care      Brief HPI:  Today 29/08 family's patient decided to transition patient to comfort care.      The following issues were addressed during hospitalization:    Encephalopathy probably 2/2 Neuroleptic malignant syndrome   He presented to the ED (27/8) for violent behavior,fever and tachycardia ,received Haloperidol and Seroquel on the ED and was admitted to the floor , on the floor he experiences rapid body shaking that appear seizure like ICU and neuro came to bedside to evaluate. Patient was given 4mg of ativan with resolution of his shaking and was admitted to the ICU for neurologic monitoring.    Acute hypoxic respiratory failure.   Patient developed acute worsening respiratory status with largely elevated O2 requirements, CXR revealed total L lung opacification with volume loss. He was was placed on NRB and was scheduled for a bronchoscopy that was not done because family was discussing goals of care and if they want to do more invasive procedures.                  Significant physical exam findings at time of discharge:delete if normal physical exam**    Consults: Neurology, Hospice   Significant Diagnostic Studies:    XR CHEST PORTABLE   Final Result   1. Complete opacification of the left hemithorax, new from prior study   2. There is volume loss with shift of the mediastinum to the left   3. Right lung is clear      .      Electronically signed by Baltazar Pettit      CT HEAD

## 2024-08-29 NOTE — CARE COORDINATION
Case Management Assessment  Initial Evaluation    Date/Time of Evaluation: 8/29/2024 10:32 AM  Assessment Completed by: MARNIE ABRAMS    If patient is discharged prior to next notation, then this note serves as note for discharge by case management.    Patient Name: Pacheco Prince Sr.                   YOB: 1945  Diagnosis: AMS                   Date / Time: 8/29/2024 10:23 AM    Patient Admission Status: Hospice IP   Readmission Risk (Low < 19, Mod (19-27), High > 27): Readmission Risk Score: 16.4    Current PCP: Rohan Watson, KEYSHA - CNP  PCP verified by CM? No    Chart Reviewed: Yes      History Provided by: Medical Record  Patient Orientation: Unable to Assess (GIP with Vitas)    Patient Cognition: Other (see comment) (GIP with Vitas)    Hospitalization in the last 30 days (Readmission):  Yes    If yes, Readmission Assessment in CM Navigator will be completed.    Advance Directives:      Code Status: DNR-CC   Patient's Primary Decision Maker is: Named in Scanned ACP Document    Primary Decision Maker: Tiffany Prince - Spouse - [phone]    Discharge Planning:    Patient lives with:   Type of Home:    Primary Care Giver:  (GIP with Vitas)  Patient Support Systems include: Spouse/Significant Other, Family Members   Current Financial resources: Medicare  Current community resources: None  Current services prior to admission:              Current DME:              Type of Home Care services:       ADLS  Prior functional level: Other (see comment)  Current functional level: Other (see comment)    PT AM-PAC:   /24  OT AM-PAC:   /24    Family can provide assistance at DC: No  Would you like Case Management to discuss the discharge plan with any other family members/significant others, and if so, who? Yes  Plans to Return to Present Housing:    Other Identified Issues/Barriers to RETURNING to current housing: na  Potential Assistance needed at discharge:              Potential DME:    Patient

## 2024-08-29 NOTE — PROGRESS NOTES
Pt's wife refusing all neuro assessments and q2 turns. Family educated on the purpose of neuro assessments and turning as well as the risk of refusal. NCC NP notified. No new orders.

## 2024-08-29 NOTE — PROGRESS NOTES
105* 88 104*   CALCIUM 8.9 8.5 8.8   ANIONGAP 11 12 11     Hepatic:   Recent Labs     08/27/24  1901 08/28/24  1645   AST 12* 25   ALT <5* <5*   BILITOT 0.4 0.5   ALKPHOS 114 73     Troponin: No results for input(s): \"TROPONINI\" in the last 72 hours.  BNP: No results for input(s): \"BNP\" in the last 72 hours.  Lipids: No results for input(s): \"CHOL\", \"HDL\" in the last 72 hours.    Invalid input(s): \"LDLCALCU\", \"TRIGLYCERIDE\"  ABGs:  No results for input(s): \"PHART\", \"PUJ4RIW\", \"PO2ART\", \"GIZ8FBG\", \"BEART\", \"THGBART\", \"U0PUTFAE\", \"HLY7WLC\" in the last 72 hours.    INR: No results for input(s): \"INR\" in the last 72 hours.  Lactate: No results for input(s): \"LACTATE\" in the last 72 hours.  Cultures:  -----------------------------------------------------------------  RAD:   XR CHEST PORTABLE   Final Result   1. Complete opacification of the left hemithorax, new from prior study   2. There is volume loss with shift of the mediastinum to the left   3. Right lung is clear      .      Electronically signed by Baltazar Pettit      CT HEAD WO CONTRAST   Final Result      No acute intracranial pathology        Atrophy and ischemic leukoencephalopathy      Electronically signed by Geovanna Melendez MD      XR CHEST PORTABLE   Final Result      No acute pulmonary disease.         Electronically signed by Trevor Cabrales            Assessment/Plan:     Pacheco Prince is a 80 y/o male with past medical history of Parkinson's disease,anxiety, insomnia, hypertension, hyperlipidemia and diabetes.who presented to the ED (8/27) for altered mental status While in the ER, he was agitated,he received Haldol three times and 50 mg Seroquel .  Today on the floor he has been tachycardic on 100 and hypertensive with a systolic on 170,also he presented a new onset of generalized tonic clonic seizure that was aborted with lorazepam 4 mg and he was transfer to the ICU for neurologic monitoring for possible neuroleptic malignant syndrome.    Goals of  care  DNR-CC  Family members are here this morning and they are ready to transition patient to comfort care. was having increased work of breathing and required a non-rebreather. At this point they didn't want to purse any more aggressive forms than that of oxygenation and are ready to make patient as comfortable as possible.   -Comfort care orders in chart    Encephalopathy probably 2/2 Neuroleptic malignant syndrome   Patient presented with violent behavior. Also fevers and tachycardia. He received haloperidol and Seroquel at the emergency room. He experiences rapid body shaking that appears seizure-like. Per wife, this shaking looks similar to what he does at home and he sometimes shakes for hours.  8/28: Patient had a rapid shaking episode with a BM during it that appeared like a seizure. It was aborted with 4mg IV ativan.  Plan   -Avoid ALL dopamine antagonists, serotonergic, and anticoholinergic medications (no thorazine, haldol, zyprexa, compazine Reglan)  -Keppra 1500 mg BID  -Lorazepam 4 mg q6h prn for seizures.    Acute hypoxic respiratory failure.   Patient developed acute worsening respiratory status with largely elevated O2 requirements, CXR revealed total L lung opacification with volume loss.  -Oxygen for comfort and morphine PRN for SOB          Code Status: DNR-CC   DISPO: Inpatient hospice    Sanna Carballo MD, PGY-1  Internal Medicine Resident  Contact via TweepsMap  08/29/24  8:27 AM    This patient has been staffed and discussed with

## 2024-08-29 NOTE — PROGRESS NOTES
[facility] -  Clinical Pharmacy Note    Vancomycin - Management by Pharmacy    Consult Date(s): 8/28  Consulting Provider(s): Terrell Woodward    Assessment / Plan  CNS Infeciton - Vancomycin  Concurrent Antimicrobials: Acyclovir, meropenem  Day of Vanc Therapy / Ordered Duration: 2/ unspecified  Current Dosing Method: Bayesian-Guided AUC Dosing  Therapeutic Goal: -600 mg/L*hr  Current Dose / Plan:   Scr stable at 0.6 mg/dL today.UOP is at ~0.78 mL/kg/hr. Continue monitoring.   S/p load with 2000 mg (25 mg/kg)IV x1   Current dose: 1500 mg Q12H (~18.8 mg/kg)  Predicted AUCss = 491, Predicted trough = 12.2  Random level came back 18.5 (~4 hrs since last dose)  Given that patient is afebrile, renal function stable, and WBC is WNL, will plan to continue current regimen even though AUC is slightly below goal  Will plan to check level in 2 days to re-assess kinetics  Will continue to monitor clinical condition and make adjustments to regimen as appropriate.    Thank you for consulting pharmacy,  Please call with any questions,  Johnathon Lipscomb, PharmD  PGY1 Pharmacy Resident   Wireless: [phone]  08/29/24       Interval update: Patient had a seizure episode yesterday and was transferred to the ICU. Patient WBC has remained WNL since admission. Tmax 103.3F (yesterday at 10AM).    Subjective/Objective:   Pacheco Prince Sr. is a 79 y.o. male with a PMHx significant for Parkinson's disease, hypotention (SBP baseline 90s-110s), constipation, recurrent aspiration PNA, who presented with AMS accompanied by violence. Wife notes he was like this the last time he had PNA. Patient has become more agitated recently and was febrile. Broad spectrum abx started for concern of CNS infection. LP unlikely to be successful given tremors. Was given haloperidol for agitation but per neuro recs, will monitor for no dopamine antagonists given concern for neuroleptic malignant syndrome.    Pharmacy is consulted to dose vancomycin.    Ely  Readings from Last 1 Encounters:   08/27/24 1.727 m (5' 8\")     Wt Readings from Last 1 Encounters:   08/27/24 80 kg (176 lb 5.9 oz)     Current & Prior Antimicrobial Regimen(s):  Acyclovir 800 mg IV Q8h (8/28-current)  Meropenem 2000 mg EI IV Q8h (8/28- current)  Vancomycin - PTD (8/28-current)  2000 mg IV x1 (8/28)  1500 mg IV Q12h (8/28-current)    Vancomycin Level(s) / Doses:    Date Time Dose Type of Level / Level Interpretation   8/29 0342 1500 mg IV Q12h Random =  18.5 (~4 hrs since last dose) Predicted AUCss = 491, Predicted trough = 12.2  Although predicted AUC slightly below goal, Continue current regimen given that patient is now afebrile and WBC has remained WNL since admission          Note: Serum levels collected for AUC-based dosing may be high if collected in close proximity to the dose administered. This is not necessarily indicative of toxicity.    Cultures & Sensitivities:    Date Site Micro Susceptibility / Result   8/28 Blood x2 Pending    8/28 Respiratory Panel Negative (+ COVID19)    8/28 Urine  NGTD      Recent Labs     08/27/24  1901 08/28/24  1645 08/29/24  0342   CREATININE <0.5* 0.7* 0.6*   BUN 19 23* 19   WBC 7.7  --  9.1       Estimated Creatinine Clearance: 100 mL/min (A) (based on SCr of 0.6 mg/dL (L)).    Additional Lab Values / Findings of Note:    No results for input(s): \"PROCAL\" in the last 72 hours.

## 2024-08-29 NOTE — PROGRESS NOTES
Hospice and spiritual care called per families request. Left a voicemail with hospice, spiritual care is on the way to the unit.

## 2024-08-29 NOTE — TELEPHONE ENCOUNTER
Care Transitions Initial Follow Up Call    Outreach made within 2 business days of discharge: Yes    Patient: Pacheco Prince Sr. Patient : 1945   MRN: [id number]  Reason for Admission:     Scheduled appointment with PCP within 7-14 days    Pt currently back in hospital - [facility].    Follow Up  Future Appointments   Date Time Provider Department Center   10/30/2024 10:40 AM Rohan Watson, APRN - CNP [facility] DEP       Malgorzata Goetz LPN

## 2024-08-29 NOTE — PROGRESS NOTES
NCC NP to bedside to discuss POC with pt's wife. OK for q2 neuro exams per wife. Order changed per NCC NP.

## 2024-08-29 NOTE — PROGRESS NOTES
Spiritual Health Assessment/Progress Note  [facility]    (P) Follow-up, Grief, Loss, and Adjustments, Rapid Response, (P) Anticipatory Grief, Hospice,      Name: Pacheco Prince Sr. MRN: [id number]    Age: 79 y.o.     Sex: male   Language: English   Muslim: Baptism   AMS (altered mental status)     Date: 8/29/2024            Total Time Calculated: (P) 50 min              Spiritual Assessment continued in [facility] ICU        Referral/Consult From: (P) Nurse   Encounter Overview/Reason: (P) Follow-up, Grief, Loss, and Adjustments  Service Provided For: (P) Patient and family together    Eloise, Belief, Meaning:   Patient unable to communicate at this time  Family/Friends are connected with a eloise tradition or spiritual practice and have beliefs or practices that help with coping during difficult times      Importance and Influence:  Family/Friends have spiritual/personal beliefs that influence decisions regarding the patient's health    Community:  Family/Friends are connected with a spiritual community: and feel well-supported. Support system includes: Children, Eloise Community, and Extended family    Assessment and Plan of Care:   Family/Friends Interventions include: Family/Friends Interventions, Explored spiritual coping/struggle/distress and theological reflection, Affirmed coping skills/support systems, Engaged in life review and/or legacy, and Other:  prayed with family at bedside.  and family reflected on the last days of Spike with his apostles before his death. Pt's spouse expressed that \"as a nurse, this is the most important moments\", to be with her family as her  is transitioning. The family is Pentecostal and values prayers.    Patient Plan of Care:   Family/Friends Plan of Care: Other:  will continue to visit.    Electronically signed by Chaplain Tabatha on 8/29/2024 at 8:12 AM

## 2024-08-29 NOTE — PROGRESS NOTES
Patient's extra family members are here this morning and they are ready to transition patient to comfort care.  He was having increased work of breathing and required a non-rebreather.  At this point they didn't want to purse any more aggressive forms than that of oxygenation and are ready to make patient as comfortable as possible.  Patient's wife and other family members in the room are in agreement with each other.  They would like to formally transition to comfort care and speak to the hospice nurse this morning.    Patient's code status was limited x4 and has now been changed to DNR-CC.  Comfort care orders were placed in Epic.     Ericka Golden MD, PGY-3  Internal Medicine   8/29/2024

## 2024-08-29 NOTE — PLAN OF CARE
Problem: Discharge Planning  Goal: Discharge to home or other facility with appropriate resources  Outcome: Progressing  Flowsheets (Taken 8/28/2024 2000)  Discharge to home or other facility with appropriate resources:   Identify barriers to discharge with patient and caregiver   Arrange for needed discharge resources and transportation as appropriate   Identify discharge learning needs (meds, wound care, etc)   Refer to discharge planning if patient needs post-hospital services based on physician order or complex needs related to functional status, cognitive ability or social support system     Problem: Skin/Tissue Integrity  Goal: Absence of new skin breakdown  Description: 1.  Monitor for areas of redness and/or skin breakdown  2.  Assess vascular access sites hourly  3.  Every 4-6 hours minimum:  Change oxygen saturation probe site  4.  Every 4-6 hours:  If on nasal continuous positive airway pressure, respiratory therapy assess nares and determine need for appliance change or resting period.  Outcome: Progressing     Problem: Safety - Adult  Goal: Free from fall injury  Outcome: Progressing  Flowsheets (Taken 8/29/2024 0010)  Free From Fall Injury: Instruct family/caregiver on patient safety     Problem: ABCDS Injury Assessment  Goal: Absence of physical injury  Outcome: Progressing  Flowsheets (Taken 8/29/2024 0010)  Absence of Physical Injury: Implement safety measures based on patient assessment     Problem: Chronic Conditions and Co-morbidities  Goal: Patient's chronic conditions and co-morbidity symptoms are monitored and maintained or improved  Outcome: Progressing  Flowsheets (Taken 8/28/2024 2000)  Care Plan - Patient's Chronic Conditions and Co-Morbidity Symptoms are Monitored and Maintained or Improved:   Monitor and assess patient's chronic conditions and comorbid symptoms for stability, deterioration, or improvement   Collaborate with multidisciplinary team to address chronic and comorbid  conditions and prevent exacerbation or deterioration   Update acute care plan with appropriate goals if chronic or comorbid symptoms are exacerbated and prevent overall improvement and discharge     Problem: Pain  Goal: Verbalizes/displays adequate comfort level or baseline comfort level  Outcome: Progressing  Flowsheets (Taken 8/28/2024 2000)  Verbalizes/displays adequate comfort level or baseline comfort level:   Encourage patient to monitor pain and request assistance   Assess pain using appropriate pain scale   Implement non-pharmacological measures as appropriate and evaluate response   Notify Licensed Independent Practitioner if interventions unsuccessful or patient reports new pain

## 2024-08-29 NOTE — PROGRESS NOTES
Speech-Language Pathology    Received evaluation order, reviewed chart, spoke with RN. Will sign off, as patient planning to transition to hospice.    Arianna Marcos, SLP, [id number]  Ph. # [phone]

## 2024-08-29 NOTE — PROGRESS NOTES
Met with pt's family at bedside. They expressed a wish to take pt home with hospice. Pt's wife is a retired ICU nurse and cared for her mother and mother in law at home on hospice. She is comfortable caring for pt at home. They did request a houser catheter for comfort, which I ordered. Requested that Dr. Woodward order meds to beds. D/w ANGIE Castillo and LETI Ahumada who will arrange for transport and equipment to be delivered.    Hanna Soriano APRN  Inpatient Palliative Care  [phone]

## 2024-08-29 NOTE — PROGRESS NOTES
Consulted for coccyx - stage 3. Family has opted for comfort care and a request for hospice. Wound Care to sign off; recommend comfort measures only.

## 2024-08-29 NOTE — PLAN OF CARE
79M with PMH of Parkinson disease who presented with AMS since last week. HPI obtained from family at bedside. Patient became more confused and combative since last week. Family decided to bring him to ER. He has received Haldol x3 and Seroquel overnight. Patient was admitted to floor for further workup. Patient continue to have fever overnight. He was evaluated by neurology team this AM and at the time. reportedly, patient has rigidity with lead-pipe like muscle tone. Given the code status at the time, patient continued his medical tx on the floor. The working diagnosis was potentially a NMS. The offended agent such as antipsychotics were discontinued. Rapid response was called around 1p, for GTC. His temp was 103. Patient was transferred to ICU and Neuro ICU has been consulted.      continue Keppra  -continue Abx and Acyclovir   -Continue Sinemet  -continue Dantrolene 2.5mg/kg x1 + 1mg/kg q6  -LFT and CK daily   -B1   Avoid all dopamine antagnoist and anticholinergic medication including all antipsychotic and anti anti-emetic such as zofran  -ativan PRN for SZ      Afebrile this evening,     General: encephalopathic, no distress, well-nourished  Neurologic  Mental status:   Minimally responsive, eyes closed, does not open eyes to loud name calling, does not attempt to speak or follow commands. Pupils are pinpoint and sluggish bilaterally, EOM intact with oculocephalic maneuver, face is symmetric, No blink to threat, Corneal reflex intact bilaterally. No spontaneous movement in all 4 extremities, not following commands, Subtle withdraw to pain in all 4 extremities with nailbed pressure.  Bilateral upper limb resting, pill rolling tremor with higher amplitude in distal forearms   Tone is normal    Jackelin Peter, APRN - CNP   Neurology & Neurocritical Care

## 2024-08-30 NOTE — TELEPHONE ENCOUNTER
Patient's daughter calling in to report pt just passed away in the home about one hour ago. Pt went to the ER last night and was discharged home with Hospice Services. No hospice staff were present at the time of death but are there now. Someone from Hospice should be contacted this office of the death.

## 2024-08-30 NOTE — DISCHARGE SUMMARY
V2.0  Discharge Summary    Name:  Pacheco Prince Sr. /Age/Sex: 1945 (79 y.o. male)   Admit Date: 2024  Discharge Date: 24    MRN & CSN:  [id number] & [id number] Encounter Date and Time 24 11:59 AM EDT    Attending:  No att. providers found Discharging Provider: Terrell Woodward MD       Hospital Course:     Brief HPI: Mr. Pacheco Prince Sr. is a 79 y.o. male with a PMHx significant for Parkinson's diseas who presented to the ED on 24 with AMS. He became violent with his wife and son, which prompted them to bring him to the hospital. He started to have high fevers and had an episode of seizure. Inially CXR was clear but then he started to have severe hypoxia and repeat CXR showed complete white out of the Lt lung likely 2/2 aspiration and mucus plugging. He tested +ve for COVID. There was concern for neuroleptic malginant syndrome as fever started after several doses of haldol     After several discussions family elected for hospice . Pt was discharged home with hospice in a terminal conditions     Acute metabolic encephalopathy   Acute hypoxic resp failure 2/2 aspiration pneumonia with complete opacification of LT lung  High fever  Seizure   Hx of aspiration pneumonia     The patient expressed appropriate understanding of, and agreement with the discharge recommendations, medications, and plan.     Consults this admission:  IP CONSULT TO HOSPICE    Discharge Diagnosis:   See above     Discharge Instruction:   Follow up appointments: PCP  Primary care physician: Rohan Watson, KEYSHA - CNP within 1 week  Diet: regular diet   Activity: activity as tolerated    Discharge Medications:        Medication List        START taking these medications      hyoscyamine 0.125 MG sublingual tablet  Commonly known as: Levsin/SL  Place 1 tablet under the tongue every 4 hours as needed for Cramping or Secretions     LORazepam 2 MG/ML concentrated solution  Commonly known as: ATIVAN  Take 0.5 mLs

## 2024-09-03 LAB — CRP SERPL-MCNC: 79.2 MG/L (ref 0–5.1)

## 2025-02-25 NOTE — PATIENT INSTRUCTIONS
Ok to give midodrine three times per day, but if BP above 160, ok not to give midodrine   Continue risk factor modifications. Call for any change in symptoms, call to report any changes in shortness of breath or development of chest pain with activity.     Follow up in 3-4 mos with Dr Danny Tenorio
Acute hypercapnic and hypoxic respiratory failure

## (undated) DEVICE — GOWN AURORA NONREINF LG: Brand: MEDLINE INDUSTRIES, INC.

## (undated) DEVICE — MOUTHPIECE ENDOSCP L CTRL OPN AND SIDE PORTS DISP

## (undated) DEVICE — SOLUTION IV IRRIG WATER 500ML POUR BRL ST 2F7113

## (undated) DEVICE — SINGLE USE AIR/WATER, SUCTION AND BIOPSY VALVES SET: Brand: ORCAPOD™

## (undated) DEVICE — ENDOVIVE SFT PEG KIT PULL WENFIT 20F BX2

## (undated) DEVICE — ENDOSCOPIC KIT 6X3/16 FT COLON W/ 1.1 OZ 2 GWN W/O BRSH

## (undated) DEVICE — ENDOVIVE SFT PEG KIT PUSH WENFIT 20F BX2

## (undated) DEVICE — AIR/WATER CLEANING ADAPTER FOR OLYMPUS® GI ENDOSCOPE: Brand: BULLDOG®

## (undated) DEVICE — Z DISCONTINUED USE 2749457 TUBING SAMP AD W12.5XH8.4IN D9.1IN NSL ORAL SMRT CAPNOLINE

## (undated) DEVICE — BW-412T DISP COMBO CLEANING BRUSH: Brand: SINGLE USE COMBINATION CLEANING BRUSH

## (undated) DEVICE — GLOVE ORTHO 8   MSG9480